# Patient Record
Sex: MALE | Race: WHITE | NOT HISPANIC OR LATINO | Employment: OTHER | URBAN - NONMETROPOLITAN AREA
[De-identification: names, ages, dates, MRNs, and addresses within clinical notes are randomized per-mention and may not be internally consistent; named-entity substitution may affect disease eponyms.]

---

## 2017-04-24 ENCOUNTER — HOSPITAL ENCOUNTER (EMERGENCY)
Facility: HOSPITAL | Age: 57
Discharge: ANOTHER HOSPITAL | End: 2017-04-24
Attending: EMERGENCY MEDICINE | Admitting: EMERGENCY MEDICINE

## 2017-04-24 ENCOUNTER — APPOINTMENT (OUTPATIENT)
Dept: CT IMAGING | Facility: HOSPITAL | Age: 57
End: 2017-04-24

## 2017-04-24 VITALS
BODY MASS INDEX: 32.95 KG/M2 | DIASTOLIC BLOOD PRESSURE: 91 MMHG | TEMPERATURE: 98.2 F | HEART RATE: 99 BPM | SYSTOLIC BLOOD PRESSURE: 129 MMHG | WEIGHT: 205 LBS | HEIGHT: 66 IN | RESPIRATION RATE: 16 BRPM | OXYGEN SATURATION: 98 %

## 2017-04-24 DIAGNOSIS — I63.9 CEREBROVASCULAR ACCIDENT (CVA), UNSPECIFIED MECHANISM (HCC): Primary | ICD-10-CM

## 2017-04-24 LAB
ALBUMIN SERPL-MCNC: 5.1 G/DL (ref 3.5–5)
ALBUMIN/GLOB SERPL: 1.4 G/DL (ref 1.5–2.5)
ALP SERPL-CCNC: 47 U/L (ref 40–129)
ALT SERPL W P-5'-P-CCNC: 26 U/L (ref 10–44)
ANION GAP SERPL CALCULATED.3IONS-SCNC: 6.9 MMOL/L (ref 3.6–11.2)
APTT PPP: 27.7 SECONDS (ref 24.4–31)
AST SERPL-CCNC: 23 U/L (ref 10–34)
BASOPHILS # BLD AUTO: 0.04 10*3/MM3 (ref 0–0.3)
BASOPHILS NFR BLD AUTO: 0.3 % (ref 0–2)
BILIRUB SERPL-MCNC: 0.6 MG/DL (ref 0.2–1.8)
BUN BLD-MCNC: 8 MG/DL (ref 7–21)
BUN/CREAT SERPL: 8.5 (ref 7–25)
CALCIUM SPEC-SCNC: 10.6 MG/DL (ref 7.7–10)
CHLORIDE SERPL-SCNC: 100 MMOL/L (ref 99–112)
CO2 SERPL-SCNC: 30.1 MMOL/L (ref 24.3–31.9)
CREAT BLD-MCNC: 0.94 MG/DL (ref 0.43–1.29)
D-LACTATE SERPL-SCNC: 1.6 MMOL/L (ref 0.5–2)
DEPRECATED RDW RBC AUTO: 45.5 FL (ref 37–54)
EOSINOPHIL # BLD AUTO: 0.03 10*3/MM3 (ref 0–0.7)
EOSINOPHIL NFR BLD AUTO: 0.2 % (ref 0–5)
ERYTHROCYTE [DISTWIDTH] IN BLOOD BY AUTOMATED COUNT: 14 % (ref 11.5–14.5)
FLUAV AG NPH QL: NEGATIVE
FLUBV AG NPH QL IA: NEGATIVE
GFR SERPL CREATININE-BSD FRML MDRD: 83 ML/MIN/1.73
GLOBULIN UR ELPH-MCNC: 3.6 GM/DL
GLUCOSE BLD-MCNC: 156 MG/DL (ref 70–110)
HCT VFR BLD AUTO: 51.2 % (ref 42–52)
HGB BLD-MCNC: 17.8 G/DL (ref 14–18)
HOLD SPECIMEN: NORMAL
IMM GRANULOCYTES # BLD: 0.03 10*3/MM3 (ref 0–0.03)
IMM GRANULOCYTES NFR BLD: 0.2 % (ref 0–0.5)
INR PPP: 1.03 (ref 0.8–1.1)
LYMPHOCYTES # BLD AUTO: 2.95 10*3/MM3 (ref 1–3)
LYMPHOCYTES NFR BLD AUTO: 20.8 % (ref 21–51)
MCH RBC QN AUTO: 30.9 PG (ref 27–33)
MCHC RBC AUTO-ENTMCNC: 34.8 G/DL (ref 33–37)
MCV RBC AUTO: 88.9 FL (ref 80–94)
MONOCYTES # BLD AUTO: 0.9 10*3/MM3 (ref 0.1–0.9)
MONOCYTES NFR BLD AUTO: 6.3 % (ref 0–10)
NEUTROPHILS # BLD AUTO: 10.23 10*3/MM3 (ref 1.4–6.5)
NEUTROPHILS NFR BLD AUTO: 72.2 % (ref 30–70)
OSMOLALITY SERPL CALC.SUM OF ELEC: 275.3 MOSM/KG (ref 273–305)
PLATELET # BLD AUTO: 294 10*3/MM3 (ref 130–400)
PMV BLD AUTO: 8.8 FL (ref 6–10)
POTASSIUM BLD-SCNC: 3.9 MMOL/L (ref 3.5–5.3)
PROT SERPL-MCNC: 8.7 G/DL (ref 6–8)
PROTHROMBIN TIME: 11.4 SECONDS (ref 9.8–11.9)
RBC # BLD AUTO: 5.76 10*6/MM3 (ref 4.7–6.1)
SODIUM BLD-SCNC: 137 MMOL/L (ref 135–153)
WBC NRBC COR # BLD: 14.18 10*3/MM3 (ref 4.5–12.5)
WHOLE BLOOD HOLD SPECIMEN: NORMAL
WHOLE BLOOD HOLD SPECIMEN: NORMAL

## 2017-04-24 PROCEDURE — 36415 COLL VENOUS BLD VENIPUNCTURE: CPT

## 2017-04-24 PROCEDURE — 83605 ASSAY OF LACTIC ACID: CPT | Performed by: EMERGENCY MEDICINE

## 2017-04-24 PROCEDURE — 80053 COMPREHEN METABOLIC PANEL: CPT | Performed by: EMERGENCY MEDICINE

## 2017-04-24 PROCEDURE — 70450 CT HEAD/BRAIN W/O DYE: CPT

## 2017-04-24 PROCEDURE — 93005 ELECTROCARDIOGRAM TRACING: CPT | Performed by: EMERGENCY MEDICINE

## 2017-04-24 PROCEDURE — 87040 BLOOD CULTURE FOR BACTERIA: CPT | Performed by: EMERGENCY MEDICINE

## 2017-04-24 PROCEDURE — 85025 COMPLETE CBC W/AUTO DIFF WBC: CPT | Performed by: EMERGENCY MEDICINE

## 2017-04-24 PROCEDURE — 70450 CT HEAD/BRAIN W/O DYE: CPT | Performed by: RADIOLOGY

## 2017-04-24 PROCEDURE — 93010 ELECTROCARDIOGRAM REPORT: CPT | Performed by: INTERNAL MEDICINE

## 2017-04-24 PROCEDURE — 85610 PROTHROMBIN TIME: CPT | Performed by: EMERGENCY MEDICINE

## 2017-04-24 PROCEDURE — 99284 EMERGENCY DEPT VISIT MOD MDM: CPT

## 2017-04-24 PROCEDURE — 87804 INFLUENZA ASSAY W/OPTIC: CPT | Performed by: EMERGENCY MEDICINE

## 2017-04-24 PROCEDURE — 85730 THROMBOPLASTIN TIME PARTIAL: CPT | Performed by: EMERGENCY MEDICINE

## 2017-04-24 RX ORDER — METOPROLOL TARTRATE 100 MG/1
100 TABLET ORAL 2 TIMES DAILY
COMMUNITY
End: 2017-05-04 | Stop reason: SDUPTHER

## 2017-04-24 RX ORDER — SODIUM CHLORIDE 0.9 % (FLUSH) 0.9 %
10 SYRINGE (ML) INJECTION AS NEEDED
Status: DISCONTINUED | OUTPATIENT
Start: 2017-04-24 | End: 2017-04-25 | Stop reason: HOSPADM

## 2017-04-25 ENCOUNTER — HOSPITAL ENCOUNTER (INPATIENT)
Facility: HOSPITAL | Age: 57
LOS: 3 days | Discharge: HOME | End: 2017-04-28
Attending: FAMILY MEDICINE | Admitting: FAMILY MEDICINE

## 2017-04-25 ENCOUNTER — APPOINTMENT (OUTPATIENT)
Dept: CARDIOLOGY | Facility: HOSPITAL | Age: 57
End: 2017-04-25

## 2017-04-25 ENCOUNTER — APPOINTMENT (OUTPATIENT)
Dept: MRI IMAGING | Facility: HOSPITAL | Age: 57
End: 2017-04-25

## 2017-04-25 ENCOUNTER — APPOINTMENT (OUTPATIENT)
Dept: CT IMAGING | Facility: HOSPITAL | Age: 57
End: 2017-04-25

## 2017-04-25 DIAGNOSIS — R47.01 COMBINED RECEPTIVE AND EXPRESSIVE APHASIA: Primary | ICD-10-CM

## 2017-04-25 DIAGNOSIS — Z74.09 IMPAIRED MOBILITY AND ADLS: ICD-10-CM

## 2017-04-25 DIAGNOSIS — Z74.09 IMPAIRED FUNCTIONAL MOBILITY, BALANCE, GAIT, AND ENDURANCE: ICD-10-CM

## 2017-04-25 DIAGNOSIS — I63.9 CEREBROVASCULAR ACCIDENT (CVA), UNSPECIFIED MECHANISM (HCC): ICD-10-CM

## 2017-04-25 DIAGNOSIS — Z78.9 IMPAIRED MOBILITY AND ADLS: ICD-10-CM

## 2017-04-25 PROBLEM — R73.09 ELEVATED GLUCOSE: Status: ACTIVE | Noted: 2017-04-25

## 2017-04-25 PROBLEM — I10 HYPERTENSION: Status: ACTIVE | Noted: 2017-04-25

## 2017-04-25 PROBLEM — Z72.0 TOBACCO ABUSE DISORDER: Status: ACTIVE | Noted: 2017-04-25

## 2017-04-25 LAB
ARTICHOKE IGE QN: 105 MG/DL (ref 0–130)
BH CV ECHO HCM PROVOKED PEAK GRADIENT: 29 MMHG
BH CV ECHO HCM RESTING PEAK GRADIENT: 29 MMHG
BH CV ECHO MEAS - AO ROOT AREA: 5.7 CM^2
BH CV ECHO MEAS - AO ROOT DIAM: 2.7 CM
BH CV ECHO MEAS - CONTRAST EF (2CH): 65 ML/M^2
BH CV ECHO MEAS - CONTRAST EF 4CH: 82.9 ML/M^2
BH CV ECHO MEAS - EDV(CUBED): 61.6 ML
BH CV ECHO MEAS - EDV(MOD-SP2): 20 ML
BH CV ECHO MEAS - EDV(MOD-SP4): 41 ML
BH CV ECHO MEAS - EDV(TEICH): 67.9 ML
BH CV ECHO MEAS - EF(CUBED): 74.3 %
BH CV ECHO MEAS - EF(MOD-SP2): 65 %
BH CV ECHO MEAS - EF(TEICH): 66.8 %
BH CV ECHO MEAS - ESV(CUBED): 15.8 ML
BH CV ECHO MEAS - ESV(MOD-SP2): 7 ML
BH CV ECHO MEAS - ESV(MOD-SP4): 7 ML
BH CV ECHO MEAS - ESV(TEICH): 22.5 ML
BH CV ECHO MEAS - FS: 36.5 %
BH CV ECHO MEAS - IVS/LVPW: 1.1
BH CV ECHO MEAS - IVSD: 1.1 CM
BH CV ECHO MEAS - LA/AO: 1.4
BH CV ECHO MEAS - LAT PEAK E' VEL: 8.2 CM/SEC
BH CV ECHO MEAS - LV MASS(C)D: 127.2 GRAMS
BH CV ECHO MEAS - LVIDD: 4 CM
BH CV ECHO MEAS - LVIDS: 2.5 CM
BH CV ECHO MEAS - LVLD AP2: 5.2 CM
BH CV ECHO MEAS - LVLD AP4: 6.8 CM
BH CV ECHO MEAS - LVLS AP2: 4.6 CM
BH CV ECHO MEAS - LVLS AP4: 4.4 CM
BH CV ECHO MEAS - LVPWD: 1.1 CM
BH CV ECHO MEAS - MED PEAK E' VEL: 6.1 CM/SEC
BH CV ECHO MEAS - MV A MAX VEL: 103 CM/SEC
BH CV ECHO MEAS - MV E MAX VEL: 66.1 CM/SEC
BH CV ECHO MEAS - MV E/A: 0.64
BH CV ECHO MEAS - PA ACC SLOPE: 574 CM/SEC^2
BH CV ECHO MEAS - PA ACC TIME: 0.15 SEC
BH CV ECHO MEAS - PA PR(ACCEL): 12.4 MMHG
BH CV ECHO MEAS - SV(CUBED): 45.8 ML
BH CV ECHO MEAS - SV(MOD-SP2): 13 ML
BH CV ECHO MEAS - SV(MOD-SP4): 34 ML
BH CV ECHO MEAS - SV(TEICH): 45.4 ML
BH CV ECHO MEAS - TAPSE (>1.6): 1.5 CM2
BH CV XLRA - RV BASE: 2.9 CM
BH CV XLRA - RV LENGTH: 5.2 CM
BH CV XLRA - RV MID: 2.5 CM
BH CV XLRA - TDI S': 33 CM/SEC
BH CV XLRA MEAS LEFT CCA RATIO VEL: 108 CM/SEC
BH CV XLRA MEAS LEFT DIST CCA EDV: 30.3 CM/SEC
BH CV XLRA MEAS LEFT DIST CCA PSV: 108 CM/SEC
BH CV XLRA MEAS LEFT DIST ICA EDV: 25.8 CM/SEC
BH CV XLRA MEAS LEFT DIST ICA PSV: 50.8 CM/SEC
BH CV XLRA MEAS LEFT ICA RATIO VEL: 289 CM/SEC
BH CV XLRA MEAS LEFT ICA/CCA RATIO: 2.7
BH CV XLRA MEAS LEFT MID ICA EDV: 42.7 CM/SEC
BH CV XLRA MEAS LEFT MID ICA PSV: 114 CM/SEC
BH CV XLRA MEAS LEFT PROX CCA EDV: 21 CM/SEC
BH CV XLRA MEAS LEFT PROX CCA PSV: 84.9 CM/SEC
BH CV XLRA MEAS LEFT PROX ECA PSV: 241 CM/SEC
BH CV XLRA MEAS LEFT PROX ICA EDV: 124 CM/SEC
BH CV XLRA MEAS LEFT PROX ICA PSV: 289 CM/SEC
BH CV XLRA MEAS LEFT PROX SCLA PSV: 132 CM/SEC
BH CV XLRA MEAS LEFT VERTEBRAL A PSV: 52.5 CM/SEC
BH CV XLRA MEAS RIGHT CCA RATIO VEL: 90.7 CM/SEC
BH CV XLRA MEAS RIGHT DIST CCA EDV: 30.8 CM/SEC
BH CV XLRA MEAS RIGHT DIST CCA PSV: 90.7 CM/SEC
BH CV XLRA MEAS RIGHT DIST ICA EDV: 47.4 CM/SEC
BH CV XLRA MEAS RIGHT DIST ICA PSV: 173 CM/SEC
BH CV XLRA MEAS RIGHT ICA RATIO VEL: 277 CM/SEC
BH CV XLRA MEAS RIGHT ICA/CCA RATIO: 3.1
BH CV XLRA MEAS RIGHT MID ICA EDV: 87.7 CM/SEC
BH CV XLRA MEAS RIGHT MID ICA PSV: 270 CM/SEC
BH CV XLRA MEAS RIGHT PROX CCA EDV: 27.9 CM/SEC
BH CV XLRA MEAS RIGHT PROX CCA PSV: 88.9 CM/SEC
BH CV XLRA MEAS RIGHT PROX ECA PSV: 274 CM/SEC
BH CV XLRA MEAS RIGHT PROX ICA EDV: 107 CM/SEC
BH CV XLRA MEAS RIGHT PROX ICA PSV: 277 CM/SEC
BH CV XLRA MEAS RIGHT PROX SCLA PSV: 129 CM/SEC
BH CV XLRA MEAS RIGHT VERTEBRAL A PSV: 39.6 CM/SEC
CHOLEST SERPL-MCNC: 192 MG/DL (ref 0–200)
CORTIS SERPL-MCNC: 23.4 MCG/DL
E/E' RATIO: 9.5
GLUCOSE BLDC GLUCOMTR-MCNC: 142 MG/DL (ref 70–130)
GLUCOSE BLDC GLUCOMTR-MCNC: 166 MG/DL (ref 70–130)
HBA1C MFR BLD: 7.7 % (ref 4.8–5.6)
HDLC SERPL-MCNC: 30 MG/DL (ref 40–60)
HOLD SPECIMEN: NORMAL
LEFT ATRIUM VOLUME INDEX: 9.5 ML/M2
LV EF 2D ECHO EST: 70 %
TRIGL SERPL-MCNC: 460 MG/DL (ref 0–150)

## 2017-04-25 PROCEDURE — 93010 ELECTROCARDIOGRAM REPORT: CPT | Performed by: INTERNAL MEDICINE

## 2017-04-25 PROCEDURE — 82962 GLUCOSE BLOOD TEST: CPT

## 2017-04-25 PROCEDURE — 0 IOPAMIDOL PER 1 ML: Performed by: INTERNAL MEDICINE

## 2017-04-25 PROCEDURE — 93005 ELECTROCARDIOGRAM TRACING: CPT | Performed by: FAMILY MEDICINE

## 2017-04-25 PROCEDURE — 83036 HEMOGLOBIN GLYCOSYLATED A1C: CPT | Performed by: NURSE PRACTITIONER

## 2017-04-25 PROCEDURE — 99255 IP/OBS CONSLTJ NEW/EST HI 80: CPT | Performed by: PSYCHIATRY & NEUROLOGY

## 2017-04-25 PROCEDURE — 80061 LIPID PANEL: CPT | Performed by: NURSE PRACTITIONER

## 2017-04-25 PROCEDURE — 70553 MRI BRAIN STEM W/O & W/DYE: CPT

## 2017-04-25 PROCEDURE — 93880 EXTRACRANIAL BILAT STUDY: CPT | Performed by: INTERNAL MEDICINE

## 2017-04-25 PROCEDURE — 92523 SPEECH SOUND LANG COMPREHEN: CPT

## 2017-04-25 PROCEDURE — 70498 CT ANGIOGRAPHY NECK: CPT

## 2017-04-25 PROCEDURE — 99223 1ST HOSP IP/OBS HIGH 75: CPT | Performed by: FAMILY MEDICINE

## 2017-04-25 PROCEDURE — 82533 TOTAL CORTISOL: CPT | Performed by: NURSE PRACTITIONER

## 2017-04-25 PROCEDURE — 93306 TTE W/DOPPLER COMPLETE: CPT

## 2017-04-25 PROCEDURE — 93880 EXTRACRANIAL BILAT STUDY: CPT

## 2017-04-25 PROCEDURE — 70496 CT ANGIOGRAPHY HEAD: CPT

## 2017-04-25 PROCEDURE — 99253 IP/OBS CNSLTJ NEW/EST LOW 45: CPT | Performed by: NEUROLOGICAL SURGERY

## 2017-04-25 PROCEDURE — A9577 INJ MULTIHANCE: HCPCS | Performed by: FAMILY MEDICINE

## 2017-04-25 PROCEDURE — 0 GADOBENATE DIMEGLUMINE 529 MG/ML SOLUTION: Performed by: FAMILY MEDICINE

## 2017-04-25 PROCEDURE — 97166 OT EVAL MOD COMPLEX 45 MIN: CPT

## 2017-04-25 PROCEDURE — 97162 PT EVAL MOD COMPLEX 30 MIN: CPT

## 2017-04-25 PROCEDURE — 93306 TTE W/DOPPLER COMPLETE: CPT | Performed by: INTERNAL MEDICINE

## 2017-04-25 RX ORDER — ATORVASTATIN CALCIUM 40 MG/1
80 TABLET, FILM COATED ORAL NIGHTLY
Status: DISCONTINUED | OUTPATIENT
Start: 2017-04-25 | End: 2017-04-28 | Stop reason: HOSPADM

## 2017-04-25 RX ORDER — BISACODYL 10 MG
10 SUPPOSITORY, RECTAL RECTAL DAILY PRN
Status: DISCONTINUED | OUTPATIENT
Start: 2017-04-25 | End: 2017-04-28 | Stop reason: HOSPADM

## 2017-04-25 RX ORDER — LANSOPRAZOLE 30 MG/1
30 CAPSULE, DELAYED RELEASE ORAL DAILY
Status: ON HOLD | COMMUNITY
End: 2017-04-28

## 2017-04-25 RX ORDER — ASPIRIN 325 MG
325 TABLET ORAL DAILY
Status: DISCONTINUED | OUTPATIENT
Start: 2017-04-26 | End: 2017-04-28 | Stop reason: HOSPADM

## 2017-04-25 RX ORDER — DOCUSATE SODIUM 100 MG/1
100 CAPSULE, LIQUID FILLED ORAL 2 TIMES DAILY
Status: DISCONTINUED | OUTPATIENT
Start: 2017-04-25 | End: 2017-04-28 | Stop reason: HOSPADM

## 2017-04-25 RX ORDER — ONDANSETRON 2 MG/ML
4 INJECTION INTRAMUSCULAR; INTRAVENOUS EVERY 6 HOURS PRN
Status: DISCONTINUED | OUTPATIENT
Start: 2017-04-25 | End: 2017-04-28 | Stop reason: HOSPADM

## 2017-04-25 RX ORDER — ASPIRIN 300 MG/1
300 SUPPOSITORY RECTAL DAILY
Status: DISCONTINUED | OUTPATIENT
Start: 2017-04-26 | End: 2017-04-28 | Stop reason: HOSPADM

## 2017-04-25 RX ORDER — ACETAMINOPHEN 325 MG/1
650 TABLET ORAL EVERY 4 HOURS PRN
Status: DISCONTINUED | OUTPATIENT
Start: 2017-04-25 | End: 2017-04-28 | Stop reason: HOSPADM

## 2017-04-25 RX ORDER — PANTOPRAZOLE SODIUM 40 MG/1
40 TABLET, DELAYED RELEASE ORAL
Status: DISCONTINUED | OUTPATIENT
Start: 2017-04-25 | End: 2017-04-28 | Stop reason: HOSPADM

## 2017-04-25 RX ORDER — SODIUM CHLORIDE 0.9 % (FLUSH) 0.9 %
1-10 SYRINGE (ML) INJECTION AS NEEDED
Status: DISCONTINUED | OUTPATIENT
Start: 2017-04-25 | End: 2017-04-28 | Stop reason: HOSPADM

## 2017-04-25 RX ORDER — ACETAMINOPHEN 650 MG/1
650 SUPPOSITORY RECTAL EVERY 4 HOURS PRN
Status: DISCONTINUED | OUTPATIENT
Start: 2017-04-25 | End: 2017-04-28 | Stop reason: HOSPADM

## 2017-04-25 RX ORDER — NICOTINE 21 MG/24HR
1 PATCH, TRANSDERMAL 24 HOURS TRANSDERMAL EVERY 24 HOURS
Status: DISCONTINUED | OUTPATIENT
Start: 2017-04-25 | End: 2017-04-28 | Stop reason: HOSPADM

## 2017-04-25 RX ADMIN — NICOTINE 1 PATCH: 21 PATCH, EXTENDED RELEASE TRANSDERMAL at 05:55

## 2017-04-25 RX ADMIN — GADOBENATE DIMEGLUMINE 19 ML: 529 INJECTION, SOLUTION INTRAVENOUS at 05:15

## 2017-04-25 RX ADMIN — DOCUSATE SODIUM 100 MG: 100 CAPSULE, LIQUID FILLED ORAL at 17:56

## 2017-04-25 RX ADMIN — PANTOPRAZOLE SODIUM 40 MG: 40 TABLET, DELAYED RELEASE ORAL at 05:55

## 2017-04-25 RX ADMIN — DOCUSATE SODIUM 100 MG: 100 CAPSULE, LIQUID FILLED ORAL at 09:27

## 2017-04-25 RX ADMIN — ATORVASTATIN CALCIUM 80 MG: 40 TABLET, FILM COATED ORAL at 20:12

## 2017-04-25 RX ADMIN — ACETAMINOPHEN 650 MG: 325 TABLET ORAL at 05:55

## 2017-04-25 RX ADMIN — IOPAMIDOL 75 ML: 755 INJECTION, SOLUTION INTRAVENOUS at 16:35

## 2017-04-25 NOTE — ED NOTES
Cary Clayton RN, [facility] called for report at this time. Full report given at this time, room number provided per Cary Clayton for patient.     Federica Mack RN  04/24/17 4811

## 2017-04-25 NOTE — ED NOTES
Called [facility] about ALS transport to [facility]. Dispatcher said they have 2 trucks out of town right now and will probably be a while before they can take it.     Cristina Carr  04/24/17 1237

## 2017-04-25 NOTE — ED NOTES
FAMILY STATES THAT PT JUST HAS NOT BEEN ACTING RIGHT FAMILY STATES THAT PT HAS BEEN VERY CONFUSED SINCE THEY WENT TO HIS HOUSE TODAY TO SEE WHY HE DIDN'T GO TO WORK OR WAS ANSWERING PHONE PT CANNOT VERBALIZE WHAT IS WRONG PT CANNOT TELL ME BIRTHDAY NAME ETC PT KNOWS BUT CANNOT VERBALIZE. LAST NIGHT WAS LAST KNOWN WELL FOR PT     Zena Nguyen RN  04/24/17 4415       Zena Nguyen RN  04/24/17 8561

## 2017-04-25 NOTE — ED NOTES
[facility] dispatch also said they have 2 trucks out of town and will be a few hours before they have a truck available.     Cristina Carr  04/24/17 9120

## 2017-04-25 NOTE — ED NOTES
Spoke with Oneida Vargas, patient's daughter at this time. Patient's daughter gives permission at this time for patient to be transported to [place] for further care.     Federica Mack RN  04/24/17 9749

## 2017-04-25 NOTE — ED NOTES
"Patient is resting on stretcher with patient's family at bedside x4. Patient remains aphasic at this time, patient is able to answer \"yes\" to questions, but unable to verbalize any other words. Patient's respirations are regular and unlabored at this time, NADN, will continue to monitor.     Federica Mack RN  04/24/17 9063    "

## 2017-04-25 NOTE — ED NOTES
"Patient is leaving ED with [facility] at this time. Report given to EMS, no further questions at this time. Patient remains aphasic, able to verbalize \"thank you\" when explained plan of care. IVs remain in place at discharge, respirations are regular and unlabored, DORENEN.     Federica Mack RN  04/24/17 8359    "

## 2017-04-25 NOTE — ED PROVIDER NOTES
Subjective   Patient is a 56 y.o. male presenting with altered mental status.   History provided by:  Patient  Altered Mental Status   Presenting symptoms: behavior changes, confusion, disorientation, lethargy and memory loss    Severity:  Moderate  Most recent episode:  Today  Episode history:  Single  Duration:  15 hours  Timing:  Constant  Progression:  Unchanged  Context: not head injury, taking medications as prescribed, not nursing home resident, not recent change in medication and not recent illness    Associated symptoms: weakness    Associated symptoms: no abdominal pain and no fever        Review of Systems   Constitutional: Negative for fever.   HENT: Negative.    Respiratory: Negative.    Cardiovascular: Negative.  Negative for chest pain.   Gastrointestinal: Negative.  Negative for abdominal pain.   Endocrine: Negative.    Genitourinary: Negative.  Negative for dysuria.   Skin: Negative.    Neurological: Positive for speech difficulty and weakness.   Psychiatric/Behavioral: Positive for confusion and memory loss.   All other systems reviewed and are negative.      Past Medical History:   Diagnosis Date   • Ear infection    • GERD (gastroesophageal reflux disease)     self diagnosed takes otc ppi   • Hypertension    • Hypertension        No Known Allergies    Past Surgical History:   Procedure Laterality Date   • HERNIA REPAIR     • HERNIA REPAIR         Family History   Problem Relation Age of Onset   • Diabetes Mother    • COPD Father        Social History     Social History   • Marital status:      Spouse name: N/A   • Number of children: N/A   • Years of education: N/A     Social History Main Topics   • Smoking status: Current Every Day Smoker     Packs/day: 1.00     Years: 40.00     Types: Cigarettes   • Smokeless tobacco: Never Used   • Alcohol use No   • Drug use: No   • Sexual activity: Defer     Other Topics Concern   • None     Social History Narrative           Objective   Physical  Exam   Constitutional: He is oriented to person, place, and time. He appears well-developed and well-nourished. No distress.   HENT:   Head: Normocephalic and atraumatic.   Right Ear: External ear normal.   Left Ear: External ear normal.   Nose: Nose normal.   Eyes: Conjunctivae and EOM are normal. Pupils are equal, round, and reactive to light.   Neck: Normal range of motion. Neck supple. No JVD present. No tracheal deviation present.   Cardiovascular: Normal rate, regular rhythm and normal heart sounds.    No murmur heard.  Pulmonary/Chest: Effort normal and breath sounds normal. No respiratory distress. He has no wheezes.   Abdominal: Soft. Bowel sounds are normal. There is no tenderness.   Musculoskeletal: Normal range of motion. He exhibits no edema or deformity.   Neurological: He is alert and oriented to person, place, and time. No cranial nerve deficit.   Weakness right    Skin: Skin is warm and dry. No rash noted. He is not diaphoretic. No erythema. No pallor.   Psychiatric: He has a normal mood and affect. His behavior is normal. Thought content normal.   Nursing note and vitals reviewed.      Procedures         ED Course  ED Course                  MDM    Final diagnoses:   Cerebrovascular accident (CVA), unspecified mechanism            Sid Rojo MD  04/28/17 3304

## 2017-04-26 LAB
GLUCOSE BLDC GLUCOMTR-MCNC: 147 MG/DL (ref 70–130)
GLUCOSE BLDC GLUCOMTR-MCNC: 148 MG/DL (ref 70–130)
GLUCOSE BLDC GLUCOMTR-MCNC: 150 MG/DL (ref 70–130)
GLUCOSE BLDC GLUCOMTR-MCNC: 155 MG/DL (ref 70–130)
GLUCOSE BLDC GLUCOMTR-MCNC: 156 MG/DL (ref 70–130)

## 2017-04-26 PROCEDURE — 99231 SBSQ HOSP IP/OBS SF/LOW 25: CPT | Performed by: PSYCHIATRY & NEUROLOGY

## 2017-04-26 PROCEDURE — 97110 THERAPEUTIC EXERCISES: CPT

## 2017-04-26 PROCEDURE — 82962 GLUCOSE BLOOD TEST: CPT

## 2017-04-26 PROCEDURE — G0108 DIAB MANAGE TRN  PER INDIV: HCPCS | Performed by: REGISTERED NURSE

## 2017-04-26 PROCEDURE — 99232 SBSQ HOSP IP/OBS MODERATE 35: CPT | Performed by: INTERNAL MEDICINE

## 2017-04-26 PROCEDURE — 99231 SBSQ HOSP IP/OBS SF/LOW 25: CPT | Performed by: NEUROLOGICAL SURGERY

## 2017-04-26 PROCEDURE — 63710000001 INSULIN LISPRO (HUMAN) PER 5 UNITS: Performed by: INTERNAL MEDICINE

## 2017-04-26 PROCEDURE — 97116 GAIT TRAINING THERAPY: CPT

## 2017-04-26 RX ORDER — CLOPIDOGREL BISULFATE 75 MG/1
75 TABLET ORAL DAILY
Status: DISCONTINUED | OUTPATIENT
Start: 2017-04-27 | End: 2017-04-28 | Stop reason: HOSPADM

## 2017-04-26 RX ORDER — CLOPIDOGREL BISULFATE 75 MG/1
300 TABLET ORAL ONCE
Status: COMPLETED | OUTPATIENT
Start: 2017-04-26 | End: 2017-04-26

## 2017-04-26 RX ORDER — NICOTINE POLACRILEX 4 MG
15 LOZENGE BUCCAL
Status: DISCONTINUED | OUTPATIENT
Start: 2017-04-26 | End: 2017-04-28 | Stop reason: HOSPADM

## 2017-04-26 RX ORDER — METOPROLOL TARTRATE 100 MG/1
100 TABLET ORAL 2 TIMES DAILY
Status: DISCONTINUED | OUTPATIENT
Start: 2017-04-26 | End: 2017-04-28 | Stop reason: HOSPADM

## 2017-04-26 RX ORDER — DEXTROSE MONOHYDRATE 25 G/50ML
25 INJECTION, SOLUTION INTRAVENOUS
Status: DISCONTINUED | OUTPATIENT
Start: 2017-04-26 | End: 2017-04-28 | Stop reason: HOSPADM

## 2017-04-26 RX ADMIN — INSULIN LISPRO 2 UNITS: 100 INJECTION, SOLUTION INTRAVENOUS; SUBCUTANEOUS at 20:56

## 2017-04-26 RX ADMIN — ASPIRIN 325 MG ORAL TABLET 325 MG: 325 PILL ORAL at 05:40

## 2017-04-26 RX ADMIN — INSULIN LISPRO 2 UNITS: 100 INJECTION, SOLUTION INTRAVENOUS; SUBCUTANEOUS at 12:49

## 2017-04-26 RX ADMIN — PANTOPRAZOLE SODIUM 40 MG: 40 TABLET, DELAYED RELEASE ORAL at 05:40

## 2017-04-26 RX ADMIN — DOCUSATE SODIUM 100 MG: 100 CAPSULE, LIQUID FILLED ORAL at 09:07

## 2017-04-26 RX ADMIN — DOCUSATE SODIUM 100 MG: 100 CAPSULE, LIQUID FILLED ORAL at 17:17

## 2017-04-26 RX ADMIN — CLOPIDOGREL BISULFATE 300 MG: 75 TABLET ORAL at 11:48

## 2017-04-26 RX ADMIN — NICOTINE 1 PATCH: 21 PATCH, EXTENDED RELEASE TRANSDERMAL at 05:41

## 2017-04-26 RX ADMIN — METOPROLOL TARTRATE 100 MG: 100 TABLET ORAL at 11:49

## 2017-04-26 RX ADMIN — METOPROLOL TARTRATE 100 MG: 100 TABLET ORAL at 17:17

## 2017-04-26 RX ADMIN — ATORVASTATIN CALCIUM 80 MG: 40 TABLET, FILM COATED ORAL at 20:55

## 2017-04-27 PROBLEM — E11.9 DIABETES MELLITUS TYPE 2 IN NONOBESE: Status: ACTIVE | Noted: 2017-04-25

## 2017-04-27 PROBLEM — E78.5 DYSLIPIDEMIA: Status: ACTIVE | Noted: 2017-04-27

## 2017-04-27 PROBLEM — I65.23 BILATERAL CAROTID ARTERY STENOSIS: Status: ACTIVE | Noted: 2017-04-27

## 2017-04-27 LAB
GLUCOSE BLDC GLUCOMTR-MCNC: 144 MG/DL (ref 70–130)
GLUCOSE BLDC GLUCOMTR-MCNC: 148 MG/DL (ref 70–130)
GLUCOSE BLDC GLUCOMTR-MCNC: 160 MG/DL (ref 70–130)
GLUCOSE BLDC GLUCOMTR-MCNC: 183 MG/DL (ref 70–130)

## 2017-04-27 PROCEDURE — 97530 THERAPEUTIC ACTIVITIES: CPT | Performed by: OCCUPATIONAL THERAPIST

## 2017-04-27 PROCEDURE — 97116 GAIT TRAINING THERAPY: CPT

## 2017-04-27 PROCEDURE — 82962 GLUCOSE BLOOD TEST: CPT

## 2017-04-27 PROCEDURE — 99232 SBSQ HOSP IP/OBS MODERATE 35: CPT | Performed by: FAMILY MEDICINE

## 2017-04-27 PROCEDURE — 99231 SBSQ HOSP IP/OBS SF/LOW 25: CPT | Performed by: PSYCHIATRY & NEUROLOGY

## 2017-04-27 PROCEDURE — 97110 THERAPEUTIC EXERCISES: CPT

## 2017-04-27 RX ORDER — MAGNESIUM CARB/ALUMINUM HYDROX 105-160MG
30 TABLET,CHEWABLE ORAL DAILY PRN
Status: DISCONTINUED | OUTPATIENT
Start: 2017-04-27 | End: 2017-04-28 | Stop reason: HOSPADM

## 2017-04-27 RX ADMIN — METOPROLOL TARTRATE 100 MG: 100 TABLET ORAL at 08:54

## 2017-04-27 RX ADMIN — INSULIN LISPRO 2 UNITS: 100 INJECTION, SOLUTION INTRAVENOUS; SUBCUTANEOUS at 12:19

## 2017-04-27 RX ADMIN — INSULIN LISPRO 2 UNITS: 100 INJECTION, SOLUTION INTRAVENOUS; SUBCUTANEOUS at 17:16

## 2017-04-27 RX ADMIN — DOCUSATE SODIUM 100 MG: 100 CAPSULE, LIQUID FILLED ORAL at 17:16

## 2017-04-27 RX ADMIN — CLOPIDOGREL BISULFATE 75 MG: 75 TABLET ORAL at 08:54

## 2017-04-27 RX ADMIN — ASPIRIN 325 MG ORAL TABLET 325 MG: 325 PILL ORAL at 08:54

## 2017-04-27 RX ADMIN — ATORVASTATIN CALCIUM 80 MG: 40 TABLET, FILM COATED ORAL at 21:29

## 2017-04-27 RX ADMIN — NICOTINE 1 PATCH: 21 PATCH, EXTENDED RELEASE TRANSDERMAL at 05:17

## 2017-04-27 RX ADMIN — METOPROLOL TARTRATE 100 MG: 100 TABLET ORAL at 17:16

## 2017-04-27 RX ADMIN — PANTOPRAZOLE SODIUM 40 MG: 40 TABLET, DELAYED RELEASE ORAL at 05:17

## 2017-04-27 RX ADMIN — METFORMIN HYDROCHLORIDE 500 MG: 500 TABLET, FILM COATED ORAL at 17:16

## 2017-04-27 RX ADMIN — DOCUSATE SODIUM 100 MG: 100 CAPSULE, LIQUID FILLED ORAL at 08:53

## 2017-04-28 VITALS
HEIGHT: 66 IN | DIASTOLIC BLOOD PRESSURE: 73 MMHG | SYSTOLIC BLOOD PRESSURE: 119 MMHG | BODY MASS INDEX: 32.62 KG/M2 | HEART RATE: 82 BPM | TEMPERATURE: 96.3 F | RESPIRATION RATE: 16 BRPM | WEIGHT: 203 LBS | OXYGEN SATURATION: 95 %

## 2017-04-28 LAB
GLUCOSE BLDC GLUCOMTR-MCNC: 134 MG/DL (ref 70–130)
GLUCOSE BLDC GLUCOMTR-MCNC: 146 MG/DL (ref 70–130)
TSH SERPL DL<=0.05 MIU/L-ACNC: 2.94 MIU/ML (ref 0.35–5.35)

## 2017-04-28 PROCEDURE — 82962 GLUCOSE BLOOD TEST: CPT

## 2017-04-28 PROCEDURE — 97116 GAIT TRAINING THERAPY: CPT

## 2017-04-28 PROCEDURE — 97110 THERAPEUTIC EXERCISES: CPT

## 2017-04-28 PROCEDURE — 84443 ASSAY THYROID STIM HORMONE: CPT | Performed by: NURSE PRACTITIONER

## 2017-04-28 RX ORDER — ATORVASTATIN CALCIUM 80 MG/1
80 TABLET, FILM COATED ORAL NIGHTLY
Qty: 30 TABLET | Refills: 0 | Status: SHIPPED | OUTPATIENT
Start: 2017-04-28 | End: 2017-05-04 | Stop reason: SDUPTHER

## 2017-04-28 RX ORDER — CLOPIDOGREL BISULFATE 75 MG/1
75 TABLET ORAL DAILY
Qty: 30 TABLET | Refills: 0 | Status: SHIPPED | OUTPATIENT
Start: 2017-04-28 | End: 2017-05-04 | Stop reason: SDUPTHER

## 2017-04-28 RX ORDER — LANSOPRAZOLE 30 MG/1
30 CAPSULE, DELAYED RELEASE ORAL DAILY
Qty: 30 CAPSULE | Refills: 0 | Status: SHIPPED | OUTPATIENT
Start: 2017-04-28 | End: 2017-05-04 | Stop reason: SDUPTHER

## 2017-04-28 RX ORDER — NICOTINE 21 MG/24HR
1 PATCH, TRANSDERMAL 24 HOURS TRANSDERMAL EVERY 24 HOURS
Qty: 30 PATCH | Refills: 0 | Status: SHIPPED | OUTPATIENT
Start: 2017-04-28 | End: 2017-05-16 | Stop reason: SINTOL

## 2017-04-28 RX ORDER — ASPIRIN 325 MG
325 TABLET ORAL DAILY
Qty: 30 TABLET | Refills: 0 | Status: SHIPPED | OUTPATIENT
Start: 2017-04-28 | End: 2017-05-04 | Stop reason: SDUPTHER

## 2017-04-28 RX ADMIN — METOPROLOL TARTRATE 100 MG: 100 TABLET ORAL at 09:31

## 2017-04-28 RX ADMIN — NICOTINE 1 PATCH: 21 PATCH, EXTENDED RELEASE TRANSDERMAL at 05:27

## 2017-04-28 RX ADMIN — CLOPIDOGREL BISULFATE 75 MG: 75 TABLET ORAL at 09:31

## 2017-04-28 RX ADMIN — PANTOPRAZOLE SODIUM 40 MG: 40 TABLET, DELAYED RELEASE ORAL at 05:26

## 2017-04-28 RX ADMIN — ASPIRIN 325 MG ORAL TABLET 325 MG: 325 PILL ORAL at 09:31

## 2017-04-28 RX ADMIN — METFORMIN HYDROCHLORIDE 500 MG: 500 TABLET, FILM COATED ORAL at 09:32

## 2017-04-29 LAB — BACTERIA SPEC AEROBE CULT: NORMAL

## 2017-05-01 ENCOUNTER — APPOINTMENT (OUTPATIENT)
Dept: OCCUPATIONAL THERAPY | Facility: HOSPITAL | Age: 57
End: 2017-05-01

## 2017-05-01 LAB
BACTERIA SPEC AEROBE CULT: ABNORMAL
GRAM STN SPEC: ABNORMAL

## 2017-05-02 ENCOUNTER — HOSPITAL ENCOUNTER (OUTPATIENT)
Dept: PHYSICAL THERAPY | Facility: HOSPITAL | Age: 57
Setting detail: THERAPIES SERIES
Discharge: HOME | End: 2017-05-02

## 2017-05-02 DIAGNOSIS — R26.89 BALANCE PROBLEM: ICD-10-CM

## 2017-05-02 DIAGNOSIS — R47.01 APHASIA: Primary | ICD-10-CM

## 2017-05-02 DIAGNOSIS — I63.9 CEREBROVASCULAR ACCIDENT (CVA), UNSPECIFIED MECHANISM (HCC): ICD-10-CM

## 2017-05-02 PROCEDURE — 97163 PT EVAL HIGH COMPLEX 45 MIN: CPT | Performed by: PHYSICAL THERAPIST

## 2017-05-03 ENCOUNTER — HOSPITAL ENCOUNTER (OUTPATIENT)
Dept: OCCUPATIONAL THERAPY | Facility: HOSPITAL | Age: 57
Setting detail: THERAPIES SERIES
Discharge: HOME | End: 2017-05-03

## 2017-05-03 DIAGNOSIS — G81.91 RIGHT HEMIPARESIS (HCC): Primary | ICD-10-CM

## 2017-05-03 PROCEDURE — 97167 OT EVAL HIGH COMPLEX 60 MIN: CPT | Performed by: OCCUPATIONAL THERAPIST

## 2017-05-04 ENCOUNTER — OFFICE VISIT (OUTPATIENT)
Dept: FAMILY MEDICINE CLINIC | Facility: CLINIC | Age: 57
End: 2017-05-04

## 2017-05-04 VITALS
WEIGHT: 195.2 LBS | OXYGEN SATURATION: 99 % | HEIGHT: 66 IN | SYSTOLIC BLOOD PRESSURE: 151 MMHG | TEMPERATURE: 97.3 F | DIASTOLIC BLOOD PRESSURE: 90 MMHG | BODY MASS INDEX: 31.37 KG/M2 | HEART RATE: 64 BPM

## 2017-05-04 DIAGNOSIS — R47.01 APHASIA: ICD-10-CM

## 2017-05-04 DIAGNOSIS — K21.9 GASTROESOPHAGEAL REFLUX DISEASE WITHOUT ESOPHAGITIS: ICD-10-CM

## 2017-05-04 DIAGNOSIS — E78.5 DYSLIPIDEMIA: ICD-10-CM

## 2017-05-04 DIAGNOSIS — E11.9 DIABETES MELLITUS TYPE 2 IN NONOBESE (HCC): ICD-10-CM

## 2017-05-04 DIAGNOSIS — I10 ESSENTIAL HYPERTENSION: ICD-10-CM

## 2017-05-04 DIAGNOSIS — Z72.0 TOBACCO ABUSE DISORDER: ICD-10-CM

## 2017-05-04 DIAGNOSIS — H61.22 IMPACTED CERUMEN OF LEFT EAR: ICD-10-CM

## 2017-05-04 DIAGNOSIS — I63.9 CEREBROVASCULAR ACCIDENT (CVA), UNSPECIFIED MECHANISM (HCC): Primary | ICD-10-CM

## 2017-05-04 LAB
ALBUMIN SERPL-MCNC: 4.7 G/DL (ref 3.5–5)
ALBUMIN/GLOB SERPL: 1.5 G/DL (ref 1.5–2.5)
ALP SERPL-CCNC: 59 U/L (ref 40–129)
ALT SERPL W P-5'-P-CCNC: 30 U/L (ref 10–44)
ANION GAP SERPL CALCULATED.3IONS-SCNC: 3.9 MMOL/L (ref 3.6–11.2)
AST SERPL-CCNC: 22 U/L (ref 10–34)
BASOPHILS # BLD AUTO: 0.05 10*3/MM3 (ref 0–0.3)
BASOPHILS NFR BLD AUTO: 0.5 % (ref 0–2)
BILIRUB SERPL-MCNC: 0.5 MG/DL (ref 0.2–1.8)
BUN BLD-MCNC: 10 MG/DL (ref 7–21)
BUN/CREAT SERPL: 12 (ref 7–25)
CALCIUM SPEC-SCNC: 10.2 MG/DL (ref 7.7–10)
CHLORIDE SERPL-SCNC: 103 MMOL/L (ref 99–112)
CO2 SERPL-SCNC: 32.1 MMOL/L (ref 24.3–31.9)
CREAT BLD-MCNC: 0.83 MG/DL (ref 0.43–1.29)
DEPRECATED RDW RBC AUTO: 43.4 FL (ref 37–54)
EOSINOPHIL # BLD AUTO: 0.11 10*3/MM3 (ref 0–0.7)
EOSINOPHIL NFR BLD AUTO: 1.1 % (ref 0–5)
ERYTHROCYTE [DISTWIDTH] IN BLOOD BY AUTOMATED COUNT: 13.5 % (ref 11.5–14.5)
GFR SERPL CREATININE-BSD FRML MDRD: 96 ML/MIN/1.73
GLOBULIN UR ELPH-MCNC: 3.1 GM/DL
GLUCOSE BLD-MCNC: 80 MG/DL (ref 70–110)
HCT VFR BLD AUTO: 44.8 % (ref 42–52)
HGB BLD-MCNC: 15.5 G/DL (ref 14–18)
IMM GRANULOCYTES # BLD: 0.02 10*3/MM3 (ref 0–0.03)
IMM GRANULOCYTES NFR BLD: 0.2 % (ref 0–0.5)
LYMPHOCYTES # BLD AUTO: 3.67 10*3/MM3 (ref 1–3)
LYMPHOCYTES NFR BLD AUTO: 36.8 % (ref 21–51)
MCH RBC QN AUTO: 31.1 PG (ref 27–33)
MCHC RBC AUTO-ENTMCNC: 34.6 G/DL (ref 33–37)
MCV RBC AUTO: 89.8 FL (ref 80–94)
MONOCYTES # BLD AUTO: 1.06 10*3/MM3 (ref 0.1–0.9)
MONOCYTES NFR BLD AUTO: 10.6 % (ref 0–10)
NEUTROPHILS # BLD AUTO: 5.06 10*3/MM3 (ref 1.4–6.5)
NEUTROPHILS NFR BLD AUTO: 50.8 % (ref 30–70)
OSMOLALITY SERPL CALC.SUM OF ELEC: 275.6 MOSM/KG (ref 273–305)
PLATELET # BLD AUTO: 354 10*3/MM3 (ref 130–400)
PMV BLD AUTO: 8.9 FL (ref 6–10)
POTASSIUM BLD-SCNC: 4.2 MMOL/L (ref 3.5–5.3)
PROT SERPL-MCNC: 7.8 G/DL (ref 6–8)
RBC # BLD AUTO: 4.99 10*6/MM3 (ref 4.7–6.1)
SODIUM BLD-SCNC: 139 MMOL/L (ref 135–153)
WBC NRBC COR # BLD: 9.97 10*3/MM3 (ref 4.5–12.5)

## 2017-05-04 PROCEDURE — 99214 OFFICE O/P EST MOD 30 MIN: CPT | Performed by: NURSE PRACTITIONER

## 2017-05-04 PROCEDURE — 80053 COMPREHEN METABOLIC PANEL: CPT | Performed by: NURSE PRACTITIONER

## 2017-05-04 PROCEDURE — 85025 COMPLETE CBC W/AUTO DIFF WBC: CPT | Performed by: NURSE PRACTITIONER

## 2017-05-04 RX ORDER — CALCIUM CITRATE/VITAMIN D3 200MG-6.25
TABLET ORAL
COMMUNITY
Start: 2017-05-01 | End: 2017-05-31

## 2017-05-04 RX ORDER — LOSARTAN POTASSIUM 25 MG/1
25 TABLET ORAL DAILY
Qty: 30 TABLET | Refills: 3 | Status: SHIPPED | OUTPATIENT
Start: 2017-05-04 | End: 2017-05-12 | Stop reason: SDUPTHER

## 2017-05-04 RX ORDER — METOPROLOL TARTRATE 100 MG/1
100 TABLET ORAL 2 TIMES DAILY
Qty: 60 TABLET | Refills: 3 | Status: SHIPPED | OUTPATIENT
Start: 2017-05-04 | End: 2017-08-02 | Stop reason: SDUPTHER

## 2017-05-04 RX ORDER — ASPIRIN 325 MG
325 TABLET ORAL DAILY
Qty: 30 TABLET | Refills: 3 | Status: SHIPPED | OUTPATIENT
Start: 2017-05-04 | End: 2017-09-25 | Stop reason: SDUPTHER

## 2017-05-04 RX ORDER — GLUCOSAM/CHON-MSM1/C/MANG/BOSW 500-416.6
TABLET ORAL
COMMUNITY
Start: 2017-05-01 | End: 2017-05-31

## 2017-05-04 RX ORDER — CLOPIDOGREL BISULFATE 75 MG/1
75 TABLET ORAL DAILY
Qty: 30 TABLET | Refills: 3 | Status: SHIPPED | OUTPATIENT
Start: 2017-05-04 | End: 2017-08-02 | Stop reason: SDUPTHER

## 2017-05-04 RX ORDER — LANSOPRAZOLE 30 MG/1
30 CAPSULE, DELAYED RELEASE ORAL DAILY
Qty: 30 CAPSULE | Refills: 3 | Status: SHIPPED | OUTPATIENT
Start: 2017-05-04 | End: 2017-05-31

## 2017-05-04 RX ORDER — ATORVASTATIN CALCIUM 80 MG/1
80 TABLET, FILM COATED ORAL NIGHTLY
Qty: 30 TABLET | Refills: 3 | Status: SHIPPED | OUTPATIENT
Start: 2017-05-04 | End: 2017-09-25 | Stop reason: SDUPTHER

## 2017-05-05 ENCOUNTER — APPOINTMENT (OUTPATIENT)
Dept: OCCUPATIONAL THERAPY | Facility: HOSPITAL | Age: 57
End: 2017-05-05

## 2017-05-05 ENCOUNTER — APPOINTMENT (OUTPATIENT)
Dept: PHYSICAL THERAPY | Facility: HOSPITAL | Age: 57
End: 2017-05-05

## 2017-05-08 ENCOUNTER — TELEPHONE (OUTPATIENT)
Dept: FAMILY MEDICINE CLINIC | Facility: CLINIC | Age: 57
End: 2017-05-08

## 2017-05-08 ENCOUNTER — HOSPITAL ENCOUNTER (OUTPATIENT)
Dept: OCCUPATIONAL THERAPY | Facility: HOSPITAL | Age: 57
Setting detail: THERAPIES SERIES
Discharge: HOME | End: 2017-05-08

## 2017-05-08 ENCOUNTER — HOSPITAL ENCOUNTER (OUTPATIENT)
Dept: SPEECH THERAPY | Facility: HOSPITAL | Age: 57
Setting detail: THERAPIES SERIES
Discharge: HOME | End: 2017-05-08

## 2017-05-08 ENCOUNTER — HOSPITAL ENCOUNTER (OUTPATIENT)
Dept: PHYSICAL THERAPY | Facility: HOSPITAL | Age: 57
Setting detail: THERAPIES SERIES
Discharge: HOME | End: 2017-05-08

## 2017-05-08 DIAGNOSIS — G81.91 RIGHT HEMIPARESIS (HCC): Primary | ICD-10-CM

## 2017-05-08 DIAGNOSIS — R47.01 APHASIA: Primary | ICD-10-CM

## 2017-05-08 DIAGNOSIS — R26.89 BALANCE PROBLEM: ICD-10-CM

## 2017-05-08 PROCEDURE — 92523 SPEECH SOUND LANG COMPREHEN: CPT

## 2017-05-08 PROCEDURE — 97112 NEUROMUSCULAR REEDUCATION: CPT

## 2017-05-08 PROCEDURE — 97110 THERAPEUTIC EXERCISES: CPT

## 2017-05-10 ENCOUNTER — APPOINTMENT (OUTPATIENT)
Dept: SPEECH THERAPY | Facility: HOSPITAL | Age: 57
End: 2017-05-10

## 2017-05-10 ENCOUNTER — TELEPHONE (OUTPATIENT)
Dept: FAMILY MEDICINE CLINIC | Facility: CLINIC | Age: 57
End: 2017-05-10

## 2017-05-10 DIAGNOSIS — I10 ESSENTIAL HYPERTENSION: ICD-10-CM

## 2017-05-12 RX ORDER — LOSARTAN POTASSIUM 25 MG/1
25 TABLET ORAL 2 TIMES DAILY
Qty: 30 TABLET | Refills: 3
Start: 2017-05-12 | End: 2017-05-16 | Stop reason: SDUPTHER

## 2017-05-15 ENCOUNTER — HOSPITAL ENCOUNTER (OUTPATIENT)
Dept: PHYSICAL THERAPY | Facility: HOSPITAL | Age: 57
Setting detail: THERAPIES SERIES
Discharge: HOME | End: 2017-05-15

## 2017-05-15 ENCOUNTER — HOSPITAL ENCOUNTER (OUTPATIENT)
Dept: OCCUPATIONAL THERAPY | Facility: HOSPITAL | Age: 57
Setting detail: THERAPIES SERIES
Discharge: HOME | End: 2017-05-15

## 2017-05-15 ENCOUNTER — HOSPITAL ENCOUNTER (OUTPATIENT)
Dept: SPEECH THERAPY | Facility: HOSPITAL | Age: 57
Setting detail: THERAPIES SERIES
Discharge: HOME | End: 2017-05-15

## 2017-05-15 DIAGNOSIS — R47.01 APHASIA: Primary | ICD-10-CM

## 2017-05-15 DIAGNOSIS — R26.89 BALANCE PROBLEM: ICD-10-CM

## 2017-05-15 DIAGNOSIS — I63.9 CEREBROVASCULAR ACCIDENT (CVA), UNSPECIFIED MECHANISM (HCC): Primary | ICD-10-CM

## 2017-05-15 PROCEDURE — 92507 TX SP LANG VOICE COMM INDIV: CPT

## 2017-05-16 ENCOUNTER — OFFICE VISIT (OUTPATIENT)
Dept: FAMILY MEDICINE CLINIC | Facility: CLINIC | Age: 57
End: 2017-05-16

## 2017-05-16 VITALS
SYSTOLIC BLOOD PRESSURE: 128 MMHG | HEIGHT: 66 IN | BODY MASS INDEX: 31.61 KG/M2 | OXYGEN SATURATION: 96 % | TEMPERATURE: 98.6 F | HEART RATE: 69 BPM | WEIGHT: 196.7 LBS | DIASTOLIC BLOOD PRESSURE: 78 MMHG

## 2017-05-16 DIAGNOSIS — I10 ESSENTIAL HYPERTENSION: ICD-10-CM

## 2017-05-16 PROCEDURE — 99213 OFFICE O/P EST LOW 20 MIN: CPT | Performed by: NURSE PRACTITIONER

## 2017-05-16 RX ORDER — CEPHALEXIN 500 MG/1
CAPSULE ORAL 2 TIMES DAILY
COMMUNITY
Start: 2017-05-10 | End: 2017-05-31

## 2017-05-16 RX ORDER — LOSARTAN POTASSIUM 25 MG/1
25 TABLET ORAL 2 TIMES DAILY
Qty: 30 TABLET | Refills: 5 | Status: SHIPPED | OUTPATIENT
Start: 2017-05-16 | End: 2017-06-23 | Stop reason: SDUPTHER

## 2017-05-18 ENCOUNTER — TELEPHONE (OUTPATIENT)
Dept: FAMILY MEDICINE CLINIC | Facility: CLINIC | Age: 57
End: 2017-05-18

## 2017-05-22 ENCOUNTER — HOSPITAL ENCOUNTER (OUTPATIENT)
Dept: SPEECH THERAPY | Facility: HOSPITAL | Age: 57
Setting detail: THERAPIES SERIES
Discharge: HOME | End: 2017-05-22

## 2017-05-22 ENCOUNTER — HOSPITAL ENCOUNTER (OUTPATIENT)
Dept: PHYSICAL THERAPY | Facility: HOSPITAL | Age: 57
Setting detail: THERAPIES SERIES
Discharge: HOME | End: 2017-05-22

## 2017-05-22 ENCOUNTER — TELEPHONE (OUTPATIENT)
Dept: FAMILY MEDICINE CLINIC | Facility: CLINIC | Age: 57
End: 2017-05-22

## 2017-05-22 DIAGNOSIS — R47.01 APHASIA: Primary | ICD-10-CM

## 2017-05-22 DIAGNOSIS — R26.89 BALANCE PROBLEM: ICD-10-CM

## 2017-05-22 DIAGNOSIS — I63.9 CEREBROVASCULAR ACCIDENT (CVA), UNSPECIFIED MECHANISM (HCC): ICD-10-CM

## 2017-05-22 PROCEDURE — 92507 TX SP LANG VOICE COMM INDIV: CPT

## 2017-05-25 ENCOUNTER — HOSPITAL ENCOUNTER (OUTPATIENT)
Dept: OCCUPATIONAL THERAPY | Facility: HOSPITAL | Age: 57
Setting detail: THERAPIES SERIES
Discharge: HOME | End: 2017-05-25

## 2017-05-25 ENCOUNTER — HOSPITAL ENCOUNTER (OUTPATIENT)
Dept: SPEECH THERAPY | Facility: HOSPITAL | Age: 57
Setting detail: THERAPIES SERIES
Discharge: HOME | End: 2017-05-25

## 2017-05-25 ENCOUNTER — HOSPITAL ENCOUNTER (OUTPATIENT)
Dept: PHYSICAL THERAPY | Facility: HOSPITAL | Age: 57
Setting detail: THERAPIES SERIES
Discharge: HOME | End: 2017-05-25

## 2017-05-25 DIAGNOSIS — G81.91 RIGHT HEMIPARESIS (HCC): Primary | ICD-10-CM

## 2017-05-25 DIAGNOSIS — R47.01 APHASIA: Primary | ICD-10-CM

## 2017-05-25 PROCEDURE — 97110 THERAPEUTIC EXERCISES: CPT | Performed by: OCCUPATIONAL THERAPIST

## 2017-05-25 PROCEDURE — 92507 TX SP LANG VOICE COMM INDIV: CPT

## 2017-05-31 ENCOUNTER — OFFICE VISIT (OUTPATIENT)
Dept: NEUROSURGERY | Facility: CLINIC | Age: 57
End: 2017-05-31

## 2017-05-31 VITALS
DIASTOLIC BLOOD PRESSURE: 80 MMHG | HEIGHT: 66 IN | SYSTOLIC BLOOD PRESSURE: 142 MMHG | TEMPERATURE: 98 F | WEIGHT: 197 LBS | BODY MASS INDEX: 31.66 KG/M2

## 2017-05-31 DIAGNOSIS — I65.23 BILATERAL CAROTID ARTERY STENOSIS: ICD-10-CM

## 2017-05-31 DIAGNOSIS — I63.9 CEREBROVASCULAR ACCIDENT (CVA), UNSPECIFIED MECHANISM (HCC): Primary | ICD-10-CM

## 2017-05-31 PROCEDURE — 99214 OFFICE O/P EST MOD 30 MIN: CPT | Performed by: NEUROLOGICAL SURGERY

## 2017-06-05 ENCOUNTER — APPOINTMENT (OUTPATIENT)
Dept: OCCUPATIONAL THERAPY | Facility: HOSPITAL | Age: 57
End: 2017-06-05

## 2017-06-05 ENCOUNTER — APPOINTMENT (OUTPATIENT)
Dept: SPEECH THERAPY | Facility: HOSPITAL | Age: 57
End: 2017-06-05

## 2017-06-06 DIAGNOSIS — I48.91 ATRIAL FIBRILLATION, UNSPECIFIED TYPE (HCC): Primary | ICD-10-CM

## 2017-06-12 ENCOUNTER — APPOINTMENT (OUTPATIENT)
Dept: SPEECH THERAPY | Facility: HOSPITAL | Age: 57
End: 2017-06-12

## 2017-06-12 ENCOUNTER — APPOINTMENT (OUTPATIENT)
Dept: PHYSICAL THERAPY | Facility: HOSPITAL | Age: 57
End: 2017-06-12

## 2017-06-22 ENCOUNTER — OFFICE VISIT (OUTPATIENT)
Dept: NEUROLOGY | Facility: CLINIC | Age: 57
End: 2017-06-22

## 2017-06-22 ENCOUNTER — OFFICE VISIT (OUTPATIENT)
Dept: CARDIOLOGY | Facility: HOSPITAL | Age: 57
End: 2017-06-22

## 2017-06-22 ENCOUNTER — PROCEDURE VISIT (OUTPATIENT)
Dept: CARDIOLOGY | Facility: HOSPITAL | Age: 57
End: 2017-06-22

## 2017-06-22 ENCOUNTER — APPOINTMENT (OUTPATIENT)
Dept: CARDIOLOGY | Facility: HOSPITAL | Age: 57
End: 2017-06-22

## 2017-06-22 VITALS
SYSTOLIC BLOOD PRESSURE: 161 MMHG | WEIGHT: 197 LBS | HEART RATE: 69 BPM | HEIGHT: 66 IN | OXYGEN SATURATION: 99 % | DIASTOLIC BLOOD PRESSURE: 93 MMHG | BODY MASS INDEX: 31.66 KG/M2 | RESPIRATION RATE: 16 BRPM | TEMPERATURE: 98 F

## 2017-06-22 VITALS
DIASTOLIC BLOOD PRESSURE: 100 MMHG | OXYGEN SATURATION: 99 % | HEIGHT: 66 IN | BODY MASS INDEX: 31.66 KG/M2 | WEIGHT: 197 LBS | HEART RATE: 64 BPM | SYSTOLIC BLOOD PRESSURE: 190 MMHG

## 2017-06-22 DIAGNOSIS — K12.2 ORAL ABSCESS: ICD-10-CM

## 2017-06-22 DIAGNOSIS — I63.9 CEREBROVASCULAR ACCIDENT (CVA), UNSPECIFIED MECHANISM (HCC): ICD-10-CM

## 2017-06-22 DIAGNOSIS — H53.451 ABNORMAL PERIPHERAL VISION OF RIGHT EYE: ICD-10-CM

## 2017-06-22 DIAGNOSIS — R00.2 PALPITATIONS: ICD-10-CM

## 2017-06-22 DIAGNOSIS — I63.9 CEREBROVASCULAR ACCIDENT (CVA), UNSPECIFIED MECHANISM (HCC): Primary | ICD-10-CM

## 2017-06-22 DIAGNOSIS — H91.93 HEARING LOSS, BILATERAL: ICD-10-CM

## 2017-06-22 DIAGNOSIS — I48.0 PAROXYSMAL ATRIAL FIBRILLATION (HCC): ICD-10-CM

## 2017-06-22 DIAGNOSIS — I10 ESSENTIAL HYPERTENSION: ICD-10-CM

## 2017-06-22 DIAGNOSIS — R06.83 SNORING: ICD-10-CM

## 2017-06-22 DIAGNOSIS — IMO0002: ICD-10-CM

## 2017-06-22 DIAGNOSIS — I10 ESSENTIAL HYPERTENSION: Primary | ICD-10-CM

## 2017-06-22 DIAGNOSIS — I65.23 BILATERAL CAROTID ARTERY STENOSIS: ICD-10-CM

## 2017-06-22 PROCEDURE — 99215 OFFICE O/P EST HI 40 MIN: CPT | Performed by: NURSE PRACTITIONER

## 2017-06-22 PROCEDURE — 93005 ELECTROCARDIOGRAM TRACING: CPT

## 2017-06-22 PROCEDURE — 99204 OFFICE O/P NEW MOD 45 MIN: CPT | Performed by: NURSE PRACTITIONER

## 2017-06-22 RX ORDER — LANSOPRAZOLE 15 MG/1
15 CAPSULE, DELAYED RELEASE ORAL DAILY
COMMUNITY
End: 2017-12-18 | Stop reason: SDUPTHER

## 2017-06-22 NOTE — PROGRESS NOTES
Subjective:     Patient ID: Vince Olivera is a 56 y.o. male.    History of Present Illness   This is a 56-year-old male who presents for [facility] follow-up from stroke.  He was admitted from 4/25/17 to 4/28/17.  He was diagnosed with a large left MCA stroke on the MRI of the brain with and without contrast also showing some chronic white matter changes and a very small intracranial hemorrhage in the right posterior lobe.  He was found on bilateral carotid ultrasound to have greater than 70% stenosis.  CTA of the brain was normal in CTA of the neck showed mild stenosis and he was evaluated by Dr. Bach but did not require intervention.  He is currently on aspirin 325 mg, Lipitor 80 mg and Plavix 75 mg daily for secondary stroke prevention.  Echocardiogram showed no PFO but it was recommended he have a loop recorder placed to rule out paroxysmal atrial fibrillation.  He is scheduled to see cardiology clinic today at 2 PM.  He is accompanied by his family during today's visit.  Late effects of stroke Included expressive and mild receptive aphasia, right eye peripheral vision disturbance and a mild right arm discoordination.  He also tells me that he is having severe tooth pain and mouth aches which were present before his stroke and he has several abscesses in his teeth and the dizziness has recommended he have a full oral extraction.  He and his family are wanting a referral to an oral surgeon for this to possibly be done.  He also has complaints of hearing loss and would like to be referred to ear nose and throat specialist.  He also has concerns of a right peripheral vision loss and would like to have his eyes evaluated as well.  He currently is not driving.  He does have hypertension and his blood pressure is significantly elevated today at 190/100.  He has taken all of his medications today.  He and his family have been checking his blood pressure twice a day and tell me every morning  that its significantly elevated like this.  He recently had his losartan added at 25 mg twice a day together with his metoprolol 100 mg twice a day.  His family tells me his blood pressure has still been poorly controlled.  He does have a lot of pain in his mouth so they felt it could be related to his mouth.  He did complete outpatient speech therapy and occupational therapy.  He did quit smoking on 5/29/17 as well.  He is also diabetic with most recent hemoglobin A1c 7.7%.  He is scheduled to follow-up with Dr. Bach in 2-3 months for repeat carotid ultrasound.  He will be seeing cardiology today and he is following closely with his primary care provider.    The following portions of the patient's history were reviewed and updated as appropriate: allergies, current medications, past family history, past medical history, past social history, past surgical history and problem list.    Review of Systems   Constitutional: Positive for fatigue. Negative for chills, fever and unexpected weight change.   HENT: Positive for ear pain. Negative for hearing loss, nosebleeds, rhinorrhea and sore throat.    Eyes: Positive for visual disturbance. Negative for photophobia, pain, discharge and itching.   Respiratory: Positive for cough. Negative for chest tightness, shortness of breath and wheezing.    Cardiovascular: Negative for chest pain, palpitations and leg swelling.   Gastrointestinal: Negative for abdominal pain, blood in stool, constipation, diarrhea, nausea and vomiting.   Endocrine:        FEELINGS OF WEAKNESS, MUSCLE WEAKNESS   Genitourinary: Negative for dysuria, frequency, hematuria and urgency.   Musculoskeletal: Negative for arthralgias, back pain, gait problem, joint swelling, myalgias, neck pain and neck stiffness.        JOINT STIFFNESS, LIMB PAIN   Skin: Negative for rash and wound.   Allergic/Immunologic: Negative for environmental allergies and food allergies.   Neurological: Negative for dizziness,  tremors, seizures, syncope, speech difficulty, weakness, light-headedness, numbness and headaches.        CONFUSION, DIFFICULTY WALKING   Hematological: Positive for adenopathy. Does not bruise/bleed easily.   Psychiatric/Behavioral: Positive for dysphoric mood and sleep disturbance. Negative for agitation, confusion, decreased concentration, hallucinations and suicidal ideas. The patient is not nervous/anxious.         CHANGE IN PERSONALITY, EMOTIONAL PROBLEMS        Objective:    Neurologic Exam     Mental Status   Oriented to person, place, and time.   Speech: (Mild expressive and receptive aphasia noted)  Level of consciousness: alert    Cranial Nerves     CN II   Visual acuity: decreased  Right visual field deficit: lower nasal quadrant(s)  Left visual field deficit: none     CN III, IV, VI   Pupils are equal, round, and reactive to light.  Extraocular motions are normal.     CN V   Facial sensation intact.     CN VII   Right facial weakness: central (mild)  Left facial weakness: none  Right taste: normal  Left taste: normal    CN VIII   Hearing: impaired    CN IX, X   CN IX normal.   CN X normal.     CN XI   CN XI normal.     CN XII   CN XII normal.     Motor Exam   Muscle bulk: normal  Overall muscle tone: normal    Strength   Strength 5/5 throughout.        Minimal right pronator drift     Sensory Exam   Light touch normal.   Vibration normal.   Proprioception normal.   Pinprick normal.     Gait, Coordination, and Reflexes     Gait  Gait: normal    Coordination   Romberg: negative  Finger to nose coordination: normal  Heel to shin coordination: normal  Tandem walking coordination: normal    Tremor   Resting tremor: absent  Intention tremor: absent  Action tremor: absent    Reflexes   Right brachioradialis: 2+  Left brachioradialis: 2+  Right biceps: 2+  Left biceps: 2+  Right triceps: 2+  Left triceps: 2+  Right patellar: 2+  Left patellar: 2+  Right achilles: 2+  Left achilles: 2+  Right : 2+  Left  : 2+      Physical Exam   Constitutional: He is oriented to person, place, and time. He appears well-developed and well-nourished.   HENT:   Mouth/Throat: Oral lesions present. Abnormal dentition. Dental abscesses present.   Eyes: EOM are normal. Pupils are equal, round, and reactive to light.   Fundoscopic exam:       The right eye shows red reflex.        The left eye shows red reflex.   Cardiovascular: Normal rate, regular rhythm, S1 normal, S2 normal and normal heart sounds.    Pulmonary/Chest: Effort normal and breath sounds normal.   Neurological: He is oriented to person, place, and time. He has normal strength. He has a normal Finger-Nose-Finger Test, a normal Heel to Shin Test, a normal Romberg Test and a normal Tandem Gait Test. Gait normal.   Reflex Scores:       Tricep reflexes are 2+ on the right side and 2+ on the left side.       Bicep reflexes are 2+ on the right side and 2+ on the left side.       Brachioradialis reflexes are 2+ on the right side and 2+ on the left side.       Patellar reflexes are 2+ on the right side and 2+ on the left side.       Achilles reflexes are 2+ on the right side and 2+ on the left side.  Psychiatric: His behavior is normal. Judgment and thought content normal. His mood appears anxious. Cognition and memory are normal.       Assessment/Plan:     Vince was seen today for stroke.    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  Recommended he follow-up with cardiology  as scheduled today and to discuss his hypertension with better management.  I recommended he take his second losartan when he gets to the car for a total of 50 mg this morning and to go to a local pharmacy her drug store and have this rechecked prior to his appointment with cardiology.  We will refer him to ophthalmology for vision exam and recommended he does not drive at this time.  Referral to ENT for hearing loss and to oral maxillofacial surgery for possible total dental extraction.  Of course he would have to be cleared to come off the Plavix unless the oral surgeon felt that they could do this safely while being on this medication.  He will follow-up in 3 months for reevaluation of symptoms.  I do expect that the receptive and expressive aphasia may slightly improved with time. Reviewed medications, potential side effects and signs and symptoms to report. Discussed risk versus benefits of treatment plan with patient and/or family-including medications, labs and radiology that may be ordered. Addressed questions and concerns during visit. Patient and/or family verbalized understanding and agree with plan. Discussed signs and symptoms of stroke and when to call 911. Instructed to follow a low fat diet including the Mediterranean diet. Instructed to take all medications daily as prescribed. Encouraged 30 minutes of exercise 3-4 times a week as tolerated. Stay well hydrated. Discussed potential side effects of new medications and signs and symptoms to report. If patient is currently using tobacco products, smoking cessation was encouraged. Reviewed stroke risk factors and stroke prevention plan. Patient and/or family verbalizes understanding and agrees with plan.     During this visit the following were done:  Labs Reviewed [x]    Labs Ordered []    Radiology Reports Reviewed [x]    Radiology Ordered []    PCP Records Reviewed []    Referring Provider Records Reviewed []    ER Records Reviewed [x]    Hospital  Records Reviewed [x]    History Obtained From Family []    Radiology Images Reviewed [x]    Other Reviewed [x]    Records Requested []      EMR Dragon/Transcription Disclaimer:  Much of this encounter note is an electronic transcription of spoken language to printed text. Electronic transcription of spoken language may permit erroneous words or phrases to be inadvertently transcribed. Although I have reviewed the note for such errors, some may still exist in this documentation.

## 2017-06-22 NOTE — PROGRESS NOTES
[facility]  [facility]      Encounter Date:06/22/2017     Vince Olivera  [address] [address]  [phone]    1960    [place]    Vince Olivera is a 56 y.o. male.      Subjective:     Chief Complaint:  Hypertension (s/p recent stroke.)       HPI     This is a 56-year-old male who presents for [facility] follow-up from stroke. He was admitted from 4/25/17 to 4/28/17. He was diagnosed with a large left MCA stroke on the MRI of the brain with and without contrast also showing some chronic white matter changes and a very small intracranial hemorrhage in the right posterior lobe. He was found on bilateral carotid ultrasound to have greater than 70% stenosis. CTA of the brain was normal in CTA of the neck showed mild stenosis and he was evaluated by Dr. Bach but did not require intervention. He is currently on aspirin 325 mg, Lipitor 80 mg and Plavix 75 mg daily for secondary stroke prevention. Echocardiogram showed no PFO. It was recommended during hospital visit to be evaluated for a loop recorder placed to rule out paroxysmal atrial fibrillation.     Pt denies CP, pressure, dizziness, syncope, edema.  C/o occasional palpitations over the years, short in duration, asymptomatic.  Reports hx of load snoring, even reported apneic period by family, daily time fatigue and can nap easily if sitting for period of time.  No hx of sleep study.       Late effects of stroke Included expressive and mild receptive aphasia, right eye peripheral vision disturbance and a mild right arm discoordination. He reports that he is having severe tooth pain and mouth aches which were present before his stroke and he has several abscesses in his teeth has been recommended he have a full oral extraction. He has been referred to opthalmology and oral surgeon by neurology.     He does have hypertension which has been elevated since d/c.  Duration of HTN  "unclear (\"I never went to the doctor unless sick\").  His Losartan increased today by Neurology to 50 mg am, 25 mg PM.  metoprolol 100 mg twice a day.  He did quit smoking on 5/29/17 as well. He is also diabetic with most recent hemoglobin A1c 7.7%.     He is scheduled to follow-up with Dr. Bach in 2-3 months for repeat carotid ultrasound.       Patient Active Problem List    Diagnosis   • GERD (gastroesophageal reflux disease) [K21.9]     Overview Note:     self diagnosed takes otc ppi     • Hyperlipidemia [E78.5]   • Combined receptive and expressive aphasia due to cerebrovascular accident [I63.9, R47.01]   • Abnormal peripheral vision of right eye [H53.451]   • Aphasia [R47.01]   • Gastroesophageal reflux disease without esophagitis [K21.9]   • Dyslipidemia [E78.5]   • Bilateral carotid artery stenosis [I65.23]   • Left MCA ischemic CVA (cerebral vascular accident) [I63.9]   • Tobacco abuse disorder [Z72.0]   • Diabetes mellitus type 2 in nonobese [E11.9]   • Hypertension [I10]         Past Surgical History:   Procedure Laterality Date   • HERNIA REPAIR     • HERNIA REPAIR         No Known Allergies      Current Outpatient Prescriptions:   •  aspirin 325 MG tablet, Take 1 tablet by mouth Daily., Disp: 30 tablet, Rfl: 3  •  atorvastatin (LIPITOR) 80 MG tablet, Take 1 tablet by mouth Every Night., Disp: 30 tablet, Rfl: 3  •  clopidogrel (PLAVIX) 75 MG tablet, Take 1 tablet by mouth Daily., Disp: 30 tablet, Rfl: 3  •  lansoprazole (PREVACID) 15 MG capsule, Take 15 mg by mouth Daily., Disp: , Rfl:   •  losartan (COZAAR) 25 MG tablet, 25 mg, 2 tabs am, 1 tab pm, Disp: 90 tablet, Rfl: 5  •  metFORMIN (GLUCOPHAGE) 500 MG tablet, Take 1 tablet by mouth 2 (Two) Times a Day With Meals., Disp: 60 tablet, Rfl: 3  •  metoprolol tartrate (LOPRESSOR) 100 MG tablet, Take 1 tablet by mouth 2 (Two) Times a Day., Disp: 60 tablet, Rfl: 3    The following portions of the patient's history were reviewed and updated as appropriate: " allergies, current medications, past family history, past medical history, past social history, past surgical history and problem list.    Review of Systems   Constitution: Positive for weakness and malaise/fatigue (all the time). Negative for chills, decreased appetite, diaphoresis, fever, night sweats, weight gain and weight loss.   HENT: Negative for congestion, headaches and nosebleeds.    Eyes: Negative for blurred vision, visual disturbance and visual halos.        Changes in vision   Cardiovascular: Negative for chest pain, dyspnea on exertion, irregular heartbeat, leg swelling, near-syncope, orthopnea, palpitations, paroxysmal nocturnal dyspnea and syncope.   Respiratory: Negative for cough, hemoptysis, shortness of breath, sleep disturbances due to breathing, snoring, sputum production and wheezing.    Endocrine: Negative for cold intolerance, heat intolerance, polydipsia, polyphagia and polyuria.   Hematologic/Lymphatic: Does not bruise/bleed easily.   Skin: Negative for dry skin, itching and rash.   Musculoskeletal: Positive for joint pain, muscle cramps, muscle weakness and neck pain (back of neck and shoulders). Negative for falls and myalgias.   Gastrointestinal: Positive for heartburn (controlled on prevacid). Negative for bloating, abdominal pain, constipation, diarrhea, dysphagia, melena, nausea and vomiting.   Genitourinary: Negative for dysuria, flank pain, hematuria and nocturia.   Neurological: Negative for difficulty with concentration, excessive daytime sleepiness, dizziness and loss of balance.   Psychiatric/Behavioral: Positive for altered mental status (confusion), depression and memory loss. The patient has insomnia and is nervous/anxious.    Allergic/Immunologic: Negative for environmental allergies.        Seasonal allergies       Objective:     Vitals:    06/22/17 1351 06/22/17 1352 06/22/17 1353   BP: 172/92 172/89 161/93   BP Location: Right arm Left arm Left arm   Patient Position:  "Sitting Sitting Standing   Pulse: 67  69   Resp: 16     Temp: 98 °F (36.7 °C)     TempSrc: Temporal Artery      SpO2: 99%     Weight: 197 lb (89.4 kg)     Height: 66\" (167.6 cm)           Physical Exam   Constitutional: He is oriented to person, place, and time. He appears well-developed and well-nourished. No distress.   HENT:   Head: Normocephalic and atraumatic.   Mouth/Throat: Oropharynx is clear and moist.   Eyes: Conjunctivae are normal. Pupils are equal, round, and reactive to light. No scleral icterus.   Neck: No hepatojugular reflux and no JVD present. Carotid bruit is not present. No tracheal deviation present. No thyromegaly present.   Cardiovascular: Normal rate, regular rhythm, normal heart sounds and intact distal pulses.  Exam reveals no friction rub.    No murmur heard.  Pulmonary/Chest: Effort normal and breath sounds normal.   Abdominal: Soft. Bowel sounds are normal. He exhibits no distension. There is no tenderness.   Musculoskeletal: He exhibits no edema.   Lymphadenopathy:     He has no cervical adenopathy.   Neurological: He is alert and oriented to person, place, and time.   Skin: Skin is warm, dry and intact. No rash noted. No cyanosis or erythema. No pallor.   Psychiatric: He has a normal mood and affect. His behavior is normal. Thought content normal.   Vitals reviewed.      Lab and Diagnostic Review:  05/04/17  Glucose 70 - 110 mg/dL 80   BUN 7 - 21 mg/dL 10   Creatinine 0.43 - 1.29 mg/dL 0.83   Sodium 135 - 153 mmol/L 139   Potassium 3.5 - 5.3 mmol/L 4.2   Chloride 99 - 112 mmol/L 103   CO2 24.3 - 31.9 mmol/L 32.1 (H)   Calcium 7.7 - 10.0 mg/dL 10.2 (H)   Total Protein 6.0 - 8.0 g/dL 7.8   Albumin 3.50 - 5.00 g/dL 4.70   ALT (SGPT) 10 - 44 U/L 30   AST (SGOT) 10 - 34 U/L 22     WBC 4.50 - 12.50 10*3/mm3 9.97   RBC 4.70 - 6.10 10*6/mm3 4.99   Hemoglobin 14.0 - 18.0 g/dL 15.5   Hematocrit 42.0 - 52.0 % 44.8   MCV 80.0 - 94.0 fL 89.8   MCH 27.0 - 33.0 pg 31.1   MCHC 33.0 - 37.0 g/dL 34.6 "   RDW 11.5 - 14.5 % 13.5   RDW-SD 37.0 - 54.0 fl 43.4   MPV 6.0 - 10.0 fL 8.9   Platelets 130 - 400 10*3/mm3 354     TSH 0.350 - 5.350 mIU/mL 2.936     04/25/17  Total Cholesterol 0 - 200 mg/dL 192   Triglycerides 0 - 150 mg/dL 460 (H)   HDL Cholesterol 40 - 60 mg/dL 30 (L)   LDL Cholesterol  0 - 130 mg/dL 105       Assessment and Plan:         1. Essential hypertension    - ECG 12 Lead; Normal sinus rhythm 60 bpm      - Ambulatory Referral to Sleep Lab  - Refill losartan (COZAAR) 25 MG tablet; 25 mg, 2 tabs am, 1 tab pm  Dispense: 90 tablet; Refill: 5  Continue metoprolol    Continue home BP and HR log.  F/u 2 weeks with PCP for BP check.    2. Bilateral carotid artery stenosis  Plavix, asa, statin    3. Cerebrovascular accident (CVA), unspecified mechanism      4. Palpitations    - Cardiac Event Monitor; 30 day, Dr. Ng to read  Pending event monitor result cardiology may consider loop recorder    5. Snoring    - Ambulatory Referral to Sleep Lab    Referral to cardiology    F/u H&V 8 weeks or sooner if needed.  *Please note that portions of this note were completed with a voice recognition program. Efforts were made to edit the dictations, but occasionally words are mistranscribed.

## 2017-06-23 RX ORDER — LOSARTAN POTASSIUM 25 MG/1
TABLET ORAL
Qty: 90 TABLET | Refills: 5 | Status: SHIPPED | OUTPATIENT
Start: 2017-06-23 | End: 2017-08-02 | Stop reason: SDUPTHER

## 2017-06-26 ENCOUNTER — TELEPHONE (OUTPATIENT)
Dept: FAMILY MEDICINE CLINIC | Facility: CLINIC | Age: 57
End: 2017-06-26

## 2017-06-26 NOTE — TELEPHONE ENCOUNTER
Medication been sent in by Cardiologist today with 5 refills. Wife been notified, she voiced understanding.

## 2017-06-26 NOTE — TELEPHONE ENCOUNTER
SAW CARDIOLOGIST IN [place] INCREASE HIS LOSARTAN.  CAN YOU SEND IN PREC FOR INCREASE. NOTE IN CHART.  HIS # [phone]

## 2017-07-03 ENCOUNTER — DOCUMENTATION (OUTPATIENT)
Dept: PHYSICAL THERAPY | Facility: HOSPITAL | Age: 57
End: 2017-07-03

## 2017-07-03 DIAGNOSIS — R47.01 APHASIA: Primary | ICD-10-CM

## 2017-07-03 DIAGNOSIS — R26.89 BALANCE PROBLEM: ICD-10-CM

## 2017-07-03 DIAGNOSIS — I63.9 CEREBROVASCULAR ACCIDENT (CVA), UNSPECIFIED MECHANISM (HCC): ICD-10-CM

## 2017-07-03 NOTE — THERAPY DISCHARGE NOTE
Outpatient Physical Therapy Discharge Summary         Patient Name: Vince Olivera  : 1960  MRN: [id number]    Today's Date: 7/3/2017    Visit Dx:    ICD-10-CM ICD-9-CM   1. Aphasia R47.01 784.3   2. Cerebrovascular accident (CVA), unspecified mechanism I63.9 434.91   3. Balance problem R26.89 781.99           OP PT Discharge Summary  Date of Discharge: 17  Reason for Discharge:  (Unknown)  Outcomes Achieved:  (Unable to assess due to failure to attend last scheduled treatment session.)  Discharge Destination: Unknown  Discharge Instructions: The patient was evaluated on 17 and attended four sessions following the evaluation. Two of the four sessions were held upon arrival due to elevated blood pressure. Due to not attending since 17, the patient is being discharged at this time.      Time Calculation:                    Ashley Claudene Dalton, PT  7/3/2017

## 2017-07-05 ENCOUNTER — DOCUMENTATION (OUTPATIENT)
Dept: OCCUPATIONAL THERAPY | Facility: HOSPITAL | Age: 57
End: 2017-07-05

## 2017-07-05 DIAGNOSIS — G81.91 RIGHT HEMIPARESIS (HCC): Primary | ICD-10-CM

## 2017-07-05 NOTE — THERAPY DISCHARGE NOTE
Outpatient Occupational Therapy Neuro Initial Evaluation/Discharge       Patient Name: Vince Olivera  : 1960  MRN: [id number]  Today's Date: 2017      Visit Date: 2017    Patient Active Problem List   Diagnosis   • Left MCA ischemic CVA (cerebral vascular accident)   • Tobacco abuse disorder   • Diabetes mellitus type 2 in nonobese   • Hypertension   • Dyslipidemia   • Bilateral carotid artery stenosis   • Aphasia   • Gastroesophageal reflux disease without esophagitis   • Combined receptive and expressive aphasia due to cerebrovascular accident   • Abnormal peripheral vision of right eye   • GERD (gastroesophageal reflux disease)   • Hyperlipidemia        Past Medical History:   Diagnosis Date   • Diabetes mellitus    • Ear infection    • GERD (gastroesophageal reflux disease)     self diagnosed takes otc ppi   • Hyperlipidemia    • Hypertension    • Hypertension    • Stroke         Past Surgical History:   Procedure Laterality Date   • HERNIA REPAIR     • HERNIA REPAIR           Visit Dx:    ICD-10-CM ICD-9-CM   1. Right hemiparesis G81.91 342.90                                                  Time Calculation:                     OP OT Discharge Summary  Date of Discharge: 17  Reason for Discharge: Unable to participate  Outcomes Achieved: Patient able to partially acheive established goals  Discharge Destination: Other (comment)  Discharge Instructions:   pt unable to attend therapy      Milagros Stein, OT  2017

## 2017-07-31 ENCOUNTER — OFFICE VISIT (OUTPATIENT)
Dept: CARDIOLOGY | Facility: CLINIC | Age: 57
End: 2017-07-31

## 2017-07-31 DIAGNOSIS — R00.2 PALPITATIONS: ICD-10-CM

## 2017-07-31 PROCEDURE — 93272 ECG/REVIEW INTERPRET ONLY: CPT | Performed by: INTERNAL MEDICINE

## 2017-08-02 ENCOUNTER — OFFICE VISIT (OUTPATIENT)
Dept: FAMILY MEDICINE CLINIC | Facility: CLINIC | Age: 57
End: 2017-08-02

## 2017-08-02 VITALS
DIASTOLIC BLOOD PRESSURE: 91 MMHG | SYSTOLIC BLOOD PRESSURE: 176 MMHG | BODY MASS INDEX: 31.19 KG/M2 | TEMPERATURE: 99.2 F | HEIGHT: 66 IN | HEART RATE: 68 BPM | WEIGHT: 194.1 LBS

## 2017-08-02 DIAGNOSIS — I10 ESSENTIAL HYPERTENSION: ICD-10-CM

## 2017-08-02 DIAGNOSIS — M79.671 BILATERAL FOOT PAIN: ICD-10-CM

## 2017-08-02 DIAGNOSIS — M79.672 BILATERAL FOOT PAIN: ICD-10-CM

## 2017-08-02 DIAGNOSIS — M67.432 GANGLION CYST OF WRIST, LEFT: ICD-10-CM

## 2017-08-02 DIAGNOSIS — E11.9 DIABETES MELLITUS TYPE 2 IN NONOBESE (HCC): ICD-10-CM

## 2017-08-02 DIAGNOSIS — I63.9 CEREBROVASCULAR ACCIDENT (CVA), UNSPECIFIED MECHANISM (HCC): Primary | ICD-10-CM

## 2017-08-02 DIAGNOSIS — Z11.59 NEED FOR HEPATITIS C SCREENING TEST: ICD-10-CM

## 2017-08-02 PROBLEM — R00.2 PALPITATIONS: Status: ACTIVE | Noted: 2017-08-02

## 2017-08-02 PROCEDURE — 99214 OFFICE O/P EST MOD 30 MIN: CPT | Performed by: NURSE PRACTITIONER

## 2017-08-02 RX ORDER — ONDANSETRON HYDROCHLORIDE 8 MG/1
8 TABLET, FILM COATED ORAL EVERY 8 HOURS PRN
Qty: 15 TABLET | Refills: 0 | Status: SHIPPED | OUTPATIENT
Start: 2017-08-02 | End: 2017-09-25

## 2017-08-02 RX ORDER — CLOPIDOGREL BISULFATE 75 MG/1
75 TABLET ORAL DAILY
Qty: 30 TABLET | Refills: 5 | Status: SHIPPED | OUTPATIENT
Start: 2017-08-02 | End: 2017-12-18 | Stop reason: SDUPTHER

## 2017-08-02 RX ORDER — LOSARTAN POTASSIUM 50 MG/1
50 TABLET ORAL 2 TIMES DAILY
Qty: 30 TABLET | Refills: 5 | Status: SHIPPED | OUTPATIENT
Start: 2017-08-02 | End: 2017-09-12 | Stop reason: SDUPTHER

## 2017-08-02 RX ORDER — METOPROLOL TARTRATE 100 MG/1
100 TABLET ORAL 2 TIMES DAILY
Qty: 60 TABLET | Refills: 5 | Status: SHIPPED | OUTPATIENT
Start: 2017-08-02 | End: 2017-11-10

## 2017-08-02 NOTE — PROGRESS NOTES
"Subjective   Vince Olivera is a 56 y.o. male.   Chief Complaint   Patient presents with   • Hypertension   • Med Refill     Plavix,Metformin,Metoprolol 30 days     History of Present Illness     Patient presents today for routine follow-up.  His blood pressure is elevated in the office.  He does report anxiety when coming into the office and states it is much higher here than it only as a home.  However, his blood pressure has been higher in the mornings.  Of the evenings, systolic readings are mostly in the 120s and 130s.  He is tolerating the medication well.  Continues to follow with neurology and cardiology.  He has appointments pending with ENT, ophthalmology, and the history.  Is planning to have all of his teeth extracted.  He is monitoring blood glucose at home, readings gradually improved.  Dietary compliance is variable.  He has become more active and is working more outside.  The expressive aphasia has markedly improved.  Today he has complaints of bilateral foot pain.  Describes this as a burning and tingling in both feet.  This is worse with walking and somewhat relieved with rest.  Is concerned about diabetic neuropathy.  Also has complaints of a nodule in the anterior portion of his left wrist.  Has noticed this in the past few months.  This does cause discomfort at times.  Has not had any trauma, redness, or swelling.  This is stable.  He also requests some type of medication for nausea.  Reports he has had some issues with drainage involving his teeth and this does cause nausea from time to time.        The following portions of the patient's history were reviewed and updated as appropriate: allergies, current medications, past family history, past medical history, past social history, past surgical history and problem list.  /91 (BP Location: Left arm, Patient Position: Sitting)  Pulse 68  Temp 99.2 °F (37.3 °C) (Oral)   Ht 66\" (167.6 cm)  Wt 194 lb 1.6 oz (88 kg)  BMI 31.33 " kg/m2  Review of Systems   Constitutional: Negative for chills and fever.   HENT: Negative for congestion, ear pain, rhinorrhea and sore throat.    Respiratory: Negative for cough, shortness of breath and wheezing.    Cardiovascular: Negative for chest pain, palpitations and leg swelling.   Gastrointestinal: Positive for nausea. Negative for abdominal pain, diarrhea and vomiting.   Genitourinary: Negative for dysuria and urgency.   Musculoskeletal: Negative for back pain and myalgias.        Foot pain   Skin: Negative for rash and wound.        cyst   Neurological: Negative for dizziness, light-headedness and headaches.   Psychiatric/Behavioral: Negative for sleep disturbance. The patient is not nervous/anxious.        Objective   Physical Exam   Constitutional: He is oriented to person, place, and time. He appears well-developed and well-nourished.   HENT:   Head: Normocephalic and atraumatic.   Eyes: Pupils are equal, round, and reactive to light.   Cardiovascular: Normal rate, regular rhythm and normal heart sounds.    Pedal pulses 2+, good hair growth, capillary refill < 3 seconds, warm   Pulmonary/Chest: Effort normal and breath sounds normal.   Abdominal: Soft. Bowel sounds are normal.   Musculoskeletal: Normal range of motion.   Ganglion cyst noted left wrist   Neurological: He is alert and oriented to person, place, and time.   Skin: Skin is warm and dry.   Psychiatric: He has a normal mood and affect. His behavior is normal.       Assessment/Plan   Vince was seen today for hypertension and med refill.    Diagnoses and all orders for this visit:    Cerebrovascular accident (CVA), unspecified mechanism  -     clopidogrel (PLAVIX) 75 MG tablet; Take 1 tablet by mouth Daily.  -     CBC & Differential; Future  -     Comprehensive Metabolic Panel; Future  -     Hemoglobin A1c; Future  -     Lipid Panel; Future  -     CK; Future  -     MicroAlbumin, Urine, Random; Future    Essential hypertension  -     losartan  (COZAAR) 50 MG tablet; Take 1 tablet by mouth 2 (Two) Times a Day.  -     metoprolol tartrate (LOPRESSOR) 100 MG tablet; Take 1 tablet by mouth 2 (Two) Times a Day.  -     CBC & Differential; Future  -     Comprehensive Metabolic Panel; Future  -     Hemoglobin A1c; Future  -     Lipid Panel; Future  -     CK; Future  -     MicroAlbumin, Urine, Random; Future    Diabetes mellitus type 2 in nonobese  -     metFORMIN (GLUCOPHAGE) 500 MG tablet; Take 1 tablet by mouth 2 (Two) Times a Day With Meals.  -     CBC & Differential; Future  -     Comprehensive Metabolic Panel; Future  -     Hemoglobin A1c; Future  -     Lipid Panel; Future  -     CK; Future  -     MicroAlbumin, Urine, Random; Future    Need for hepatitis C screening test  -     Hepatitis C Antibody; Future    Ganglion cyst of wrist, left    Bilateral foot pain    Other orders  -     ondansetron (ZOFRAN) 8 MG tablet; Take 1 tablet by mouth Every 8 (Eight) Hours As Needed for Nausea or Vomiting.      His blood pressure is not at goal.  I have increased the losartan to 50 mg twice a day.  They will continue to monitor blood pressure at home.  Parameters discussed.  Will let us know blood pressure does not improve.  For the bilateral foot pain, I have ordered a CK to be done with routine lab.  I do think this is likely a diabetic neuropathy.  However, I have asked him to discuss with cardiology as well due to the CAD.  I will discuss with Dr. Marvin for possible initiation of gabapentin.  The ganglion cyst, at this time he does not wish to pursue any type of intervention.  Will let us know if he wishes to do this in the future.  With the nausea, I have ordered Zofran to be used as needed.  Will help with facilitation of the oral surgeon referral.  I have ordered labs to be drawn fasting within the next week.  Continue follow-up with specialty as scheduled.  Follow-up here in 4 months and when needed.

## 2017-08-03 ENCOUNTER — LAB (OUTPATIENT)
Dept: FAMILY MEDICINE CLINIC | Facility: CLINIC | Age: 57
End: 2017-08-03

## 2017-08-03 DIAGNOSIS — Z11.59 NEED FOR HEPATITIS C SCREENING TEST: ICD-10-CM

## 2017-08-03 DIAGNOSIS — I10 ESSENTIAL HYPERTENSION: ICD-10-CM

## 2017-08-03 DIAGNOSIS — I63.9 CEREBROVASCULAR ACCIDENT (CVA), UNSPECIFIED MECHANISM (HCC): ICD-10-CM

## 2017-08-03 DIAGNOSIS — E11.9 DIABETES MELLITUS TYPE 2 IN NONOBESE (HCC): ICD-10-CM

## 2017-08-03 LAB
ALBUMIN SERPL-MCNC: 4.6 G/DL (ref 3.5–5)
ALBUMIN UR-MCNC: 4.6 MG/L
ALBUMIN/GLOB SERPL: 1.5 G/DL (ref 1.5–2.5)
ALP SERPL-CCNC: 67 U/L (ref 40–129)
ALT SERPL W P-5'-P-CCNC: 26 U/L (ref 10–44)
ANION GAP SERPL CALCULATED.3IONS-SCNC: 7.2 MMOL/L (ref 3.6–11.2)
AST SERPL-CCNC: 18 U/L (ref 10–34)
BASOPHILS # BLD AUTO: 0.04 10*3/MM3 (ref 0–0.3)
BASOPHILS NFR BLD AUTO: 0.5 % (ref 0–2)
BILIRUB SERPL-MCNC: 0.7 MG/DL (ref 0.2–1.8)
BUN BLD-MCNC: 9 MG/DL (ref 7–21)
BUN/CREAT SERPL: 10.5 (ref 7–25)
CALCIUM SPEC-SCNC: 10.3 MG/DL (ref 7.7–10)
CHLORIDE SERPL-SCNC: 105 MMOL/L (ref 99–112)
CHOLEST SERPL-MCNC: 98 MG/DL (ref 0–200)
CK SERPL-CCNC: 60 U/L (ref 24–204)
CO2 SERPL-SCNC: 30.8 MMOL/L (ref 24.3–31.9)
CREAT BLD-MCNC: 0.86 MG/DL (ref 0.43–1.29)
DEPRECATED RDW RBC AUTO: 44.6 FL (ref 37–54)
EOSINOPHIL # BLD AUTO: 0.13 10*3/MM3 (ref 0–0.7)
EOSINOPHIL NFR BLD AUTO: 1.5 % (ref 0–5)
ERYTHROCYTE [DISTWIDTH] IN BLOOD BY AUTOMATED COUNT: 13.8 % (ref 11.5–14.5)
GFR SERPL CREATININE-BSD FRML MDRD: 92 ML/MIN/1.73
GLOBULIN UR ELPH-MCNC: 3.1 GM/DL
GLUCOSE BLD-MCNC: 139 MG/DL (ref 70–110)
HBA1C MFR BLD: 6.1 % (ref 4.5–5.7)
HCT VFR BLD AUTO: 43.6 % (ref 42–52)
HCV AB SER DONR QL: NORMAL
HDLC SERPL-MCNC: 26 MG/DL (ref 60–100)
HGB BLD-MCNC: 14.8 G/DL (ref 14–18)
IMM GRANULOCYTES # BLD: 0.01 10*3/MM3 (ref 0–0.03)
IMM GRANULOCYTES NFR BLD: 0.1 % (ref 0–0.5)
LDLC SERPL CALC-MCNC: 47 MG/DL (ref 0–100)
LDLC/HDLC SERPL: 1.82 {RATIO}
LYMPHOCYTES # BLD AUTO: 3.22 10*3/MM3 (ref 1–3)
LYMPHOCYTES NFR BLD AUTO: 38.4 % (ref 21–51)
MCH RBC QN AUTO: 30.5 PG (ref 27–33)
MCHC RBC AUTO-ENTMCNC: 33.9 G/DL (ref 33–37)
MCV RBC AUTO: 89.9 FL (ref 80–94)
MONOCYTES # BLD AUTO: 0.76 10*3/MM3 (ref 0.1–0.9)
MONOCYTES NFR BLD AUTO: 9.1 % (ref 0–10)
NEUTROPHILS # BLD AUTO: 4.23 10*3/MM3 (ref 1.4–6.5)
NEUTROPHILS NFR BLD AUTO: 50.4 % (ref 30–70)
OSMOLALITY SERPL CALC.SUM OF ELEC: 285.9 MOSM/KG (ref 273–305)
PLATELET # BLD AUTO: 277 10*3/MM3 (ref 130–400)
PMV BLD AUTO: 9.2 FL (ref 6–10)
POTASSIUM BLD-SCNC: 4.2 MMOL/L (ref 3.5–5.3)
PROT SERPL-MCNC: 7.7 G/DL (ref 6–8)
RBC # BLD AUTO: 4.85 10*6/MM3 (ref 4.7–6.1)
SODIUM BLD-SCNC: 143 MMOL/L (ref 135–153)
TRIGL SERPL-MCNC: 124 MG/DL (ref 0–150)
VLDLC SERPL-MCNC: 24.8 MG/DL
WBC NRBC COR # BLD: 8.39 10*3/MM3 (ref 4.5–12.5)

## 2017-08-03 PROCEDURE — 83036 HEMOGLOBIN GLYCOSYLATED A1C: CPT | Performed by: NURSE PRACTITIONER

## 2017-08-03 PROCEDURE — 82043 UR ALBUMIN QUANTITATIVE: CPT | Performed by: NURSE PRACTITIONER

## 2017-08-03 PROCEDURE — 80053 COMPREHEN METABOLIC PANEL: CPT | Performed by: NURSE PRACTITIONER

## 2017-08-03 PROCEDURE — 36415 COLL VENOUS BLD VENIPUNCTURE: CPT | Performed by: FAMILY MEDICINE

## 2017-08-03 PROCEDURE — 80061 LIPID PANEL: CPT | Performed by: NURSE PRACTITIONER

## 2017-08-03 PROCEDURE — 85025 COMPLETE CBC W/AUTO DIFF WBC: CPT | Performed by: NURSE PRACTITIONER

## 2017-08-03 PROCEDURE — 86803 HEPATITIS C AB TEST: CPT | Performed by: NURSE PRACTITIONER

## 2017-08-03 PROCEDURE — 82550 ASSAY OF CK (CPK): CPT | Performed by: NURSE PRACTITIONER

## 2017-08-03 RX ORDER — GABAPENTIN 300 MG/1
300 CAPSULE ORAL
Qty: 30 CAPSULE | Refills: 1
Start: 2017-08-03 | End: 2017-09-25

## 2017-08-03 RX ORDER — AMOXICILLIN 875 MG/1
875 TABLET, COATED ORAL 2 TIMES DAILY
Qty: 20 TABLET | Refills: 0 | Status: SHIPPED | OUTPATIENT
Start: 2017-08-03 | End: 2017-08-13

## 2017-08-03 NOTE — TELEPHONE ENCOUNTER
Will let the patient know Dr. Marvin has written Gabapentin as noted for the bilateral foot paresthesias. He will  at the .

## 2017-09-11 DIAGNOSIS — I10 ESSENTIAL HYPERTENSION: ICD-10-CM

## 2017-09-11 NOTE — TELEPHONE ENCOUNTER
PREC ON LOSARTAN WAS INCREASE TO 2 A DAY . LAST PREC SENT IN WAS ONLY FOR 30 TABLETS , CAN YOU SEND NEW PREC TO [facility]

## 2017-09-12 RX ORDER — LOSARTAN POTASSIUM 50 MG/1
50 TABLET ORAL 2 TIMES DAILY
Qty: 60 TABLET | Refills: 5 | Status: SHIPPED | OUTPATIENT
Start: 2017-09-12 | End: 2017-09-25 | Stop reason: DRUGHIGH

## 2017-09-15 ENCOUNTER — HOSPITAL ENCOUNTER (OUTPATIENT)
Dept: CARDIOLOGY | Facility: HOSPITAL | Age: 57
Discharge: HOME | End: 2017-09-15
Attending: NEUROLOGICAL SURGERY | Admitting: NEUROLOGICAL SURGERY

## 2017-09-15 ENCOUNTER — OFFICE VISIT (OUTPATIENT)
Dept: NEUROSURGERY | Facility: CLINIC | Age: 57
End: 2017-09-15

## 2017-09-15 VITALS
WEIGHT: 190 LBS | SYSTOLIC BLOOD PRESSURE: 178 MMHG | BODY MASS INDEX: 30.53 KG/M2 | HEIGHT: 66 IN | TEMPERATURE: 98 F | DIASTOLIC BLOOD PRESSURE: 98 MMHG

## 2017-09-15 DIAGNOSIS — I63.412 CEREBROVASCULAR ACCIDENT (CVA) DUE TO EMBOLISM OF LEFT MIDDLE CEREBRAL ARTERY (HCC): ICD-10-CM

## 2017-09-15 DIAGNOSIS — I63.9 CEREBROVASCULAR ACCIDENT (CVA), UNSPECIFIED MECHANISM (HCC): ICD-10-CM

## 2017-09-15 DIAGNOSIS — I65.23 BILATERAL CAROTID ARTERY STENOSIS: ICD-10-CM

## 2017-09-15 DIAGNOSIS — I48.91 ATRIAL FIBRILLATION, UNSPECIFIED TYPE (HCC): ICD-10-CM

## 2017-09-15 DIAGNOSIS — I63.9 CEREBROVASCULAR ACCIDENT (CVA), UNSPECIFIED MECHANISM (HCC): Primary | ICD-10-CM

## 2017-09-15 PROBLEM — Z72.0 TOBACCO ABUSE DISORDER: Status: RESOLVED | Noted: 2017-04-25 | Resolved: 2017-09-15

## 2017-09-15 LAB
BH CV ECHO MEAS - BSA(HAYCOCK): 2.1 M^2
BH CV ECHO MEAS - BSA: 2 M^2
BH CV ECHO MEAS - BZI_BMI: 31.3 KILOGRAMS/M^2
BH CV ECHO MEAS - BZI_METRIC_HEIGHT: 167.6 CM
BH CV ECHO MEAS - BZI_METRIC_WEIGHT: 88 KG
BH CV XLRA MEAS LEFT CCA RATIO VEL: 66 CM/SEC
BH CV XLRA MEAS LEFT DIST CCA EDV: 22 CM/SEC
BH CV XLRA MEAS LEFT DIST CCA PSV: 66.6 CM/SEC
BH CV XLRA MEAS LEFT DIST ICA EDV: 22.4 CM/SEC
BH CV XLRA MEAS LEFT DIST ICA PSV: 50.7 CM/SEC
BH CV XLRA MEAS LEFT ICA RATIO VEL: 461 CM/SEC
BH CV XLRA MEAS LEFT ICA/CCA RATIO: 7
BH CV XLRA MEAS LEFT MID ICA EDV: 23.6 CM/SEC
BH CV XLRA MEAS LEFT MID ICA PSV: 95.9 CM/SEC
BH CV XLRA MEAS LEFT PROX CCA EDV: 22 CM/SEC
BH CV XLRA MEAS LEFT PROX CCA PSV: 84.9 CM/SEC
BH CV XLRA MEAS LEFT PROX ECA PSV: 256.8 CM/SEC
BH CV XLRA MEAS LEFT PROX ICA EDV: 164 CM/SEC
BH CV XLRA MEAS LEFT PROX ICA PSV: 461 CM/SEC
BH CV XLRA MEAS LEFT PROX SCLA PSV: 126.4 CM/SEC
BH CV XLRA MEAS LEFT VERTEBRAL A EDV: 14.1 CM/SEC
BH CV XLRA MEAS LEFT VERTEBRAL A PSV: 41.3 CM/SEC
BH CV XLRA MEAS RIGHT CCA RATIO VEL: 60.4 CM/SEC
BH CV XLRA MEAS RIGHT DIST CCA EDV: 20.6 CM/SEC
BH CV XLRA MEAS RIGHT DIST CCA PSV: 60.9 CM/SEC
BH CV XLRA MEAS RIGHT DIST ICA EDV: 21.4 CM/SEC
BH CV XLRA MEAS RIGHT DIST ICA PSV: 60.3 CM/SEC
BH CV XLRA MEAS RIGHT ICA RATIO VEL: 243 CM/SEC
BH CV XLRA MEAS RIGHT ICA/CCA RATIO: 4
BH CV XLRA MEAS RIGHT MID ICA EDV: 46.5 CM/SEC
BH CV XLRA MEAS RIGHT MID ICA PSV: 183.5 CM/SEC
BH CV XLRA MEAS RIGHT PROX CCA EDV: 18.2 CM/SEC
BH CV XLRA MEAS RIGHT PROX CCA PSV: 74.2 CM/SEC
BH CV XLRA MEAS RIGHT PROX ECA PSV: 211.2 CM/SEC
BH CV XLRA MEAS RIGHT PROX ICA EDV: 71.7 CM/SEC
BH CV XLRA MEAS RIGHT PROX ICA PSV: 243.9 CM/SEC
BH CV XLRA MEAS RIGHT PROX SCLA PSV: 151.3 CM/SEC
BH CV XLRA MEAS RIGHT VERTEBRAL A EDV: 6.3 CM/SEC
BH CV XLRA MEAS RIGHT VERTEBRAL A PSV: 32.7 CM/SEC
LEFT ARM BP: NORMAL MMHG
RIGHT ARM BP: NORMAL MMHG

## 2017-09-15 PROCEDURE — 99215 OFFICE O/P EST HI 40 MIN: CPT | Performed by: NEUROLOGICAL SURGERY

## 2017-09-15 PROCEDURE — 93880 EXTRACRANIAL BILAT STUDY: CPT

## 2017-09-15 PROCEDURE — 93880 EXTRACRANIAL BILAT STUDY: CPT | Performed by: INTERNAL MEDICINE

## 2017-09-15 NOTE — PROGRESS NOTES
Subjective     Chief Complaint: Left MCA stroke    Patient ID: Vince Olivera is a 56 y.o. male is here today for follow-up.    Stroke   This is a new problem. The current episode started more than 1 month ago. The problem occurs rarely. The problem has been rapidly improving. Associated symptoms include neck pain. Pertinent negatives include no abdominal pain, anorexia, arthralgias, change in bowel habit, chest pain, chills, congestion, coughing, diaphoresis, fatigue, fever, headaches, joint swelling, myalgias, nausea, numbness, rash, sore throat, swollen glands, urinary symptoms, vertigo, visual change, vomiting or weakness. Nothing aggravates the symptoms. He has tried nothing for the symptoms. The treatment provided significant relief.       This is a 56-year-old man who suffered a left MCA stroke about 5 months ago.  He has been managed with aspirin and Plavix.  He presents today for routine follow-up.  He has been taking his Lipitor, aspirin, and Plavix on a regular basis.  He reports no new problems with numbness, tingling, weakness or strokelike symptoms, or aphasia.  He reports that his expressive aphasia has gotten somewhat better.    He reports that he is no longer smoking.    The following portions of the patient's history were reviewed and updated as appropriate: allergies, current medications, past family history, past medical history, past social history, past surgical history and problem list.    Family history:   Family History   Problem Relation Age of Onset   • Diabetes Mother    • COPD Father        Social history:   Social History     Social History   • Marital status:      Spouse name: N/A   • Number of children: N/A   • Years of education: N/A     Occupational History   • Disabled      Social History Main Topics   • Smoking status: Former Smoker     Packs/day: 0.00     Years: 40.00     Types: Cigarettes     Quit date: 4/26/2017   • Smokeless tobacco: Never Used   • Alcohol use No   •  Drug use: No   • Sexual activity: Defer     Other Topics Concern   • Not on file     Social History Narrative    Patient consumes 0-1serving of caffeine daily.     Patient lives at home with wife.            Review of Systems   Constitutional: Positive for unexpected weight change. Negative for activity change, appetite change, chills, diaphoresis, fatigue and fever.   HENT: Positive for dental problem and hearing loss. Negative for congestion, drooling, ear discharge, ear pain, facial swelling, mouth sores, nosebleeds, postnasal drip, rhinorrhea, sinus pressure, sneezing, sore throat, tinnitus, trouble swallowing and voice change.    Eyes: Positive for visual disturbance. Negative for photophobia, pain, discharge, redness and itching.   Respiratory: Negative for apnea, cough, choking, chest tightness, shortness of breath, wheezing and stridor.    Cardiovascular: Negative for chest pain, palpitations and leg swelling.   Gastrointestinal: Negative for abdominal distention, abdominal pain, anal bleeding, anorexia, blood in stool, change in bowel habit, constipation, diarrhea, nausea, rectal pain and vomiting.   Endocrine: Negative for cold intolerance, heat intolerance, polydipsia, polyphagia and polyuria.   Genitourinary: Negative for decreased urine volume, difficulty urinating, dysuria, enuresis, flank pain, frequency, genital sores, hematuria and urgency.   Musculoskeletal: Positive for neck pain. Negative for arthralgias, back pain, gait problem, joint swelling, myalgias and neck stiffness.   Skin: Negative for color change, pallor, rash and wound.   Allergic/Immunologic: Negative for environmental allergies, food allergies and immunocompromised state.   Neurological: Negative for dizziness, vertigo, tremors, seizures, syncope, facial asymmetry, speech difficulty, weakness, light-headedness, numbness and headaches.        Mild expressive aphasia   Hematological: Negative for adenopathy. Does not bruise/bleed  "easily.   Psychiatric/Behavioral: Positive for confusion. Negative for agitation, behavioral problems, decreased concentration, dysphoric mood, hallucinations, self-injury, sleep disturbance and suicidal ideas. The patient is not nervous/anxious and is not hyperactive.    All other systems reviewed and are negative.      Objective   Blood pressure 178/98, temperature 98 °F (36.7 °C), height 66\" (167.6 cm), weight 190 lb (86.2 kg).  Body mass index is 30.67 kg/(m^2).    Physical Exam   Constitutional: He is oriented to person, place, and time. He appears well-developed and well-nourished.  Non-toxic appearance. No distress.   HENT:   Head: Normocephalic and atraumatic.   Mouth/Throat: Oropharynx is clear and moist.   Eyes: EOM are normal. Pupils are equal, round, and reactive to light. Right eye exhibits no discharge. Left eye exhibits no discharge.   Neck: Phonation normal. No JVD present. No tracheal deviation present. No thyromegaly present.   Cardiovascular: Normal rate and regular rhythm.    Pulmonary/Chest: Effort normal. No stridor. No respiratory distress. He has no wheezes.   Abdominal: Soft. Normal appearance. He exhibits no distension. There is no tenderness.   Musculoskeletal: He exhibits no edema.   Muscle Group     L          R  Deltoid                5          5  Bicep                  5          5  Tricep                 5          5                       5          5  Hand IO              5          5  Hip Flexor           5          5  Knee Extensor   5          5     ADF                    5          5  APF                    5          5      Neurological: He is alert and oriented to person, place, and time. He displays normal reflexes. No cranial nerve deficit or sensory deficit. He exhibits normal muscle tone. He displays a negative Romberg sign. Coordination and gait normal. GCS eye subscore is 4. GCS verbal subscore is 5. GCS motor subscore is 6.   Reflex Scores:       Tricep reflexes are " 2+ on the right side and 2+ on the left side.       Bicep reflexes are 2+ on the right side and 2+ on the left side.       Brachioradialis reflexes are 2+ on the right side and 2+ on the left side.       Patellar reflexes are 3+ on the right side and 3+ on the left side.       Achilles reflexes are 3+ on the right side and 3+ on the left side.  CN II-XII grossly intact.    Mild right pronator drift. FTN testing performed without dysmetria or bradykinesia.    Impaired naming and repetition.  Speech production is fluent.   Skin: Skin is warm and dry. He is not diaphoretic. No erythema.   Psychiatric: He has a normal mood and affect. His speech is normal and behavior is normal. Judgment and thought content normal.   Nursing note and vitals reviewed.        Assessment/Plan     Independent Review of Radiographic Studies:      Available for my review is a carotid duplex ultrasound that was performed this morning here at [facility].  Peak systolic velocities in the proximal portion of the left internal carotid artery are 461 cm/s inches increased in comparison to his carotid duplex from April.  Additionally, his ICA/CCA ratio is 7 which is also increased from his prior study.  Velocities in the right internal carotid artery are stable versus slightly decreased.    Medical Decision Making:      This is a 56-year-old man with high-grade, symptomatic stenosis of the left internal carotid artery.  I would like to see if he is a candidate for any of our stroke trials.  I will recheck out with our research coordinator today and see if we can get him enrolled in a carotid stent trial or crest 2.    I reviewed the signs and symptoms of stroke with the patient.  I directed him to call 911 if he thinks he is having a stroke.    Vince was seen today for cerebrovascular accident.    Diagnoses and all orders for this visit:    Cerebrovascular accident (CVA), unspecified mechanism  -     CT angiogram neck w wo contrast; Future    Atrial  fibrillation, unspecified type    Bilateral carotid artery stenosis    Cerebrovascular accident (CVA) due to embolism of left middle cerebral artery        Return in about 1 week (around 9/22/2017).           This document signed by HUGO Bach MD September 15, 2017 11:22 AM

## 2017-09-20 ENCOUNTER — HOSPITAL ENCOUNTER (OUTPATIENT)
Dept: CT IMAGING | Facility: HOSPITAL | Age: 57
Discharge: HOME | End: 2017-09-20
Attending: NEUROLOGICAL SURGERY | Admitting: NEUROLOGICAL SURGERY

## 2017-09-20 ENCOUNTER — OFFICE VISIT (OUTPATIENT)
Dept: NEUROSURGERY | Facility: CLINIC | Age: 57
End: 2017-09-20

## 2017-09-20 VITALS
HEIGHT: 66 IN | TEMPERATURE: 97.6 F | BODY MASS INDEX: 30.53 KG/M2 | SYSTOLIC BLOOD PRESSURE: 180 MMHG | DIASTOLIC BLOOD PRESSURE: 78 MMHG | WEIGHT: 190 LBS

## 2017-09-20 DIAGNOSIS — IMO0002: Primary | ICD-10-CM

## 2017-09-20 DIAGNOSIS — I63.9 CEREBROVASCULAR ACCIDENT (CVA), UNSPECIFIED MECHANISM (HCC): ICD-10-CM

## 2017-09-20 DIAGNOSIS — I10 ESSENTIAL HYPERTENSION: Primary | ICD-10-CM

## 2017-09-20 PROCEDURE — 82565 ASSAY OF CREATININE: CPT

## 2017-09-20 PROCEDURE — 0 IOPAMIDOL PER 1 ML: Performed by: NEUROLOGICAL SURGERY

## 2017-09-20 PROCEDURE — 99215 OFFICE O/P EST HI 40 MIN: CPT | Performed by: NEUROLOGICAL SURGERY

## 2017-09-20 PROCEDURE — 70498 CT ANGIOGRAPHY NECK: CPT

## 2017-09-20 RX ORDER — SODIUM CHLORIDE 0.9 % (FLUSH) 0.9 %
1-10 SYRINGE (ML) INJECTION AS NEEDED
Status: CANCELLED | OUTPATIENT
Start: 2017-09-20

## 2017-09-20 RX ADMIN — IOPAMIDOL 100 ML: 755 INJECTION, SOLUTION INTRAVENOUS at 10:45

## 2017-09-20 NOTE — H&P
Subjective     Chief Complaint: Stroke, left carotid stenosis    Patient ID: Vince Olivera is a 56 y.o. male is here today for follow-up.    Stroke   This is a new problem. The current episode started more than 1 month ago. The problem occurs rarely. The problem has been rapidly improving. Pertinent negatives include no abdominal pain, anorexia, arthralgias, change in bowel habit, chest pain, chills, congestion, coughing, diaphoresis, fatigue, fever, headaches, joint swelling, myalgias, nausea, neck pain, numbness, rash, sore throat, swollen glands, urinary symptoms, vertigo, visual change, vomiting or weakness. Nothing aggravates the symptoms. He has tried nothing for the symptoms. The treatment provided significant relief.       This is a 56-year-old man who suffered a stroke in April secondary to high-grade left internal carotid artery stenosis.  He has been maintained on aspirin and Plavix as an outpatient, and reports no new symptoms.  He presents today to discuss treatment options for his stenosis.    He does not have high risk features for a carotid endarterectomy.  Specifically, he does not have COPD, history of an MI, neck radiation, neck dissection, or high bifurcation of his carotid artery.    The following portions of the patient's history were reviewed and updated as appropriate: allergies, current medications, past family history, past medical history, past social history, past surgical history and problem list.    Family history:   Family History   Problem Relation Age of Onset   • Diabetes Mother    • COPD Father        Social history:   Social History     Social History   • Marital status:      Spouse name: N/A   • Number of children: N/A   • Years of education: N/A     Occupational History   • Disabled      Social History Main Topics   • Smoking status: Former Smoker     Packs/day: 0.00     Years: 40.00     Types: Cigarettes     Quit date: 4/26/2017   • Smokeless tobacco: Never Used   •  Alcohol use No   • Drug use: No   • Sexual activity: Defer     Other Topics Concern   • Not on file     Social History Narrative    Patient consumes 0-1serving of caffeine daily.     Patient lives at home with wife.            Review of Systems   Constitutional: Negative for activity change, appetite change, chills, diaphoresis, fatigue, fever and unexpected weight change.   HENT: Negative for congestion, dental problem, drooling, ear discharge, ear pain, facial swelling, hearing loss, mouth sores, nosebleeds, postnasal drip, rhinorrhea, sinus pressure, sneezing, sore throat, tinnitus, trouble swallowing and voice change.    Eyes: Negative for photophobia, pain, discharge, redness, itching and visual disturbance.   Respiratory: Negative for apnea, cough, choking, chest tightness, shortness of breath, wheezing and stridor.    Cardiovascular: Negative for chest pain, palpitations and leg swelling.   Gastrointestinal: Negative for abdominal distention, abdominal pain, anal bleeding, anorexia, blood in stool, change in bowel habit, constipation, diarrhea, nausea, rectal pain and vomiting.   Endocrine: Negative for cold intolerance, heat intolerance, polydipsia, polyphagia and polyuria.   Genitourinary: Negative for decreased urine volume, difficulty urinating, dysuria, enuresis, flank pain, frequency, genital sores, hematuria and urgency.   Musculoskeletal: Positive for neck stiffness. Negative for arthralgias, back pain, gait problem, joint swelling, myalgias and neck pain.   Skin: Negative for color change, pallor, rash and wound.   Allergic/Immunologic: Negative for environmental allergies, food allergies and immunocompromised state.   Neurological: Negative for dizziness, vertigo, tremors, seizures, syncope, facial asymmetry, speech difficulty, weakness, light-headedness, numbness and headaches.   Hematological: Negative for adenopathy. Does not bruise/bleed easily.   Psychiatric/Behavioral: Negative for agitation,  "behavioral problems, confusion, decreased concentration, dysphoric mood, hallucinations, self-injury, sleep disturbance and suicidal ideas. The patient is not nervous/anxious and is not hyperactive.    All other systems reviewed and are negative.      Objective   Blood pressure 180/78, temperature 97.6 °F (36.4 °C), height 66\" (167.6 cm), weight 190 lb (86.2 kg).  Body mass index is 30.67 kg/(m^2).    Physical Exam   Constitutional: He is oriented to person, place, and time. He appears well-developed and well-nourished.  Non-toxic appearance. No distress.   HENT:   Head: Normocephalic and atraumatic.   Mouth/Throat: Oropharynx is clear and moist.   Eyes: EOM are normal. Pupils are equal, round, and reactive to light. Right eye exhibits no discharge. Left eye exhibits no discharge.   Neck: Phonation normal. No JVD present. No tracheal deviation present. No thyromegaly present.   Cardiovascular: Normal rate and regular rhythm.    Pulmonary/Chest: Effort normal. No stridor. No respiratory distress. He has no wheezes.   Abdominal: Soft. Normal appearance. He exhibits no distension. There is no tenderness.   Musculoskeletal: He exhibits no edema.   Muscle Group     L          R  Deltoid                5          5  Bicep                  5          5  Tricep                 5          5                       5          5  Hand IO              5          5  Hip Flexor           5          5  Knee Extensor   5          5     ADF                    5          5  APF                    5          5      Neurological: He is alert and oriented to person, place, and time. He displays normal reflexes. No cranial nerve deficit or sensory deficit. He exhibits normal muscle tone. He displays a negative Romberg sign. Coordination and gait normal. GCS eye subscore is 4. GCS verbal subscore is 5. GCS motor subscore is 6.   Reflex Scores:       Tricep reflexes are 2+ on the right side and 2+ on the left side.       Bicep reflexes " are 2+ on the right side and 2+ on the left side.       Brachioradialis reflexes are 2+ on the right side and 2+ on the left side.       Patellar reflexes are 3+ on the right side and 3+ on the left side.       Achilles reflexes are 3+ on the right side and 3+ on the left side.  CN II-XII grossly intact.    Mild right pronator drift. FTN testing performed without dysmetria or bradykinesia.    Impaired naming and repetition.  Speech production is fluent.   Skin: Skin is warm and dry. He is not diaphoretic. No erythema.   Psychiatric: He has a normal mood and affect. His speech is normal and behavior is normal. Judgment and thought content normal.   Nursing note and vitals reviewed.        Assessment/Plan     Independent Review of Radiographic Studies:      Available for my review is a CT angiogram of the neck.  This discloses high-grade stenosis of the left internal carotid artery with extensive calcific disease circumferentially at the bifurcation.  The extent of the calcific disease does not extend cephalad to the level of the mandible or the C2 vertebral body.    Medical Decision Making:      This is a 56-year-old man with high-grade, symptomatic stenosis of his left internal carotid artery.  He suffered a stroke 5 months ago.  He has not had any new neurological symptoms and is being maintained on aspirin and Plavix.    Informed consent for a left sided carotid endarterectomy was obtained from the patient.  He acknowledges risk of stroke, death, pain, paralysis, coma, blindness, permanent neurologic disability, bleeding, infection, cranial nerve injury, recurrent laryngeal nerve injury, hoarseness, dysphagia, facial numbness, failure of benefit of the operation, or need for additional procedures.  All questions were answered.  No guarantees were given or implied.  The patient elects to proceed with surgery.    I will perform this procedure as the assistant to Dr. Armani Griffin.  We will schedule the patient on  an elective basis in the near future.  Did review the signs and symptoms of stroke with the patient, and I directed him to call 911 if he thinks he is having another stroke.    There are no diagnoses linked to this encounter.    No Follow-up on file.           This document signed by HUGO Bach MD September 20, 2017 2:49 PM

## 2017-09-20 NOTE — PROGRESS NOTES
Subjective     Chief Complaint: Stroke, left carotid stenosis    Patient ID: Vince Olivera is a 56 y.o. male is here today for follow-up.    Stroke   This is a new problem. The current episode started more than 1 month ago. The problem occurs rarely. The problem has been rapidly improving. Pertinent negatives include no abdominal pain, anorexia, arthralgias, change in bowel habit, chest pain, chills, congestion, coughing, diaphoresis, fatigue, fever, headaches, joint swelling, myalgias, nausea, neck pain, numbness, rash, sore throat, swollen glands, urinary symptoms, vertigo, visual change, vomiting or weakness. Nothing aggravates the symptoms. He has tried nothing for the symptoms. The treatment provided significant relief.       This is a 56-year-old man who suffered a stroke in April secondary to high-grade left internal carotid artery stenosis.  He has been maintained on aspirin and Plavix as an outpatient, and reports no new symptoms.  He presents today to discuss treatment options for his stenosis.    He does not have high risk features for a carotid endarterectomy.  Specifically, he does not have COPD, history of an MI, neck radiation, neck dissection, or high bifurcation of his carotid artery.    The following portions of the patient's history were reviewed and updated as appropriate: allergies, current medications, past family history, past medical history, past social history, past surgical history and problem list.    Family history:   Family History   Problem Relation Age of Onset   • Diabetes Mother    • COPD Father        Social history:   Social History     Social History   • Marital status:      Spouse name: N/A   • Number of children: N/A   • Years of education: N/A     Occupational History   • Disabled      Social History Main Topics   • Smoking status: Former Smoker     Packs/day: 0.00     Years: 40.00     Types: Cigarettes     Quit date: 4/26/2017   • Smokeless tobacco: Never Used   •  Alcohol use No   • Drug use: No   • Sexual activity: Defer     Other Topics Concern   • Not on file     Social History Narrative    Patient consumes 0-1serving of caffeine daily.     Patient lives at home with wife.            Review of Systems   Constitutional: Negative for activity change, appetite change, chills, diaphoresis, fatigue, fever and unexpected weight change.   HENT: Negative for congestion, dental problem, drooling, ear discharge, ear pain, facial swelling, hearing loss, mouth sores, nosebleeds, postnasal drip, rhinorrhea, sinus pressure, sneezing, sore throat, tinnitus, trouble swallowing and voice change.    Eyes: Negative for photophobia, pain, discharge, redness, itching and visual disturbance.   Respiratory: Negative for apnea, cough, choking, chest tightness, shortness of breath, wheezing and stridor.    Cardiovascular: Negative for chest pain, palpitations and leg swelling.   Gastrointestinal: Negative for abdominal distention, abdominal pain, anal bleeding, anorexia, blood in stool, change in bowel habit, constipation, diarrhea, nausea, rectal pain and vomiting.   Endocrine: Negative for cold intolerance, heat intolerance, polydipsia, polyphagia and polyuria.   Genitourinary: Negative for decreased urine volume, difficulty urinating, dysuria, enuresis, flank pain, frequency, genital sores, hematuria and urgency.   Musculoskeletal: Positive for neck stiffness. Negative for arthralgias, back pain, gait problem, joint swelling, myalgias and neck pain.   Skin: Negative for color change, pallor, rash and wound.   Allergic/Immunologic: Negative for environmental allergies, food allergies and immunocompromised state.   Neurological: Negative for dizziness, vertigo, tremors, seizures, syncope, facial asymmetry, speech difficulty, weakness, light-headedness, numbness and headaches.   Hematological: Negative for adenopathy. Does not bruise/bleed easily.   Psychiatric/Behavioral: Negative for agitation,  "behavioral problems, confusion, decreased concentration, dysphoric mood, hallucinations, self-injury, sleep disturbance and suicidal ideas. The patient is not nervous/anxious and is not hyperactive.    All other systems reviewed and are negative.      Objective   Blood pressure 180/78, temperature 97.6 °F (36.4 °C), height 66\" (167.6 cm), weight 190 lb (86.2 kg).  Body mass index is 30.67 kg/(m^2).    Physical Exam   Constitutional: He is oriented to person, place, and time. He appears well-developed and well-nourished.  Non-toxic appearance. No distress.   HENT:   Head: Normocephalic and atraumatic.   Mouth/Throat: Oropharynx is clear and moist.   Eyes: EOM are normal. Pupils are equal, round, and reactive to light. Right eye exhibits no discharge. Left eye exhibits no discharge.   Neck: Phonation normal. No JVD present. No tracheal deviation present. No thyromegaly present.   Cardiovascular: Normal rate and regular rhythm.    Pulmonary/Chest: Effort normal. No stridor. No respiratory distress. He has no wheezes.   Abdominal: Soft. Normal appearance. He exhibits no distension. There is no tenderness.   Musculoskeletal: He exhibits no edema.   Muscle Group     L          R  Deltoid                5          5  Bicep                  5          5  Tricep                 5          5                       5          5  Hand IO              5          5  Hip Flexor           5          5  Knee Extensor   5          5     ADF                    5          5  APF                    5          5      Neurological: He is alert and oriented to person, place, and time. He displays normal reflexes. No cranial nerve deficit or sensory deficit. He exhibits normal muscle tone. He displays a negative Romberg sign. Coordination and gait normal. GCS eye subscore is 4. GCS verbal subscore is 5. GCS motor subscore is 6.   Reflex Scores:       Tricep reflexes are 2+ on the right side and 2+ on the left side.       Bicep reflexes " are 2+ on the right side and 2+ on the left side.       Brachioradialis reflexes are 2+ on the right side and 2+ on the left side.       Patellar reflexes are 3+ on the right side and 3+ on the left side.       Achilles reflexes are 3+ on the right side and 3+ on the left side.  CN II-XII grossly intact.    Mild right pronator drift. FTN testing performed without dysmetria or bradykinesia.    Impaired naming and repetition.  Speech production is fluent.   Skin: Skin is warm and dry. He is not diaphoretic. No erythema.   Psychiatric: He has a normal mood and affect. His speech is normal and behavior is normal. Judgment and thought content normal.   Nursing note and vitals reviewed.        Assessment/Plan     Independent Review of Radiographic Studies:      Available for my review is a CT angiogram of the neck.  This discloses high-grade stenosis of the left internal carotid artery with extensive calcific disease circumferentially at the bifurcation.  The extent of the calcific disease does not extend cephalad to the level of the mandible or the C2 vertebral body.    Medical Decision Making:      This is a 56-year-old man with high-grade, symptomatic stenosis of his left internal carotid artery.  He suffered a stroke 5 months ago.  He has not had any new neurological symptoms and is being maintained on aspirin and Plavix.    Informed consent for a left sided carotid endarterectomy was obtained from the patient.  He acknowledges risk of stroke, death, pain, paralysis, coma, blindness, permanent neurologic disability, bleeding, infection, cranial nerve injury, recurrent laryngeal nerve injury, hoarseness, dysphagia, facial numbness, failure of benefit of the operation, or need for additional procedures.  All questions were answered.  No guarantees were given or implied.  The patient elects to proceed with surgery.    I will perform this procedure as the assistant to Dr. Armani Griffin.  We will schedule the patient on  an elective basis in the near future.  Did review the signs and symptoms of stroke with the patient, and I directed him to call 911 if he thinks he is having another stroke.    Vince was seen today for cerebrovascular accident.    Diagnoses and all orders for this visit:    Carotid artery stenosis with cerebral infarction over 8 weeks ago  -     Cardiopulmonary Stress Test (CPX); Future  -     Case Request; Standing  -     CBC & Differential; Future  -     Basic Metabolic Panel; Future  -     aPTT; Future  -     Protime-INR; Future  -     Urinalysis With Culture if Indicated; Future  -     Type and screen; Future  -     sodium chloride 0.9 % flush 1-10 mL; Infuse 1-10 mL into a venous catheter As Needed for Line Care.  -     ceFAZolin (ANCEF) 2 g in sodium chloride 0.9 % 100 mL IVPB; Infuse 2 g into a venous catheter 1 (One) Time.  -     Case Request    Other orders  -     Follow Anesthesia Guidelines / Standing Orders; Future  -     Obtain Informed Consent  -     Follow Anesthesia Guidelines / Standing Orders; Standing  -     Inpatient Admission; Standing  -     Insert Peripheral IV x2; Standing  -     Saline Lock & Maintain IV Access; Standing      No Follow-up on file.           This document signed by HUGO Bach MD September 20, 2017 2:52 PM

## 2017-09-21 ENCOUNTER — TELEPHONE (OUTPATIENT)
Dept: FAMILY MEDICINE CLINIC | Facility: CLINIC | Age: 57
End: 2017-09-21

## 2017-09-21 PROBLEM — IMO0002 CAROTID ARTERY STENOSIS WITH CEREBRAL INFARCTION OVER 8 WEEKS AGO: Status: ACTIVE | Noted: 2017-09-21

## 2017-09-21 LAB — CREAT BLDA-MCNC: 0.9 MG/DL (ref 0.6–1.3)

## 2017-09-21 RX ORDER — CEPHALEXIN 500 MG/1
500 CAPSULE ORAL 2 TIMES DAILY
Qty: 14 CAPSULE | Refills: 0 | Status: SHIPPED | OUTPATIENT
Start: 2017-09-21 | End: 2017-10-12

## 2017-09-21 NOTE — TELEPHONE ENCOUNTER
PATIENT SPOUSE CALLED AND STATES SHE HAS HAD PATIENT TO 2 DIFFERENT DENTIST WITH HIS TEETH. NEITHER ONE WILL DO ANYTHING DUE TO HIS UPCOMING SURGERY. SHE WANTS TO KNOW IF YOU CAN SEND SOMETHING IN FOR THE INFECTION?

## 2017-09-22 DIAGNOSIS — E78.5 DYSLIPIDEMIA: ICD-10-CM

## 2017-09-22 DIAGNOSIS — I63.9 CEREBROVASCULAR ACCIDENT (CVA), UNSPECIFIED MECHANISM (HCC): ICD-10-CM

## 2017-09-25 ENCOUNTER — APPOINTMENT (OUTPATIENT)
Dept: PREADMISSION TESTING | Facility: HOSPITAL | Age: 57
End: 2017-09-25

## 2017-09-25 ENCOUNTER — TELEPHONE (OUTPATIENT)
Dept: NEUROSURGERY | Facility: CLINIC | Age: 57
End: 2017-09-25

## 2017-09-25 ENCOUNTER — HOSPITAL ENCOUNTER (OUTPATIENT)
Dept: CARDIOLOGY | Facility: HOSPITAL | Age: 57
Discharge: HOME | End: 2017-09-25
Attending: NEUROLOGICAL SURGERY | Admitting: NEUROLOGICAL SURGERY

## 2017-09-25 VITALS — WEIGHT: 188.05 LBS | HEIGHT: 66 IN | BODY MASS INDEX: 30.22 KG/M2

## 2017-09-25 DIAGNOSIS — I10 ESSENTIAL HYPERTENSION: ICD-10-CM

## 2017-09-25 DIAGNOSIS — I65.29 STENOSIS OF CAROTID ARTERY, UNSPECIFIED LATERALITY: Primary | ICD-10-CM

## 2017-09-25 DIAGNOSIS — I63.412 CEREBROVASCULAR ACCIDENT (CVA) DUE TO EMBOLISM OF LEFT MIDDLE CEREBRAL ARTERY (HCC): ICD-10-CM

## 2017-09-25 DIAGNOSIS — Z01.89 LABORATORY TEST: Primary | ICD-10-CM

## 2017-09-25 DIAGNOSIS — IMO0002: ICD-10-CM

## 2017-09-25 DIAGNOSIS — E78.5 DYSLIPIDEMIA: ICD-10-CM

## 2017-09-25 DIAGNOSIS — I63.9 CEREBROVASCULAR ACCIDENT (CVA), UNSPECIFIED MECHANISM (HCC): ICD-10-CM

## 2017-09-25 LAB
ANION GAP SERPL CALCULATED.3IONS-SCNC: 8 MMOL/L (ref 3–11)
APTT PPP: 26.9 SECONDS (ref 24–31)
BASOPHILS # BLD AUTO: 0.04 10*3/MM3 (ref 0–0.2)
BASOPHILS NFR BLD AUTO: 0.4 % (ref 0–1)
BILIRUB UR QL STRIP: NEGATIVE
BUN BLD-MCNC: 9 MG/DL (ref 9–23)
BUN/CREAT SERPL: 11.3 (ref 7–25)
CALCIUM SPEC-SCNC: 10.1 MG/DL (ref 8.7–10.4)
CHLORIDE SERPL-SCNC: 103 MMOL/L (ref 99–109)
CLARITY UR: CLEAR
CO2 SERPL-SCNC: 32 MMOL/L (ref 20–31)
COLOR UR: YELLOW
CREAT BLD-MCNC: 0.8 MG/DL (ref 0.6–1.3)
DEPRECATED RDW RBC AUTO: 42.3 FL (ref 37–54)
EOSINOPHIL # BLD AUTO: 0.05 10*3/MM3 (ref 0–0.3)
EOSINOPHIL NFR BLD AUTO: 0.5 % (ref 0–3)
ERYTHROCYTE [DISTWIDTH] IN BLOOD BY AUTOMATED COUNT: 13.2 % (ref 11.3–14.5)
GFR SERPL CREATININE-BSD FRML MDRD: 100 ML/MIN/1.73
GLUCOSE BLD-MCNC: 134 MG/DL (ref 70–100)
GLUCOSE UR STRIP-MCNC: NEGATIVE MG/DL
HBA1C MFR BLD: 6.3 % (ref 4.8–5.6)
HCT VFR BLD AUTO: 42.3 % (ref 38.9–50.9)
HGB BLD-MCNC: 14.9 G/DL (ref 13.1–17.5)
HGB UR QL STRIP.AUTO: NEGATIVE
IMM GRANULOCYTES # BLD: 0.02 10*3/MM3 (ref 0–0.03)
IMM GRANULOCYTES NFR BLD: 0.2 % (ref 0–0.6)
INR PPP: 1.14
KETONES UR QL STRIP: NEGATIVE
LEUKOCYTE ESTERASE UR QL STRIP.AUTO: NEGATIVE
LYMPHOCYTES # BLD AUTO: 3.14 10*3/MM3 (ref 0.6–4.8)
LYMPHOCYTES NFR BLD AUTO: 32.4 % (ref 24–44)
MCH RBC QN AUTO: 30.7 PG (ref 27–31)
MCHC RBC AUTO-ENTMCNC: 35.2 G/DL (ref 32–36)
MCV RBC AUTO: 87.2 FL (ref 80–99)
MONOCYTES # BLD AUTO: 0.78 10*3/MM3 (ref 0–1)
MONOCYTES NFR BLD AUTO: 8 % (ref 0–12)
NEUTROPHILS # BLD AUTO: 5.66 10*3/MM3 (ref 1.5–8.3)
NEUTROPHILS NFR BLD AUTO: 58.5 % (ref 41–71)
NITRITE UR QL STRIP: NEGATIVE
PH UR STRIP.AUTO: 7 [PH] (ref 5–8)
PLATELET # BLD AUTO: 240 10*3/MM3 (ref 150–450)
PMV BLD AUTO: 8.4 FL (ref 6–12)
POTASSIUM BLD-SCNC: 4.5 MMOL/L (ref 3.5–5.5)
PROT UR QL STRIP: NEGATIVE
PROTHROMBIN TIME: 12.5 SECONDS (ref 9.6–11.5)
RBC # BLD AUTO: 4.85 10*6/MM3 (ref 4.2–5.76)
SODIUM BLD-SCNC: 143 MMOL/L (ref 132–146)
SP GR UR STRIP: <=1.005 (ref 1–1.03)
UROBILINOGEN UR QL STRIP: NORMAL
WBC NRBC COR # BLD: 9.69 10*3/MM3 (ref 3.5–10.8)

## 2017-09-25 PROCEDURE — 93017 CV STRESS TEST TRACING ONLY: CPT

## 2017-09-25 PROCEDURE — 83036 HEMOGLOBIN GLYCOSYLATED A1C: CPT | Performed by: ANESTHESIOLOGY

## 2017-09-25 PROCEDURE — 81003 URINALYSIS AUTO W/O SCOPE: CPT | Performed by: NEUROLOGICAL SURGERY

## 2017-09-25 PROCEDURE — 86900 BLOOD TYPING SEROLOGIC ABO: CPT

## 2017-09-25 PROCEDURE — 93018 CV STRESS TEST I&R ONLY: CPT | Performed by: INTERNAL MEDICINE

## 2017-09-25 PROCEDURE — 85730 THROMBOPLASTIN TIME PARTIAL: CPT | Performed by: NEUROLOGICAL SURGERY

## 2017-09-25 PROCEDURE — 80048 BASIC METABOLIC PNL TOTAL CA: CPT | Performed by: NEUROLOGICAL SURGERY

## 2017-09-25 PROCEDURE — 85610 PROTHROMBIN TIME: CPT | Performed by: NEUROLOGICAL SURGERY

## 2017-09-25 PROCEDURE — 36415 COLL VENOUS BLD VENIPUNCTURE: CPT

## 2017-09-25 PROCEDURE — 85025 COMPLETE CBC W/AUTO DIFF WBC: CPT | Performed by: NEUROLOGICAL SURGERY

## 2017-09-25 PROCEDURE — 86901 BLOOD TYPING SEROLOGIC RH(D): CPT

## 2017-09-25 RX ORDER — LOSARTAN POTASSIUM 100 MG/1
50 TABLET ORAL 2 TIMES DAILY
COMMUNITY
End: 2017-12-18 | Stop reason: SDUPTHER

## 2017-09-25 RX ORDER — ASPIRIN 325 MG
TABLET, DELAYED RELEASE (ENTERIC COATED) ORAL
Qty: 30 TABLET | Refills: 0 | OUTPATIENT
Start: 2017-09-25

## 2017-09-25 RX ORDER — ATORVASTATIN CALCIUM 80 MG/1
80 TABLET, FILM COATED ORAL NIGHTLY
Qty: 30 TABLET | Refills: 3 | Status: SHIPPED | OUTPATIENT
Start: 2017-09-25 | End: 2017-12-18 | Stop reason: SDUPTHER

## 2017-09-25 RX ORDER — ATORVASTATIN CALCIUM 80 MG/1
TABLET, FILM COATED ORAL
Qty: 30 TABLET | Refills: 0 | OUTPATIENT
Start: 2017-09-25

## 2017-09-25 RX ORDER — ASPIRIN 325 MG
325 TABLET ORAL DAILY
Qty: 30 TABLET | Refills: 3 | Status: SHIPPED | OUTPATIENT
Start: 2017-09-25 | End: 2018-01-22 | Stop reason: SDUPTHER

## 2017-09-25 NOTE — TELEPHONE ENCOUNTER
Provider:  Elias  Caller: Donna  Phone #:  [phone]  Surgery:  Carotid Endarterectomy  Surgery Date:  10/3/17(upcoming)  Last visit:   9/20/17  Next visit:     LEFTY:         Reason for call:         Pt fiance states that pt is unable to do cardiac stress test, states that she spoke with Karuna and a nuclear stress test was discussed, needs new order put in for this test. Not sure which one is needed

## 2017-09-25 NOTE — TELEPHONE ENCOUNTER
Pt requesting refills for    Asprin 325 mg  Last refill 05/4/17 #30 with 3 refills    Atorvastatin 80 mg  Last refill 05/04/17 #30 with 3 refills    Last appt 08/02/17  Next appt 12/06/17    [facility]

## 2017-09-26 LAB
ABO GROUP BLD: NORMAL
BH CV STRESS BP STAGE 1: NORMAL
BH CV STRESS DURATION MIN STAGE 1: 1
BH CV STRESS DURATION SEC STAGE 1: 7
BH CV STRESS GRADE STAGE 1: 10
BH CV STRESS HR STAGE 1: 156
BH CV STRESS METS STAGE 1: 4.1
BH CV STRESS O2 STAGE 1: 98
BH CV STRESS PROTOCOL 1: NORMAL
BH CV STRESS RECOVERY BP: NORMAL MMHG
BH CV STRESS RECOVERY HR: 104 BPM
BH CV STRESS RECOVERY O2: 98 %
BH CV STRESS SPEED STAGE 1: 1.7
BH CV STRESS STAGE 1: 1
MAXIMAL PREDICTED HEART RATE: 165 BPM
PERCENT MAX PREDICTED HR: 95.15 %
RH BLD: POSITIVE
STRESS BASELINE BP: NORMAL MMHG
STRESS BASELINE HR: 96 BPM
STRESS O2 SAT REST: 98 %
STRESS PERCENT HR: 112 %
STRESS POST ESTIMATED WORKLOAD: 4.1 METS
STRESS POST EXERCISE DUR MIN: 1 MIN
STRESS POST EXERCISE DUR SEC: 7 SEC
STRESS POST O2 SAT PEAK: 98 %
STRESS POST PEAK BP: NORMAL MMHG
STRESS POST PEAK HR: 157 BPM
STRESS TARGET HR: 140 BPM

## 2017-09-26 NOTE — TELEPHONE ENCOUNTER
Violetta, has this been scheduled yet. just checking on the status of this, patient has surgery on the 3rd. Thanks!

## 2017-09-29 ENCOUNTER — HOSPITAL ENCOUNTER (OUTPATIENT)
Dept: CARDIOLOGY | Facility: HOSPITAL | Age: 57
Discharge: HOME | End: 2017-09-29

## 2017-09-29 DIAGNOSIS — I65.29 STENOSIS OF CAROTID ARTERY, UNSPECIFIED LATERALITY: ICD-10-CM

## 2017-09-29 PROCEDURE — A9500 TC99M SESTAMIBI: HCPCS | Performed by: NEUROLOGICAL SURGERY

## 2017-09-29 PROCEDURE — 0 TECHNETIUM SESTAMIBI: Performed by: NEUROLOGICAL SURGERY

## 2017-09-29 PROCEDURE — 25010000002 REGADENOSON 0.4 MG/5ML SOLUTION: Performed by: NEUROLOGICAL SURGERY

## 2017-09-29 PROCEDURE — 78452 HT MUSCLE IMAGE SPECT MULT: CPT | Performed by: INTERNAL MEDICINE

## 2017-09-29 PROCEDURE — 78452 HT MUSCLE IMAGE SPECT MULT: CPT

## 2017-09-29 PROCEDURE — 93018 CV STRESS TEST I&R ONLY: CPT | Performed by: INTERNAL MEDICINE

## 2017-09-29 PROCEDURE — 93017 CV STRESS TEST TRACING ONLY: CPT

## 2017-09-29 RX ADMIN — REGADENOSON 0.4 MG: 0.08 INJECTION, SOLUTION INTRAVENOUS at 09:39

## 2017-09-29 RX ADMIN — TECHNETIUM TC-99M SESTAMIBI 1 DOSE: 1 INJECTION INTRAVENOUS at 09:35

## 2017-09-29 RX ADMIN — TECHNETIUM TC-99M SESTAMIBI 1 DOSE: 1 INJECTION INTRAVENOUS at 08:15

## 2017-10-02 ENCOUNTER — TELEPHONE (OUTPATIENT)
Dept: CARDIOLOGY | Facility: CLINIC | Age: 57
End: 2017-10-02

## 2017-10-02 LAB
BH CV STRESS BP STAGE 1: NORMAL
BH CV STRESS BP STAGE 2: NORMAL
BH CV STRESS BP STAGE 3: NORMAL
BH CV STRESS COMMENTS STAGE 1: NORMAL
BH CV STRESS COMMENTS STAGE 2: NORMAL
BH CV STRESS DOSE REGADENOSON STAGE 1: 0.4
BH CV STRESS DURATION MIN STAGE 1: 1
BH CV STRESS DURATION MIN STAGE 2: 3
BH CV STRESS DURATION MIN STAGE 3: 2
BH CV STRESS DURATION SEC STAGE 1: 0
BH CV STRESS DURATION SEC STAGE 2: 0
BH CV STRESS DURATION SEC STAGE 3: 0
BH CV STRESS HR STAGE 1: 64
BH CV STRESS HR STAGE 2: 73
BH CV STRESS HR STAGE 3: 68
BH CV STRESS PROTOCOL 1: NORMAL
BH CV STRESS RECOVERY BP: NORMAL MMHG
BH CV STRESS RECOVERY HR: 68 BPM
BH CV STRESS STAGE 1: 1
BH CV STRESS STAGE 2: 2
BH CV STRESS STAGE 3: 3
LV EF NUC BP: 52 %
MAXIMAL PREDICTED HEART RATE: 165 BPM
PERCENT MAX PREDICTED HR: 49.09 %
STRESS BASELINE BP: NORMAL MMHG
STRESS BASELINE HR: 58 BPM
STRESS PERCENT HR: 58 %
STRESS POST PEAK BP: NORMAL MMHG
STRESS POST PEAK HR: 81 BPM
STRESS TARGET HR: 140 BPM

## 2017-10-02 NOTE — TELEPHONE ENCOUNTER
Karuna call and stated that patient is having surgery tomorrow pending stress test from Friday. Informed her it has not been read but would speak to DR Alexandra and call her back.

## 2017-10-05 ENCOUNTER — PREP FOR SURGERY (OUTPATIENT)
Dept: OTHER | Facility: HOSPITAL | Age: 57
End: 2017-10-05

## 2017-10-05 DIAGNOSIS — I65.22 STENOSIS OF LEFT CAROTID ARTERY: Primary | ICD-10-CM

## 2017-10-05 RX ORDER — FAMOTIDINE 20 MG/1
20 TABLET, FILM COATED ORAL
Status: CANCELLED | OUTPATIENT
Start: 2017-10-05

## 2017-10-05 RX ORDER — CEFAZOLIN SODIUM 2 G/100ML
2 INJECTION, SOLUTION INTRAVENOUS ONCE
Status: CANCELLED | OUTPATIENT
Start: 2017-10-05 | End: 2017-10-05

## 2017-10-12 ENCOUNTER — APPOINTMENT (OUTPATIENT)
Dept: PREADMISSION TESTING | Facility: HOSPITAL | Age: 57
End: 2017-10-12

## 2017-10-12 VITALS — BODY MASS INDEX: 28.6 KG/M2 | HEIGHT: 68 IN | WEIGHT: 188.71 LBS

## 2017-10-12 DIAGNOSIS — I65.22 STENOSIS OF LEFT CAROTID ARTERY: ICD-10-CM

## 2017-10-12 LAB
ALBUMIN SERPL-MCNC: 4.5 G/DL (ref 3.2–4.8)
ALBUMIN/GLOB SERPL: 1.6 G/DL (ref 1.5–2.5)
ALP SERPL-CCNC: 84 U/L (ref 25–100)
ALT SERPL W P-5'-P-CCNC: 33 U/L (ref 7–40)
ANION GAP SERPL CALCULATED.3IONS-SCNC: 7 MMOL/L (ref 3–11)
AST SERPL-CCNC: 17 U/L (ref 0–33)
BILIRUB SERPL-MCNC: 0.7 MG/DL (ref 0.3–1.2)
BUN BLD-MCNC: 8 MG/DL (ref 9–23)
BUN/CREAT SERPL: 10 (ref 7–25)
CALCIUM SPEC-SCNC: 9.8 MG/DL (ref 8.7–10.4)
CHLORIDE SERPL-SCNC: 98 MMOL/L (ref 99–109)
CO2 SERPL-SCNC: 34 MMOL/L (ref 20–31)
CREAT BLD-MCNC: 0.8 MG/DL (ref 0.6–1.3)
DEPRECATED RDW RBC AUTO: 42.7 FL (ref 37–54)
ERYTHROCYTE [DISTWIDTH] IN BLOOD BY AUTOMATED COUNT: 13.3 % (ref 11.3–14.5)
GFR SERPL CREATININE-BSD FRML MDRD: 100 ML/MIN/1.73
GLOBULIN UR ELPH-MCNC: 2.8 GM/DL
GLUCOSE BLD-MCNC: 95 MG/DL (ref 70–100)
HBA1C MFR BLD: 6.2 % (ref 4.8–5.6)
HCT VFR BLD AUTO: 43 % (ref 38.9–50.9)
HGB BLD-MCNC: 14.7 G/DL (ref 13.1–17.5)
MCH RBC QN AUTO: 30.1 PG (ref 27–31)
MCHC RBC AUTO-ENTMCNC: 34.2 G/DL (ref 32–36)
MCV RBC AUTO: 88.1 FL (ref 80–99)
PLATELET # BLD AUTO: 261 10*3/MM3 (ref 150–450)
PMV BLD AUTO: 8.5 FL (ref 6–12)
POTASSIUM BLD-SCNC: 4.1 MMOL/L (ref 3.5–5.5)
PROT SERPL-MCNC: 7.3 G/DL (ref 5.7–8.2)
RBC # BLD AUTO: 4.88 10*6/MM3 (ref 4.2–5.76)
SODIUM BLD-SCNC: 139 MMOL/L (ref 132–146)
WBC NRBC COR # BLD: 9.98 10*3/MM3 (ref 3.5–10.8)

## 2017-10-12 PROCEDURE — 85027 COMPLETE CBC AUTOMATED: CPT | Performed by: PHYSICIAN ASSISTANT

## 2017-10-12 PROCEDURE — 83036 HEMOGLOBIN GLYCOSYLATED A1C: CPT | Performed by: ANESTHESIOLOGY

## 2017-10-12 PROCEDURE — 36415 COLL VENOUS BLD VENIPUNCTURE: CPT

## 2017-10-12 PROCEDURE — 80053 COMPREHEN METABOLIC PANEL: CPT | Performed by: PHYSICIAN ASSISTANT

## 2017-10-12 NOTE — PAT
Measured for TEDS/SCDS in PAT-     calf measurement   14.5        Length measurement  18.5      T&s too early for pat so please do in preop and blood permit.

## 2017-10-17 ENCOUNTER — HOSPITAL ENCOUNTER (INPATIENT)
Facility: HOSPITAL | Age: 57
LOS: 1 days | Discharge: HOME | End: 2017-10-18
Attending: NEUROLOGICAL SURGERY | Admitting: NEUROLOGICAL SURGERY

## 2017-10-17 ENCOUNTER — ANESTHESIA (OUTPATIENT)
Dept: PERIOP | Facility: HOSPITAL | Age: 57
End: 2017-10-17

## 2017-10-17 ENCOUNTER — ANESTHESIA EVENT (OUTPATIENT)
Dept: PERIOP | Facility: HOSPITAL | Age: 57
End: 2017-10-17

## 2017-10-17 ENCOUNTER — APPOINTMENT (OUTPATIENT)
Dept: NEUROLOGY | Facility: HOSPITAL | Age: 57
End: 2017-10-17
Attending: NEUROLOGICAL SURGERY

## 2017-10-17 DIAGNOSIS — IMO0002: ICD-10-CM

## 2017-10-17 PROBLEM — Z98.890 STATUS POST CAROTID ENDARTERECTOMY: Status: ACTIVE | Noted: 2017-10-17

## 2017-10-17 PROBLEM — I65.22 INTERNAL CAROTID ARTERY STENOSIS, LEFT: Status: ACTIVE | Noted: 2017-10-17

## 2017-10-17 LAB
ABO GROUP BLD: NORMAL
BLD GP AB SCN SERPL QL: NEGATIVE
GLUCOSE BLDC GLUCOMTR-MCNC: 116 MG/DL (ref 70–130)
GLUCOSE BLDC GLUCOMTR-MCNC: 150 MG/DL (ref 70–130)
GLUCOSE BLDC GLUCOMTR-MCNC: 151 MG/DL (ref 70–130)
GLUCOSE BLDC GLUCOMTR-MCNC: 195 MG/DL (ref 70–130)
GLUCOSE BLDC GLUCOMTR-MCNC: 198 MG/DL (ref 70–130)
POTASSIUM BLDA-SCNC: 3.65 MMOL/L (ref 3.5–5.3)
RH BLD: POSITIVE

## 2017-10-17 PROCEDURE — 25010000002 NEOSTIGMINE 10 MG/10ML SOLUTION: Performed by: NURSE ANESTHETIST, CERTIFIED REGISTERED

## 2017-10-17 PROCEDURE — 95955 EEG DURING SURGERY: CPT

## 2017-10-17 PROCEDURE — 25010000003 CEFAZOLIN IN DEXTROSE 2-4 GM/100ML-% SOLUTION: Performed by: NEUROLOGICAL SURGERY

## 2017-10-17 PROCEDURE — 25010000002 HYDROMORPHONE PER 4 MG: Performed by: NURSE ANESTHETIST, CERTIFIED REGISTERED

## 2017-10-17 PROCEDURE — 88304 TISSUE EXAM BY PATHOLOGIST: CPT | Performed by: NEUROLOGICAL SURGERY

## 2017-10-17 PROCEDURE — 35301 RECHANNELING OF ARTERY: CPT | Performed by: NEUROLOGICAL SURGERY

## 2017-10-17 PROCEDURE — 25010000002 PROPOFOL 10 MG/ML EMULSION: Performed by: NURSE ANESTHETIST, CERTIFIED REGISTERED

## 2017-10-17 PROCEDURE — 82962 GLUCOSE BLOOD TEST: CPT

## 2017-10-17 PROCEDURE — 25010000002 FENTANYL CITRATE (PF) 100 MCG/2ML SOLUTION: Performed by: NURSE ANESTHETIST, CERTIFIED REGISTERED

## 2017-10-17 PROCEDURE — 99233 SBSQ HOSP IP/OBS HIGH 50: CPT | Performed by: INTERNAL MEDICINE

## 2017-10-17 PROCEDURE — 86850 RBC ANTIBODY SCREEN: CPT | Performed by: NEUROLOGICAL SURGERY

## 2017-10-17 PROCEDURE — 84132 ASSAY OF SERUM POTASSIUM: CPT | Performed by: ANESTHESIOLOGY

## 2017-10-17 PROCEDURE — 88311 DECALCIFY TISSUE: CPT | Performed by: NEUROLOGICAL SURGERY

## 2017-10-17 PROCEDURE — 25010000002 HEPARIN (PORCINE) PER 1000 UNITS: Performed by: NEUROLOGICAL SURGERY

## 2017-10-17 PROCEDURE — 95816 EEG AWAKE AND DROWSY: CPT

## 2017-10-17 PROCEDURE — 86901 BLOOD TYPING SEROLOGIC RH(D): CPT | Performed by: NEUROLOGICAL SURGERY

## 2017-10-17 PROCEDURE — 25010000002 PHENYLEPHRINE PER 1 ML: Performed by: NURSE ANESTHETIST, CERTIFIED REGISTERED

## 2017-10-17 PROCEDURE — 25010000002 HEPARIN (PORCINE) PER 1000 UNITS: Performed by: NURSE ANESTHETIST, CERTIFIED REGISTERED

## 2017-10-17 PROCEDURE — 86900 BLOOD TYPING SEROLOGIC ABO: CPT | Performed by: NEUROLOGICAL SURGERY

## 2017-10-17 PROCEDURE — 25010000002 DEXAMETHASONE PER 1 MG: Performed by: PHYSICIAN ASSISTANT

## 2017-10-17 PROCEDURE — 03CL0ZZ EXTIRPATION OF MATTER FROM LEFT INTERNAL CAROTID ARTERY, OPEN APPROACH: ICD-10-PCS | Performed by: NEUROLOGICAL SURGERY

## 2017-10-17 PROCEDURE — 25010000002 ONDANSETRON PER 1 MG: Performed by: NURSE ANESTHETIST, CERTIFIED REGISTERED

## 2017-10-17 PROCEDURE — 63710000001 INSULIN LISPRO (HUMAN) PER 5 UNITS: Performed by: NURSE PRACTITIONER

## 2017-10-17 RX ORDER — FAMOTIDINE 10 MG/ML
20 INJECTION, SOLUTION INTRAVENOUS EVERY 12 HOURS SCHEDULED
Status: DISCONTINUED | OUTPATIENT
Start: 2017-10-17 | End: 2017-10-17

## 2017-10-17 RX ORDER — SODIUM CHLORIDE 0.9 % (FLUSH) 0.9 %
1-10 SYRINGE (ML) INJECTION AS NEEDED
Status: DISCONTINUED | OUTPATIENT
Start: 2017-10-17 | End: 2017-10-17

## 2017-10-17 RX ORDER — SODIUM CHLORIDE, SODIUM LACTATE, POTASSIUM CHLORIDE, CALCIUM CHLORIDE 600; 310; 30; 20 MG/100ML; MG/100ML; MG/100ML; MG/100ML
9 INJECTION, SOLUTION INTRAVENOUS CONTINUOUS
Status: DISCONTINUED | OUTPATIENT
Start: 2017-10-17 | End: 2017-10-18 | Stop reason: HOSPADM

## 2017-10-17 RX ORDER — ONDANSETRON 2 MG/ML
4 INJECTION INTRAMUSCULAR; INTRAVENOUS ONCE AS NEEDED
Status: DISCONTINUED | OUTPATIENT
Start: 2017-10-17 | End: 2017-10-17 | Stop reason: HOSPADM

## 2017-10-17 RX ORDER — LIDOCAINE HYDROCHLORIDE 10 MG/ML
0.5 INJECTION, SOLUTION EPIDURAL; INFILTRATION; INTRACAUDAL; PERINEURAL ONCE AS NEEDED
Status: COMPLETED | OUTPATIENT
Start: 2017-10-17 | End: 2017-10-17

## 2017-10-17 RX ORDER — CLOPIDOGREL BISULFATE 75 MG/1
75 TABLET ORAL DAILY
Status: DISCONTINUED | OUTPATIENT
Start: 2017-10-18 | End: 2017-10-18 | Stop reason: HOSPADM

## 2017-10-17 RX ORDER — PROMETHAZINE HYDROCHLORIDE 25 MG/1
25 TABLET ORAL ONCE AS NEEDED
Status: DISCONTINUED | OUTPATIENT
Start: 2017-10-17 | End: 2017-10-17 | Stop reason: HOSPADM

## 2017-10-17 RX ORDER — HYDROCODONE BITARTRATE AND ACETAMINOPHEN 5; 325 MG/1; MG/1
1 TABLET ORAL EVERY 4 HOURS PRN
Status: DISCONTINUED | OUTPATIENT
Start: 2017-10-17 | End: 2017-10-18 | Stop reason: HOSPADM

## 2017-10-17 RX ORDER — ROCURONIUM BROMIDE 10 MG/ML
INJECTION, SOLUTION INTRAVENOUS AS NEEDED
Status: DISCONTINUED | OUTPATIENT
Start: 2017-10-17 | End: 2017-10-17 | Stop reason: SURG

## 2017-10-17 RX ORDER — ONDANSETRON 4 MG/1
4 TABLET, FILM COATED ORAL EVERY 6 HOURS PRN
Status: DISCONTINUED | OUTPATIENT
Start: 2017-10-17 | End: 2017-10-18 | Stop reason: HOSPADM

## 2017-10-17 RX ORDER — METOPROLOL TARTRATE 100 MG/1
100 TABLET ORAL EVERY 12 HOURS SCHEDULED
Status: DISCONTINUED | OUTPATIENT
Start: 2017-10-17 | End: 2017-10-18 | Stop reason: HOSPADM

## 2017-10-17 RX ORDER — PROPOFOL 10 MG/ML
VIAL (ML) INTRAVENOUS AS NEEDED
Status: DISCONTINUED | OUTPATIENT
Start: 2017-10-17 | End: 2017-10-17 | Stop reason: SURG

## 2017-10-17 RX ORDER — FAMOTIDINE 20 MG/1
20 TABLET, FILM COATED ORAL
Status: COMPLETED | OUTPATIENT
Start: 2017-10-17 | End: 2017-10-17

## 2017-10-17 RX ORDER — ONDANSETRON 2 MG/ML
INJECTION INTRAMUSCULAR; INTRAVENOUS AS NEEDED
Status: DISCONTINUED | OUTPATIENT
Start: 2017-10-17 | End: 2017-10-17 | Stop reason: SURG

## 2017-10-17 RX ORDER — SENNA AND DOCUSATE SODIUM 50; 8.6 MG/1; MG/1
2 TABLET, FILM COATED ORAL 2 TIMES DAILY PRN
Status: DISCONTINUED | OUTPATIENT
Start: 2017-10-17 | End: 2017-10-18 | Stop reason: HOSPADM

## 2017-10-17 RX ORDER — CEFAZOLIN SODIUM 2 G/100ML
2 INJECTION, SOLUTION INTRAVENOUS ONCE
Status: COMPLETED | OUTPATIENT
Start: 2017-10-17 | End: 2017-10-17

## 2017-10-17 RX ORDER — FENTANYL CITRATE 50 UG/ML
50 INJECTION, SOLUTION INTRAMUSCULAR; INTRAVENOUS
Status: DISCONTINUED | OUTPATIENT
Start: 2017-10-17 | End: 2017-10-17 | Stop reason: HOSPADM

## 2017-10-17 RX ORDER — PROMETHAZINE HYDROCHLORIDE 25 MG/ML
6.25 INJECTION, SOLUTION INTRAMUSCULAR; INTRAVENOUS ONCE AS NEEDED
Status: DISCONTINUED | OUTPATIENT
Start: 2017-10-17 | End: 2017-10-17 | Stop reason: HOSPADM

## 2017-10-17 RX ORDER — FAMOTIDINE 10 MG/ML
20 INJECTION, SOLUTION INTRAVENOUS ONCE
Status: DISCONTINUED | OUTPATIENT
Start: 2017-10-17 | End: 2017-10-17

## 2017-10-17 RX ORDER — LIDOCAINE HYDROCHLORIDE 10 MG/ML
INJECTION, SOLUTION EPIDURAL; INFILTRATION; INTRACAUDAL; PERINEURAL AS NEEDED
Status: DISCONTINUED | OUTPATIENT
Start: 2017-10-17 | End: 2017-10-17 | Stop reason: SURG

## 2017-10-17 RX ORDER — FAMOTIDINE 20 MG/1
20 TABLET, FILM COATED ORAL ONCE
Status: DISCONTINUED | OUTPATIENT
Start: 2017-10-17 | End: 2017-10-17

## 2017-10-17 RX ORDER — PROMETHAZINE HYDROCHLORIDE 25 MG/1
25 SUPPOSITORY RECTAL ONCE AS NEEDED
Status: DISCONTINUED | OUTPATIENT
Start: 2017-10-17 | End: 2017-10-17 | Stop reason: HOSPADM

## 2017-10-17 RX ORDER — MAGNESIUM HYDROXIDE 1200 MG/15ML
LIQUID ORAL AS NEEDED
Status: DISCONTINUED | OUTPATIENT
Start: 2017-10-17 | End: 2017-10-17 | Stop reason: HOSPADM

## 2017-10-17 RX ORDER — ATORVASTATIN CALCIUM 40 MG/1
80 TABLET, FILM COATED ORAL NIGHTLY
Status: DISCONTINUED | OUTPATIENT
Start: 2017-10-17 | End: 2017-10-18 | Stop reason: HOSPADM

## 2017-10-17 RX ORDER — LOSARTAN POTASSIUM 50 MG/1
50 TABLET ORAL EVERY 12 HOURS SCHEDULED
Status: DISCONTINUED | OUTPATIENT
Start: 2017-10-17 | End: 2017-10-18 | Stop reason: HOSPADM

## 2017-10-17 RX ORDER — SODIUM CHLORIDE 0.9 % (FLUSH) 0.9 %
1-10 SYRINGE (ML) INJECTION AS NEEDED
Status: DISCONTINUED | OUTPATIENT
Start: 2017-10-17 | End: 2017-10-18 | Stop reason: HOSPADM

## 2017-10-17 RX ORDER — SODIUM CHLORIDE, SODIUM LACTATE, POTASSIUM CHLORIDE, CALCIUM CHLORIDE 600; 310; 30; 20 MG/100ML; MG/100ML; MG/100ML; MG/100ML
INJECTION, SOLUTION INTRAVENOUS CONTINUOUS PRN
Status: DISCONTINUED | OUTPATIENT
Start: 2017-10-17 | End: 2017-10-17 | Stop reason: SURG

## 2017-10-17 RX ORDER — NEOSTIGMINE METHYLSULFATE 1 MG/ML
INJECTION, SOLUTION INTRAVENOUS AS NEEDED
Status: DISCONTINUED | OUTPATIENT
Start: 2017-10-17 | End: 2017-10-17 | Stop reason: SURG

## 2017-10-17 RX ORDER — GABAPENTIN 300 MG/1
300 CAPSULE ORAL EVERY EVENING
COMMUNITY
End: 2017-11-10 | Stop reason: ALTCHOICE

## 2017-10-17 RX ORDER — ONDANSETRON 2 MG/ML
4 INJECTION INTRAMUSCULAR; INTRAVENOUS EVERY 6 HOURS PRN
Status: DISCONTINUED | OUTPATIENT
Start: 2017-10-17 | End: 2017-10-18 | Stop reason: HOSPADM

## 2017-10-17 RX ORDER — GLYCOPYRROLATE 0.2 MG/ML
INJECTION INTRAMUSCULAR; INTRAVENOUS AS NEEDED
Status: DISCONTINUED | OUTPATIENT
Start: 2017-10-17 | End: 2017-10-17 | Stop reason: SURG

## 2017-10-17 RX ORDER — FENTANYL CITRATE 50 UG/ML
INJECTION, SOLUTION INTRAMUSCULAR; INTRAVENOUS AS NEEDED
Status: DISCONTINUED | OUTPATIENT
Start: 2017-10-17 | End: 2017-10-17 | Stop reason: SURG

## 2017-10-17 RX ORDER — NICOTINE POLACRILEX 4 MG
15 LOZENGE BUCCAL
Status: DISCONTINUED | OUTPATIENT
Start: 2017-10-17 | End: 2017-10-18 | Stop reason: HOSPADM

## 2017-10-17 RX ORDER — FAMOTIDINE 20 MG/1
20 TABLET, FILM COATED ORAL EVERY 12 HOURS SCHEDULED
Status: DISCONTINUED | OUTPATIENT
Start: 2017-10-17 | End: 2017-10-18 | Stop reason: HOSPADM

## 2017-10-17 RX ORDER — HYDROMORPHONE HYDROCHLORIDE 1 MG/ML
0.5 INJECTION, SOLUTION INTRAMUSCULAR; INTRAVENOUS; SUBCUTANEOUS
Status: DISCONTINUED | OUTPATIENT
Start: 2017-10-17 | End: 2017-10-17 | Stop reason: HOSPADM

## 2017-10-17 RX ORDER — ASPIRIN 325 MG
325 TABLET ORAL DAILY
Status: DISCONTINUED | OUTPATIENT
Start: 2017-10-18 | End: 2017-10-18 | Stop reason: HOSPADM

## 2017-10-17 RX ORDER — CEFAZOLIN SODIUM 2 G/100ML
2 INJECTION, SOLUTION INTRAVENOUS EVERY 8 HOURS
Status: COMPLETED | OUTPATIENT
Start: 2017-10-17 | End: 2017-10-18

## 2017-10-17 RX ORDER — DEXTROSE MONOHYDRATE 25 G/50ML
25 INJECTION, SOLUTION INTRAVENOUS
Status: DISCONTINUED | OUTPATIENT
Start: 2017-10-17 | End: 2017-10-18 | Stop reason: HOSPADM

## 2017-10-17 RX ORDER — ATRACURIUM BESYLATE 10 MG/ML
INJECTION, SOLUTION INTRAVENOUS AS NEEDED
Status: DISCONTINUED | OUTPATIENT
Start: 2017-10-17 | End: 2017-10-17 | Stop reason: SURG

## 2017-10-17 RX ORDER — HEPARIN SODIUM 1000 [USP'U]/ML
INJECTION, SOLUTION INTRAVENOUS; SUBCUTANEOUS AS NEEDED
Status: DISCONTINUED | OUTPATIENT
Start: 2017-10-17 | End: 2017-10-17 | Stop reason: SURG

## 2017-10-17 RX ADMIN — ATRACURIUM BESYLATE 7.5 MG: 10 INJECTION, SOLUTION INTRAVENOUS at 09:37

## 2017-10-17 RX ADMIN — METOPROLOL TARTRATE 100 MG: 100 TABLET ORAL at 20:02

## 2017-10-17 RX ADMIN — SODIUM CHLORIDE, POTASSIUM CHLORIDE, SODIUM LACTATE AND CALCIUM CHLORIDE 9 ML/HR: 600; 310; 30; 20 INJECTION, SOLUTION INTRAVENOUS at 06:45

## 2017-10-17 RX ADMIN — HYDROCODONE BITARTRATE AND ACETAMINOPHEN 1 TABLET: 5; 325 TABLET ORAL at 12:37

## 2017-10-17 RX ADMIN — SODIUM CHLORIDE, POTASSIUM CHLORIDE, SODIUM LACTATE AND CALCIUM CHLORIDE: 600; 310; 30; 20 INJECTION, SOLUTION INTRAVENOUS at 06:47

## 2017-10-17 RX ADMIN — CEFAZOLIN SODIUM 2 G: 2 INJECTION, SOLUTION INTRAVENOUS at 16:25

## 2017-10-17 RX ADMIN — ONDANSETRON 4 MG: 2 INJECTION INTRAMUSCULAR; INTRAVENOUS at 10:45

## 2017-10-17 RX ADMIN — NEOSTIGMINE METHYLSULFATE 2.5 MG: 1 INJECTION, SOLUTION INTRAVENOUS at 10:45

## 2017-10-17 RX ADMIN — DEXAMETHASONE SODIUM PHOSPHATE 10 MG: 4 INJECTION, SOLUTION INTRAMUSCULAR; INTRAVENOUS at 07:56

## 2017-10-17 RX ADMIN — LOSARTAN POTASSIUM 50 MG: 50 TABLET ORAL at 20:01

## 2017-10-17 RX ADMIN — INSULIN LISPRO 2 UNITS: 100 INJECTION, SOLUTION INTRAVENOUS; SUBCUTANEOUS at 20:42

## 2017-10-17 RX ADMIN — CEFAZOLIN SODIUM 2 G: 2 INJECTION, SOLUTION INTRAVENOUS at 23:58

## 2017-10-17 RX ADMIN — FAMOTIDINE 20 MG: 20 TABLET, FILM COATED ORAL at 16:45

## 2017-10-17 RX ADMIN — GLYCOPYRROLATE 0.4 MG: 0.2 INJECTION, SOLUTION INTRAMUSCULAR; INTRAVENOUS at 10:45

## 2017-10-17 RX ADMIN — HEPARIN SODIUM 3500 UNITS: 1000 INJECTION, SOLUTION INTRAVENOUS; SUBCUTANEOUS at 08:50

## 2017-10-17 RX ADMIN — CEFAZOLIN SODIUM 2 G: 2 INJECTION, SOLUTION INTRAVENOUS at 07:48

## 2017-10-17 RX ADMIN — PROPOFOL 150 MG: 10 INJECTION, EMULSION INTRAVENOUS at 07:56

## 2017-10-17 RX ADMIN — Medication 5 MG: at 22:19

## 2017-10-17 RX ADMIN — ATRACURIUM BESYLATE 10 MG: 10 INJECTION, SOLUTION INTRAVENOUS at 08:40

## 2017-10-17 RX ADMIN — FENTANYL CITRATE 50 MCG: 50 INJECTION, SOLUTION INTRAMUSCULAR; INTRAVENOUS at 07:56

## 2017-10-17 RX ADMIN — INSULIN LISPRO 2 UNITS: 100 INJECTION, SOLUTION INTRAVENOUS; SUBCUTANEOUS at 16:46

## 2017-10-17 RX ADMIN — FAMOTIDINE 20 MG: 20 TABLET, FILM COATED ORAL at 06:45

## 2017-10-17 RX ADMIN — ROCURONIUM BROMIDE 50 MG: 10 INJECTION INTRAVENOUS at 07:56

## 2017-10-17 RX ADMIN — PHENYLEPHRINE HYDROCHLORIDE 1 MCG/KG/MIN: 10 INJECTION INTRAVENOUS at 08:18

## 2017-10-17 RX ADMIN — NICARDIPINE HYDROCHLORIDE 2 MG/HR: 25 INJECTION INTRAVENOUS at 10:00

## 2017-10-17 RX ADMIN — LIDOCAINE HYDROCHLORIDE 0.5 ML: 10 INJECTION, SOLUTION EPIDURAL; INFILTRATION; INTRACAUDAL; PERINEURAL at 06:45

## 2017-10-17 RX ADMIN — GLYCOPYRROLATE 0.2 MG: 0.2 INJECTION, SOLUTION INTRAMUSCULAR; INTRAVENOUS at 08:15

## 2017-10-17 RX ADMIN — HYDROMORPHONE HYDROCHLORIDE 0.5 MG: 1 INJECTION, SOLUTION INTRAMUSCULAR; INTRAVENOUS; SUBCUTANEOUS at 11:33

## 2017-10-17 RX ADMIN — LIDOCAINE HYDROCHLORIDE 50 MG: 10 INJECTION, SOLUTION EPIDURAL; INFILTRATION; INTRACAUDAL; PERINEURAL at 07:56

## 2017-10-17 RX ADMIN — EPHEDRINE SULFATE 5 MG: 50 INJECTION INTRAMUSCULAR; INTRAVENOUS; SUBCUTANEOUS at 10:25

## 2017-10-17 RX ADMIN — ATORVASTATIN CALCIUM 80 MG: 40 TABLET, FILM COATED ORAL at 20:01

## 2017-10-17 RX ADMIN — EPHEDRINE SULFATE 5 MG: 50 INJECTION INTRAMUSCULAR; INTRAVENOUS; SUBCUTANEOUS at 10:30

## 2017-10-17 NOTE — BRIEF OP NOTE
CAROTID ENDARTERECTOMY WITH EEG  Progress Note    Vince Olivera  10/17/2017    Pre-op Diagnosis:   Carotid artery stenosis with cerebral infarction over 8 weeks ago [I63.9]       Post-Op Diagnosis Codes:     * Carotid artery stenosis with cerebral infarction over 8 weeks ago [LON5201]    Procedure/CPT® Codes:  TX THROMBOENDARTECTMY NECK,NECK INCIS [64822]    Procedure(s):  CAROTID ENDARTERECTOMY LEFT    Surgeon(s):  MD Armani Prescott MD    Anesthesia: General    Staff:   Circulator: Vandana Cabral RN; Margarita Fox RN  Scrub Person: Marilynn Valenzuela  Nursing Assistant: Daniel Covarrubias  Assistant: Pearl Zhu PA-C  Orientee: Jaja Recio    Estimated Blood Loss: minimal    Urine Voided: * No values recorded between 10/17/2017  7:48 AM and 10/17/2017 10:36 AM *    Specimens:                  ID Type Source Tests Collected by Time Destination   A : left Tissue Carotid Plaque TISSUE EXAM Alexx Bach MD 10/17/2017 1003          Drains:   Drain/Device Site 10/17/17 1024 neck collapsible closed device (Active)           Findings: Heavily calcified plaque removed from theLeft carotid bifurcation and extending into the left internal carotid artery.  Primary closure of the arteriotomy was achieved without apparent complication    Complications: None      Alexx Bach MD     Date: 10/17/2017  Time: 10:36 AM

## 2017-10-17 NOTE — H&P
Pre-Op H&P  Vince Olivera  [id number]  1960    Date of Service: 10/17/2017    Chief complaint: Recent stroke    HPI:Patient is a 56 y.o.male presents with 6 month history of stroke which occurred in April. This resulted in some right sided weakness which resolved. He was found to have left carotid stenosis.  He was started on Plavix and ASA.  He denies any changes in symptoms or further episodes since last seen by the MD.      Review of Systems:  General ROS: negative for chills, fever or skin lesions;  No changes since last office visit  Cardiovascular ROS: no chest pain or dyspnea on exertion  Respiratory ROS: Admits to mild productive cough ,no  shortness of breath, or wheezing    Allergies: No Known Allergies    Home Meds:    Prescriptions Prior to Admission   Medication Sig Dispense Refill Last Dose   • aspirin 325 MG tablet Take 1 tablet by mouth Daily. 30 tablet 3 10/17/2017 at 0420   • atorvastatin (LIPITOR) 80 MG tablet Take 1 tablet by mouth Every Night. 30 tablet 3 10/17/2017 at 0420   • clopidogrel (PLAVIX) 75 MG tablet Take 1 tablet by mouth Daily. 30 tablet 5 10/17/2017 at 0420   • lansoprazole (PREVACID) 15 MG capsule Take 15 mg by mouth Daily.   10/17/2017 at 0420   • losartan (COZAAR) 100 MG tablet Take 50 mg by mouth 2 (Two) Times a Day.   10/17/2017 at 0420   • metFORMIN (GLUCOPHAGE) 500 MG tablet Take 1 tablet by mouth 2 (Two) Times a Day With Meals. 60 tablet 5 10/16/2017 at 2000   • metoprolol tartrate (LOPRESSOR) 100 MG tablet Take 1 tablet by mouth 2 (Two) Times a Day. 60 tablet 5 10/17/2017 at 0420       PMH:   Past Medical History:   Diagnosis Date   • Arthritis    • Diabetes mellitus     DIAGNOSED 4-2017 CHECKS FSBG 3X/WEEK    • Ear infection     about 2 weeks ago- cleared up - wax removed and cleaned out    • GERD (gastroesophageal reflux disease)     self diagnosed takes otc ppi   • Hyperlipidemia    • Hypertension    • Hypertension    • Shoulder pain     bilat    • Stroke  "04/24/2017    EXPRESSIVE APHASIA RESIDUAL    • Tooth loose     5 - all over- will get teeth done after this surgery    • Wears reading eyeglasses      PSH:    Past Surgical History:   Procedure Laterality Date   • ABDOMINAL HERNIA REPAIR  2010       Immunization History:  Influenza: no  Pneumococcal: no  Tetanus: yes    Social History:   Tobacco:   History   Smoking Status   • Former Smoker   • Packs/day: 0.05   • Years: 40.00   • Types: Cigarettes   • Quit date: 4/26/2017   Smokeless Tobacco   • Former User   • Types: Snuff     Comment: quit snuff when 18 or 19 years old - only did for baseball - 2-3 years       Alcohol:     History   Alcohol Use No       Vitals:           /95 (BP Location: Right arm, Patient Position: Lying)  Pulse 61  Temp 97.9 °F (36.6 °C) (Temporal Artery )   Resp 20  Ht 67.5\" (171.5 cm)  Wt 188 lb (85.3 kg)  SpO2 99%  BMI 29.01 kg/m2    Physical Exam:  General Appearance:    Alert, cooperative, no distress, appears stated age   Head:    Normocephalic, without obvious abnormality, atraumatic   Lungs:     Rales and wheezing  bilaterally, respirations unlabored    Heart:   Regular rate and rhythm, S1 and S2 normal, no murmur, rub    or gallop    Abdomen:    Soft, non-tender.  +bowel sounds   Breast Exam:    deferred   Genitalia:    deferred   Extremities:   Extremities normal, atraumatic, no cyanosis or edema   Skin:   Skin color, texture, turgor normal, no rashes or lesions   Neurologic:   Grossly intact   Results Review  I reviewed the patient's new clinical results.  CBC, Chem profile, INR on chart, EEG and EKG pending this AM.     Cancer Staging (if applicable)  Cancer Patient: __ yes _x_no __unknown; If yes, clinical stage T:__ N:__M:__, stage group or __N/A    Impression: Left carotid stenosis with recent stroke.    Plan:  For left carotid endarterectomy today.    KILEY Garcia   10/17/2017   7:06 AM     Patient with sx left ICA stenosis, plan left CEA  "

## 2017-10-17 NOTE — PROGRESS NOTES
Discharge Planning Assessment  [facility]     Patient Name: Vince Olivera  MRN: [id number]  Today's Date: 10/17/2017    Admit Date: 10/17/2017          Discharge Needs Assessment       10/17/17 1455    Living Environment    Lives With significant other   Lives In [place] with his girlfriend.    Living Arrangements house    Home Accessibility bed and bath on same level    Number of Stairs to Enter Home 2    Type of Financial/Environmental Concern none    Transportation Available car;family or friend will provide    Living Environment    Provides Primary Care For significant other    Quality Of Family Relationships supportive    Able to Return to Prior Living Arrangements yes    Discharge Needs Assessment    Concerns To Be Addressed basic needs concerns;no discharge needs identified    Readmission Within The Last 30 Days no previous admission in last 30 days    Outpatient/Agency/Support Group Needs --   He has never a used HH agency.    Equipment Currently Used at Home glucometer    Discharge Disposition home or self-care            Discharge Plan       10/17/17 1501    Case Management/Social Work Plan    Plan Home    Patient/Family In Agreement With Plan yes    Additional Comments Talked with Mr. Olivera at .  He lives c his S.O. in [place], [place]. in 1 level home.  He is independent c ADL's.  He does not use DME at home, except a glucometer.  His PCP is Dr. Emili Barber.  His goal is to return home c assist from his S.O.  CM will follow.          Discharge Placement     No information found                Demographic Summary       10/17/17 1451    Referral Information    Admission Type inpatient    Arrived From admitted as an inpatient    Referral Source admission list    Reason For Consult discharge planning    Contact Information    Permission Granted to Share Information With     Primary Care Physician Information    Name Dr. Emili Barber            Functional Status        10/17/17 1452    Functional Status Current    Ambulation 0-->independent    Transferring 0-->independent    Toileting 0-->independent    Bathing 0-->independent    Dressing 0-->independent    Eating 0-->independent    Communication 0-->understands/communicates without difficulty    Swallowing (if score 2 or more for any item, consult Rehab Services) 0-->swallows foods/liquids without difficulty    Functional Status Prior    Ambulation 0-->independent    Transferring 0-->independent    Toileting 0-->independent    Bathing 0-->independent    Dressing 0-->independent    Eating 0-->independent    Communication 0-->understands/communicates without difficulty    Swallowing 0-->swallows foods/liquids without difficulty    IADL    Medications independent    Meal Preparation independent    Housekeeping independent    Laundry independent    Shopping independent    Oral Care independent            Psychosocial     None            Abuse/Neglect     None            Legal     None            Substance Abuse     None            Patient Forms     None          Daniel Gold RN

## 2017-10-17 NOTE — OP NOTE
Preoperative diagnosis: Left internal carotid artery stenosis with infarction  Postoperative diagnosis: Same    Indication for procedure: This is a 56-year-old man with high-grade (greater than 70%), symptomatic stenosis of his left internal carotid artery.  He is a candidate for a carotid endarterectomy.  Informed consent for this procedure was obtained from the patient.  He acknowledges risk of stroke, death, pain, paralysis, coma, blindness, permanent neurologic disability, bleeding, infection, cranial nerve injury, hoarseness, dysphagia, tracheal/esophageal injury, failure of benefit of the operation, or need for additional procedures.  All questions were answered prior to beginning the procedure.  No guarantees were given or implied.  The patient elects to proceed with surgery.    Procedure in detail: The patient was identified in the preoperative holding area and brought to the operating suite where he underwent the uneventful induction of general endotracheal anesthesia.  Venodyne's and a Rucker catheter were placed by the nursing staff.  The patient's head was extended and rotated to the right exposing the left anterior neck.  The patient's neck was then prepped and draped in the usual sterile fashion.  A timeout was performed.  Intravenous antibiotics were administered.  An S-shaped incision over the medial border of the sternocleidomastoid was then made after anesthetizing the skin.  Hemostasis was achieved using bipolar and monopolar electrocautery.  The platysma was divided.  The avascular plane medial to the sternocleidomastoid was then followed down until the internal jugular vein was identified.  Dissection was then carried out medial to this until the common carotid artery was identified.  The common carotid artery, carotid bifurcation, proximal external carotid artery, and the internal carotid artery all the way up to the level of the hypoglossal nerve were then exposed circumferentially.  The  hypoglossal nerve and proximal ansa cervicalis were mobilized to minimize the risk of retraction injury.    3500 units of intravenous heparin was administered.    Self-retaining retractors were placed to expose the common carotid artery and its bifurcation.  A Lyndsay tourniquet was placed over the proximal common carotid artery.  The superior thyroid artery was secured using a silk tie and a single square knot and retracted medially.  A vascular loop was placed around the external carotid artery.  An aneurysm clip was applied to the distal internal carotid artery.  The common carotid artery was then ligated followed by the external carotid artery.  No changes in the baseline EEG were reported by the technologist.  Blood pressure was supported by the anesthesiologist to maintain a systolic blood pressure of 180 mmHg during crossclamping      An arteriotomy was then made starting in the common carotid artery just proximal to the bifurcation extending along the lateral aspect of the vessel wall up into the internal carotid artery.  There was a heavily calcified, ulcerated plaque which was encountered.  The Pott scissors were used to complete the arteriotomy.  The plaque was then meticulously worked free from the intima starting at the proximal aspect of the plaque in the common carotid artery.  The proximal aspect of the plaque was sharply cut.  The plaque was then elevated using blunt dissection from the back wall of the vessel.  The external carotid artery was backbled and I attempted to remove the plaque from the proximal aspect of the external carotid artery.  This was sharply truncated.  The external carotid artery was reclamped.  The plaque was removed and passed off the field.    The intima was carefully inspected and irrigated using heparinized saline.  Several small areas of intimal flaps were removed using vascular forceps.  The intima was tacked up at the proximal aspect of the internal carotid artery near  the junction between the plaque and the transition to normal intima using 7-0 Prolene sutures.    The arteriotomy was then closed with a 6-0 Prolene suture in a running fashion starting at the distal aspect of the opening and working proximally towards the common carotid artery area did before closing the suture line, the internal carotid artery was backbled to flush out the debris and a Campuzano from the vessel.  The internal carotid artery was reclamped, and the suture line was closed.  There was a small, focal area of arterial bleeding from the proximal aspect of the suture line and this was easily controlled using a single throw with a 6-0 Prolene suture.  There was a small area of oozing that was seen from the medial aspect of the internal carotid artery just distal to the bifurcation.  This was easily controlled using pressure and thrombin foam.    Gelfoam and Surgicel were laid over the suture line.  The wound was inspected for several minutes and hemostasis was excellent at this point.    A 7 flat LUIS drain was tunneled out through separate stab incision and left in the surgical bed.  The platysma and skin were then closed in layers.  Glue and a sterile dressing then applied.     I performed this procedure with the assistance of Armani Griffin, and Pearl Zhu is a physician assistant  Sponge, instrument, and needle counts were all correct at the conclusion of the case..

## 2017-10-17 NOTE — ANESTHESIA PROCEDURE NOTES
Airway  Urgency: elective    Airway not difficult    General Information and Staff    Patient location during procedure: OR    Indications and Patient Condition  Indications for airway management: airway protection    Preoxygenated: yes  MILS not maintained throughout  Mask difficulty assessment: 1 - vent by mask    Final Airway Details  Final airway type: endotracheal airway      Successful airway: ETT  Cuffed: yes   Successful intubation technique: direct laryngoscopy  Facilitating devices/methods: cricoid pressure  Endotracheal tube insertion site: oral  Blade: Koby  Blade size: #3  ETT size: 7.5 mm  Cormack-Lehane Classification: grade I - full view of glottis  Placement verified by: chest auscultation and capnometry   Measured from: lips  ETT to lips (cm): 22  Number of attempts at approach: 1    Additional Comments  Negative epigastric sounds, Breath sound equal bilaterally with symmetric chest rise and fall

## 2017-10-17 NOTE — PLAN OF CARE
Problem: Patient Care Overview (Adult)  Goal: Plan of Care Review  Outcome: Ongoing (interventions implemented as appropriate)    10/17/17 1402   Coping/Psychosocial Response Interventions   Plan Of Care Reviewed With patient;family;significant other   Patient Care Overview   Progress improving   Outcome Evaluation   Outcome Summary/Follow up Plan S/p CEA, site CDI wo drainage, LUIS drain intact with minimal serosanginous drainage. Minimal pain, tolerating PO and ambulation well         Problem: Perioperative Period (Adult)  Intervention: Prevent/Manage DVT/VTE Risk    10/17/17 1402   Support Surgical/Anesthesia Recovery   Venous Thromboembolism Prevent/Manage bilateral;sequential compression devices on;compression stockings on       Intervention: Monitor/Manage Postoperative Bleeding    10/17/17 1402   Safety Interventions   Bleeding Management dressing monitored           Problem: Carotid Endarterectomy (Adult)  Intervention: Maximize Oxygenation/Ventilation/Perfusion    10/17/17 1200   Activity   Activity Type activity adjusted per tolerance   Promote Aggressive Pulmonary Hygiene/Secretion Management   Cough And Deep Breathing done independently per patient   Positioning   Head Of Bed (HOB) Position HOB at 45 degrees       Intervention: Manage Post-operative Pain    10/17/17 1200   Manage Acute Burn Pain   Pain Management Interventions relaxation techniques promoted;pillow support       Intervention: Support/Optimize Psychosocial Response to Illness    10/17/17 1200 10/17/17 1402   Coping/Psychosocial Interventions   Environmental Support --  calm environment promoted   Coping Strategies   Family/Support System Care involvement promoted;presence promoted --    Supportive Measures active listening utilized;counseling provided;decision-making supported;goal setting facilitated;guided imagery facilitated;journaling promoted;positive reinforcement provided;problem solving facilitated --        Intervention: Promote  Hemodynamic Stability    10/17/17 1200 10/17/17 1402   Coping/Psychosocial Interventions   Environmental Support --  calm environment promoted   Safety Interventions   Medication Review/Management medications reviewed --    Safety Interventions   Bleeding Management dressing monitored --        Intervention: Protect/Optimize Cerebral Perfusion    10/17/17 1200 10/17/17 1402   Neurological Interventions   Cerebral Edema Prevention/Management --  blood pressure monitored   Positioning   Head Of Bed (HOB) Position HOB at 45 degrees --          Goal: Signs and Symptoms of Listed Potential Problems Will be Absent or Manageable (Carotid Endarterectomy)  Outcome: Ongoing (interventions implemented as appropriate)    10/17/17 1402   Carotid Endarterectomy   Problems Assessed (Carotid Endarterectomy) all   Problems Present (Carotid Endarterectomy) none

## 2017-10-17 NOTE — PROGRESS NOTES
Intensivist Note     10/17/2017  Hospital Day: 0      Mr. Vince Olivear, 56 y.o. male is followed for:  Principal Problem:    Status post left carotid endarterectomy  Active Problems:    Bilateral Carotid artery stenosis. Left greater than right     H/o Left MCA ischemic CVA 4/2017    Residual mild expressive Aphasia    Diabetes mellitus type 2 in nonobese    Hypertension    Dyslipidemia    GERD (gastroesophageal reflux disease)       SUBJECTIVE     Subjective    Vinec Olivera is a 56 y.o. male presenting to [facility] today for surgery. Patient Now  S/p left internal carotid endarterectomy per Dr. Bach and moved to the ICU for close observation. He had a left MCA ischemic CVA in 4/2017 and was found to have >70% bilateral carotid stenosis. The patient was not a candidate for TPA administration due to being outside the recommended timeline. During this previous hospitalization he was discharged on ASA, Plavix, and STATINs. Repeat carotid duplex revealed >70% left internal carotid stenosis and 50-69% right internal carotid stenosis. Today, he was transferred to 2B ICU from PACU for postop monitoring.    Currently the patient is very conversant and resting in the bed. He has no complaints of pain. Pertinent negatives include nausea, vomiting, hemoptysis, cough, headache, changes in vision, numbness, tingling, or speech/swallowing difficulty. The patient and family state that the patient does have baseline expressive aphasia from the previous CVA. Is confused on timeline of previous skin and thinks it just occurred this past weekend.    Past Medical History:   Diagnosis Date   • Arthritis    • Diabetes mellitus. Diagnosed 4/2017     • Ear infection    • GERD Self Diagnosed     • Hyperlipidemia    • Hypertension    • Shoulder pain     bilat    • Stroke 4/2017 04/24/2017    EXPRESSIVE APHASIA RESIDUAL    • Periodontal disease and loose teeth      5 - all over- will get teeth done after this surgery        Current  "Outpatient Prescriptions on File Prior to Encounter   Medication Sig Dispense Refill   • clopidogrel (PLAVIX) 75 MG tablet Take 1 tablet by mouth Daily. 30 tablet 5   • lansoprazole (PREVACID) 15 MG capsule Take 15 mg by mouth Daily.     • metFORMIN (GLUCOPHAGE) 500 MG tablet Take 1 tablet by mouth 2 (Two) Times a Day With Meals. 60 tablet 5   • metoprolol tartrate (LOPRESSOR) 100 MG tablet Take 1 tablet by mouth 2 (Two) Times a Day. 60 tablet 5     The patient's relevant past medical, surgical and social history were reviewed and updated in Epic as appropriate.    OBJECTIVE     BP 98/60  Pulse 67  Temp 98 °F (36.7 °C) (Oral)   Resp 20  Ht 67.5\" (171.5 cm)  Wt 188 lb (85.3 kg)  SpO2 98%  BMI 29.01 kg/m2     Flow (L/min): 2    Flowsheet Rows         First Filed Value    Admission Height  67.5\" (171.5 cm) Documented at 10/17/2017 0640    Admission Weight  188 lb (85.3 kg) Documented at 10/17/2017 0640        Intake & Output (last day)       10/16 0701 - 10/17 0700 10/17 0701 - 10/18 0700    P.O.  360    Total Intake(mL/kg)  360 (4.2)    Net   +360                Objective    Exam:  General Exam:  Middle-aged white male in NAD.  HEENT: Pupils equal and reactive. Nose and throat clear. Tongue midline.  No nystagmus. Face is symmetrical at rest and with movement.   Neck:                          Supple, no JVD, thyromegaly, or adenopathy. Left carotid incision well-approximated with clear mesh dressing. Clean and dry. Left neck LUIS drain noted with sanguinous drainage.   Lungs: Clear without wheezes, rales, rhonchi  Cardiovascular: Regular rate and rhythm without murmurs or gallops. Left radial arterial line in place.   Abdomen: Soft nontender without organomegaly or masses. Obese.    and rectal: Deferred.  Extremities: No cyanosis clubbing edema.  Neurologic:                 Symmetric strength. No focal deficits. Baseline expressive aphasia.     Chest X-Ray: None     INFUSIONS    lactated ringers 9 mL/hr Last " Rate: 9 mL/hr (10/17/17 0645)   niCARdipine 5-15 mg/hr          Results from last 7 days  Lab Units 10/12/17  1253   WBC 10*3/mm3 9.98   HEMOGLOBIN g/dL 14.7   HEMATOCRIT % 43.0   PLATELETS 10*3/mm3 261       Results from last 7 days  Lab Units 10/12/17  1253   SODIUM mmol/L 139   POTASSIUM mmol/L 4.1   CHLORIDE mmol/L 98*   CO2 mmol/L 34.0*   BUN mg/dL 8*   CREATININE mg/dL 0.80   GLUCOSE mg/dL 95   CALCIUM mg/dL 9.8           No results found for: SEDRATE  No results found for: BNP  Lab Results   Component Value Date    CKTOTAL 60 08/03/2017     Lab Results   Component Value Date    TSH 2.936 04/28/2017     Lab Results   Component Value Date    LACTATE 1.6 04/24/2017     Lab Results   Component Value Date    CORTISOL 23.40 04/25/2017       I reviewed the patient's results, images and medication.    Assessment/Plan   ASSESSMENT      Principal Problem:    Status post left carotid endarterectomy  Active Problems:    Bilateral Carotid artery stenosis. Left greater than right     H/o Left MCA ischemic CVA 4/2017    Residual mild expressive Aphasia    Diabetes mellitus type 2 in nonobese    Hypertension    Dyslipidemia    GERD (gastroesophageal reflux disease)      DISCUSSION: Looks great postop. Seems confused regarding timing of his previous CVA, but is alert and cooperative without any focal findings.    PLAN     1. Monitor in ICU  2. Neuro checks per protocol  3. SSI coverage  4. Cardene to keep SBP<140    5. PUD/DVT prophylaxis     Plan of care and goals reviewed with mulitdisciplinary team at daily rounds.    I discussed the patient's findings and my recommendations with patient, family and nursing staff    Time spent Critical care 25 min (It does not include procedure time).    Jack Menon MD  Intensive Care Medicine  10/17/17 3:08 PM

## 2017-10-17 NOTE — ANESTHESIA PREPROCEDURE EVALUATION
Anesthesia Evaluation     Patient summary reviewed and Nursing notes reviewed          Airway   Mallampati: II  TM distance: >3 FB  Neck ROM: full  no difficulty expected  Dental - normal exam   (+) poor dentition    Comment: Multiple loose teeth    Pulmonary - normal exam   (+) a smoker (quit 6 months ago) Former,   Cardiovascular - normal exam    (+) hypertension, PVD, hyperlipidemia    ROS comment: Negative stress test 9/17    Normal echo 4/17    Neuro/Psych  (+) CVA (expressive aphasia),    GI/Hepatic/Renal/Endo    (+)  GERD, diabetes mellitus,     Musculoskeletal     Abdominal  - normal exam    Bowel sounds: normal.   Substance History - negative use     OB/GYN negative ob/gyn ROS         Other   (+) arthritis                                     Anesthesia Plan    ASA 3     general   (A line)  intravenous induction   Anesthetic plan and risks discussed with patient.    Plan discussed with CRNA.

## 2017-10-17 NOTE — ANESTHESIA POSTPROCEDURE EVALUATION
Patient: Vince Olivera    Procedure Summary     Date Anesthesia Start Anesthesia Stop Room / Location    10/17/17 0748   [facility] OR [facility] / [facility]       Procedure Diagnosis Surgeon Provider    CAROTID ENDARTERECTOMY LEFT (Left Neck) Carotid artery stenosis with cerebral infarction over 8 weeks ago  (Carotid artery stenosis with cerebral infarction over 8 weeks ago [I63.9]) MD Markel Prescott MD          Anesthesia Type: general  Last vitals  BP   173/95 (10/17/17 0640)   Temp   97.9 °F (36.6 °C) (10/17/17 0640)   Pulse   61 (10/17/17 0640)   Resp   20 (10/17/17 0640)     SpO2   99 % (10/17/17 0640)     Post Anesthesia Care and Evaluation    Patient location during evaluation: PACU  Patient participation: complete - patient participated  Level of consciousness: awake and alert  Pain score: 0  Pain management: adequate  Airway patency: patent  Anesthetic complications: No anesthetic complications  PONV Status: none  Cardiovascular status: hemodynamically stable and acceptable  Respiratory status: nonlabored ventilation, acceptable and nasal cannula  Hydration status: acceptable

## 2017-10-18 VITALS
SYSTOLIC BLOOD PRESSURE: 124 MMHG | TEMPERATURE: 97.9 F | BODY MASS INDEX: 28.49 KG/M2 | HEIGHT: 68 IN | HEART RATE: 64 BPM | WEIGHT: 188 LBS | DIASTOLIC BLOOD PRESSURE: 69 MMHG | RESPIRATION RATE: 20 BRPM | OXYGEN SATURATION: 93 %

## 2017-10-18 LAB
ANION GAP SERPL CALCULATED.3IONS-SCNC: 9 MMOL/L (ref 3–11)
BASOPHILS # BLD AUTO: 0.01 10*3/MM3 (ref 0–0.2)
BASOPHILS NFR BLD AUTO: 0.1 % (ref 0–1)
BUN BLD-MCNC: 13 MG/DL (ref 9–23)
BUN/CREAT SERPL: 16.3 (ref 7–25)
CALCIUM SPEC-SCNC: 8.6 MG/DL (ref 8.7–10.4)
CHLORIDE SERPL-SCNC: 100 MMOL/L (ref 99–109)
CO2 SERPL-SCNC: 26 MMOL/L (ref 20–31)
CREAT BLD-MCNC: 0.8 MG/DL (ref 0.6–1.3)
CYTO UR: NORMAL
DEPRECATED RDW RBC AUTO: 42.5 FL (ref 37–54)
EOSINOPHIL # BLD AUTO: 0 10*3/MM3 (ref 0–0.3)
EOSINOPHIL NFR BLD AUTO: 0 % (ref 0–3)
ERYTHROCYTE [DISTWIDTH] IN BLOOD BY AUTOMATED COUNT: 13.2 % (ref 11.3–14.5)
GFR SERPL CREATININE-BSD FRML MDRD: 100 ML/MIN/1.73
GLUCOSE BLD-MCNC: 178 MG/DL (ref 70–100)
GLUCOSE BLDC GLUCOMTR-MCNC: 190 MG/DL (ref 70–130)
HCT VFR BLD AUTO: 38.4 % (ref 38.9–50.9)
HGB BLD-MCNC: 13.1 G/DL (ref 13.1–17.5)
IMM GRANULOCYTES # BLD: 0.05 10*3/MM3 (ref 0–0.03)
IMM GRANULOCYTES NFR BLD: 0.3 % (ref 0–0.6)
LAB AP CASE REPORT: NORMAL
LAB AP CLINICAL INFORMATION: NORMAL
LYMPHOCYTES # BLD AUTO: 2.6 10*3/MM3 (ref 0.6–4.8)
LYMPHOCYTES NFR BLD AUTO: 18.2 % (ref 24–44)
Lab: NORMAL
MCH RBC QN AUTO: 29.8 PG (ref 27–31)
MCHC RBC AUTO-ENTMCNC: 34.1 G/DL (ref 32–36)
MCV RBC AUTO: 87.5 FL (ref 80–99)
MONOCYTES # BLD AUTO: 1.18 10*3/MM3 (ref 0–1)
MONOCYTES NFR BLD AUTO: 8.2 % (ref 0–12)
NEUTROPHILS # BLD AUTO: 10.47 10*3/MM3 (ref 1.5–8.3)
NEUTROPHILS NFR BLD AUTO: 73.2 % (ref 41–71)
PATH REPORT.FINAL DX SPEC: NORMAL
PATH REPORT.GROSS SPEC: NORMAL
PLATELET # BLD AUTO: 251 10*3/MM3 (ref 150–450)
PMV BLD AUTO: 8.6 FL (ref 6–12)
POTASSIUM BLD-SCNC: 3.6 MMOL/L (ref 3.5–5.5)
RBC # BLD AUTO: 4.39 10*6/MM3 (ref 4.2–5.76)
SODIUM BLD-SCNC: 135 MMOL/L (ref 132–146)
WBC NRBC COR # BLD: 14.31 10*3/MM3 (ref 3.5–10.8)

## 2017-10-18 PROCEDURE — 85025 COMPLETE CBC W/AUTO DIFF WBC: CPT | Performed by: NEUROLOGICAL SURGERY

## 2017-10-18 PROCEDURE — 82962 GLUCOSE BLOOD TEST: CPT

## 2017-10-18 PROCEDURE — 80048 BASIC METABOLIC PNL TOTAL CA: CPT | Performed by: NEUROLOGICAL SURGERY

## 2017-10-18 RX ADMIN — CLOPIDOGREL BISULFATE 75 MG: 75 TABLET ORAL at 08:04

## 2017-10-18 RX ADMIN — METOPROLOL TARTRATE 100 MG: 100 TABLET ORAL at 08:04

## 2017-10-18 RX ADMIN — ASPIRIN 325 MG ORAL TABLET 325 MG: 325 PILL ORAL at 08:04

## 2017-10-18 RX ADMIN — LOSARTAN POTASSIUM 50 MG: 50 TABLET ORAL at 08:04

## 2017-10-18 RX ADMIN — INSULIN LISPRO 2 UNITS: 100 INJECTION, SOLUTION INTRAVENOUS; SUBCUTANEOUS at 08:04

## 2017-10-18 RX ADMIN — FAMOTIDINE 20 MG: 20 TABLET, FILM COATED ORAL at 08:04

## 2017-10-18 NOTE — PLAN OF CARE
Problem: Patient Care Overview (Adult)  Goal: Plan of Care Review  Outcome: Ongoing (interventions implemented as appropriate)    10/18/17 0116   Coping/Psychosocial Response Interventions   Plan Of Care Reviewed With patient   Patient Care Overview   Progress improving         Problem: Perioperative Period (Adult)  Goal: Signs and Symptoms of Listed Potential Problems Will be Absent or Manageable (Perioperative Period)  Outcome: Ongoing (interventions implemented as appropriate)    10/18/17 0116   Perioperative Period   Problems Assessed (Perioperative Period) all   Problems Present (Perioperative Period) none         Problem: Carotid Endarterectomy (Adult)  Goal: Signs and Symptoms of Listed Potential Problems Will be Absent or Manageable (Carotid Endarterectomy)  Outcome: Ongoing (interventions implemented as appropriate)    10/18/17 0116   Carotid Endarterectomy   Problems Assessed (Carotid Endarterectomy) all   Problems Present (Carotid Endarterectomy) none         Problem: Skin Integrity Impairment, Risk/Actual (Adult)  Goal: Identify Related Risk Factors and Signs and Symptoms  Outcome: Ongoing (interventions implemented as appropriate)    10/18/17 0116   Skin Integrity Impairment, Risk/Actual   Skin Integrity Impairment, Risk/Actual: Related Risk Factors surgery/procedure       Goal: Skin Integrity/Wound Healing  Outcome: Ongoing (interventions implemented as appropriate)    10/18/17 0116   Skin Integrity Impairment, Risk/Actual (Adult)   Skin Integrity/Wound Healing making progress toward outcome

## 2017-10-18 NOTE — PROGRESS NOTES
Continued Stay Note  [facility]     Patient Name: Vince Olivera  MRN: [id number]  Today's Date: 10/18/2017    Admit Date: 10/17/2017          Discharge Plan       10/18/17 1014    Case Management/Social Work Plan    Additional Comments Discharging to home acc. by S.O.  No d/c needs noted.               Discharge Codes     None        Expected Discharge Date and Time     Expected Discharge Date Expected Discharge Time    Oct 18, 2017             Daniel Gold RN

## 2017-10-18 NOTE — DISCHARGE SUMMARY
Date of admission: 10/17/17  Date of discharge 10/18/17    Reason for hospitalization: Admitted following an elective left sided carotid endarterectomy    Hospital course: The patient underwent an uncomplicated left sided carotid endarterectomy on the day of admission.  He was admitted and observed in the intensive care unit overnight.  On the day of discharge, he was tolerating a regular diet, his pain was controlled with oral medications, he was ambulating, and is at his neurological baseline.    Discharge activities: As tolerated  Discharge diet: Regular  Discharge medications: Resume all outpatient medications.  He should continue to take aspirin and Plavix on a daily basis  Follow-up: Dr. Bach physician assistant clinic in 2 weeks.  He will need a carotid duplex in 1 month.

## 2017-10-19 ENCOUNTER — OFFICE VISIT (OUTPATIENT)
Dept: FAMILY MEDICINE CLINIC | Facility: CLINIC | Age: 57
End: 2017-10-19

## 2017-10-19 VITALS
BODY MASS INDEX: 29.01 KG/M2 | DIASTOLIC BLOOD PRESSURE: 87 MMHG | TEMPERATURE: 99.4 F | RESPIRATION RATE: 20 BRPM | WEIGHT: 191.4 LBS | SYSTOLIC BLOOD PRESSURE: 161 MMHG | HEART RATE: 77 BPM | OXYGEN SATURATION: 96 % | HEIGHT: 68 IN

## 2017-10-19 DIAGNOSIS — Z98.890 STATUS POST CAROTID ENDARTERECTOMY: Primary | ICD-10-CM

## 2017-10-19 DIAGNOSIS — J20.9 ACUTE BRONCHITIS, UNSPECIFIED ORGANISM: ICD-10-CM

## 2017-10-19 PROCEDURE — 99213 OFFICE O/P EST LOW 20 MIN: CPT | Performed by: NURSE PRACTITIONER

## 2017-10-19 RX ORDER — DOXYCYCLINE 100 MG/1
100 CAPSULE ORAL 2 TIMES DAILY
Qty: 20 CAPSULE | Refills: 0 | Status: SHIPPED | OUTPATIENT
Start: 2017-10-19 | End: 2017-10-29

## 2017-10-20 ENCOUNTER — EPISODE CHANGES (OUTPATIENT)
Dept: CASE MANAGEMENT | Facility: OTHER | Age: 57
End: 2017-10-20

## 2017-10-23 ENCOUNTER — TELEPHONE (OUTPATIENT)
Dept: FAMILY MEDICINE CLINIC | Facility: CLINIC | Age: 57
End: 2017-10-23

## 2017-10-23 NOTE — TELEPHONE ENCOUNTER
SHANEKA WAS TAKEN TO HOSPITAL AND THEN SENT OUT TO [facility]  TOLD HIM HE HAD SEIZURE. GAVE HIM A PREC FOR LEVETIRACETAM    500 MG 1 TAB TWICE DAILY. ONLY HAS ENOUGH  FOR A FEW DAYS.. CAN YOU SEND IN A PREC FOR HIM TO [facility].  KAREEN   [phone]    DAPHNE WORKING ON GETTING RECORDS FROM HOSPITAL.

## 2017-10-26 RX ORDER — LEVETIRACETAM 500 MG/1
500 TABLET ORAL 2 TIMES DAILY
Qty: 60 TABLET | Refills: 0 | Status: SHIPPED | OUTPATIENT
Start: 2017-10-26 | End: 2017-11-27 | Stop reason: SDUPTHER

## 2017-10-30 ENCOUNTER — OFFICE VISIT (OUTPATIENT)
Dept: NEUROSURGERY | Facility: CLINIC | Age: 57
End: 2017-10-30

## 2017-10-30 VITALS
WEIGHT: 183 LBS | TEMPERATURE: 97.1 F | HEIGHT: 68 IN | BODY MASS INDEX: 27.74 KG/M2 | DIASTOLIC BLOOD PRESSURE: 80 MMHG | SYSTOLIC BLOOD PRESSURE: 132 MMHG

## 2017-10-30 DIAGNOSIS — I65.29 STENOSIS OF CAROTID ARTERY, UNSPECIFIED LATERALITY: Primary | ICD-10-CM

## 2017-10-30 PROCEDURE — 99024 POSTOP FOLLOW-UP VISIT: CPT | Performed by: NEUROLOGICAL SURGERY

## 2017-10-30 NOTE — PROGRESS NOTES
Subjective     Chief Complaint: Status post carotid endarterectomy    Patient ID: Vince Olivera is a 56 y.o. male is here today for follow-up.    Stroke   This is a new problem. The current episode started more than 1 month ago. The problem occurs rarely. The problem has been rapidly improving. Associated symptoms include congestion. Pertinent negatives include no abdominal pain, anorexia, arthralgias, change in bowel habit, chest pain, chills, coughing, diaphoresis, fatigue, fever, headaches, joint swelling, myalgias, nausea, neck pain, numbness, rash, sore throat, swollen glands, urinary symptoms, vertigo, visual change, vomiting or weakness. Nothing aggravates the symptoms. He has tried nothing for the symptoms. The treatment provided significant relief.       This is a 56-year-old man who underwent an uncomplicated left sided carotid endarterectomy about 2 weeks ago.  After being discharged home on postoperative day 1, he suffered a seizure and was transferred to the [facility].  He was extensively worked up there, and ultimately diagnosed with an unrelated seizure.  He did not have any radiographic or clinical evidence of new strokes.  His surgically repaired artery was widely patent according to the reports that I received from the hospital.    The following portions of the patient's history were reviewed and updated as appropriate: allergies, current medications, past family history, past medical history, past social history, past surgical history and problem list.    Family history:   Family History   Problem Relation Age of Onset   • Diabetes Mother    • COPD Father        Social history:   Social History     Social History   • Marital status:      Spouse name: N/A   • Number of children: N/A   • Years of education: N/A     Occupational History   • Disabled      Social History Main Topics   • Smoking status: Former Smoker     Packs/day: 0.05     Years: 40.00      Types: Cigarettes     Quit date: 4/26/2017   • Smokeless tobacco: Former User     Types: Snuff      Comment: quit snuff when 18 or 19 years old - only did for baseball - 2-3 years    • Alcohol use No   • Drug use: No   • Sexual activity: Yes     Partners: Female      Comment: SPOUSE     Other Topics Concern   • Not on file     Social History Narrative    Patient consumes 0-1serving of caffeine daily.     Patient lives at home with wife.            Review of Systems   Constitutional: Positive for activity change. Negative for appetite change, chills, diaphoresis, fatigue, fever and unexpected weight change.   HENT: Positive for congestion, dental problem and ear pain. Negative for drooling, ear discharge, facial swelling, hearing loss, mouth sores, nosebleeds, postnasal drip, rhinorrhea, sinus pressure, sneezing, sore throat, tinnitus, trouble swallowing and voice change.    Eyes: Negative for photophobia, pain, discharge, redness, itching and visual disturbance.   Respiratory: Negative for apnea, cough, choking, chest tightness, shortness of breath, wheezing and stridor.    Cardiovascular: Negative for chest pain, palpitations and leg swelling.   Gastrointestinal: Negative for abdominal distention, abdominal pain, anal bleeding, anorexia, blood in stool, change in bowel habit, constipation, diarrhea, nausea, rectal pain and vomiting.   Endocrine: Negative for cold intolerance, heat intolerance, polydipsia, polyphagia and polyuria.   Genitourinary: Negative for decreased urine volume, difficulty urinating, dysuria, enuresis, flank pain, frequency, genital sores, hematuria and urgency.   Musculoskeletal: Negative for arthralgias, back pain, gait problem, joint swelling, myalgias, neck pain and neck stiffness.   Skin: Negative for color change, pallor, rash and wound.   Allergic/Immunologic: Negative for environmental allergies, food allergies and immunocompromised state.   Neurological: Positive for seizures and  "speech difficulty. Negative for dizziness, vertigo, tremors, syncope, facial asymmetry, weakness, light-headedness, numbness and headaches.   Hematological: Negative for adenopathy. Does not bruise/bleed easily.   Psychiatric/Behavioral: Positive for confusion, decreased concentration and dysphoric mood. Negative for agitation, behavioral problems, hallucinations, self-injury, sleep disturbance and suicidal ideas. The patient is nervous/anxious and is hyperactive.        Objective   Blood pressure 132/80, temperature 97.1 °F (36.2 °C), height 67.5\" (171.5 cm), weight 183 lb (83 kg).  Body mass index is 28.24 kg/(m^2).    Physical Exam   Constitutional: He is oriented to person, place, and time. He appears well-developed and well-nourished.  Non-toxic appearance. No distress.   HENT:   Head: Normocephalic and atraumatic.   Mouth/Throat: Oropharynx is clear and moist.   Eyes: EOM are normal. Pupils are equal, round, and reactive to light. Right eye exhibits no discharge. Left eye exhibits no discharge.   Neck: Phonation normal. No JVD present. No tracheal deviation present. No thyromegaly present.       Cardiovascular: Normal rate and regular rhythm.    Pulmonary/Chest: Effort normal. No stridor. No respiratory distress. He has no wheezes.   Abdominal: Soft. Normal appearance. He exhibits no distension. There is no tenderness.   Musculoskeletal: He exhibits no edema.   Muscle Group     L          R  Deltoid                5          5  Bicep                  5          5  Tricep                 5          5                       5          5  Hand IO              5          5  Hip Flexor           5          5  Knee Extensor   5          5     ADF                    5          5  APF                    5          5      Neurological: He is alert and oriented to person, place, and time. He displays normal reflexes. No cranial nerve deficit or sensory deficit. He exhibits normal muscle tone. He displays a negative " Romberg sign. Coordination and gait normal. GCS eye subscore is 4. GCS verbal subscore is 5. GCS motor subscore is 6.   Reflex Scores:       Tricep reflexes are 2+ on the right side and 2+ on the left side.       Bicep reflexes are 2+ on the right side and 2+ on the left side.       Brachioradialis reflexes are 2+ on the right side and 2+ on the left side.       Patellar reflexes are 3+ on the right side and 3+ on the left side.       Achilles reflexes are 3+ on the right side and 3+ on the left side.  CN II-XII grossly intact.    Mild right pronator drift. FTN testing performed without dysmetria or bradykinesia.    Impaired naming and repetition.  Speech production is fluent.   Skin: Skin is warm and dry. He is not diaphoretic. No erythema.   Psychiatric: He has a normal mood and affect. His speech is normal and behavior is normal. Judgment and thought content normal.   Nursing note and vitals reviewed.        Assessment/Plan     Independent Review of Radiographic Studies:      No new imaging    Medical Decision Making:      This is a 56-year-old man who is 2 weeks status post an uncomplicated left sided carotid endarterectomy.  He suffered a seizure several days after his surgery.  He was admitted to [facility] [place].  A MRI of the brain was done which did not demonstrate any new strokes.  The presumptive etiology for his seizure was hemodynamic.    He cannot drive for 90 days after his event.    I would like to follow up with him in 3 months with a repeat carotid duplex ultrasound.  I reviewed the signs and symptoms of stroke with him, and I directed him to contact my office with any new symptoms.  He should remain on aspirin and Plavix for the time being.  After his ultrasound, we can talk about of early discontinuing some of his antiplatelet agents for some dental work that he needs done at that point.    Vince was seen today for post-op.    Diagnoses and all orders for this  visit:    Stenosis of carotid artery, unspecified laterality  -     Duplex Carotid Ultrasound CAR; Future      Return in about 3 months (around 1/30/2018).           This document signed by HUGO Bach MD October 30, 2017 11:57 AM

## 2017-11-10 ENCOUNTER — OFFICE VISIT (OUTPATIENT)
Dept: FAMILY MEDICINE CLINIC | Facility: CLINIC | Age: 57
End: 2017-11-10

## 2017-11-10 VITALS
SYSTOLIC BLOOD PRESSURE: 175 MMHG | HEART RATE: 74 BPM | HEIGHT: 68 IN | DIASTOLIC BLOOD PRESSURE: 90 MMHG | TEMPERATURE: 99 F | BODY MASS INDEX: 28.58 KG/M2 | WEIGHT: 188.6 LBS

## 2017-11-10 DIAGNOSIS — R42 DIZZINESS AND GIDDINESS: Primary | ICD-10-CM

## 2017-11-10 DIAGNOSIS — Z98.890 STATUS POST CAROTID ENDARTERECTOMY: ICD-10-CM

## 2017-11-10 DIAGNOSIS — I10 ESSENTIAL HYPERTENSION: ICD-10-CM

## 2017-11-10 LAB
ANION GAP SERPL CALCULATED.3IONS-SCNC: 7.3 MMOL/L (ref 3.6–11.2)
BASOPHILS # BLD AUTO: 0.04 10*3/MM3 (ref 0–0.3)
BASOPHILS NFR BLD AUTO: 0.4 % (ref 0–2)
BUN BLD-MCNC: 8 MG/DL (ref 7–21)
BUN/CREAT SERPL: 9.4 (ref 7–25)
CALCIUM SPEC-SCNC: 9.8 MG/DL (ref 7.7–10)
CHLORIDE SERPL-SCNC: 101 MMOL/L (ref 99–112)
CO2 SERPL-SCNC: 31.7 MMOL/L (ref 24.3–31.9)
CREAT BLD-MCNC: 0.85 MG/DL (ref 0.43–1.29)
DEPRECATED RDW RBC AUTO: 41.5 FL (ref 37–54)
EOSINOPHIL # BLD AUTO: 0.05 10*3/MM3 (ref 0–0.7)
EOSINOPHIL NFR BLD AUTO: 0.5 % (ref 0–5)
ERYTHROCYTE [DISTWIDTH] IN BLOOD BY AUTOMATED COUNT: 13.2 % (ref 11.5–14.5)
GFR SERPL CREATININE-BSD FRML MDRD: 93 ML/MIN/1.73
GLUCOSE BLD-MCNC: 172 MG/DL (ref 70–110)
HCT VFR BLD AUTO: 42.6 % (ref 42–52)
HGB BLD-MCNC: 14.6 G/DL (ref 14–18)
IMM GRANULOCYTES # BLD: 0.02 10*3/MM3 (ref 0–0.03)
IMM GRANULOCYTES NFR BLD: 0.2 % (ref 0–0.5)
LYMPHOCYTES # BLD AUTO: 2.76 10*3/MM3 (ref 1–3)
LYMPHOCYTES NFR BLD AUTO: 28.8 % (ref 21–51)
MCH RBC QN AUTO: 30 PG (ref 27–33)
MCHC RBC AUTO-ENTMCNC: 34.3 G/DL (ref 33–37)
MCV RBC AUTO: 87.5 FL (ref 80–94)
MONOCYTES # BLD AUTO: 0.83 10*3/MM3 (ref 0.1–0.9)
MONOCYTES NFR BLD AUTO: 8.7 % (ref 0–10)
NEUTROPHILS # BLD AUTO: 5.88 10*3/MM3 (ref 1.4–6.5)
NEUTROPHILS NFR BLD AUTO: 61.4 % (ref 30–70)
OSMOLALITY SERPL CALC.SUM OF ELEC: 281.8 MOSM/KG (ref 273–305)
PLATELET # BLD AUTO: 344 10*3/MM3 (ref 130–400)
PMV BLD AUTO: 9 FL (ref 6–10)
POTASSIUM BLD-SCNC: 4 MMOL/L (ref 3.5–5.3)
RBC # BLD AUTO: 4.87 10*6/MM3 (ref 4.7–6.1)
SODIUM BLD-SCNC: 140 MMOL/L (ref 135–153)
WBC NRBC COR # BLD: 9.58 10*3/MM3 (ref 4.5–12.5)

## 2017-11-10 PROCEDURE — 99214 OFFICE O/P EST MOD 30 MIN: CPT | Performed by: NURSE PRACTITIONER

## 2017-11-10 PROCEDURE — 85025 COMPLETE CBC W/AUTO DIFF WBC: CPT | Performed by: NURSE PRACTITIONER

## 2017-11-10 PROCEDURE — 80048 BASIC METABOLIC PNL TOTAL CA: CPT | Performed by: NURSE PRACTITIONER

## 2017-11-10 RX ORDER — METOPROLOL SUCCINATE 200 MG/1
200 TABLET, EXTENDED RELEASE ORAL DAILY
Qty: 30 TABLET | Refills: 5 | Status: SHIPPED | OUTPATIENT
Start: 2017-11-10 | End: 2017-12-18 | Stop reason: SDUPTHER

## 2017-11-10 NOTE — PROGRESS NOTES
"Subjective   Vince Olivera is a 57 y.o. male.   Chief Complaint   Patient presents with   • Earache   • Dizziness     History of Present Illness     The patient presents today with his girlfriend, Donna present. Has c/o dizziness. This has been present for the past few weeks. He had a left carotid Endarterectomy on 10/17/17.  His girlfriend reports on 10/20/17 he was observed at home having a seizure.  He was flown to [facility].  He was placed on Keppra.  Since that time, has seen the neurologist.  Donna states Dr. Bach plans to keep him on the Keppra for the next few months. Hopefully will be able to discontinue in the next few months if he does not have any more seizure activity. Donna also has BP readings from home. His BP has been fluctuating. He does have mostly high readings in the morning which normalize after taking the Losartan a few hours later. Also has higher BP when coming in to the office. States she has talked to his other providers and is wanting to try an extended release formulation of his BP medications. The patient states the dizziness occurs when he changes positions quickly. He describes this as a loss of equilibrium. He has a history of cerumen impaction, wonders if that is impacting his symptoms. Also has chronic ear pain, but he relates to dental infection. Is hoping he will be cleared for tooth extraction by his surgeon in the near future.     The following portions of the patient's history were reviewed and updated as appropriate: allergies, current medications, past family history, past medical history, past social history, past surgical history and problem list.  /90 (BP Location: Left arm, Patient Position: Sitting, Cuff Size: Adult)  Pulse 74  Temp 99 °F (37.2 °C) (Oral)   Ht 67.5\" (171.5 cm)  Wt 188 lb 9.6 oz (85.5 kg)  BMI 29.1 kg/m2  Review of Systems   Constitutional: Positive for fatigue. Negative for chills and fever.   HENT: Positive for ear pain. " Negative for congestion, rhinorrhea and sore throat.    Respiratory: Negative for cough, shortness of breath and wheezing.    Cardiovascular: Negative for chest pain, palpitations and leg swelling.   Gastrointestinal: Negative for abdominal pain, diarrhea, nausea and vomiting.   Genitourinary: Negative for dysuria.   Musculoskeletal: Negative for back pain and myalgias.   Skin: Negative for rash and wound.   Neurological: Positive for dizziness. Negative for light-headedness and headaches.   Psychiatric/Behavioral: Negative for sleep disturbance. The patient is not nervous/anxious.        Objective   Physical Exam   Constitutional: He is oriented to person, place, and time. He appears well-developed and well-nourished.   HENT:   Head: Normocephalic and atraumatic.   Right Ear: External ear normal.   Left Ear: External ear normal.   Eyes: EOM are normal. Pupils are equal, round, and reactive to light.   Cardiovascular: Normal rate, regular rhythm and normal heart sounds.    Pulmonary/Chest: Effort normal and breath sounds normal.   Abdominal: Soft. Bowel sounds are normal.   Musculoskeletal: Normal range of motion.   Neurological: He is alert and oriented to person, place, and time. No cranial nerve deficit.   Skin: Skin is warm and dry.   Psychiatric: He has a normal mood and affect. His behavior is normal.       Assessment/Plan   Vince was seen today for earache and dizziness.    Diagnoses and all orders for this visit:    Dizziness and giddiness  -     Basic Metabolic Panel  -     CBC & Differential    Essential hypertension  -     metoprolol succinate XL (TOPROL XL) 200 MG 24 hr tablet; Take 1 tablet by mouth Daily.  -     Basic Metabolic Panel  -     CBC & Differential    Status post left carotid endarterectomy  -     Basic Metabolic Panel  -     CBC & Differential      I suspect the dizziness is related to the BP fluctuations. This could also be a side effect of the Keppra. His ears are clean today, no signs of  infection. I have ordered a BMP, CBC. I have also discontinued the lopressor BID dosing and started Toprol XL daily dosing as noted. Continue to monitor BP closely at home. Concerning signs and symptoms that would prompt urgent evaluation discussed. Patient voiced understanding.  Will follow up in 4 weeks as scheduled or earlier if needed.

## 2017-11-16 ENCOUNTER — OFFICE VISIT (OUTPATIENT)
Dept: FAMILY MEDICINE CLINIC | Facility: CLINIC | Age: 57
End: 2017-11-16

## 2017-11-16 VITALS
DIASTOLIC BLOOD PRESSURE: 97 MMHG | WEIGHT: 189 LBS | BODY MASS INDEX: 28.64 KG/M2 | SYSTOLIC BLOOD PRESSURE: 193 MMHG | HEART RATE: 75 BPM | TEMPERATURE: 96.9 F | HEIGHT: 68 IN

## 2017-11-16 DIAGNOSIS — I10 HYPERTENSION, UNSPECIFIED TYPE: Primary | ICD-10-CM

## 2017-11-16 PROCEDURE — 99213 OFFICE O/P EST LOW 20 MIN: CPT | Performed by: FAMILY MEDICINE

## 2017-11-16 RX ORDER — AMLODIPINE BESYLATE 5 MG/1
5 TABLET ORAL DAILY
Qty: 30 TABLET | Refills: 6 | Status: SHIPPED | OUTPATIENT
Start: 2017-11-16 | End: 2017-12-18 | Stop reason: SINTOL

## 2017-11-17 NOTE — PROGRESS NOTES
"Subjective   Vince Olivera is a 57 y.o. male.     History of Present Illness blood pressure fluctuating.  Mild uneasiness but no headache.  No visual changes.  No CP SOB palpitations GI  changes.  Compliant with medications.    The following portions of the patient's history were reviewed and updated as appropriate: allergies, past family history, past medical history, past social history, past surgical history and problem list.    Review of Systems see history of present illness    Objective   Physical Exam   Constitutional: He is oriented to person, place, and time. He appears well-developed and well-nourished.   HENT:   Head: Normocephalic.   Mouth/Throat: Oropharynx is clear and moist.   Eyes: Conjunctivae and EOM are normal. Pupils are equal, round, and reactive to light.   Neck: Normal range of motion. Neck supple. No thyromegaly present.   Cardiovascular: Normal rate, regular rhythm, normal heart sounds and intact distal pulses.    Pulmonary/Chest: Effort normal and breath sounds normal.   Musculoskeletal: He exhibits no edema.   Neurological: He is alert and oriented to person, place, and time.   Psychiatric: He has a normal mood and affect.   Vitals reviewed.    BP (!) 193/97 (BP Location: Left arm, Patient Position: Sitting)  Pulse 75  Temp 96.9 °F (36.1 °C) (Oral)   Ht 67.5\" (171.5 cm)  Wt 189 lb (85.7 kg)  BMI 29.16 kg/m2  Assessment/Plan   Vince was seen today for hypertension.    Diagnoses and all orders for this visit:    Hypertension, unspecified type  -     amLODIPine (NORVASC) 5 MG tablet; Take 1 tablet by mouth Daily.      Blood pressure noted.  Not controlled.  Home blood pressure readings noted.  Today will add amlodipine to current regimen.  Stay safely active maintain heart healthy diet.  Continue to monitor blood pressure.  If things do not improve with appropriate dosing will likely refer to nephrology.  Keep follow-up as scheduled and of course as needed.         "

## 2017-11-18 ENCOUNTER — APPOINTMENT (OUTPATIENT)
Dept: CT IMAGING | Facility: HOSPITAL | Age: 57
End: 2017-11-18

## 2017-11-18 ENCOUNTER — HOSPITAL ENCOUNTER (EMERGENCY)
Facility: HOSPITAL | Age: 57
Discharge: HOME | End: 2017-11-18
Attending: EMERGENCY MEDICINE | Admitting: EMERGENCY MEDICINE

## 2017-11-18 VITALS
DIASTOLIC BLOOD PRESSURE: 74 MMHG | RESPIRATION RATE: 16 BRPM | SYSTOLIC BLOOD PRESSURE: 108 MMHG | TEMPERATURE: 98.1 F | OXYGEN SATURATION: 98 % | HEART RATE: 92 BPM | BODY MASS INDEX: 30.53 KG/M2 | WEIGHT: 190 LBS | HEIGHT: 66 IN

## 2017-11-18 DIAGNOSIS — I10 ESSENTIAL HYPERTENSION: Primary | ICD-10-CM

## 2017-11-18 DIAGNOSIS — R56.9 SEIZURE (HCC): ICD-10-CM

## 2017-11-18 LAB
ALBUMIN SERPL-MCNC: 4.3 G/DL (ref 3.5–5)
ALBUMIN/GLOB SERPL: 1.4 G/DL (ref 1.5–2.5)
ALP SERPL-CCNC: 64 U/L (ref 40–129)
ALT SERPL W P-5'-P-CCNC: 30 U/L (ref 10–44)
ANION GAP SERPL CALCULATED.3IONS-SCNC: 3.4 MMOL/L (ref 3.6–11.2)
APTT PPP: 28.4 SECONDS (ref 23.8–36.1)
AST SERPL-CCNC: 35 U/L (ref 10–34)
BASOPHILS # BLD AUTO: 0.03 10*3/MM3 (ref 0–0.3)
BASOPHILS NFR BLD AUTO: 0.3 % (ref 0–2)
BILIRUB SERPL-MCNC: 0.5 MG/DL (ref 0.2–1.8)
BILIRUB UR QL STRIP: NEGATIVE
BUN BLD-MCNC: 6 MG/DL (ref 7–21)
BUN/CREAT SERPL: 7.6 (ref 7–25)
CALCIUM SPEC-SCNC: 9.6 MG/DL (ref 7.7–10)
CHLORIDE SERPL-SCNC: 105 MMOL/L (ref 99–112)
CLARITY UR: CLEAR
CO2 SERPL-SCNC: 27.6 MMOL/L (ref 24.3–31.9)
COLOR UR: YELLOW
CREAT BLD-MCNC: 0.79 MG/DL (ref 0.43–1.29)
DEPRECATED RDW RBC AUTO: 41 FL (ref 37–54)
EOSINOPHIL # BLD AUTO: 0.04 10*3/MM3 (ref 0–0.7)
EOSINOPHIL NFR BLD AUTO: 0.4 % (ref 0–5)
ERYTHROCYTE [DISTWIDTH] IN BLOOD BY AUTOMATED COUNT: 13.2 % (ref 11.5–14.5)
GFR SERPL CREATININE-BSD FRML MDRD: 101 ML/MIN/1.73
GLOBULIN UR ELPH-MCNC: 3 GM/DL
GLUCOSE BLD-MCNC: 140 MG/DL (ref 70–110)
GLUCOSE UR STRIP-MCNC: ABNORMAL MG/DL
HCT VFR BLD AUTO: 40.8 % (ref 42–52)
HGB BLD-MCNC: 14.4 G/DL (ref 14–18)
HGB UR QL STRIP.AUTO: NEGATIVE
IMM GRANULOCYTES # BLD: 0.01 10*3/MM3 (ref 0–0.03)
IMM GRANULOCYTES NFR BLD: 0.1 % (ref 0–0.5)
INR PPP: 1.08 (ref 0.9–1.1)
KETONES UR QL STRIP: NEGATIVE
LEUKOCYTE ESTERASE UR QL STRIP.AUTO: NEGATIVE
LYMPHOCYTES # BLD AUTO: 2.53 10*3/MM3 (ref 1–3)
LYMPHOCYTES NFR BLD AUTO: 25 % (ref 21–51)
MCH RBC QN AUTO: 30.3 PG (ref 27–33)
MCHC RBC AUTO-ENTMCNC: 35.3 G/DL (ref 33–37)
MCV RBC AUTO: 85.7 FL (ref 80–94)
MONOCYTES # BLD AUTO: 0.65 10*3/MM3 (ref 0.1–0.9)
MONOCYTES NFR BLD AUTO: 6.4 % (ref 0–10)
NEUTROPHILS # BLD AUTO: 6.84 10*3/MM3 (ref 1.4–6.5)
NEUTROPHILS NFR BLD AUTO: 67.8 % (ref 30–70)
NITRITE UR QL STRIP: NEGATIVE
OSMOLALITY SERPL CALC.SUM OF ELEC: 271.9 MOSM/KG (ref 273–305)
PH UR STRIP.AUTO: 8 [PH] (ref 5–8)
PLATELET # BLD AUTO: 239 10*3/MM3 (ref 130–400)
PMV BLD AUTO: 8.5 FL (ref 6–10)
POTASSIUM BLD-SCNC: 4.2 MMOL/L (ref 3.5–5.3)
PROT SERPL-MCNC: 7.3 G/DL (ref 6–8)
PROT UR QL STRIP: NEGATIVE
PROTHROMBIN TIME: 14.1 SECONDS (ref 11–15.4)
RBC # BLD AUTO: 4.76 10*6/MM3 (ref 4.7–6.1)
SODIUM BLD-SCNC: 136 MMOL/L (ref 135–153)
SP GR UR STRIP: 1.01 (ref 1–1.03)
UROBILINOGEN UR QL STRIP: ABNORMAL
WBC NRBC COR # BLD: 10.1 10*3/MM3 (ref 4.5–12.5)

## 2017-11-18 PROCEDURE — 99285 EMERGENCY DEPT VISIT HI MDM: CPT

## 2017-11-18 PROCEDURE — 85025 COMPLETE CBC W/AUTO DIFF WBC: CPT | Performed by: EMERGENCY MEDICINE

## 2017-11-18 PROCEDURE — 80177 DRUG SCRN QUAN LEVETIRACETAM: CPT | Performed by: EMERGENCY MEDICINE

## 2017-11-18 PROCEDURE — 85610 PROTHROMBIN TIME: CPT | Performed by: EMERGENCY MEDICINE

## 2017-11-18 PROCEDURE — 81003 URINALYSIS AUTO W/O SCOPE: CPT | Performed by: EMERGENCY MEDICINE

## 2017-11-18 PROCEDURE — 93005 ELECTROCARDIOGRAM TRACING: CPT | Performed by: EMERGENCY MEDICINE

## 2017-11-18 PROCEDURE — 85730 THROMBOPLASTIN TIME PARTIAL: CPT | Performed by: EMERGENCY MEDICINE

## 2017-11-18 PROCEDURE — 70450 CT HEAD/BRAIN W/O DYE: CPT | Performed by: RADIOLOGY

## 2017-11-18 PROCEDURE — 96374 THER/PROPH/DIAG INJ IV PUSH: CPT

## 2017-11-18 PROCEDURE — 70450 CT HEAD/BRAIN W/O DYE: CPT

## 2017-11-18 PROCEDURE — 80053 COMPREHEN METABOLIC PANEL: CPT | Performed by: EMERGENCY MEDICINE

## 2017-11-18 PROCEDURE — 96361 HYDRATE IV INFUSION ADD-ON: CPT

## 2017-11-18 PROCEDURE — 25010000002 LORAZEPAM PER 2 MG: Performed by: EMERGENCY MEDICINE

## 2017-11-18 RX ORDER — LORAZEPAM 2 MG/ML
2 INJECTION INTRAMUSCULAR ONCE
Status: DISCONTINUED | OUTPATIENT
Start: 2017-11-18 | End: 2017-11-18

## 2017-11-18 RX ORDER — LORAZEPAM 2 MG/ML
1 INJECTION INTRAMUSCULAR ONCE
Status: COMPLETED | OUTPATIENT
Start: 2017-11-18 | End: 2017-11-18

## 2017-11-18 RX ORDER — SODIUM CHLORIDE 0.9 % (FLUSH) 0.9 %
10 SYRINGE (ML) INJECTION AS NEEDED
Status: DISCONTINUED | OUTPATIENT
Start: 2017-11-18 | End: 2017-11-18 | Stop reason: HOSPADM

## 2017-11-18 RX ADMIN — LORAZEPAM 1 MG: 2 INJECTION INTRAMUSCULAR; INTRAVENOUS at 15:26

## 2017-11-18 RX ADMIN — SODIUM CHLORIDE 1000 ML: 9 INJECTION, SOLUTION INTRAVENOUS at 15:51

## 2017-11-18 NOTE — ED PROVIDER NOTES
Subjective   Patient is a 57 y.o. male presenting with weakness.   History provided by:  Spouse  Weakness - Generalized   Severity:  Mild  Onset quality:  Gradual  Progression:  Resolved  Chronicity:  Recurrent  Context: stress    Relieved by:  Nothing  Worsened by:  Nothing  Ineffective treatments:  None tried  Associated symptoms: dizziness    Associated symptoms: no abdominal pain, no chest pain, no dysuria and no fever        Review of Systems   Constitutional: Negative.  Negative for fever.   HENT: Negative.    Respiratory: Negative.    Cardiovascular: Negative.  Negative for chest pain.   Gastrointestinal: Negative.  Negative for abdominal pain.   Endocrine: Negative.    Genitourinary: Negative.  Negative for dysuria.   Skin: Negative.    Neurological: Positive for dizziness.   Psychiatric/Behavioral: Negative.    All other systems reviewed and are negative.      Past Medical History:   Diagnosis Date   • Arthritis    • Carotid artery disorder    • Diabetes mellitus     DIAGNOSED 4-2017 CHECKS FSBG 3X/WEEK    • Ear infection     about 2 weeks ago- cleared up - wax removed and cleaned out    • GERD (gastroesophageal reflux disease)     self diagnosed takes otc ppi   • Hyperlipidemia    • Hypertension    • Hypertension    • Seizure    • Shoulder pain     bilat    • Stroke 04/24/2017    EXPRESSIVE APHASIA RESIDUAL    • Tooth loose     5 - all over- will get teeth done after this surgery    • Wears reading eyeglasses        No Known Allergies    Past Surgical History:   Procedure Laterality Date   • ABDOMINAL HERNIA REPAIR  2010   • CAROTID ENDARTERECTOMY Left 10/17/2017    Procedure: CAROTID ENDARTERECTOMY LEFT;  Surgeon: Alexx Bach MD;  [facility];  Service:    • CAROTID ENDARTERECTOMY Left 10/17/2017       Family History   Problem Relation Age of Onset   • Diabetes Mother    • COPD Father        Social History     Social History   • Marital status:      Spouse name: N/A   • Number  of children: N/A   • Years of education: N/A     Occupational History   • Disabled      Social History Main Topics   • Smoking status: Former Smoker     Packs/day: 0.05     Years: 40.00     Types: Cigarettes     Quit date: 4/26/2017   • Smokeless tobacco: Former User     Types: Snuff      Comment: quit snuff when 18 or 19 years old - only did for baseball - 2-3 years    • Alcohol use No   • Drug use: No   • Sexual activity: Yes     Partners: Female      Comment: SPOUSE     Other Topics Concern   • None     Social History Narrative    Patient consumes 0-1serving of caffeine daily.     Patient lives at home with wife.                Objective   Physical Exam   Constitutional: He is oriented to person, place, and time. He appears well-developed and well-nourished. No distress.   HENT:   Head: Normocephalic and atraumatic.   Right Ear: External ear normal.   Left Ear: External ear normal.   Nose: Nose normal.   Eyes: Conjunctivae and EOM are normal. Pupils are equal, round, and reactive to light.   Neck: Normal range of motion. Neck supple. No JVD present. No tracheal deviation present.   Cardiovascular: Normal rate, regular rhythm and normal heart sounds.    No murmur heard.  Pulmonary/Chest: Effort normal and breath sounds normal. No respiratory distress. He has no wheezes.   Abdominal: Soft. Bowel sounds are normal. There is no tenderness.   Musculoskeletal: Normal range of motion. He exhibits no edema or deformity.   Neurological: He is alert and oriented to person, place, and time. No cranial nerve deficit.   No acute neuro deficits noted in the exam   Skin: Skin is warm and dry. No rash noted. He is not diaphoretic. No erythema. No pallor.   Psychiatric: He has a normal mood and affect. His behavior is normal. Thought content normal.   Nursing note and vitals reviewed.      Procedures         ED Course  ED Course   Comment By Time   CT head negative Sid Rojo MD 11/18 1510   Dr. Daniel Bach consulted  at 15:28, advises to double Keppra and have patient call office Monday AM Sid Rojo MD 11/18 1536                  [facility]    Final diagnoses:   Essential hypertension   Seizure            Sid Rojo MD  11/18/17 1610       Sid Rojo MD  12/11/17 0216

## 2017-11-18 NOTE — ED NOTES
Pt sleeping. Seizure precautions maintained. o2 and suction at bedside, siderails up x2 and padded.     Kayla Dejesus RN  11/18/17 1310

## 2017-11-18 NOTE — ED NOTES
"Wife at bedside, staated\"he had a stroke 4/24/2017. His ability to answer questions is abstract since the stroke.     Edi Dumas RN  11/18/17 [id number]    "

## 2017-11-18 NOTE — ED NOTES
Pt's girlfriend came to the nurses desk and ask for help upon arriving in the room the pt was actively seizing this was at 1517 the seizure lasted until 1519, the pt is now postictal, Dr. Rojo was in the room as well as myself, Ativan was ordered, was not available in the accudose, pharmacy was called by another RN, as another RN was trying to pull Valium in which we did not have in the accudose either, the pharmacist advised Sonia SINGH that they had to have an order in the system before they could bring the Ativan over, the order was placed, after they had already been informed verbally since the pt was actively seizing at 1522, the medication arrived at 1526 by pharmacy tech 9 minutes after the sz started and 7 minutes after the seizure stopped.  Meanwhile the pt was immediately placed on O2 at the bedside and suction was in place if needed.     Gregoria Ngo, SAMANTHA  11/18/17 [id number]

## 2017-11-18 NOTE — ED NOTES
Pt resting on stretcher, pt skin pwd, no respiratory distress, pt alert and confused. No seizure activity noted at this time.     Kayla Dejesus RN  11/18/17 [id number]

## 2017-11-22 LAB — LEVETIRACETAM SERPL-MCNC: 11 UG/ML (ref 10–40)

## 2017-11-27 ENCOUNTER — TELEPHONE (OUTPATIENT)
Dept: FAMILY MEDICINE CLINIC | Facility: CLINIC | Age: 57
End: 2017-11-27

## 2017-11-27 RX ORDER — LEVETIRACETAM 500 MG/1
1000 TABLET ORAL 2 TIMES DAILY
Qty: 120 TABLET | Refills: 1 | Status: SHIPPED | OUTPATIENT
Start: 2017-11-27 | End: 2017-12-18 | Stop reason: SDUPTHER

## 2017-11-27 NOTE — TELEPHONE ENCOUNTER
KAREEN CALLED AND STATED SHE HAD TO CALL 911 ON Saturday DUE TO PATIENT HAVING ANOTHER SEIZURE. SHE SAID THE ER DOCTORS CALLED HIS NEUROLOGIST AND HAS INCREASED HIS KEPPRA TO 2 TABS IN THE AM AND PM. SHE NEED A NEW PRESCRIPTION FOR THIS INCREASE. SEND TO [facility].

## 2017-12-18 ENCOUNTER — OFFICE VISIT (OUTPATIENT)
Dept: FAMILY MEDICINE CLINIC | Facility: CLINIC | Age: 57
End: 2017-12-18

## 2017-12-18 VITALS
HEIGHT: 66 IN | HEART RATE: 77 BPM | TEMPERATURE: 97.7 F | WEIGHT: 192.4 LBS | SYSTOLIC BLOOD PRESSURE: 192 MMHG | BODY MASS INDEX: 30.92 KG/M2 | DIASTOLIC BLOOD PRESSURE: 93 MMHG

## 2017-12-18 DIAGNOSIS — E11.9 DIABETES MELLITUS TYPE 2 IN NONOBESE (HCC): ICD-10-CM

## 2017-12-18 DIAGNOSIS — K02.9 DENTAL CARIES: ICD-10-CM

## 2017-12-18 DIAGNOSIS — I63.9 CEREBROVASCULAR ACCIDENT (CVA), UNSPECIFIED MECHANISM (HCC): ICD-10-CM

## 2017-12-18 DIAGNOSIS — G40.909 SEIZURE DISORDER (HCC): ICD-10-CM

## 2017-12-18 DIAGNOSIS — I10 ESSENTIAL HYPERTENSION: Primary | ICD-10-CM

## 2017-12-18 DIAGNOSIS — E78.5 DYSLIPIDEMIA: ICD-10-CM

## 2017-12-18 DIAGNOSIS — K21.9 GASTROESOPHAGEAL REFLUX DISEASE, ESOPHAGITIS PRESENCE NOT SPECIFIED: ICD-10-CM

## 2017-12-18 PROCEDURE — 99214 OFFICE O/P EST MOD 30 MIN: CPT | Performed by: NURSE PRACTITIONER

## 2017-12-18 RX ORDER — CLOPIDOGREL BISULFATE 75 MG/1
75 TABLET ORAL DAILY
Qty: 30 TABLET | Refills: 5 | Status: SHIPPED | OUTPATIENT
Start: 2017-12-18 | End: 2018-09-25 | Stop reason: SDUPTHER

## 2017-12-18 RX ORDER — LANSOPRAZOLE 15 MG/1
15 CAPSULE, DELAYED RELEASE ORAL DAILY
Qty: 30 CAPSULE | Refills: 5 | Status: SHIPPED | OUTPATIENT
Start: 2017-12-18 | End: 2019-10-16 | Stop reason: HOSPADM

## 2017-12-18 RX ORDER — HYDROCHLOROTHIAZIDE 25 MG/1
25 TABLET ORAL DAILY
Qty: 30 TABLET | Refills: 5 | Status: SHIPPED | OUTPATIENT
Start: 2017-12-18 | End: 2018-06-26 | Stop reason: SDUPTHER

## 2017-12-18 RX ORDER — LEVETIRACETAM 500 MG/1
1000 TABLET ORAL 2 TIMES DAILY
Qty: 120 TABLET | Refills: 1 | Status: SHIPPED | OUTPATIENT
Start: 2017-12-18 | End: 2018-04-02 | Stop reason: SDUPTHER

## 2017-12-18 RX ORDER — ATORVASTATIN CALCIUM 80 MG/1
80 TABLET, FILM COATED ORAL NIGHTLY
Qty: 30 TABLET | Refills: 5 | Status: SHIPPED | OUTPATIENT
Start: 2017-12-18 | End: 2018-07-24 | Stop reason: SDUPTHER

## 2017-12-18 RX ORDER — LOSARTAN POTASSIUM 100 MG/1
50 TABLET ORAL 2 TIMES DAILY
Qty: 60 TABLET | Refills: 5 | Status: SHIPPED | OUTPATIENT
Start: 2017-12-18 | End: 2018-12-20 | Stop reason: SDUPTHER

## 2017-12-18 RX ORDER — METOPROLOL SUCCINATE 200 MG/1
200 TABLET, EXTENDED RELEASE ORAL DAILY
Qty: 30 TABLET | Refills: 5 | Status: SHIPPED | OUTPATIENT
Start: 2017-12-18 | End: 2018-10-23 | Stop reason: SDUPTHER

## 2017-12-18 NOTE — PROGRESS NOTES
"Subjective   Vince Olivera is a 57 y.o. male.   Chief Complaint   Patient presents with   • Follow-up     History of Present Illness     The patient presents today for routine follow-up.  His blood pressure is elevated in the office today.  Home readings are consistent.  Reports the blood pressure elevation only occurs in the morning time.  He had been taking the amlodipine 5 mg daily.  However, on 11/19/17 had another seizure and went to the ER.  Was reportedly advised by his neurosurgeon to discontinue the amlodipine.  Since that time, blood pressures have remained high.  He has doubled the Keppra dosing as recommended by the neurosurgeon.  Also reports he has been referred to a neurologist locally and has an appointment there next month.  In addition, has multiple dental caries and poor dentition.  States he has been advised by dentist to have extraction of teeth.  He has requested referral to an oral surgeon at .  His last hemoglobin A1c was 6.2%.  Reports blood glucose at home has been excellent.      The following portions of the patient's history were reviewed and updated as appropriate: allergies, current medications, past family history, past medical history, past social history, past surgical history and problem list.  BP (!) 192/93 (BP Location: Right arm, Patient Position: Sitting)  Pulse 77  Temp 97.7 °F (36.5 °C) (Oral)   Ht 167.6 cm (66\")  Wt 87.3 kg (192 lb 6.4 oz)  BMI 31.05 kg/m2  Review of Systems   Constitutional: Negative for chills and fever.   HENT: Positive for dental problem. Negative for congestion, ear pain, rhinorrhea and sore throat.    Respiratory: Negative for cough, shortness of breath and wheezing.    Cardiovascular: Negative for chest pain, palpitations and leg swelling.   Gastrointestinal: Negative for abdominal pain, diarrhea, nausea and vomiting.   Genitourinary: Negative for dysuria.   Musculoskeletal: Negative for back pain and myalgias.   Skin: Negative for rash and " wound.   Neurological: Positive for seizures. Negative for dizziness, light-headedness and headaches.   Psychiatric/Behavioral: Negative for sleep disturbance. The patient is not nervous/anxious.        Objective   Physical Exam   Constitutional: He is oriented to person, place, and time. He appears well-developed and well-nourished.   HENT:   Head: Normocephalic and atraumatic.   Mouth/Throat: Oropharynx is clear and moist.   Poor dentition   Cardiovascular: Normal rate, regular rhythm and normal heart sounds.    Pulmonary/Chest: Effort normal and breath sounds normal.   Abdominal: Soft. Bowel sounds are normal.   Musculoskeletal: Normal range of motion.   Neurological: He is alert and oriented to person, place, and time.   Mild expressive aphasia noted   Skin: Skin is warm and dry.   Psychiatric: He has a normal mood and affect. His behavior is normal.       Assessment/Plan   Vince was seen today for follow-up.    Diagnoses and all orders for this visit:    Essential hypertension  -     losartan (COZAAR) 100 MG tablet; Take 0.5 tablets by mouth 2 (Two) Times a Day.  -     metoprolol succinate XL (TOPROL XL) 200 MG 24 hr tablet; Take 1 tablet by mouth Daily.  -     hydrochlorothiazide (HYDRODIURIL) 25 MG tablet; Take 1 tablet by mouth Daily.    Dyslipidemia  -     atorvastatin (LIPITOR) 80 MG tablet; Take 1 tablet by mouth Every Night.    Cerebrovascular accident (CVA), unspecified mechanism  -     clopidogrel (PLAVIX) 75 MG tablet; Take 1 tablet by mouth Daily.    Diabetes mellitus type 2 in nonobese  -     metFORMIN (GLUCOPHAGE) 500 MG tablet; Take 1 tablet by mouth 2 (Two) Times a Day With Meals.    Gastroesophageal reflux disease, esophagitis presence not specified  -     lansoprazole (PREVACID) 15 MG capsule; Take 1 capsule by mouth Daily.    Seizure disorder  -     levETIRAcetam (KEPPRA) 500 MG tablet; Take 2 tablets by mouth 2 (Two) Times a Day.    Dental caries  -     Ambulatory Referral to Oral  Maxillofacial Surgery      I have renewed his medications. His blood pressure is not at goal. I have added HCTZ 25 mg daily. He will take in the morning. His girlfriend will carefully monitor BP at home and call with readings within the next 2 weeks. He will continue with neurology consultation as scheduled. I have placed a referral for evaluation by oral surgeon. Follow up here in 3 months and PRN.

## 2017-12-21 LAB — TOAL ENROLLMENT DAYS: 30

## 2018-01-03 ENCOUNTER — OFFICE VISIT (OUTPATIENT)
Dept: FAMILY MEDICINE CLINIC | Facility: CLINIC | Age: 58
End: 2018-01-03

## 2018-01-03 VITALS
OXYGEN SATURATION: 95 % | WEIGHT: 186.9 LBS | BODY MASS INDEX: 30.04 KG/M2 | DIASTOLIC BLOOD PRESSURE: 69 MMHG | TEMPERATURE: 101.9 F | HEIGHT: 66 IN | SYSTOLIC BLOOD PRESSURE: 120 MMHG | HEART RATE: 107 BPM

## 2018-01-03 DIAGNOSIS — R50.9 FEVER, UNSPECIFIED FEVER CAUSE: ICD-10-CM

## 2018-01-03 DIAGNOSIS — J10.1 INFLUENZA A: Primary | ICD-10-CM

## 2018-01-03 LAB
EXPIRATION DATE: NORMAL
FLUAV AG NPH QL: POSITIVE
FLUBV AG NPH QL: NEGATIVE
INTERNAL CONTROL: NORMAL
Lab: NORMAL

## 2018-01-03 PROCEDURE — 99213 OFFICE O/P EST LOW 20 MIN: CPT | Performed by: NURSE PRACTITIONER

## 2018-01-03 PROCEDURE — 87804 INFLUENZA ASSAY W/OPTIC: CPT | Performed by: NURSE PRACTITIONER

## 2018-01-03 RX ORDER — OSELTAMIVIR PHOSPHATE 75 MG/1
75 CAPSULE ORAL 2 TIMES DAILY
Qty: 10 CAPSULE | Refills: 0 | Status: SHIPPED | OUTPATIENT
Start: 2018-01-03 | End: 2018-03-19

## 2018-01-03 NOTE — PROGRESS NOTES
"Esther Olivera is a 57 y.o. male.   Chief Complaint   Patient presents with   • Generalized Body Aches   • Fever     Fever    This is a new problem. The current episode started yesterday. The problem occurs constantly. The problem has been unchanged. His temperature was unmeasured prior to arrival. Pertinent negatives include no abdominal pain, chest pain, congestion, coughing, diarrhea, ear pain, headaches, nausea, rash, sore throat, urinary pain, vomiting or wheezing. He has tried nothing for the symptoms. The treatment provided no relief.   Risk factors: no sick contacts         The following portions of the patient's history were reviewed and updated as appropriate: allergies, current medications, past family history, past medical history, past social history, past surgical history and problem list.  /69 (BP Location: Left arm, Patient Position: Sitting, Cuff Size: Adult)  Pulse 107  Temp (!) 101.9 °F (38.8 °C) (Oral)   Ht 167.6 cm (65.98\")  Wt 84.8 kg (186 lb 14.4 oz)  SpO2 95%  BMI 30.18 kg/m2  Review of Systems   Constitutional: Positive for chills and fever. Negative for fatigue.   HENT: Positive for rhinorrhea, sinus pain and sinus pressure. Negative for congestion, ear pain and sore throat.    Respiratory: Negative for cough, shortness of breath and wheezing.    Cardiovascular: Negative for chest pain, palpitations and leg swelling.   Gastrointestinal: Negative for abdominal pain, diarrhea, nausea and vomiting.   Genitourinary: Negative for dysuria.   Musculoskeletal: Positive for myalgias. Negative for back pain.   Skin: Negative for rash and wound.   Neurological: Negative for dizziness, light-headedness and headaches.   Psychiatric/Behavioral: Negative for sleep disturbance. The patient is not nervous/anxious.        Objective   Physical Exam   Constitutional: He is oriented to person, place, and time. He appears well-developed and well-nourished.   HENT:   Head: Normocephalic " and atraumatic.   Right Ear: External ear normal.   Left Ear: External ear normal.   Mouth/Throat: Oropharynx is clear and moist.   Cardiovascular: Regular rhythm and normal heart sounds.    tachycardic   Pulmonary/Chest: Effort normal and breath sounds normal.   Abdominal: Soft. Bowel sounds are normal.   Musculoskeletal:   Gait and station normal   Neurological: He is alert and oriented to person, place, and time.   Skin: Skin is warm and dry.   Psychiatric: He has a normal mood and affect. His behavior is normal.       Assessment/Plan   Vince was seen today for generalized body aches and fever.    Diagnoses and all orders for this visit:    Influenza A  -     oseltamivir (TAMIFLU) 75 MG capsule; Take 1 capsule by mouth 2 (Two) Times a Day.    Fever, unspecified fever cause  -     POCT Influenza A/B      Influenza A positive  Influenza B negative    We have discussed SE of Tamiflu. I have sent in a prescription. Stay hydrated. Emphasized fever control. Supportive measures encouraged.  Follow up in 2-3 days if no improvement or if symptoms worsen.

## 2018-01-05 ENCOUNTER — TELEPHONE (OUTPATIENT)
Dept: FAMILY MEDICINE CLINIC | Facility: CLINIC | Age: 58
End: 2018-01-05

## 2018-01-05 RX ORDER — AMOXICILLIN 875 MG/1
875 TABLET, COATED ORAL EVERY 12 HOURS SCHEDULED
Qty: 20 TABLET | Refills: 0 | Status: SHIPPED | OUTPATIENT
Start: 2018-01-05 | End: 2018-01-15

## 2018-01-05 NOTE — TELEPHONE ENCOUNTER
KAREEN CALLED STATES PATIENTS FEVER HAS BROKE, HE IS HAVING SOME EAR PAIN AND WANTS TO KNOW IF SOMETHING COULD BE CALLED TO PHARMACY. PATIENT USES [facility].

## 2018-01-19 DIAGNOSIS — I63.9 CEREBROVASCULAR ACCIDENT (CVA), UNSPECIFIED MECHANISM (HCC): ICD-10-CM

## 2018-01-19 RX ORDER — ASPIRIN 325 MG
TABLET, DELAYED RELEASE (ENTERIC COATED) ORAL
Qty: 30 TABLET | Refills: 0 | OUTPATIENT
Start: 2018-01-19

## 2018-01-22 DIAGNOSIS — I63.9 CEREBROVASCULAR ACCIDENT (CVA), UNSPECIFIED MECHANISM (HCC): ICD-10-CM

## 2018-01-22 RX ORDER — ASPIRIN 325 MG
325 TABLET ORAL DAILY
Qty: 30 TABLET | Refills: 3 | Status: SHIPPED | OUTPATIENT
Start: 2018-01-22 | End: 2018-02-16 | Stop reason: SDUPTHER

## 2018-02-02 ENCOUNTER — APPOINTMENT (OUTPATIENT)
Dept: CARDIOLOGY | Facility: HOSPITAL | Age: 58
End: 2018-02-02
Attending: NEUROLOGICAL SURGERY

## 2018-02-16 ENCOUNTER — HOSPITAL ENCOUNTER (OUTPATIENT)
Dept: CARDIOLOGY | Facility: HOSPITAL | Age: 58
Discharge: HOME | End: 2018-02-16
Attending: NEUROLOGICAL SURGERY | Admitting: NEUROLOGICAL SURGERY

## 2018-02-16 ENCOUNTER — OFFICE VISIT (OUTPATIENT)
Dept: NEUROSURGERY | Facility: CLINIC | Age: 58
End: 2018-02-16

## 2018-02-16 VITALS
TEMPERATURE: 97 F | BODY MASS INDEX: 30.53 KG/M2 | WEIGHT: 190 LBS | SYSTOLIC BLOOD PRESSURE: 126 MMHG | HEIGHT: 66 IN | DIASTOLIC BLOOD PRESSURE: 80 MMHG

## 2018-02-16 DIAGNOSIS — I65.29 STENOSIS OF CAROTID ARTERY, UNSPECIFIED LATERALITY: ICD-10-CM

## 2018-02-16 DIAGNOSIS — I65.23 BILATERAL CAROTID ARTERY STENOSIS: Primary | ICD-10-CM

## 2018-02-16 LAB
BH CV XLRA MEAS LEFT DIST CCA EDV: 30 CM/SEC
BH CV XLRA MEAS LEFT DIST CCA PSV: 129 CM/SEC
BH CV XLRA MEAS LEFT DIST ICA EDV: 40 CM/SEC
BH CV XLRA MEAS LEFT DIST ICA PSV: 102 CM/SEC
BH CV XLRA MEAS LEFT ICA/CCA RATIO: 0.6
BH CV XLRA MEAS LEFT MID CCA EDV: 26 CM/SEC
BH CV XLRA MEAS LEFT MID CCA PSV: 95 CM/SEC
BH CV XLRA MEAS LEFT MID ICA EDV: 27 CM/SEC
BH CV XLRA MEAS LEFT MID ICA PSV: 80 CM/SEC
BH CV XLRA MEAS LEFT PROX CCA EDV: 25 CM/SEC
BH CV XLRA MEAS LEFT PROX CCA PSV: 105 CM/SEC
BH CV XLRA MEAS LEFT PROX ECA EDV: 20 CM/SEC
BH CV XLRA MEAS LEFT PROX ECA PSV: 128 CM/SEC
BH CV XLRA MEAS LEFT PROX ICA EDV: 29 CM/SEC
BH CV XLRA MEAS LEFT PROX ICA PSV: 79 CM/SEC
BH CV XLRA MEAS LEFT PROX SCLA EDV: 0.7 CM/SEC
BH CV XLRA MEAS LEFT PROX SCLA PSV: 133 CM/SEC
BH CV XLRA MEAS LEFT VERTEBRAL A EDV: 11 CM/SEC
BH CV XLRA MEAS LEFT VERTEBRAL A PSV: 32 CM/SEC
BH CV XLRA MEAS RIGHT DIST CCA EDV: 25 CM/SEC
BH CV XLRA MEAS RIGHT DIST CCA PSV: 92 CM/SEC
BH CV XLRA MEAS RIGHT DIST ICA EDV: 21 CM/SEC
BH CV XLRA MEAS RIGHT DIST ICA PSV: 55 CM/SEC
BH CV XLRA MEAS RIGHT ICA/CCA RATIO: 2
BH CV XLRA MEAS RIGHT MID CCA EDV: 24 CM/SEC
BH CV XLRA MEAS RIGHT MID CCA PSV: 80 CM/SEC
BH CV XLRA MEAS RIGHT MID ICA EDV: 29 CM/SEC
BH CV XLRA MEAS RIGHT MID ICA PSV: 154 CM/SEC
BH CV XLRA MEAS RIGHT PROX CCA EDV: 25 CM/SEC
BH CV XLRA MEAS RIGHT PROX CCA PSV: 89 CM/SEC
BH CV XLRA MEAS RIGHT PROX ECA EDV: 35 CM/SEC
BH CV XLRA MEAS RIGHT PROX ECA PSV: 244 CM/SEC
BH CV XLRA MEAS RIGHT PROX ICA EDV: 57 CM/SEC
BH CV XLRA MEAS RIGHT PROX ICA PSV: 190 CM/SEC
BH CV XLRA MEAS RIGHT PROX SCLA EDV: 0.7 CM/SEC
BH CV XLRA MEAS RIGHT PROX SCLA PSV: 106 CM/SEC
BH CV XLRA MEAS RIGHT VERTEBRAL A EDV: 7 CM/SEC
BH CV XLRA MEAS RIGHT VERTEBRAL A PSV: 41 CM/SEC
LEFT ARM BP: NORMAL MMHG
RIGHT ARM BP: NORMAL MMHG

## 2018-02-16 PROCEDURE — 93880 EXTRACRANIAL BILAT STUDY: CPT | Performed by: INTERNAL MEDICINE

## 2018-02-16 PROCEDURE — 93880 EXTRACRANIAL BILAT STUDY: CPT

## 2018-02-16 PROCEDURE — 99213 OFFICE O/P EST LOW 20 MIN: CPT | Performed by: NEUROLOGICAL SURGERY

## 2018-02-16 RX ORDER — ASPIRIN 325 MG
TABLET, DELAYED RELEASE (ENTERIC COATED) ORAL
COMMUNITY
Start: 2018-01-22 | End: 2018-05-22 | Stop reason: SDUPTHER

## 2018-02-16 NOTE — PROGRESS NOTES
Subjective     Chief Complaint: Stroke, status post left sided carotid endarterectomy    Patient ID: Vince Olivera is a 57 y.o. male is here today for follow-up.    Stroke   This is a new problem. The current episode started more than 1 month ago. The problem occurs rarely. The problem has been rapidly improving. Pertinent negatives include no abdominal pain, anorexia, arthralgias, change in bowel habit, chest pain, chills, congestion, coughing, diaphoresis, fatigue, fever, headaches, joint swelling, myalgias, nausea, neck pain, numbness, rash, sore throat, swollen glands, urinary symptoms, vertigo, visual change, vomiting or weakness. Nothing aggravates the symptoms. He has tried nothing for the symptoms. The treatment provided significant relief.       This is a 57-year-old man who is about 6 months out following an uncomplicated left sided carotid endarterectomy.  Postoperatively, he experienced some seizure activity.  He was started on Keppra, and reports no seizure activity since October 2017.    He denies any numbness, tingling, weakness, or strokelike symptoms.    He presents today for routine follow-up, and to discuss the results of a carotid duplex ultrasound which was performed.    The following portions of the patient's history were reviewed and updated as appropriate: allergies, current medications, past family history, past medical history, past social history, past surgical history and problem list.    Family history:   Family History   Problem Relation Age of Onset   • Diabetes Mother    • COPD Father        Social history:   Social History     Social History   • Marital status:      Spouse name: N/A   • Number of children: N/A   • Years of education: N/A     Occupational History   • Disabled      Social History Main Topics   • Smoking status: Former Smoker     Packs/day: 0.05     Years: 40.00     Types: Cigarettes     Quit date: 4/26/2017   • Smokeless tobacco: Former User     Types: Snuff       Comment: quit snuff when 18 or 19 years old - only did for baseball - 2-3 years    • Alcohol use No   • Drug use: No   • Sexual activity: Yes     Partners: Female      Comment: SPOUSE     Other Topics Concern   • Not on file     Social History Narrative    Patient consumes 0-1serving of caffeine daily.     Patient lives at home with wife.            Review of Systems   Constitutional: Negative for activity change, appetite change, chills, diaphoresis, fatigue, fever and unexpected weight change.   HENT: Positive for dental problem and sinus pressure. Negative for congestion, drooling, ear discharge, ear pain, facial swelling, hearing loss, mouth sores, nosebleeds, postnasal drip, rhinorrhea, sneezing, sore throat, tinnitus, trouble swallowing and voice change.    Eyes: Negative for photophobia, pain, discharge, redness, itching and visual disturbance.   Respiratory: Negative for apnea, cough, choking, chest tightness, shortness of breath, wheezing and stridor.    Cardiovascular: Negative for chest pain, palpitations and leg swelling.   Gastrointestinal: Negative for abdominal distention, abdominal pain, anal bleeding, anorexia, blood in stool, change in bowel habit, constipation, diarrhea, nausea, rectal pain and vomiting.   Endocrine: Negative for cold intolerance, heat intolerance, polydipsia, polyphagia and polyuria.   Genitourinary: Negative for decreased urine volume, difficulty urinating, dysuria, enuresis, flank pain, frequency, genital sores, hematuria and urgency.   Musculoskeletal: Negative for arthralgias, back pain, gait problem, joint swelling, myalgias, neck pain and neck stiffness.   Skin: Negative for color change, pallor, rash and wound.   Allergic/Immunologic: Negative for environmental allergies, food allergies and immunocompromised state.   Neurological: Negative for dizziness, vertigo, tremors, seizures, syncope, facial asymmetry, speech difficulty, weakness, light-headedness, numbness and  "headaches.   Hematological: Negative for adenopathy. Does not bruise/bleed easily.   Psychiatric/Behavioral: Negative for agitation, behavioral problems, confusion, decreased concentration, dysphoric mood, hallucinations, self-injury, sleep disturbance and suicidal ideas. The patient is not nervous/anxious and is not hyperactive.    All other systems reviewed and are negative.      Objective   Blood pressure 126/80, temperature 97 °F (36.1 °C), height 167.6 cm (65.98\"), weight 86.2 kg (190 lb).  Body mass index is 30.68 kg/(m^2).    Physical Exam   Constitutional: He is oriented to person, place, and time. He appears well-developed and well-nourished.  Non-toxic appearance. No distress.   HENT:   Head: Normocephalic and atraumatic.   Mouth/Throat: Oropharynx is clear and moist.   Eyes: EOM are normal. Pupils are equal, round, and reactive to light. Right eye exhibits no discharge. Left eye exhibits no discharge.   Neck: Phonation normal. No JVD present. No tracheal deviation present. No thyromegaly present.       Cardiovascular: Normal rate and regular rhythm.    Pulmonary/Chest: Effort normal. No stridor. No respiratory distress. He has no wheezes.   Abdominal: Soft. Normal appearance. He exhibits no distension. There is no tenderness.   Musculoskeletal: He exhibits no edema.   Muscle Group     L          R  Deltoid                5          5  Bicep                  5          5  Tricep                 5          5                       5          5  Hand IO              5          5  Hip Flexor           5          5  Knee Extensor   5          5     ADF                    5          5  APF                    5          5      Neurological: He is alert and oriented to person, place, and time. He displays normal reflexes. No cranial nerve deficit or sensory deficit. He exhibits normal muscle tone. He displays a negative Romberg sign. Coordination and gait normal. GCS eye subscore is 4. GCS verbal subscore is " 5. GCS motor subscore is 6.   Reflex Scores:       Tricep reflexes are 2+ on the right side and 2+ on the left side.       Bicep reflexes are 2+ on the right side and 2+ on the left side.       Brachioradialis reflexes are 2+ on the right side and 2+ on the left side.       Patellar reflexes are 3+ on the right side and 3+ on the left side.       Achilles reflexes are 3+ on the right side and 3+ on the left side.  CN II-XII grossly intact.    No pronator drift. FTN testing performed without dysmetria or bradykinesia.    Impaired naming and repetition.  Speech production is fluent.   Skin: Skin is warm and dry. He is not diaphoretic. No erythema.   Psychiatric: He has a normal mood and affect. His speech is normal and behavior is normal. Judgment and thought content normal.   Nursing note and vitals reviewed.        Assessment/Plan     Independent Review of Radiographic Studies:      Available for my review is a carotid duplex ultrasound which was performed today, to 1618.  His previous carotid duplex which was performed in May 2017 showed peak systolic velocities on the right side of 243 cm/s.  This is improved on today's study showing be systolic velocities of 190 cm/s.  Peak systolic velocities in the left side are 102 cm/s, which are substantially improved compared to his preoperative duplex    Medical Decision Making:      This is a 57-year-old man who is about 6 months out following an uncomplicated left sided carotid endarterectomy.  He did have some seizure activity postoperatively, but he has not reported any seizure-like activity since October 2017.  It is been more than 90 days since his last seizure, so he appears to be well-controlled on his current dose of Keppra.  From my standpoint, it is safe for him to resume driving.    He has some dental work that needs to be performed.  It would be safe for him to stop his aspirin and Plavix, however I would be more comfortable if the procedure could be performed  with him on aspirin alone.  In any case, if he has to, off of dual antiplatelet therapy I would like for it to be for as short amount of time as possible.    Vince was seen today for carotid artery disease.    Diagnoses and all orders for this visit:    Bilateral carotid artery stenosis        Return in about 1 year (around 2/16/2019).           This document signed by HUGO Bach MD February 16, 2018 1:39 PM

## 2018-02-21 ENCOUNTER — TELEPHONE (OUTPATIENT)
Dept: FAMILY MEDICINE CLINIC | Facility: CLINIC | Age: 58
End: 2018-02-21

## 2018-02-21 NOTE — TELEPHONE ENCOUNTER
THEY HAVE NOT THEY GO BACK ON 3/13/18 TO FIND OUT WHAT THEY ARE GOING TO DO. THEY DID NOT GIVE HIM ANYTHING TAKING TYLENOL AND ALEVE OTC.

## 2018-02-21 NOTE — TELEPHONE ENCOUNTER
PATIENT WENT TO  DENTAL CLINIC ON 1/30/18 AND THEY DID A SCREENING. THEY SCHEDULED HIM TO COME BACK TO HAVE ALL HIS TEETH REMOVED. PATIENT IS HAVING A LOT OF PAIN, KEEPING HIM UP AT NIGHT. EATING IS PAINFUL EVEN SOUP. PATIENT WOULD LIKE TO KNOW IF AN ANTIBIOTIC OR SOMETHING CAN BE SENT TO [facility]. OR WILL PATIENT NEED TO BE SEEN?

## 2018-02-22 RX ORDER — PENICILLIN V POTASSIUM 500 MG/1
500 TABLET ORAL 2 TIMES DAILY
Qty: 20 TABLET | Refills: 0 | Status: SHIPPED | OUTPATIENT
Start: 2018-02-22 | End: 2018-03-19

## 2018-03-19 ENCOUNTER — OFFICE VISIT (OUTPATIENT)
Dept: FAMILY MEDICINE CLINIC | Facility: CLINIC | Age: 58
End: 2018-03-19

## 2018-03-19 VITALS
TEMPERATURE: 97.3 F | HEIGHT: 66 IN | HEART RATE: 86 BPM | BODY MASS INDEX: 31.19 KG/M2 | OXYGEN SATURATION: 98 % | DIASTOLIC BLOOD PRESSURE: 80 MMHG | SYSTOLIC BLOOD PRESSURE: 128 MMHG | WEIGHT: 194.1 LBS

## 2018-03-19 DIAGNOSIS — E11.9 DIABETES MELLITUS TYPE 2 IN NONOBESE (HCC): ICD-10-CM

## 2018-03-19 DIAGNOSIS — M54.2 CHRONIC NECK PAIN: ICD-10-CM

## 2018-03-19 DIAGNOSIS — K21.9 GASTROESOPHAGEAL REFLUX DISEASE WITHOUT ESOPHAGITIS: ICD-10-CM

## 2018-03-19 DIAGNOSIS — G89.29 CHRONIC NECK PAIN: ICD-10-CM

## 2018-03-19 DIAGNOSIS — E78.49 OTHER HYPERLIPIDEMIA: ICD-10-CM

## 2018-03-19 DIAGNOSIS — I10 HYPERTENSION, UNSPECIFIED TYPE: Primary | ICD-10-CM

## 2018-03-19 DIAGNOSIS — I73.9 INTERMITTENT CLAUDICATION (HCC): ICD-10-CM

## 2018-03-19 PROCEDURE — 99214 OFFICE O/P EST MOD 30 MIN: CPT | Performed by: NURSE PRACTITIONER

## 2018-03-20 PROBLEM — G40.909 SEIZURE DISORDER (HCC): Status: ACTIVE | Noted: 2018-03-20

## 2018-03-20 NOTE — PROGRESS NOTES
"Subjective   Vince Olivera is a 57 y.o. male.   Chief Complaint   Patient presents with   • Essential hypertension     History of Present Illness     The patient presents today for routine follow-up.  He has a history of CVA and has been following with the neurosurgeon. He has had seizures in the past and is now on Keppra. He reports his neurologist plans to keep him on this medication for at least one year. He has a history of hypertension, his blood pressure is well controlled.  Has a history of diabetes.  He has not been monitoring glucose regularly at home.  His last hemoglobin A1c was at goal.  He has poor dentition and has been following with the [facility] dental clinic.  Plans to have teeth extracted but this will be at least 3 months in the future.  Recently, has had some headaches and dizziness.  Has also had worsening neck pain.  However, he does have a history of chronic neck pain.  Has had multiple injuries over the years mostly acquired on the job.  He denies paresthesias and weakness in the arms.  His also been having bilateral lower extremity pain.  He denies swelling or erythema.  The pain is worse when walking and alleviated when resting.  This is variable.    The following portions of the patient's history were reviewed and updated as appropriate: allergies, current medications, past family history, past medical history, past social history, past surgical history and problem list.  /80 (BP Location: Left arm, Patient Position: Sitting, Cuff Size: Adult)   Pulse 86   Temp 97.3 °F (36.3 °C) (Oral)   Ht 167.6 cm (65.98\")   Wt 88 kg (194 lb 1.6 oz)   SpO2 98%   BMI 31.34 kg/m²   Review of Systems   Constitutional: Negative for chills and fever.   HENT: Positive for dental problem. Negative for congestion, ear pain, rhinorrhea and sore throat.    Respiratory: Negative for cough, shortness of breath and wheezing.    Cardiovascular: Negative for chest pain, palpitations and leg swelling. "   Gastrointestinal: Negative for abdominal pain, diarrhea, nausea and vomiting.   Genitourinary: Negative for dysuria.   Musculoskeletal: Positive for back pain and neck pain. Negative for myalgias.   Skin: Negative for rash and wound.   Neurological: Positive for dizziness. Negative for light-headedness and headaches.   Psychiatric/Behavioral: Negative for sleep disturbance. The patient is not nervous/anxious.        Objective   Physical Exam   Constitutional: He is oriented to person, place, and time. He appears well-developed and well-nourished.   HENT:   Head: Normocephalic and atraumatic.   Cardiovascular: Normal rate, regular rhythm and normal heart sounds.    Pedal pulses 1 + bilaterally   Pulmonary/Chest: Effort normal and breath sounds normal.   Abdominal: Soft. Bowel sounds are normal.   Musculoskeletal:   No palpable cervical spine tenderness. Full ROM cervical spine. Pain with movement.   Neurological: He is alert and oriented to person, place, and time.   Skin: Skin is warm and dry.   Psychiatric: He has a normal mood and affect. His behavior is normal.       Assessment/Plan   Vince was seen today for essential hypertension.    Diagnoses and all orders for this visit:    Hypertension, unspecified type  -     CBC & Differential; Future  -     Comprehensive Metabolic Panel; Future  -     Hemoglobin A1c; Future  -     Lipid Panel; Future  -     TSH; Future    Other hyperlipidemia  -     CBC & Differential; Future  -     Comprehensive Metabolic Panel; Future  -     Hemoglobin A1c; Future  -     Lipid Panel; Future  -     TSH; Future    Chronic neck pain  -     XR Spine Cervical 2 or 3 View; Future    Diabetes mellitus type 2 in nonobese  -     CBC & Differential; Future  -     Comprehensive Metabolic Panel; Future  -     Hemoglobin A1c; Future  -     Lipid Panel; Future  -     TSH; Future    Gastroesophageal reflux disease without esophagitis  -     CBC & Differential; Future  -     Comprehensive Metabolic  Panel; Future  -     Hemoglobin A1c; Future  -     Lipid Panel; Future  -     TSH; Future    Intermittent claudication  -     US Arterial Doppler Lower Extremity Bilateral; Future        I have made no medication changes today.  I will order fasting labs to be done the next week.  Monitor BP at home. Monitor glucose at home. For the chronic worsening neck pain, I have ordered an x-ray of the cervical spine.  For the intermittent claudication, I have ordered an arterial ultrasound of the lower extremities.  He will continue follow-up with neurologist and dental clinic.  Follow-up here in 4 months and as needed.

## 2018-03-31 DIAGNOSIS — G40.909 SEIZURE DISORDER (HCC): ICD-10-CM

## 2018-04-02 DIAGNOSIS — G40.909 SEIZURE DISORDER (HCC): ICD-10-CM

## 2018-04-02 RX ORDER — LEVETIRACETAM 500 MG/1
TABLET ORAL
Qty: 120 TABLET | Refills: 0 | OUTPATIENT
Start: 2018-04-02

## 2018-04-02 RX ORDER — LEVETIRACETAM 500 MG/1
1000 TABLET ORAL EVERY 12 HOURS SCHEDULED
Qty: 120 TABLET | Refills: 1 | Status: SHIPPED | OUTPATIENT
Start: 2018-04-02 | End: 2018-06-01 | Stop reason: SDUPTHER

## 2018-04-05 ENCOUNTER — HOSPITAL ENCOUNTER (OUTPATIENT)
Dept: CARDIOLOGY | Facility: HOSPITAL | Age: 58
Discharge: HOME | End: 2018-04-05
Admitting: NURSE PRACTITIONER

## 2018-04-05 DIAGNOSIS — I73.9 INTERMITTENT CLAUDICATION (HCC): ICD-10-CM

## 2018-04-05 PROCEDURE — 93925 LOWER EXTREMITY STUDY: CPT | Performed by: RADIOLOGY

## 2018-04-05 PROCEDURE — 93923 UPR/LXTR ART STDY 3+ LVLS: CPT

## 2018-04-06 DIAGNOSIS — I73.9 PAD (PERIPHERAL ARTERY DISEASE) (HCC): ICD-10-CM

## 2018-04-06 DIAGNOSIS — I73.9 INTERMITTENT CLAUDICATION (HCC): Primary | ICD-10-CM

## 2018-04-06 NOTE — PROGRESS NOTES
Please let him know there are several areas in his leg where there is decreased blood flow. I would like him to see a vascular surgeon

## 2018-04-14 ENCOUNTER — HOSPITAL ENCOUNTER (EMERGENCY)
Facility: HOSPITAL | Age: 58
Discharge: ANOTHER HOSPITAL | End: 2018-04-14
Attending: EMERGENCY MEDICINE | Admitting: EMERGENCY MEDICINE

## 2018-04-14 ENCOUNTER — APPOINTMENT (OUTPATIENT)
Dept: GENERAL RADIOLOGY | Facility: HOSPITAL | Age: 58
End: 2018-04-14

## 2018-04-14 ENCOUNTER — APPOINTMENT (OUTPATIENT)
Dept: CT IMAGING | Facility: HOSPITAL | Age: 58
End: 2018-04-14

## 2018-04-14 ENCOUNTER — APPOINTMENT (OUTPATIENT)
Dept: MRI IMAGING | Facility: HOSPITAL | Age: 58
End: 2018-04-14

## 2018-04-14 ENCOUNTER — HOSPITAL ENCOUNTER (INPATIENT)
Facility: HOSPITAL | Age: 58
LOS: 2 days | Discharge: HOME | End: 2018-04-16
Attending: FAMILY MEDICINE | Admitting: FAMILY MEDICINE

## 2018-04-14 VITALS
HEART RATE: 83 BPM | HEIGHT: 68 IN | DIASTOLIC BLOOD PRESSURE: 90 MMHG | RESPIRATION RATE: 18 BRPM | OXYGEN SATURATION: 98 % | SYSTOLIC BLOOD PRESSURE: 165 MMHG | BODY MASS INDEX: 29.7 KG/M2 | WEIGHT: 196 LBS | TEMPERATURE: 98.6 F

## 2018-04-14 DIAGNOSIS — Z74.09 IMPAIRED FUNCTIONAL MOBILITY, BALANCE, GAIT, AND ENDURANCE: Primary | ICD-10-CM

## 2018-04-14 DIAGNOSIS — I67.9 CEREBRAL VASCULAR DISEASE: ICD-10-CM

## 2018-04-14 DIAGNOSIS — Z74.09 IMPAIRED MOBILITY AND ADLS: ICD-10-CM

## 2018-04-14 DIAGNOSIS — Z78.9 IMPAIRED MOBILITY AND ADLS: ICD-10-CM

## 2018-04-14 DIAGNOSIS — Z86.73 HISTORY OF CVA (CEREBROVASCULAR ACCIDENT): ICD-10-CM

## 2018-04-14 DIAGNOSIS — R29.90 STROKE-LIKE SYMPTOMS: Primary | ICD-10-CM

## 2018-04-14 PROBLEM — R53.1 ACUTE RIGHT-SIDED WEAKNESS: Status: ACTIVE | Noted: 2018-04-14

## 2018-04-14 PROBLEM — R47.01 APHASIA: Status: ACTIVE | Noted: 2018-04-14

## 2018-04-14 PROBLEM — R56.9 PARTIAL SEIZURE: Status: ACTIVE | Noted: 2018-03-20

## 2018-04-14 LAB
ALBUMIN SERPL-MCNC: 4.2 G/DL (ref 3.5–5)
ALBUMIN/GLOB SERPL: 1.4 G/DL (ref 1.5–2.5)
ALP SERPL-CCNC: 64 U/L (ref 40–129)
ALT SERPL W P-5'-P-CCNC: 34 U/L (ref 10–44)
ANION GAP SERPL CALCULATED.3IONS-SCNC: 15 MMOL/L (ref 3–11)
ANION GAP SERPL CALCULATED.3IONS-SCNC: 6.7 MMOL/L (ref 3.6–11.2)
APTT PPP: 29.6 SECONDS (ref 23.8–36.1)
AST SERPL-CCNC: 19 U/L (ref 10–34)
BASOPHILS # BLD AUTO: 0.03 10*3/MM3 (ref 0–0.3)
BASOPHILS NFR BLD AUTO: 0.4 % (ref 0–2)
BILIRUB SERPL-MCNC: 0.5 MG/DL (ref 0.2–1.8)
BUN BLD-MCNC: 11 MG/DL (ref 9–23)
BUN BLD-MCNC: 12 MG/DL (ref 7–21)
BUN/CREAT SERPL: 11 (ref 7–25)
BUN/CREAT SERPL: 12.9 (ref 7–25)
CALCIUM SPEC-SCNC: 10 MG/DL (ref 8.7–10.4)
CALCIUM SPEC-SCNC: 9.9 MG/DL (ref 7.7–10)
CHLORIDE SERPL-SCNC: 102 MMOL/L (ref 99–112)
CHLORIDE SERPL-SCNC: 97 MMOL/L (ref 99–109)
CO2 SERPL-SCNC: 26 MMOL/L (ref 20–31)
CO2 SERPL-SCNC: 28.3 MMOL/L (ref 24.3–31.9)
CREAT BLD-MCNC: 0.93 MG/DL (ref 0.43–1.29)
CREAT BLD-MCNC: 1 MG/DL (ref 0.6–1.3)
DEPRECATED RDW RBC AUTO: 42.1 FL (ref 37–54)
EOSINOPHIL # BLD AUTO: 0.06 10*3/MM3 (ref 0–0.7)
EOSINOPHIL NFR BLD AUTO: 0.7 % (ref 0–5)
ERYTHROCYTE [DISTWIDTH] IN BLOOD BY AUTOMATED COUNT: 13.3 % (ref 11.5–14.5)
GFR SERPL CREATININE-BSD FRML MDRD: 77 ML/MIN/1.73
GFR SERPL CREATININE-BSD FRML MDRD: 84 ML/MIN/1.73
GLOBULIN UR ELPH-MCNC: 3 GM/DL
GLUCOSE BLD-MCNC: 164 MG/DL (ref 70–100)
GLUCOSE BLD-MCNC: 222 MG/DL (ref 70–110)
HCT VFR BLD AUTO: 41 % (ref 42–52)
HGB BLD-MCNC: 14.6 G/DL (ref 14–18)
IMM GRANULOCYTES # BLD: 0.01 10*3/MM3 (ref 0–0.03)
IMM GRANULOCYTES NFR BLD: 0.1 % (ref 0–0.5)
INR PPP: 1.04 (ref 0.9–1.1)
LYMPHOCYTES # BLD AUTO: 2.84 10*3/MM3 (ref 1–3)
LYMPHOCYTES NFR BLD AUTO: 34 % (ref 21–51)
MCH RBC QN AUTO: 30.9 PG (ref 27–33)
MCHC RBC AUTO-ENTMCNC: 35.6 G/DL (ref 33–37)
MCV RBC AUTO: 86.7 FL (ref 80–94)
MONOCYTES # BLD AUTO: 0.67 10*3/MM3 (ref 0.1–0.9)
MONOCYTES NFR BLD AUTO: 8 % (ref 0–10)
NEUTROPHILS # BLD AUTO: 4.74 10*3/MM3 (ref 1.4–6.5)
NEUTROPHILS NFR BLD AUTO: 56.8 % (ref 30–70)
OSMOLALITY SERPL CALC.SUM OF ELEC: 280.4 MOSM/KG (ref 273–305)
PLATELET # BLD AUTO: 243 10*3/MM3 (ref 130–400)
PMV BLD AUTO: 8.8 FL (ref 6–10)
POTASSIUM BLD-SCNC: 3.3 MMOL/L (ref 3.5–5.3)
POTASSIUM BLD-SCNC: 3.7 MMOL/L (ref 3.5–5.5)
PROT SERPL-MCNC: 7.2 G/DL (ref 6–8)
PROTHROMBIN TIME: 13.8 SECONDS (ref 11–15.4)
RBC # BLD AUTO: 4.73 10*6/MM3 (ref 4.7–6.1)
SODIUM BLD-SCNC: 137 MMOL/L (ref 135–153)
SODIUM BLD-SCNC: 138 MMOL/L (ref 132–146)
WBC NRBC COR # BLD: 8.35 10*3/MM3 (ref 4.5–12.5)

## 2018-04-14 PROCEDURE — 70450 CT HEAD/BRAIN W/O DYE: CPT | Performed by: RADIOLOGY

## 2018-04-14 PROCEDURE — 85730 THROMBOPLASTIN TIME PARTIAL: CPT | Performed by: PHYSICIAN ASSISTANT

## 2018-04-14 PROCEDURE — 71045 X-RAY EXAM CHEST 1 VIEW: CPT

## 2018-04-14 PROCEDURE — 93005 ELECTROCARDIOGRAM TRACING: CPT | Performed by: FAMILY MEDICINE

## 2018-04-14 PROCEDURE — 63710000001 INSULIN LISPRO (HUMAN) PER 5 UNITS: Performed by: FAMILY MEDICINE

## 2018-04-14 PROCEDURE — 25010000002 FOSPHENYTOIN 500 MG PE/10ML SOLUTION 10 ML VIAL: Performed by: PSYCHIATRY & NEUROLOGY

## 2018-04-14 PROCEDURE — 93010 ELECTROCARDIOGRAM REPORT: CPT | Performed by: INTERNAL MEDICINE

## 2018-04-14 PROCEDURE — 25010000002 LORAZEPAM PER 2 MG: Performed by: PSYCHIATRY & NEUROLOGY

## 2018-04-14 PROCEDURE — 99223 1ST HOSP IP/OBS HIGH 75: CPT | Performed by: FAMILY MEDICINE

## 2018-04-14 PROCEDURE — 25010000003 LEVETIRACETAM IN NACL 0.54% 1500 MG/100ML SOLUTION: Performed by: PSYCHIATRY & NEUROLOGY

## 2018-04-14 PROCEDURE — 85610 PROTHROMBIN TIME: CPT | Performed by: PHYSICIAN ASSISTANT

## 2018-04-14 PROCEDURE — 80053 COMPREHEN METABOLIC PANEL: CPT | Performed by: PHYSICIAN ASSISTANT

## 2018-04-14 PROCEDURE — 99284 EMERGENCY DEPT VISIT MOD MDM: CPT

## 2018-04-14 PROCEDURE — 70450 CT HEAD/BRAIN W/O DYE: CPT

## 2018-04-14 PROCEDURE — 85025 COMPLETE CBC W/AUTO DIFF WBC: CPT | Performed by: PHYSICIAN ASSISTANT

## 2018-04-14 PROCEDURE — 70551 MRI BRAIN STEM W/O DYE: CPT

## 2018-04-14 RX ORDER — FOSPHENYTOIN SODIUM 50 MG/ML
150 INJECTION, SOLUTION INTRAMUSCULAR; INTRAVENOUS EVERY 8 HOURS
Status: DISCONTINUED | OUTPATIENT
Start: 2018-04-15 | End: 2018-04-14 | Stop reason: SDUPTHER

## 2018-04-14 RX ORDER — SODIUM CHLORIDE 0.9 % (FLUSH) 0.9 %
1-10 SYRINGE (ML) INJECTION AS NEEDED
Status: DISCONTINUED | OUTPATIENT
Start: 2018-04-14 | End: 2018-04-16 | Stop reason: HOSPADM

## 2018-04-14 RX ORDER — LEVETIRACETAM 10 MG/ML
1000 INJECTION INTRAVASCULAR EVERY 12 HOURS SCHEDULED
Status: DISCONTINUED | OUTPATIENT
Start: 2018-04-15 | End: 2018-04-15

## 2018-04-14 RX ORDER — CLOPIDOGREL BISULFATE 75 MG/1
75 TABLET ORAL DAILY
Status: DISCONTINUED | OUTPATIENT
Start: 2018-04-14 | End: 2018-04-16 | Stop reason: HOSPADM

## 2018-04-14 RX ORDER — LORAZEPAM 2 MG/ML
2 INJECTION INTRAMUSCULAR ONCE
Status: COMPLETED | OUTPATIENT
Start: 2018-04-14 | End: 2018-04-14

## 2018-04-14 RX ORDER — SODIUM CHLORIDE 0.9 % (FLUSH) 0.9 %
10 SYRINGE (ML) INJECTION AS NEEDED
Status: DISCONTINUED | OUTPATIENT
Start: 2018-04-14 | End: 2018-04-14 | Stop reason: HOSPADM

## 2018-04-14 RX ORDER — PROMETHAZINE HYDROCHLORIDE 25 MG/ML
12.5 INJECTION, SOLUTION INTRAMUSCULAR; INTRAVENOUS EVERY 6 HOURS PRN
Status: DISCONTINUED | OUTPATIENT
Start: 2018-04-14 | End: 2018-04-16 | Stop reason: HOSPADM

## 2018-04-14 RX ORDER — METOPROLOL SUCCINATE 100 MG/1
200 TABLET, EXTENDED RELEASE ORAL DAILY
Status: DISCONTINUED | OUTPATIENT
Start: 2018-04-14 | End: 2018-04-16 | Stop reason: HOSPADM

## 2018-04-14 RX ORDER — LORAZEPAM 2 MG/ML
1 INJECTION INTRAMUSCULAR EVERY 6 HOURS PRN
Status: DISCONTINUED | OUTPATIENT
Start: 2018-04-14 | End: 2018-04-16 | Stop reason: HOSPADM

## 2018-04-14 RX ORDER — ATORVASTATIN CALCIUM 40 MG/1
80 TABLET, FILM COATED ORAL NIGHTLY
Status: DISCONTINUED | OUTPATIENT
Start: 2018-04-14 | End: 2018-04-16 | Stop reason: HOSPADM

## 2018-04-14 RX ORDER — NICOTINE POLACRILEX 4 MG
15 LOZENGE BUCCAL
Status: DISCONTINUED | OUTPATIENT
Start: 2018-04-14 | End: 2018-04-16 | Stop reason: HOSPADM

## 2018-04-14 RX ORDER — DEXTROSE MONOHYDRATE 25 G/50ML
25 INJECTION, SOLUTION INTRAVENOUS
Status: DISCONTINUED | OUTPATIENT
Start: 2018-04-14 | End: 2018-04-16 | Stop reason: HOSPADM

## 2018-04-14 RX ORDER — PROMETHAZINE HYDROCHLORIDE 12.5 MG/1
12.5 SUPPOSITORY RECTAL EVERY 6 HOURS PRN
Status: DISCONTINUED | OUTPATIENT
Start: 2018-04-14 | End: 2018-04-16 | Stop reason: HOSPADM

## 2018-04-14 RX ORDER — BISACODYL 5 MG/1
5 TABLET, DELAYED RELEASE ORAL DAILY PRN
Status: DISCONTINUED | OUTPATIENT
Start: 2018-04-14 | End: 2018-04-16 | Stop reason: HOSPADM

## 2018-04-14 RX ORDER — BISACODYL 10 MG
10 SUPPOSITORY, RECTAL RECTAL DAILY PRN
Status: DISCONTINUED | OUTPATIENT
Start: 2018-04-14 | End: 2018-04-16 | Stop reason: HOSPADM

## 2018-04-14 RX ORDER — PANTOPRAZOLE SODIUM 40 MG/1
40 TABLET, DELAYED RELEASE ORAL EVERY MORNING
Status: DISCONTINUED | OUTPATIENT
Start: 2018-04-15 | End: 2018-04-16 | Stop reason: HOSPADM

## 2018-04-14 RX ORDER — LOSARTAN POTASSIUM 50 MG/1
50 TABLET ORAL
Status: DISCONTINUED | OUTPATIENT
Start: 2018-04-14 | End: 2018-04-16 | Stop reason: HOSPADM

## 2018-04-14 RX ORDER — ACETAMINOPHEN 325 MG/1
650 TABLET ORAL EVERY 6 HOURS PRN
Status: DISCONTINUED | OUTPATIENT
Start: 2018-04-14 | End: 2018-04-16 | Stop reason: HOSPADM

## 2018-04-14 RX ORDER — PROMETHAZINE HYDROCHLORIDE 12.5 MG/1
12.5 TABLET ORAL EVERY 6 HOURS PRN
Status: DISCONTINUED | OUTPATIENT
Start: 2018-04-14 | End: 2018-04-16 | Stop reason: HOSPADM

## 2018-04-14 RX ORDER — HYDROCHLOROTHIAZIDE 25 MG/1
25 TABLET ORAL DAILY
Status: DISCONTINUED | OUTPATIENT
Start: 2018-04-14 | End: 2018-04-16 | Stop reason: HOSPADM

## 2018-04-14 RX ORDER — LEVETIRACETAM 15 MG/ML
1500 INJECTION INTRAVASCULAR ONCE
Status: COMPLETED | OUTPATIENT
Start: 2018-04-14 | End: 2018-04-14

## 2018-04-14 RX ORDER — LABETALOL HYDROCHLORIDE 5 MG/ML
20 INJECTION, SOLUTION INTRAVENOUS EVERY 6 HOURS PRN
Status: DISCONTINUED | OUTPATIENT
Start: 2018-04-14 | End: 2018-04-16 | Stop reason: HOSPADM

## 2018-04-14 RX ORDER — ASPIRIN 325 MG
325 TABLET, DELAYED RELEASE (ENTERIC COATED) ORAL DAILY
Status: DISCONTINUED | OUTPATIENT
Start: 2018-04-15 | End: 2018-04-16 | Stop reason: HOSPADM

## 2018-04-14 RX ADMIN — LORAZEPAM 2 MG: 2 INJECTION INTRAMUSCULAR; INTRAVENOUS at 19:41

## 2018-04-14 RX ADMIN — LEVETIRACETAM 1500 MG: 15 INJECTION INTRAVENOUS at 19:39

## 2018-04-14 RX ADMIN — SODIUM CHLORIDE 1333 MG PE: 9 INJECTION, SOLUTION INTRAVENOUS at 21:58

## 2018-04-14 RX ADMIN — LABETALOL HYDROCHLORIDE 20 MG: 5 INJECTION INTRAVENOUS at 19:31

## 2018-04-14 NOTE — ED PROVIDER NOTES
"Subjective   Pt was at home with girlfriend working outside, came in around 1pm to eat lunch and around 1:30, girlfriend states patient began complaining of right side \"numbness\" that is different from his baseline, and his head was burning, felt like he was about to have a seizure. Gilfriend also states he has been more confused than his baseline. Pt with PMH of Left MCA ischemic stroke in 4/2017 that left him with deficits and has difficulty with naming place, colors, and year. He is followed by neurology at [facility]. Developed seizures in 9/2017 and started on keppra, last seizure activity was 11/2017 and keppra level was increased to 2 pills BID and has had no complications thus far. Pt has no other complaints at this time.         History provided by:  Patient and friend   used: No    Stroke   Presenting symptoms: confusion and weakness    Presenting symptoms: no headaches    Onset quality:  Sudden  Last known well:  1:30pm  Duration:  2 hours  Timing:  Constant  Progression:  Unchanged  Similar to previous episodes: yes    Associated symptoms: no chest pain, no dizziness, no facial pain, no fever, no bladder incontinence, no nausea, no seizures and no vomiting        Review of Systems   Constitutional: Negative for activity change and fever.   HENT: Negative for congestion, rhinorrhea and sore throat.    Eyes: Negative for pain.   Respiratory: Negative for cough, shortness of breath and wheezing.    Cardiovascular: Negative for chest pain.   Gastrointestinal: Negative for abdominal distention, abdominal pain, anal bleeding, blood in stool, diarrhea, nausea and vomiting.   Genitourinary: Negative for bladder incontinence, difficulty urinating and dysuria.   Musculoskeletal: Negative for arthralgias and myalgias.   Skin: Negative for rash and wound.   Neurological: Positive for speech difficulty and weakness. Negative for dizziness, seizures and headaches.   Psychiatric/Behavioral: Positive for " confusion. Negative for agitation.   All other systems reviewed and are negative.      Past Medical History:   Diagnosis Date   • Arthritis    • Carotid artery disorder    • Diabetes mellitus     DIAGNOSED 4-2017 CHECKS FSBG 3X/WEEK    • Ear infection     about 2 weeks ago- cleared up - wax removed and cleaned out    • GERD (gastroesophageal reflux disease)     self diagnosed takes otc ppi   • Hyperlipidemia    • Hypertension    • Hypertension    • Seizure    • Shoulder pain     bilat    • Stroke 04/24/2017    EXPRESSIVE APHASIA RESIDUAL    • Tooth loose     5 - all over- will get teeth done after this surgery    • Wears reading eyeglasses        No Known Allergies    Past Surgical History:   Procedure Laterality Date   • ABDOMINAL HERNIA REPAIR  2010   • CAROTID ENDARTERECTOMY Left 10/17/2017    Procedure: CAROTID ENDARTERECTOMY LEFT;  Surgeon: Alexx Bach MD;  Location: [facility];  Service:    • CAROTID ENDARTERECTOMY Left 10/17/2017       Family History   Problem Relation Age of Onset   • Diabetes Mother    • COPD Father        Social History     Social History   • Marital status:      Occupational History   • Disabled      Social History Main Topics   • Smoking status: Former Smoker     Packs/day: 0.05     Years: 40.00     Types: Cigarettes     Quit date: 4/26/2017   • Smokeless tobacco: Former User     Types: Snuff      Comment: quit snuff when 18 or 19 years old - only did for baseball - 2-3 years    • Alcohol use No   • Drug use: No   • Sexual activity: Yes     Partners: Female      Comment: SPOUSE     Other Topics Concern   • Not on file     Social History Narrative    Patient consumes 0-1serving of caffeine daily.     Patient lives at home with wife.                Objective   Physical Exam   Constitutional: He is oriented to person, place, and time. He appears well-developed and well-nourished.   HENT:   Head: Normocephalic and atraumatic.   Eyes: EOM are normal. Pupils are equal,  round, and reactive to light.   Neck: Normal range of motion. Neck supple.   Cardiovascular: Normal rate, regular rhythm and normal heart sounds.    Pulmonary/Chest: Effort normal and breath sounds normal.   Abdominal: Soft. Bowel sounds are normal.   Musculoskeletal: Normal range of motion.   Neurological: He is alert and oriented to person, place, and time. GCS eye subscore is 4. GCS verbal subscore is 4. GCS motor subscore is 6.   Skin: Skin is warm and dry.   Psychiatric: He has a normal mood and affect. His behavior is normal. Judgment and thought content normal.   Nursing note and vitals reviewed.      Procedures         ED Course Mary ESPINOZA had patient loaded into the ambulance and were about to pull out from facility, one of the crew came back in and informed me that patient's condition had seemed to worsen since loading in to the ambulance. I went over and discussed with my attending, Dr. Vaughan, patient's condition in detail, who advised that no further intervention at this time, and proceed with transportation to [facility] at this time.   ED Course   Comment By Time   Dr. Vaughan evaluated patient also, stroke scale 7. Calling [facility] stroke team.  KILEY Vasquez 04/14 5979   Spoke with Dr. Bazan, advises no tpa at this time. Will accept in transfer.  KILEY Vasquez 04/14 2112   No air transport is available at this time due to weather, Mary ESPINOZA is in route at this time.  KILEY Vasquez 04/14 1609                  [facility]  Number of Diagnoses or Management Options     Amount and/or Complexity of Data Reviewed  Clinical lab tests: ordered and reviewed  Tests in the radiology section of CPT®: ordered and reviewed  Tests in the medicine section of CPT®: reviewed and ordered  Independent visualization of images, tracings, or specimens: yes    Patient Progress  Patient progress: stable      Final diagnoses:   Stroke-like symptoms   Cerebral vascular disease   History of CVA (cerebrovascular accident)             KILEY Vasquez  04/15/18 0120

## 2018-04-14 NOTE — ED NOTES
"Patients girlfriend states \" he is normally confused, he has been this way ever since his last stroke\". Provider aware     Nila Ardon RN  04/14/18 0533    "

## 2018-04-14 NOTE — ED NOTES
Patients family reports patient had his plavix and ASA this morning.      Nila Ardon RN  04/14/18 9199

## 2018-04-14 NOTE — ED NOTES
Patient to be transferred to [facility].      Nila Ardon RN  04/14/18 6637

## 2018-04-14 NOTE — ED NOTES
[facility] declined transport of patient due to weather conditions.      Chris Aguilar  04/14/18 3539

## 2018-04-14 NOTE — ED NOTES
Contacted [facility], patient information given along with consult for Primary Provider.      Chris Aguilar  04/14/18 1436

## 2018-04-14 NOTE — H&P
[facility] Medicine Services  HISTORY AND PHYSICAL    Patient Name: Vince Olivera  : 1960  MRN: [id number]  Primary Care Physician: KINGSTON Rowley    Subjective   Subjective     Chief Complaint:  Right sided weakness    HPI:  Vince Olivera is a 57 y.o. male who came in transfer from [facility] for acute onset of right sided weakness at about 1:30 PM.  History is taken from the chart due to patient's confusion and aphasia.  He told his girlfriend he felt weird and like he was about to have a seizure as well as right sided numbness.  He has a history of left MCA stroke 2017 with some residual right HP and aphasia at baseline and left CEA 10/2017.  His CT head was reported to be negative per the outside hospital, however official transcript is not available.  He does have a history of seizures beginning 2017 and has been on keppra without seizure since October.    EMS reports that while the patient was in the transport vehicle, he had sudden right arm flaciddity followed by tonic clonic movements of the right arm and leg.  He received ativan and the movements seemed to calm down.  He did not have LOC.    Presently the patient is having right arm and leg movements again and is awake but unable to contribute anything meaningful to the history.    Review of Systems   Unable to obtain    Otherwise 10-system ROS reviewed and is negative except as mentioned in the HPI.    Personal History     Past Medical History:   Diagnosis Date   • Arthritis    • Carotid artery disorder    • Diabetes mellitus     DIAGNOSED  CHECKS FSBG 3X/WEEK    • Ear infection     about 2 weeks ago- cleared up - wax removed and cleaned out    • GERD (gastroesophageal reflux disease)     self diagnosed takes otc ppi   • Hyperlipidemia    • Hypertension    • Hypertension    • Seizure    • Shoulder pain     bilat    • Stroke 2017    EXPRESSIVE APHASIA RESIDUAL    • Tooth loose     5 - all  over- will get teeth done after this surgery    • Wears reading eyeglasses        Past Surgical History:   Procedure Laterality Date   • ABDOMINAL HERNIA REPAIR  2010   • CAROTID ENDARTERECTOMY Left 10/17/2017    Procedure: CAROTID ENDARTERECTOMY LEFT;  Surgeon: Alexx Bach MD;  Location: [facility];  Service:    • CAROTID ENDARTERECTOMY Left 10/17/2017       Family History: family history includes COPD in his father; Diabetes in his mother.     Social History:  reports that he quit smoking about a year ago. His smoking use included Cigarettes. He has a 2.00 pack-year smoking history. He has quit using smokeless tobacco. His smokeless tobacco use included Snuff. He reports that he does not drink alcohol or use drugs.  Social History     Social History Narrative    Patient consumes 0-1serving of caffeine daily.     Patient lives at home with wife.            Medications:  Prescriptions Prior to Admission   Medication Sig Dispense Refill Last Dose   • aspirin  MG tablet    Taking   • atorvastatin (LIPITOR) 80 MG tablet Take 1 tablet by mouth Every Night. 30 tablet 5 Taking   • clopidogrel (PLAVIX) 75 MG tablet Take 1 tablet by mouth Daily. 30 tablet 5 Taking   • hydrochlorothiazide (HYDRODIURIL) 25 MG tablet Take 1 tablet by mouth Daily. 30 tablet 5 Taking   • lansoprazole (PREVACID) 15 MG capsule Take 1 capsule by mouth Daily. 30 capsule 5 Taking   • levETIRAcetam (KEPPRA) 500 MG tablet Take 2 tablets by mouth Every 12 (Twelve) Hours. 120 tablet 1    • losartan (COZAAR) 100 MG tablet Take 0.5 tablets by mouth 2 (Two) Times a Day. 60 tablet 5 Taking   • metFORMIN (GLUCOPHAGE) 500 MG tablet Take 1 tablet by mouth 2 (Two) Times a Day With Meals. 60 tablet 5 Taking   • metoprolol succinate XL (TOPROL XL) 200 MG 24 hr tablet Take 1 tablet by mouth Daily. 30 tablet 5 Taking       No Known Allergies    Objective   Objective     Vital Signs:   Temp:  [98.5 °F (36.9 °C)-98.6 °F (37 °C)] 98.6 °F (37  °C)  Heart Rate:  [] 108  Resp:  [14-18] 14  BP: (144-210)/() 210/117   Total (NIH Stroke Scale): 17    Physical Exam   Constitutional: No acute distress, awake, alert, flushed  Eyes: PERRLA, sclerae anicteric, no conjunctival injection  HENT: NCAT, mucous membranes moist  Neck: Supple, no thyromegaly, no lymphadenopathy, trachea midline  Respiratory: Clear to auscultation bilaterally, nonlabored respirations   Cardiovascular: RRR, palpable pedal pulses bilaterally  Gastrointestinal: Positive bowel sounds, soft, nontender, nondistended  Musculoskeletal: No bilateral ankle edema, no clubbing or cyanosis to extremities  Psychiatric: Appropriate affect, cooperative  Neurologic: Oriented x 1, lucas name birthdate, year, current president, location.  Right arm and leg with rhythmic jerking.  Unable to really cooperate very well with exam.    Skin: No rashes      Results Reviewed:  I have personally reviewed current lab, radiology, and data and agree.      Results from last 7 days  Lab Units 04/14/18  1513   WBC 10*3/mm3 8.35   HEMOGLOBIN g/dL 14.6   HEMATOCRIT % 41.0*   PLATELETS 10*3/mm3 243   INR  1.04       Results from last 7 days  Lab Units 04/14/18  1513   SODIUM mmol/L 137   POTASSIUM mmol/L 3.3*   CHLORIDE mmol/L 102   CO2 mmol/L 28.3   BUN mg/dL 12   CREATININE mg/dL 0.93   GLUCOSE mg/dL 222*   CALCIUM mg/dL 9.9   ALT (SGPT) U/L 34   AST (SGOT) U/L 19     Estimated Creatinine Clearance: 96.1 mL/min (by C-G formula based on SCr of 0.93 mg/dL).  Brief Urine Lab Results  (Last result in the past 365 days)      Color   Clarity   Blood   Leuk Est   Nitrite   Protein   CREAT   Urine HCG        11/18/17 1539 Yellow Clear Negative Negative Negative Negative             No results found for: BNP  No results found for: PHART  Imaging Results (last 24 hours)     ** No results found for the last 24 hours. **        Results for orders placed during the hospital encounter of 04/25/17   Transthoracic Echo Complete  With Bubble Study    Narrative · Left ventricular function is normal. Estimated EF = 70%.  · Hyperdynamic right ventricular systolic function noted.  · Left ventricular diastolic dysfunction (grade I) consistent with   impaired relaxation.  · There is no evidence of pericardial effusion.  · No evidence of pulmonary hypertension is present.  · The aortic valve exhibits sclerosis.  · Saline test results are negative.          Assessment/Plan   Assessment / Plan     Hospital Problem List     H/o Left MCA ischemic CVA 4/2017    Diabetes mellitus type 2 in nonobese    Hypertension    Dyslipidemia    GERD (gastroesophageal reflux disease)    Overview Signed 6/23/2017 10:26 AM by KINGSTON Ha     self diagnosed takes otc ppi         Hyperlipidemia    Status post left carotid endarterectomy    Partial seizure    Acute right-sided weakness    Aphasia            Assessment & Plan:  Mr. Olivera is a 57 year old gentleman with history of left MCA CVA in 4/2017, left CEA in 10/17, DM, HTN, HLD who comes in transfer from [facility] with history of acute right sided deficit starting at about 1:30 PM.  In transit, he developed right arm flaccidity and right sided t/c movements (arm and leg).    Neuro consultation, Dr. Bazan  --He has changed Keppra to IV and has added dilantin  --EEG  --Stat MRI brain  --ECHO  --Carotids (last done in February)  --Continue plavix and ASA    Check A1C, lipids  BP currently >200/>100, will add labetalol for SBP greater than 180  Hold metformin, SSI and accuchecks      DVT prophylaxis: Mechanical    CODE STATUS:  Full Code    INPATIENT status due to the need for care which can only be reasonably provided in an hospital setting such as aggressive/expedited ancillary services and consultation services, the necessity for IV medications, close physician monitoring.  In such, I feel patient’s risk for adverse outcomes and need for care warrant INPATIENT evaluation and predict the patient’s  care encounter to likely last beyond 2 midnights.    Electronically signed by Katlyn Hill MD, 04/14/18, 7:29 PM.

## 2018-04-14 NOTE — NURSING NOTE
ACC REVIEW REPORT: [facility]        PATIENT NAME: Vince Olivera    PATIENT ID: [id number]    BED: S371    BED TYPE: Tele    BED GIVEN TO: Nila    TIME BED GIVEN: 1540    YOB: 1960    AGE: 56 yo    GENDER: Male    PREVIOUS ADMIT TO [facility]:     PREVIOUS ADMISSION DATE:     PATIENT CLASS:     TODAY'S DATE: 4/14/2018    TRANSFER DATE: 4/14/2018    ETA:     TRANSFERRING FACILITY: [facility]    TRANSFERRING FACILITY PHONE # : [phone]    TRANSFERRING MD: Josiah CAO    DATE/TIME REQUEST RECEIVED: 4/14/2018 at 1534    [facility] RN: Annie Bedoya RN     REPORT FROM: Nila Ardon RN    TIME REPORT TAKEN: 1552    DIAGNOSIS: Cerebral Vascular Disease    REASON FOR TRANSFER TO [facility]: Higher Level of Care    TRANSPORTATION: Ambulance    CLINICAL REASON FOR TRANSFER TO [facility]: Per the girlfriend, the patient was sitting on the porch eating a bowl of cereal when he broke out into a sweat and complained of his head burning.  Symptoms began around 1145 this moring. The EMS was called and he was brought to the local ED .  CT scan of  head was negative for acute CVA. [facility].      CLINICAL INFORMATION    HEIGHT:     WEIGHT:     ALLERGIES: NKA    LI:     INFECTIOUS DISEASE: None    ISOLATION:     1ST VITAL SIGNS:   TIME:   TEMP:   PULSE:   B/P:   RESP:     LAST VITAL SIGNS:  TIME:   TEMP: 98.5  PULSE: 80  B/P: 168/100  RESP: 18    LAB INFORMATION: Glucose-222, K+-3.3    CULTURE INFORMATION:     MEDS/IV FLUIDS: See MAR sent with patient. Girlfriend reports the patient took his Asa, Keppra,  and Plavix at home. Has a #20 LAC-SL      CARDIAC SYSTEM:    CHEST PAIN:     RATE:     SCALE:     RHYTHM: SR    Is patient taking or has patient been given any drugs that could increase bleeding? Yes  (Plavix, Brilinta, Effient, Eliquis, Xarelto, Warfarin, Integrilin, Angiomax)    DRUG: Plavix      DOSE/FREQUENCY: 75 mg daily    CARDIAC ENZYMES:    DATE:   TIME:   CK:   CKMB:   JL:   TROP:      DATE:   TIME:   CK:   CKMB:   JL:   TROP:       HEART CATH:     HEART CATH DATE:     HEART CATH RESULTS:     LAD:   RCA:   CX:   LMAIN:   EF:     SWAN:     SITE:   SIZE:    DATE INSERTED:     ARTLINE:     SITE:   SIZE:   DATE INSERTED:     SHEATH:    SITE:   SIZE:   DATE INSERTED:         VASOSEAL:    SITE:   DATE INSERTED:     EXTERNAL PACEMAKER:     RATE:   EXT PACER ON:     MODE:    DATE INSERTED:   OUTPUT SETTING:   SENSITIVITY SETTING:   SENSITIVITY TYPE:     IABP:    SITE:   AUG PRESSURE:   DATE INSERTED:     CARDIAC NOTES:       RESPIRATORY SYSTEM:    LUNG SOUNDS:    CLEAR: Yes  CRACKLES:   WHEEZES:   RHONCHI:   DIMINISHED:     ABG DATE:         ABG TIME:     ABG RESULTS:    PH:   PO2:   PCO2:   HCO3:   O2 SAT:       ETT:     ETT SIZE:     ORAL:     NASAL:     SECURED AT MEASUREMENT (CM):     ON VENTILATOR:    TV:   FI02:   RATE:   PEEP:     OXYGEN: RA    O2 SAT: 99%    ADMINISTRATION ROUTE:     IMAGING FINDINGS:     PNEUMO LOCATION:     PNEUMO SIZE:     PNEUMO CHEST TUBE SEAL TYPE:     RADIOLOGY RESULTS:     RESPIRATORY STATUS:       CNS/MUSCULOSKELETAL    ALERT AND ORIENTED:    PERSON: Yes  PLACE: No  TIME: No    INJURY:  WHERE:     EMILY COMA SCALE: 15    E:   M:   V:     STROKE SCALE: NIH-7  LOC-1  Commands-1  Rt motor-arm-1  Rt motor leg-1  Limb Ataxia-1  Sensory-1  Best Language-1    SIZE OF HEMORRHAGE:     SYMPTOMS: (CHOOSE APPROPRIATE)    ASPHASIA:   ATAXIA:   DYSARTHRIA:   DYSPHASIA:   HEADACHE:   PARALYSIS:   SEIZURE:   SYNCOPE:   VERTIGO:   VISION CHANGE:        EXTREMITY WEAKNESS:    LEFT ARM:   RIGHT ARM: Yes  LEFT LEG:   RIGHT LEG: Yes    CAT SCAN RESULTS: CT of head showed old CVA    MRI RESULTS:    CNS/MUSCULOSKELETAL NOTES: Patient had a CVA in 2017 which left him with some confusion and right sided weakness. According to the girlfriend it is baseline for the patient not to know time or place. He follows commands. Has no dysarthria. He is ambulatory.       GI//GY      ABDOMINAL PAIN:      VOMITING:     DIARRHEA:     NAUSEA:     BOWEL SOUNDS:     OCCULT STOOL:     VAGINAL BLEEDING:     TESTICULAR PAIN:     HEMATURIA:     NG TUBE:    SIZE:   DATE INSERTED:       ULTRASOUND:     ULTRASOUND RESULTS:       ACUTE ABDOMEN:     ACUTE ABDOMEN RESULTS:       CT SCAN:     CT SCAN RESULTS:       GI//GY NOTES:     PAST MEDICAL HISTORY: CVA, Dm, Arthritis, HLD, HTN,  Hernia repair    OTHER SYMPTOM NOTES:     ADDITIONAL NOTES:           Annie Bedoya RN  4/14/2018  4:08 PM

## 2018-04-14 NOTE — CONSULTS
NEUROLOGY CONSULTATION    Vince Olivera    Consulting Physician: Dr. Hill    Chief Complaint/Reason for Consultation: seizures, speech difficulties    HPI:   Subjective     Vince Olivera is a 57 y.o. M who presented to an OSH today with onset of worsening speech and right sided weakness.  He has a history of a left MCA ischemic stroke in April 2017.  He has residual combined aphasia with some right hemiparesis at baseline.  However, today he had an acute worsening of these deficits.  He underwent CT head which did not show acute abnormalities.  Upon transfer to [facility] for further evaluation and treatment, he has developed rhythmic jerking of the right upper and lower extremity and worsening of his aphasia.  He was given one dose of IV Ativan by EMS.  He has had seizures after the stroke last year and takes Keppra at home.       Patient Active Problem List    Diagnosis   • Seizure disorder [G40.909]   • Internal carotid artery stenosis, left [I65.22]   • Status post left carotid endarterectomy [Z98.890]   • Bilateral Carotid artery stenosis. Left greater than right  [[id number]]     Overview Note:     Added automatically from request for surgery [id number]     • Palpitations [R00.2]     Overview Note:     · Event monitor 6/22/17-7/21/17.  No rhythm strips for analysis except for baseline rhythm of sinus rhythm 63 bpm. (pt only wore monitor for 1 day)     • GERD (gastroesophageal reflux disease) [K21.9]     Overview Note:     self diagnosed takes otc ppi     • Hyperlipidemia [E78.5]   • Combined receptive and expressive aphasia due to cerebrovascular accident [I63.9, R47.01]   • Abnormal peripheral vision of right eye [H53.451]   • Residual mild expressive Aphasia [R47.01]   • Gastroesophageal reflux disease without esophagitis [K21.9]   • Dyslipidemia [E78.5]   • Bilateral carotid artery stenosis [I65.23]   • H/o Left MCA ischemic CVA 4/2017 [I63.9]   • Diabetes mellitus type 2 in nonobese [E11.9]    • Hypertension [I10]     Past Surgical History:   Procedure Laterality Date   • ABDOMINAL HERNIA REPAIR  2010   • CAROTID ENDARTERECTOMY Left 10/17/2017    Procedure: CAROTID ENDARTERECTOMY LEFT;  Surgeon: Alexx Bach MD;  Location: [facility];  Service:    • CAROTID ENDARTERECTOMY Left 10/17/2017     Family History   Problem Relation Age of Onset   • Diabetes Mother    • COPD Father      Social History     Social History   • Marital status:      Occupational History   • Disabled      Social History Main Topics   • Smoking status: Former Smoker     Packs/day: 0.05     Years: 40.00     Types: Cigarettes     Quit date: 4/26/2017   • Smokeless tobacco: Former User     Types: Snuff      Comment: quit snuff when 18 or 19 years old - only did for baseball - 2-3 years    • Alcohol use No   • Drug use: No   • Sexual activity: Yes     Partners: Female      Comment: SPOUSE     Other Topics Concern   • Not on file     Social History Narrative    Patient consumes 0-1serving of caffeine daily.     Patient lives at home with wife.             Review of Systems  Review of Systems unable to obtain secondary to patient's aphasia    Objective     Vital signs in last 24 hours:  Temp:  [98.5 °F (36.9 °C)-98.6 °F (37 °C)] 98.6 °F (37 °C)  Heart Rate:  [] 108  Resp:  [14-18] 14  BP: (144-210)/() 210/117    General: NAD  HEENT: NCAT  Neuro: awake, alert. Mixed aphasia with receptive and expressive difficulties. PERRL. Moves left upper and lower extremity purposefully.  Rhythmic jerking of RUE and RLE with increased tone. Right hemineglect.        Current Facility-Administered Medications:   •  acetaminophen (TYLENOL) tablet 650 mg, 650 mg, Oral, Q6H PRN, Katlyn Hill MD  •  [START ON 4/15/2018] aspirin EC tablet 325 mg, 325 mg, Oral, Daily, Katlyn Hill MD  •  atorvastatin (LIPITOR) tablet 80 mg, 80 mg, Oral, Nightly, Katlyn Hill MD  •  bisacodyl (DULCOLAX) EC tablet 5 mg, 5 mg, Oral, Daily  PRN, Katlyn Hill MD  •  bisacodyl (DULCOLAX) suppository 10 mg, 10 mg, Rectal, Daily PRN, Katlyn Hill MD  •  clopidogrel (PLAVIX) tablet 75 mg, 75 mg, Oral, Daily, Katlyn Hill MD  •  dextrose (D50W) solution 25 g, 25 g, Intravenous, Q15 Min PRN, Katlyn Hill MD  •  dextrose (GLUTOSE) oral gel 15 g, 15 g, Oral, Q15 Min PRN, Katlyn Hill MD  •  fosphenytoin (Cerebyx) 1,333 mg PE in sodium chloride 0.9 % 100 mL IVPB, 1,333 mg PE, Intravenous, Once, Pedro Bazan MD  •  [START ON 4/15/2018] fosphenytoin (Cerebyx) 150 mg PE in sodium chloride 0.9 % 100 mL IVPB, 150 mg PE, Intravenous, Q8H, Katlyn Hill MD  •  glucagon (human recombinant) (GLUCAGEN DIAGNOSTIC) injection 1 mg, 1 mg, Subcutaneous, PRN, Katlyn Hill MD  •  hydrochlorothiazide (HYDRODIURIL) tablet 25 mg, 25 mg, Oral, Daily, Katlyn Hill MD  •  insulin lispro (humaLOG) injection 0-7 Units, 0-7 Units, Subcutaneous, 4x Daily With Meals & Nightly, Katlyn Hill MD  •  labetalol (NORMODYNE,TRANDATE) injection 20 mg, 20 mg, Intravenous, Q6H PRN, Katlyn Hill MD  •  levETIRAcetam in NaCl 0.54% (KEPPRA) IVPB 1,500 mg, 1,500 mg, Intravenous, Once, Pedro Bazan MD  •  [START ON 4/15/2018] levETIRAcetam in NaCl 0.75% (KEPPRA) IVPB 1,000 mg, 1,000 mg, Intravenous, Q12H, Pedro Bazan MD  •  losartan (COZAAR) tablet 50 mg, 50 mg, Oral, Q24H, Katlyn Hill MD  •  metoprolol succinate XL (TOPROL-XL) 24 hr tablet 200 mg, 200 mg, Oral, Daily, Katlyn Hill MD  •  [START ON 4/15/2018] pantoprazole (PROTONIX) EC tablet 40 mg, 40 mg, Oral, QAM, Katlyn Hill MD  •  promethazine (PHENERGAN) tablet 12.5 mg, 12.5 mg, Oral, Q6H PRN **OR** promethazine (PHENERGAN) injection 12.5 mg, 12.5 mg, Intramuscular, Q6H PRN **OR** promethazine (PHENERGAN) injection 12.5 mg, 12.5 mg, Intravenous, Q6H PRN **OR** promethazine (PHENERGAN) suppository 12.5 mg, 12.5 mg, Rectal, Q6H PRN, Katlyn Hill MD  •  sodium  chloride 0.9 % flush 1-10 mL, 1-10 mL, Intravenous, PRN, Katlyn Hill MD  •  sodium chloride 0.9 % flush 1-10 mL, 1-10 mL, Intravenous, PRN, Katlyn Hill MD    No Known Allergies      Imaging/Results:   CT Head at OSH: No acute abnormalities per report.    Lab Results   Component Value Date    GLUCOSE 222 (H) 04/14/2018    BUN 12 04/14/2018    CREATININE 0.93 04/14/2018    EGFRIFNONA 84 04/14/2018    BCR 12.9 04/14/2018    K 3.3 (L) 04/14/2018    CO2 28.3 04/14/2018    CALCIUM 9.9 04/14/2018    ALBUMIN 4.20 04/14/2018    LABIL2 1.4 (L) 04/14/2018    AST 19 04/14/2018    ALT 34 04/14/2018       Lab Results   Component Value Date    WBC 8.35 04/14/2018    HGB 14.6 04/14/2018    HCT 41.0 (L) 04/14/2018    MCV 86.7 04/14/2018     04/14/2018         Assessment/Plan      1. Epilepsia Partialis Continua  2. History of Left MCA stroke  3. Aphasia      -Fosphenytoin load and maintenance dose  -Keppra load and maintenance dose  -MRI brain STAT  -EEG  -phenytoin level in AM  -carotid u/s, Echo  -plavix, atorvastatin        Pedro Bazan MD

## 2018-04-14 NOTE — ED NOTES
[facility] contacted for transport of patient to [facility] for ischemic stroke, declined due to inclement weather.      Chris Aguilar  04/14/18 [id number]

## 2018-04-14 NOTE — ED NOTES
[facility] at bedside, bedside report given to transport crew      Nila Ardon RN  04/14/18 [id number]

## 2018-04-15 ENCOUNTER — APPOINTMENT (OUTPATIENT)
Dept: CARDIOLOGY | Facility: HOSPITAL | Age: 58
End: 2018-04-15
Attending: FAMILY MEDICINE

## 2018-04-15 LAB
ANION GAP SERPL CALCULATED.3IONS-SCNC: 10 MMOL/L (ref 3–11)
ARTICHOKE IGE QN: 66 MG/DL (ref 0–130)
BUN BLD-MCNC: 10 MG/DL (ref 9–23)
BUN/CREAT SERPL: 11.1 (ref 7–25)
CALCIUM SPEC-SCNC: 9.4 MG/DL (ref 8.7–10.4)
CHLORIDE SERPL-SCNC: 98 MMOL/L (ref 99–109)
CHOLEST SERPL-MCNC: 108 MG/DL (ref 0–200)
CO2 SERPL-SCNC: 31 MMOL/L (ref 20–31)
CREAT BLD-MCNC: 0.9 MG/DL (ref 0.6–1.3)
DEPRECATED RDW RBC AUTO: 44 FL (ref 37–54)
ERYTHROCYTE [DISTWIDTH] IN BLOOD BY AUTOMATED COUNT: 13.6 % (ref 11.3–14.5)
GFR SERPL CREATININE-BSD FRML MDRD: 87 ML/MIN/1.73
GLUCOSE BLD-MCNC: 144 MG/DL (ref 70–100)
GLUCOSE BLDC GLUCOMTR-MCNC: 133 MG/DL (ref 70–130)
GLUCOSE BLDC GLUCOMTR-MCNC: 140 MG/DL (ref 70–130)
GLUCOSE BLDC GLUCOMTR-MCNC: 149 MG/DL (ref 70–130)
GLUCOSE BLDC GLUCOMTR-MCNC: 172 MG/DL (ref 70–130)
HBA1C MFR BLD: 7.7 % (ref 4.8–5.6)
HCT VFR BLD AUTO: 39.3 % (ref 38.9–50.9)
HDLC SERPL-MCNC: 29 MG/DL (ref 40–60)
HGB BLD-MCNC: 13.6 G/DL (ref 13.1–17.5)
MCH RBC QN AUTO: 30.7 PG (ref 27–31)
MCHC RBC AUTO-ENTMCNC: 34.6 G/DL (ref 32–36)
MCV RBC AUTO: 88.7 FL (ref 80–99)
PHENYTOIN SERPL-MCNC: 14 MCG/ML (ref 10–20)
PLATELET # BLD AUTO: 244 10*3/MM3 (ref 150–450)
PMV BLD AUTO: 8.6 FL (ref 6–12)
POTASSIUM BLD-SCNC: 3.1 MMOL/L (ref 3.5–5.5)
RBC # BLD AUTO: 4.43 10*6/MM3 (ref 4.2–5.76)
SODIUM BLD-SCNC: 139 MMOL/L (ref 132–146)
TRIGL SERPL-MCNC: 112 MG/DL (ref 0–150)
WBC NRBC COR # BLD: 9.95 10*3/MM3 (ref 3.5–10.8)

## 2018-04-15 PROCEDURE — 93880 EXTRACRANIAL BILAT STUDY: CPT | Performed by: INTERNAL MEDICINE

## 2018-04-15 PROCEDURE — 80185 ASSAY OF PHENYTOIN TOTAL: CPT | Performed by: PSYCHIATRY & NEUROLOGY

## 2018-04-15 PROCEDURE — 93306 TTE W/DOPPLER COMPLETE: CPT

## 2018-04-15 PROCEDURE — 97161 PT EVAL LOW COMPLEX 20 MIN: CPT

## 2018-04-15 PROCEDURE — 92610 EVALUATE SWALLOWING FUNCTION: CPT

## 2018-04-15 PROCEDURE — 93880 EXTRACRANIAL BILAT STUDY: CPT

## 2018-04-15 PROCEDURE — 80048 BASIC METABOLIC PNL TOTAL CA: CPT | Performed by: FAMILY MEDICINE

## 2018-04-15 PROCEDURE — 93306 TTE W/DOPPLER COMPLETE: CPT | Performed by: INTERNAL MEDICINE

## 2018-04-15 PROCEDURE — 97165 OT EVAL LOW COMPLEX 30 MIN: CPT

## 2018-04-15 PROCEDURE — 80061 LIPID PANEL: CPT | Performed by: FAMILY MEDICINE

## 2018-04-15 PROCEDURE — 83036 HEMOGLOBIN GLYCOSYLATED A1C: CPT | Performed by: FAMILY MEDICINE

## 2018-04-15 PROCEDURE — 85027 COMPLETE CBC AUTOMATED: CPT | Performed by: FAMILY MEDICINE

## 2018-04-15 PROCEDURE — 99233 SBSQ HOSP IP/OBS HIGH 50: CPT | Performed by: INTERNAL MEDICINE

## 2018-04-15 PROCEDURE — 25010000003 LEVETIRACETAM IN NACL 0.75% 1000 MG/100ML SOLUTION: Performed by: PSYCHIATRY & NEUROLOGY

## 2018-04-15 PROCEDURE — 82962 GLUCOSE BLOOD TEST: CPT

## 2018-04-15 PROCEDURE — 25010000002 FOSPHENYTOIN 100 MG PE/2ML SOLUTION 2 ML VIAL: Performed by: FAMILY MEDICINE

## 2018-04-15 RX ORDER — PHENYTOIN SODIUM 100 MG/1
200 CAPSULE, EXTENDED RELEASE ORAL EVERY 12 HOURS SCHEDULED
Status: DISCONTINUED | OUTPATIENT
Start: 2018-04-15 | End: 2018-04-16 | Stop reason: HOSPADM

## 2018-04-15 RX ORDER — LEVETIRACETAM 750 MG/1
1500 TABLET ORAL 2 TIMES DAILY
Status: DISCONTINUED | OUTPATIENT
Start: 2018-04-15 | End: 2018-04-16 | Stop reason: HOSPADM

## 2018-04-15 RX ADMIN — LOSARTAN POTASSIUM 50 MG: 50 TABLET ORAL at 09:36

## 2018-04-15 RX ADMIN — HYDROCHLOROTHIAZIDE 25 MG: 25 TABLET ORAL at 09:36

## 2018-04-15 RX ADMIN — PANTOPRAZOLE SODIUM 40 MG: 40 TABLET, DELAYED RELEASE ORAL at 09:36

## 2018-04-15 RX ADMIN — CLOPIDOGREL BISULFATE 75 MG: 75 TABLET ORAL at 09:36

## 2018-04-15 RX ADMIN — LEVETIRACETAM 1000 MG: 10 INJECTION INTRAVENOUS at 09:36

## 2018-04-15 RX ADMIN — ASPIRIN 325 MG: 325 TABLET, DELAYED RELEASE ORAL at 09:36

## 2018-04-15 RX ADMIN — ATORVASTATIN CALCIUM 80 MG: 40 TABLET, FILM COATED ORAL at 21:37

## 2018-04-15 RX ADMIN — SODIUM CHLORIDE 150 MG PE: 9 INJECTION, SOLUTION INTRAVENOUS at 07:00

## 2018-04-15 RX ADMIN — PHENYTOIN SODIUM 200 MG: 100 CAPSULE, EXTENDED RELEASE ORAL at 17:42

## 2018-04-15 RX ADMIN — LEVETIRACETAM 1500 MG: 750 TABLET, FILM COATED ORAL at 21:37

## 2018-04-15 RX ADMIN — METOPROLOL SUCCINATE 200 MG: 100 TABLET, EXTENDED RELEASE ORAL at 09:36

## 2018-04-15 NOTE — THERAPY EVALUATION
Acute Care - Occupational Therapy Initial Evaluation   [place]     Patient Name: Vince Olivera  : 1960  MRN: [id number]  Today's Date: 4/15/2018  Onset of Illness/Injury or Date of Surgery: 18  Date of Referral to OT: 18  Referring Physician: MD Sergio    Admit Date: 2018       ICD-10-CM ICD-9-CM   1. Impaired functional mobility, balance, gait, and endurance Z74.09 V49.89   2. Impaired mobility and ADLs Z74.09 799.89     Patient Active Problem List   Diagnosis   • H/o Left MCA ischemic CVA 2017   • Diabetes mellitus type 2 in nonobese   • Hypertension   • Dyslipidemia   • Bilateral carotid artery stenosis   • Residual mild expressive Aphasia   • Gastroesophageal reflux disease without esophagitis   • Combined receptive and expressive aphasia due to cerebrovascular accident   • Abnormal peripheral vision of right eye   • GERD (gastroesophageal reflux disease)   • Hyperlipidemia   • Palpitations   • Bilateral Carotid artery stenosis. Left greater than right    • Internal carotid artery stenosis, left   • Status post left carotid endarterectomy   • Partial seizure   • Acute right-sided weakness   • Aphasia     Past Medical History:   Diagnosis Date   • Arthritis    • Carotid artery disorder    • Diabetes mellitus     DIAGNOSED  CHECKS FSBG 3X/WEEK    • Ear infection     about 2 weeks ago- cleared up - wax removed and cleaned out    • GERD (gastroesophageal reflux disease)     self diagnosed takes otc ppi   • Hyperlipidemia    • Hypertension    • Hypertension    • Seizure    • Shoulder pain     bilat    • Stroke 2017    EXPRESSIVE APHASIA RESIDUAL    • Tooth loose     5 - all over- will get teeth done after this surgery    • Wears reading eyeglasses      Past Surgical History:   Procedure Laterality Date   • ABDOMINAL HERNIA REPAIR     • CAROTID ENDARTERECTOMY Left 10/17/2017    Procedure: CAROTID ENDARTERECTOMY LEFT;  Surgeon: Alexx Bach MD;  Location:   [facility];  Service:    • CAROTID ENDARTERECTOMY Left 10/17/2017          OT ASSESSMENT FLOWSHEET (last 72 hours)      Occupational Therapy Evaluation     Row Name 04/15/18 6793                   OT Evaluation Time/Intention    Subjective Information complains of;pain  -KF        Document Type evaluation  -KF        Mode of Treatment occupational therapy  -KF        Patient Effort excellent  -KF           General Information    Patient Profile Reviewed? yes  -KF        Onset of Illness/Injury or Date of Surgery 04/14/18  -KF        Referring Physician MD Sergio  -KF        Patient Observations alert;cooperative;agree to therapy  -KF        Patient/Family Observations girlfriend present and family  -KF        General Observations of Patient supine, seizure precautions  -KF        Prior Level of Function independent:;gait;all household mobility;transfer;ADL's  -KF        Equipment Currently Used at Home cane, straight  -KF        Pertinent History of Current Functional Problem Pt hx L MCA stroke w/ aphasia and R hemiparesis; c/o burning in R side worsening R sided weakness and speech. Pt felt like seizure was coming on, no TPA, CT neg, MRI neg.   -KF        Existing Precautions/Restrictions seizures;fall  -KF        Limitations/Impairments visual  -KF        Risks Reviewed patient:;family:;spouse/S.O.:;LOB;nausea/vomiting;dizziness;increased discomfort;lines disloged  -KF        Benefits Reviewed patient:;family:;spouse/S.O.:;improve function;increase independence;increase strength;increase balance;increase knowledge  -KF        Barriers to Rehab previous functional deficit  -KF           Relationship/Environment    Primary Source of Support/Comfort significant other  -KF        Lives With significant other  -KF           Resource/Environmental Concerns    Current Living Arrangements home/apartment/condo  -KF           Cognitive Assessment/Interventions    Additional Documentation Cognitive Assessment/Intervention (Group)   -KF           Cognitive Assessment/Intervention- PT/OT    Affect/Mental Status (Cognitive) confused  -KF        Orientation Status (Cognition) oriented to;person;time;verbal cues/prompts needed for orientation;place  -KF        Follows Commands (Cognition) follows one step commands;75-90% accuracy  -KF        Cognitive Function (Cognitive) safety deficit  -KF        Safety Deficit (Cognitive) moderate deficit;impulsivity;awareness of need for assistance;insight into deficits/self awareness;safety precautions awareness;ability to follow commands  -KF        Cognitive Interventions (Cognitive) process/task specific training;occupation/activity based interventions;drill and practice training  -KF        Personal Safety Interventions fall prevention program maintained;muscle strengthening facilitated;gait belt;nonskid shoes/slippers when out of bed  -KF           Safety Issues, Functional Mobility    Safety Issues Affecting Function (Mobility) awareness of need for assistance;impulsivity;insight into deficits/self awareness;positioning of assistive device;judgment;safety precaution awareness;sequencing abilities  -KF        Impairments Affecting Function (Mobility) balance;cognition;visual/perceptual  -KF        Comment, Safety Issues/Impairments (Mobility) R visual field deficit  -KF           Bed Mobility Assessment/Treatment    Bed Mobility Assessment/Treatment rolling left;scooting/bridging;supine-sit;sit-supine  -KF        Rolling Left [place] (Bed Mobility) independent  -KF        Supine-Sit [place] (Bed Mobility) independent  -KF        Sit-Supine [place] (Bed Mobility) independent  -KF        Bed Mobility, Safety Issues cognitive deficits limit understanding  -KF           Functional Mobility    Functional Mobility- Ind. Level contact guard assist;verbal cues required  -KF        Functional Mobility- Device rolling walker  -KF        Functional Mobility-Distance (Feet) 8  -KF        Functional  Mobility- Safety Issues balance decreased during turns;weight-shifting ability decreased;step length decreased;sequencing ability decreased  -KF        Functional Mobility- Comment impulsive, safety awareness deficits  -KF           Transfer Assessment/Treatment    Transfer Assessment/Treatment sit-stand transfer;stand-sit transfer  -KF        Sit-Stand [place] (Transfers) stand by assist;verbal cues  -KF        Stand-Sit [place] (Transfers) verbal cues;supervision  -KF           Sit-Stand Transfer    Assistive Device (Sit-Stand Transfers) walker, front-wheeled  -KF           Stand-Sit Transfer    Assistive Device (Stand-Sit Transfers) walker, front-wheeled  -KF           ADL Assessment/Intervention    BADL Assessment/Intervention lower body dressing;toileting  -KF           Lower Body Dressing Assessment/Training    Lower Body Dressing [place] Level don;doff;socks;supervision  -KF        Lower Body Dressing Position edge of bed sitting  -KF        Comment (Lower Body Dressing) demo no LOB dynamic sitting balance  -KF           Toileting Assessment/Training    [place] Level (Toileting) toileting skills;minimum assist (75% patient effort)  -KF        Assistive Devices (Toileting) urinal  -KF        Toileting Position supine  -KF           BADL Safety/Performance    Impairments, BADL Safety/Performance balance;cognition;motor planning;coordination  -KF        Cognitive Impairments, BADL Safety/Performance impulsivity;awareness, need for assistance;insight into deficits/self awareness;safety precaution awareness  -KF        Skilled BADL Treatment/Intervention adaptive equipment training;BADL process/adaptation training;cognitive/safety deficit modifications;sensory/visual deficit compensatory training  -KF        Progress in BADL Status cueing required;level of supervision required  -KF           General ROM    GENERAL ROM COMMENTS BUE WFL  -KF           MMT (Manual Muscle Testing)    Additional  Documentation General Assessment (Manual Muscle Testing) (Group)  -KF           General Assessment (Manual Muscle Testing)    General Manual Muscle Testing (MMT) Assessment no strength deficits identified  -KF           Lower Extremity (Manual Muscle Testing)    Comment, MMT: Lower Extremity BUE 5/5  -KF           Motor Assessment/Interventions    Additional Documentation Balance (Group);Balance Interventions (Group);Gross Motor Coordination (Group)  -KF           Gross Motor Coordination    Gross Motor Impairments coordination;motor control;sensation;postural control  -KF        Gross Motor Skill, Impairments Detail deficits in motor planning for coordination  -KF        Gross Motor Coordination Interventions backward walking  -KF           Balance    Balance static sitting balance;static standing balance;dynamic sitting balance  -KF           Static Sitting Balance    Level of [place] (Unsupported Sitting, Static Balance) independent  -KF        Sitting Position (Unsupported Sitting, Static Balance) sitting on edge of bed  -KF        Level of [place] (Supported Sitting, Static Balance) independent  -KF        Sitting Position (Supported Sitting, Static Balance) sitting on edge of bed  -KF           Dynamic Sitting Balance    Level of [place], Reaches Outside Midline (Sitting, Dynamic Balance) independent  -KF        Sitting Position, Reaches Outside Midline (Sitting, Dynamic Balance) sitting on edge of bed  -KF        Level of [place], Resists Mild Perturbations (Sitting, Dynamic Balance) independent  -KF        Sitting Position, Resists Mild Perturbations (Sitting, Dynamic Balance) sitting on edge of bed  -KF           Static Standing Balance    Level of [place] (Supported Standing, Static Balance) contact guard assist  -KF        Assistive Device Utilized (Supported Standing, Static Balance) rolling walker  -KF           Dynamic Standing Balance    Level of [place], Reaches Outside  Midline (Standing, Dynamic Balance) minimal assist, 75% patient effort  -KF        Comment, Reaches Outside Midline (Standing, Dynamic Balance) requires RW for safety in standing and funct mob  -KF           Balance Interventions    Identified Impairments Contributing to Balance Disturbance (Balance) strength decreased;postural control impaired;sensation decreased;motor control impaired  -KF           Sensory Assessment/Intervention    Sensory General Assessment light touch sensation deficits identified  -KF           Light Touch Sensation Assessment    Right Upper Extremity: Light Touch Sensation Assessment mild impairment, 75% or more correct responses  -KF           Vision Assessment/Intervention    Visual Impairment/Limitations visual/perceptual impairments present   blind spot on R visual field  -KF           Positioning and Restraints    Pre-Treatment Position in bed  -KF        Post Treatment Position bed  -KF        In Bed notified nsg;supine;fowlers;side rails up x3;call light within reach;encouraged to call for assist;exit alarm on;with family/caregiver;legs elevated   seizure precautions  -KF           Pain Assessment    Additional Documentation Pain Scale: Numbers Pre/Post-Treatment (Group)  -KF           Pain Scale: Numbers Pre/Post-Treatment    Pain Scale: Numbers, Pretreatment 0/10 - no pain  -KF        Pain Scale: Numbers, Post-Treatment 0/10 - no pain  -KF        Pain Intervention(s) Repositioned;Ambulation/increased activity  -KF           Plan of Care Review    Plan of Care Reviewed With patient  -KF           Clinical Impression (OT)    Date of Referral to OT 04/14/18  -KF        OT Diagnosis ADL decline  -KF        Criteria for Skilled Therapeutic Interventions Met (OT Eval) yes;treatment indicated  -KF        Rehab Potential (OT Eval) fair, will monitor progress closely  -KF        Therapy Frequency (OT Eval) daily  -KF        Care Plan Review (OT) risks/benefits reviewed;current/potential  barriers reviewed;patient/other agree to care plan  -KF        Care Plan Review, Other Participant (OT Eval) significant other  -KF        Anticipated Discharge Disposition (OT) home with home health  -KF           Vital Signs    Pre Systolic BP Rehab --   RN cleared; vitals stable   -KF        O2 Delivery Pre Treatment room air  -KF        O2 Delivery Intra Treatment room air  -KF        Pre Patient Position Supine  -KF        Intra Patient Position Standing  -KF        Post Patient Position Supine  -KF           Planned OT Interventions    Planned Therapy Interventions (OT Eval) adaptive equipment training;BADL retraining;cognitive/visual perception retraining;functional balance retraining;occupation/activity based interventions;transfer/mobility retraining;patient/caregiver education/training;neuromuscular control/coordination retraining  -KF           OT Goals    Transfer Goal Selection (OT) transfer, OT goal 1  -KF        Dressing Goal Selection (OT) dressing, OT goal 1  -KF        Toileting Goal Selection (OT) toileting, OT goal 1  -KF           Transfer Goal 1 (OT)    Activity/Assistive Device (Transfer Goal 1, OT) bed-to-chair/chair-to-bed  -KF        [place] Level/Cues Needed (Transfer Goal 1, OT) standby assist  -KF        Time Frame (Transfer Goal 1, OT) long term goal (LTG);1 week  -KF        Progress/Outcome (Transfer Goal 1, OT) goal ongoing  -KF           Dressing Goal 1 (OT)    Activity/Assistive Device (Dressing Goal 1, OT) lower body dressing  -KF        [place]/Cues Needed (Dressing Goal 1, OT) standby assist  -KF        Time Frame (Dressing Goal 1, OT) long term goal (LTG);1 week  -KF        Progress/Outcome (Dressing Goal 1, OT) goal ongoing  -KF           Toileting Goal 1 (OT)    Activity/Device (Toileting Goal 1, OT) adjust/manage clothing;commode;perform perineal hygiene  -KF        [place] Level/Cues Needed (Toileting Goal 1, OT) supervision required  -KF        Time Frame  (Toileting Goal 1, OT) long term goal (LTG);1 week  -KF        Progress/Outcome (Toileting Goal 1, OT) goal ongoing  -KF           Patient Education Goal (OT)    Activity (Patient Education Goal, OT) Pt demonstrates safe transfer w/ RW with min VC's 50% of the time  -KF        [place]/Cues/Accuracy (Memory Goal 2, OT) demonstrates adequately  -KF        Time Frame (Patient Education Goal, OT) long term goal (LTG);1 week  -KF        Progress/Outcome (Patient Education Goal, OT) goal ongoing  -KF           Living Environment    Home Accessibility stairs within home  -KF          User Key  (r) = Recorded By, (t) = Taken By, (c) = Cosigned By    Initials Name Effective Dates    LES Adams, OT 04/03/18 -            Occupational Therapy Education     Title: PT OT SLP Therapies (Active)     Topic: Occupational Therapy (Active)     Point: ADL training (Active)     Description: Instruct learner(s) on proper safety adaptation and remediation techniques during self care or transfers.   Instruct in proper use of assistive devices.   Learning Progress Summary     Learner Status Readiness Method Response Comment Documented by    Patient Active Acceptance E NR Pt education provided on safe sequencing for functional transfers with FWW, body mechanics for transfers and funct mobility, and purpose/role of OT services with ADL retraining.  04/15/18 1349    Significant Other Active Acceptance E NR Pt education provided on safe sequencing for functional transfers with FWW, body mechanics for transfers and funct mobility, and purpose/role of OT services with ADL retraining.  04/15/18 1342          Point: Home exercise program (Active)     Description: Instruct learner(s) on appropriate technique for monitoring, assisting and/or progressing therapeutic exercises/activities.   Learning Progress Summary     Learner Status Readiness Method Response Comment Documented by    Patient Active Acceptance E NR Pt education  provided on safe sequencing for functional transfers with FWW, body mechanics for transfers and funct mobility, and purpose/role of OT services with ADL retraining.  04/15/18 1349    Significant Other Active Acceptance E NR Pt education provided on safe sequencing for functional transfers with FWW, body mechanics for transfers and funct mobility, and purpose/role of OT services with ADL retraining.  04/15/18 1349          Point: Precautions (Active)     Description: Instruct learner(s) on prescribed precautions during self-care and functional transfers.   Learning Progress Summary     Learner Status Readiness Method Response Comment Documented by    Patient Active Acceptance E NR Pt education provided on safe sequencing for functional transfers with FWW, body mechanics for transfers and funct mobility, and purpose/role of OT services with ADL retraining.  04/15/18 1349    Significant Other Active Acceptance E NR Pt education provided on safe sequencing for functional transfers with FWW, body mechanics for transfers and funct mobility, and purpose/role of OT services with ADL retraining.  04/15/18 1349          Point: Body mechanics (Active)     Description: Instruct learner(s) on proper positioning and spine alignment during self-care, functional mobility activities and/or exercises.   Learning Progress Summary     Learner Status Readiness Method Response Comment Documented by    Patient Active Acceptance E NR Pt education provided on safe sequencing for functional transfers with FWW, body mechanics for transfers and funct mobility, and purpose/role of OT services with ADL retraining.  04/15/18 1349    Significant Other Active Acceptance E NR Pt education provided on safe sequencing for functional transfers with FWW, body mechanics for transfers and funct mobility, and purpose/role of OT services with ADL retraining.  04/15/18 1341                      User Key     Initials Effective Dates Name Provider  Type Discipline    KF 04/03/18 -  Radha Adams, OT Occupational Therapist OT                  OT Recommendation and Plan  Outcome Summary/Treatment Plan (OT)  Anticipated Discharge Disposition (OT): home with home health  Planned Therapy Interventions (OT Eval): adaptive equipment training, BADL retraining, cognitive/visual perception retraining, functional balance retraining, occupation/activity based interventions, transfer/mobility retraining, patient/caregiver education/training, neuromuscular control/coordination retraining  Therapy Frequency (OT Eval): daily  Plan of Care Review  Plan of Care Reviewed With: patient  Plan of Care Reviewed With: patient  Outcome Summary: OT eval completed with chart review, pt demo no BUE strength deficits, but deficits in safety awareness with functional mobility and functional transfers as well as deficits in functional balance for ADL completion. Recom IPOT d/c home w/ HH          Outcome Measures     Row Name 04/15/18 1303 04/15/18 0930          How much help from another person do you currently need...    Turning from your back to your side while in flat bed without using bedrails?  -- 4  -SC     Moving from lying on back to sitting on the side of a flat bed without bedrails?  -- 4  -SC     Moving to and from a bed to a chair (including a wheelchair)?  -- 3  -SC     Standing up from a chair using your arms (e.g., wheelchair, bedside chair)?  -- 3  -SC     Climbing 3-5 steps with a railing?  -- 3  -SC     To walk in hospital room?  -- 3  -SC     AM-PAC 6 Clicks Score  -- 20  -SC        How much help from another is currently needed...    Putting on and taking off regular lower body clothing? 3  -KF  --     Bathing (including washing, rinsing, and drying) 3  -KF  --     Toileting (which includes using toilet bed pan or urinal) 3  -KF  --     Putting on and taking off regular upper body clothing 4  -KF  --     Taking care of personal grooming (such as brushing teeth) 3   -KF  --     Eating meals 4  -KF  --     Score 20  -KF  --        Modified [place] Scale    Modified [place] Scale 4 - Moderately severe disability.  Unable to walk without assistance, and unable to attend to own bodily needs without assistance.  -KF  --        Functional Assessment    Outcome Measure Options AM-PAC 6 Clicks Daily Activity (OT);Modified [place]  -KF AM-PAC 6 Clicks Basic Mobility (PT)  -SC       User Key  (r) = Recorded By, (t) = Taken By, (c) = Cosigned By    Initials Name Provider Type    SC Abi Silva, PT Physical Therapist    LES Adams, OT Occupational Therapist          Time Calculation:   OT Start Time: 1303    Therapy Charges for Today     Code Description Service Date Service Provider Modifiers Qty    [id number]  OT EVAL LOW COMPLEXITY 4 4/15/2018 Radha Adams OT GO 1               Radha Adams OT  4/15/2018

## 2018-04-15 NOTE — PLAN OF CARE
Problem: Patient Care Overview  Goal: Plan of Care Review  Outcome: Ongoing (interventions implemented as appropriate)   04/15/18 1022   Coping/Psychosocial   Plan of Care Reviewed With patient;significant other   Plan of Care Review   Progress (initial eval)   SLP evaluation completed. Will continue to address with a speech/language evaluation. Please see note for further details and recommendations.

## 2018-04-15 NOTE — PROGRESS NOTES
[facility] Medicine Services  PROGRESS NOTE    Patient Name: Vince Olivera  : 1960  MRN: [id number]    Date of Admission: 2018  Length of Stay: 1  Primary Care Physician: KINGSTON Rowley    Subjective   Subjective     CC: right sided weakness    HPI:No acute events overnight,patient states that he is feeling fine, symptoms are resolving.    Review of Systems  Gen- No fevers, chills  CV- No chest pain, palpitations  Resp- No cough, dyspnea  GI- No N/V/D, abd pain  Otherwise ROS is negative except as mentioned in the HPI.    Objective   Objective     Vital Signs:   Temp:  [98.1 °F (36.7 °C)-99.5 °F (37.5 °C)] 98.1 °F (36.7 °C)  Heart Rate:  [] 82  Resp:  [14-18] 16  BP: (131-210)/() 159/91  Total (NIH Stroke Scale): 16     Physical Exam:  Constitutional: No acute distress, awake, alert, comfortable  HENT: NCAT, mucous membranes moist  Respiratory: Clear to auscultation bilaterally, respiratory effort normal   Cardiovascular: RRR, no murmurs, rubs, or gallops, palpable pedal pulses bilaterally  Gastrointestinal: Positive bowel sounds, soft, nontender, nondistended  Musculoskeletal: No bilateral ankle edema  Psychiatric: Appropriate affect, cooperative  Neurologic: Oriented x 3, strength symmetric in all extremities, Cranial Nerves grossly intact to confrontation, speech clear  Skin: No rashes    Results Reviewed:  I have personally reviewed current lab, radiology, and data and agree.      Results from last 7 days  Lab Units 04/15/18  0508 18  1513   WBC 10*3/mm3 9.95 8.35   HEMOGLOBIN g/dL 13.6 14.6   HEMATOCRIT % 39.3 41.0*   PLATELETS 10*3/mm3 244 243   INR   --  1.04       Results from last 7 days  Lab Units 04/15/18  0508 18  1935 18  1513   SODIUM mmol/L 139 138 137   POTASSIUM mmol/L 3.1* 3.7 3.3*   CHLORIDE mmol/L 98* 97* 102   CO2 mmol/L 31.0 26.0 28.3   BUN mg/dL 10 11 12   CREATININE mg/dL 0.90 1.00 0.93   GLUCOSE mg/dL 144* 164*  222*   CALCIUM mg/dL 9.4 10.0 9.9   ALT (SGPT) U/L  --   --  34   AST (SGOT) U/L  --   --  19     Estimated Creatinine Clearance: 99.3 mL/min (by C-G formula based on SCr of 0.9 mg/dL).  No results found for: BNP  No results found for: PHART    Microbiology Results Abnormal     None          Imaging Results (last 24 hours)     Procedure Component Value Units Date/Time    MRI Brain Without Contrast [[id number]] Collected:  04/14/18 1856     Updated:  04/14/18 2150    Narrative:       EXAM:    MR Head Without Intravenous Contrast    EXAM DATE/TIME:    4/14/2018 6:56 PM    CLINICAL HISTORY:    57 years old, male; Signs and symptoms; Altered mental status/memory loss and   numbness / parasthesia and speech disturbance and weakness, extremity; Right;   Confusion or disorientation; Slurred speech; Patient HX: Right sided weakness   and numbness, confusion, speech difficulty, HX seizures-possible seizure   activity **motion noted-best possible images at this time**; Additional info:   Seizures new or progressive    TECHNIQUE:    Magnetic resonance images of the head/brain without intravenous contrast in   multiple planes.    COMPARISON:    MRI BRAIN W WO CONTRAST 2017-04-25 04:20    FINDINGS:    No abnormal restriction of diffusion to indicate acute CVA.      Prior posterior division left MCA infarct has evolved to encephalomalacia   with compensatory dilatation of the left lateral ventricle trigone and temporal   horn, with cortical thinning and white matter changes      Midline structures and cerebellar tonsillar position appear normal.     Ventricles, cisterns and sulci are symmetric and normal for age.      No intracranial mass, midline shift or abnormal extra-axial fluid.      No acute intracranial hemorrhage.       Mild pattern of increased T2 and flair signal in supratentorial white matter.     Optic chiasm and pituitary infundibulum appear normal.       Normal vascular flow voids in major intracranial arteries and  dural venous   sinuses.       Paranasal sinuses are clear.     Right mastoids are opacified.     Optic globes and orbits are unremarkable.        Impression:         No acute intracranial abnormality.      Remote posterior division left MCA infarct with encephalomalacia      Left mastoid opacification, new since the prior MRI one year ago, without   middle ear involvement and unclear acute significance    THIS DOCUMENT HAS BEEN ELECTRONICALLY SIGNED BY ERIN GUILLEN MD    XR Chest 1 View [[id number]] Collected:  04/14/18 1929     Updated:  04/14/18 2101    Narrative:       EXAM:    XR Chest, 1 View    EXAM DATE/TIME:    4/14/2018 7:29 PM    CLINICAL HISTORY:    57 years old, male; Signs and symptoms; Shortness of breath; Additional info:   Stroke    TECHNIQUE:    Frontal view of the chest.    COMPARISON:    No relevant prior studies available.    FINDINGS:    Degree of lung inflation is normal.      Cardiac silhouette appears normal.      No adenopathy or hilar mass.      No evidence of pulmonary edema.      No focal consolidation or parenchymal lung mass.      No pleural effusion or pneumothorax.    Bony structures and extrathoracic soft tissues are unremarkable.       Impression:         No acute or focal cardiopulmonary process.       THIS DOCUMENT HAS BEEN ELECTRONICALLY SIGNED BY ERIN GUILLEN MD        Results for orders placed during the hospital encounter of 04/25/17   Transthoracic Echo Complete With Bubble Study    Narrative · Left ventricular function is normal. Estimated EF = 70%.  · Hyperdynamic right ventricular systolic function noted.  · Left ventricular diastolic dysfunction (grade I) consistent with   impaired relaxation.  · There is no evidence of pericardial effusion.  · No evidence of pulmonary hypertension is present.  · The aortic valve exhibits sclerosis.  · Saline test results are negative.          I have reviewed the medications.    Assessment/Plan   Assessment / Plan     Hospital  Problem List     H/o Left MCA ischemic CVA 4/2017    Diabetes mellitus type 2 in nonobese    Hypertension    Dyslipidemia    GERD (gastroesophageal reflux disease)    Overview Signed 6/23/2017 10:26 AM by KINGSTON Ha     self diagnosed takes otc ppi         Hyperlipidemia    Status post left carotid endarterectomy    Partial seizure    Acute right-sided weakness    Aphasia        Brief Hospital Course to date:  Vince Olivera is a 57 y.o. male with a hx of left MCA CVA with residual right hemiparesis and aphasia, s/p left CEA, type 2 diabetes, HLD, patient presented with right sided weakness, starring and tonic clonic movements of right arm and leg.    Assessment & Plan:  - Suspected seizure, neurology following, s/p fosphenytoin and Keppra load, now on maintainance for both  - Hx of left MCA CVA with residual right hemiparesis and aphasia, with suspected new cva however, repeat MRI shows no acute infarcts, continue asa, statin, plavix  - Hypertensive urgency, /117 with above symptoms, continue aggressive BP control  - decently controlled T2DM A1C 7.7 continue ssi  - Resume home rx for HTN  - PT/OT  - repeat labs in AM    DVT Prophylaxis: mechanical    CODE STATUS: Full Code    Disposition: anticipate d/c in AM    Electronically signed by Kya Min MD, 04/15/18, 1:31 PM.

## 2018-04-15 NOTE — THERAPY EVALUATION
Acute Care - Speech Language Pathology   Swallow Initial Evaluation  [place]     Patient Name: Vince Olivera  : 1960  MRN: [id number]  Today's Date: 4/15/2018               Admit Date: 2018    Visit Dx:   No diagnosis found.  Patient Active Problem List   Diagnosis   • H/o Left MCA ischemic CVA 2017   • Diabetes mellitus type 2 in nonobese   • Hypertension   • Dyslipidemia   • Bilateral carotid artery stenosis   • Residual mild expressive Aphasia   • Gastroesophageal reflux disease without esophagitis   • Combined receptive and expressive aphasia due to cerebrovascular accident   • Abnormal peripheral vision of right eye   • GERD (gastroesophageal reflux disease)   • Hyperlipidemia   • Palpitations   • Bilateral Carotid artery stenosis. Left greater than right    • Internal carotid artery stenosis, left   • Status post left carotid endarterectomy   • Partial seizure   • Acute right-sided weakness   • Aphasia     Past Medical History:   Diagnosis Date   • Arthritis    • Carotid artery disorder    • Diabetes mellitus     DIAGNOSED  CHECKS FSBG 3X/WEEK    • Ear infection     about 2 weeks ago- cleared up - wax removed and cleaned out    • GERD (gastroesophageal reflux disease)     self diagnosed takes otc ppi   • Hyperlipidemia    • Hypertension    • Hypertension    • Seizure    • Shoulder pain     bilat    • Stroke 2017    EXPRESSIVE APHASIA RESIDUAL    • Tooth loose     5 - all over- will get teeth done after this surgery    • Wears reading eyeglasses      Past Surgical History:   Procedure Laterality Date   • ABDOMINAL HERNIA REPAIR     • CAROTID ENDARTERECTOMY Left 10/17/2017    Procedure: CAROTID ENDARTERECTOMY LEFT;  Surgeon: Alexx Bach MD;  Location: [facility];  Service:    • CAROTID ENDARTERECTOMY Left 10/17/2017          SWALLOW EVALUATION (last 72 hours)      SLP Adult Swallow Evaluation     Row Name 04/15/18 0815                   Rehab Evaluation     Document Type evaluation  -LR        Subjective Information no complaints  -LR        Patient Observations alert;cooperative   pt has aphasia present  -LR           General Information    Patient Profile Reviewed yes  -LR        Pertinent History Of Current Problem Pt has h/o L CVA in 2017 with mild aphasia as residual. Per pt's girlfriend, pt's speech is not back to baseline, but has improved since initial admission.   -LR        Current Method of Nutrition NPO  -LR        Precautions/Limitations, Vision WFL;for purposes of eval  -LR        Precautions/Limitations, Hearing WFL;for purposes of eval  -LR        Prior Level of Function-Communication expressive language impairment   pt's girlfriend reported difficulty w/ names, places, colors  -LR        Prior Level of Function-Swallowing no diet consistency restrictions;safe, efficient swallowing in all situations  -LR        Plans/Goals Discussed with patient;spouse/S.O.  -LR        Barriers to Rehab none identified  -LR        Patient's Goals for Discharge patient did not state  -LR        Family Goals for Discharge family did not state  -LR           Pain Assessment    Additional Documentation --  -LR           Oral Motor and Function    Dentition Assessment natural, present and adequate;missing teeth  -LR        Secretion Management WNL/WFL  -LR        Mucosal Quality moist, healthy  -LR        Volitional Swallow WFL  -LR        Volitional Cough WFL  -LR           Oral Musculature and Cranial Nerve Assessment    Oral Motor General Assessment --   oral apraxia  -LR        Oral Motor, Comment Pt had difficulty following some oral motor commands due to oral apraxia. Pt did not appear to have oral motor weakness or reduced mvmt.   -LR           General Eating/Swallowing Observations    Respiratory Support Currently in Use room air  -LR           Clinical Swallow Eval    Oral Prep Phase WFL  -LR        Oral Transit WFL  -LR        Oral Residue WFL  -LR         Pharyngeal Phase no overt signs/symptoms of pharyngeal impairment  -LR        Esophageal Phase unremarkable  -LR        Clinical Swallow Evaluation Summary Pt presents alert, and with mild to mod aphasia at this time. Pt had difficulty following oral motor commands due to oral apraxia. No oral dysphagia present. No overt s/s of aspiration or pharyngeal dysphagia with any consistency. Recommend to initiate a regular diet with thin liquids.   -LR           Clinical Impression    SLP Swallowing Diagnosis functional oral phase;functional pharyngeal phase  -LR        Criteria for Skilled Therapeutic Interventions Met no problems identified which require skilled intervention   will f/u with speech/language eval for aphasia  -LR           Recommendations    SLP Diet Recommendation regular textures;thin liquids  -LR        Recommended Precautions and Strategies upright posture during/after eating;small bites of food and sips of liquid  -LR        SLP Rec. for Method of Medication Administration meds whole;with thin liquids  -LR          User Key  (r) = Recorded By, (t) = Taken By, (c) = Cosigned By    Initials Name Effective Dates    LR Cece Garcia, MS CCC-SLP 06/22/15 -         EDUCATION  The patient has been educated in the following areas:   Dysphagia (Swallowing Impairment).    SLP Recommendation and Plan  SLP Swallowing Diagnosis: functional oral phase, functional pharyngeal phase  SLP Diet Recommendation: regular textures, thin liquids  Recommended Precautions and Strategies: upright posture during/after eating, small bites of food and sips of liquid           Criteria for Skilled Therapeutic Interventions Met: no problems identified which require skilled intervention (will f/u with speech/language eval for aphasia)                   Plan of Care Reviewed With: patient, significant other  Plan of Care Review  Plan of Care Reviewed With: patient, significant other  Progress:  (initial eval)             Time  Calculation:         Time Calculation- SLP     Row Name 04/15/18 1024             Time Calculation- SLP    SLP Start Time 0815  -LR      SLP Received On 04/15/18  -LR        User Key  (r) = Recorded By, (t) = Taken By, (c) = Cosigned By    Initials Name Provider Type    LR Cece Garcia MS CCC-SLP Speech and Language Pathologist          Therapy Charges for Today     Code Description Service Date Service Provider Modifiers Qty    [id number] HC ST EVAL ORAL PHARYNG SWALLOW 4 4/15/2018 Cece Garcia MS CCC-SLP GN 1               Cece Garcia MS CCC-SLP  4/15/2018

## 2018-04-15 NOTE — THERAPY EVALUATION
Acute Care - Physical Therapy Initial Evaluation  [facility]     Patient Name: Vince Olivera  : 1960  MRN: [id number]  Today's Date: 4/15/2018   Onset of Illness/Injury or Date of Surgery: 18  Date of Referral to PT: 04/15/18  Referring Physician: Dr Hill      Admit Date: 2018    Visit Dx:     ICD-10-CM ICD-9-CM   1. Impaired functional mobility, balance, gait, and endurance Z74.09 V49.89     Patient Active Problem List   Diagnosis   • H/o Left MCA ischemic CVA 2017   • Diabetes mellitus type 2 in nonobese   • Hypertension   • Dyslipidemia   • Bilateral carotid artery stenosis   • Residual mild expressive Aphasia   • Gastroesophageal reflux disease without esophagitis   • Combined receptive and expressive aphasia due to cerebrovascular accident   • Abnormal peripheral vision of right eye   • GERD (gastroesophageal reflux disease)   • Hyperlipidemia   • Palpitations   • Bilateral Carotid artery stenosis. Left greater than right    • Internal carotid artery stenosis, left   • Status post left carotid endarterectomy   • Partial seizure   • Acute right-sided weakness   • Aphasia     Past Medical History:   Diagnosis Date   • Arthritis    • Carotid artery disorder    • Diabetes mellitus     DIAGNOSED  CHECKS FSBG 3X/WEEK    • Ear infection     about 2 weeks ago- cleared up - wax removed and cleaned out    • GERD (gastroesophageal reflux disease)     self diagnosed takes otc ppi   • Hyperlipidemia    • Hypertension    • Hypertension    • Seizure    • Shoulder pain     bilat    • Stroke 2017    EXPRESSIVE APHASIA RESIDUAL    • Tooth loose     5 - all over- will get teeth done after this surgery    • Wears reading eyeglasses      Past Surgical History:   Procedure Laterality Date   • ABDOMINAL HERNIA REPAIR     • CAROTID ENDARTERECTOMY Left 10/17/2017    Procedure: CAROTID ENDARTERECTOMY LEFT;  Surgeon: Alexx Bach MD;  Location: [facility];  Service:    • CAROTID  ENDARTERECTOMY Left 10/17/2017        PT ASSESSMENT (last 12 hours)      Physical Therapy Evaluation     Row Name 04/15/18 0930          PT Evaluation Time/Intention    Subjective Information no complaints  -SC     Document Type evaluation  -SC     Mode of Treatment physical therapy  -SC     Patient Effort good  -SC     Row Name 04/15/18 0930          General Information    Patient Profile Reviewed? yes  -SC     Onset of Illness/Injury or Date of Surgery 04/14/18  -SC     Referring Physician Dr Hill  -SC     Patient Observations alert;cooperative;agree to therapy  -SC     Patient/Family Observations girlfriend   -SC     General Observations of Patient in bed  -SC     Prior Level of Function independent:;all household mobility;gait   occationally uses a cane  -SC     Equipment Currently Used at Home cane, straight  -SC     Pertinent History of Current Functional Problem Admitted wtih c/o numbness R UE. Has hx of old L mca. No to ER for testing. Ct negative but being treated for seizures  -SC     Existing Precautions/Restrictions fall  -SC     Limitations/Impairments visual  -SC     Risks Reviewed patient and family:;increased discomfort;change in vital signs  -SC     Benefits Reviewed patient:;improve function;increase independence  -SC     Barriers to Rehab none identified  -SC     Row Name 04/15/18 0930          Relationship/Environment    Primary Source of Support/Comfort significant other  -SC     Lives With significant other  -SC     Row Name 04/15/18 0930          Resource/Environmental Concerns    Current Living Arrangements home/apartment/condo  -SC     Row Name 04/15/18 0930          Cognitive Assessment/Intervention- PT/OT    Orientation Status (Cognition) oriented to;person;situation;verbal cues/prompts needed for orientation;place;time  -SC     Follows Commands (Cognition) follows one step commands;75-90% accuracy  -SC     Safety Deficit (Cognitive) moderate deficit;impulsivity  -SC     Row Name 04/15/18  0930          Safety Issues, Functional Mobility    Safety Issues Affecting Function (Mobility) judgment  -SC     Impairments Affecting Function (Mobility) balance;visual/perceptual  -SC     Row Name 04/15/18 0930          Bed Mobility Assessment/Treatment    Bed Mobility Assessment/Treatment supine-sit;sit-supine  -SC     Supine-Sit [place] (Bed Mobility) independent  -SC     Sit-Supine [place] (Bed Mobility) independent  -SC     Row Name 04/15/18 0930          Transfer Assessment/Treatment    Transfer Assessment/Treatment sit-stand transfer;stand-sit transfer  -SC     Sit-Stand [place] (Transfers) supervision  -SC     Stand-Sit [place] (Transfers) supervision  -SC     Row Name 04/15/18 0930          Gait/Stairs Assessment/Training    Gait/Stairs Assessment/Training gait/ambulation independence  -SC     [place] Level (Gait) contact guard  -SC     Assistive Device (Gait) walker, front-wheeled  -SC     Distance in Feet (Gait) 350  -SC     Deviations/Abnormal Patterns (Gait) other (see comments)   sways to right and difficulty negotiating objects on right  -SC     Right Sided Gait Deviations heel strike decreased  -SC     Comment (Gait/Stairs) Walked  in hallway with cues to stay centered. Unable to control walk 100% with some hitting of objects in halway  -SC     Row Name 04/15/18 0930          General ROM    GENERAL ROM COMMENTS wnl  -SC     Row Name 04/15/18 0930          General Assessment (Manual Muscle Testing)    General Manual Muscle Testing (MMT) Assessment lower extremity strength deficits identified  -[facility] Name 04/15/18 0930          Lower Extremity (Manual Muscle Testing)    Comment, MMT: Lower Extremity R: tib ant -3/5, ehl1/5, hip flex3/5 , quads 4/5  -SC     Row Name 04/15/18 0930          Motor Assessment/Intervention    Additional Documentation Balance (Group);Balance Interventions (Group);Muscle Tone Assessment (Row);Gross Motor Coordination (Group)  -SC     Row Name  04/15/18 0930          Gross Motor Coordination    Gross Motor Impairments motor control;balance   no clonus noted, no increased mus tone, no tremor  -SC     Row Name 04/15/18 0930          Balance    Balance dynamic standing balance  -SC     Row Name 04/15/18 0930          Dynamic Standing Balance    Level of Hinds, Reaches Outside Midline (Standing, Dynamic Balance) minimal assist, 75% patient effort  -SC     Comment, Reaches Outside Midline (Standing, Dynamic Balance) required walker  -SC     Row Name 04/15/18 0930          Sensory Assessment/Intervention    Sensory General Assessment light touch sensation deficits identified  -SC     Row Name 04/15/18 0930          Vision Assessment/Intervention    Visual Impairment/Limitations --   R side with some disfunction  -SC     Row Name 04/15/18 0930          Light Touch Sensation Assessment    Right Upper Extremity: Light Touch Sensation Assessment moderate impairment, 50 to 74% correct responses  -SC     Row Name 04/15/18 0930          Pain Assessment    Additional Documentation Pain Scale: Numbers Pre/Post-Treatment (Group)  -SC     Row Name 04/15/18 0930          Pain Scale: Numbers Pre/Post-Treatment    Pain Scale: Numbers, Pretreatment 0/10 - no pain  -SC     Pain Scale: Numbers, Post-Treatment 0/10 - no pain  -SC     Row Name 04/15/18 0930          Coping    Observed Emotional State calm  -SC     Verbalized Emotional State acceptance  -SC     Row Name 04/15/18 0930          Plan of Care Review    Plan of Care Reviewed With patient;significant other  -SC     Row Name 04/15/18 0930          Physical Therapy Clinical Impression    Date of Referral to PT 04/15/18  -SC     PT Diagnosis (PT Clinical Impression) r side weakness  -SC     Criteria for Skilled Interventions Met (PT Clinical Impression) yes;treatment indicated  -SC     Pathology/Pathophysiology Noted (Describe Specifically for Each System) musculoskeletal;neuromuscular   vision  -SC     Impairments  Found (describe specific impairments) gait, locomotion, and balance;motor function;muscle performance;sensory Integrity;cranial and peripheral nerve integrity  -SC     Rehab Potential (PT Clinical Summary) good, to achieve stated therapy goals  -SC     Care Plan Review (PT) risks/benefits reviewed;care plan/treatment goals reviewed  -SC     Care Plan Review, Other Participant (PT Clinical Impression) significant other  -SC     Row Name 04/15/18 0930          Vital Signs    Post Systolic BP Rehab 171  -SC     Post Treatment Diastolic   -SC     Row Name 04/15/18 0930          Physical Therapy Goals    Gait Training Goal Selection (PT) gait training, PT goal 1  -SC     Stairs Goal Selection (PT) stairs, PT goal 1  -SC     Additional Documentation Stairs Goal Selection (PT) (Row)  -SC     Row Name 04/15/18 0930          Gait Training Goal 1 (PT)    Activity/Assistive Device (Gait Training Goal 1, PT) gait (walking locomotion);cane, straight  -SC     [place] Level (Gait Training Goal 1, PT) supervision required  -SC     Distance (Gait Goal 1, PT) 350  -SC     Time Frame (Gait Training Goal 1, PT) long term goal (LTG);10 days  -SC     Progress/Outcome (Gait Training Goal 1, PT) goal ongoing  -SC     Row Name 04/15/18 0930          Stairs Goal 1 (PT)    Activity/Assistive Device (Stairs Goal 1, PT) ascending stairs;descending stairs  -SC     [place] Level/Cues Needed (Stairs Goal 1, PT) supervision required  -SC     Number of Stairs (Stairs Goal 1, PT) 10  -SC     Time Frame (Stairs Goal 1, PT) long term goal (LTG);10 days  -SC     Progress/Outcome (Stairs Goal 1, PT) goal ongoing  -SC     Row Name 04/15/18 0930          Positioning and Restraints    Pre-Treatment Position in bed  -SC     Post Treatment Position bed  -SC     In Bed notified nsg;supine;encouraged to call for assist;patient within staff view  -SC     Row Name 04/15/18 0930          Living Environment    Home Accessibility stairs within home   -SC       User Key  (r) = Recorded By, (t) = Taken By, (c) = Cosigned By    Initials Name Provider Type    SC Abi Silva, PT Physical Therapist          Physical Therapy Education     Title: PT OT SLP Therapies (Done)     Topic: Physical Therapy (Done)     Point: Mobility training (Done)    Learning Progress Summary     Learner Status Readiness Method Response Comment Documented by    Patient Done Tonny AN,NR reviewed safety [facility] mobility SC 04/15/18 1205          Point: Home exercise program (Done)    Learning Progress Summary     Learner Status Readiness Method Response Comment Documented by    Patient Done Tonny AN,NR reviewed safety [facility] mobility SC 04/15/18 1205          Point: Body mechanics (Done)    Learning Progress Summary     Learner Status Readiness Method Response Comment Documented by    Patient Done Tonny AN,NR reviewed safety [facility] mobility SC 04/15/18 1205          Point: Precautions (Done)    Learning Progress Summary     Learner Status Readiness Method Response Comment Documented by    Patient Done Tonny AN,NR reviewed safety [facility] mobility SC 04/15/18 1205                      User Key     Initials Effective Dates Name Provider Type Discipline    SC 06/19/15 -  Abi Silva, PT Physical Therapist PT                PT Recommendation and Plan  Anticipated Discharge Disposition (PT): home or self care  Planned Therapy Interventions (PT Eval): bed mobility training, gait training, home exercise program, patient/family education, stair training, strengthening, transfer training  Therapy Frequency (PT Clinical Impression): daily  Outcome Summary/Treatment Plan (PT)  Anticipated Discharge Disposition (PT): home or self care  Plan of Care Reviewed With: patient  Progress: improving  Outcome Summary: Austin is slightly below his baseline function. Requires rolling walker for extra stability today.  Walked in hallway 350 feet wtih wome difficulty negotiating objects           Outcome Measures      Row Name 04/15/18 0930             How much help from another person do you currently need...    Turning from your back to your side while in flat bed without using bedrails? 4  -SC      Moving from lying on back to sitting on the side of a flat bed without bedrails? 4  -SC      Moving to and from a bed to a chair (including a wheelchair)? 3  -SC      Standing up from a chair using your arms (e.g., wheelchair, bedside chair)? 3  -SC      Climbing 3-5 steps with a railing? 3  -SC      To walk in hospital room? 3  -SC      AM-PAC 6 Clicks Score 20  -SC         Functional Assessment    Outcome Measure Options AM-PAC 6 Clicks Basic Mobility (PT)  -SC        User Key  (r) = Recorded By, (t) = Taken By, (c) = Cosigned By    Initials Name Provider Type    SC Abi Silva, PT Physical Therapist           Time Calculation:         PT Charges     Row Name 04/15/18 1209             Time Calculation    Start Time 0930  -SC      PT Received On 04/15/18  -SC      PT Goal Re-Cert Due Date 04/25/18  -SC        User Key  (r) = Recorded By, (t) = Taken By, (c) = Cosigned By    Initials Name Provider Type    SC Abi Silva, PT Physical Therapist          Therapy Charges for Today     Code Description Service Date Service Provider Modifiers Qty    07866964239 HC PT EVAL LOW COMPLEXITY 4 4/15/2018 Abi Silva, PT GP 1          PT G-Codes  Outcome Measure Options: AM-PAC 6 Clicks Basic Mobility (PT)      Abi Silva PT  4/15/2018

## 2018-04-15 NOTE — PLAN OF CARE
Problem: Patient Care Overview  Goal: Plan of Care Review  Outcome: Ongoing (interventions implemented as appropriate)   04/15/18 1205   Coping/Psychosocial   Plan of Care Reviewed With patient   Plan of Care Review   Progress improving   OTHER   Outcome Summary Patient is slightly below his baseline function. Requires rolling walker for extra stability today. Walked in hallway 350 feet with some difficulty negotiating objects .

## 2018-04-15 NOTE — PLAN OF CARE
Problem: Patient Care Overview  Goal: Plan of Care Review  Outcome: Ongoing (interventions implemented as appropriate)   04/15/18 1350   Coping/Psychosocial   Plan of Care Reviewed With patient   Plan of Care Review   Progress improving   OTHER   Outcome Summary OT eval completed with chart review, pt demo no BUE strength deficits, but deficits in safety awareness with functional mobility and functional transfers as well as deficits in functional balance for ADL completion. Recom IPOT d/c home w/ HH

## 2018-04-15 NOTE — PROGRESS NOTES
"Neurology Progress Note      Patient: Vince Olivera        CC: seizures  Subjective: patient much better today       The relevant past medical, social, surgical, and family history and ROS were reviewed.     Vital Signs   Vitals:    04/14/18 1945 04/15/18 0008 04/15/18 0418 04/15/18 0737   BP: 155/90 131/72 155/95 159/91   BP Location: Right arm Right arm Right arm Right arm   Patient Position: Lying Lying Sitting Lying   Pulse: 102 86 88 82   Resp: 14 14 16 16   Temp:  99.5 °F (37.5 °C) 99.3 °F (37.4 °C) 98.1 °F (36.7 °C)   TempSrc:  Oral Oral Tympanic   SpO2: 95% 96% 99% 95%   Weight: 88.9 kg (196 lb)      Height: 172.7 cm (67.99\")        Temp:  [98.1 °F (36.7 °C)-99.5 °F (37.5 °C)] 98.1 °F (36.7 °C)  Physical Exam:   General: NAD   Neuro: awake, alert and oriented x 3. Follows commands. Mild-moderate aphasia of mixed variety with some receptive difficulties, inability to repeat, and some expressive difficulties including annomia and paraphasic errors. PERRL, EOMI, RHH. Face symm. HERMOSILLO no drift. Increased tone on R      Current Facility-Administered Medications:   •  acetaminophen (TYLENOL) tablet 650 mg, 650 mg, Oral, Q6H PRN, Katlyn Hill MD  •  aspirin EC tablet 325 mg, 325 mg, Oral, Daily, Katlyn Hill MD, 325 mg at 04/15/18 0936  •  atorvastatin (LIPITOR) tablet 80 mg, 80 mg, Oral, Nightly, Katlyn Hill MD  •  bisacodyl (DULCOLAX) EC tablet 5 mg, 5 mg, Oral, Daily PRN, Katlyn Hill MD  •  bisacodyl (DULCOLAX) suppository 10 mg, 10 mg, Rectal, Daily PRN, Katlyn Hill MD  •  clopidogrel (PLAVIX) tablet 75 mg, 75 mg, Oral, Daily, Katlyn Hill MD, 75 mg at 04/15/18 0936  •  dextrose (D50W) solution 25 g, 25 g, Intravenous, Q15 Min PRN, Katyln Hill MD  •  dextrose (GLUTOSE) oral gel 15 g, 15 g, Oral, Q15 Min PRN, Katlyn Hill MD  •  fosphenytoin (Cerebyx) 150 mg PE in sodium chloride 0.9 % 100 mL IVPB, 150 mg PE, Intravenous, Q8H, Katlyn Hill MD, 150 mg PE at 04/15/18 " 0700  •  glucagon (human recombinant) (GLUCAGEN DIAGNOSTIC) injection 1 mg, 1 mg, Subcutaneous, PRN, Katlyn Hill MD  •  hydrochlorothiazide (HYDRODIURIL) tablet 25 mg, 25 mg, Oral, Daily, Katlyn Hill MD, 25 mg at 04/15/18 0936  •  insulin lispro (humaLOG) injection 0-7 Units, 0-7 Units, Subcutaneous, 4x Daily With Meals & Nightly, Katlyn Hill MD  •  labetalol (NORMODYNE,TRANDATE) injection 20 mg, 20 mg, Intravenous, Q6H PRN, Katlyn Hill MD, 20 mg at 04/14/18 1931  •  levETIRAcetam in NaCl 0.75% (KEPPRA) IVPB 1,000 mg, 1,000 mg, Intravenous, Q12H, Pedro Bazan MD, 1,000 mg at 04/15/18 0936  •  LORazepam (ATIVAN) injection 1 mg, 1 mg, Intravenous, Q6H PRN, Pedro Bazan MD  •  losartan (COZAAR) tablet 50 mg, 50 mg, Oral, Q24H, Katlyn Hill MD, 50 mg at 04/15/18 0936  •  metoprolol succinate XL (TOPROL-XL) 24 hr tablet 200 mg, 200 mg, Oral, Daily, Katlyn Hill MD, 200 mg at 04/15/18 0936  •  pantoprazole (PROTONIX) EC tablet 40 mg, 40 mg, Oral, QAM, Katlyn Hill MD, 40 mg at 04/15/18 0936  •  pneumococcal polysaccharide 23-valent (PNEUMOVAX-23) vaccine 0.5 mL, 0.5 mL, Intramuscular, During Hospitalization, Katlyn Hill MD  •  promethazine (PHENERGAN) tablet 12.5 mg, 12.5 mg, Oral, Q6H PRN **OR** promethazine (PHENERGAN) injection 12.5 mg, 12.5 mg, Intramuscular, Q6H PRN **OR** promethazine (PHENERGAN) injection 12.5 mg, 12.5 mg, Intravenous, Q6H PRN **OR** promethazine (PHENERGAN) suppository 12.5 mg, 12.5 mg, Rectal, Q6H PRN, Katlyn Hill MD  •  sodium chloride 0.9 % flush 1-10 mL, 1-10 mL, Intravenous, PRN, Katlyn Hill MD  •  sodium chloride 0.9 % flush 1-10 mL, 1-10 mL, Intravenous, PRN, Katlyn Hill MD    No Known Allergies      Results Review:    Results from last 7 days  Lab Units 04/15/18  0508   WBC 10*3/mm3 9.95   HEMOGLOBIN g/dL 13.6   HEMATOCRIT % 39.3   PLATELETS 10*3/mm3 244       Results from last 7 days  Lab Units 04/15/18  0508  04/14/18  1935 04/14/18  1513   SODIUM mmol/L 139 138 137   POTASSIUM mmol/L 3.1* 3.7 3.3*   CHLORIDE mmol/L 98* 97* 102   CO2 mmol/L 31.0 26.0 28.3   BUN mg/dL 10 11 12   CREATININE mg/dL 0.90 1.00 0.93   CALCIUM mg/dL 9.4 10.0 9.9   BILIRUBIN mg/dL  --   --  0.5   ALK PHOS U/L  --   --  64   ALT (SGPT) U/L  --   --  34   AST (SGOT) U/L  --   --  19   GLUCOSE mg/dL 144* 164* 222*     Results for orders placed or performed during the hospital encounter of 04/14/18   MRI Brain Without Contrast    Narrative    EXAM:    MR Head Without Intravenous Contrast    EXAM DATE/TIME:    4/14/2018 6:56 PM    CLINICAL HISTORY:    57 years old, male; Signs and symptoms; Altered mental status/memory loss and   numbness / parasthesia and speech disturbance and weakness, extremity; Right;   Confusion or disorientation; Slurred speech; Patient HX: Right sided weakness   and numbness, confusion, speech difficulty, HX seizures-possible seizure   activity **motion noted-best possible images at this time**; Additional info:   Seizures new or progressive    TECHNIQUE:    Magnetic resonance images of the head/brain without intravenous contrast in   multiple planes.    COMPARISON:    MRI BRAIN W WO CONTRAST 2017-04-25 04:20    FINDINGS:    No abnormal restriction of diffusion to indicate acute CVA.      Prior posterior division left MCA infarct has evolved to encephalomalacia   with compensatory dilatation of the left lateral ventricle trigone and temporal   horn, with cortical thinning and white matter changes      Midline structures and cerebellar tonsillar position appear normal.     Ventricles, cisterns and sulci are symmetric and normal for age.      No intracranial mass, midline shift or abnormal extra-axial fluid.      No acute intracranial hemorrhage.       Mild pattern of increased T2 and flair signal in supratentorial white matter.     Optic chiasm and pituitary infundibulum appear normal.       Normal vascular flow voids in major  intracranial arteries and dural venous   sinuses.       Paranasal sinuses are clear.     Right mastoids are opacified.     Optic globes and orbits are unremarkable.        Impression      No acute intracranial abnormality.      Remote posterior division left MCA infarct with encephalomalacia      Left mastoid opacification, new since the prior MRI one year ago, without   middle ear involvement and unclear acute significance    THIS DOCUMENT HAS BEEN ELECTRONICALLY SIGNED BY ERIN GUILLEN MD   Results for orders placed or performed during the hospital encounter of 10/17/17   EEG    Narrative    Reason for referral:    56 y.o.male undergoing Baseline EEG prior to intraoperative monitoring    Technical Summary:     A 19 channel digital EEG was performed using the international 10-20   placement system, including eye leads and EKG leads.    Findings:    The awake tracing demonstrates low amplitude 14-18 Hz beta activity seen   symmetrically over both hemispheres, with abundant EMG artifact and eye   blink artifact seen over the anterior head leads.  Sleep is not seen.  No   focal slowing or epileptiform activity is seen.      Impression    Normal EEG in the awake state    This study is adequate for intraoperative monitoring     EEG Monitoring Nonintracranial Surgery    Narrative    Reason for referral:    56 y.o.male undergoing EEG monitoring during left carotid endarterectomy    Technical Summary:     A 19 channel digital EEG was performed using the international 10-20   placement system, including eye leads and EKG leads.    Findings:    The awake tracing is as described in the baseline report.  Following   sedation, there is generalized slowing of the background.  Eventually a   pattern of diffuse low amplitude 4-7 Hz intermixed delta and theta   activity seen symmetrically over both hemispheres.  Occasional and brief   periods of relative voltage attenuation are seen usually lasting less than   1 second.  The  left carotid artery is clamped at 9:13 AM and the clamp is   removed at 10:04 AM.  During the time the clamp is in place and following   clamp removal no changes in the background are seen.      Impression    Uneventful EEG monitoring during left carotid endarterectomy     Results for orders placed or performed during the hospital encounter of 04/25/17   MRI Brain With & Without Contrast    Addendum: 4/25/2017    IMPRESSION:       Large ACUTE ISCHEMIC INFARCT in the LEFT MCA distribution concordant with the   recent CT findings.      Associated intracranial hemorrhage in the infarcted brain consistent WITH   HEMORRHAGIC TRANSFORMATION.         THIS REPORT CONTAINS FINDINGS THAT MAY BE CRITICAL TO PATIENT CARE. The   findings were verbally communicated via telephone conference with RAMU Aguilar  at 6:22 AM EDT on 4/25/2017. The findings were acknowledged and   understood.    THIS DOCUMENT HAS BEEN ELECTRONICALLY SIGNED BY NICHOLAS MURPHY MD      Narrative    EXAM:    MR Head Without and With Intravenous Contrast    CLINICAL HISTORY:    56 years old, male; Signs and symptoms; Other: AMS, confusion; Patient HX:   Cyberknife protocol for new finding left temporal/parietal mass 0.94 creat 83   gfr 19 ml multihance administered    TECHNIQUE:    Magnetic resonance images of the head/brain without and with intravenous   contrast in multiple planes.    CONTRAST:    19 mL of multihance administered intravenously.    COMPARISON:    CT HEAD WO CONTRAST 4/24/2017 9:21:14 PM    FINDINGS:    Large area of restricted diffusion in the LEFT MCA distribution involving the   LEFT basal ganglia, temporofrontal and parietal lobes measuring approximately   12.2 cm AP and 4.1 cm transverse with surrounding edema and mass effect   attenuating the sulci with a few areas of susceptibility artifact predominantly   in the posterior RIGHT lobe consistent with hemorrhage.       A few scattered focal areas of abnormal T2 prolongation within  the   periventricular and subcortical white matter of the supratentorial brain   without any other region of restricted diffusion.      Midline brain structures including the corpus callosum, pituitary gland,   pineal region, and craniovertebral junction are unremarkable. Ventricles and   sulci are mildly dilated without evidence of hydrocephalus. Intracranial   vessels demonstrate a normal flow-void.       Impression      Large ACUTE ISCHEMIC INFARCT in the LEFT MCA distribution concordant with the   recent CT findings.      Associated intracranial hemorrhage in the infarcted brain consistent WITH   HEMORRHAGIC TRANSFORMATION.         THIS DOCUMENT HAS BEEN ELECTRONICALLY SIGNED BY NICHOLAS MURPHY MD   Results for orders placed or performed during the hospital encounter of 04/24/17   CT Head Without Contrast    Narrative    CT HEAD WITHOUT CONTRAST-     CLINICAL INDICATION: Altered mental status.          COMPARISON: None available.      TECHNIQUE: Axial images of the brain were obtained with out intravenous  contrast.  Reformatted images were created in the sagittal and coronal  planes.     DOSE: 964.28 mGy.cm     Radiation dose reduction techniques were utilized per ALARA protocol.  Automated exposure control was initiated through either or CareDose or  DoseRight software packages by  protocol.        FINDINGS:   Abnormal low attenuation in the left temporal and posterior parietal  region. This may represent acute ischemic change, but I cannot exclude  underlying mass. A contrasted study is suggested.  The ventricles, cisterns, and sulci are unremarkable. There is no  hydrocephalus.        I do not see epidural or subdural hematoma.       The bone window setting images show no destructive calvarial lesion or  acute calvarial fracture.   The posterior fossa is unremarkable.             Impression    1.  Abnormal low attenuation in the left temporal lobe and posterior  parietal region on the left. This is  a somewhat poor distribution for  focal vascular territory. Differential includes acute ischemic event  versus edema from an underlying mass. Follow-up contrasted study is  suggested.  2.  No evidence of hemorrhage.     This report was finalized on 4/25/2017 7:44 AM by Dr. Josiah Kaplan MD.      PHT: 14.0    Assessment/Plan:            1. Symptomatic Localization Related Epilepsy  2. History of Left MCA stroke  3. Aphasia        -Phenytoin  mg BID  -Keppra 1500 mg BID  -ASA, Atorvastatin  -follow up in Neurology clinic as outpatient post hospitalization        Pedro Baazn MD  04/15/18  11:27 AM

## 2018-04-16 VITALS
HEIGHT: 68 IN | TEMPERATURE: 97.9 F | RESPIRATION RATE: 18 BRPM | HEART RATE: 86 BPM | DIASTOLIC BLOOD PRESSURE: 96 MMHG | SYSTOLIC BLOOD PRESSURE: 158 MMHG | BODY MASS INDEX: 29.7 KG/M2 | WEIGHT: 196 LBS | OXYGEN SATURATION: 97 %

## 2018-04-16 PROBLEM — R56.9 PARTIAL SEIZURE: Status: RESOLVED | Noted: 2018-03-20 | Resolved: 2018-04-16

## 2018-04-16 PROBLEM — R53.1 ACUTE RIGHT-SIDED WEAKNESS: Status: RESOLVED | Noted: 2018-04-14 | Resolved: 2018-04-16

## 2018-04-16 PROBLEM — R47.01 APHASIA: Status: RESOLVED | Noted: 2018-04-14 | Resolved: 2018-04-16

## 2018-04-16 LAB
ANION GAP SERPL CALCULATED.3IONS-SCNC: 8 MMOL/L (ref 3–11)
BH CV ECHO MEAS - AO ROOT AREA (BSA CORRECTED): 1.4
BH CV ECHO MEAS - AO ROOT AREA: 6.3 CM^2
BH CV ECHO MEAS - AO ROOT DIAM: 2.8 CM
BH CV ECHO MEAS - ASC AORTA: 2.4 CM
BH CV ECHO MEAS - BSA(HAYCOCK): 2.1 M^2
BH CV ECHO MEAS - BSA(HAYCOCK): 2.1 M^2
BH CV ECHO MEAS - BSA: 2 M^2
BH CV ECHO MEAS - BSA: 2 M^2
BH CV ECHO MEAS - BZI_BMI: 29.8 KILOGRAMS/M^2
BH CV ECHO MEAS - BZI_BMI: 29.8 KILOGRAMS/M^2
BH CV ECHO MEAS - BZI_METRIC_HEIGHT: 172.7 CM
BH CV ECHO MEAS - BZI_METRIC_HEIGHT: 172.7 CM
BH CV ECHO MEAS - BZI_METRIC_WEIGHT: 88.9 KG
BH CV ECHO MEAS - BZI_METRIC_WEIGHT: 88.9 KG
BH CV ECHO MEAS - CONTRAST EF (2CH): 63.4 ML/M^2
BH CV ECHO MEAS - CONTRAST EF 4CH: 64.2 ML/M^2
BH CV ECHO MEAS - EDV(CUBED): 105.6 ML
BH CV ECHO MEAS - EDV(MOD-SP2): 41 ML
BH CV ECHO MEAS - EDV(MOD-SP4): 81 ML
BH CV ECHO MEAS - EDV(TEICH): 103.7 ML
BH CV ECHO MEAS - EF(CUBED): 71.7 %
BH CV ECHO MEAS - EF(MOD-SP2): 63.4 %
BH CV ECHO MEAS - EF(MOD-SP4): 64.2 %
BH CV ECHO MEAS - EF(TEICH): 63.3 %
BH CV ECHO MEAS - ESV(CUBED): 29.9 ML
BH CV ECHO MEAS - ESV(MOD-SP2): 15 ML
BH CV ECHO MEAS - ESV(MOD-SP4): 29 ML
BH CV ECHO MEAS - ESV(TEICH): 38 ML
BH CV ECHO MEAS - FS: 34.3 %
BH CV ECHO MEAS - IVS/LVPW: 0.99
BH CV ECHO MEAS - IVSD: 1.1 CM
BH CV ECHO MEAS - LAT PEAK E' VEL: 11.1 CM/SEC
BH CV ECHO MEAS - LV DIASTOLIC VOL/BSA (35-75): 40 ML/M^2
BH CV ECHO MEAS - LV MASS(C)D: 185.9 GRAMS
BH CV ECHO MEAS - LV MASS(C)DI: 91.7 GRAMS/M^2
BH CV ECHO MEAS - LV SYSTOLIC VOL/BSA (12-30): 14.3 ML/M^2
BH CV ECHO MEAS - LVIDD: 4.7 CM
BH CV ECHO MEAS - LVIDS: 3.1 CM
BH CV ECHO MEAS - LVLD AP2: 6.1 CM
BH CV ECHO MEAS - LVLD AP4: 7.5 CM
BH CV ECHO MEAS - LVLS AP2: 4.9 CM
BH CV ECHO MEAS - LVLS AP4: 5.4 CM
BH CV ECHO MEAS - LVOT AREA (M): 2.5 CM^2
BH CV ECHO MEAS - LVOT AREA: 2.5 CM^2
BH CV ECHO MEAS - LVOT DIAM: 1.8 CM
BH CV ECHO MEAS - LVPWD: 1.1 CM
BH CV ECHO MEAS - MED PEAK E' VEL: 6.36 CM/SEC
BH CV ECHO MEAS - MV A MAX VEL: 101.2 CM/SEC
BH CV ECHO MEAS - MV DEC TIME: 0.18 SEC
BH CV ECHO MEAS - MV E MAX VEL: 85.9 CM/SEC
BH CV ECHO MEAS - MV E/A: 0.85
BH CV ECHO MEAS - PA ACC SLOPE: 330.6 CM/SEC^2
BH CV ECHO MEAS - PA ACC TIME: 0.19 SEC
BH CV ECHO MEAS - PA MAX PG: 3.2 MMHG
BH CV ECHO MEAS - PA PR(ACCEL): -5 MMHG
BH CV ECHO MEAS - PA V2 MAX: 89.6 CM/SEC
BH CV ECHO MEAS - SI(CUBED): 37.3 ML/M^2
BH CV ECHO MEAS - SI(MOD-SP2): 12.8 ML/M^2
BH CV ECHO MEAS - SI(MOD-SP4): 25.7 ML/M^2
BH CV ECHO MEAS - SI(TEICH): 32.4 ML/M^2
BH CV ECHO MEAS - SV(CUBED): 75.6 ML
BH CV ECHO MEAS - SV(MOD-SP2): 26 ML
BH CV ECHO MEAS - SV(MOD-SP4): 52 ML
BH CV ECHO MEAS - SV(TEICH): 65.7 ML
BH CV ECHO MEAS - TAPSE (>1.6): 2.67 CM2
BH CV XLRA - RV BASE: 2.2 CM
BH CV XLRA - RV LENGTH: 6.2 CM
BH CV XLRA - RV MID: 1.6 CM
BH CV XLRA - TDI S': 18.9 CM/SEC
BH CV XLRA MEAS LEFT CCA RATIO VEL: 94.3 CM/SEC
BH CV XLRA MEAS LEFT DIST CCA EDV: 30.3 CM/SEC
BH CV XLRA MEAS LEFT DIST CCA PSV: 116.7 CM/SEC
BH CV XLRA MEAS LEFT DIST ICA EDV: 23.2 CM/SEC
BH CV XLRA MEAS LEFT DIST ICA PSV: 75.5 CM/SEC
BH CV XLRA MEAS LEFT ICA RATIO VEL: 75 CM/SEC
BH CV XLRA MEAS LEFT ICA/CCA RATIO: 0.8
BH CV XLRA MEAS LEFT MID CCA EDV: 28.1 CM/SEC
BH CV XLRA MEAS LEFT MID CCA PSV: 80.8 CM/SEC
BH CV XLRA MEAS LEFT MID ICA EDV: 29.2 CM/SEC
BH CV XLRA MEAS LEFT MID ICA PSV: 77.2 CM/SEC
BH CV XLRA MEAS LEFT PROX CCA EDV: 22.4 CM/SEC
BH CV XLRA MEAS LEFT PROX CCA PSV: 95.4 CM/SEC
BH CV XLRA MEAS LEFT PROX ECA EDV: 19.3 CM/SEC
BH CV XLRA MEAS LEFT PROX ECA PSV: 114.7 CM/SEC
BH CV XLRA MEAS LEFT PROX ICA EDV: 24.8 CM/SEC
BH CV XLRA MEAS LEFT PROX ICA PSV: 67.8 CM/SEC
BH CV XLRA MEAS LEFT PROX SCLA PSV: 145.9 CM/SEC
BH CV XLRA MEAS LEFT VERTEBRAL A EDV: 11 CM/SEC
BH CV XLRA MEAS LEFT VERTEBRAL A PSV: 28.3 CM/SEC
BH CV XLRA MEAS RIGHT CCA RATIO VEL: 66.8 CM/SEC
BH CV XLRA MEAS RIGHT DIST CCA EDV: 18.3 CM/SEC
BH CV XLRA MEAS RIGHT DIST CCA PSV: 67.2 CM/SEC
BH CV XLRA MEAS RIGHT DIST ICA EDV: 18.2 CM/SEC
BH CV XLRA MEAS RIGHT DIST ICA PSV: 82.7 CM/SEC
BH CV XLRA MEAS RIGHT ICA RATIO VEL: 139 CM/SEC
BH CV XLRA MEAS RIGHT ICA/CCA RATIO: 2.1
BH CV XLRA MEAS RIGHT MID CCA EDV: 13.1 CM/SEC
BH CV XLRA MEAS RIGHT MID CCA PSV: 59.4 CM/SEC
BH CV XLRA MEAS RIGHT MID ICA EDV: 27.9 CM/SEC
BH CV XLRA MEAS RIGHT MID ICA PSV: 139.7 CM/SEC
BH CV XLRA MEAS RIGHT PROX CCA EDV: 15.3 CM/SEC
BH CV XLRA MEAS RIGHT PROX CCA PSV: 55.9 CM/SEC
BH CV XLRA MEAS RIGHT PROX ECA EDV: 40.2 CM/SEC
BH CV XLRA MEAS RIGHT PROX ECA PSV: 270.3 CM/SEC
BH CV XLRA MEAS RIGHT PROX ICA EDV: 41 CM/SEC
BH CV XLRA MEAS RIGHT PROX ICA PSV: 110 CM/SEC
BH CV XLRA MEAS RIGHT PROX SCLA EDV: 9.4 CM/SEC
BH CV XLRA MEAS RIGHT PROX SCLA PSV: 102 CM/SEC
BH CV XLRA MEAS RIGHT VERTEBRAL A EDV: 9.9 CM/SEC
BH CV XLRA MEAS RIGHT VERTEBRAL A PSV: 46.3 CM/SEC
BUN BLD-MCNC: 10 MG/DL (ref 9–23)
BUN/CREAT SERPL: 10 (ref 7–25)
CALCIUM SPEC-SCNC: 9.4 MG/DL (ref 8.7–10.4)
CHLORIDE SERPL-SCNC: 94 MMOL/L (ref 99–109)
CO2 SERPL-SCNC: 32 MMOL/L (ref 20–31)
CREAT BLD-MCNC: 1 MG/DL (ref 0.6–1.3)
E/E' RATIO: 11.1
GFR SERPL CREATININE-BSD FRML MDRD: 77 ML/MIN/1.73
GLUCOSE BLD-MCNC: 164 MG/DL (ref 70–100)
GLUCOSE BLDC GLUCOMTR-MCNC: 167 MG/DL (ref 70–130)
GLUCOSE BLDC GLUCOMTR-MCNC: 179 MG/DL (ref 70–130)
LEFT ATRIUM VOLUME INDEX: 17.2 ML/M2
LV EF 2D ECHO EST: 65 %
MAXIMAL PREDICTED HEART RATE: 164 BPM
POTASSIUM BLD-SCNC: 3.2 MMOL/L (ref 3.5–5.5)
SODIUM BLD-SCNC: 134 MMOL/L (ref 132–146)
STRESS TARGET HR: 139 BPM

## 2018-04-16 PROCEDURE — 82962 GLUCOSE BLOOD TEST: CPT

## 2018-04-16 PROCEDURE — 99239 HOSP IP/OBS DSCHRG MGMT >30: CPT | Performed by: INTERNAL MEDICINE

## 2018-04-16 PROCEDURE — 80048 BASIC METABOLIC PNL TOTAL CA: CPT | Performed by: INTERNAL MEDICINE

## 2018-04-16 PROCEDURE — 97110 THERAPEUTIC EXERCISES: CPT

## 2018-04-16 PROCEDURE — 97116 GAIT TRAINING THERAPY: CPT

## 2018-04-16 RX ORDER — POTASSIUM CHLORIDE 7.45 MG/ML
10 INJECTION INTRAVENOUS
Status: DISCONTINUED | OUTPATIENT
Start: 2018-04-16 | End: 2018-04-16 | Stop reason: HOSPADM

## 2018-04-16 RX ORDER — POTASSIUM CHLORIDE 1.5 G/1.77G
40 POWDER, FOR SOLUTION ORAL AS NEEDED
Status: DISCONTINUED | OUTPATIENT
Start: 2018-04-16 | End: 2018-04-16 | Stop reason: HOSPADM

## 2018-04-16 RX ORDER — PHENYTOIN SODIUM 200 MG/1
200 CAPSULE, EXTENDED RELEASE ORAL EVERY 12 HOURS SCHEDULED
Qty: 60 CAPSULE | Refills: 1 | Status: SHIPPED | OUTPATIENT
Start: 2018-04-16 | End: 2018-05-17

## 2018-04-16 RX ORDER — POTASSIUM CHLORIDE 750 MG/1
40 CAPSULE, EXTENDED RELEASE ORAL AS NEEDED
Status: DISCONTINUED | OUTPATIENT
Start: 2018-04-16 | End: 2018-04-16 | Stop reason: HOSPADM

## 2018-04-16 RX ADMIN — PHENYTOIN SODIUM 200 MG: 100 CAPSULE, EXTENDED RELEASE ORAL at 09:47

## 2018-04-16 RX ADMIN — HYDROCHLOROTHIAZIDE 25 MG: 25 TABLET ORAL at 09:46

## 2018-04-16 RX ADMIN — INSULIN LISPRO 2 UNITS: 100 INJECTION, SOLUTION INTRAVENOUS; SUBCUTANEOUS at 08:28

## 2018-04-16 RX ADMIN — POTASSIUM CHLORIDE 40 MEQ: 750 CAPSULE, EXTENDED RELEASE ORAL at 09:46

## 2018-04-16 RX ADMIN — CLOPIDOGREL BISULFATE 75 MG: 75 TABLET ORAL at 09:46

## 2018-04-16 RX ADMIN — ASPIRIN 325 MG: 325 TABLET, DELAYED RELEASE ORAL at 09:46

## 2018-04-16 RX ADMIN — PANTOPRAZOLE SODIUM 40 MG: 40 TABLET, DELAYED RELEASE ORAL at 07:33

## 2018-04-16 RX ADMIN — LEVETIRACETAM 1500 MG: 750 TABLET, FILM COATED ORAL at 09:46

## 2018-04-16 RX ADMIN — LOSARTAN POTASSIUM 50 MG: 50 TABLET ORAL at 09:46

## 2018-04-16 RX ADMIN — METOPROLOL SUCCINATE 200 MG: 100 TABLET, EXTENDED RELEASE ORAL at 09:46

## 2018-04-16 NOTE — PROGRESS NOTES
Discharge Planning Assessment  [facility]     Patient Name: Vince Olivera  MRN: [id number]  Today's Date: 4/16/2018    Admit Date: 4/14/2018          Discharge Needs Assessment     Row Name 04/16/18 0945       Living Environment    Lives With significant other    Name(s) of Who Lives With Patient Donna Davis    Current Living Arrangements home/apartment/condo    Primary Care Provided by self    Provides Primary Care For no one    Family Caregiver if Needed significant other    Family Caregiver Names Donna Davis    Quality of Family Relationships helpful;involved    Able to Return to Prior Arrangements yes       Resource/Environmental Concerns    Resource/Environmental Concerns none       Transition Planning    Patient/Family Anticipates Transition to home with family    Patient/Family Anticipated Services at Transition none       Discharge Needs Assessment    Readmission Within the Last 30 Days no previous admission in last 30 days    Concerns to be Addressed no discharge needs identified    Equipment Currently Used at Home cane, quad    Anticipated Changes Related to Illness none    Equipment Needed After Discharge none            Discharge Plan     Row Name 04/16/18 0945       Plan    Plan home    Patient/Family in Agreement with Plan yes    Plan Comments Pt lives in [facility] with his significant other. He reports he uses a quad cane as needed for mobility and is independent with ADLs. He is followed by his PCP and his medications are covered by insurance. His goal for discharge is to return home. No discharge needs at this time.    Final Discharge Disposition Code 01 - home or self-care    Final Note home        Destination     No service coordination in this encounter.      Durable Medical Equipment     No service coordination in this encounter.      Dialysis/Infusion     No service coordination in this encounter.      Home Medical Care     No service coordination in this encounter.      Social Care      No service coordination in this encounter.        Expected Discharge Date and Time     Expected Discharge Date Expected Discharge Time    Apr 16, 2018               Demographic Summary     Row Name 04/16/18 0944       General Information    Admission Type inpatient    Referral Source physician    Reason for Consult discharge planning    Preferred Language English     Used During This Interaction no    General Information Comments Emili Barber is the PCP       Contact Information    Permission Granted to Share Info With     Contact Information Comments Donna Davis [phone]            Functional Status     Row Name 04/16/18 0945       Functional Status, IADL    Medications independent    Meal Preparation independent    Housekeeping independent    Laundry independent    Shopping independent       Mental Status    General Appearance WDL WDL       Mental Status Summary    Recent Changes in Mental Status/Cognitive Functioning no changes            Psychosocial    No documentation.           Abuse/Neglect    No documentation.           Legal    No documentation.           Substance Abuse    No documentation.           Patient Forms    No documentation.         Sangeeta Jacques RN

## 2018-04-16 NOTE — NURSING NOTE
Reviewed discharge paperwork w/ pt and family.  Instructed to  medication at [facility] pharmacy on the way home.  PIV removed and tele box 2390 cleaned and returned to [facility].  Follow up appointments reviewed as well.  Family taking pt home via private vehicle, awaiting hospital transport to take pt out to car.

## 2018-04-16 NOTE — PLAN OF CARE
Problem: Patient Care Overview  Goal: Plan of Care Review  Outcome: Ongoing (interventions implemented as appropriate)   04/16/18 1050   Coping/Psychosocial   Plan of Care Reviewed With patient   Plan of Care Review   Progress improving   OTHER   Outcome Summary Pt ambulated 330 feet with RW and CGA, limited by fatigue. Pt with decreased R sided spatial awareness and ability to control R LE during gait training. Stair training completed to include 3 steps x2 reps with B handrails and CGA. Continual VC required for sequencing and safety. Pt educated on importance of slowed movements for safe mobility once returned home.

## 2018-04-16 NOTE — PLAN OF CARE
Problem: Patient Care Overview  Goal: Plan of Care Review  Outcome: Ongoing (interventions implemented as appropriate)   04/16/18 8230   Coping/Psychosocial   Plan of Care Reviewed With patient;family   Plan of Care Review   Progress improving   OTHER   Outcome Summary patient appears to continue having difficulty processing understanding of all information. family member articulates understanding.

## 2018-04-16 NOTE — THERAPY DISCHARGE NOTE
Orders received per SLP CVA pathway. Pt d/c'd facility before S/L evaluation could be completed. Per MD note, no acute CVA.

## 2018-04-16 NOTE — DISCHARGE SUMMARY
[facility] Medicine Services  DISCHARGE SUMMARY    Patient Name: Vince Olivera  : 1960  MRN: [id number]    Date of Admission: 2018  Date of Discharge:  2018  Primary Care Physician: KINGSTON Rowley    Consults     Date and Time Order Name Status Description    2018 1926 Inpatient Neurology Consult          Hospital Course     Presenting Problem:   CVA (cerebral vascular accident) [I63.9]    Active Hospital Problems (** Indicates Principal Problem)    Diagnosis Date Noted   • Status post left carotid endarterectomy [Z98.890] 10/17/2017   • Hyperlipidemia [E78.5]    • GERD (gastroesophageal reflux disease) [K21.9]    • Dyslipidemia [E78.5] 2017   • Hypertension [I10] 2017   • H/o Left MCA ischemic CVA 2017 [I63.9] 2017   • Diabetes mellitus type 2 in nonobese [E11.9] 2017      Resolved Hospital Problems    Diagnosis Date Noted Date Resolved   • Acute right-sided weakness [M62.89] 2018   • Aphasia [R47.01] 2018   • Partial seizure [R56.9] 2018        Hospital Course:  Vince Olivera is a 57 y.o. 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 He was admitted for suspected CVA vs seizure.  MRI showed no acute infarct. Neurology was consulted,their impression was this was likely symptomatic localization related to epilepsy. He was loaded on fosphenytoin and Keppra load and started on maintainance for which he will continue at d/c. He as been asked to f/u with neurology clinic in 2 weeks. Due to his prior CVA  We continued his home asa, statin, plavix. His BP was elevated on presentation /117, this was aggressively controlled, he will resume his home rx at d/c.       Day of Discharge     HPI: No  acute events overnight, patient states that he had a good night, feels like he is back to baseline.    Review of Systems  Gen- No fevers, chills  CV- No chest pain, palpitations  Resp- No cough, dyspnea  GI- No N/V/D, abd pain    Otherwise ROS is negative except as mentioned in the HPI.    Vital Signs:   Temp:  [97.9 °F (36.6 °C)-98.9 °F (37.2 °C)] 97.9 °F (36.6 °C)  Heart Rate:  [66-86] 86  Resp:  [16-18] 18  BP: (133-158)/(85-99) 158/96     Physical Exam:  Constitutional: No acute distress, awake, alert  HENT: NCAT, mucous membranes moist  Respiratory: Clear to auscultation bilaterally, respiratory effort normal   Cardiovascular: RRR, no murmurs, rubs, or gallops, palpable pedal pulses bilaterally  Gastrointestinal: Positive bowel sounds, soft, nontender, nondistended  Musculoskeletal: No bilateral ankle edema  Psychiatric: Appropriate affect, cooperative  Neurologic: Oriented x 3, strength symmetric in all extremities, Cranial Nerves grossly intact to confrontation, speech clear  Skin: No rashes    Pertinent  and/or Most Recent Results       Results from last 7 days  Lab Units 04/16/18  0714 04/15/18  0508 04/14/18  1935 04/14/18  1513   WBC 10*3/mm3  --  9.95  --  8.35   HEMOGLOBIN g/dL  --  13.6  --  14.6   HEMATOCRIT %  --  39.3  --  41.0*   PLATELETS 10*3/mm3  --  244  --  243   SODIUM mmol/L 134 139 138 137   POTASSIUM mmol/L 3.2* 3.1* 3.7 3.3*   CHLORIDE mmol/L 94* 98* 97* 102   CO2 mmol/L 32.0* 31.0 26.0 28.3   BUN mg/dL 10 10 11 12   CREATININE mg/dL 1.00 0.90 1.00 0.93   GLUCOSE mg/dL 164* 144* 164* 222*   CALCIUM mg/dL 9.4 9.4 10.0 9.9       Results from last 7 days  Lab Units 04/14/18  1513   BILIRUBIN mg/dL 0.5   ALK PHOS U/L 64   ALT (SGPT) U/L 34   AST (SGOT) U/L 19   PROTIME Seconds 13.8   INR  1.04   APTT seconds 29.6       Results from last 7 days  Lab Units 04/15/18  0508   CHOLESTEROL mg/dL 108   TRIGLYCERIDES mg/dL 112   HDL CHOL mg/dL 29*       Results from last 7 days  Lab Units  04/15/18  0508   HEMOGLOBIN A1C % 7.70*     Brief Urine Lab Results  (Last result in the past 365 days)      Color   Clarity   Blood   Leuk Est   Nitrite   Protein   CREAT   Urine HCG        11/18/17 1539 Yellow Clear Negative Negative Negative Negative               Microbiology Results Abnormal     None          Imaging Results (all)     Procedure Component Value Units Date/Time    MRI Brain Without Contrast [[id number]] Collected:  04/14/18 1856     Updated:  04/14/18 2150    Narrative:       EXAM:    MR Head Without Intravenous Contrast    EXAM DATE/TIME:    4/14/2018 6:56 PM    CLINICAL HISTORY:    57 years old, male; Signs and symptoms; Altered mental status/memory loss and   numbness / parasthesia and speech disturbance and weakness, extremity; Right;   Confusion or disorientation; Slurred speech; Patient HX: Right sided weakness   and numbness, confusion, speech difficulty, HX seizures-possible seizure   activity **motion noted-best possible images at this time**; Additional info:   Seizures new or progressive    TECHNIQUE:    Magnetic resonance images of the head/brain without intravenous contrast in   multiple planes.    COMPARISON:    MRI BRAIN W WO CONTRAST 2017-04-25 04:20    FINDINGS:    No abnormal restriction of diffusion to indicate acute CVA.      Prior posterior division left MCA infarct has evolved to encephalomalacia   with compensatory dilatation of the left lateral ventricle trigone and temporal   horn, with cortical thinning and white matter changes      Midline structures and cerebellar tonsillar position appear normal.     Ventricles, cisterns and sulci are symmetric and normal for age.      No intracranial mass, midline shift or abnormal extra-axial fluid.      No acute intracranial hemorrhage.       Mild pattern of increased T2 and flair signal in supratentorial white matter.     Optic chiasm and pituitary infundibulum appear normal.       Normal vascular flow voids in major intracranial  arteries and dural venous   sinuses.       Paranasal sinuses are clear.     Right mastoids are opacified.     Optic globes and orbits are unremarkable.        Impression:         No acute intracranial abnormality.      Remote posterior division left MCA infarct with encephalomalacia      Left mastoid opacification, new since the prior MRI one year ago, without   middle ear involvement and unclear acute significance    THIS DOCUMENT HAS BEEN ELECTRONICALLY SIGNED BY ERIN GUILLEN MD    XR Chest 1 View [[id number]] Collected:  04/14/18 1929     Updated:  04/14/18 2101    Narrative:       EXAM:    XR Chest, 1 View    EXAM DATE/TIME:    4/14/2018 7:29 PM    CLINICAL HISTORY:    57 years old, male; Signs and symptoms; Shortness of breath; Additional info:   Stroke    TECHNIQUE:    Frontal view of the chest.    COMPARISON:    No relevant prior studies available.    FINDINGS:    Degree of lung inflation is normal.      Cardiac silhouette appears normal.      No adenopathy or hilar mass.      No evidence of pulmonary edema.      No focal consolidation or parenchymal lung mass.      No pleural effusion or pneumothorax.    Bony structures and extrathoracic soft tissues are unremarkable.       Impression:         No acute or focal cardiopulmonary process.       THIS DOCUMENT HAS BEEN ELECTRONICALLY SIGNED BY ERIN GUILLEN MD          Results for orders placed during the hospital encounter of 02/16/18   Duplex Carotid Ultrasound CAR    Narrative · Right internal carotid artery stenosis of 50-69%.  · Left internal carotid artery stenosis of 0-49%.  · Proximal left internal carotid artery plaque without significant   stenosis.          Results for orders placed during the hospital encounter of 02/16/18   Duplex Carotid Ultrasound CAR    Narrative · Right internal carotid artery stenosis of 50-69%.  · Left internal carotid artery stenosis of 0-49%.  · Proximal left internal carotid artery plaque without significant    stenosis.          Results for orders placed during the hospital encounter of 04/14/18   Adult Transthoracic Echo Complete W/ Cont if Necessary Per Protocol (With Agitated Saline)    Narrative · Left ventricular systolic function is normal. Estimated EF = 65%.  · Left ventricular diastolic dysfunction (grade I a) consistent with   impaired relaxation.  · Saline test results are negative for evidence of a PFO.            Discharge Details      Vince Olivera   Home Medication Instructions YANETH:[id number]    Printed on:04/16/18 0439   Medication Information                      aspirin  MG tablet               atorvastatin (LIPITOR) 80 MG tablet  Take 1 tablet by mouth Every Night.             clopidogrel (PLAVIX) 75 MG tablet  Take 1 tablet by mouth Daily.             hydrochlorothiazide (HYDRODIURIL) 25 MG tablet  Take 1 tablet by mouth Daily.             lansoprazole (PREVACID) 15 MG capsule  Take 1 capsule by mouth Daily.             levETIRAcetam (KEPPRA) 500 MG tablet  Take 2 tablets by mouth Every 12 (Twelve) Hours.             losartan (COZAAR) 100 MG tablet  Take 0.5 tablets by mouth 2 (Two) Times a Day.             metFORMIN (GLUCOPHAGE) 500 MG tablet  Take 1 tablet by mouth 2 (Two) Times a Day With Meals.             metoprolol succinate XL (TOPROL XL) 200 MG 24 hr tablet  Take 1 tablet by mouth Daily.             phenytoin extended (DILANTIN) 200 MG ER capsule  Take 1 capsule by mouth Every 12 (Twelve) Hours for 30 days.             pneumococcal polysaccharide 23-valent (PNEUMOVAX-23) 25 MCG/0.5ML vaccine  Inject 0.5 mL into the shoulder, thigh, or buttocks During Hospitalization (before d/c) for up to 1 dose.                   Discharge Disposition:  Home or Self Care    Discharge Diet:  Diet Instructions     Advance Diet As Tolerated             Discharge Activity:   Activity Instructions     Activity as Tolerated             Special Instructions:None    Future Appointments  Date Time  Provider Department [place]   7/16/2018 2:30 PM KINGSTON Rowley [facility] None       Additional Instructions for the Follow-ups that You Need to Schedule     Discharge Follow-up with PCP    As directed      Follow Up Details:  post-hospitalization f/u         Discharge Follow-up with Specialty: Neurology; 2 Weeks    As directed      Specialty:  Neurology    Follow Up:  2 Weeks               Time Spent on Discharge:  35 minutes    Electronically signed by Kya Min MD, 04/16/18, 9:42 AM..

## 2018-04-16 NOTE — THERAPY DISCHARGE NOTE
Acute Care - Physical Therapy Treatment Note/Discharge  [facility]     Patient Name: Vince Olivera  : 1960  MRN: [id number]  Today's Date: 2018  Onset of Illness/Injury or Date of Surgery: 18  Date of Referral to PT: 04/15/18  Referring Physician: MD Sergio    Admit Date: 2018    Visit Dx:    ICD-10-CM ICD-9-CM   1. Impaired functional mobility, balance, gait, and endurance Z74.09 V49.89   2. Impaired mobility and ADLs Z74.09 799.89     Patient Active Problem List   Diagnosis   • H/o Left MCA ischemic CVA 2017   • Diabetes mellitus type 2 in nonobese   • Hypertension   • Dyslipidemia   • Bilateral carotid artery stenosis   • Residual mild expressive Aphasia   • Gastroesophageal reflux disease without esophagitis   • Combined receptive and expressive aphasia due to cerebrovascular accident   • Abnormal peripheral vision of right eye   • GERD (gastroesophageal reflux disease)   • Hyperlipidemia   • Palpitations   • Bilateral Carotid artery stenosis. Left greater than right    • Internal carotid artery stenosis, left   • Status post left carotid endarterectomy       Physical Therapy Education     Title: PT OT SLP Therapies (Active)     Topic: Physical Therapy (Done)     Point: Mobility training (Done)    Learning Progress Summary     Learner Status Readiness Method Response Comment Documented by    Patient Done Acceptance E VU Educated pt on proper body mechanics and sequencing with bed mobility, transfers, stair training, and gait training. Educated pt and significant other on importance of slowed mobility for safety. AD 18 1054     Done [place] AN,NR reviewed safety [facility] mobility SC 04/15/18 1205          Point: Home exercise program (Done)    Learning Progress Summary     Learner Status Readiness Method Response Comment Documented by    Patient Done Acceptance E VU Educated pt on proper body mechanics and sequencing with bed mobility, transfers, stair training, and gait training.  Educated pt and significant other on importance of slowed mobility for safety. AD 04/16/18 1054     Done Tonny AN,NR reviewed safety wtih mobility SC 04/15/18 1205          Point: Body mechanics (Done)    Learning Progress Summary     Learner Status Readiness Method Response Comment Documented by    Patient Done Acceptance E VU Educated pt on proper body mechanics and sequencing with bed mobility, transfers, stair training, and gait training. Educated pt and significant other on importance of slowed mobility for safety. AD 04/16/18 1054     Done Tonny AN,NR reviewed safety wtih mobility SC 04/15/18 1205          Point: Precautions (Done)    Learning Progress Summary     Learner Status Readiness Method Response Comment Documented by    Patient Done Acceptance E VU Educated pt on proper body mechanics and sequencing with bed mobility, transfers, stair training, and gait training. Educated pt and significant other on importance of slowed mobility for safety. AD 04/16/18 1054     Done Tonny AN,NR reviewed safety wtih mobility SC 04/15/18 1205                      User Key     Initials Effective Dates Name Provider Type Discipline    SC 06/19/15 -  Abi Silva, PT Physical Therapist PT     03/07/18 -  Misti Huerta, PT Student PT Student PT                    PT Rehab Goals     Row Name 04/16/18 1020             Gait Training Goal 1 (PT)    Activity/Assistive Device (Gait Training Goal 1, PT) (P)  gait (walking locomotion);cane, straight  -AD      [place] Level (Gait Training Goal 1, PT) (P)  supervision required  -AD      Distance (Gait Goal 1, PT) (P)  350  -AD      Time Frame (Gait Training Goal 1, PT) (P)  long term goal (LTG);10 days  -AD      Progress/Outcome (Gait Training Goal 1, PT) (P)  goal not met  -AD         Stairs Goal 1 (PT)    Activity/Assistive Device (Stairs Goal 1, PT) (P)  ascending stairs;descending stairs  -AD      [place] Level/Cues Needed (Stairs Goal 1, PT) (P)   supervision required  -AD      Number of Stairs (Stairs Goal 1, PT) (P)  10  -AD      Time Frame (Stairs Goal 1, PT) (P)  long term goal (LTG);10 days  -AD      Progress/Outcome (Stairs Goal 1, PT) (P)  goal not met  -AD        User Key  (r) = Recorded By, (t) = Taken By, (c) = Cosigned By    Initials Name Provider Type Discipline    AD Misti Huerta, PT Student PT Student PT        Therapy Treatment        Rehabilitation Treatment Summary     Row Name 04/16/18 1020             Treatment Time/Intention    Discipline (P)  physical therapist  -AD      Document Type (P)  therapy note (daily note);discharge evaluation/summary  -AD      Subjective Information (P)  no complaints  -AD      Mode of Treatment (P)  individual therapy  -AD      Patient/Family Observations (P)  Pt awake and supine in bed with girlfriend present.   -AD      Care Plan Review (P)  patient/other agree to care plan  -AD      Therapy Frequency (PT Clinical Impression) (P)  daily  -AD      Patient Effort (P)  good  -AD      Existing Precautions/Restrictions (P)  fall  -AD      Recorded by [AD] Misti Huerta, PT Student 04/16/18 1051 04/16/18 1051      Row Name 04/16/18 1020             Cognitive Assessment/Intervention- PT/OT    Affect/Mental Status (Cognitive) (P)  WNL  -AD      Orientation Status (Cognition) (P)  oriented x 4  -AD      Follows Commands (Cognition) (P)  WNL;follows one step commands;75-90% accuracy  -AD      Cognitive Function (Cognitive) (P)  WNL  -AD      Safety Deficit (Cognitive) (P)  moderate deficit;impulsivity  -AD      Recorded by [AD] Misti Huerta, PT Student 04/16/18 1051 04/16/18 1051      Row Name 04/16/18 1020             Safety Issues, Functional Mobility    Safety Issues Affecting Function (Mobility) (P)  impulsivity;judgment  -AD      Impairments Affecting Function (Mobility) (P)  balance;visual/perceptual  -AD      Recorded by [AD] Misti Huerta, PT Student 04/16/18 1051 04/16/18 1051       Row Name 04/16/18 1020             Bed Mobility Assessment/Treatment    Supine-Sit [place] (Bed Mobility) (P)  independent  -AD      Assistive Device (Bed Mobility) (P)  bed rails  -AD      Comment (Bed Mobility) (P)  Pt able to complete bed mobility independent of VC and manual assist.   -AD      Recorded by [AD] Misti Huerta, PT Student 04/16/18 1051 04/16/18 1051      Row Name 04/16/18 1020             Transfer Assessment/Treatment    Transfer Assessment/Treatment (P)  sit-stand transfer;stand-sit transfer  -AD      Comment (Transfers) (P)  VC for hand placement and slowed pace for safety   -AD      Sit-Stand [place] (Transfers) (P)  verbal cues;contact guard  -AD      Stand-Sit [place] (Transfers) (P)  verbal cues;contact guard  -AD      Recorded by [AD] Misti Huerta, PT Student 04/16/18 1051 04/16/18 1051      Row Name 04/16/18 1020             Sit-Stand Transfer    Assistive Device (Sit-Stand Transfers) (P)  walker, front-wheeled  -AD      Recorded by [AD] Misti Huerta, PT Student 04/16/18 1051 04/16/18 1051      Row Name 04/16/18 1020             Stand-Sit Transfer    Assistive Device (Stand-Sit Transfers) (P)  walker, front-wheeled  -AD      Recorded by [AD] Misti Huerta, PT Student 04/16/18 1051 04/16/18 1051      Row Name 04/16/18 1020             Gait/Stairs Assessment/Training    [place] Level (Gait) (P)  verbal cues;contact guard  -AD      Assistive Device (Gait) (P)  walker, front-wheeled  -AD      Distance in Feet (Gait) (P)  330  -AD      Pattern (Gait) (P)  step-through  -AD      Deviations/Abnormal Patterns (Gait) (P)  bilateral deviations;stride length decreased   Mild difficulty with R LE control and placement. Sways R.   -AD      [place] Level (Stairs) (P)  verbal cues;contact guard  -AD      Handrail Location (Stairs) (P)  both sides  -AD      Number of Steps (Stairs) (P)  6  -AD      Ascending Technique (Stairs) (P)  step-to-step  -AD       Descending Technique (Stairs) (P)  step-to-step  -AD      Stairs, Safety Issues (P)  sequencing ability decreased   Impulsivity. Decreased ability to follow directions   -AD      Stairs, Impairments (P)  impaired balance  -AD      Comment (Gait/Stairs) (P)  Pt ambulated with RW and CGA at quick pace limited by fatigue. VC for slowed pace, right-sided spatial awareness, and equal step lengths. Pt required cuing to initiate and complete R turns. Stair training completed to include 3 steps x2 reps with B hand rails and CGA. Pt impulsive and requiring continual VC for sequencing and slowed pace for safety.     -AD      Recorded by [AD] Misti Huerta, PT Student 04/16/18 1051 04/16/18 1051      Row Name 04/16/18 1020             Motor Skills Assessment/Interventions    Additional Documentation (P)  Therapeutic Exercise (Group);Therapeutic Exercise Interventions (Group)  -AD      Recorded by [AD] Misti Huerta, PT Student 04/16/18 1051 04/16/18 1051      Row Name 04/16/18 1020             Therapeutic Exercise    Lower Extremity (Therapeutic Exercise) (P)  gluteal sets;heel slides, bilateral;LAQ (long arc quad), bilateral;marching while seated;quad sets, bilateral;SAQ (short arc quad), bilateral;SLR (straight leg raise), bilateral   B ankle pumps   -AD      Position (Therapeutic Exercise) (P)  supine  -AD      Sets/Reps (Therapeutic Exercise) (P)  x15  -AD      Comment (Therapeutic Exercise) (P)  No manual assist required. VC and TC for instructions  -AD      Recorded by [AD] Misti Huerta, PT Student 04/16/18 1053 04/16/18 1053      Row Name 04/16/18 1020             Positioning and Restraints    Pre-Treatment Position (P)  in bed  -AD      Post Treatment Position (P)  bed  -AD      In Bed (P)  notified nsg;call light within reach;encouraged to call for assist;with family/caregiver;side rails up x3  -AD      Recorded by [AD] Misti Huerta, PT Student 04/16/18 1053 04/16/18 1053      Row Name  04/16/18 1020             Pain Scale: Numbers Pre/Post-Treatment    Pain Scale: Numbers, Pretreatment (P)  0/10 - no pain  -AD      Pain Scale: Numbers, Post-Treatment (P)  0/10 - no pain  -AD      Recorded by [AD] Misti Huerta, PT Student 04/16/18 1053 04/16/18 1053      Row Name 04/16/18 1020             Plan of Care Review    Plan of Care Reviewed With (P)  patient  -AD      Recorded by [AD] Misti Huerta, PT Student 04/16/18 1053 04/16/18 1053      Row Name 04/16/18 1020             Outcome Summary/Treatment Plan (PT)    Daily Summary of Progress (PT) (P)  progress toward functional goals is good  -AD      Recorded by [AD] Misti Huerta, PT Student 04/16/18 1053 04/16/18 1053        User Key  (r) = Recorded By, (t) = Taken By, (c) = Cosigned By    Initials Name Effective Dates Discipline    AD Misti Huerta, PT Student 03/07/18 -  PT             PT Recommendation and Plan  Anticipated Discharge Disposition (PT): (P) home or self care  Therapy Frequency (PT Clinical Impression): (P) daily  Outcome Summary/Treatment Plan (PT)  Daily Summary of Progress (PT): (P) progress toward functional goals is good  Anticipated Discharge Disposition (PT): (P) home or self care  Plan of Care Reviewed With: (P) patient  Progress: (P) improving  Outcome Summary: (P) Pt ambulated 330 feet with RW and CGA, limited by fatigue. Pt with decreased R sided spatial awareness and ability to control R LE during gait training. Stair training completed to include 3 steps x2 reps with B handrails and CGA. Continual VC required for sequencing and safety. Pt educated on importance of slowed movements for safe mobility once returned home.           Outcome Measures     Row Name 04/16/18 1020 04/15/18 1303 04/15/18 0930       How much help from another person do you currently need...    Turning from your back to your side while in flat bed without using bedrails? (P)  4  -AD  -- 4  -SC    Moving from lying on back to  sitting on the side of a flat bed without bedrails? (P)  4  -AD  -- 4  -SC    Moving to and from a bed to a chair (including a wheelchair)? (P)  4  -AD  -- 3  -SC    Standing up from a chair using your arms (e.g., wheelchair, bedside chair)? (P)  4  -AD  -- 3  -SC    Climbing 3-5 steps with a railing? (P)  3  -AD  -- 3  -SC    To walk in hospital room? (P)  3  -AD  -- 3  -SC    AM-PAC 6 Clicks Score (P)  22  -MJ (r) AD (t)  -- 20  -SC       How much help from another is currently needed...    Putting on and taking off regular lower body clothing?  -- 3  -KF  --    Bathing (including washing, rinsing, and drying)  -- 3  -KF  --    Toileting (which includes using toilet bed pan or urinal)  -- 3  -KF  --    Putting on and taking off regular upper body clothing  -- 4  -KF  --    Taking care of personal grooming (such as brushing teeth)  -- 3  -KF  --    Eating meals  -- 4  -KF  --    Score  -- 20  -KF  --       Modified [place] Scale    Modified [place] Scale (P)  2 - Slight disability.  Unable to carry out all previous activities but able to look after own affairs without assistance.  -AD 4 - Moderately severe disability.  Unable to walk without assistance, and unable to attend to own bodily needs without assistance.  -KF  --       Functional Assessment    Outcome Measure Options (P)  AM-PAC 6 Clicks Basic Mobility (PT);Modified [place]  -AD AM-PAC 6 Clicks Daily Activity (OT);Modified [place]  -KF AM-PAC 6 Clicks Basic Mobility (PT)  -SC      User Key  (r) = Recorded By, (t) = Taken By, (c) = Cosigned By    Initials Name Provider Type    SC Abi Silva, PT Physical Therapist    JOSE Rich, PT Physical Therapist    RIAN Huerta, PT Student PT Student    LES Adams, OT Occupational Therapist           Time Calculation:         PT Charges     Row Name 04/16/18 1101             Time Calculation    Start Time (P)  1020  -AD      PT Received On (P)  04/16/18  -AD      PT Goal Re-Cert Due Date (P)   04/25/18  -AD         Time Calculation- PT    Total Timed Code Minutes- PT (P)  23 minute(s)  -AD        User Key  (r) = Recorded By, (t) = Taken By, (c) = Cosigned By    Initials Name Provider Type    AD Misti Huerta, PT Student PT Student          Therapy Charges for Today     Code Description Service Date Service Provider Modifiers Qty    [id number] HC PT THER PROC EA 15 MIN 4/16/2018 Misti Huerta, PT Student GP 1    [id number] HC GAIT TRAINING EA 15 MIN 4/16/2018 Misti Huerta, PT Student GP 1          PT G-Codes  Outcome Measure Options: (P) AM-PAC 6 Clicks Basic Mobility (PT), Modified [place]    PT Discharge Summary  Anticipated Discharge Disposition (PT): (P) home or self care  Reason for Discharge: (P) Discharge from facility  Outcomes Achieved: (P) Refer to plan of care for updates on goals achieved  Discharge Destination: (P) Home with assist    Misti Huerta, PT Student  4/16/2018

## 2018-04-16 NOTE — PAYOR COMM NOTE
"Shaneka Tinoco (57 y.o. Male)     Day 2 clinicals per your request      Kenisha Gilmore RN  [id number]  [id number]        Date of Birth Social Security Number Address Home Phone MRN    1960  [address] [place] [id number] [phone] [id number]    Sikh Marital Status          Nondenominational        Admission Date Admission Type Admitting Provider Attending Provider Department, Room/Bed    4/14/18 Elective Kya Min MD Opii, [place], MD [address], S371/1    Discharge Date Discharge Disposition Discharge Destination                       Attending Provider:  Kya Min MD    Allergies:  No Known Allergies    Isolation:  None   Infection:  None   Code Status:  FULL    Ht:  172.7 cm (67.99\")   Wt:  88.9 kg (196 lb)    Admission Cmt:  None   Principal Problem:  None                Active Insurance as of 4/14/2018     Primary Coverage     Payor Plan Insurance Group Employer/Plan Group    ANTH MEDICAID [facility] MEDICAID [id number]     Payor Plan Address Payor Plan Phone Number Effective From Effective To    [address] [address] [phone] 2/1/2017     [place] [address]       Subscriber Name Subscriber Birth Date Member ID       SHANEKA TINOCO 1960 [id number]                 Emergency Contacts      (Rel.) Home Phone Work Phone Mobile Phone    Donna Davis (Significant Other) [phone] -- [phone]    Sussy Retana (Relative) [phone] -- [phone]                  ICU Vital Signs     Row Name 04/16/18 0725 04/16/18 0400 04/16/18 0306 04/16/18 0200 04/16/18 0053       Vitals    Temp 97.9 °F (36.6 °C)  -- 97.9 °F (36.6 °C)  -- 98.5 °F (36.9 °C)    Temp src Temporal Artery   -- Oral  -- Oral    Pulse 86  -- 71  -- 71    Heart Rate Source Monitor  -- Monitor  -- Monitor    Resp 18  -- 18  -- 18    Resp Rate Source Visual  -- Visual  -- Visual    /96  -- 152/99  -- 146/91    BP Location Right arm  -- Right arm  -- Right arm " "   BP Method Automatic  -- Automatic  -- Automatic    Patient Position Sitting  -- Lying  -- Lying       Oxygen Therapy    SpO2  --  -- 97 %  -- 96 %    Device (Oxygen Therapy)  -- room air room air room air room air    Row Name 04/16/18 0001 04/15/18 2113 04/15/18 2000 04/15/18 1911 04/15/18 1647       Height and Weight    Height  -- 172.7 cm (67.99\")  --  --  --    Weight  -- 88.9 kg (196 lb)  --  --  --    Ideal Body Weight (IBW) (kg)  -- 70.87  --  --  --    BSA (Calculated - sq m)  -- 2.03 sq meters  --  --  --    BMI (Calculated)  -- 29.8  --  --  --    Weight in (lb) to have BMI = 25  -- 164  --  --  --       Vitals    Temp  --  --  -- 98.9 °F (37.2 °C)  --    Temp src  --  --  -- Oral  --    Pulse  --  --  -- 66 66    Heart Rate Source  --  --  -- Monitor Monitor    Resp  --  --  -- 16 16    Resp Rate Source  --  --  -- Visual Visual    BP  -- 133/87  -- 142/96 155/85    BP Location  --  --  -- Right arm Right arm    BP Method  --  --  -- Automatic Automatic    Patient Position  --  --  -- Lying Lying       Oxygen Therapy    SpO2  --  --  -- 96 % 96 %    Device (Oxygen Therapy) room air  -- room air room air  --    Row Name 04/15/18 1600 04/15/18 1400 04/15/18 1200 04/15/18 1000 04/15/18 0800       Vitals    Temp  --  -- 98.7 °F (37.1 °C)  --  --    Temp src  --  -- Oral  --  --    Pulse  --  -- 67  --  --    Heart Rate Source  --  -- Monitor  --  --    Resp  --  -- 16  --  --    Resp Rate Source  --  -- Visual  --  --    BP  --  -- 137/87  --  --    BP Location  --  -- Right arm  --  --    BP Method  --  -- Automatic  --  --    Patient Position  --  -- Lying  --  --       Oxygen Therapy    SpO2  --  -- 97 %  --  --    Device (Oxygen Therapy) room air room air room air room air room air    Row Name 04/15/18 0737 04/15/18 0419 04/15/18 0418 04/15/18 0040 04/15/18 0008       Vitals    Temp 98.1 °F (36.7 °C)  -- 99.3 °F (37.4 °C)  -- 99.5 °F (37.5 °C)    Temp src Tympanic  -- Oral  -- Oral    Pulse 82  -- 88  " "-- 86    Heart Rate Source Monitor  -- Monitor  -- Monitor    Resp 16  -- 16  -- 14    Resp Rate Source Visual  -- Visual  -- Visual    /91  -- 155/95  -- 131/72    BP Location Right arm  -- Right arm  -- Right arm    BP Method Automatic  -- Automatic  -- Automatic    Patient Position Lying  -- Sitting  -- Lying       Oxygen Therapy    SpO2 95 %  -- 99 %  -- 96 %    Device (Oxygen Therapy)  -- room air  -- room air  --    Row Name 04/14/18 1957 04/14/18 1945 04/14/18 1931 04/14/18 1903 04/14/18 1826       Height and Weight    Height  -- 172.7 cm (67.99\")  --  --  --    Weight  -- 88.9 kg (196 lb)  --  --  --    Ideal Body Weight (IBW) (kg)  -- 70.87  --  --  --    BSA (Calculated - sq m)  -- 2.03 sq meters  --  --  --    BMI (Calculated)  -- 29.8  --  --  --    Weight in (lb) to have BMI = 25  -- 164  --  --  --       Vitals    Temp  --  --  --  -- 98.6 °F (37 °C)    Temp src  --  --  --  -- Axillary    Pulse  -- 102 114 108 91    Heart Rate Source  -- Monitor  -- Monitor Monitor    Resp  -- 14  -- 14 16    Resp Rate Source  -- Visual  -- Visual Visual    BP  -- 155/90  -- (!)  210/117 (!)  198/96    BP Location  -- Right arm  -- Left arm Right arm    BP Method  -- Automatic  -- Automatic Automatic    Patient Position  -- Lying  -- Lying Lying       Oxygen Therapy    SpO2  -- 95 %  --  -- 98 %    Pulse Oximetry Type  -- Continuous  --  --  --    Device (Oxygen Therapy) room air room air  --  -- room air    Row Name 04/14/18 1631 04/14/18 15:50:07 04/14/18 1445             Height and Weight    Height  --  -- 172.7 cm (68\")      Height Method  --  -- Stated      Weight  --  -- 88.9 kg (196 lb)      Weight Method  --  -- Stated      Ideal Body Weight (IBW) (kg)  --  -- 70.89      BSA (Calculated - sq m)  --  -- 2.03 sq meters      BMI (Calculated)  --  -- 29.8      Weight in (lb) to have BMI = 25  --  -- 164.1         Vitals    Temp 98.6 °F (37 °C)  -- 98.5 °F (36.9 °C)      Temp src Oral  -- Oral      Pulse 83 " 80 84      Heart Rate Source Monitor Monitor Monitor      Resp 18 18 18      Resp Rate Source Visual Visual Visual      /90 168/100 144/86      BP Location Right arm  -- Right arm      BP Method Automatic  -- Automatic      Patient Position Lying  -- Lying         Oxygen Therapy    SpO2 98 % 99 % 99 %      Pulse Oximetry Type Intermittent Intermittent Intermittent      Device (Oxygen Therapy) room air  -- room air          Hospital Medications (active)       Dose Frequency Start End    acetaminophen (TYLENOL) tablet 650 mg 650 mg Every 6 Hours PRN 4/14/2018     Sig - Route: Take 2 tablets by mouth Every 6 (Six) Hours As Needed for Mild Pain . - Oral    aspirin EC tablet 325 mg 325 mg Daily 4/15/2018     Sig - Route: Take 1 tablet by mouth Daily. - Oral    atorvastatin (LIPITOR) tablet 80 mg 80 mg Nightly 4/14/2018     Sig - Route: Take 2 tablets by mouth Every Night. - Oral    bisacodyl (DULCOLAX) EC tablet 5 mg 5 mg Daily PRN 4/14/2018     Sig - Route: Take 1 tablet by mouth Daily As Needed for Constipation. - Oral    bisacodyl (DULCOLAX) suppository 10 mg 10 mg Daily PRN 4/14/2018     Sig - Route: Insert 1 suppository into the rectum Daily As Needed for Constipation. - Rectal    clopidogrel (PLAVIX) tablet 75 mg 75 mg Daily 4/14/2018     Sig - Route: Take 1 tablet by mouth Daily. - Oral    dextrose (D50W) solution 25 g 25 g Every 15 Minutes PRN 4/14/2018     Sig - Route: Infuse 50 mL into a venous catheter Every 15 (Fifteen) Minutes As Needed for Low Blood Sugar (Blood Sugar Less Than 70). - Intravenous    dextrose (GLUTOSE) oral gel 15 g 15 g Every 15 Minutes PRN 4/14/2018     Sig - Route: Take 15 g by mouth Every 15 (Fifteen) Minutes As Needed for Low Blood Sugar (Blood sugar less than 70). - Oral    glucagon (human recombinant) (GLUCAGEN DIAGNOSTIC) injection 1 mg 1 mg As Needed 4/14/2018     Sig - Route: Inject 1 mg under the skin As Needed (Blood Glucose Less Than 70). - Subcutaneous     hydrochlorothiazide (HYDRODIURIL) tablet 25 mg 25 mg Daily 4/14/2018     Sig - Route: Take 1 tablet by mouth Daily. - Oral    insulin lispro (humaLOG) injection 0-7 Units 0-7 Units 4 Times Daily With Meals & Nightly 4/14/2018     Sig - Route: Inject 0-7 Units under the skin 4 (Four) Times a Day With Meals & at Bedtime. - Subcutaneous    labetalol (NORMODYNE,TRANDATE) injection 20 mg 20 mg Every 6 Hours PRN 4/14/2018     Sig - Route: Infuse 4 mL into a venous catheter Every 6 (Six) Hours As Needed for High Blood Pressure. - Intravenous    levETIRAcetam (KEPPRA) tablet 1,500 mg 1,500 mg 2 Times Daily 4/15/2018     Sig - Route: Take 2 tablets by mouth 2 (Two) Times a Day. - Oral    LORazepam (ATIVAN) injection 1 mg 1 mg Every 6 Hours PRN 4/14/2018 4/24/2018    Sig - Route: Infuse 0.5 mL into a venous catheter Every 6 (Six) Hours As Needed for Seizures. - Intravenous    losartan (COZAAR) tablet 50 mg 50 mg Every 24 Hours Scheduled 4/14/2018     Sig - Route: Take 1 tablet by mouth Daily. - Oral    metoprolol succinate XL (TOPROL-XL) 24 hr tablet 200 mg 200 mg Daily 4/14/2018     Sig - Route: Take 2 tablets by mouth Daily. - Oral    pantoprazole (PROTONIX) EC tablet 40 mg 40 mg Every Morning 4/15/2018     Sig - Route: Take 1 tablet by mouth Every Morning. - Oral    phenytoin (DILANTIN) ER capsule 200 mg 200 mg Every 12 Hours Scheduled 4/15/2018     Sig - Route: Take 2 capsules by mouth Every 12 (Twelve) Hours. - Oral    pneumococcal polysaccharide 23-valent (PNEUMOVAX-23) vaccine 0.5 mL 0.5 mL During Hospitalization 4/14/2018     Sig - Route: Inject 0.5 mL into the shoulder, thigh, or buttocks During Hospitalization for Immunization. - Intramuscular    Cosign for Ordering: Accepted by Katlyn Hill MD on 4/14/2018  9:55 PM    potassium chloride (KLOR-CON) packet 40 mEq 40 mEq As Needed 4/16/2018     Sig - Route: Take 40 mEq by mouth As Needed (potassium replacement, see admin instructions). - Oral    Linked Group 1:   "\"Or\" Linked Group Details        potassium chloride (MICRO-K) CR capsule 40 mEq 40 mEq As Needed 4/16/2018     Sig - Route: Take 4 capsules by mouth As Needed (potassium replacement.  see admin instructions). - Oral    Linked Group 1:  \"Or\" Linked Group Details        potassium chloride 10 mEq in 100 mL IVPB 10 mEq Every 1 Hour PRN 4/16/2018     Sig - Route: Infuse 100 mL into a venous catheter Every 1 (One) Hour As Needed (potassium protocol PERIPHERAL - see admin instructions). - Intravenous    Linked Group 1:  \"Or\" Linked Group Details        promethazine (PHENERGAN) injection 12.5 mg 12.5 mg Every 6 Hours PRN 4/14/2018     Sig - Route: Inject 0.5 mL into the shoulder, thigh, or buttocks Every 6 (Six) Hours As Needed for Nausea or Vomiting. - Intramuscular    Linked Group 2:  \"Or\" Linked Group Details        promethazine (PHENERGAN) injection 12.5 mg 12.5 mg Every 6 Hours PRN 4/14/2018     Sig - Route: Infuse 0.5 mL into a venous catheter Every 6 (Six) Hours As Needed for Nausea or Vomiting. - Intravenous    Linked Group 2:  \"Or\" Linked Group Details        promethazine (PHENERGAN) suppository 12.5 mg 12.5 mg Every 6 Hours PRN 4/14/2018     Sig - Route: Insert 1 suppository into the rectum Every 6 (Six) Hours As Needed for Nausea or Vomiting. - Rectal    Linked Group 2:  \"Or\" Linked Group Details        promethazine (PHENERGAN) tablet 12.5 mg 12.5 mg Every 6 Hours PRN 4/14/2018     Sig - Route: Take 1 tablet by mouth Every 6 (Six) Hours As Needed for Nausea or Vomiting. - Oral    Linked Group 2:  \"Or\" Linked Group Details        sodium chloride 0.9 % flush 1-10 mL 1-10 mL As Needed 4/14/2018     Sig - Route: Infuse 1-10 mL into a venous catheter As Needed for Line Care. - Intravenous    sodium chloride 0.9 % flush 1-10 mL 1-10 mL As Needed 4/14/2018     Sig - Route: Infuse 1-10 mL into a venous catheter As Needed for Line Care. - Intravenous    fosphenytoin (Cerebyx) 150 mg PE in sodium chloride 0.9 % 100 mL IVPB " (Discontinued) 150 mg PE Every 8 Hours 4/15/2018 4/15/2018    Sig - Route: Infuse 150 mg PE into a venous catheter Every 8 (Eight) Hours. - Intravenous    levETIRAcetam in NaCl 0.75% (KEPPRA) IVPB 1,000 mg (Discontinued) 1,000 mg Every 12 Hours Scheduled 4/15/2018 4/15/2018    Sig - Route: Infuse 100 mL into a venous catheter Every 12 (Twelve) Hours. - Intravenous          Lab Results (last 72 hours)     Procedure Component Value Units Date/Time    Basic Metabolic Panel [[id number]]  (Abnormal) Collected:  04/16/18 0714    Specimen:  Blood Updated:  04/16/18 0906     Glucose 164 (H) mg/dL      BUN 10 mg/dL      Creatinine 1.00 mg/dL      Sodium 134 mmol/L      Potassium 3.2 (L) mmol/L      Chloride 94 (L) mmol/L      CO2 32.0 (H) mmol/L      Calcium 9.4 mg/dL      eGFR Non African Amer 77 mL/min/1.73      BUN/Creatinine Ratio 10.0     Anion Gap 8.0 mmol/L     Narrative:       National Kidney Foundation Guidelines    Stage     Description        GFR  1         Normal or High     90+  2         Mild decrease      60-89  3         Moderate decrease  30-59  4         Severe decrease    15-29  5         Kidney failure     <15    POC Glucose Once [[id number]]  (Abnormal) Collected:  04/16/18 0731    Specimen:  Blood Updated:  04/16/18 0813     Glucose 179 (H) mg/dL     Narrative:       Meter: [id number] : [id number] Kika Orta    POC Glucose Once [[id number]]  (Abnormal) Collected:  04/15/18 2015    Specimen:  Blood Updated:  04/15/18 2016     Glucose 149 (H) mg/dL     Narrative:       Verify with Lab Meter: [id number] : [id number] Tania Rizo    POC Glucose Once [[id number]]  (Abnormal) Collected:  04/15/18 1646    Specimen:  Blood Updated:  04/15/18 1648     Glucose 140 (H) mg/dL     Narrative:       Meter: [id number] : [id number] Hubert Garcia    POC Glucose Once [[id number]]  (Abnormal) Collected:  04/15/18 1202    Specimen:  Blood Updated:  04/15/18 1204     Glucose 172 (H) mg/dL     Narrative:        Meter: [id number] : [id number] Hubert Deangelohoma    POC Glucose Once [[id number]]  (Abnormal) Collected:  04/15/18 0739    Specimen:  Blood Updated:  04/15/18 0740     Glucose 133 (H) mg/dL     Narrative:       Meter: [id number] : [id number] Hubert Brighthoma    Hemoglobin A1c [[id number]]  (Abnormal) Collected:  04/15/18 0508    Specimen:  Blood Updated:  04/15/18 0705     Hemoglobin A1C 7.70 (H) %     Narrative:       The American Diabetes Association recommends maintenance of Hemoglobin A1C at 7.0% or lower. Goals for Hemoglobin A1C reduction may need to be modified if hypoglycemia is a problem.    Phenytoin Level, Total [[id number]]  (Normal) Collected:  04/15/18 0508    Specimen:  Blood Updated:  04/15/18 0653     Phenytoin Level 14.0 mcg/mL     Lipid Panel [[id number]]  (Abnormal) Collected:  04/15/18 0508    Specimen:  Blood Updated:  04/15/18 0646     Total Cholesterol 108 mg/dL      Triglycerides 112 mg/dL      HDL Cholesterol 29 (L) mg/dL      LDL Cholesterol  66 mg/dL     Narrative:       Cholesterol Reference Ranges:   Desirable       < 200 mg/dL   Borderline    200-239 mg/dL   High Risk       > 239 mg/dL    Triglyceride Reference Ranges:   Normal          < 150 mg/dL   Borderline    150-199 mg/dL   High          200-499 mg/dL   Very High       > 499 mg/dL    HDL Reference Ranges:   Low              < 40 mg/dL   High             > 59 mg/dL    LDL Reference Ranges:   Optimal         < 100 mg/dL   Near Optimal  100-129 mg/dL   Borderline    130-159 mg/dL   High          160-189 mg/dL   Very High       > 189 mg/dL    Basic Metabolic Panel [[id number]]  (Abnormal) Collected:  04/15/18 0508    Specimen:  Blood Updated:  04/15/18 0646     Glucose 144 (H) mg/dL      BUN 10 mg/dL      Creatinine 0.90 mg/dL      Sodium 139 mmol/L      Potassium 3.1 (L) mmol/L      Chloride 98 (L) mmol/L      CO2 31.0 mmol/L      Calcium 9.4 mg/dL      eGFR Non African Amer 87 mL/min/1.73      BUN/Creatinine Ratio 11.1     Anion Gap 10.0  mmol/L     Narrative:       National Kidney Foundation Guidelines    Stage     Description        GFR  1         Normal or High     90+  2         Mild decrease      60-89  3         Moderate decrease  30-59  4         Severe decrease    15-29  5         Kidney failure     <15    CBC (No Diff) [[id number]]  (Normal) Collected:  04/15/18 0508    Specimen:  Blood Updated:  04/15/18 0631     WBC 9.95 10*3/mm3      RBC 4.43 10*6/mm3      Hemoglobin 13.6 g/dL      Hematocrit 39.3 %      MCV 88.7 fL      MCH 30.7 pg      MCHC 34.6 g/dL      RDW 13.6 %      RDW-SD 44.0 fl      MPV 8.6 fL      Platelets 244 10*3/mm3     Basic Metabolic Panel [[id number]]  (Abnormal) Collected:  04/14/18 1935    Specimen:  Blood Updated:  04/14/18 2023     Glucose 164 (H) mg/dL      BUN 11 mg/dL      Creatinine 1.00 mg/dL      Sodium 138 mmol/L      Potassium 3.7 mmol/L      Chloride 97 (L) mmol/L      CO2 26.0 mmol/L      Calcium 10.0 mg/dL      eGFR Non African Amer 77 mL/min/1.73      BUN/Creatinine Ratio 11.0     Anion Gap 15.0 (H) mmol/L     Narrative:       National Kidney Foundation Guidelines    Stage     Description        GFR  1         Normal or High     90+  2         Mild decrease      60-89  3         Moderate decrease  30-59  4         Severe decrease    15-29  5         Kidney failure     <15          Imaging Results (last 72 hours)     Procedure Component Value Units Date/Time    MRI Brain Without Contrast [[id number]] Collected:  04/14/18 1856     Updated:  04/14/18 2150    Narrative:       EXAM:    MR Head Without Intravenous Contrast    EXAM DATE/TIME:    4/14/2018 6:56 PM    CLINICAL HISTORY:    57 years old, male; Signs and symptoms; Altered mental status/memory loss and   numbness / parasthesia and speech disturbance and weakness, extremity; Right;   Confusion or disorientation; Slurred speech; Patient HX: Right sided weakness   and numbness, confusion, speech difficulty, HX seizures-possible seizure   activity **motion  noted-best possible images at this time**; Additional info:   Seizures new or progressive    TECHNIQUE:    Magnetic resonance images of the head/brain without intravenous contrast in   multiple planes.    COMPARISON:    MRI BRAIN W WO CONTRAST 2017-04-25 04:20    FINDINGS:    No abnormal restriction of diffusion to indicate acute CVA.      Prior posterior division left MCA infarct has evolved to encephalomalacia   with compensatory dilatation of the left lateral ventricle trigone and temporal   horn, with cortical thinning and white matter changes      Midline structures and cerebellar tonsillar position appear normal.     Ventricles, cisterns and sulci are symmetric and normal for age.      No intracranial mass, midline shift or abnormal extra-axial fluid.      No acute intracranial hemorrhage.       Mild pattern of increased T2 and flair signal in supratentorial white matter.     Optic chiasm and pituitary infundibulum appear normal.       Normal vascular flow voids in major intracranial arteries and dural venous   sinuses.       Paranasal sinuses are clear.     Right mastoids are opacified.     Optic globes and orbits are unremarkable.        Impression:         No acute intracranial abnormality.      Remote posterior division left MCA infarct with encephalomalacia      Left mastoid opacification, new since the prior MRI one year ago, without   middle ear involvement and unclear acute significance    THIS DOCUMENT HAS BEEN ELECTRONICALLY SIGNED BY ERIN GUILLEN MD    XR Chest 1 View [[id number]] Collected:  04/14/18 1929     Updated:  04/14/18 2101    Narrative:       EXAM:    XR Chest, 1 View    EXAM DATE/TIME:    4/14/2018 7:29 PM    CLINICAL HISTORY:    57 years old, male; Signs and symptoms; Shortness of breath; Additional info:   Stroke    TECHNIQUE:    Frontal view of the chest.    COMPARISON:    No relevant prior studies available.    FINDINGS:    Degree of lung inflation is normal.      Cardiac  silhouette appears normal.      No adenopathy or hilar mass.      No evidence of pulmonary edema.      No focal consolidation or parenchymal lung mass.      No pleural effusion or pneumothorax.    Bony structures and extrathoracic soft tissues are unremarkable.       Impression:         No acute or focal cardiopulmonary process.       THIS DOCUMENT HAS BEEN ELECTRONICALLY SIGNED BY ERIN GUILLEN MD          ECG/EMG Results (last 72 hours)     Procedure Component Value Units Date/Time    ECG 12 Lead [[id number]] Collected:  04/14/18 2153     Updated:  04/14/18 2153    Narrative:       Test Reason : stroke  Blood Pressure : **/** mmHG  Vent. Rate : 094 BPM     Atrial Rate : 094 BPM     P-R Int : 150 ms          QRS Dur : 082 ms      QT Int : 346 ms       P-R-T Axes : 037 034 046 degrees     QTc Int : 432 ms    Normal sinus rhythm  Normal ECG  When compared with ECG of 22-JUN-2017 12:59,  Vent. rate has increased BY  34 BPM  Nonspecific T wave abnormality, improved in Inferior leads  Nonspecific T wave abnormality no longer evident in Lateral leads    Referred By: BECKY MORRISON           Confirmed By:     Adult Transthoracic Echo Complete W/ Cont if Necessary Per Protocol (With Agitated Saline) [[id number]] Collected:  04/15/18 1553     Updated:  04/16/18 0851     BSA 2.0 m^2      IVSd 1.1 cm      LVIDd 4.7 cm      LVIDs 3.1 cm      LVPWd 1.1 cm      IVS/LVPW 0.99     FS 34.3 %      EDV(Teich) 103.7 ml      ESV(Teich) 38.0 ml      EF(Teich) 63.3 %      EDV(cubed) 105.6 ml      ESV(cubed) 29.9 ml      EF(cubed) 71.7 %      LV mass(C)d 185.9 grams      LV mass(C)dI 91.7 grams/m^2      SV(Teich) 65.7 ml      SI(Teich) 32.4 ml/m^2      SV(cubed) 75.6 ml      SI(cubed) 37.3 ml/m^2      Ao root diam 2.8 cm      Ao root area 6.3 cm^2      asc Aorta Diam 2.4 cm      LVOT diam 1.8 cm      LVOT area 2.5 cm^2      LVOT area(traced) 2.5 cm^2      LVLd ap4 7.5 cm      EDV(MOD-sp4) 81.0 ml      LVLs ap4 5.4 cm      ESV(MOD-sp4) 29.0 ml       EF(MOD-sp4) 64.2 %      LVLd ap2 6.1 cm      EDV(MOD-sp2) 41.0 ml      LVLs ap2 4.9 cm      ESV(MOD-sp2) 15.0 ml      EF(MOD-sp2) 63.4 %      SV(MOD-sp4) 52.0 ml      SI(MOD-sp4) 25.7 ml/m^2      SV(MOD-sp2) 26.0 ml      SI(MOD-sp2) 12.8 ml/m^2      Ao root area (BSA corrected) 1.4     Ao root area (BSA corrected) 63.4 ml/m^2      CONTRAST EF 4CH 64.2 ml/m^2      LV Diastolic corrected for BSA 40.0 ml/m^2      LV Systolic corrected for BSA 14.3 ml/m^2      MV E max jesse 85.9 cm/sec      MV A max jesse 101.2 cm/sec      MV E/A 0.85     MV dec time 0.18 sec      PA V2 max 89.6 cm/sec      PA max PG 3.2 mmHg      PA acc slope 330.6 cm/sec^2      PA acc time 0.19 sec      PA pr(Accel) -5.0 mmHg       CV ECHO PENNY - BZI_BMI 29.8 kilograms/m^2       CV ECHO PENNY - BSA(HAYCOCK) 2.1 m^2       CV ECHO PENNY - BZI_METRIC_WEIGHT 88.9 kg       CV ECHO PENNY - BZI_METRIC_HEIGHT 172.7 cm      TDI S' 18.90 cm/sec      RV Base 2.20 cm      RV Length 6.20 cm      RV Mid 1.60 cm      E/E' ratio 11.1     LA Volume Index 17.2 mL/m2      Lat Peak E' Jesse 11.1 cm/sec      Med Peak E' Jesse 6.36 cm/sec      TAPSE (>1.6) 2.67 cm2      Target HR (85%) 139 bpm      Max. Pred. HR (100%) 164 bpm      Echo EF Estimated 65 %     Narrative:       · Left ventricular systolic function is normal. Estimated EF = 65%.  · Left ventricular diastolic dysfunction (grade I a) consistent with   impaired relaxation.  · Saline test results are negative for evidence of a PFO.             Operative/Procedure Notes (all)     No notes of this type exist for this encounter.           Physician Progress Notes (last 72 hours) (Notes from 2018  9:34 AM through 2018  9:34 AM)      Kya Min MD at 4/15/2018  1:12 PM              [facility] Medicine Services  PROGRESS NOTE    Patient Name: Vince Olivera  : 1960  MRN: [id number]    Date of Admission: 2018  Length of Stay: 1  Primary Care Physician: Emili  KINGSTON Barber    Subjective   Subjective     CC: right sided weakness    HPI:No acute events overnight,patient states that he is feeling fine, symptoms are resolving.    Review of Systems  Gen- No fevers, chills  CV- No chest pain, palpitations  Resp- No cough, dyspnea  GI- No N/V/D, abd pain  Otherwise ROS is negative except as mentioned in the HPI.    Objective   Objective     Vital Signs:   Temp:  [98.1 °F (36.7 °C)-99.5 °F (37.5 °C)] 98.1 °F (36.7 °C)  Heart Rate:  [] 82  Resp:  [14-18] 16  BP: (131-210)/() 159/91  Total (NIH Stroke Scale): 16     Physical Exam:  Constitutional: No acute distress, awake, alert, comfortable  HENT: NCAT, mucous membranes moist  Respiratory: Clear to auscultation bilaterally, respiratory effort normal   Cardiovascular: RRR, no murmurs, rubs, or gallops, palpable pedal pulses bilaterally  Gastrointestinal: Positive bowel sounds, soft, nontender, nondistended  Musculoskeletal: No bilateral ankle edema  Psychiatric: Appropriate affect, cooperative  Neurologic: Oriented x 3, strength symmetric in all extremities, Cranial Nerves grossly intact to confrontation, speech clear  Skin: No rashes    Results Reviewed:  I have personally reviewed current lab, radiology, and data and agree.      Results from last 7 days  Lab Units 04/15/18  0508 04/14/18  1513   WBC 10*3/mm3 9.95 8.35   HEMOGLOBIN g/dL 13.6 14.6   HEMATOCRIT % 39.3 41.0*   PLATELETS 10*3/mm3 244 243   INR   --  1.04       Results from last 7 days  Lab Units 04/15/18  0508 04/14/18  1935 04/14/18  1513   SODIUM mmol/L 139 138 137   POTASSIUM mmol/L 3.1* 3.7 3.3*   CHLORIDE mmol/L 98* 97* 102   CO2 mmol/L 31.0 26.0 28.3   BUN mg/dL 10 11 12   CREATININE mg/dL 0.90 1.00 0.93   GLUCOSE mg/dL 144* 164* 222*   CALCIUM mg/dL 9.4 10.0 9.9   ALT (SGPT) U/L  --   --  34   AST (SGOT) U/L  --   --  19     Estimated Creatinine Clearance: 99.3 mL/min (by C-G formula based on SCr of 0.9 mg/dL).  No results found for: BNP  No  results found for: PHART    Microbiology Results Abnormal     None          Imaging Results (last 24 hours)     Procedure Component Value Units Date/Time    MRI Brain Without Contrast [[id number]] Collected:  04/14/18 1856     Updated:  04/14/18 2150    Narrative:       EXAM:    MR Head Without Intravenous Contrast    EXAM DATE/TIME:    4/14/2018 6:56 PM    CLINICAL HISTORY:    57 years old, male; Signs and symptoms; Altered mental status/memory loss and   numbness / parasthesia and speech disturbance and weakness, extremity; Right;   Confusion or disorientation; Slurred speech; Patient HX: Right sided weakness   and numbness, confusion, speech difficulty, HX seizures-possible seizure   activity **motion noted-best possible images at this time**; Additional info:   Seizures new or progressive    TECHNIQUE:    Magnetic resonance images of the head/brain without intravenous contrast in   multiple planes.    COMPARISON:    MRI BRAIN W WO CONTRAST 2017-04-25 04:20    FINDINGS:    No abnormal restriction of diffusion to indicate acute CVA.      Prior posterior division left MCA infarct has evolved to encephalomalacia   with compensatory dilatation of the left lateral ventricle trigone and temporal   horn, with cortical thinning and white matter changes      Midline structures and cerebellar tonsillar position appear normal.     Ventricles, cisterns and sulci are symmetric and normal for age.      No intracranial mass, midline shift or abnormal extra-axial fluid.      No acute intracranial hemorrhage.       Mild pattern of increased T2 and flair signal in supratentorial white matter.     Optic chiasm and pituitary infundibulum appear normal.       Normal vascular flow voids in major intracranial arteries and dural venous   sinuses.       Paranasal sinuses are clear.     Right mastoids are opacified.     Optic globes and orbits are unremarkable.        Impression:         No acute intracranial abnormality.      Remote  posterior division left MCA infarct with encephalomalacia      Left mastoid opacification, new since the prior MRI one year ago, without   middle ear involvement and unclear acute significance    THIS DOCUMENT HAS BEEN ELECTRONICALLY SIGNED BY ERIN GUILLEN MD    XR Chest 1 View [[id number]] Collected:  04/14/18 1929     Updated:  04/14/18 2101    Narrative:       EXAM:    XR Chest, 1 View    EXAM DATE/TIME:    4/14/2018 7:29 PM    CLINICAL HISTORY:    57 years old, male; Signs and symptoms; Shortness of breath; Additional info:   Stroke    TECHNIQUE:    Frontal view of the chest.    COMPARISON:    No relevant prior studies available.    FINDINGS:    Degree of lung inflation is normal.      Cardiac silhouette appears normal.      No adenopathy or hilar mass.      No evidence of pulmonary edema.      No focal consolidation or parenchymal lung mass.      No pleural effusion or pneumothorax.    Bony structures and extrathoracic soft tissues are unremarkable.       Impression:         No acute or focal cardiopulmonary process.       THIS DOCUMENT HAS BEEN ELECTRONICALLY SIGNED BY ERIN GUILLEN MD        Results for orders placed during the hospital encounter of 04/25/17   Transthoracic Echo Complete With Bubble Study    Narrative · Left ventricular function is normal. Estimated EF = 70%.  · Hyperdynamic right ventricular systolic function noted.  · Left ventricular diastolic dysfunction (grade I) consistent with   impaired relaxation.  · There is no evidence of pericardial effusion.  · No evidence of pulmonary hypertension is present.  · The aortic valve exhibits sclerosis.  · Saline test results are negative.          I have reviewed the medications.    Assessment/Plan   Assessment / Plan     Hospital Problem List     H/o Left MCA ischemic CVA 4/2017    Diabetes mellitus type 2 in nonobese    Hypertension    Dyslipidemia    GERD (gastroesophageal reflux disease)    Overview Signed 6/23/2017 10:26 AM by He  "KINGSTON Gray     self diagnosed takes otc ppi         Hyperlipidemia    Status post left carotid endarterectomy    Partial seizure    Acute right-sided weakness    Aphasia        Brief Hospital Course to date:  Vince Olivera is a 57 y.o. male with a hx of left MCA CVA with residual right hemiparesis and aphasia, s/p left CEA, type 2 diabetes, HLD, patient presented with right sided weakness, starring and tonic clonic movements of right arm and leg.    Assessment & Plan:  - Suspected seizure, neurology following, s/p fosphenytoin and Keppra load, now on maintainance for both  - Hx of left MCA CVA with residual right hemiparesis and aphasia, with suspected new cva however, repeat MRI shows no acute infarcts, continue asa, statin, plavix  - Hypertensive urgency, /117 with above symptoms, continue aggressive BP control  - decently controlled T2DM A1C 7.7 continue ssi  - Resume home rx for HTN  - PT/OT  - repeat labs in AM    DVT Prophylaxis: mechanical    CODE STATUS: Full Code    Disposition: anticipate d/c in AM    Electronically signed by Kya Min MD, 04/15/18, 1:31 PM.      Electronically signed by Kya Min MD at 4/15/2018  1:31 PM     Pedro Bazan MD at 4/15/2018 11:27 AM          Neurology Progress Note      Patient: Vince Olivera        CC: seizures  Subjective: patient much better today       The relevant past medical, social, surgical, and family history and ROS were reviewed.     Vital Signs   Vitals:    04/14/18 1945 04/15/18 0008 04/15/18 0418 04/15/18 0737   BP: 155/90 131/72 155/95 159/91   BP Location: Right arm Right arm Right arm Right arm   Patient Position: Lying Lying Sitting Lying   Pulse: 102 86 88 82   Resp: 14 14 16 16   Temp:  99.5 °F (37.5 °C) 99.3 °F (37.4 °C) 98.1 °F (36.7 °C)   TempSrc:  Oral Oral Tympanic   SpO2: 95% 96% 99% 95%   Weight: 88.9 kg (196 lb)      Height: 172.7 cm (67.99\")        Temp:  [98.1 °F (36.7 °C)-99.5 °F (37.5 °C)] 98.1 °F (36.7 " °C)  Physical Exam:   General: NAD   Neuro: awake, alert and oriented x 3. Follows commands. Mild-moderate aphasia of mixed variety with some receptive difficulties, inability to repeat, and some expressive difficulties including annomia and paraphasic errors. PERRL, EOMI, RHH. Face symm. HERMOSILLO no drift. Increased tone on R      Current Facility-Administered Medications:   •  acetaminophen (TYLENOL) tablet 650 mg, 650 mg, Oral, Q6H PRN, Ktalyn Hill MD  •  aspirin EC tablet 325 mg, 325 mg, Oral, Daily, Katlyn Hill MD, 325 mg at 04/15/18 0936  •  atorvastatin (LIPITOR) tablet 80 mg, 80 mg, Oral, Nightly, Katlyn Hill MD  •  bisacodyl (DULCOLAX) EC tablet 5 mg, 5 mg, Oral, Daily PRN, Katlyn Hill MD  •  bisacodyl (DULCOLAX) suppository 10 mg, 10 mg, Rectal, Daily PRN, Katlyn Hill MD  •  clopidogrel (PLAVIX) tablet 75 mg, 75 mg, Oral, Daily, Katlyn Hill MD, 75 mg at 04/15/18 0936  •  dextrose (D50W) solution 25 g, 25 g, Intravenous, Q15 Min PRN, Katlyn Hill MD  •  dextrose (GLUTOSE) oral gel 15 g, 15 g, Oral, Q15 Min PRN, Katlyn Hill MD  •  fosphenytoin (Cerebyx) 150 mg PE in sodium chloride 0.9 % 100 mL IVPB, 150 mg PE, Intravenous, Q8H, Katlyn Hill MD, 150 mg PE at 04/15/18 0700  •  glucagon (human recombinant) (GLUCAGEN DIAGNOSTIC) injection 1 mg, 1 mg, Subcutaneous, PRN, Katlyn Hill MD  •  hydrochlorothiazide (HYDRODIURIL) tablet 25 mg, 25 mg, Oral, Daily, Katlyn Hill MD, 25 mg at 04/15/18 0936  •  insulin lispro (humaLOG) injection 0-7 Units, 0-7 Units, Subcutaneous, 4x Daily With Meals & Nightly, Katlyn Hill MD  •  labetalol (NORMODYNE,TRANDATE) injection 20 mg, 20 mg, Intravenous, Q6H PRN, Katlyn Hill MD, 20 mg at 04/14/18 1931  •  levETIRAcetam in NaCl 0.75% (KEPPRA) IVPB 1,000 mg, 1,000 mg, Intravenous, Q12H, Pedro Bazan MD, 1,000 mg at 04/15/18 0936  •  LORazepam (ATIVAN) injection 1 mg, 1 mg, Intravenous, Q6H PRN, Pedro Bazan,  MD  •  losartan (COZAAR) tablet 50 mg, 50 mg, Oral, Q24H, Katlyn Hill MD, 50 mg at 04/15/18 0936  •  metoprolol succinate XL (TOPROL-XL) 24 hr tablet 200 mg, 200 mg, Oral, Daily, Katlyn Hill MD, 200 mg at 04/15/18 0936  •  pantoprazole (PROTONIX) EC tablet 40 mg, 40 mg, Oral, QAM, Katlyn Hill MD, 40 mg at 04/15/18 0936  •  pneumococcal polysaccharide 23-valent (PNEUMOVAX-23) vaccine 0.5 mL, 0.5 mL, Intramuscular, During Hospitalization, Katlyn Hill MD  •  promethazine (PHENERGAN) tablet 12.5 mg, 12.5 mg, Oral, Q6H PRN **OR** promethazine (PHENERGAN) injection 12.5 mg, 12.5 mg, Intramuscular, Q6H PRN **OR** promethazine (PHENERGAN) injection 12.5 mg, 12.5 mg, Intravenous, Q6H PRN **OR** promethazine (PHENERGAN) suppository 12.5 mg, 12.5 mg, Rectal, Q6H PRN, Katlyn Hill MD  •  sodium chloride 0.9 % flush 1-10 mL, 1-10 mL, Intravenous, PRN, Katlyn Hill MD  •  sodium chloride 0.9 % flush 1-10 mL, 1-10 mL, Intravenous, PRN, Katlyn Hill MD    No Known Allergies      Results Review:    Results from last 7 days  Lab Units 04/15/18  0508   WBC 10*3/mm3 9.95   HEMOGLOBIN g/dL 13.6   HEMATOCRIT % 39.3   PLATELETS 10*3/mm3 244       Results from last 7 days  Lab Units 04/15/18  0508 04/14/18  1935 04/14/18  1513   SODIUM mmol/L 139 138 137   POTASSIUM mmol/L 3.1* 3.7 3.3*   CHLORIDE mmol/L 98* 97* 102   CO2 mmol/L 31.0 26.0 28.3   BUN mg/dL 10 11 12   CREATININE mg/dL 0.90 1.00 0.93   CALCIUM mg/dL 9.4 10.0 9.9   BILIRUBIN mg/dL  --   --  0.5   ALK PHOS U/L  --   --  64   ALT (SGPT) U/L  --   --  34   AST (SGOT) U/L  --   --  19   GLUCOSE mg/dL 144* 164* 222*     Results for orders placed or performed during the hospital encounter of 04/14/18   MRI Brain Without Contrast    Narrative    EXAM:    MR Head Without Intravenous Contrast    EXAM DATE/TIME:    4/14/2018 6:56 PM    CLINICAL HISTORY:    57 years old, male; Signs and symptoms; Altered mental status/memory loss and   numbness /  parasthesia and speech disturbance and weakness, extremity; Right;   Confusion or disorientation; Slurred speech; Patient HX: Right sided weakness   and numbness, confusion, speech difficulty, HX seizures-possible seizure   activity **motion noted-best possible images at this time**; Additional info:   Seizures new or progressive    TECHNIQUE:    Magnetic resonance images of the head/brain without intravenous contrast in   multiple planes.    COMPARISON:    MRI BRAIN W WO CONTRAST 2017-04-25 04:20    FINDINGS:    No abnormal restriction of diffusion to indicate acute CVA.      Prior posterior division left MCA infarct has evolved to encephalomalacia   with compensatory dilatation of the left lateral ventricle trigone and temporal   horn, with cortical thinning and white matter changes      Midline structures and cerebellar tonsillar position appear normal.     Ventricles, cisterns and sulci are symmetric and normal for age.      No intracranial mass, midline shift or abnormal extra-axial fluid.      No acute intracranial hemorrhage.       Mild pattern of increased T2 and flair signal in supratentorial white matter.     Optic chiasm and pituitary infundibulum appear normal.       Normal vascular flow voids in major intracranial arteries and dural venous   sinuses.       Paranasal sinuses are clear.     Right mastoids are opacified.     Optic globes and orbits are unremarkable.        Impression      No acute intracranial abnormality.      Remote posterior division left MCA infarct with encephalomalacia      Left mastoid opacification, new since the prior MRI one year ago, without   middle ear involvement and unclear acute significance    THIS DOCUMENT HAS BEEN ELECTRONICALLY SIGNED BY ERIN GUILLEN MD   Results for orders placed or performed during the hospital encounter of 10/17/17   EEG    Narrative    Reason for referral:    56 y.o.male undergoing Baseline EEG prior to intraoperative monitoring    Technical  Summary:     A 19 channel digital EEG was performed using the international 10-20   placement system, including eye leads and EKG leads.    Findings:    The awake tracing demonstrates low amplitude 14-18 Hz beta activity seen   symmetrically over both hemispheres, with abundant EMG artifact and eye   blink artifact seen over the anterior head leads.  Sleep is not seen.  No   focal slowing or epileptiform activity is seen.      Impression    Normal EEG in the awake state    This study is adequate for intraoperative monitoring     EEG Monitoring Nonintracranial Surgery    Narrative    Reason for referral:    56 y.o.male undergoing EEG monitoring during left carotid endarterectomy    Technical Summary:     A 19 channel digital EEG was performed using the international 10-20   placement system, including eye leads and EKG leads.    Findings:    The awake tracing is as described in the baseline report.  Following   sedation, there is generalized slowing of the background.  Eventually a   pattern of diffuse low amplitude 4-7 Hz intermixed delta and theta   activity seen symmetrically over both hemispheres.  Occasional and brief   periods of relative voltage attenuation are seen usually lasting less than   1 second.  The left carotid artery is clamped at 9:13 AM and the clamp is   removed at 10:04 AM.  During the time the clamp is in place and following   clamp removal no changes in the background are seen.      Impression    Uneventful EEG monitoring during left carotid endarterectomy     Results for orders placed or performed during the hospital encounter of 04/25/17   MRI Brain With & Without Contrast    Addendum: 4/25/2017    IMPRESSION:       Large ACUTE ISCHEMIC INFARCT in the LEFT MCA distribution concordant with the   recent CT findings.      Associated intracranial hemorrhage in the infarcted brain consistent WITH   HEMORRHAGIC TRANSFORMATION.         THIS REPORT CONTAINS FINDINGS THAT MAY BE CRITICAL TO PATIENT  CARE. The   findings were verbally communicated via telephone conference with RAMU Aguilar  at 6:22 AM EDT on 4/25/2017. The findings were acknowledged and   understood.    THIS DOCUMENT HAS BEEN ELECTRONICALLY SIGNED BY NICHOLAS MURPHY MD      Narrative    EXAM:    MR Head Without and With Intravenous Contrast    CLINICAL HISTORY:    56 years old, male; Signs and symptoms; Other: AMS, confusion; Patient HX:   Cyberknife protocol for new finding left temporal/parietal mass 0.94 creat 83   gfr 19 ml multihance administered    TECHNIQUE:    Magnetic resonance images of the head/brain without and with intravenous   contrast in multiple planes.    CONTRAST:    19 mL of multihance administered intravenously.    COMPARISON:    CT HEAD WO CONTRAST 4/24/2017 9:21:14 PM    FINDINGS:    Large area of restricted diffusion in the LEFT MCA distribution involving the   LEFT basal ganglia, temporofrontal and parietal lobes measuring approximately   12.2 cm AP and 4.1 cm transverse with surrounding edema and mass effect   attenuating the sulci with a few areas of susceptibility artifact predominantly   in the posterior RIGHT lobe consistent with hemorrhage.       A few scattered focal areas of abnormal T2 prolongation within the   periventricular and subcortical white matter of the supratentorial brain   without any other region of restricted diffusion.      Midline brain structures including the corpus callosum, pituitary gland,   pineal region, and craniovertebral junction are unremarkable. Ventricles and   sulci are mildly dilated without evidence of hydrocephalus. Intracranial   vessels demonstrate a normal flow-void.       Impression      Large ACUTE ISCHEMIC INFARCT in the LEFT MCA distribution concordant with the   recent CT findings.      Associated intracranial hemorrhage in the infarcted brain consistent WITH   HEMORRHAGIC TRANSFORMATION.         THIS DOCUMENT HAS BEEN ELECTRONICALLY SIGNED BY NICHOLAS MURPHY MD    Results for orders placed or performed during the hospital encounter of 04/24/17   CT Head Without Contrast    Narrative    CT HEAD WITHOUT CONTRAST-     CLINICAL INDICATION: Altered mental status.          COMPARISON: None available.      TECHNIQUE: Axial images of the brain were obtained with out intravenous  contrast.  Reformatted images were created in the sagittal and coronal  planes.     DOSE: 964.28 mGy.cm     Radiation dose reduction techniques were utilized per ALARA protocol.  Automated exposure control was initiated through either or [employer] or  Culture Machine software packages by  protocol.        FINDINGS:   Abnormal low attenuation in the left temporal and posterior parietal  region. This may represent acute ischemic change, but I cannot exclude  underlying mass. A contrasted study is suggested.  The ventricles, cisterns, and sulci are unremarkable. There is no  hydrocephalus.        I do not see epidural or subdural hematoma.       The bone window setting images show no destructive calvarial lesion or  acute calvarial fracture.   The posterior fossa is unremarkable.             Impression    1.  Abnormal low attenuation in the left temporal lobe and posterior  parietal region on the left. This is a somewhat poor distribution for  focal vascular territory. Differential includes acute ischemic event  versus edema from an underlying mass. Follow-up contrasted study is  suggested.  2.  No evidence of hemorrhage.     This report was finalized on 4/25/2017 7:44 AM by Dr. Josiah Kaplan MD.      PHT: 14.0    Assessment/Plan:            1. Symptomatic Localization Related Epilepsy  2. History of Left MCA stroke  3. Aphasia        -Phenytoin  mg BID  -Keppra 1500 mg BID  -ASA, Atorvastatin  -follow up in Neurology clinic as outpatient post hospitalization        Pedro Bazan MD  04/15/18  11:27 AM          Electronically signed by Pedro Bazan MD at 4/15/2018 11:32 AM           Consult Notes (last 72 hours) (Notes from 4/13/2018  9:34 AM through 4/16/2018  9:34 AM)      Pedro Bazan MD at 4/14/2018  7:06 PM          NEUROLOGY CONSULTATION    Vince Olivera    Consulting Physician: Dr. Hill    Chief Complaint/Reason for Consultation: seizures, speech difficulties    HPI:   Subjective     Vince Olivera is a 57 y.o. M who presented to an OSH today with onset of worsening speech and right sided weakness.  He has a history of a left MCA ischemic stroke in April 2017.  He has residual combined aphasia with some right hemiparesis at baseline.  However, today he had an acute worsening of these deficits.  He underwent CT head which did not show acute abnormalities.  Upon transfer to [facility] for further evaluation and treatment, he has developed rhythmic jerking of the right upper and lower extremity and worsening of his aphasia.  He was given one dose of IV Ativan by EMS.  He has had seizures after the stroke last year and takes Keppra at home.       Patient Active Problem List    Diagnosis   • Seizure disorder [G40.909]   • Internal carotid artery stenosis, left [I65.22]   • Status post left carotid endarterectomy [Z98.890]   • Bilateral Carotid artery stenosis. Left greater than right  [[id number]]     Overview Note:     Added automatically from request for surgery [id number]     • Palpitations [R00.2]     Overview Note:     · Event monitor 6/22/17-7/21/17.  No rhythm strips for analysis except for baseline rhythm of sinus rhythm 63 bpm. (pt only wore monitor for 1 day)     • GERD (gastroesophageal reflux disease) [K21.9]     Overview Note:     self diagnosed takes otc ppi     • Hyperlipidemia [E78.5]   • Combined receptive and expressive aphasia due to cerebrovascular accident [I63.9, R47.01]   • Abnormal peripheral vision of right eye [H53.451]   • Residual mild expressive Aphasia [R47.01]   • Gastroesophageal reflux disease without esophagitis [K21.9]   •  Dyslipidemia [E78.5]   • Bilateral carotid artery stenosis [I65.23]   • H/o Left MCA ischemic CVA 4/2017 [I63.9]   • Diabetes mellitus type 2 in nonobese [E11.9]   • Hypertension [I10]     Past Surgical History:   Procedure Laterality Date   • ABDOMINAL HERNIA REPAIR  2010   • CAROTID ENDARTERECTOMY Left 10/17/2017    Procedure: CAROTID ENDARTERECTOMY LEFT;  Surgeon: Alexx Bach MD;  Location: [facility];  Service:    • CAROTID ENDARTERECTOMY Left 10/17/2017     Family History   Problem Relation Age of Onset   • Diabetes Mother    • COPD Father      Social History     Social History   • Marital status:      Occupational History   • Disabled      Social History Main Topics   • Smoking status: Former Smoker     Packs/day: 0.05     Years: 40.00     Types: Cigarettes     Quit date: 4/26/2017   • Smokeless tobacco: Former User     Types: Snuff      Comment: quit snuff when 18 or 19 years old - only did for baseball - 2-3 years    • Alcohol use No   • Drug use: No   • Sexual activity: Yes     Partners: Female      Comment: SPOUSE     Other Topics Concern   • Not on file     Social History Narrative    Patient consumes 0-1serving of caffeine daily.     Patient lives at home with wife.             Review of Systems  Review of Systems unable to obtain secondary to patient's aphasia    Objective     Vital signs in last 24 hours:  Temp:  [98.5 °F (36.9 °C)-98.6 °F (37 °C)] 98.6 °F (37 °C)  Heart Rate:  [] 108  Resp:  [14-18] 14  BP: (144-210)/() 210/117    General: NAD  HEENT: NCAT  Neuro: awake, alert. Mixed aphasia with receptive and expressive difficulties. PERRL. Moves left upper and lower extremity purposefully.  Rhythmic jerking of RUE and RLE with increased tone. Right hemineglect.        Current Facility-Administered Medications:   •  acetaminophen (TYLENOL) tablet 650 mg, 650 mg, Oral, Q6H PRN, Katlyn Hill MD  •  [START ON 4/15/2018] aspirin EC tablet 325 mg, 325 mg, Oral, Daily,  Katlyn Hill MD  •  atorvastatin (LIPITOR) tablet 80 mg, 80 mg, Oral, Nightly, Katlyn Hill MD  •  bisacodyl (DULCOLAX) EC tablet 5 mg, 5 mg, Oral, Daily PRN, Katlyn Hill MD  •  bisacodyl (DULCOLAX) suppository 10 mg, 10 mg, Rectal, Daily PRN, Katlyn Hill MD  •  clopidogrel (PLAVIX) tablet 75 mg, 75 mg, Oral, Daily, Katlyn Hill MD  •  dextrose (D50W) solution 25 g, 25 g, Intravenous, Q15 Min PRN, Katlyn Hill MD  •  dextrose (GLUTOSE) oral gel 15 g, 15 g, Oral, Q15 Min PRN, Katlyn Hill MD  •  fosphenytoin (Cerebyx) 1,333 mg PE in sodium chloride 0.9 % 100 mL IVPB, 1,333 mg PE, Intravenous, Once, Pedro Bazan MD  •  [START ON 4/15/2018] fosphenytoin (Cerebyx) 150 mg PE in sodium chloride 0.9 % 100 mL IVPB, 150 mg PE, Intravenous, Q8H, Katlyn Hill MD  •  glucagon (human recombinant) (GLUCAGEN DIAGNOSTIC) injection 1 mg, 1 mg, Subcutaneous, PRN, Katlyn Hill MD  •  hydrochlorothiazide (HYDRODIURIL) tablet 25 mg, 25 mg, Oral, Daily, Katlyn Hill MD  •  insulin lispro (humaLOG) injection 0-7 Units, 0-7 Units, Subcutaneous, 4x Daily With Meals & Nightly, Katlyn Hill MD  •  labetalol (NORMODYNE,TRANDATE) injection 20 mg, 20 mg, Intravenous, Q6H PRN, Katlyn Hill MD  •  levETIRAcetam in NaCl 0.54% (KEPPRA) IVPB 1,500 mg, 1,500 mg, Intravenous, Once, Pedro Bazan MD  •  [START ON 4/15/2018] levETIRAcetam in NaCl 0.75% (KEPPRA) IVPB 1,000 mg, 1,000 mg, Intravenous, Q12H, Pedro Bazan MD  •  losartan (COZAAR) tablet 50 mg, 50 mg, Oral, Q24H, Katlyn Hill MD  •  metoprolol succinate XL (TOPROL-XL) 24 hr tablet 200 mg, 200 mg, Oral, Daily, Katlyn Hill MD  •  [START ON 4/15/2018] pantoprazole (PROTONIX) EC tablet 40 mg, 40 mg, Oral, QAM, Katlyn Hill MD  •  promethazine (PHENERGAN) tablet 12.5 mg, 12.5 mg, Oral, Q6H PRN **OR** promethazine (PHENERGAN) injection 12.5 mg, 12.5 mg, Intramuscular, Q6H PRN **OR** promethazine (PHENERGAN)  injection 12.5 mg, 12.5 mg, Intravenous, Q6H PRN **OR** promethazine (PHENERGAN) suppository 12.5 mg, 12.5 mg, Rectal, Q6H PRN, Katlyn Hill MD  •  sodium chloride 0.9 % flush 1-10 mL, 1-10 mL, Intravenous, PRN, Katlyn Hill MD  •  sodium chloride 0.9 % flush 1-10 mL, 1-10 mL, Intravenous, PRN, Katlyn Hill MD    No Known Allergies      Imaging/Results:   CT Head at OSH: No acute abnormalities per report.    Lab Results   Component Value Date    GLUCOSE 222 (H) 04/14/2018    BUN 12 04/14/2018    CREATININE 0.93 04/14/2018    EGFRIFNONA 84 04/14/2018    BCR 12.9 04/14/2018    K 3.3 (L) 04/14/2018    CO2 28.3 04/14/2018    CALCIUM 9.9 04/14/2018    ALBUMIN 4.20 04/14/2018    LABIL2 1.4 (L) 04/14/2018    AST 19 04/14/2018    ALT 34 04/14/2018       Lab Results   Component Value Date    WBC 8.35 04/14/2018    HGB 14.6 04/14/2018    HCT 41.0 (L) 04/14/2018    MCV 86.7 04/14/2018     04/14/2018         Assessment/Plan      1. Epilepsia Partialis Continua  2. History of Left MCA stroke  3. Aphasia      -Fosphenytoin load and maintenance dose  -Keppra load and maintenance dose  -MRI brain STAT  -EEG  -phenytoin level in AM  -carotid u/s, Echo  -plavix, atorvastatin        Pedro Bazan MD      Electronically signed by Pedro Bazan MD at 4/14/2018  7:31 PM

## 2018-04-18 ENCOUNTER — TELEPHONE (OUTPATIENT)
Dept: FAMILY MEDICINE CLINIC | Facility: CLINIC | Age: 58
End: 2018-04-18

## 2018-04-18 RX ORDER — MECLIZINE HYDROCHLORIDE 25 MG/1
25 TABLET ORAL 3 TIMES DAILY PRN
Qty: 21 TABLET | Refills: 0 | Status: SHIPPED | OUTPATIENT
Start: 2018-04-18 | End: 2019-02-19

## 2018-04-18 NOTE — TELEPHONE ENCOUNTER
I have sent in meclizine. He can take up to three times a day as needed. This will likely cause drowsiness so use with caution. I would like him to follow up soon for evaluation

## 2018-04-18 NOTE — TELEPHONE ENCOUNTER
Got out of [facility] Monday, still having problems with dizzy spells.  Do you need to see or can you call him something.

## 2018-04-23 ENCOUNTER — OFFICE VISIT (OUTPATIENT)
Dept: FAMILY MEDICINE CLINIC | Facility: CLINIC | Age: 58
End: 2018-04-23

## 2018-04-23 VITALS
DIASTOLIC BLOOD PRESSURE: 90 MMHG | WEIGHT: 191.2 LBS | SYSTOLIC BLOOD PRESSURE: 151 MMHG | TEMPERATURE: 97.2 F | OXYGEN SATURATION: 99 % | HEIGHT: 68 IN | BODY MASS INDEX: 28.98 KG/M2 | HEART RATE: 74 BPM

## 2018-04-23 DIAGNOSIS — K02.9 DENTAL CARIES: ICD-10-CM

## 2018-04-23 DIAGNOSIS — G40.909 SEIZURE DISORDER AS SEQUELA OF CEREBROVASCULAR ACCIDENT (HCC): ICD-10-CM

## 2018-04-23 DIAGNOSIS — I69.398 SEIZURE DISORDER AS SEQUELA OF CEREBROVASCULAR ACCIDENT (HCC): ICD-10-CM

## 2018-04-23 DIAGNOSIS — I63.412 CEREBROVASCULAR ACCIDENT (CVA) DUE TO EMBOLISM OF LEFT MIDDLE CEREBRAL ARTERY (HCC): ICD-10-CM

## 2018-04-23 DIAGNOSIS — E11.9 DIABETES MELLITUS TYPE 2 IN NONOBESE (HCC): Primary | ICD-10-CM

## 2018-04-23 DIAGNOSIS — I73.9 PAD (PERIPHERAL ARTERY DISEASE) (HCC): ICD-10-CM

## 2018-04-23 LAB
ANION GAP SERPL CALCULATED.3IONS-SCNC: 7.6 MMOL/L (ref 3.6–11.2)
BUN BLD-MCNC: 10 MG/DL (ref 7–21)
BUN/CREAT SERPL: 12 (ref 7–25)
CALCIUM SPEC-SCNC: 10 MG/DL (ref 7.7–10)
CHLORIDE SERPL-SCNC: 98 MMOL/L (ref 99–112)
CO2 SERPL-SCNC: 33.4 MMOL/L (ref 24.3–31.9)
CREAT BLD-MCNC: 0.83 MG/DL (ref 0.43–1.29)
GFR SERPL CREATININE-BSD FRML MDRD: 96 ML/MIN/1.73
GLUCOSE BLD-MCNC: 131 MG/DL (ref 70–110)
OSMOLALITY SERPL CALC.SUM OF ELEC: 278.4 MOSM/KG (ref 273–305)
POTASSIUM BLD-SCNC: 3.9 MMOL/L (ref 3.5–5.3)
SODIUM BLD-SCNC: 139 MMOL/L (ref 135–153)

## 2018-04-23 PROCEDURE — 80048 BASIC METABOLIC PNL TOTAL CA: CPT | Performed by: NURSE PRACTITIONER

## 2018-04-23 PROCEDURE — 99214 OFFICE O/P EST MOD 30 MIN: CPT | Performed by: NURSE PRACTITIONER

## 2018-04-23 NOTE — PROGRESS NOTES
"Subjective   Vince Olivera is a 57 y.o. male.   Chief Complaint   Patient presents with   • Hypertension     History of Present Illness     Patient presents today for follow-up regarding recent hospitalization.  He was hospitalized on 4/14/18 due to a seizure.  Imaging of the brain was stable.  He was ultimately discharged home with follow-up arranged with neurology.  He was maintained on Keppra and Dilantin was also prescribed.  Since the event, he has had intermittent dizziness.  This is mostly positional.  Has had no more seizure activity, headache, or visual change.  He does have a follow-up with neurology next week.  During his hospitalization he was noted to be hypokalemic. He is not taking any potassium replacement currently. He does have an appointment with a vascular surgeon scheduled due to PAD and abnormal arterial doppler of the lower extremities. In addition, he has persistent dental caries. He is needing a total extraction of his teeth. Reports he has been on a wait list at  for several months. He is requesting referral to a local oral surgeon.    The following portions of the patient's history were reviewed and updated as appropriate: allergies, current medications, past family history, past medical history, past social history, past surgical history and problem list.  /90 (BP Location: Left arm, Patient Position: Sitting, Cuff Size: Adult)   Pulse 74   Temp 97.2 °F (36.2 °C) (Oral)   Ht 172.7 cm (67.99\")   Wt 86.7 kg (191 lb 3.2 oz)   SpO2 99%   BMI 29.08 kg/m²   Review of Systems   Constitutional: Negative for chills and fever.   HENT: Negative for congestion, ear pain, rhinorrhea and sore throat.    Respiratory: Negative for cough, shortness of breath and wheezing.    Cardiovascular: Negative for chest pain, palpitations and leg swelling.   Gastrointestinal: Negative for abdominal pain, diarrhea, nausea and vomiting.   Genitourinary: Negative for dysuria.   Musculoskeletal: " Negative for back pain and myalgias.   Skin: Negative for rash and wound.   Neurological: Positive for dizziness. Negative for light-headedness and headaches.   Psychiatric/Behavioral: Negative for sleep disturbance. The patient is not nervous/anxious.        Objective   Physical Exam   Constitutional: He is oriented to person, place, and time. He appears well-developed and well-nourished.   HENT:   Head: Normocephalic and atraumatic.   Cardiovascular: Normal rate, regular rhythm and normal heart sounds.    Pulmonary/Chest: Effort normal and breath sounds normal.   Abdominal: Soft. Bowel sounds are normal.   Musculoskeletal:   Gait and station normal   Neurological: He is alert and oriented to person, place, and time.   Skin: Skin is warm and dry.   Psychiatric: He has a normal mood and affect. His behavior is normal.       Assessment/Plan   Vince was seen today for hypertension.    Diagnoses and all orders for this visit:    Diabetes mellitus type 2 in nonobese  -     metFORMIN (GLUCOPHAGE) 500 MG tablet; Take one tablet in the morning and two tablets in the evening.    Cerebrovascular accident (CVA) due to embolism of left middle cerebral artery    Seizure disorder as sequela of cerebrovascular accident  -     Basic Metabolic Panel    Dental caries  -     Ambulatory Referral to Oral Maxillofacial Surgery    PAD (peripheral artery disease)        We have reviewed recent lab from hospitalization. I have ordered a BMP today. I have increased his Metformin as noted. Continue to work on diabetic diet. He will follow up with neurology next week as scheduled. He may use Meclizine for the dizziness. However, it is possible this is a side effect of his medication. For the dental caries, I have placed a referral to a local oral surgeon for further evaluation. Follow up with vascular surgeon as scheduled. Follow up here as scheduled and needed.

## 2018-04-30 ENCOUNTER — OFFICE VISIT (OUTPATIENT)
Dept: NEUROLOGY | Facility: CLINIC | Age: 58
End: 2018-04-30

## 2018-04-30 VITALS
OXYGEN SATURATION: 99 % | SYSTOLIC BLOOD PRESSURE: 132 MMHG | DIASTOLIC BLOOD PRESSURE: 88 MMHG | WEIGHT: 192 LBS | HEART RATE: 76 BPM | BODY MASS INDEX: 29.2 KG/M2

## 2018-04-30 DIAGNOSIS — I63.412 CEREBROVASCULAR ACCIDENT (CVA) DUE TO EMBOLISM OF LEFT MIDDLE CEREBRAL ARTERY (HCC): ICD-10-CM

## 2018-04-30 DIAGNOSIS — I69.398 SEIZURE DISORDER AS SEQUELA OF CEREBROVASCULAR ACCIDENT (HCC): Primary | ICD-10-CM

## 2018-04-30 DIAGNOSIS — G40.909 SEIZURE DISORDER AS SEQUELA OF CEREBROVASCULAR ACCIDENT (HCC): Primary | ICD-10-CM

## 2018-04-30 PROCEDURE — 99213 OFFICE O/P EST LOW 20 MIN: CPT | Performed by: NURSE PRACTITIONER

## 2018-04-30 NOTE — PROGRESS NOTES
Subjective:     Patient ID: Vince Olivera is a 57 y.o. male.    CC:   Chief Complaint   Patient presents with   • Seizures       HPI:   History of Present Illness     Mr. Olivera is a very pleasant 57-year-old male here for hospital follow-up.  He first came in to neurology care in April 2017 after having a fairly significant left MCA CVA.  He is status post left CEA by Dr. Bach in April 2017.  After stroke he continued to have mild residual right arm weakness with occasional dysarthria.  He is accompanied by his significant other today who tells me that in October 2017 he had his first seizure, at that time he was started on Keppra 500 twice a day.  On November 17 he had his second seizure and Keppra at that time was increased to 1000 mg twice a day.  He had been seizure-free until April 14, 2018 when he had sudden onset of worsening right-sided weakness and dysarthria, EMS was called by family and he was taken to [facility] in [place] was thought to have extended his stroke.  MRI was essentially unremarkable for new CVA and he was witnessed to have tonic-clonic jerking of the right extremities.  At that time he was given IV loading dose of Dilantin and started on oral Dilantin at 200 mg extended release twice a day.  He was discharged home with the addition of Dilantin and has felt back to baseline.  He tells me that he has had continued dizziness since CVA in April 2017, this is at his baseline now.  Further workup done with a recent hospitalization included carotid duplex studies showing 50-69% blockage of the right carotid artery which was stable from previous ultrasound in February 2018 at his last visit with Dr. Bach.  Echocardiogram with negative bubble study was also performed.  He feels well today with no other complaints.  His PCP has recently increased his metformin from 500 twice a day to 500 in the morning with 1000 mg at night due to elevated hemoglobin A1c of 7.7%.  He does continue  on Plavix, aspirin 325 and 80 mg of atorvastatin.  The following portions of the patient's history were reviewed and updated as appropriate: allergies, current medications, past family history, past medical history, past social history, past surgical history and problem list.    Past Medical History:   Diagnosis Date   • Arthritis    • Carotid artery disorder    • Diabetes mellitus     DIAGNOSED 4-2017 CHECKS FSBG 3X/WEEK    • Ear infection     about 2 weeks ago- cleared up - wax removed and cleaned out    • GERD (gastroesophageal reflux disease)     self diagnosed takes otc ppi   • Hyperlipidemia    • Hypertension    • Hypertension    • Seizure    • Shoulder pain     bilat    • Stroke 04/24/2017    EXPRESSIVE APHASIA RESIDUAL    • Tooth loose     5 - all over- will get teeth done after this surgery    • Wears reading eyeglasses        Past Surgical History:   Procedure Laterality Date   • ABDOMINAL HERNIA REPAIR  2010   • CAROTID ENDARTERECTOMY Left 10/17/2017    Procedure: CAROTID ENDARTERECTOMY LEFT;  Surgeon: Alexx Bach MD;  [facility]  Service:    • CAROTID ENDARTERECTOMY Left 10/17/2017       Social History     Social History   • Marital status:      Spouse name: N/A   • Number of children: N/A   • Years of education: N/A     Occupational History   • Disabled      Social History Main Topics   • Smoking status: Former Smoker     Packs/day: 0.05     Years: 40.00     Types: Cigarettes     Quit date: 4/26/2017   • Smokeless tobacco: Former User     Types: Snuff      Comment: quit snuff when 18 or 19 years old - only did for baseball - 2-3 years    • Alcohol use No   • Drug use: No   • Sexual activity: Yes     Partners: Female      Comment: SPOUSE     Other Topics Concern   • Not on file     Social History Narrative    Patient consumes 0-1serving of caffeine daily.     Patient lives at home with wife.            Family History   Problem Relation Age of Onset   • Diabetes Mother    •  COPD Father         Review of Systems   Constitutional: Negative for chills, fatigue, fever and unexpected weight change.   HENT: Negative for ear pain, hearing loss, tinnitus and trouble swallowing.    Eyes: Negative for photophobia and visual disturbance.   Respiratory: Negative for cough, chest tightness, shortness of breath and wheezing.    Cardiovascular: Negative for chest pain, palpitations and leg swelling.   Gastrointestinal: Negative.    Endocrine: Negative.    Genitourinary: Negative for difficulty urinating, frequency and urgency.   Musculoskeletal: Positive for neck pain. Negative for arthralgias, back pain, gait problem, joint swelling, myalgias and neck stiffness.   Skin: Negative.  Negative for rash and wound.   Allergic/Immunologic: Negative for environmental allergies and food allergies.   Neurological: Positive for dizziness (at baseline since CVA), seizures and light-headedness. Negative for tremors, syncope, facial asymmetry, speech difficulty, weakness, numbness and headaches.   Hematological: Negative.    Psychiatric/Behavioral: Positive for confusion (only during seizure activity, none since hospitalization in April 14) and decreased concentration. Negative for agitation, dysphoric mood, hallucinations, sleep disturbance and suicidal ideas. The patient is not nervous/anxious.         Objective:    Neurologic Exam     Mental Status   Oriented to person, place, and time.   Follows 3 step commands.   Attention: normal. Concentration: normal.   Speech: speech is normal   Level of consciousness: alert  Knowledge: consistent with education.   Normal comprehension.     Cranial Nerves   Cranial nerves II through XII intact.     CN III, IV, VI   Pupils are equal, round, and reactive to light.  Extraocular motions are normal.     Motor Exam   Muscle bulk: normal  Overall muscle tone: normal  Right arm tone: normal  Left arm tone: normal  Right arm pronator drift: absent  Left arm pronator drift:  absent  Right leg tone: normal  Left leg tone: normal    Strength   Strength 5/5 throughout.     Sensory Exam   Light touch normal.   Vibration normal.     Gait, Coordination, and Reflexes     Gait  Gait: normal    Coordination   Romberg: negative  Finger to nose coordination: normal  Heel to shin coordination: normal  Tandem walking coordination: normal    Tremor   Resting tremor: absent  Intention tremor: absent  Action tremor: absent    Reflexes   Reflexes 2+ except as noted.   Right Rick: absent  Left Rick: absent      Physical Exam   Constitutional: He is oriented to person, place, and time. He appears well-developed and well-nourished.   HENT:   Head: Normocephalic and atraumatic.   Eyes: EOM are normal. Pupils are equal, round, and reactive to light.   Neck: Normal range of motion. Neck supple.   Cardiovascular: Normal rate, regular rhythm, normal heart sounds and intact distal pulses.    Pulmonary/Chest: Effort normal and breath sounds normal. No respiratory distress.   Musculoskeletal: Normal range of motion.   Neurological: He is alert and oriented to person, place, and time. He has normal strength and normal reflexes. He displays normal reflexes. No cranial nerve deficit. He exhibits normal muscle tone. He has a normal Finger-Nose-Finger Test, a normal Heel to Shin Test, a normal Romberg Test and a normal Tandem Gait Test. Gait normal. Coordination normal.   Skin: Skin is warm. Capillary refill takes less than 2 seconds.   Psychiatric: He has a normal mood and affect. His speech is normal and behavior is normal. Judgment and thought content normal.   Vitals reviewed.      Assessment/Plan:       Vince was seen today for seizures.    Diagnoses and all orders for this visit:    Seizure disorder as sequela of cerebrovascular accident  Comments:  Continue current Keppra and Dilantin doses.  Order placed for Dilantin level, CBC and CMP to be drawn in approximately 3 weeks  Orders:  -     Phenytoin Level,  Total & Free; Future  -     CBC & Differential; Future  -     Comprehensive Metabolic Panel; Future    Cerebrovascular accident (CVA) due to embolism of left middle cerebral artery  Comments:  Continue aspirin and statin and Plavix, continue follow-up with Dr. Bach as scheduled    Mr. Olivera is doing well today after having his third seizure since CVA in April 2017.  He is currently on Dilantin 200 mg extended release twice a day along with Keppra 1000 mg twice a day.  We will get Dilantin level with CBC and CMP in approximately 3-4 weeks, he will report any seizure activity or problems with the medication in the meantime.  I will see him back in the clinic here in 3 months or sooner if needed.  He will continue secondary stroke prevention with aspirin and statin and Plavix.  Carotid ultrasound done recently was stable from February.  He will continue follow-up with Dr. Bach as scheduled.  Patient instructions include: No driving or operating heavy machinery for 3 months from onset of most recent seizure. Minimize stress as much as possible. Recommended 7-8 hours of sleep each night. Abstain from alcohol intake. Educated on Antiepileptic medications with possible side effects and signs and symptoms to report if prescribed during visit. Instructed to take seizure medication daily if prescribed. Reviewed potential seizure risk factors. Instructed to call 911 or our office if another seizure does occur.  Reviewed medications, potential side effects and signs and symptoms to report. Discussed risk versus benefits of treatment plan with patient and/or family-including medications, labs and radiology that may be ordered. Addressed questions and concerns during visit. Patient and/or family verbalized understanding and agree with plan.  During this visit the following were done:  Labs Reviewed [x]    Labs Ordered [x]    Radiology Reports Reviewed [x]    Radiology Ordered []    PCP Records Reviewed []    Referring  Provider Records Reviewed []    ER Records Reviewed [x]    Hospital Records Reviewed [x]    History Obtained From Family []    Radiology Images Reviewed []    Other Reviewed []    Records Requested []      EMR Dragon/Transcription Disclaimer:  Much of this encounter note is an electronic transcription of spoken language to printed text. Electronic transcription of spoken language may permit erroneous words or phrases to be inadvertently transcribed. Although I have reviewed the note for such errors, some may still exist in this documentation.           Ani Haas, APRN  4/30/2018

## 2018-05-14 ENCOUNTER — APPOINTMENT (OUTPATIENT)
Dept: LAB | Facility: HOSPITAL | Age: 58
End: 2018-05-14

## 2018-05-14 ENCOUNTER — LAB (OUTPATIENT)
Dept: LAB | Facility: HOSPITAL | Age: 58
End: 2018-05-14

## 2018-05-14 DIAGNOSIS — G40.909 SEIZURE DISORDER AS SEQUELA OF CEREBROVASCULAR ACCIDENT (HCC): ICD-10-CM

## 2018-05-14 DIAGNOSIS — I69.398 SEIZURE DISORDER AS SEQUELA OF CEREBROVASCULAR ACCIDENT (HCC): ICD-10-CM

## 2018-05-14 LAB
ALBUMIN SERPL-MCNC: 4.2 G/DL (ref 3.5–5)
ALBUMIN/GLOB SERPL: 1.6 G/DL (ref 1.5–2.5)
ALP SERPL-CCNC: 78 U/L (ref 40–129)
ALT SERPL W P-5'-P-CCNC: 29 U/L (ref 10–44)
ANION GAP SERPL CALCULATED.3IONS-SCNC: 7.3 MMOL/L (ref 3.6–11.2)
AST SERPL-CCNC: 17 U/L (ref 10–34)
BASOPHILS # BLD AUTO: 0.03 10*3/MM3 (ref 0–0.3)
BASOPHILS NFR BLD AUTO: 0.3 % (ref 0–2)
BILIRUB SERPL-MCNC: 0.3 MG/DL (ref 0.2–1.8)
BUN BLD-MCNC: 10 MG/DL (ref 7–21)
BUN/CREAT SERPL: 10.8 (ref 7–25)
CALCIUM SPEC-SCNC: 9.4 MG/DL (ref 7.7–10)
CHLORIDE SERPL-SCNC: 99 MMOL/L (ref 99–112)
CO2 SERPL-SCNC: 31.7 MMOL/L (ref 24.3–31.9)
CREAT BLD-MCNC: 0.93 MG/DL (ref 0.43–1.29)
DEPRECATED RDW RBC AUTO: 42.8 FL (ref 37–54)
EOSINOPHIL # BLD AUTO: 0.11 10*3/MM3 (ref 0–0.7)
EOSINOPHIL NFR BLD AUTO: 1.2 % (ref 0–5)
ERYTHROCYTE [DISTWIDTH] IN BLOOD BY AUTOMATED COUNT: 13.6 % (ref 11.5–14.5)
GFR SERPL CREATININE-BSD FRML MDRD: 84 ML/MIN/1.73
GLOBULIN UR ELPH-MCNC: 2.7 GM/DL
GLUCOSE BLD-MCNC: 106 MG/DL (ref 70–110)
HCT VFR BLD AUTO: 39.6 % (ref 42–52)
HGB BLD-MCNC: 13.9 G/DL (ref 14–18)
IMM GRANULOCYTES # BLD: 0.03 10*3/MM3 (ref 0–0.03)
IMM GRANULOCYTES NFR BLD: 0.3 % (ref 0–0.5)
LYMPHOCYTES # BLD AUTO: 3.9 10*3/MM3 (ref 1–3)
LYMPHOCYTES NFR BLD AUTO: 43 % (ref 21–51)
MCH RBC QN AUTO: 30.6 PG (ref 27–33)
MCHC RBC AUTO-ENTMCNC: 35.1 G/DL (ref 33–37)
MCV RBC AUTO: 87.2 FL (ref 80–94)
MONOCYTES # BLD AUTO: 0.78 10*3/MM3 (ref 0.1–0.9)
MONOCYTES NFR BLD AUTO: 8.6 % (ref 0–10)
NEUTROPHILS # BLD AUTO: 4.23 10*3/MM3 (ref 1.4–6.5)
NEUTROPHILS NFR BLD AUTO: 46.6 % (ref 30–70)
OSMOLALITY SERPL CALC.SUM OF ELEC: 275.1 MOSM/KG (ref 273–305)
PLATELET # BLD AUTO: 223 10*3/MM3 (ref 130–400)
PMV BLD AUTO: 8.8 FL (ref 6–10)
POTASSIUM BLD-SCNC: 3.2 MMOL/L (ref 3.5–5.3)
PROT SERPL-MCNC: 6.9 G/DL (ref 6–8)
RBC # BLD AUTO: 4.54 10*6/MM3 (ref 4.7–6.1)
SODIUM BLD-SCNC: 138 MMOL/L (ref 135–153)
WBC NRBC COR # BLD: 9.08 10*3/MM3 (ref 4.5–12.5)

## 2018-05-14 PROCEDURE — 36415 COLL VENOUS BLD VENIPUNCTURE: CPT

## 2018-05-14 PROCEDURE — 85025 COMPLETE CBC W/AUTO DIFF WBC: CPT

## 2018-05-14 PROCEDURE — 80053 COMPREHEN METABOLIC PANEL: CPT

## 2018-05-14 PROCEDURE — 80186 ASSAY OF PHENYTOIN FREE: CPT

## 2018-05-14 PROCEDURE — 80185 ASSAY OF PHENYTOIN TOTAL: CPT

## 2018-05-16 LAB
PHENYTOIN FREE SERPL-MCNC: ABNORMAL UG/ML (ref 1–2)
PHENYTOIN SERPL-MCNC: 3.6 UG/ML (ref 10–20)

## 2018-05-16 NOTE — PROGRESS NOTES
Let him know his potassium is a bit low, does he have potassium supplement at home that he has taken in the past? Dilantin level is pending.  VERY mild anemia, fax copy of labs to PCP

## 2018-05-17 ENCOUNTER — TELEPHONE (OUTPATIENT)
Dept: FAMILY MEDICINE CLINIC | Facility: CLINIC | Age: 58
End: 2018-05-17

## 2018-05-17 ENCOUNTER — TELEPHONE (OUTPATIENT)
Dept: NEUROLOGY | Facility: CLINIC | Age: 58
End: 2018-05-17

## 2018-05-17 ENCOUNTER — HOSPITAL ENCOUNTER (EMERGENCY)
Facility: HOSPITAL | Age: 58
Discharge: HOME | End: 2018-05-17
Attending: EMERGENCY MEDICINE | Admitting: EMERGENCY MEDICINE

## 2018-05-17 ENCOUNTER — APPOINTMENT (OUTPATIENT)
Dept: CT IMAGING | Facility: HOSPITAL | Age: 58
End: 2018-05-17

## 2018-05-17 VITALS
SYSTOLIC BLOOD PRESSURE: 151 MMHG | DIASTOLIC BLOOD PRESSURE: 98 MMHG | RESPIRATION RATE: 16 BRPM | BODY MASS INDEX: 30.92 KG/M2 | HEIGHT: 67 IN | HEART RATE: 94 BPM | OXYGEN SATURATION: 98 % | WEIGHT: 197 LBS | TEMPERATURE: 97.5 F

## 2018-05-17 DIAGNOSIS — R56.9 SEIZURE (HCC): Primary | ICD-10-CM

## 2018-05-17 LAB
ALBUMIN SERPL-MCNC: 4.5 G/DL (ref 3.5–5)
ALBUMIN/GLOB SERPL: 1.6 G/DL (ref 1.5–2.5)
ALP SERPL-CCNC: 71 U/L (ref 40–129)
ALT SERPL W P-5'-P-CCNC: 34 U/L (ref 10–44)
ANION GAP SERPL CALCULATED.3IONS-SCNC: 11.3 MMOL/L (ref 3.6–11.2)
AST SERPL-CCNC: 19 U/L (ref 10–34)
BASOPHILS # BLD AUTO: 0.02 10*3/MM3 (ref 0–0.3)
BASOPHILS NFR BLD AUTO: 0.2 % (ref 0–2)
BILIRUB SERPL-MCNC: 0.3 MG/DL (ref 0.2–1.8)
BUN BLD-MCNC: 10 MG/DL (ref 7–21)
BUN/CREAT SERPL: 11.4 (ref 7–25)
CALCIUM SPEC-SCNC: 9.3 MG/DL (ref 7.7–10)
CHLORIDE SERPL-SCNC: 97 MMOL/L (ref 99–112)
CO2 SERPL-SCNC: 26.7 MMOL/L (ref 24.3–31.9)
CREAT BLD-MCNC: 0.88 MG/DL (ref 0.43–1.29)
DEPRECATED RDW RBC AUTO: 41.7 FL (ref 37–54)
EOSINOPHIL # BLD AUTO: 0.02 10*3/MM3 (ref 0–0.7)
EOSINOPHIL NFR BLD AUTO: 0.2 % (ref 0–5)
ERYTHROCYTE [DISTWIDTH] IN BLOOD BY AUTOMATED COUNT: 13.5 % (ref 11.5–14.5)
GFR SERPL CREATININE-BSD FRML MDRD: 90 ML/MIN/1.73
GLOBULIN UR ELPH-MCNC: 2.9 GM/DL
GLUCOSE BLD-MCNC: 182 MG/DL (ref 70–110)
HCT VFR BLD AUTO: 39.7 % (ref 42–52)
HGB BLD-MCNC: 14.1 G/DL (ref 14–18)
IMM GRANULOCYTES # BLD: 0.02 10*3/MM3 (ref 0–0.03)
IMM GRANULOCYTES NFR BLD: 0.2 % (ref 0–0.5)
LYMPHOCYTES # BLD AUTO: 1.88 10*3/MM3 (ref 1–3)
LYMPHOCYTES NFR BLD AUTO: 17.8 % (ref 21–51)
MCH RBC QN AUTO: 31.1 PG (ref 27–33)
MCHC RBC AUTO-ENTMCNC: 35.5 G/DL (ref 33–37)
MCV RBC AUTO: 87.4 FL (ref 80–94)
MONOCYTES # BLD AUTO: 0.63 10*3/MM3 (ref 0.1–0.9)
MONOCYTES NFR BLD AUTO: 6 % (ref 0–10)
NEUTROPHILS # BLD AUTO: 7.99 10*3/MM3 (ref 1.4–6.5)
NEUTROPHILS NFR BLD AUTO: 75.6 % (ref 30–70)
OSMOLALITY SERPL CALC.SUM OF ELEC: 273.8 MOSM/KG (ref 273–305)
PHENYTOIN SERPL-MCNC: 3.4 MCG/ML (ref 10–20)
PLATELET # BLD AUTO: 243 10*3/MM3 (ref 130–400)
PMV BLD AUTO: 8.3 FL (ref 6–10)
POTASSIUM BLD-SCNC: 2.9 MMOL/L (ref 3.5–5.3)
PROT SERPL-MCNC: 7.4 G/DL (ref 6–8)
RBC # BLD AUTO: 4.54 10*6/MM3 (ref 4.7–6.1)
SODIUM BLD-SCNC: 135 MMOL/L (ref 135–153)
WBC NRBC COR # BLD: 10.56 10*3/MM3 (ref 4.5–12.5)

## 2018-05-17 PROCEDURE — 70450 CT HEAD/BRAIN W/O DYE: CPT

## 2018-05-17 PROCEDURE — 99285 EMERGENCY DEPT VISIT HI MDM: CPT

## 2018-05-17 PROCEDURE — 93005 ELECTROCARDIOGRAM TRACING: CPT | Performed by: EMERGENCY MEDICINE

## 2018-05-17 PROCEDURE — 85025 COMPLETE CBC W/AUTO DIFF WBC: CPT | Performed by: EMERGENCY MEDICINE

## 2018-05-17 PROCEDURE — 96360 HYDRATION IV INFUSION INIT: CPT

## 2018-05-17 PROCEDURE — 70450 CT HEAD/BRAIN W/O DYE: CPT | Performed by: RADIOLOGY

## 2018-05-17 PROCEDURE — 80185 ASSAY OF PHENYTOIN TOTAL: CPT | Performed by: EMERGENCY MEDICINE

## 2018-05-17 PROCEDURE — 80177 DRUG SCRN QUAN LEVETIRACETAM: CPT | Performed by: EMERGENCY MEDICINE

## 2018-05-17 PROCEDURE — 80053 COMPREHEN METABOLIC PANEL: CPT | Performed by: EMERGENCY MEDICINE

## 2018-05-17 RX ORDER — HYDROCHLOROTHIAZIDE 12.5 MG/1
12.5 TABLET ORAL DAILY
Qty: 30 TABLET | Refills: 0 | Status: SHIPPED | OUTPATIENT
Start: 2018-05-17 | End: 2018-06-26 | Stop reason: SDUPTHER

## 2018-05-17 RX ORDER — POTASSIUM CHLORIDE 750 MG/1
10 TABLET, FILM COATED, EXTENDED RELEASE ORAL DAILY
Qty: 15 TABLET | Refills: 0 | Status: SHIPPED | OUTPATIENT
Start: 2018-05-17 | End: 2018-07-10

## 2018-05-17 RX ORDER — LEVETIRACETAM 250 MG/1
500 TABLET ORAL ONCE
Status: COMPLETED | OUTPATIENT
Start: 2018-05-17 | End: 2018-05-17

## 2018-05-17 RX ORDER — POTASSIUM CHLORIDE 20 MEQ/1
40 TABLET, EXTENDED RELEASE ORAL ONCE
Status: COMPLETED | OUTPATIENT
Start: 2018-05-17 | End: 2018-05-17

## 2018-05-17 RX ORDER — PHENYTOIN SODIUM 100 MG/1
300 CAPSULE, EXTENDED RELEASE ORAL ONCE
Status: COMPLETED | OUTPATIENT
Start: 2018-05-17 | End: 2018-05-17

## 2018-05-17 RX ADMIN — SODIUM CHLORIDE 1000 ML: 9 INJECTION, SOLUTION INTRAVENOUS at 18:07

## 2018-05-17 RX ADMIN — POTASSIUM CHLORIDE 40 MEQ: 1500 TABLET, EXTENDED RELEASE ORAL at 19:17

## 2018-05-17 RX ADMIN — LEVETIRACETAM 500 MG: 250 TABLET, FILM COATED ORAL at 17:44

## 2018-05-17 RX ADMIN — PHENYTOIN SODIUM 300 MG: 100 CAPSULE, EXTENDED RELEASE ORAL at 19:17

## 2018-05-17 NOTE — TELEPHONE ENCOUNTER
----- Message from KINGSTON Rawls sent at 5/16/2018  3:32 PM EDT -----  Let him know his potassium is a bit low, does he have potassium supplement at home that he has taken in the past? Dilantin level is pending.  VERY mild anemia, fax copy of labs to PCP

## 2018-05-17 NOTE — TELEPHONE ENCOUNTER
KAREEN MUNROE HAD BEEN DIZZY FOR LAST SEVERAL DAYS. BLOOD PRESSURE UP THIS MORNING /97. WAS JUST NOT FEELING RIGHT. SPOKE WITH MCKENNA .  WITH PATIENT HISTORY REFERRED TO ER.

## 2018-05-18 ENCOUNTER — TELEPHONE (OUTPATIENT)
Dept: NEUROLOGY | Facility: CLINIC | Age: 58
End: 2018-05-18

## 2018-05-18 NOTE — TELEPHONE ENCOUNTER
I talked to Donna to let her know.  She states that she ended up taking him to the ER yesterday because he had a seizure. The ER prescribed him Potassium BID for 15 days, but didn't adjust his Dilantin dose.

## 2018-05-18 NOTE — PROGRESS NOTES
Please let him know his dilantin level is low, is he taking his dilantin every day along with his Keppra? Has he missed doses?

## 2018-05-18 NOTE — TELEPHONE ENCOUNTER
----- Message from KINGSTON Rawls sent at 5/18/2018 12:32 AM EDT -----  Please let him know his dilantin level is low, is he taking his dilantin every day along with his Keppra? Has he missed doses?

## 2018-05-20 LAB — LEVETIRACETAM SERPL-MCNC: 13.8 UG/ML (ref 10–40)

## 2018-05-22 ENCOUNTER — LAB (OUTPATIENT)
Dept: FAMILY MEDICINE CLINIC | Facility: CLINIC | Age: 58
End: 2018-05-22

## 2018-05-22 DIAGNOSIS — E87.6 HYPOKALEMIA: Primary | ICD-10-CM

## 2018-05-22 DIAGNOSIS — E87.6 HYPOKALEMIA: ICD-10-CM

## 2018-05-22 RX ORDER — ASPIRIN 325 MG
325 TABLET, DELAYED RELEASE (ENTERIC COATED) ORAL DAILY
Qty: 30 TABLET | Refills: 5 | Status: SHIPPED | OUTPATIENT
Start: 2018-05-22 | End: 2018-11-19 | Stop reason: SDUPTHER

## 2018-05-23 ENCOUNTER — APPOINTMENT (OUTPATIENT)
Dept: LAB | Facility: HOSPITAL | Age: 58
End: 2018-05-23

## 2018-05-23 LAB
ANION GAP SERPL CALCULATED.3IONS-SCNC: 5.3 MMOL/L (ref 3.6–11.2)
BUN BLD-MCNC: 10 MG/DL (ref 7–21)
BUN/CREAT SERPL: 10 (ref 7–25)
CALCIUM SPEC-SCNC: 8.1 MG/DL (ref 7.7–10)
CHLORIDE SERPL-SCNC: 103 MMOL/L (ref 99–112)
CO2 SERPL-SCNC: 30.7 MMOL/L (ref 24.3–31.9)
CREAT BLD-MCNC: 1 MG/DL (ref 0.43–1.29)
GFR SERPL CREATININE-BSD FRML MDRD: 77 ML/MIN/1.73
GLUCOSE BLD-MCNC: 201 MG/DL (ref 70–110)
OSMOLALITY SERPL CALC.SUM OF ELEC: 282.3 MOSM/KG (ref 273–305)
POTASSIUM BLD-SCNC: 3.5 MMOL/L (ref 3.5–5.3)
SODIUM BLD-SCNC: 139 MMOL/L (ref 135–153)

## 2018-05-23 PROCEDURE — 80048 BASIC METABOLIC PNL TOTAL CA: CPT | Performed by: NURSE PRACTITIONER

## 2018-05-23 NOTE — PROGRESS NOTES
Please let him know his potassium is normal. Make sure to keep follow up with neurology regarding the seizures

## 2018-05-23 NOTE — PROGRESS NOTES
I spoke to Mr. Olivera's significant other over the weekend, his call and tell her I spoke with Dr. Marley.  We will continue the Dilantin 200 mg in the morning with 300 mg at night, repeat Dilantin level in 2 weeks.  I would like to see him in follow-up in about 3-4 weeks on a Tuesday that Dr. Marley is here please

## 2018-05-24 ENCOUNTER — TELEPHONE (OUTPATIENT)
Dept: NEUROLOGY | Facility: CLINIC | Age: 58
End: 2018-05-24

## 2018-05-24 NOTE — TELEPHONE ENCOUNTER
----- Message from KINGSTON Rawls sent at 5/23/2018  9:54 AM EDT -----  I spoke to Mr. Olivera's significant other over the weekend, his call and tell her I spoke with Dr. Marley.  We will continue the Dilantin 200 mg in the morning with 300 mg at night, repeat Dilantin level in 2 weeks.  I would like to see him in follow-up in about 3-4 weeks on a Tuesday that Dr. Marley is here please

## 2018-05-25 NOTE — ED PROVIDER NOTES
Subjective   Patient had seizure 30 minutes prior to arrival        Seizures   Seizure activity on arrival: no    Seizure type:  Grand mal  Preceding symptoms: dizziness    Episode characteristics: eye deviation and stiffening    Postictal symptoms: somnolence    Return to baseline: no    Timing:  Intermittent      Review of Systems   Constitutional: Positive for activity change.   HENT: Negative.    Eyes: Negative.    Respiratory: Negative.    Cardiovascular: Negative.    Gastrointestinal: Negative.    Endocrine: Negative.    Genitourinary: Negative.    Musculoskeletal: Negative.    Skin: Negative.    Allergic/Immunologic: Negative.    Neurological: Positive for seizures and weakness.   Hematological: Negative.    Psychiatric/Behavioral: Negative.    All other systems reviewed and are negative.      Past Medical History:   Diagnosis Date   • Arthritis    • Carotid artery disorder    • Diabetes mellitus     DIAGNOSED 4-2017 CHECKS FSBG 3X/WEEK    • Ear infection     about 2 weeks ago- cleared up - wax removed and cleaned out    • GERD (gastroesophageal reflux disease)     self diagnosed takes otc ppi   • Hyperlipidemia    • Hypertension    • Hypertension    • Seizure    • Shoulder pain     bilat    • Stroke 04/24/2017    EXPRESSIVE APHASIA RESIDUAL    • Tooth loose     5 - all over- will get teeth done after this surgery    • Wears reading eyeglasses        No Known Allergies    Past Surgical History:   Procedure Laterality Date   • ABDOMINAL HERNIA REPAIR  2010   • CAROTID ENDARTERECTOMY Left 10/17/2017    Procedure: CAROTID ENDARTERECTOMY LEFT;  Surgeon: Alexx Bach MD;  Location: [facility];  Service:    • CAROTID ENDARTERECTOMY Left 10/17/2017       Family History   Problem Relation Age of Onset   • Diabetes Mother    • COPD Father        Social History     Social History   • Marital status:      Occupational History   • Disabled      Social History Main Topics   • Smoking status: Former  Smoker     Packs/day: 0.05     Years: 40.00     Types: Cigarettes     Quit date: 4/26/2017   • Smokeless tobacco: Former User     Types: Snuff      Comment: quit snuff when 18 or 19 years old - only did for baseball - 2-3 years    • Alcohol use No   • Drug use: No   • Sexual activity: Yes     Partners: Female      Comment: SPOUSE     Other Topics Concern   • Not on file     Social History Narrative    Patient consumes 0-1serving of caffeine daily.     Patient lives at home with wife.                Objective   Physical Exam   Constitutional: He appears well-developed and well-nourished.   HENT:   Head: Normocephalic and atraumatic.   Eyes: EOM are normal. Pupils are equal, round, and reactive to light.   Neck: Normal range of motion.   Cardiovascular: Normal rate.    Pulmonary/Chest: Effort normal.   Abdominal: Soft.   Musculoskeletal: Normal range of motion.   Neurological: He is alert.   Skin: Skin is warm.   Psychiatric: He has a normal mood and affect.   Nursing note and vitals reviewed.      Procedures           ED Course  ED Course as of May 25 1352   Thu May 17, 2018   1817 CT head old scarring no new injury noted per Dr Martinez  [JANIE]      ED Course User Index  [JANIE] Sid Rojo MD                  [facility]      Final diagnoses:   Seizure            Sid Rojo MD  05/25/18 [id number]

## 2018-06-01 DIAGNOSIS — G40.909 SEIZURE DISORDER (HCC): ICD-10-CM

## 2018-06-01 RX ORDER — LEVETIRACETAM 500 MG/1
1000 TABLET ORAL EVERY 12 HOURS SCHEDULED
Qty: 120 TABLET | Refills: 1 | Status: SHIPPED | OUTPATIENT
Start: 2018-06-01 | End: 2018-07-24 | Stop reason: SDUPTHER

## 2018-06-01 NOTE — TELEPHONE ENCOUNTER
PATIENT REQUESTING REFILL ON KEPPRA. HE WILL BE OUT OF MEDICATION THIS EVENING. SEND TO [place]

## 2018-06-04 ENCOUNTER — TELEPHONE (OUTPATIENT)
Dept: NEUROLOGY | Facility: CLINIC | Age: 58
End: 2018-06-04

## 2018-06-04 DIAGNOSIS — G40.909 SEIZURE DISORDER (HCC): Primary | ICD-10-CM

## 2018-06-04 NOTE — TELEPHONE ENCOUNTER
Donna was reminded that it was time for Vince's Dilantin level.  She said if we put an order in [facility].

## 2018-06-07 ENCOUNTER — LAB (OUTPATIENT)
Dept: LAB | Facility: HOSPITAL | Age: 58
End: 2018-06-07

## 2018-06-07 DIAGNOSIS — G40.909 SEIZURE DISORDER (HCC): ICD-10-CM

## 2018-06-07 LAB — PHENYTOIN SERPL-MCNC: 3.6 MCG/ML (ref 10–20)

## 2018-06-07 PROCEDURE — 80185 ASSAY OF PHENYTOIN TOTAL: CPT

## 2018-06-07 PROCEDURE — 36415 COLL VENOUS BLD VENIPUNCTURE: CPT

## 2018-06-08 ENCOUNTER — TELEPHONE (OUTPATIENT)
Dept: NEUROLOGY | Facility: CLINIC | Age: 58
End: 2018-06-08

## 2018-06-08 NOTE — TELEPHONE ENCOUNTER
Call in potassium 10 meq 1 po daily for 2 days, then follow up with PCP for repeat potassium check next week   Patient called back  Informed of +chlamydia  Patient was treated in the ER  Instructed patient to have partner get tested and treated  Patient agreeable

## 2018-06-08 NOTE — PROGRESS NOTES
Please let Mr. Olivera know his Dilantin level was again low, is he taking this medication as prescribed which is 200 mg in the morning and 300 mg at night?  Has she been missing doses?  Also verify his dose of Keppra, I am going to increase the Keppra to more therapeutic dose.

## 2018-06-08 NOTE — TELEPHONE ENCOUNTER
Donna is informed.  She said he is taking Dilantin 200mg bid but can't tolerate the extra 300mg due to dizziness.   Pt is taking Keppra 500mg q 12 hours.

## 2018-06-08 NOTE — TELEPHONE ENCOUNTER
----- Message from KINGSTON Rawls sent at 6/8/2018  9:24 AM EDT -----  Please let Mr. Olivera know his Dilantin level was again low, is he taking this medication as prescribed which is 200 mg in the morning and 300 mg at night?  Has she been missing doses?  Also verify his dose of Keppra, I am going to increase the Keppra to more therapeutic dose.

## 2018-06-23 DIAGNOSIS — I10 ESSENTIAL HYPERTENSION: ICD-10-CM

## 2018-06-25 RX ORDER — HYDROCHLOROTHIAZIDE 25 MG/1
TABLET ORAL
Qty: 30 TABLET | Refills: 0 | OUTPATIENT
Start: 2018-06-25

## 2018-06-26 DIAGNOSIS — I10 ESSENTIAL HYPERTENSION: ICD-10-CM

## 2018-06-26 RX ORDER — HYDROCHLOROTHIAZIDE 25 MG/1
25 TABLET ORAL DAILY
Qty: 30 TABLET | Refills: 5 | Status: SHIPPED | OUTPATIENT
Start: 2018-06-26 | End: 2018-07-24 | Stop reason: SDUPTHER

## 2018-07-10 ENCOUNTER — APPOINTMENT (OUTPATIENT)
Dept: LAB | Facility: HOSPITAL | Age: 58
End: 2018-07-10

## 2018-07-10 ENCOUNTER — OFFICE VISIT (OUTPATIENT)
Dept: NEUROLOGY | Facility: CLINIC | Age: 58
End: 2018-07-10

## 2018-07-10 VITALS
HEIGHT: 66 IN | OXYGEN SATURATION: 99 % | DIASTOLIC BLOOD PRESSURE: 92 MMHG | BODY MASS INDEX: 31.66 KG/M2 | HEART RATE: 62 BPM | SYSTOLIC BLOOD PRESSURE: 148 MMHG | WEIGHT: 197 LBS

## 2018-07-10 DIAGNOSIS — I65.23 BILATERAL CAROTID ARTERY STENOSIS: ICD-10-CM

## 2018-07-10 DIAGNOSIS — I69.398 SEIZURE DISORDER AS SEQUELA OF CEREBROVASCULAR ACCIDENT (HCC): Primary | ICD-10-CM

## 2018-07-10 DIAGNOSIS — G40.909 SEIZURE DISORDER AS SEQUELA OF CEREBROVASCULAR ACCIDENT (HCC): Primary | ICD-10-CM

## 2018-07-10 LAB
ANION GAP SERPL CALCULATED.3IONS-SCNC: 5 MMOL/L (ref 3–11)
BUN BLD-MCNC: 8 MG/DL (ref 9–23)
BUN/CREAT SERPL: 9.6 (ref 7–25)
CALCIUM SPEC-SCNC: 9.9 MG/DL (ref 8.7–10.4)
CHLORIDE SERPL-SCNC: 101 MMOL/L (ref 99–109)
CO2 SERPL-SCNC: 34 MMOL/L (ref 20–31)
CREAT BLD-MCNC: 0.83 MG/DL (ref 0.6–1.3)
GFR SERPL CREATININE-BSD FRML MDRD: 96 ML/MIN/1.73
GLUCOSE BLD-MCNC: 128 MG/DL (ref 70–100)
PHENYTOIN SERPL-MCNC: 5 MCG/ML (ref 10–20)
POTASSIUM BLD-SCNC: 3.8 MMOL/L (ref 3.5–5.5)
SODIUM BLD-SCNC: 140 MMOL/L (ref 132–146)

## 2018-07-10 PROCEDURE — 80048 BASIC METABOLIC PNL TOTAL CA: CPT | Performed by: NURSE PRACTITIONER

## 2018-07-10 PROCEDURE — 80177 DRUG SCRN QUAN LEVETIRACETAM: CPT | Performed by: NURSE PRACTITIONER

## 2018-07-10 PROCEDURE — 99214 OFFICE O/P EST MOD 30 MIN: CPT | Performed by: NURSE PRACTITIONER

## 2018-07-10 PROCEDURE — 36415 COLL VENOUS BLD VENIPUNCTURE: CPT | Performed by: NURSE PRACTITIONER

## 2018-07-10 PROCEDURE — 80185 ASSAY OF PHENYTOIN TOTAL: CPT | Performed by: NURSE PRACTITIONER

## 2018-07-10 NOTE — PROGRESS NOTES
Subjective:     Patient ID: Vince Olivera is a 57 y.o. male.    CC:   Chief Complaint   Patient presents with   • Seizures       HPI:   History of Present Illness     Mr Olivera is here today for follow up on seizure disorder.  He is doing well since last visit, no seizure activity since last ER visit when dilantin level was subtherapeutic. He was unable to tolerate increased dilantin dosage due to dizziness, he is currently taking dilantin 200 mg ER BID along with keppra 1000 mg am and 1500 mg evening; keppra was increased when he cut back on his dilantin dose.  He is feeling very well overall, he is active and working with no stroke s/s.  He does c/o persistent episodic dizziness that he reports only started after initiation of dilantin during last hospitalization.  He does have documented carotid artery disease with 50-69% stenosis KANNAN and <50% stenosis LICA on last duplex 4/2018, this was stable from previous and is followed by Dr. Bach.  He has follow up with NS in January.  Dizziness was present when last carotid US was performed.      The following portions of the patient's history were reviewed and updated as appropriate: allergies, current medications, past family history, past medical history, past social history, past surgical history and problem list.    Past Medical History:   Diagnosis Date   • Arthritis    • Carotid artery disorder (CMS/HCC)    • Diabetes mellitus (CMS/HCC)     DIAGNOSED 4-2017 CHECKS FSBG 3X/WEEK    • Ear infection     about 2 weeks ago- cleared up - wax removed and cleaned out    • GERD (gastroesophageal reflux disease)     self diagnosed takes otc ppi   • Hyperlipidemia    • Hypertension    • Hypertension    • Seizure (CMS/HCC)    • Shoulder pain     bilat    • Stroke (CMS/HCC) 04/24/2017    EXPRESSIVE APHASIA RESIDUAL    • Tooth loose     5 - all over- will get teeth done after this surgery    • Wears reading eyeglasses        Past Surgical History:   Procedure Laterality  Date   • ABDOMINAL HERNIA REPAIR  2010   • CAROTID ENDARTERECTOMY Left 10/17/2017    Procedure: CAROTID ENDARTERECTOMY LEFT;  Surgeon: Alexx Bach MD;  Location: [facility];  Service:    • CAROTID ENDARTERECTOMY Left 10/17/2017       Social History     Social History   • Marital status:      Spouse name: N/A   • Number of children: N/A   • Years of education: N/A     Occupational History   • Disabled      Social History Main Topics   • Smoking status: Former Smoker     Packs/day: 0.05     Years: 40.00     Types: Cigarettes     Quit date: 4/26/2017   • Smokeless tobacco: Former User     Types: Snuff      Comment: quit snuff when 18 or 19 years old - only did for baseball - 2-3 years    • Alcohol use No   • Drug use: No   • Sexual activity: Yes     Partners: Female      Comment: SPOUSE     Other Topics Concern   • Not on file     Social History Narrative    Patient consumes 0-1serving of caffeine daily.     Patient lives at home with wife.            Family History   Problem Relation Age of Onset   • Diabetes Mother    • COPD Father         Review of Systems   Constitutional: Negative.    HENT: Negative.    Eyes: Negative.    Respiratory: Negative.    Cardiovascular: Negative.    Gastrointestinal: Negative.    Endocrine: Negative.    Genitourinary: Negative.    Musculoskeletal: Positive for neck pain and neck stiffness.   Allergic/Immunologic: Negative.    Neurological: Positive for dizziness and seizures (none since last ER visit and increase in Keppra). Negative for tremors, syncope, facial asymmetry, speech difficulty, weakness, light-headedness, numbness and headaches.   Hematological: Negative.    Psychiatric/Behavioral: Negative.         Objective:    Neurologic Exam     Mental Status   Oriented to person, place, and time.   Attention: normal. Concentration: normal.   Speech: speech is normal   Level of consciousness: alert  Knowledge: consistent with education.   Able to read. Able to write.  Normal comprehension.     Cranial Nerves   Cranial nerves II through XII intact.     CN III, IV, VI   Pupils are equal, round, and reactive to light.  Extraocular motions are normal.     Motor Exam   Muscle bulk: normal  Overall muscle tone: normal  Right arm tone: normal  Left arm tone: normal  Right arm pronator drift: absent  Left arm pronator drift: absent  Right leg tone: normal  Left leg tone: normal    Strength   Strength 5/5 throughout.     Gait, Coordination, and Reflexes     Gait  Gait: normal    Coordination   Romberg: negative  Finger to nose coordination: normal    Tremor   Resting tremor: absent  Intention tremor: absent  Action tremor: absent    Reflexes   Right brachioradialis: 1+  Left brachioradialis: 1+  Right biceps: 1+  Left biceps: 1+  Right triceps: 1+  Left triceps: 1+  Right patellar: 1+  Left patellar: 1+  Right achilles: 1+  Left achilles: 1+  Right : 1+  Left : 1+  Right Rick: absent  Left Rick: absent      Physical Exam   Constitutional: He is oriented to person, place, and time. He appears well-developed and well-nourished. No distress.   HENT:   Head: Normocephalic and atraumatic.   Eyes: EOM are normal. Pupils are equal, round, and reactive to light.   Neck: Normal range of motion. Neck supple.   Cardiovascular: Normal rate.    Pulmonary/Chest: Effort normal. No respiratory distress.   Neurological: He is alert and oriented to person, place, and time. He has normal strength. He displays normal reflexes. No cranial nerve deficit. He exhibits normal muscle tone. He has a normal Finger-Nose-Finger Test and a normal Romberg Test. Gait normal.   Reflex Scores:       Tricep reflexes are 1+ on the right side and 1+ on the left side.       Bicep reflexes are 1+ on the right side and 1+ on the left side.       Brachioradialis reflexes are 1+ on the right side and 1+ on the left side.       Patellar reflexes are 1+ on the right side and 1+ on the left side.       Achilles reflexes  are 1+ on the right side and 1+ on the left side.  Skin: Skin is warm.   Psychiatric: He has a normal mood and affect. His speech is normal and behavior is normal. Judgment and thought content normal.   Vitals reviewed.      Assessment/Plan:       Vince was seen today for seizures.    Diagnoses and all orders for this visit:    Seizure disorder as sequela of cerebrovascular accident (CMS/[facility])  -     Basic Metabolic Panel  -     Levetiracetam Level (Keppra)  -     Phenytoin Level, Total    Bilateral carotid artery stenosis  Comments:  last carotid US 4/2018 unchanged with Left internal carotid stenosis 50-69%    Mr Olivera reports he is doing well with exception of intermittent dizziness which he contributes to initiation of dilantin, dizziness was worse with higher dose of dilantin.  When he was unable to tolerate increased dilantin dose keppra was increased; he would like to try to get off the dilantin.  We will check levels today and likely cut back on dilantin and increase keppra to 1500 mg BID and eventually taper off dilantin.  If dizziness persists after medication change I suggest he follow up with NS as he does have documented carotid stenosis up to 69% (unchanged on last carotid US)  He and significant other verbalize understanding.    Reviewed medications, potential side effects and signs and symptoms to report. Discussed risk versus benefits of treatment plan with patient and/or family-including medications, labs and radiology that may be ordered. Addressed questions and concerns during visit. Patient and/or family verbalized understanding and agree with plan.  Patient instructions include: No driving or operating heavy machinery for 3 months from onset of most recent seizure. Minimize stress as much as possible. Recommended 7-8 hours of sleep each night. Abstain from alcohol intake. Educated on Antiepileptic medications with possible side effects and signs and symptoms to report if prescribed during  visit. Instructed to take seizure medication daily if prescribed. Reviewed potential seizure risk factors. Instructed to call 911 or our office if another seizure does occur.  EMR Dragon/Transcription Disclaimer:  Much of this encounter note is an electronic transcription of spoken language to printed text. Electronic transcription of spoken language may permit erroneous words or phrases to be inadvertently transcribed. Although I have reviewed the note for such errors, some may still exist in this documentation.           Ani Haas, APRN  7/10/2018

## 2018-07-13 LAB — LEVETIRACETAM SERPL-MCNC: 28.3 UG/ML (ref 10–40)

## 2018-07-24 ENCOUNTER — OFFICE VISIT (OUTPATIENT)
Dept: FAMILY MEDICINE CLINIC | Facility: CLINIC | Age: 58
End: 2018-07-24

## 2018-07-24 VITALS
BODY MASS INDEX: 31.58 KG/M2 | TEMPERATURE: 97.3 F | WEIGHT: 196.5 LBS | SYSTOLIC BLOOD PRESSURE: 139 MMHG | DIASTOLIC BLOOD PRESSURE: 87 MMHG | OXYGEN SATURATION: 98 % | HEIGHT: 66 IN | HEART RATE: 71 BPM

## 2018-07-24 DIAGNOSIS — G40.909 SEIZURE DISORDER (HCC): ICD-10-CM

## 2018-07-24 DIAGNOSIS — K02.9 DENTAL CARIES: ICD-10-CM

## 2018-07-24 DIAGNOSIS — E78.5 DYSLIPIDEMIA: ICD-10-CM

## 2018-07-24 DIAGNOSIS — I10 ESSENTIAL HYPERTENSION: Primary | ICD-10-CM

## 2018-07-24 PROCEDURE — 99214 OFFICE O/P EST MOD 30 MIN: CPT | Performed by: FAMILY MEDICINE

## 2018-07-24 RX ORDER — PHENYTOIN SODIUM 100 MG/1
100 CAPSULE, EXTENDED RELEASE ORAL 2 TIMES DAILY
COMMUNITY
End: 2018-08-28

## 2018-07-24 RX ORDER — ATORVASTATIN CALCIUM 80 MG/1
80 TABLET, FILM COATED ORAL NIGHTLY
Qty: 30 TABLET | Refills: 5 | Status: SHIPPED | OUTPATIENT
Start: 2018-07-24 | End: 2019-01-30 | Stop reason: SDUPTHER

## 2018-07-24 RX ORDER — LEVETIRACETAM 500 MG/1
TABLET ORAL
Qty: 150 TABLET | Refills: 1 | Status: SHIPPED | OUTPATIENT
Start: 2018-07-24 | End: 2018-09-18 | Stop reason: SDUPTHER

## 2018-07-24 RX ORDER — LEVETIRACETAM 500 MG/1
1000 TABLET ORAL EVERY 12 HOURS SCHEDULED
Qty: 120 TABLET | Refills: 1 | Status: CANCELLED | OUTPATIENT
Start: 2018-07-24

## 2018-07-24 RX ORDER — HYDROCHLOROTHIAZIDE 12.5 MG/1
12.5 TABLET ORAL DAILY
COMMUNITY
Start: 2018-07-24 | End: 2018-08-20 | Stop reason: SDUPTHER

## 2018-07-24 RX ORDER — HYDROCHLOROTHIAZIDE 25 MG/1
25 TABLET ORAL DAILY
Qty: 30 TABLET | Refills: 5 | Status: CANCELLED | OUTPATIENT
Start: 2018-07-24

## 2018-07-24 NOTE — PROGRESS NOTES
Subjective     Chief Complaint   Patient presents with   • Hypertension   • Dizziness       Vince Olivera is a 57 y.o. male.     History of Present Illness  follow-up regarding hypertension.  Has had some challenges regarding seizure medications.  No recent seizure activity.  Has had what is felt to be intolerance to the Dilantin.  Medications are as reconciled.  Has been visiting with neurology clinic in [place].  Some medication adjustments and recent levels obtained.  Has also visited with neurosurgeon locally who anticipates near total dental extractions.  Desires medical clearance for that procedure to be done under local anesthesia.  States blood sugars are doing reasonably well.  Again has had no seizure activity.  Denies headaches visual changes.  Occasional dizziness that is felt to be possibly related to the Dilantin.  Denies upper or lower GI changes.  Denies urinary concerns.  Stays very active.  No orthopedic concerns at this time.  There was no residua from the CVA essentially at this time besides the concern about seizures it seems.    The following portions of the patient's history were reviewed and updated as appropriate: allergies, current medications, past medical history, past social history, past surgical history and problem list.    Review of Systems see the HPI    Objective   Physical Exam   Constitutional: He is oriented to person, place, and time. He appears well-developed and well-nourished.   HENT:   Head: Normocephalic.   Right Ear: External ear normal.   Left Ear: External ear normal.   Mouth/Throat: Oropharynx is clear and moist.   Very poor dentition   Eyes: Pupils are equal, round, and reactive to light. Conjunctivae and EOM are normal.   Neck: Normal range of motion. Neck supple. No thyromegaly present.   Cardiovascular: Normal rate, regular rhythm, normal heart sounds and intact distal pulses.    No murmur heard.  Pulmonary/Chest: Effort normal and breath sounds normal.  "  Abdominal: Soft. There is no tenderness.   Musculoskeletal: He exhibits no edema.   Lymphadenopathy:     He has no cervical adenopathy.   Neurological: He is alert and oriented to person, place, and time.   Skin: Skin is warm and dry.   Psychiatric: He has a normal mood and affect.   Vitals reviewed.    /87 (BP Location: Right arm, Patient Position: Sitting, Cuff Size: Adult)   Pulse 71   Temp 97.3 °F (36.3 °C) (Oral)   Ht 167.6 cm (65.98\")   Wt 89.1 kg (196 lb 8 oz)   SpO2 98%   BMI 31.73 kg/m²   Assessment/Plan   Vince was seen today for hypertension and dizziness.    Diagnoses and all orders for this visit:    Essential hypertension    Seizure disorder (CMS/HCC)  -     levETIRAcetam (KEPPRA) 500 MG tablet; Take 2 am  And 3 pm    Dyslipidemia  -     atorvastatin (LIPITOR) 80 MG tablet; Take 1 tablet by mouth Every Night.    Dental caries    Other orders  -     Cancel: hydrochlorothiazide (HYDRODIURIL) 25 MG tablet; Take 1 tablet by mouth Daily.     At this time will continue medications as reconciled ordered.  I encourage you to be in contact with neurology clinic.  It appears that recent labs were unremarkable.  You report they are attempting to decrease the Dilantin.  I believe you are in except a candidate to have the proposed procedure under local anesthetic.  Follow-up.  Just a few months.         "

## 2018-07-24 NOTE — PROGRESS NOTES
I spoke with Ms Olivera, pt wishes to get off dilantin.  Will increase keppra to 1500 bid and cut back dilantin to 100 mg daily from 200 mg daily.  She will call to check in with office in 2 weeks or sooner if needed.  No driving, report any seizures

## 2018-07-25 NOTE — TELEPHONE ENCOUNTER
----- Message from KINGSTON Rawls sent at 7/24/2018  6:58 PM EDT -----  I spoke with Ms Olivera, pt wishes to get off dilantin.  Will increase keppra to 1500 bid and cut back dilantin to 100 mg daily from 200 mg daily.  She will call to check in with office in 2 weeks or sooner if needed.  No driving, report any seizures

## 2018-08-20 ENCOUNTER — OFFICE VISIT (OUTPATIENT)
Dept: NEUROLOGY | Facility: CLINIC | Age: 58
End: 2018-08-20

## 2018-08-20 VITALS
HEART RATE: 61 BPM | SYSTOLIC BLOOD PRESSURE: 130 MMHG | BODY MASS INDEX: 31.33 KG/M2 | WEIGHT: 194 LBS | DIASTOLIC BLOOD PRESSURE: 80 MMHG | OXYGEN SATURATION: 99 %

## 2018-08-20 DIAGNOSIS — I65.23 BILATERAL CAROTID ARTERY STENOSIS: Primary | ICD-10-CM

## 2018-08-20 DIAGNOSIS — I69.398 SEIZURE DISORDER AS SEQUELA OF CEREBROVASCULAR ACCIDENT (HCC): ICD-10-CM

## 2018-08-20 DIAGNOSIS — I10 ESSENTIAL HYPERTENSION: ICD-10-CM

## 2018-08-20 DIAGNOSIS — G40.909 SEIZURE DISORDER AS SEQUELA OF CEREBROVASCULAR ACCIDENT (HCC): ICD-10-CM

## 2018-08-20 PROCEDURE — 99213 OFFICE O/P EST LOW 20 MIN: CPT | Performed by: NURSE PRACTITIONER

## 2018-08-20 RX ORDER — HYDROCHLOROTHIAZIDE 12.5 MG/1
12.5 TABLET ORAL DAILY
Qty: 30 TABLET | Refills: 5 | Status: SHIPPED | OUTPATIENT
Start: 2018-08-20 | End: 2018-10-23 | Stop reason: SDUPTHER

## 2018-08-20 NOTE — PROGRESS NOTES
Subjective:     Patient ID: Vince Olivera is a 57 y.o. male.    CC:   Chief Complaint   Patient presents with   • Seizures       HPI:   History of Present Illness     Mr. Olivera is doing well.  He's here for follow-up on seizures after CVA last years.  No seizures since I saw him last, he is now on Keppra 1500 mg twice a day with only 100 mg of Dilantin once a day, he would like to stop the Dilantin as dizziness started with the initiation of this medication.  He is working around the house with no problems.  No new stroke symptoms  He is under care of NS for carotid stenosis s/p intervention      The following portions of the patient's history were reviewed and updated as appropriate: allergies, current medications, past family history, past medical history, past social history, past surgical history and problem list.    Past Medical History:   Diagnosis Date   • Arthritis    • Carotid artery disorder (CMS/HCC)    • Diabetes mellitus (CMS/HCC)     DIAGNOSED 4-2017 CHECKS FSBG 3X/WEEK    • Ear infection     about 2 weeks ago- cleared up - wax removed and cleaned out    • GERD (gastroesophageal reflux disease)     self diagnosed takes otc ppi   • Hyperlipidemia    • Hypertension    • Hypertension    • Seizure (CMS/HCC)    • Shoulder pain     bilat    • Stroke (CMS/HCC) 04/24/2017    EXPRESSIVE APHASIA RESIDUAL    • Tooth loose     5 - all over- will get teeth done after this surgery    • Wears reading eyeglasses        Past Surgical History:   Procedure Laterality Date   • ABDOMINAL HERNIA REPAIR  2010   • CAROTID ENDARTERECTOMY Left 10/17/2017    Procedure: CAROTID ENDARTERECTOMY LEFT;  Surgeon: Alexx Bach MD;  [facility]  Service:    • CAROTID ENDARTERECTOMY Left 10/17/2017       Social History     Social History   • Marital status:      Spouse name: N/A   • Number of children: N/A   • Years of education: N/A     Occupational History   • Disabled      Social History Main Topics    • Smoking status: Former Smoker     Packs/day: 0.05     Years: 40.00     Types: Cigarettes     Quit date: 4/26/2017   • Smokeless tobacco: Former User     Types: Snuff      Comment: quit snuff when 18 or 19 years old - only did for baseball - 2-3 years    • Alcohol use No   • Drug use: No   • Sexual activity: Defer      Comment: SPOUSE     Other Topics Concern   • Not on file     Social History Narrative    Patient consumes 0-1serving of caffeine daily.     Patient lives at home with wife.            Family History   Problem Relation Age of Onset   • Diabetes Mother    • COPD Father         Review of Systems   Constitutional: Negative for chills, fatigue, fever and unexpected weight change.   HENT: Negative for ear pain, hearing loss, nosebleeds, rhinorrhea and sore throat.    Eyes: Negative for photophobia, pain, discharge, itching and visual disturbance.   Respiratory: Negative for cough, chest tightness, shortness of breath and wheezing.    Cardiovascular: Negative for chest pain, palpitations and leg swelling.   Gastrointestinal: Negative for abdominal pain, blood in stool, constipation, diarrhea, nausea and vomiting.   Genitourinary: Negative for dysuria, frequency, hematuria and urgency.   Musculoskeletal: Negative for arthralgias, back pain, gait problem, joint swelling, myalgias, neck pain and neck stiffness.   Skin: Negative for rash and wound.   Allergic/Immunologic: Negative for environmental allergies and food allergies.   Neurological: Positive for dizziness. Negative for tremors, seizures, syncope, speech difficulty, weakness, light-headedness, numbness and headaches.   Hematological: Negative for adenopathy. Does not bruise/bleed easily.   Psychiatric/Behavioral: Positive for dysphoric mood. Negative for agitation, confusion, decreased concentration, hallucinations, sleep disturbance and suicidal ideas. The patient is nervous/anxious.    All other systems reviewed and are negative.        Objective:    Neurologic Exam     Mental Status   Oriented to person, place, and time.   Attention: normal. Concentration: normal.   Speech: speech is normal   Level of consciousness: alert  Knowledge: consistent with education.   Able to read. Able to write. Normal comprehension.     Cranial Nerves   Cranial nerves II through XII intact.     CN III, IV, VI   Pupils are equal, round, and reactive to light.    Motor Exam   Muscle bulk: normal    Gait, Coordination, and Reflexes     Gait  Gait: normal    Coordination   Romberg: negative  Finger to nose coordination: normal  Heel to shin coordination: normal    Tremor   Resting tremor: absent  Intention tremor: absent  Action tremor: absent    Reflexes   Right brachioradialis: 1+  Left brachioradialis: 1+  Right biceps: 1+  Left biceps: 1+  Right triceps: 1+  Left triceps: 1+  Right patellar: 1+  Left patellar: 1+  Right achilles: 1+  Left achilles: 1+  Right : 1+  Left : 1+  Right Rick: absent  Left Rick: absent      Physical Exam   Constitutional: He is oriented to person, place, and time. He appears well-developed and well-nourished. No distress.   HENT:   Head: Normocephalic and atraumatic.   Eyes: Pupils are equal, round, and reactive to light.   Neck: Normal range of motion. Neck supple.   Cardiovascular: Normal rate.    Pulmonary/Chest: Effort normal.   Neurological: He is alert and oriented to person, place, and time. He has a normal Finger-Nose-Finger Test, a normal Heel to Gunn Test and a normal Romberg Test. Gait normal.   Reflex Scores:       Tricep reflexes are 1+ on the right side and 1+ on the left side.       Bicep reflexes are 1+ on the right side and 1+ on the left side.       Brachioradialis reflexes are 1+ on the right side and 1+ on the left side.       Patellar reflexes are 1+ on the right side and 1+ on the left side.       Achilles reflexes are 1+ on the right side and 1+ on the left side.  Psychiatric: He has a normal mood and  affect. His speech is normal and behavior is normal. Judgment and thought content normal.   Vitals reviewed.      Assessment/Plan:       Vince was seen today for seizures.    Diagnoses and all orders for this visit:    Bilateral carotid artery stenosis  -     hydrochlorothiazide (HYDRODIURIL) 12.5 MG tablet; Take 1 tablet by mouth Daily.  -     Duplex Carotid Ultrasound CAR    Essential hypertension  -     hydrochlorothiazide (HYDRODIURIL) 12.5 MG tablet; Take 1 tablet by mouth Daily.    Seizure disorder as sequela of cerebrovascular accident (CMS/HCC)  -     lacosamide (VIMPAT) 50 MG tablet tablet; Take 1 tablet by mouth Every 12 (Twelve) Hours.    We will stop Dilantin and start Vimpat with the Keppra.  He will let me know ASAP if he has any seizures.  Pt and wife verbalize understanding.    This will touch base with me in about 3-4 weeks otherwise and RTC 3 mo or sooner PRN  Reviewed medications, potential side effects and signs and symptoms to report. Discussed risk versus benefits of treatment plan with patient and/or family-including medications, labs and radiology that may be ordered. Addressed questions and concerns during visit. Patient and/or family verbalized understanding and agree with plan.  Patient instructions include: No driving or operating heavy machinery for 3 months from onset of most recent seizure. Minimize stress as much as possible. Recommended 7-8 hours of sleep each night. Abstain from alcohol intake. Educated on Antiepileptic medications with possible side effects and signs and symptoms to report if prescribed during visit. Instructed to take seizure medication daily if prescribed. Reviewed potential seizure risk factors. Instructed to call 911 or our office if another seizure does occur.  EMR Dragon/Transcription Disclaimer:  Much of this encounter note is an electronic transcription of spoken language to printed text. Electronic transcription of spoken language may permit erroneous  words or phrases to be inadvertently transcribed. Although I have reviewed the note for such errors, some may still exist in this documentation.             Ani Haas, APRN  8/28/2018

## 2018-08-28 ENCOUNTER — TELEPHONE (OUTPATIENT)
Dept: NEUROLOGY | Facility: CLINIC | Age: 58
End: 2018-08-28

## 2018-08-28 RX ORDER — LACOSAMIDE 50 MG/1
50 TABLET ORAL EVERY 12 HOURS SCHEDULED
Qty: 60 TABLET | Refills: 2 | Status: SHIPPED | OUTPATIENT
Start: 2018-08-28 | End: 2019-03-18 | Stop reason: SDUPTHER

## 2018-08-28 NOTE — TELEPHONE ENCOUNTER
Pt is informed to stop Dilantin and start Vimpat.  He will led us know if he feels bad and give us a report in 3-4 weeks.

## 2018-08-31 RX ORDER — PHENYTOIN SODIUM 100 MG/1
100 CAPSULE, EXTENDED RELEASE ORAL DAILY
Qty: 15 CAPSULE | Refills: 0 | Status: SHIPPED | OUTPATIENT
Start: 2018-08-31 | End: 2018-10-23

## 2018-09-14 ENCOUNTER — TELEPHONE (OUTPATIENT)
Dept: NEUROLOGY | Facility: CLINIC | Age: 58
End: 2018-09-14

## 2018-09-14 DIAGNOSIS — G40.909 SEIZURE DISORDER (HCC): ICD-10-CM

## 2018-09-17 ENCOUNTER — TELEPHONE (OUTPATIENT)
Dept: NEUROLOGY | Facility: CLINIC | Age: 58
End: 2018-09-17

## 2018-09-17 ENCOUNTER — DOCUMENTATION (OUTPATIENT)
Dept: NEUROLOGY | Facility: CLINIC | Age: 58
End: 2018-09-17

## 2018-09-17 DIAGNOSIS — R73.9 HYPERGLYCEMIA: Primary | ICD-10-CM

## 2018-09-17 DIAGNOSIS — G40.909 SEIZURE DISORDER (HCC): ICD-10-CM

## 2018-09-17 RX ORDER — LEVETIRACETAM 500 MG/1
TABLET ORAL
Qty: 150 TABLET | Refills: 1 | OUTPATIENT
Start: 2018-09-17

## 2018-09-17 RX ORDER — LEVETIRACETAM 500 MG/1
TABLET ORAL
Qty: 150 TABLET | Refills: 1 | Status: CANCELLED | OUTPATIENT
Start: 2018-09-17

## 2018-09-17 NOTE — TELEPHONE ENCOUNTER
Donna called to let us know that the pcp wants us to put the order in for the A1C..   Donna also said that you wanted to per Vince on a medication that was expensive.  I will call pharmacy to figure out what we need to do.

## 2018-09-17 NOTE — TELEPHONE ENCOUNTER
They picked up  The vimpat but stated it was expensive and not covered, we need to work on PA for this, on keppra, failed dilantin  A1c order put in

## 2018-09-17 NOTE — TELEPHONE ENCOUNTER
Have patient contact neurology.  There only needs to be one provider prescribing seizure medications.  They are managing the seizure disorder.

## 2018-09-18 DIAGNOSIS — G40.909 SEIZURE DISORDER (HCC): ICD-10-CM

## 2018-09-18 RX ORDER — LEVETIRACETAM 500 MG/1
TABLET ORAL
Qty: 180 TABLET | Refills: 5 | Status: SHIPPED | OUTPATIENT
Start: 2018-09-18 | End: 2019-03-18 | Stop reason: SDUPTHER

## 2018-09-25 DIAGNOSIS — I63.9 CEREBROVASCULAR ACCIDENT (CVA), UNSPECIFIED MECHANISM (HCC): ICD-10-CM

## 2018-09-25 RX ORDER — CLOPIDOGREL BISULFATE 75 MG/1
75 TABLET ORAL DAILY
Qty: 30 TABLET | Refills: 5 | Status: SHIPPED | OUTPATIENT
Start: 2018-09-25 | End: 2019-02-19 | Stop reason: SDUPTHER

## 2018-09-26 ENCOUNTER — HOSPITAL ENCOUNTER (OUTPATIENT)
Dept: CARDIOLOGY | Facility: HOSPITAL | Age: 58
Discharge: HOME | End: 2018-09-26
Admitting: NURSE PRACTITIONER

## 2018-09-26 PROCEDURE — 93880 EXTRACRANIAL BILAT STUDY: CPT

## 2018-09-26 PROCEDURE — 93880 EXTRACRANIAL BILAT STUDY: CPT | Performed by: RADIOLOGY

## 2018-10-05 ENCOUNTER — TELEPHONE (OUTPATIENT)
Dept: NEUROLOGY | Facility: CLINIC | Age: 58
End: 2018-10-05

## 2018-10-10 ENCOUNTER — APPOINTMENT (OUTPATIENT)
Dept: LAB | Facility: HOSPITAL | Age: 58
End: 2018-10-10

## 2018-10-10 LAB — HBA1C MFR BLD: 6.2 % (ref 4.5–5.7)

## 2018-10-10 PROCEDURE — 83036 HEMOGLOBIN GLYCOSYLATED A1C: CPT | Performed by: NURSE PRACTITIONER

## 2018-10-10 PROCEDURE — 36415 COLL VENOUS BLD VENIPUNCTURE: CPT | Performed by: NURSE PRACTITIONER

## 2018-10-23 ENCOUNTER — OFFICE VISIT (OUTPATIENT)
Dept: FAMILY MEDICINE CLINIC | Facility: CLINIC | Age: 58
End: 2018-10-23

## 2018-10-23 VITALS
DIASTOLIC BLOOD PRESSURE: 90 MMHG | HEART RATE: 65 BPM | WEIGHT: 197.2 LBS | BODY MASS INDEX: 31.69 KG/M2 | HEIGHT: 66 IN | SYSTOLIC BLOOD PRESSURE: 153 MMHG | TEMPERATURE: 97.2 F

## 2018-10-23 DIAGNOSIS — I65.23 BILATERAL CAROTID ARTERY STENOSIS: ICD-10-CM

## 2018-10-23 DIAGNOSIS — I10 ESSENTIAL HYPERTENSION: Primary | ICD-10-CM

## 2018-10-23 DIAGNOSIS — Z23 FLU VACCINE NEED: ICD-10-CM

## 2018-10-23 PROCEDURE — 99213 OFFICE O/P EST LOW 20 MIN: CPT | Performed by: FAMILY MEDICINE

## 2018-10-23 PROCEDURE — 90674 CCIIV4 VAC NO PRSV 0.5 ML IM: CPT | Performed by: FAMILY MEDICINE

## 2018-10-23 PROCEDURE — 90471 IMMUNIZATION ADMIN: CPT | Performed by: FAMILY MEDICINE

## 2018-10-23 RX ORDER — HYDROCHLOROTHIAZIDE 25 MG/1
25 TABLET ORAL DAILY
Qty: 30 TABLET | Refills: 6 | Status: SHIPPED | OUTPATIENT
Start: 2018-10-23 | End: 2019-02-19 | Stop reason: SDUPTHER

## 2018-10-23 RX ORDER — METOPROLOL SUCCINATE 200 MG/1
200 TABLET, EXTENDED RELEASE ORAL DAILY
Qty: 30 TABLET | Refills: 6 | Status: SHIPPED | OUTPATIENT
Start: 2018-10-23 | End: 2019-05-20 | Stop reason: SDUPTHER

## 2018-10-23 NOTE — PROGRESS NOTES
Subjective   Vince Olivera is a 57 y.o. male.     History of Present Illness follow-up regarding hypertension.  Has visited with neurology.  Medications have been adjusted.  Currently using Keppra 1200 a.m. and 1500 mg p.m.  All other medications are as reconciled.  Had recent carotid ultrasound.  Will be following with vascular surgeon in near future.  Denies seizure activity.  Denies CP SOB palpitations GI  changes.  Did have all teeth extracted.  Still has persistent dizziness.    The following portions of the patient's history were reviewed and updated as appropriate: current medications, past family history, past medical history, past social history, past surgical history and problem list.    Review of Systems see the history of present illness    Objective   Physical Exam   Constitutional: He is oriented to person, place, and time. He appears well-developed and well-nourished.   HENT:   Head: Normocephalic.   Mouth/Throat: Oropharynx is clear and moist.   Edentulous   Eyes: Pupils are equal, round, and reactive to light. Conjunctivae and EOM are normal.   Neck: Normal range of motion. Neck supple. No thyromegaly present.   Cardiovascular: Normal rate, regular rhythm and normal heart sounds.    No murmur heard.  Pulmonary/Chest: Effort normal and breath sounds normal.   Musculoskeletal: He exhibits no edema.   Neurological: He is alert and oriented to person, place, and time.   Skin: Skin is warm and dry.   Psychiatric: He has a normal mood and affect.   Vitals reviewed.      Assessment/Plan   Vince was seen today for essential hypertension.    Diagnoses and all orders for this visit:    Essential hypertension  -     metoprolol succinate XL (TOPROL XL) 200 MG 24 hr tablet; Take 1 tablet by mouth Daily.  -     hydrochlorothiazide (HYDRODIURIL) 25 MG tablet; Take 1 tablet by mouth Daily.    Flu vaccine need  -     Flucelvax Quad=>4Years (PFS)    Bilateral carotid artery stenosis  -     hydrochlorothiazide  (HYDRODIURIL) 25 MG tablet; Take 1 tablet by mouth Daily.     flu vaccine given.  Will continue all medications as reconciled ordered.  Stay safely active.  Maintain heart healthy diet.  Keep follow-ups as scheduled.  Recheck here in about 6 months or as needed.  Most recent A1c was very acceptable.    Patient's Body mass index is 31.84 kg/m². BMI is above normal parameters. Recommendations include: exercise counseling and nutrition counseling.

## 2018-10-29 ENCOUNTER — OFFICE VISIT (OUTPATIENT)
Dept: NEUROLOGY | Facility: CLINIC | Age: 58
End: 2018-10-29

## 2018-10-29 VITALS
OXYGEN SATURATION: 98 % | HEART RATE: 84 BPM | WEIGHT: 194 LBS | DIASTOLIC BLOOD PRESSURE: 80 MMHG | SYSTOLIC BLOOD PRESSURE: 134 MMHG | BODY MASS INDEX: 31.33 KG/M2

## 2018-10-29 DIAGNOSIS — R20.2 NUMBNESS AND TINGLING: ICD-10-CM

## 2018-10-29 DIAGNOSIS — I69.398 SEIZURE DISORDER AS SEQUELA OF CEREBROVASCULAR ACCIDENT (HCC): Primary | ICD-10-CM

## 2018-10-29 DIAGNOSIS — G40.909 SEIZURE DISORDER AS SEQUELA OF CEREBROVASCULAR ACCIDENT (HCC): Primary | ICD-10-CM

## 2018-10-29 DIAGNOSIS — R42 DIZZINESS: ICD-10-CM

## 2018-10-29 DIAGNOSIS — R20.0 NUMBNESS AND TINGLING: ICD-10-CM

## 2018-10-29 PROCEDURE — 99213 OFFICE O/P EST LOW 20 MIN: CPT | Performed by: NURSE PRACTITIONER

## 2018-11-07 ENCOUNTER — TELEPHONE (OUTPATIENT)
Dept: NEUROLOGY | Facility: CLINIC | Age: 58
End: 2018-11-07

## 2018-11-07 NOTE — TELEPHONE ENCOUNTER
Per Mayra pt is informed to take his Vimpat 100mg 1 po bid starting now, and take Keppra 750mg q a.m. And 1000mg in evening for one week and then Keppra 750mg 1 po bid.  Pt is instructed to then call in three weeks with an update and sooner if needed.  Verbalizes understanding.

## 2018-11-19 RX ORDER — ASPIRIN 325 MG
325 TABLET, DELAYED RELEASE (ENTERIC COATED) ORAL DAILY
Qty: 30 TABLET | Refills: 5 | Status: SHIPPED | OUTPATIENT
Start: 2018-11-19 | End: 2019-05-15 | Stop reason: SDUPTHER

## 2018-12-13 ENCOUNTER — APPOINTMENT (OUTPATIENT)
Dept: NEUROLOGY | Facility: HOSPITAL | Age: 58
End: 2018-12-13
Attending: NURSE PRACTITIONER

## 2018-12-19 DIAGNOSIS — I10 ESSENTIAL HYPERTENSION: ICD-10-CM

## 2018-12-20 DIAGNOSIS — I10 ESSENTIAL HYPERTENSION: ICD-10-CM

## 2018-12-20 RX ORDER — LOSARTAN POTASSIUM 100 MG/1
50 TABLET ORAL 2 TIMES DAILY
Qty: 60 TABLET | Refills: 5 | Status: SHIPPED | OUTPATIENT
Start: 2018-12-20 | End: 2019-10-25 | Stop reason: SDUPTHER

## 2018-12-20 RX ORDER — LOSARTAN POTASSIUM 100 MG/1
TABLET ORAL
Qty: 60 TABLET | Refills: 0 | OUTPATIENT
Start: 2018-12-20

## 2019-01-16 ENCOUNTER — APPOINTMENT (OUTPATIENT)
Dept: NEUROLOGY | Facility: HOSPITAL | Age: 59
End: 2019-01-16
Attending: NURSE PRACTITIONER

## 2019-01-18 DIAGNOSIS — E11.9 DIABETES MELLITUS TYPE 2 IN NONOBESE (HCC): ICD-10-CM

## 2019-01-30 DIAGNOSIS — E78.5 DYSLIPIDEMIA: ICD-10-CM

## 2019-01-30 RX ORDER — ATORVASTATIN CALCIUM 80 MG/1
80 TABLET, FILM COATED ORAL NIGHTLY
Qty: 30 TABLET | Refills: 5 | Status: SHIPPED | OUTPATIENT
Start: 2019-01-30 | End: 2019-05-20 | Stop reason: SDUPTHER

## 2019-02-05 ENCOUNTER — TELEPHONE (OUTPATIENT)
Dept: NEUROLOGY | Facility: CLINIC | Age: 59
End: 2019-02-05

## 2019-02-12 ENCOUNTER — APPOINTMENT (OUTPATIENT)
Dept: NEUROLOGY | Facility: HOSPITAL | Age: 59
End: 2019-02-12
Attending: NURSE PRACTITIONER

## 2019-02-19 ENCOUNTER — OFFICE VISIT (OUTPATIENT)
Dept: FAMILY MEDICINE CLINIC | Facility: CLINIC | Age: 59
End: 2019-02-19

## 2019-02-19 VITALS
HEIGHT: 66 IN | WEIGHT: 200.6 LBS | TEMPERATURE: 97.8 F | SYSTOLIC BLOOD PRESSURE: 129 MMHG | DIASTOLIC BLOOD PRESSURE: 82 MMHG | HEART RATE: 66 BPM | BODY MASS INDEX: 32.24 KG/M2

## 2019-02-19 DIAGNOSIS — Z23 NEED FOR STREPTOCOCCUS PNEUMONIAE VACCINATION: ICD-10-CM

## 2019-02-19 DIAGNOSIS — I65.23 BILATERAL CAROTID ARTERY STENOSIS: ICD-10-CM

## 2019-02-19 DIAGNOSIS — E11.9 ENCOUNTER FOR DIABETIC FOOT EXAM (HCC): ICD-10-CM

## 2019-02-19 DIAGNOSIS — Z01.00 DIABETIC EYE EXAM (HCC): ICD-10-CM

## 2019-02-19 DIAGNOSIS — I63.9 CEREBROVASCULAR ACCIDENT (CVA), UNSPECIFIED MECHANISM (HCC): ICD-10-CM

## 2019-02-19 DIAGNOSIS — E11.9 TYPE 2 DIABETES MELLITUS WITHOUT COMPLICATION, WITHOUT LONG-TERM CURRENT USE OF INSULIN (HCC): Primary | ICD-10-CM

## 2019-02-19 DIAGNOSIS — I10 ESSENTIAL HYPERTENSION: ICD-10-CM

## 2019-02-19 DIAGNOSIS — E11.9 DIABETIC EYE EXAM (HCC): ICD-10-CM

## 2019-02-19 LAB
ALBUMIN SERPL-MCNC: 4.6 G/DL (ref 3.5–5)
ALBUMIN/GLOB SERPL: 1.5 G/DL (ref 1.5–2.5)
ALP SERPL-CCNC: 70 U/L (ref 40–129)
ALT SERPL W P-5'-P-CCNC: 32 U/L (ref 10–44)
ANION GAP SERPL CALCULATED.3IONS-SCNC: 7 MMOL/L (ref 3.6–11.2)
AST SERPL-CCNC: 16 U/L (ref 10–34)
BASOPHILS # BLD AUTO: 0.04 10*3/MM3 (ref 0–0.3)
BASOPHILS NFR BLD AUTO: 0.5 % (ref 0–2)
BILIRUB SERPL-MCNC: 0.5 MG/DL (ref 0.2–1.8)
BUN BLD-MCNC: 12 MG/DL (ref 7–21)
BUN/CREAT SERPL: 13.5 (ref 7–25)
CALCIUM SPEC-SCNC: 10 MG/DL (ref 7.7–10)
CHLORIDE SERPL-SCNC: 99 MMOL/L (ref 99–112)
CHOLEST SERPL-MCNC: 94 MG/DL (ref 0–200)
CO2 SERPL-SCNC: 33 MMOL/L (ref 24.3–31.9)
CREAT BLD-MCNC: 0.89 MG/DL (ref 0.43–1.29)
DEPRECATED RDW RBC AUTO: 43.6 FL (ref 37–54)
EOSINOPHIL # BLD AUTO: 0.14 10*3/MM3 (ref 0–0.7)
EOSINOPHIL NFR BLD AUTO: 1.7 % (ref 0–5)
ERYTHROCYTE [DISTWIDTH] IN BLOOD BY AUTOMATED COUNT: 13.5 % (ref 11.5–14.5)
GFR SERPL CREATININE-BSD FRML MDRD: 88 ML/MIN/1.73
GLOBULIN UR ELPH-MCNC: 3.1 GM/DL
GLUCOSE BLD-MCNC: 113 MG/DL (ref 70–110)
HBA1C MFR BLD: 7.4 % (ref 4.5–5.7)
HCT VFR BLD AUTO: 41.6 % (ref 42–52)
HDLC SERPL-MCNC: 27 MG/DL (ref 60–100)
HGB BLD-MCNC: 14.7 G/DL (ref 14–18)
IMM GRANULOCYTES # BLD AUTO: 0.01 10*3/MM3 (ref 0–0.03)
IMM GRANULOCYTES NFR BLD AUTO: 0.1 % (ref 0–0.5)
LDLC SERPL CALC-MCNC: 39 MG/DL (ref 0–100)
LDLC/HDLC SERPL: 1.46 {RATIO}
LYMPHOCYTES # BLD AUTO: 4.08 10*3/MM3 (ref 1–3)
LYMPHOCYTES NFR BLD AUTO: 50.7 % (ref 21–51)
MCH RBC QN AUTO: 31.4 PG (ref 27–33)
MCHC RBC AUTO-ENTMCNC: 35.3 G/DL (ref 33–37)
MCV RBC AUTO: 88.9 FL (ref 80–94)
MONOCYTES # BLD AUTO: 0.82 10*3/MM3 (ref 0.1–0.9)
MONOCYTES NFR BLD AUTO: 10.2 % (ref 0–10)
NEUTROPHILS # BLD AUTO: 2.95 10*3/MM3 (ref 1.4–6.5)
NEUTROPHILS NFR BLD AUTO: 36.8 % (ref 30–70)
OSMOLALITY SERPL CALC.SUM OF ELEC: 278.1 MOSM/KG (ref 273–305)
PLATELET # BLD AUTO: 244 10*3/MM3 (ref 130–400)
PMV BLD AUTO: 8.8 FL (ref 6–10)
POTASSIUM BLD-SCNC: 4 MMOL/L (ref 3.5–5.3)
PROT SERPL-MCNC: 7.7 G/DL (ref 6–8)
RBC # BLD AUTO: 4.68 10*6/MM3 (ref 4.7–6.1)
SODIUM BLD-SCNC: 139 MMOL/L (ref 135–153)
TRIGL SERPL-MCNC: 138 MG/DL (ref 0–150)
TSH SERPL DL<=0.05 MIU/L-ACNC: 1.95 MIU/ML (ref 0.55–4.78)
VLDLC SERPL-MCNC: 27.6 MG/DL
WBC NRBC COR # BLD: 8.04 10*3/MM3 (ref 4.5–12.5)

## 2019-02-19 PROCEDURE — 83036 HEMOGLOBIN GLYCOSYLATED A1C: CPT | Performed by: FAMILY MEDICINE

## 2019-02-19 PROCEDURE — 90471 IMMUNIZATION ADMIN: CPT | Performed by: FAMILY MEDICINE

## 2019-02-19 PROCEDURE — 80061 LIPID PANEL: CPT | Performed by: FAMILY MEDICINE

## 2019-02-19 PROCEDURE — 80053 COMPREHEN METABOLIC PANEL: CPT | Performed by: FAMILY MEDICINE

## 2019-02-19 PROCEDURE — 84443 ASSAY THYROID STIM HORMONE: CPT | Performed by: FAMILY MEDICINE

## 2019-02-19 PROCEDURE — 99214 OFFICE O/P EST MOD 30 MIN: CPT | Performed by: FAMILY MEDICINE

## 2019-02-19 PROCEDURE — 85025 COMPLETE CBC W/AUTO DIFF WBC: CPT | Performed by: FAMILY MEDICINE

## 2019-02-19 PROCEDURE — 90732 PPSV23 VACC 2 YRS+ SUBQ/IM: CPT | Performed by: FAMILY MEDICINE

## 2019-02-19 RX ORDER — HYDROCHLOROTHIAZIDE 12.5 MG/1
12.5 TABLET ORAL DAILY
Qty: 30 TABLET | Refills: 6 | Status: SHIPPED | OUTPATIENT
Start: 2019-02-19 | End: 2019-09-11 | Stop reason: SDUPTHER

## 2019-02-19 RX ORDER — CLOPIDOGREL BISULFATE 75 MG/1
75 TABLET ORAL DAILY
Qty: 30 TABLET | Refills: 5 | Status: SHIPPED | OUTPATIENT
Start: 2019-02-19 | End: 2019-09-11 | Stop reason: SDUPTHER

## 2019-02-19 NOTE — PROGRESS NOTES
Subjective   Vince Olivera is a 58 y.o. male.     History of Present Illness follow-up regarding diabetes dyslipidemia hypertension CVA.  Has had no recent seizure activity.  Has maintained visits with neurology.  Has upcoming EMG/NCV of upper and lower extremities pending.  Has difficulties with some muscle spasms.  Has been active as comfortably possible.  Trying to maintain reasonable diet.  Utilizing medications as reconciled.  Denies recent illness.  Denies CP SOB palpitations  GI changes.    The following portions of the patient's history were reviewed and updated as appropriate: allergies, current medications, past medical history, past social history, past surgical history and problem list.    Review of Systems see the history of present illness    Objective   Physical Exam   Constitutional: He is oriented to person, place, and time. He appears well-developed and well-nourished.   HENT:   Head: Normocephalic.   Right Ear: External ear normal.   Left Ear: External ear normal.   Mouth/Throat: Oropharynx is clear and moist.   Edentulous   Eyes: Conjunctivae and EOM are normal. Pupils are equal, round, and reactive to light.   Neck: Normal range of motion. Neck supple. No thyromegaly present.   Cardiovascular: Normal rate, regular rhythm and normal heart sounds.   No murmur heard.  Pulmonary/Chest: Effort normal and breath sounds normal.   Musculoskeletal: He exhibits no edema.   Neurological: He is alert and oriented to person, place, and time. He displays normal reflexes.   Skin: Skin is warm and dry.   Psychiatric: He has a normal mood and affect.   Vitals reviewed.      Assessment/Plan   Vince was seen today for essential hypertension, leg spasms and hip pain.    Diagnoses and all orders for this visit:    Encounter for diabetic foot exam (CMS/[facility])  -     Ambulatory Referral to Podiatry    Essential hypertension  -     hydrochlorothiazide (HYDRODIURIL) 12.5 MG tablet; Take 1 tablet by mouth Daily.  -      CBC & Differential  -     TSH  -     CBC Auto Differential    Bilateral carotid artery stenosis  -     hydrochlorothiazide (HYDRODIURIL) 12.5 MG tablet; Take 1 tablet by mouth Daily.    Cerebrovascular accident (CVA), unspecified mechanism (CMS/[facility])  -     clopidogrel (PLAVIX) 75 MG tablet; Take 1 tablet by mouth Daily.  -     CBC & Differential  -     Lipid Panel  -     CBC Auto Differential    Diabetic eye exam (CMS/[facility])  -     Ambulatory Referral for Diabetic Eye Exam-Ophthalmology    Need for Streptococcus pneumoniae vaccination  -     Pneumococcal Polysaccharide Vaccine 23-Valent Greater Than or Equal To 3yo Subcutaneous / IM    Type 2 diabetes mellitus without complication, without long-term current use of insulin (CMS/[facility])  -     CBC & Differential  -     Comprehensive Metabolic Panel  -     Hemoglobin A1c  -     Lipid Panel  -     TSH  -     CBC Auto Differential  -     Osmolality, Calculated; Future  -     Osmolality, Calculated     overall things appear to be stable.  Neurological follow-up evaluation is ongoing.  I believe the studies planned can enlighten us regarding possible peripheral or central cause for some symptoms.  Continue medications as reconciled ordered.  Multiple lab check as well as referrals for PME accomplish.  Vaccine administered.  Recheck here in a few months routinely.  Stay safely active.  Maintain heart healthy diabetic diet.    Patient's Body mass index is 32.39 kg/m². BMI is above normal parameters. Recommendations include: exercise counseling and nutrition counseling.

## 2019-02-21 PROBLEM — E11.9 TYPE 2 DIABETES MELLITUS WITHOUT COMPLICATION, WITHOUT LONG-TERM CURRENT USE OF INSULIN: Status: ACTIVE | Noted: 2019-02-21

## 2019-02-21 PROBLEM — E11.9 DIABETES MELLITUS TYPE 2 IN NONOBESE (HCC): Status: RESOLVED | Noted: 2017-04-25 | Resolved: 2019-02-21

## 2019-02-22 DIAGNOSIS — E11.9 DIABETES MELLITUS TYPE 2 IN NONOBESE (HCC): ICD-10-CM

## 2019-02-22 NOTE — PROGRESS NOTES
Average sugar test not as good.  Thyroid good.  Cholesterol profile okay.  Other chemistries okay.  Start taking the metformin 2 pills twice daily.  Try to get weight down.  Stay well-hydrated.

## 2019-03-12 ENCOUNTER — TELEPHONE (OUTPATIENT)
Dept: NEUROLOGY | Facility: CLINIC | Age: 59
End: 2019-03-12

## 2019-03-18 ENCOUNTER — HOSPITAL ENCOUNTER (OUTPATIENT)
Dept: NEUROLOGY | Facility: HOSPITAL | Age: 59
Discharge: HOME | End: 2019-03-18
Attending: NURSE PRACTITIONER | Admitting: NURSE PRACTITIONER

## 2019-03-18 ENCOUNTER — OFFICE VISIT (OUTPATIENT)
Dept: NEUROLOGY | Facility: CLINIC | Age: 59
End: 2019-03-18

## 2019-03-18 ENCOUNTER — LAB (OUTPATIENT)
Dept: LAB | Facility: HOSPITAL | Age: 59
End: 2019-03-18

## 2019-03-18 VITALS
BODY MASS INDEX: 33.32 KG/M2 | HEIGHT: 65 IN | WEIGHT: 200 LBS | SYSTOLIC BLOOD PRESSURE: 140 MMHG | OXYGEN SATURATION: 98 % | DIASTOLIC BLOOD PRESSURE: 90 MMHG | HEART RATE: 73 BPM

## 2019-03-18 DIAGNOSIS — G40.909 SEIZURE DISORDER (HCC): Primary | ICD-10-CM

## 2019-03-18 DIAGNOSIS — I63.412 CEREBROVASCULAR ACCIDENT (CVA) DUE TO EMBOLISM OF LEFT MIDDLE CEREBRAL ARTERY (HCC): ICD-10-CM

## 2019-03-18 DIAGNOSIS — I69.398 SEIZURE DISORDER AS SEQUELA OF CEREBROVASCULAR ACCIDENT (HCC): ICD-10-CM

## 2019-03-18 DIAGNOSIS — G40.909 SEIZURE DISORDER (HCC): ICD-10-CM

## 2019-03-18 DIAGNOSIS — G40.909 SEIZURE DISORDER AS SEQUELA OF CEREBROVASCULAR ACCIDENT (HCC): ICD-10-CM

## 2019-03-18 PROCEDURE — 80177 DRUG SCRN QUAN LEVETIRACETAM: CPT

## 2019-03-18 PROCEDURE — 99213 OFFICE O/P EST LOW 20 MIN: CPT | Performed by: NURSE PRACTITIONER

## 2019-03-18 PROCEDURE — 36415 COLL VENOUS BLD VENIPUNCTURE: CPT

## 2019-03-18 PROCEDURE — 95913 NRV CNDJ TEST 13/> STUDIES: CPT

## 2019-03-18 PROCEDURE — 95886 MUSC TEST DONE W/N TEST COMP: CPT

## 2019-03-18 RX ORDER — LEVETIRACETAM 500 MG/1
TABLET ORAL
Qty: 180 TABLET | Refills: 5 | Status: ON HOLD | OUTPATIENT
Start: 2019-03-18 | End: 2019-10-16 | Stop reason: SDUPTHER

## 2019-03-18 RX ORDER — LACOSAMIDE 50 MG/1
50 TABLET ORAL EVERY 12 HOURS SCHEDULED
Qty: 60 TABLET | Refills: 5 | Status: SHIPPED | OUTPATIENT
Start: 2019-03-18 | End: 2019-09-20 | Stop reason: SDUPTHER

## 2019-03-18 NOTE — PROGRESS NOTES
Subjective:     Patient ID: Vince Olivera is a 58 y.o. male.    CC:   Chief Complaint   Patient presents with   • Seizures       HPI:   History of Present Illness     Mr Olivera is here today for follow-up.  He is status post ischemic left MCA CVA 2017 with subsequent seizure disorder.  He was referred to me after having 2 seizures post CVA.  He has a history of carotid stenosis status post intervention.  Throughout the past year he has struggled with dizziness post CVA, dizziness did intensify after Dilantin was started after hospitalization in April 2018.  Over the course of several months Dilantin was tapered off word, Keppra was increased and he was started on low-dose Vimpat.  He reports that his dizziness is greatly improved, he has occasional episodes of dizziness however nothing like when he was taking Dilantin.  He is currently taking Keppra 1500 mg twice daily with Vimpat 50 mg twice daily.  He has had no seizures in nearly 1 year.    Last carotid ultrasound performed in August 2018 showing no significant stenosis compared to  prior ultrasound.  He continues on Lipitor 80 mg, Plavix and aspirin for secondary stroke prevention.  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        The following portions of the patient's history were reviewed and updated as appropriate: allergies, current medications, past family history, past medical history, past social history, past surgical history and problem list.    Past Medical History:   Diagnosis Date   • Arthritis    • Carotid artery disorder (CMS/HCC)    • Diabetes mellitus (CMS/HCC)     DIAGNOSED 4-2017 CHECKS FSBG 3X/WEEK    • Ear infection     about 2 weeks ago- cleared up - wax removed and cleaned out    • GERD (gastroesophageal reflux disease)     self diagnosed takes otc ppi   • Hyperlipidemia    • Hypertension    • Hypertension    • Seizure (CMS/HCC)    • Shoulder pain     bilat    • Stroke  (CMS/[facility]) 2017    EXPRESSIVE APHASIA RESIDUAL    • Tooth loose     5 - all over- will get teeth done after this surgery    • Wears reading eyeglasses        Past Surgical History:   Procedure Laterality Date   • ABDOMINAL HERNIA REPAIR     • CAROTID ENDARTERECTOMY Left 10/17/2017    Procedure: CAROTID ENDARTERECTOMY LEFT;  Surgeon: Alexx Bach MD;  Location: [facility];  Service:    • CAROTID ENDARTERECTOMY Left 10/17/2017       Social History     Socioeconomic History   • Marital status:      Spouse name: Not on file   • Number of children: Not on file   • Years of education: Not on file   • Highest education level: Not on file   Social Needs   • Financial resource strain: Not on file   • Food insecurity - worry: Not on file   • Food insecurity - inability: Not on file   • Transportation needs - medical: Not on file   • Transportation needs - non-medical: Not on file   Occupational History   • Occupation: Disabled   Tobacco Use   • Smoking status: Former Smoker     Packs/day: 0.05     Years: 40.00     Pack years: 2.00     Types: Cigarettes     Last attempt to quit: 2017     Years since quittin.8   • Smokeless tobacco: Former User     Types: Snuff   • Tobacco comment: quit snuff when 18 or 19 years old - only did for baseball - 2-3 years    Substance and Sexual Activity   • Alcohol use: No   • Drug use: No   • Sexual activity: Defer     Partners: Female     Comment: SPOUSE   Other Topics Concern   • Not on file   Social History Narrative    Patient consumes 0-1serving of caffeine daily.     Patient lives at home with wife.        Family History   Problem Relation Age of Onset   • Diabetes Mother    • COPD Father         Review of Systems   Constitutional: Negative.    HENT: Negative for hearing loss, tinnitus, trouble swallowing and voice change.    Eyes: Negative.    Respiratory: Negative.    Cardiovascular: Negative.    Gastrointestinal: Negative.    Endocrine: Negative.     Genitourinary: Negative.    Musculoskeletal: Positive for back pain (chronic and stable per report).   Skin: Negative.    Neurological: Positive for dizziness (much improved) and numbness (in feet, having nerve conduction today). Negative for tremors, seizures, syncope, facial asymmetry, speech difficulty, weakness and headaches.   Hematological: Negative.    Psychiatric/Behavioral: Negative.         Objective:    Neurologic Exam     Mental Status   Oriented to person, place, and time.   Attention: normal. Concentration: normal.   Speech: speech is normal   Level of consciousness: alert  Knowledge: consistent with education.   Able to read. Able to write. Normal comprehension.     Cranial Nerves   Cranial nerves II through XII intact.     CN II   Visual fields full to confrontation.   Right visual field deficit: none  Left visual field deficit: none     CN III, IV, VI   Pupils are equal, round, and reactive to light.  Extraocular motions are normal.   Right pupil: Shape: regular. Reactivity: brisk. Consensual response: intact. Accommodation: intact.   Left pupil: Shape: regular. Reactivity: brisk. Consensual response: intact. Accommodation: intact.   CN III: no CN III palsy  CN VI: no CN VI palsy  Nystagmus: none   Upgaze: normal  Downgaze: normal    CN V   Facial sensation intact.     CN VII   Facial expression full, symmetric.     CN VIII   CN VIII normal.     CN IX, X   CN IX normal.   CN X normal.     CN XI   CN XI normal.     CN XII   CN XII normal.     Motor Exam   Muscle bulk: normal  Overall muscle tone: normal  Right arm tone: normal  Left arm tone: normal  Right arm pronator drift: absent  Left arm pronator drift: absent  Right leg tone: normal  Left leg tone: normal    Strength   Strength 5/5 throughout.     Sensory Exam   Light touch normal.     Gait, Coordination, and Reflexes     Gait  Gait: normal    Coordination   Romberg: negative  Finger to nose coordination: normal  Heel to shin coordination:  normal  Tandem walking coordination: normal    Tremor   Resting tremor: absent  Intention tremor: absent  Action tremor: absent    Reflexes   Reflexes 2+ except as noted.   Right plantar: normal  Left plantar: normal  Right Rick: absent  Left Rick: absent      Physical Exam   Constitutional: He is oriented to person, place, and time. He appears well-developed and well-nourished. No distress.   HENT:   Head: Normocephalic and atraumatic.   Eyes: Conjunctivae and EOM are normal. Pupils are equal, round, and reactive to light. No scleral icterus.   Neck: Normal range of motion. Neck supple.   Cardiovascular: Normal rate.   Pulmonary/Chest: Effort normal. No respiratory distress.   Neurological: He is alert and oriented to person, place, and time. He has normal strength. He has a normal Finger-Nose-Finger Test, a normal Heel to Shin Test, a normal Romberg Test and a normal Tandem Gait Test. Gait normal.   Skin: Skin is warm.   Psychiatric: He has a normal mood and affect. His speech is normal and behavior is normal. Judgment and thought content normal.   Vitals reviewed.      Assessment/Plan:       Vince was seen today for seizures.    Diagnoses and all orders for this visit:    Seizure disorder (CMS/HCC)  -     Levetiracetam Level (Keppra); Future  -     levETIRAcetam (KEPPRA) 500 MG tablet; Take 1500 mg BID    Cerebrovascular accident (CVA) due to embolism of left middle cerebral artery (CMS/HCC)  Comments:  repeat carotid US 8/2019  continue asa, statin, plavix    Seizure disorder as sequela of cerebrovascular accident (CMS/HCC)  Comments:  no seizures in nearly one year, cont current medications  Orders:  -     lacosamide (VIMPAT) 50 MG tablet tablet; Take 1 tablet by mouth Every 12 (Twelve) Hours.    Seizure disorder as sequela of cerebrovascular accident (CMS/HCC)  -     lacosamide (VIMPAT) 50 MG tablet tablet; Take 1 tablet by mouth Every 12 (Twelve) Hours.    Mr Olivera is doing very well, much improved  in regards to dizziness since tapering off Dilantin.  Levels pending as noted above  Recent labs with PCP 2/2019, TC 94, LDL 39, platelets in normal range, electrolytes in normal range. A1c 7.4%.  Recommend Mr. Olivera continue secondary stroke prevention methods aspirin, statin and Plavix.  He has had no seizures in nearly 1 year, continue current medications.  Labs pending as noted above.  Carotid ultrasound stable August 2018, will repeat August 2019.  He is reported no new stroke symptoms, he is doing very well overall.  To the ER with any stroke symptoms, no seizures, notify office with seizure activity.  Return to the office 6 months or sooner if needed  Reviewed medications, potential side effects and signs and symptoms to report. Discussed risk versus benefits of treatment plan with patient and/or family-including medications, labs and radiology that may be ordered. Addressed questions and concerns during visit. Patient and/or family verbalized understanding and agree with plan.  Discussed signs and symptoms of stroke and when to call 911. Instructed to follow a low fat diet including the Mediterranean diet. Instructed to take all medications daily as prescribed. Encouraged 30 minutes of exercise 3-4 times a week as tolerated. Stay well hydrated. Discussed potential side effects of new medications and signs and symptoms to report. If patient is currently using tobacco products, smoking cessation was encouraged. Reviewed stroke risk factors and stroke prevention plan. Patient and/or family verbalizes understanding and agrees with plan.   Patient instructions include: No driving or operating heavy machinery for 3 months from onset of most recent seizure. Minimize stress as much as possible. Recommended 7-8 hours of sleep each night. Abstain from alcohol intake. Educated on Antiepileptic medications with possible side effects and signs and symptoms to report if prescribed during visit. Instructed to take  seizure medication daily if prescribed. Reviewed potential seizure risk factors. Instructed to call 911 or our office if another seizure does occur.  As part of this patient's treatment plan I am prescribing controlled substances. The patient has been made aware of appropriate use of such medications, including potential risk of somnolence, limited ability to drive and/or work safely, and potential for dependence or overdose. It has also been made clear that these medications are for use by the patient only, without concomitant use of alcohol or other substances unless prescribed. Keep out of reach of children.  Jose C report has been reviewed. If this is going to be prescribed long term, [facility] Controlled Substance Agreement Contract has also been read and signed by patient and myself.  EMR Dragon/Transcription Disclaimer:  Much of this encounter note is an electronic transcription of spoken language to printed text. Electronic transcription of spoken language may permit erroneous words or phrases to be inadvertently transcribed. Although I have reviewed the note for such errors, some may still exist in this documentation.           Ani Haas, APRN  3/18/2019

## 2019-03-20 LAB — LEVETIRACETAM SERPL-MCNC: 40.2 UG/ML (ref 10–40)

## 2019-03-25 ENCOUNTER — TELEPHONE (OUTPATIENT)
Dept: NEUROLOGY | Facility: CLINIC | Age: 59
End: 2019-03-25

## 2019-03-26 NOTE — TELEPHONE ENCOUNTER
Pt is informed and verbalizes understanding.  He also wants to check on results of emg that he just had.

## 2019-04-09 DIAGNOSIS — G56.00 CARPAL TUNNEL SYNDROME, UNSPECIFIED LATERALITY: Primary | ICD-10-CM

## 2019-04-18 ENCOUNTER — OFFICE VISIT (OUTPATIENT)
Dept: ORTHOPEDIC SURGERY | Facility: CLINIC | Age: 59
End: 2019-04-18

## 2019-04-18 VITALS
HEIGHT: 66 IN | DIASTOLIC BLOOD PRESSURE: 89 MMHG | HEART RATE: 63 BPM | BODY MASS INDEX: 32.14 KG/M2 | SYSTOLIC BLOOD PRESSURE: 145 MMHG | WEIGHT: 200 LBS

## 2019-04-18 DIAGNOSIS — M79.672 BILATERAL LEG AND FOOT PAIN: Primary | ICD-10-CM

## 2019-04-18 DIAGNOSIS — M79.604 BILATERAL LEG AND FOOT PAIN: Primary | ICD-10-CM

## 2019-04-18 DIAGNOSIS — M79.671 BILATERAL LEG AND FOOT PAIN: Primary | ICD-10-CM

## 2019-04-18 DIAGNOSIS — M79.605 BILATERAL LEG AND FOOT PAIN: Primary | ICD-10-CM

## 2019-04-18 DIAGNOSIS — G56.03 BILATERAL CARPAL TUNNEL SYNDROME: ICD-10-CM

## 2019-04-18 PROCEDURE — 99203 OFFICE O/P NEW LOW 30 MIN: CPT | Performed by: ORTHOPAEDIC SURGERY

## 2019-04-18 NOTE — PROGRESS NOTES
Patient: Vince Olivera    YOB: 1960    Chief Complaint   Patient presents with   • Right Knee - Pain   • Left Knee - Pain         History of Present Illness: 58-year-old white male who was sent here for carpal tunnel syndrome but his main complaint is bilateral lower extremity pain and spasm.  States is a history of a stroke and seizure disorder subsequently.  States that over the past 6 months or so he is developed increasing problems with his legs he has a burning sensation in his legs and he can only walk short period of time before he feels like a spasm or lock up on him may be a little bit of swelling at times is not a major complaint.  He points up and down his calves is where most of the discomfort is not into any particular joints.  States his hands get numb at times especially his right hand is on second third finger but that seems to have improved since his last seizure.    Past Medical History:   Diagnosis Date   • Arthritis    • Carotid artery disorder (CMS/[facility])    • Diabetes mellitus (CMS/[facility])     DIAGNOSED 4-2017 CHECKS FSBG 3X/WEEK    • Ear infection     about 2 weeks ago- cleared up - wax removed and cleaned out    • GERD (gastroesophageal reflux disease)     self diagnosed takes otc ppi   • Hyperlipidemia    • Hypertension    • Hypertension    • Seizure (CMS/[facility])    • Shoulder pain     bilat    • Stroke (CMS/[facility]) 04/24/2017    EXPRESSIVE APHASIA RESIDUAL    • Tooth loose     5 - all over- will get teeth done after this surgery    • Wears reading eyeglasses         Social History     Socioeconomic History   • Marital status:      Spouse name: Not on file   • Number of children: Not on file   • Years of education: Not on file   • Highest education level: Not on file   Occupational History   • Occupation: Disabled   Tobacco Use   • Smoking status: Former Smoker     Packs/day: 0.05     Years: 40.00     Pack years: 2.00     Types: Cigarettes     Last attempt to quit: 4/26/2017  "    Years since quittin.9   • Smokeless tobacco: Former User     Types: Snuff   • Tobacco comment: quit snuff when 18 or 19 years old - only did for baseball - 2-3 years    Substance and Sexual Activity   • Alcohol use: No   • Drug use: No   • Sexual activity: Defer     Partners: Female     Comment: SPOUSE   Social History Narrative    Patient consumes 0-1serving of caffeine daily.     Patient lives at home with wife.            Physical Exam: 58 y.o. male  General Appearance:    Alert and oriented x 3, cooperative, in no acute distress                   Vitals:    19 1338   BP: 145/89   BP Location: Left arm   Patient Position: Sitting   Cuff Size: Adult   Pulse: 63   Weight: 90.7 kg (200 lb)   Height: 167.6 cm (66\")          Lower extremities showed any bilaterally and excellent range of motion hips are no obvious discomfort.  His knee shows no obvious warmth swelling or effusion bilaterally with full range of motion.  No focal motor deficit noted.  He has good range of motion of his ankles and swelling or point tenderness noted.  He has good dorsiflexion plantarflexion of his feet.  His toes show capillary refill.  I cannot really find a good dorsalis or posterior tibial pulse in either leg.  Hand show excellent range of motion no swelling good  strength.  Mildly decreased sensation is noted in the median nerve distribution on the right.    Patient presented with a EMG nerve conductions are consistent with bilateral carpal tunnel syndrome mild to moderate on the right mild on the left.  Also noted peripheral neuropathy chiefly sensory.  Please see official report  Radiology:             Assessment/Plan: Patient does appear to have some signs and symptoms of carpal tunnel syndrome which are verified with the EMG nerve conduction test.  This however is not his main complaint.  He is more bothered by his legs burning pain he has a decrease in walking ability.  I discussed with him his wife this could " be secondary to peripheral neuropathy and might want you to talk with the neurologist or family physician about treatment of this.  The other possibilities he could have peripheral vascular disease in his legs also need to be getting ischemic claudication.  I am going to set him up for a ankle-brachial index as a screening test to see if this shows us any evidence of arterial pathology.  If this will be positive you need further work-up with vascular surgery.  We will inform him once we get the results of that test.  As far as his carpal tunnel there is no emergency.  If he has worsening symptoms and signs he can use at all reevaluate him for that.              Patient's Body mass index is 32.28 kg/m². BMI is above normal parameters. Recommendations include: referral to primary care.      Discussion/Summary:                This chart was completed utilizing the dragon speech recognition software.  Grammatical errors, random word insertions, pronoun errors, and incomplete sentences or occasional consequences of the system due to software limitations, ambient noise, and hardware issues.  Any questions or concerns about the content, text, or information contained within the body of this dictation should be directly addressed to the physician for clarification        This document was signed by Keanu Sheth M.D. April 18, 2019 2:22 PM

## 2019-04-24 ENCOUNTER — TELEPHONE (OUTPATIENT)
Dept: NEUROLOGY | Facility: CLINIC | Age: 59
End: 2019-04-24

## 2019-04-24 NOTE — TELEPHONE ENCOUNTER
Pt was seen with ortho.  They didn't feel it was severe enough to do anything about.  He suggested following up with us or pcp.

## 2019-04-25 NOTE — TELEPHONE ENCOUNTER
Donna said that they called today to inform them that he needs to be at the [facility] for an ultrasound on Tuesday.  They will call after that to decide if he wants to do PT.

## 2019-04-30 ENCOUNTER — HOSPITAL ENCOUNTER (OUTPATIENT)
Dept: ULTRASOUND IMAGING | Facility: HOSPITAL | Age: 59
Discharge: HOME | End: 2019-04-30
Admitting: ORTHOPAEDIC SURGERY

## 2019-04-30 PROCEDURE — 93923 UPR/LXTR ART STDY 3+ LVLS: CPT | Performed by: RADIOLOGY

## 2019-04-30 PROCEDURE — 93923 UPR/LXTR ART STDY 3+ LVLS: CPT

## 2019-05-02 DIAGNOSIS — M79.605 BILATERAL LEG AND FOOT PAIN: Primary | ICD-10-CM

## 2019-05-02 DIAGNOSIS — M79.671 BILATERAL LEG AND FOOT PAIN: Primary | ICD-10-CM

## 2019-05-02 DIAGNOSIS — R68.89 ABNORMAL ANKLE BRACHIAL INDEX (ABI): ICD-10-CM

## 2019-05-02 DIAGNOSIS — M79.604 BILATERAL LEG AND FOOT PAIN: Primary | ICD-10-CM

## 2019-05-02 DIAGNOSIS — M79.672 BILATERAL LEG AND FOOT PAIN: Primary | ICD-10-CM

## 2019-05-10 ENCOUNTER — OFFICE VISIT (OUTPATIENT)
Dept: SURGERY | Facility: CLINIC | Age: 59
End: 2019-05-10

## 2019-05-10 VITALS
RESPIRATION RATE: 17 BRPM | WEIGHT: 200 LBS | SYSTOLIC BLOOD PRESSURE: 145 MMHG | BODY MASS INDEX: 32.14 KG/M2 | HEART RATE: 70 BPM | TEMPERATURE: 97.9 F | HEIGHT: 66 IN | DIASTOLIC BLOOD PRESSURE: 89 MMHG | OXYGEN SATURATION: 99 %

## 2019-05-10 DIAGNOSIS — Z98.890 STATUS POST CAROTID ENDARTERECTOMY: ICD-10-CM

## 2019-05-10 DIAGNOSIS — I65.22 INTERNAL CAROTID ARTERY STENOSIS, LEFT: Primary | ICD-10-CM

## 2019-05-10 DIAGNOSIS — E78.5 DYSLIPIDEMIA: ICD-10-CM

## 2019-05-10 DIAGNOSIS — H53.451 ABNORMAL PERIPHERAL VISION OF RIGHT EYE: ICD-10-CM

## 2019-05-10 DIAGNOSIS — E11.9 TYPE 2 DIABETES MELLITUS WITHOUT COMPLICATION, WITHOUT LONG-TERM CURRENT USE OF INSULIN (HCC): ICD-10-CM

## 2019-05-10 DIAGNOSIS — I10 ESSENTIAL HYPERTENSION: ICD-10-CM

## 2019-05-10 PROCEDURE — 99204 OFFICE O/P NEW MOD 45 MIN: CPT | Performed by: SURGERY

## 2019-05-10 NOTE — PROGRESS NOTES
5/10/2019    Patient Information  Vince Olivera  [address] [address]  1960  [phone] (home)     Chief Complaint   Patient presents with   • Leg Pain     Referred for Leg Pain and Abnormal ABIs       HPI  Patient is a 58-year-old white male referred by Dr. Yesenia Walter, orthopedic surgeon.  Patient reports he can only walk very short distances before he starts having spasm in his legs locking up.  He has had a previous left carotid endarterectomy.  Chart indicates he has some stenosis of his right carotid as well.  He has diabetes, hypertension and history of a stroke.  He is a former long-term smoker, however he quit 2 years ago.    His wife reports that he really started having problems with his legs 5 years ago.    Right SAMINA is 0.4, left SAMINA is 0.5  Review of Systems    The Past medical history, family history, social history, medication list, allergies, and Review of Systems has been reviewed and confirmed.      General: negative  Integumentary: negative  Eyes: negative  ENT: negative  Respiratory: negative  Gastrointestinal: negative  Cardiovascular: negative  Neurological: confusion and dizziness  Psychiatric: negative  Hematologic/Lymphatic: negative  Genitourinary: negative  Musculoskeletal: painful joints, sore muscles and joint stiffness  Endocrine: negative  Breasts: negative      Patient Active Problem List   Diagnosis   • H/o Left MCA ischemic CVA 4/2017   • Essential hypertension   • Dyslipidemia   • Bilateral carotid artery stenosis   • Residual mild expressive Aphasia   • Gastroesophageal reflux disease without esophagitis   • Combined receptive and expressive aphasia due to cerebrovascular accident   • Abnormal peripheral vision of right eye   • GERD (gastroesophageal reflux disease)   • Hyperlipidemia   • Palpitations   • Bilateral Carotid artery stenosis. Left greater than right    • Internal carotid artery stenosis, left   • Status post left carotid endarterectomy    • Seizure disorder as sequela of cerebrovascular accident (CMS/HCC)   • Type 2 diabetes mellitus without complication, without long-term current use of insulin (CMS/HCC)   • Bilateral leg and foot pain   • Bilateral carpal tunnel syndrome         Past Medical History:   Diagnosis Date   • Arthritis    • Carotid artery disorder (CMS/HCC)    • Diabetes mellitus (CMS/HCC)     DIAGNOSED  CHECKS FSBG 3X/WEEK    • Ear infection     about 2 weeks ago- cleared up - wax removed and cleaned out    • GERD (gastroesophageal reflux disease)     self diagnosed takes otc ppi   • Hyperlipidemia    • Hypertension    • Hypertension    • Seizure (CMS/HCC)    • Shoulder pain     bilat    • Stroke (CMS/HCC) 2017    EXPRESSIVE APHASIA RESIDUAL    • Tooth loose     5 - all over- will get teeth done after this surgery    • Wears reading eyeglasses          Past Surgical History:   Procedure Laterality Date   • ABDOMINAL HERNIA REPAIR     • CAROTID ENDARTERECTOMY Left 10/17/2017    Procedure: CAROTID ENDARTERECTOMY LEFT;  Surgeon: Alexx Bach MD;  Location: [facility];  Service:    • CAROTID ENDARTERECTOMY Left 10/17/2017         Family History   Adopted: Yes   Problem Relation Age of Onset   • Diabetes Mother    • COPD Father          Social History     Tobacco Use   • Smoking status: Former Smoker     Packs/day: 0.05     Years: 40.00     Pack years: 2.00     Types: Cigarettes     Last attempt to quit: 2017     Years since quittin.0   • Smokeless tobacco: Former User     Types: Snuff   • Tobacco comment: quit snuff when 18 or 19 years old - only did for baseball - 2-3 years    Substance Use Topics   • Alcohol use: No   • Drug use: No       Current Outpatient Medications   Medication Sig Dispense Refill   • aspirin  MG tablet Take 1 tablet by mouth Daily. 30 tablet 5   • atorvastatin (LIPITOR) 80 MG tablet Take 1 tablet by mouth Every Night. 30 tablet 5   • clopidogrel (PLAVIX) 75 MG tablet Take  "1 tablet by mouth Daily. 30 tablet 5   • hydrochlorothiazide (HYDRODIURIL) 12.5 MG tablet Take 1 tablet by mouth Daily. 30 tablet 6   • lacosamide (VIMPAT) 50 MG tablet tablet Take 1 tablet by mouth Every 12 (Twelve) Hours. 60 tablet 5   • lansoprazole (PREVACID) 15 MG capsule Take 1 capsule by mouth Daily. 30 capsule 5   • levETIRAcetam (KEPPRA) 500 MG tablet Take 1500 mg  tablet 5   • losartan (COZAAR) 100 MG tablet Take 0.5 tablets by mouth 2 (Two) Times a Day. 60 tablet 5   • metFORMIN (GLUCOPHAGE) 500 MG tablet Take 2 tablets by mouth 2 (Two) Times a Day With Meals. 120 tablet 5   • metoprolol succinate XL (TOPROL XL) 200 MG 24 hr tablet Take 1 tablet by mouth Daily. 30 tablet 6     No current facility-administered medications for this visit.          Allergies  Patient has no known allergies.    /89   Pulse 70   Temp 97.9 °F (36.6 °C) (Oral)   Resp 17   Ht 167.6 cm (65.98\")   Wt 90.7 kg (200 lb)   SpO2 99%   BMI 32.30 kg/m²      Physical Exam    General :  Patient is a 58 y.o. male in no acute distress.    HEENT:  Normocephalic, pupils equally round and reactive to light, extraocular motions intact.  Scar left neck consistent with a left carotid endarterectomy chest:  Clear bilaterally. Equal breath sounds. No rales or rhonchi.  Heart:   Regular rate and rhythm.  Abdomen:  Soft, nontender. No distention.  :  Normal external genitalia.  Rectal:  Not performed.  Extremities:  No clubbing cyanosis or edema. .  Neurologic:  Patient oriented ×3. Cranial nerves grossly intact. No sensory,cellebellar, or motor dysfunction.      Both extremities warm.  Both with decreased capillary refill.  I am not able to palpate a femoral, popliteal, dorsalis pedis, or posterior tibial pulse.          ASSESSMENT  Peripheral vascular disease  Hypertension  Diabetes  History of stroke  Claudication  Carotid artery stenosis  History of carotid endarterectomy        PLAN    CTA.  His symptoms are bad enough and " his ABIs are bad enough that he needs an intervention, either stent placement or bypass surgery.    Patient's Body mass index is 32.3 kg/m². BMI is above normal parameters. Recommendations include: educational material.           This document signed by Zeus Lawrence MD May 10, 2019 1:57 PM

## 2019-05-15 RX ORDER — ASPIRIN 325 MG
325 TABLET, DELAYED RELEASE (ENTERIC COATED) ORAL DAILY
Qty: 30 TABLET | Refills: 5 | Status: SHIPPED | OUTPATIENT
Start: 2019-05-15 | End: 2019-10-25 | Stop reason: SDUPTHER

## 2019-05-17 ENCOUNTER — TELEPHONE (OUTPATIENT)
Dept: SURGERY | Facility: CLINIC | Age: 59
End: 2019-05-17

## 2019-05-17 NOTE — TELEPHONE ENCOUNTER
I spoke to Vince's significant other in regards to his CTA. I let her know it is scheduled 5/29 @ 10:15 @ the [facility]. NPO 6 hrs

## 2019-05-20 ENCOUNTER — OFFICE VISIT (OUTPATIENT)
Dept: FAMILY MEDICINE CLINIC | Facility: CLINIC | Age: 59
End: 2019-05-20

## 2019-05-20 VITALS
DIASTOLIC BLOOD PRESSURE: 82 MMHG | WEIGHT: 200 LBS | TEMPERATURE: 98.1 F | OXYGEN SATURATION: 98 % | HEIGHT: 66 IN | SYSTOLIC BLOOD PRESSURE: 138 MMHG | HEART RATE: 68 BPM | BODY MASS INDEX: 32.14 KG/M2

## 2019-05-20 DIAGNOSIS — E78.5 DYSLIPIDEMIA: ICD-10-CM

## 2019-05-20 DIAGNOSIS — I10 ESSENTIAL HYPERTENSION: ICD-10-CM

## 2019-05-20 DIAGNOSIS — E11.9 TYPE 2 DIABETES MELLITUS WITHOUT COMPLICATION, WITHOUT LONG-TERM CURRENT USE OF INSULIN (HCC): Primary | ICD-10-CM

## 2019-05-20 LAB
ANION GAP SERPL CALCULATED.3IONS-SCNC: 12.8 MMOL/L
BUN BLD-MCNC: 9 MG/DL (ref 6–20)
BUN/CREAT SERPL: 10.1 (ref 7–25)
CALCIUM SPEC-SCNC: 10.1 MG/DL (ref 8.6–10.5)
CHLORIDE SERPL-SCNC: 96 MMOL/L (ref 98–107)
CO2 SERPL-SCNC: 29.2 MMOL/L (ref 22–29)
CREAT BLD-MCNC: 0.89 MG/DL (ref 0.76–1.27)
GFR SERPL CREATININE-BSD FRML MDRD: 88 ML/MIN/1.73
GLUCOSE BLD-MCNC: 107 MG/DL (ref 65–99)
HBA1C MFR BLD: 6.6 % (ref 4.8–5.6)
POTASSIUM BLD-SCNC: 4.3 MMOL/L (ref 3.5–5.2)
SODIUM BLD-SCNC: 138 MMOL/L (ref 136–145)

## 2019-05-20 PROCEDURE — 83036 HEMOGLOBIN GLYCOSYLATED A1C: CPT | Performed by: FAMILY MEDICINE

## 2019-05-20 PROCEDURE — 80048 BASIC METABOLIC PNL TOTAL CA: CPT | Performed by: FAMILY MEDICINE

## 2019-05-20 PROCEDURE — 99213 OFFICE O/P EST LOW 20 MIN: CPT | Performed by: FAMILY MEDICINE

## 2019-05-20 RX ORDER — ATORVASTATIN CALCIUM 80 MG/1
80 TABLET, FILM COATED ORAL NIGHTLY
Qty: 30 TABLET | Refills: 5 | Status: SHIPPED | OUTPATIENT
Start: 2019-05-20 | End: 2019-12-30

## 2019-05-20 RX ORDER — METOPROLOL SUCCINATE 200 MG/1
200 TABLET, EXTENDED RELEASE ORAL DAILY
Qty: 30 TABLET | Refills: 6 | Status: SHIPPED | OUTPATIENT
Start: 2019-05-20 | End: 2020-01-08 | Stop reason: SDUPTHER

## 2019-05-20 NOTE — PROGRESS NOTES
Subjective   Vince Olivera is a 58 y.o. male.     History of Present Illness follow-up regarding diabetes hypertension PAD.  Has visited with orthopedist then general surgeon.  Diagnosis of carpal tunnel syndrome.  Has pending CT angiogram lower extremities.  I believe answer will be obtained with that study.  Utilizing medications as reconciled.  Has seen neurology clinic also.  No recent acute illness.  Denies CP S OB palpitations.  Tolerating the higher dose of metformin since last visit here.  Lower extremity claudication impedes physical activity.    The following portions of the patient's history were reviewed and updated as appropriate: allergies, past family history, past medical history, past social history, past surgical history and problem list.    Review of Systems  See history of Present Illness     Objective     Physical Exam   Constitutional: He is oriented to person, place, and time. He appears well-developed and well-nourished.   HENT:   Head: Normocephalic.   Mouth/Throat: Oropharynx is clear and moist.   Eyes: Conjunctivae and EOM are normal. Pupils are equal, round, and reactive to light.   Neck: Normal range of motion. Neck supple. No thyromegaly present.   Cardiovascular: Normal rate, regular rhythm and normal heart sounds.   No murmur heard.  Pulmonary/Chest: Effort normal and breath sounds normal.   Musculoskeletal: He exhibits no edema.   Neurological: He is alert and oriented to person, place, and time.   Skin: Skin is warm and dry.   Psychiatric: He has a normal mood and affect.   Vitals reviewed.      PHQ-9 Total Score:      Patient's Body mass index is 32.3 kg/m². BMI is above normal parameters. Recommendations include: exercise counseling and nutrition counseling.   (Normal BMI:  18.5-24.9, OW 25-29.9, Obesity 30 or greater)      Assessment/Plan     Vince was seen today for type 2 diabetes mellitus without complication without long t and essential hypertension.    Diagnoses and all  orders for this visit:    Type 2 diabetes mellitus without complication, without long-term current use of insulin (CMS/[facility])  -     Hemoglobin A1c  -     Basic Metabolic Panel    Dyslipidemia  -     atorvastatin (LIPITOR) 80 MG tablet; Take 1 tablet by mouth Every Night.    Essential hypertension  -     metoprolol succinate XL (TOPROL XL) 200 MG 24 hr tablet; Take 1 tablet by mouth Daily.  -     Basic Metabolic Panel    Some renewals authorized.  Will check labs.  Renal function has been very good to this date.  I recommend holding metformin for at least 2 days prior and 2 days after the contrast CT.  That would be most current conservative approach.  Recheck here in a few months.  Anticipate more information and further intervention likely surgical.                     This document has been electronically signed by Fortunato Marvin MD   May 20, 2019 3:12 PM

## 2019-05-29 ENCOUNTER — HOSPITAL ENCOUNTER (OUTPATIENT)
Dept: CT IMAGING | Facility: HOSPITAL | Age: 59
Discharge: HOME | End: 2019-05-29
Admitting: SURGERY

## 2019-05-29 DIAGNOSIS — E78.5 DYSLIPIDEMIA: ICD-10-CM

## 2019-05-29 DIAGNOSIS — Z98.890 STATUS POST CAROTID ENDARTERECTOMY: ICD-10-CM

## 2019-05-29 DIAGNOSIS — I65.22 INTERNAL CAROTID ARTERY STENOSIS, LEFT: ICD-10-CM

## 2019-05-29 DIAGNOSIS — E11.9 TYPE 2 DIABETES MELLITUS WITHOUT COMPLICATION, WITHOUT LONG-TERM CURRENT USE OF INSULIN (HCC): ICD-10-CM

## 2019-05-29 DIAGNOSIS — H53.451 ABNORMAL PERIPHERAL VISION OF RIGHT EYE: ICD-10-CM

## 2019-05-29 DIAGNOSIS — I10 ESSENTIAL HYPERTENSION: ICD-10-CM

## 2019-05-29 PROCEDURE — 75635 CT ANGIO ABDOMINAL ARTERIES: CPT

## 2019-05-29 PROCEDURE — 75635 CT ANGIO ABDOMINAL ARTERIES: CPT | Performed by: RADIOLOGY

## 2019-05-29 PROCEDURE — 0 IOVERSOL 68 % SOLUTION: Performed by: SURGERY

## 2019-05-29 RX ADMIN — IOVERSOL 100 ML: 678 INJECTION INTRA-ARTERIAL; INTRAVENOUS at 10:56

## 2019-06-11 ENCOUNTER — OFFICE VISIT (OUTPATIENT)
Dept: SURGERY | Facility: CLINIC | Age: 59
End: 2019-06-11

## 2019-06-11 VITALS
BODY MASS INDEX: 32.14 KG/M2 | SYSTOLIC BLOOD PRESSURE: 143 MMHG | TEMPERATURE: 97.9 F | WEIGHT: 200 LBS | DIASTOLIC BLOOD PRESSURE: 88 MMHG | HEART RATE: 70 BPM | HEIGHT: 66 IN | OXYGEN SATURATION: 99 % | RESPIRATION RATE: 16 BRPM

## 2019-06-11 DIAGNOSIS — M79.672 BILATERAL LEG AND FOOT PAIN: Primary | ICD-10-CM

## 2019-06-11 DIAGNOSIS — I10 ESSENTIAL HYPERTENSION: ICD-10-CM

## 2019-06-11 DIAGNOSIS — M79.605 BILATERAL LEG AND FOOT PAIN: Primary | ICD-10-CM

## 2019-06-11 DIAGNOSIS — I65.23 BILATERAL CAROTID ARTERY STENOSIS: ICD-10-CM

## 2019-06-11 DIAGNOSIS — E11.9 TYPE 2 DIABETES MELLITUS WITHOUT COMPLICATION, WITHOUT LONG-TERM CURRENT USE OF INSULIN (HCC): ICD-10-CM

## 2019-06-11 DIAGNOSIS — E78.49 OTHER HYPERLIPIDEMIA: ICD-10-CM

## 2019-06-11 DIAGNOSIS — M79.604 BILATERAL LEG AND FOOT PAIN: Primary | ICD-10-CM

## 2019-06-11 DIAGNOSIS — M79.671 BILATERAL LEG AND FOOT PAIN: Primary | ICD-10-CM

## 2019-06-11 DIAGNOSIS — I73.9 PERIPHERAL VASCULAR DISEASE OF EXTREMITY (HCC): ICD-10-CM

## 2019-06-11 PROCEDURE — 99214 OFFICE O/P EST MOD 30 MIN: CPT | Performed by: SURGERY

## 2019-06-11 NOTE — PROGRESS NOTES
6/11/2019    Patient Information  Vince Olivera  [address] [address]  1960  [phone] (home)     Chief Complaint   Patient presents with   • Follow-up     FU CTA       HPI  Patient is a 58-year-old white male here for follow-up on CTA.  He has right SAMINA is 0.4 and his left SAMINA is 0.5.  He has very short distance claudication.  He says he may be able to walk 100 to 50 feet only.  He has no nonhealing wounds.  He has no rest pain.  He is status post left carotid endarterectomy after a stroke.  CTA shows occlusion of his aorta with reconstitution at the femoral arteries.  He also has bilateral distal superficial femoral occlusions.  He does not have any chest pain.  He is quit smoking years ago.  He does have diabetes and hypertension.    Review of Systems    The Past medical history, family history, social history, medication list, allergies, and Review of Systems has been reviewed and confirmed.      General: negative  Integumentary: negative  Eyes: negative  ENT: negative  Respiratory: negative  Gastrointestinal: negative  Cardiovascular: negative  Neurological: confusion and dizziness  Psychiatric: negative  Hematologic/Lymphatic: negative  Genitourinary: negative  Musculoskeletal: painful joints, sore muscles and joint stiffness  Endocrine: negative  Breasts: negative      Patient Active Problem List   Diagnosis   • H/o Left MCA ischemic CVA 4/2017   • Essential hypertension   • Dyslipidemia   • Bilateral carotid artery stenosis   • Residual mild expressive Aphasia   • Gastroesophageal reflux disease without esophagitis   • Combined receptive and expressive aphasia due to cerebrovascular accident   • Abnormal peripheral vision of right eye   • GERD (gastroesophageal reflux disease)   • Hyperlipidemia   • Palpitations   • Bilateral Carotid artery stenosis. Left greater than right    • Internal carotid artery stenosis, left   • Status post left carotid endarterectomy   • Seizure  disorder as sequela of cerebrovascular accident (CMS/HCC)   • Type 2 diabetes mellitus without complication, without long-term current use of insulin (CMS/HCC)   • Bilateral leg and foot pain   • Bilateral carpal tunnel syndrome         Past Medical History:   Diagnosis Date   • Arthritis    • Carotid artery disorder (CMS/HCC)    • Diabetes mellitus (CMS/HCC)     DIAGNOSED - CHECKS FSBG 3X/WEEK    • Ear infection     about 2 weeks ago- cleared up - wax removed and cleaned out    • GERD (gastroesophageal reflux disease)     self diagnosed takes otc ppi   • Hyperlipidemia    • Hypertension    • Hypertension    • Seizure (CMS/HCC)    • Shoulder pain     bilat    • Stroke (CMS/[facility]) 2017    EXPRESSIVE APHASIA RESIDUAL    • Tooth loose     5 - all over- will get teeth done after this surgery    • Wears reading eyeglasses          Past Surgical History:   Procedure Laterality Date   • ABDOMINAL HERNIA REPAIR     • CAROTID ENDARTERECTOMY Left 10/17/2017    Procedure: CAROTID ENDARTERECTOMY LEFT;  Surgeon: Alexx Bach MD;  Location: [facility];  Service:    • CAROTID ENDARTERECTOMY Left 10/17/2017         Family History   Adopted: Yes   Problem Relation Age of Onset   • Diabetes Mother    • COPD Father          Social History     Tobacco Use   • Smoking status: Former Smoker     Packs/day: 0.05     Years: 40.00     Pack years: 2.00     Types: Cigarettes     Last attempt to quit: 2017     Years since quittin.1   • Smokeless tobacco: Former User     Types: Snuff   • Tobacco comment: quit snuff when 18 or 19 years old - only did for baseball - 2-3 years    Substance Use Topics   • Alcohol use: No   • Drug use: No       Current Outpatient Medications   Medication Sig Dispense Refill   • aspirin  MG tablet Take 1 tablet by mouth Daily. 30 tablet 5   • atorvastatin (LIPITOR) 80 MG tablet Take 1 tablet by mouth Every Night. 30 tablet 5   • clopidogrel (PLAVIX) 75 MG tablet Take 1 tablet by  "mouth Daily. 30 tablet 5   • hydrochlorothiazide (HYDRODIURIL) 12.5 MG tablet Take 1 tablet by mouth Daily. 30 tablet 6   • lacosamide (VIMPAT) 50 MG tablet tablet Take 1 tablet by mouth Every 12 (Twelve) Hours. 60 tablet 5   • lansoprazole (PREVACID) 15 MG capsule Take 1 capsule by mouth Daily. 30 capsule 5   • levETIRAcetam (KEPPRA) 500 MG tablet Take 1500 mg  tablet 5   • losartan (COZAAR) 100 MG tablet Take 0.5 tablets by mouth 2 (Two) Times a Day. 60 tablet 5   • metFORMIN (GLUCOPHAGE) 500 MG tablet Take 2 tablets by mouth 2 (Two) Times a Day With Meals. 120 tablet 5   • metoprolol succinate XL (TOPROL XL) 200 MG 24 hr tablet Take 1 tablet by mouth Daily. 30 tablet 6     No current facility-administered medications for this visit.          Allergies  Patient has no known allergies.    /88   Pulse 70   Temp 97.9 °F (36.6 °C)   Resp 16   Ht 167.6 cm (65.98\")   Wt 90.7 kg (200 lb)   SpO2 99%   BMI 32.30 kg/m²      Physical Exam    General :  Patient is a 58 y.o. male in no acute distress.    HEENT:  Normocephalic, pupils equally round and reactive to light, extraocular motions intact.  Chest:  Clear bilaterally. Equal breath sounds. No rales or rhonchi.  Heart:   Regular rate and rhythm.  Abdomen:  Soft, nontender. No distention.  :  Normal external genitalia.  Rectal:  Not performed.  Extremities:  No femoral, popliteal, or pedal pulses bilaterally..  Neurologic:  Patient oriented ×3. Cranial nerves grossly intact. No sensory,cellebellar, or motor dysfunction.                ASSESSMENT  Severe life limiting claudication with occlusion of the aorta and bilateral iliacs.        PLAN    Patient needs an aortobifemoral bypass.  His disease is not amenable to stenting.  Is asked me to do the procedure here as it would be much less trouble to his family and himself.  I have explained to him that I have done many of these procedures in the past but not at this hospital.    I went over his CTA in " detail with both him and his wife showing them the actual films from where his occlusions are.    Patient's Body mass index is 32.3 kg/m². BMI is above normal parameters. Recommendations include: educational material.           This document signed by Zeus Lawrence MD June 11, 2019 4:36 PM

## 2019-06-25 ENCOUNTER — OFFICE VISIT (OUTPATIENT)
Dept: SURGERY | Facility: CLINIC | Age: 59
End: 2019-06-25

## 2019-06-25 VITALS
HEART RATE: 70 BPM | OXYGEN SATURATION: 98 % | BODY MASS INDEX: 32.14 KG/M2 | HEIGHT: 66 IN | TEMPERATURE: 98 F | DIASTOLIC BLOOD PRESSURE: 87 MMHG | RESPIRATION RATE: 17 BRPM | WEIGHT: 200 LBS | SYSTOLIC BLOOD PRESSURE: 140 MMHG

## 2019-06-25 DIAGNOSIS — M79.671 BILATERAL LEG AND FOOT PAIN: Primary | ICD-10-CM

## 2019-06-25 DIAGNOSIS — M79.604 BILATERAL LEG AND FOOT PAIN: Primary | ICD-10-CM

## 2019-06-25 DIAGNOSIS — M79.672 BILATERAL LEG AND FOOT PAIN: Primary | ICD-10-CM

## 2019-06-25 DIAGNOSIS — I74.09 AORTOILIAC OCCLUSIVE DISEASE (HCC): ICD-10-CM

## 2019-06-25 DIAGNOSIS — M79.605 BILATERAL LEG AND FOOT PAIN: Primary | ICD-10-CM

## 2019-06-25 PROCEDURE — 99212 OFFICE O/P EST SF 10 MIN: CPT | Performed by: SURGERY

## 2019-06-25 NOTE — PROGRESS NOTES
6/25/2019    Patient Information  Vince Olivera  [address] [address]  1960  [phone] (home)     Chief Complaint   Patient presents with   • Follow-up     FU Occlusion of Aorta       HPI  Patient is a 58-year-old white male here for follow-up of severe peripheral vascular disease with occlusion of his aorta with reconstitution of his femorals bilaterally, with bilateral superficial femoral occlusions.  Patient has very short distance claudication.    Review of Systems    The Past medical history, family history, social history, medication list, allergies, and Review of Systems has been reviewed and confirmed.      General: negative  Integumentary: negative  Eyes: negative  ENT: negative  Respiratory: negative  Gastrointestinal: negative  Cardiovascular: negative  Neurological: confusion and dizziness  Psychiatric: negative  Hematologic/Lymphatic: negative  Genitourinary: negative  Musculoskeletal: painful joints, sore muscles and joint stiffness  Endocrine: negative  Breasts: negative      Patient Active Problem List   Diagnosis   • H/o Left MCA ischemic CVA 4/2017   • Essential hypertension   • Dyslipidemia   • Bilateral carotid artery stenosis   • Residual mild expressive Aphasia   • Gastroesophageal reflux disease without esophagitis   • Combined receptive and expressive aphasia due to cerebrovascular accident   • Abnormal peripheral vision of right eye   • GERD (gastroesophageal reflux disease)   • Hyperlipidemia   • Palpitations   • Bilateral Carotid artery stenosis. Left greater than right    • Internal carotid artery stenosis, left   • Status post left carotid endarterectomy   • Seizure disorder as sequela of cerebrovascular accident (CMS/HCC)   • Type 2 diabetes mellitus without complication, without long-term current use of insulin (CMS/HCC)   • Bilateral leg and foot pain   • Bilateral carpal tunnel syndrome         Past Medical History:   Diagnosis Date   • Arthritis    •  Carotid artery disorder (CMS/HCC)    • Diabetes mellitus (CMS/HCC)     DIAGNOSED - CHECKS FSBG 3X/WEEK    • Ear infection     about 2 weeks ago- cleared up - wax removed and cleaned out    • GERD (gastroesophageal reflux disease)     self diagnosed takes otc ppi   • Hyperlipidemia    • Hypertension    • Hypertension    • Seizure (CMS/HCC)    • Shoulder pain     bilat    • Stroke (CMS/HCC) 2017    EXPRESSIVE APHASIA RESIDUAL    • Tooth loose     5 - all over- will get teeth done after this surgery    • Wears reading eyeglasses          Past Surgical History:   Procedure Laterality Date   • ABDOMINAL HERNIA REPAIR     • CAROTID ENDARTERECTOMY Left 10/17/2017    Procedure: CAROTID ENDARTERECTOMY LEFT;  Surgeon: Alexx Bach MD;  Location: [facility];  Service:    • CAROTID ENDARTERECTOMY Left 10/17/2017         Family History   Adopted: Yes   Problem Relation Age of Onset   • Diabetes Mother    • COPD Father          Social History     Tobacco Use   • Smoking status: Former Smoker     Packs/day: 0.05     Years: 40.00     Pack years: 2.00     Types: Cigarettes     Last attempt to quit: 2017     Years since quittin.1   • Smokeless tobacco: Former User     Types: Snuff   • Tobacco comment: quit snuff when 18 or 19 years old - only did for baseball - 2-3 years    Substance Use Topics   • Alcohol use: No   • Drug use: No       Current Outpatient Medications   Medication Sig Dispense Refill   • aspirin  MG tablet Take 1 tablet by mouth Daily. 30 tablet 5   • atorvastatin (LIPITOR) 80 MG tablet Take 1 tablet by mouth Every Night. 30 tablet 5   • clopidogrel (PLAVIX) 75 MG tablet Take 1 tablet by mouth Daily. 30 tablet 5   • hydrochlorothiazide (HYDRODIURIL) 12.5 MG tablet Take 1 tablet by mouth Daily. 30 tablet 6   • lacosamide (VIMPAT) 50 MG tablet tablet Take 1 tablet by mouth Every 12 (Twelve) Hours. 60 tablet 5   • lansoprazole (PREVACID) 15 MG capsule Take 1 capsule by mouth  "Daily. 30 capsule 5   • levETIRAcetam (KEPPRA) 500 MG tablet Take 1500 mg  tablet 5   • losartan (COZAAR) 100 MG tablet Take 0.5 tablets by mouth 2 (Two) Times a Day. 60 tablet 5   • metFORMIN (GLUCOPHAGE) 500 MG tablet Take 2 tablets by mouth 2 (Two) Times a Day With Meals. 120 tablet 5   • metoprolol succinate XL (TOPROL XL) 200 MG 24 hr tablet Take 1 tablet by mouth Daily. 30 tablet 6     No current facility-administered medications for this visit.          Allergies  Patient has no known allergies.    /87   Pulse 70   Temp 98 °F (36.7 °C)   Resp 17   Ht 167.6 cm (65.98\")   Wt 90.7 kg (200 lb)   SpO2 98%   BMI 32.30 kg/m²      Physical Exam    General :  Patient is a 58 y.o. male in no acute distress.    HEENT:  Normocephalic, pupils equally round and reactive to light, extraocular motions intact.  Chest:  Clear bilaterally. Equal breath sounds. No rales or rhonchi.  Heart:   Regular rate and rhythm.  Abdomen:  Soft, nontender. No distention.  :  Normal external genitalia.  Rectal:  Not performed.  Extremities:  No clubbing cyanosis or edema. Good femoral, popliteal, dorsalis pedis and posterior tibial pulse.  Neurologic:  Patient oriented ×3. Cranial nerves grossly intact. No sensory,cellebellar, or motor dysfunction.                ASSESSMENT  Severe aorto iliac occlusive disease        PLAN    Not able to do procedure at our hospital.  I will refer to Dr. Gerald Weaver, cardiovascular surgeon at [facility].    Patient's Body mass index is 32.3 kg/m². BMI is above normal parameters. Recommendations include: educational material.           This document signed by Zeus Lawrence MD June 25, 2019 4:05 PM   "

## 2019-07-18 ENCOUNTER — OFFICE VISIT (OUTPATIENT)
Dept: CARDIAC SURGERY | Facility: CLINIC | Age: 59
End: 2019-07-18

## 2019-07-18 VITALS
WEIGHT: 199 LBS | DIASTOLIC BLOOD PRESSURE: 89 MMHG | BODY MASS INDEX: 31.98 KG/M2 | OXYGEN SATURATION: 98 % | TEMPERATURE: 98.5 F | HEIGHT: 66 IN | HEART RATE: 59 BPM | SYSTOLIC BLOOD PRESSURE: 135 MMHG

## 2019-07-18 DIAGNOSIS — I63.412 CEREBROVASCULAR ACCIDENT (CVA) DUE TO EMBOLISM OF LEFT MIDDLE CEREBRAL ARTERY (HCC): ICD-10-CM

## 2019-07-18 DIAGNOSIS — E11.9 TYPE 2 DIABETES MELLITUS WITHOUT COMPLICATION, WITHOUT LONG-TERM CURRENT USE OF INSULIN (HCC): ICD-10-CM

## 2019-07-18 DIAGNOSIS — I10 ESSENTIAL HYPERTENSION: ICD-10-CM

## 2019-07-18 DIAGNOSIS — I74.09 CHRONIC DISTAL AORTIC OCCLUSION (HCC): ICD-10-CM

## 2019-07-18 DIAGNOSIS — I65.23 BILATERAL CAROTID ARTERY STENOSIS: Primary | ICD-10-CM

## 2019-07-18 PROCEDURE — 99204 OFFICE O/P NEW MOD 45 MIN: CPT | Performed by: THORACIC SURGERY (CARDIOTHORACIC VASCULAR SURGERY)

## 2019-07-18 NOTE — PROGRESS NOTES
07/18/2019  Patient Information  Vince Olivera                                                                                          [address] [facility] [address]   1960  'PCP/Referring Physician'  Fortunato Marvin MD  [phone]  Zeus Lawrence MD  [phone]  Chief Complaint   Patient presents with   • Consult     Np referred for PAD,complains of leg pain and tightness when he walks.   • Peripheral Vascular Disease   • Claudication     CC: My legs hurt every time I walk and I have to stop after a few feet    History of Present Illness: 58-year-old  male being seen at the request of Dr. Lawrence for evaluation of claudication and aortic occlusion.  This gentleman has a significant past history of vascular disease.  He had a cerebrovascular accident in April 2017 and subsequent to that has had a left carotid endarterectomy.  Residual mild expressive aphasia but minimal motor function problems following his stroke.  He has hypertension, diabetes, hyperlipidemia, a past history of cigarette smoking, and a positive family history of diabetes.  Has not had anginal quality chest pain or significant shortness of breath.  Since his carotid endarterectomy he has not had TIAs.  His major problem at this point which extends back at least 6 months is claudication.  If he walks more than about 100 feet he develops discomfort in both calves necessitating stopping and resting.  If he tries to push on the discomfort extends up into his buttocks.  If he waits approximately 5 to 10 minutes he can then walk another short distance before he again has to stop secondary to discomfort in his calves.  He has not had rest pain.  He does not awaken in the middle the night secondary to foot pain.  He has not had sensory or motor loss of either lower extremity.    Patient Active Problem List   Diagnosis   • H/o Left MCA ischemic CVA 4/2017   • Essential hypertension   • Dyslipidemia   • Bilateral  carotid artery stenosis   • Residual mild expressive Aphasia   • Gastroesophageal reflux disease without esophagitis   • Combined receptive and expressive aphasia due to cerebrovascular accident   • Abnormal peripheral vision of right eye   • GERD (gastroesophageal reflux disease)   • Hyperlipidemia   • Palpitations   • Bilateral Carotid artery stenosis. Left greater than right    • Internal carotid artery stenosis, left   • Status post left carotid endarterectomy   • Seizure disorder as sequela of cerebrovascular accident (CMS/[facility])   • Type 2 diabetes mellitus without complication, without long-term current use of insulin (CMS/[facility])   • Bilateral leg and foot pain   • Bilateral carpal tunnel syndrome     Past Medical History:   Diagnosis Date   • Arthritis    • Carotid artery disease (CMS/HCC)    • Carotid artery disorder (CMS/[facility])    • Depression    • Diabetes mellitus (CMS/[facility])     DIAGNOSED 4-2017 CHECKS FSBG 3X/WEEK    • Ear infection     about 2 weeks ago- cleared up - wax removed and cleaned out    • GERD (gastroesophageal reflux disease)     self diagnosed takes otc ppi   • Hyperlipidemia    • Hypertension    • Hypertension    • Inguinal hernia    • Seizure (CMS/[facility])    • Shoulder pain     bilat    • Stroke (CMS/[facility]) 04/24/2017    EXPRESSIVE APHASIA RESIDUAL    • Tooth loose     5 - all over- will get teeth done after this surgery    • Wears reading eyeglasses      Past Surgical History:   Procedure Laterality Date   • ABDOMINAL HERNIA REPAIR  2010   • CAROTID ENDARTERECTOMY Left 10/17/2017    Procedure: CAROTID ENDARTERECTOMY LEFT;  Surgeon: Alexx Bach MD;  Location: [facility];  Service:    • CAROTID ENDARTERECTOMY Left 10/17/2017   • INGUINAL HERNIA REPAIR Left        Current Outpatient Medications:   •  aspirin  MG tablet, Take 1 tablet by mouth Daily., Disp: 30 tablet, Rfl: 5  •  atorvastatin (LIPITOR) 80 MG tablet, Take 1 tablet by mouth Every Night., Disp: 30 tablet, Rfl: 5  •   clopidogrel (PLAVIX) 75 MG tablet, Take 1 tablet by mouth Daily., Disp: 30 tablet, Rfl: 5  •  hydrochlorothiazide (HYDRODIURIL) 12.5 MG tablet, Take 1 tablet by mouth Daily., Disp: 30 tablet, Rfl: 6  •  lacosamide (VIMPAT) 50 MG tablet tablet, Take 1 tablet by mouth Every 12 (Twelve) Hours., Disp: 60 tablet, Rfl: 5  •  lansoprazole (PREVACID) 15 MG capsule, Take 1 capsule by mouth Daily., Disp: 30 capsule, Rfl: 5  •  levETIRAcetam (KEPPRA) 500 MG tablet, Take 1500 mg BID, Disp: 180 tablet, Rfl: 5  •  losartan (COZAAR) 100 MG tablet, Take 0.5 tablets by mouth 2 (Two) Times a Day., Disp: 60 tablet, Rfl: 5  •  metFORMIN (GLUCOPHAGE) 500 MG tablet, Take 2 tablets by mouth 2 (Two) Times a Day With Meals., Disp: 120 tablet, Rfl: 5  •  metoprolol succinate XL (TOPROL XL) 200 MG 24 hr tablet, Take 1 tablet by mouth Daily., Disp: 30 tablet, Rfl: 6  No Known Allergies  Social History     Socioeconomic History   • Marital status:      Spouse name: Not on file   • Number of children: 2   • Years of education: Not on file   • Highest education level: Not on file   Occupational History   • Occupation: Disabled/allan     Comment: disabled from stroke   Tobacco Use   • Smoking status: Former Smoker     Packs/day: 0.05     Years: 40.00     Pack years: 2.00     Types: Cigarettes     Last attempt to quit: 2017     Years since quittin.2   • Smokeless tobacco: Former User     Types: Snuff   • Tobacco comment: quit snuff when 18 or 19 years old - only did for baseball - 2-3 years    Substance and Sexual Activity   • Alcohol use: No   • Drug use: No   • Sexual activity: Defer     Partners: Female     Comment: SPOUSE   Social History Narrative    Patient consumes 0-1serving of caffeine daily.     Patient lives at home with wife in [facility]     Family History   Adopted: Yes   Problem Relation Age of Onset   • Diabetes Mother    • COPD Father      Review of Systems   Constitution: Negative. Negative for chills,  "fever, malaise/fatigue, night sweats and weight loss.   HENT: Negative.  Negative for hearing loss, odynophagia and sore throat.    Eyes: Negative.    Cardiovascular: Positive for claudication. Negative for chest pain, dyspnea on exertion, leg swelling, orthopnea and palpitations.   Respiratory: Negative.  Negative for cough and hemoptysis.    Endocrine: Negative.  Negative for cold intolerance, heat intolerance, polydipsia, polyphagia and polyuria.   Hematologic/Lymphatic: Bruises/bleeds easily.   Skin: Negative.  Negative for itching and rash.   Musculoskeletal: Positive for muscle cramps. Negative for joint pain, joint swelling and myalgias.   Gastrointestinal: Negative.  Negative for abdominal pain, constipation, diarrhea, hematemesis, hematochezia, melena, nausea and vomiting.   Genitourinary: Negative.  Negative for dysuria, frequency and hematuria.   Neurological: Positive for seizures. Negative for focal weakness, headaches and numbness.   Psychiatric/Behavioral: Positive for depression. Negative for suicidal ideas.   Allergic/Immunologic: Positive for environmental allergies.   All other systems reviewed and are negative.    Vitals:    07/18/19 1012   BP: 135/89   BP Location: Right arm   Patient Position: Sitting   Pulse: 59   Temp: 98.5 °F (36.9 °C)   SpO2: 98%   Weight: 90.3 kg (199 lb)   Height: 167.6 cm (66\")      Physical Exam   Constitutional: He is oriented to person, place, and time. He appears well-developed and well-nourished. No distress.   HENT:   Head: Normocephalic.   Right Ear: External ear normal.   Left Ear: External ear normal.   Nose: Nose normal.   Eyes: Pupils are equal, round, and reactive to light.   Neck: Normal range of motion. Neck supple. No tracheal deviation present. No thyromegaly present.   Bilateral carotid bruits are present left greater than right.  There is a healed left carotid endarterectomy incision.   Cardiovascular: Normal rate, regular rhythm and normal heart " sounds.   No murmur heard.  Pulmonary/Chest: Effort normal and breath sounds normal. No respiratory distress. He has no wheezes. He has no rales. He exhibits no tenderness.   Abdominal: Soft. Bowel sounds are normal. He exhibits no distension and no mass. There is no tenderness.   Musculoskeletal: Normal range of motion. He exhibits no edema.   Femoral pulses are absent bilaterally but are faintly present with the Doppler.  Popliteal pulses are absent.  Dorsalis pedis and posterior tibial pulses are obtainable only with the Doppler and are not palpable.  Hair and skin and nails of both feet are normal.  Capillary refill is slightly greater than 2 seconds.  The feet are warm and pink.  There is no sensory loss and no motor loss in either foot.   Lymphadenopathy:     He has no cervical adenopathy.   Neurological: He is alert and oriented to person, place, and time. He has normal reflexes. No cranial nerve deficit.   Skin: Skin is warm and dry. No rash noted. No erythema.   Psychiatric: He has a normal mood and affect.       Labs/Imaging: CT angiogram of the abdominal aorta with runoff reviewed.  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  External iliac artery reconstitution is present bilaterally from collateral flow.  Both common femoral arteries are patent but have significant vascular disease.  The profunda is patent bilaterally.  Both superficial femoral arteries have extensive atherosclerotic disease and are occluded in their distal portions proximal to reconstitution below Silverio's canal.  There is  single-vessel runoff  to below the ankles bilaterally.    Assessment: Extensive peripheral vascular disease as noted above.  Modification is likely secondary to aortic occlusion    Plan: This patient will need clearance cardiology and will need bilateral carotid ultrasound.  As we have cardiology clearance patient can be scheduled for aortobifemoral bypass.   I have discussed the procedure in detail with the patient including risks of the procedure such as stroke, heart attack, death, bleeding, renal insufficiency, respiratory insufficiency etc. the patient understands the procedure he understands risks and benefits and is ready to proceed.  If he continues to have claudication following aortobifemoral bypass femoral-popliteal bypass on one or both sides may be necessary.  The patient is aware of this and understands all of proceeding with aortobifemoral bypass first.    Patient Active Problem List   Diagnosis   • H/o Left MCA ischemic CVA 4/2017   • Essential hypertension   • Dyslipidemia   • Bilateral carotid artery stenosis   • Residual mild expressive Aphasia   • Gastroesophageal reflux disease without esophagitis   • Combined receptive and expressive aphasia due to cerebrovascular accident   • Abnormal peripheral vision of right eye   • GERD (gastroesophageal reflux disease)   • Hyperlipidemia   • Palpitations   • Bilateral Carotid artery stenosis. Left greater than right    • Internal carotid artery stenosis, left   • Status post left carotid endarterectomy   • Seizure disorder as sequela of cerebrovascular accident (CMS/HCC)   • Type 2 diabetes mellitus without complication, without long-term current use of insulin (CMS/HCC)   • Bilateral leg and foot pain   • Bilateral carpal tunnel syndrome         Dylan Weaver MD  [facility]gery  07/23/19   10:58 AM

## 2019-07-22 ENCOUNTER — HOSPITAL ENCOUNTER (OUTPATIENT)
Dept: CARDIOLOGY | Facility: HOSPITAL | Age: 59
Discharge: HOME | End: 2019-07-22
Admitting: PHYSICIAN ASSISTANT

## 2019-07-22 DIAGNOSIS — I65.23 BILATERAL CAROTID ARTERY STENOSIS: ICD-10-CM

## 2019-07-22 PROCEDURE — 93880 EXTRACRANIAL BILAT STUDY: CPT

## 2019-07-22 PROCEDURE — 93880 EXTRACRANIAL BILAT STUDY: CPT | Performed by: RADIOLOGY

## 2019-07-31 ENCOUNTER — OFFICE VISIT (OUTPATIENT)
Dept: CARDIOLOGY | Facility: CLINIC | Age: 59
End: 2019-07-31

## 2019-07-31 VITALS
DIASTOLIC BLOOD PRESSURE: 85 MMHG | HEART RATE: 62 BPM | HEIGHT: 66 IN | SYSTOLIC BLOOD PRESSURE: 140 MMHG | OXYGEN SATURATION: 99 % | BODY MASS INDEX: 31.82 KG/M2 | WEIGHT: 198 LBS

## 2019-07-31 DIAGNOSIS — I73.9 PVD (PERIPHERAL VASCULAR DISEASE) WITH CLAUDICATION (HCC): ICD-10-CM

## 2019-07-31 DIAGNOSIS — Z91.89 HIGH RISK OF CARDIAC EVENT: Primary | ICD-10-CM

## 2019-07-31 DIAGNOSIS — Z01.810 PREOP CARDIOVASCULAR EXAM: ICD-10-CM

## 2019-07-31 PROCEDURE — 93000 ELECTROCARDIOGRAM COMPLETE: CPT | Performed by: INTERNAL MEDICINE

## 2019-07-31 PROCEDURE — 99204 OFFICE O/P NEW MOD 45 MIN: CPT | Performed by: INTERNAL MEDICINE

## 2019-07-31 NOTE — PROGRESS NOTES
Patient Identification:  Name: Vince Olivera  Age: 58 y.o.  Sex: male  :  1960  MRN: [id number]             Chief Complaint: Preop clearance, peripheral vascular surgery    History of Present Illness:    58-year-old white male with a history of peripheral vascular disease involving carotid arteries with previous stroke, left carotid endarterectomy.  Recently has been worked up for somewhat chronic claudication, moderately intense worse with physical exertion alleviated with rest.  No associated chest pain.  Mild associated shortness of breath.  This is been going on for a year or more.  His CTA imaging shows total occlusion of the distal abdominal aorta involving the iliac vessels with markedly abnormal SAMINA.  He has been evaluated by Dr. Weaver and will be undergoing aortic bifemoral bypass surgery.  Cardiac clearance has been requested.  The patient's functional capacity is somewhat limited by claudication.  He is compliant with his medications.  He quit smoking in 2017.    Past Medical History:  Past Medical History:   Diagnosis Date   • Arthritis    • Carotid artery disease (CMS/HCC)    • Carotid artery disorder (CMS/HCC)    • Depression    • Diabetes mellitus (CMS/HCC)     DIAGNOSED  CHECKS FSBG 3X/WEEK    • Ear infection     about 2 weeks ago- cleared up - wax removed and cleaned out    • GERD (gastroesophageal reflux disease)     self diagnosed takes otc ppi   • Hyperlipidemia    • Hypertension    • Hypertension    • Inguinal hernia    • Seizure (CMS/HCC)    • Shoulder pain     bilat    • Stroke (CMS/HCC) 2017    EXPRESSIVE APHASIA RESIDUAL    • Tooth loose     5 - all over- will get teeth done after this surgery    • Wears reading eyeglasses        Past Surgical History:  Past Surgical History:   Procedure Laterality Date   • ABDOMINAL HERNIA REPAIR     • CAROTID ENDARTERECTOMY Left 10/17/2017    Procedure: CAROTID ENDARTERECTOMY LEFT;  Surgeon: Alexx Bach MD;   Location: [facility] OR;  Service:    • CAROTID ENDARTERECTOMY Left 10/17/2017   • INGUINAL HERNIA REPAIR Left         Home Meds:  Current Outpatient Medications   Medication Sig Dispense Refill   • aspirin  MG tablet Take 1 tablet by mouth Daily. 30 tablet 5   • atorvastatin (LIPITOR) 80 MG tablet Take 1 tablet by mouth Every Night. 30 tablet 5   • clopidogrel (PLAVIX) 75 MG tablet Take 1 tablet by mouth Daily. 30 tablet 5   • hydrochlorothiazide (HYDRODIURIL) 12.5 MG tablet Take 1 tablet by mouth Daily. 30 tablet 6   • lacosamide (VIMPAT) 50 MG tablet tablet Take 1 tablet by mouth Every 12 (Twelve) Hours. 60 tablet 5   • lansoprazole (PREVACID) 15 MG capsule Take 1 capsule by mouth Daily. 30 capsule 5   • levETIRAcetam (KEPPRA) 500 MG tablet Take 1500 mg  tablet 5   • losartan (COZAAR) 100 MG tablet Take 0.5 tablets by mouth 2 (Two) Times a Day. 60 tablet 5   • metFORMIN (GLUCOPHAGE) 500 MG tablet Take 2 tablets by mouth 2 (Two) Times a Day With Meals. 120 tablet 5   • metoprolol succinate XL (TOPROL XL) 200 MG 24 hr tablet Take 1 tablet by mouth Daily. 30 tablet 6     No current facility-administered medications for this visit.          Allergies:  No Known Allergies    Social History:   Social History     Tobacco Use   • Smoking status: Former Smoker     Packs/day: 0.05     Years: 40.00     Pack years: 2.00     Types: Cigarettes     Last attempt to quit: 2017     Years since quittin.2   • Smokeless tobacco: Former User     Types: Snuff   • Tobacco comment: quit snuff when 18 or 19 years old - only did for baseball - 2-3 years    Substance Use Topics   • Alcohol use: No        Family History:  Family History   Adopted: Yes   Problem Relation Age of Onset   • Diabetes Mother    • COPD Father         Review of Systems  Review of Systems   Constitution: Negative for chills, fever and malaise/fatigue.   HENT: Negative for congestion and sore throat.    Cardiovascular: Negative for chest pain,  "near-syncope, palpitations and syncope.   Respiratory: Negative for shortness of breath.    Endocrine: Negative.    Hematologic/Lymphatic: Negative.    Skin: Negative.    Musculoskeletal: Positive for arthritis.   Gastrointestinal: Negative.    Genitourinary: Negative.    Neurological: Negative.    Psychiatric/Behavioral: Negative.    Allergic/Immunologic: Negative.    All other systems reviewed and are negative.        Physical Exam:  Ht 167.6 cm (65.98\")   BMI 32.14 kg/m²          ECG 12 Lead  Date/Time: 7/31/2019 8:35 AM  Performed by: MAGO Phillips MD  Authorized by: MAGO Phillips MD   Previous ECG: no previous ECG available  Rhythm: sinus bradycardia  Conduction: conduction normal  ST Segments: ST segments normal  T Waves: T waves normal    Clinical impression: normal ECG             General Appearance:   · well developed  · well nourished  HENT:   · oropharynx moist  · lips not cyanotic  Neck:  · thyroid not enlarged  · supple  Respiratory:  · no respiratory distress  · normal breath sounds  · no rales  Cardiovascular:  · no jugular venous distention  · regular rhythm  · apical impulse normal  · S1 normal, S2 normal  · no S3, no S4   · no murmur  · no rub, no thrill  · carotid pulses normal; no bruit  · pedal pulses diminished  · lower extremity edema: none    Gastrointestinal:   · bowel sounds normal  · non-tender  · no hepatomegaly, no splenomegaly  Musculoskeletal:  · no clubbing of fingers.   · normocephalic, head atraumatic  Skin:   · warm, dry  Psychiatric:  · judgement and insight appropriate  · normal mood and affect  ·   Data Review:    Reviewed primary care records, imaging and laboratory studies    Previous echo in 2017 showed no significant structural abnormalities    Patient's Body mass index is 32.14 kg/m². BMI is above normal parameters. Recommendations include: nutrition counseling.           DIAGNOSES:     Diagnosis Plan   1. High risk of cardiac event  Stress Test With Myocardial " Perfusion   2. Preop cardiovascular exam  Stress Test With Myocardial Perfusion   3. PVD (peripheral vascular disease) with claudication (CMS/HCC)  Stress Test With Myocardial Perfusion         MANAGEMENT PLAN:    Mr. Olivera is not complaining of cardiac symptoms, however, his functional capacity is limited by claudication with severe peripheral vascular disease.  He is going to be undergoing aortic bifemoral bypass surgery.  Further cardiac evaluation is recommended to risk stratify.  Stress imaging will be scheduled.  Agree with his current medical therapy.  He has already previously quit smoking.  If stress imaging does not show any high risk findings then the patient is cleared for vascular surgery from a cardiac standpoint.  The patient is agreeable with this plan.          MAGO Phillips MD  7/31/2019    8:31 AM

## 2019-08-16 ENCOUNTER — HOSPITAL ENCOUNTER (OUTPATIENT)
Dept: NUCLEAR MEDICINE | Facility: HOSPITAL | Age: 59
Discharge: HOME | End: 2019-08-16

## 2019-08-16 ENCOUNTER — HOSPITAL ENCOUNTER (OUTPATIENT)
Dept: CARDIOLOGY | Facility: HOSPITAL | Age: 59
Discharge: HOME | End: 2019-08-16

## 2019-08-16 DIAGNOSIS — I73.9 PVD (PERIPHERAL VASCULAR DISEASE) WITH CLAUDICATION (HCC): ICD-10-CM

## 2019-08-16 DIAGNOSIS — Z91.89 HIGH RISK OF CARDIAC EVENT: ICD-10-CM

## 2019-08-16 DIAGNOSIS — Z01.810 PREOP CARDIOVASCULAR EXAM: ICD-10-CM

## 2019-08-16 LAB
BH CV NUCLEAR PRIOR STUDY: 3
BH CV STRESS BP STAGE 1: NORMAL
BH CV STRESS BP STAGE 2: NORMAL
BH CV STRESS COMMENTS STAGE 1: NORMAL
BH CV STRESS COMMENTS STAGE 2: NORMAL
BH CV STRESS DOSE REGADENOSON STAGE 1: 0.4
BH CV STRESS DURATION MIN STAGE 1: 0
BH CV STRESS DURATION MIN STAGE 2: 4
BH CV STRESS DURATION SEC STAGE 1: 10
BH CV STRESS DURATION SEC STAGE 2: 0
BH CV STRESS HR STAGE 1: 78
BH CV STRESS HR STAGE 2: 74
BH CV STRESS PROTOCOL 1: NORMAL
BH CV STRESS RECOVERY BP: NORMAL MMHG
BH CV STRESS RECOVERY HR: 67 BPM
BH CV STRESS STAGE 1: 1
BH CV STRESS STAGE 2: 2
LV EF NUC BP: 60 %
MAXIMAL PREDICTED HEART RATE: 163 BPM
PERCENT MAX PREDICTED HR: 50.31 %
STRESS BASELINE BP: NORMAL MMHG
STRESS BASELINE HR: 59 BPM
STRESS PERCENT HR: 59 %
STRESS POST PEAK BP: NORMAL MMHG
STRESS POST PEAK HR: 82 BPM
STRESS TARGET HR: 139 BPM

## 2019-08-16 PROCEDURE — 93018 CV STRESS TEST I&R ONLY: CPT | Performed by: INTERNAL MEDICINE

## 2019-08-16 PROCEDURE — 0 TECHNETIUM SESTAMIBI: Performed by: INTERNAL MEDICINE

## 2019-08-16 PROCEDURE — 78452 HT MUSCLE IMAGE SPECT MULT: CPT | Performed by: INTERNAL MEDICINE

## 2019-08-16 PROCEDURE — A9500 TC99M SESTAMIBI: HCPCS | Performed by: INTERNAL MEDICINE

## 2019-08-16 PROCEDURE — 93017 CV STRESS TEST TRACING ONLY: CPT

## 2019-08-16 PROCEDURE — 78452 HT MUSCLE IMAGE SPECT MULT: CPT

## 2019-08-16 PROCEDURE — 25010000002 REGADENOSON 0.4 MG/5ML SOLUTION: Performed by: INTERNAL MEDICINE

## 2019-08-16 RX ADMIN — REGADENOSON 0.4 MG: 0.08 INJECTION, SOLUTION INTRAVENOUS at 09:48

## 2019-08-16 RX ADMIN — TECHNETIUM TC 99M SESTAMIBI 1 DOSE: 1 INJECTION INTRAVENOUS at 08:10

## 2019-08-16 RX ADMIN — TECHNETIUM TC 99M SESTAMIBI 1 DOSE: 1 INJECTION INTRAVENOUS at 09:48

## 2019-08-21 ENCOUNTER — TELEPHONE (OUTPATIENT)
Dept: CARDIOLOGY | Facility: CLINIC | Age: 59
End: 2019-08-21

## 2019-08-21 NOTE — TELEPHONE ENCOUNTER
Patient is needing to be scheduled for surgery for aortabifemoral bypass. He was seen by Dr. Phillips and had a stress. They wanted to know if patient is cleared for surgery.     Thanks!

## 2019-08-22 NOTE — TELEPHONE ENCOUNTER
Stress test result note:    MAGO Phillips MD  P [facility]             Nuclear stress test was mildly abnormal abnormal, showing a relatively small area of abnormal blood flow in the inferior wall/apex     However, his overall heart function calculated normal     This is not a high risk finding, based on his peripheral vascular disease and need for peripheral vascular surgery, I have cleared him to undergo peripheral vascular bypass.  Based on the stress test results he is at increased but not high risk for cardiac events from general anesthesia.  More invasive cardiac testing is not indicated at this time.  However, if the patient develops chest pain or worsening cardiac symptoms, I would recommend additional work-up in that case.     Otherwise he can be cleared for vascular surgery.  He should continue taking his Toprol every day including the day of surgery, continue taking his atorvastatin every day, and continue taking his losartan every day including the day of surgery.  Whether he needs to come off of aspirin or Plavix for surgery is up to his surgeon, but after surgery, long-term these should be used for vascular prevention     Alexx Phillips MD

## 2019-08-28 ENCOUNTER — HOSPITAL ENCOUNTER (OUTPATIENT)
Facility: HOSPITAL | Age: 59
Setting detail: SURGERY ADMIT
End: 2019-08-28
Attending: THORACIC SURGERY (CARDIOTHORACIC VASCULAR SURGERY) | Admitting: THORACIC SURGERY (CARDIOTHORACIC VASCULAR SURGERY)

## 2019-08-28 DIAGNOSIS — M79.605 BILATERAL LEG AND FOOT PAIN: Primary | ICD-10-CM

## 2019-08-28 DIAGNOSIS — M79.671 BILATERAL LEG AND FOOT PAIN: Primary | ICD-10-CM

## 2019-08-28 DIAGNOSIS — M79.604 BILATERAL LEG AND FOOT PAIN: Primary | ICD-10-CM

## 2019-08-28 DIAGNOSIS — M79.672 BILATERAL LEG AND FOOT PAIN: Primary | ICD-10-CM

## 2019-08-29 ENCOUNTER — OFFICE VISIT (OUTPATIENT)
Dept: CARDIOLOGY | Facility: CLINIC | Age: 59
End: 2019-08-29

## 2019-08-29 VITALS
HEART RATE: 61 BPM | SYSTOLIC BLOOD PRESSURE: 174 MMHG | HEIGHT: 66 IN | WEIGHT: 198 LBS | BODY MASS INDEX: 31.82 KG/M2 | DIASTOLIC BLOOD PRESSURE: 99 MMHG | RESPIRATION RATE: 16 BRPM

## 2019-08-29 DIAGNOSIS — I63.412 CEREBROVASCULAR ACCIDENT (CVA) DUE TO EMBOLISM OF LEFT MIDDLE CEREBRAL ARTERY (HCC): Primary | ICD-10-CM

## 2019-08-29 DIAGNOSIS — E78.5 DYSLIPIDEMIA: ICD-10-CM

## 2019-08-29 DIAGNOSIS — I73.9 PAD (PERIPHERAL ARTERY DISEASE) (HCC): ICD-10-CM

## 2019-08-29 DIAGNOSIS — E11.9 TYPE 2 DIABETES MELLITUS WITHOUT COMPLICATION, WITHOUT LONG-TERM CURRENT USE OF INSULIN (HCC): ICD-10-CM

## 2019-08-29 DIAGNOSIS — I65.23 BILATERAL CAROTID ARTERY STENOSIS: ICD-10-CM

## 2019-08-29 DIAGNOSIS — I10 ESSENTIAL HYPERTENSION: ICD-10-CM

## 2019-08-29 PROCEDURE — 99213 OFFICE O/P EST LOW 20 MIN: CPT | Performed by: PHYSICIAN ASSISTANT

## 2019-08-29 RX ORDER — OMEPRAZOLE 20 MG/1
20 CAPSULE, DELAYED RELEASE ORAL DAILY
COMMUNITY
End: 2020-01-23

## 2019-08-30 ENCOUNTER — OFFICE VISIT (OUTPATIENT)
Dept: FAMILY MEDICINE CLINIC | Facility: CLINIC | Age: 59
End: 2019-08-30

## 2019-08-30 VITALS
TEMPERATURE: 97.2 F | DIASTOLIC BLOOD PRESSURE: 86 MMHG | SYSTOLIC BLOOD PRESSURE: 148 MMHG | HEART RATE: 61 BPM | OXYGEN SATURATION: 98 % | WEIGHT: 198.6 LBS | BODY MASS INDEX: 31.92 KG/M2 | HEIGHT: 66 IN

## 2019-08-30 DIAGNOSIS — I10 ESSENTIAL HYPERTENSION: ICD-10-CM

## 2019-08-30 DIAGNOSIS — J01.11 ACUTE RECURRENT FRONTAL SINUSITIS: ICD-10-CM

## 2019-08-30 DIAGNOSIS — H61.23 BILATERAL IMPACTED CERUMEN: Primary | ICD-10-CM

## 2019-08-30 PROCEDURE — 99214 OFFICE O/P EST MOD 30 MIN: CPT | Performed by: NURSE PRACTITIONER

## 2019-08-30 RX ORDER — AMOXICILLIN AND CLAVULANATE POTASSIUM 875; 125 MG/1; MG/1
1 TABLET, FILM COATED ORAL 2 TIMES DAILY
Qty: 20 TABLET | Refills: 0 | Status: SHIPPED | OUTPATIENT
Start: 2019-08-30 | End: 2019-09-09

## 2019-08-30 RX ORDER — AMLODIPINE BESYLATE 5 MG/1
5 TABLET ORAL DAILY
Qty: 30 TABLET | Refills: 2 | Status: SHIPPED | OUTPATIENT
Start: 2019-08-30 | End: 2019-09-23

## 2019-08-30 NOTE — PROGRESS NOTES
Subjective   Vince Olivera is a 58 y.o. male.     Chief Complaint   Patient presents with   • Dizziness   • Ear Problem   • blood pressure problem       He presents with c/o high blood pressure. His wife states it was 174/99 yesterday and today it is 148/86. He c/o feeling dizzy. He denies shortness of breath and chest pain. His wife states he was scheduled to have surgery on his legs but he saw the cardiologist for clearance. She states he is scheduled for a heart cath in the near future. He c/o feeling dizzy when he stands up or when he turns his head. He states he doesn't feel like the room is spinning or like he is going to pass out. He just feels funny walking. He states he got new glasses that make him dizzy. He c/o headache in the back of his head every now and then. His wife states that is new. His wife states he takes metoprolol and losartan every day. She states he takes hctz as well.          The following portions of the patient's history were reviewed and updated as appropriate: allergies, current medications, past family history, past medical history, past social history, past surgical history and problem list.    Review of Systems   Constitutional: Negative for fever and unexpected weight change.   HENT: Positive for ear pain. Negative for rhinorrhea, sore throat and trouble swallowing.    Eyes: Negative for discharge, itching and visual disturbance.   Respiratory: Negative for cough, shortness of breath and wheezing.    Cardiovascular: Negative for chest pain and palpitations.   Gastrointestinal: Negative for abdominal pain, blood in stool, constipation, diarrhea, nausea and vomiting.   Endocrine: Negative for polydipsia, polyphagia and polyuria.   Genitourinary: Negative for dysuria and hematuria.   Musculoskeletal: Negative for arthralgias and myalgias.   Skin: Negative for color change.   Allergic/Immunologic: Positive for environmental allergies.   Neurological: Positive for dizziness,  "light-headedness and headaches. Negative for seizures and syncope.   Hematological: Negative for adenopathy.   Psychiatric/Behavioral: Negative for sleep disturbance and suicidal ideas. The patient is not nervous/anxious.        Objective     /86 (BP Location: Left arm, Patient Position: Sitting, Cuff Size: Large Adult)   Pulse 61   Temp 97.2 °F (36.2 °C) (Oral)   Ht 167.6 cm (65.98\")   Wt 90.1 kg (198 lb 9.6 oz)   SpO2 98%   BMI 32.07 kg/m²     Physical Exam   Constitutional: He is oriented to person, place, and time. He appears well-developed and well-nourished. No distress.   HENT:   Head: Normocephalic and atraumatic.   Right Ear: Hearing and external ear normal. A foreign body (cerumen impaction) is present.   Left Ear: Hearing and external ear normal. A foreign body (cerumen impaction) is present.   Nose: Right sinus exhibits frontal sinus tenderness. Right sinus exhibits no maxillary sinus tenderness. Left sinus exhibits frontal sinus tenderness. Left sinus exhibits no maxillary sinus tenderness.   Mouth/Throat: Uvula is midline, oropharynx is clear and moist and mucous membranes are normal. Abnormal dentition (edentulous).   Eyes: Conjunctivae and lids are normal. Pupils are equal, round, and reactive to light.   Neck: Trachea normal. No tracheal tenderness present. No tracheal deviation present.   Cardiovascular: Normal rate, regular rhythm, S1 normal, S2 normal, normal heart sounds and intact distal pulses. Exam reveals no gallop and no friction rub.   No murmur heard.  Pulmonary/Chest: Effort normal and breath sounds normal. No respiratory distress.   Abdominal: Soft. Bowel sounds are normal. He exhibits no distension. There is no tenderness.   Neurological: He is alert and oriented to person, place, and time. He has normal strength. No cranial nerve deficit or sensory deficit.   Reflex Scores:       Tricep reflexes are 2+ on the right side and 2+ on the left side.       Bicep reflexes are 2+ " on the right side and 2+ on the left side.       Brachioradialis reflexes are 2+ on the right side and 2+ on the left side.       Patellar reflexes are 2+ on the right side and 2+ on the left side.  Skin: Skin is warm and dry. He is not diaphoretic.   Psychiatric: He has a normal mood and affect. His speech is normal and behavior is normal. Judgment and thought content normal.   Vitals reviewed.      Assessment/Plan     Problem List Items Addressed This Visit        Cardiovascular and Mediastinum    Essential hypertension    Relevant Medications    amLODIPine (NORVASC) 5 MG tablet      Other Visit Diagnoses     Bilateral impacted cerumen    -  Primary    Relevant Medications    carbamide peroxide (DEBROX) 6.5 % otic solution    Acute recurrent frontal sinusitis        Relevant Medications    amoxicillin-clavulanate (AUGMENTIN) 875-125 MG per tablet        Plan: Start amlodipine for better blood pressure control. Debrox ordered for bilateral cerumen impactions. Augmentin ordered for sinusitis. Your dizziness could be caused by your sinusitis or your elevated blood pressure. If you develop severe headache, difficulty speaking or any other symptom of stroke, go straight to the emergency room. His wife states he has been craving cigarettes. I have explained that nicotine in cigarettes causes vasoconstriction, so if he has blockages in his legs and heart, it will only make them worse. So he should not use a patch or any form of nicotine if possible. Follow up as needed.     Patient's Body mass index is 32.07 kg/m². BMI is above normal parameters. Recommendations include: educational material.   (Normal BMI:  18.5-24.9, OW 25-29.9, Obesity 30 or greater)          Understands disease processes and need for medications.  Understands reasons for urgent and emergent care.  Patient (& family) verbalized agreement for treatment plan.   Emotional support and active listening provided.  Patient provided time to verbalize  feelings.               This document has been electronically signed by KINGSTON Gallardo   August 30, 2019 4:39 PM

## 2019-08-30 NOTE — PATIENT INSTRUCTIONS

## 2019-09-09 ENCOUNTER — APPOINTMENT (OUTPATIENT)
Dept: PREADMISSION TESTING | Facility: HOSPITAL | Age: 59
End: 2019-09-09

## 2019-09-11 ENCOUNTER — TELEPHONE (OUTPATIENT)
Dept: FAMILY MEDICINE CLINIC | Facility: CLINIC | Age: 59
End: 2019-09-11

## 2019-09-11 DIAGNOSIS — I10 ESSENTIAL HYPERTENSION: ICD-10-CM

## 2019-09-11 DIAGNOSIS — I63.9 CEREBROVASCULAR ACCIDENT (CVA), UNSPECIFIED MECHANISM (HCC): ICD-10-CM

## 2019-09-11 DIAGNOSIS — I65.23 BILATERAL CAROTID ARTERY STENOSIS: ICD-10-CM

## 2019-09-11 RX ORDER — HYDROCHLOROTHIAZIDE 12.5 MG/1
12.5 TABLET ORAL DAILY
Qty: 30 TABLET | Refills: 6 | Status: SHIPPED | OUTPATIENT
Start: 2019-09-11 | End: 2020-04-24 | Stop reason: SDUPTHER

## 2019-09-11 RX ORDER — CLOPIDOGREL BISULFATE 75 MG/1
75 TABLET ORAL DAILY
Qty: 30 TABLET | Refills: 5 | Status: SHIPPED | OUTPATIENT
Start: 2019-09-11 | End: 2020-03-23 | Stop reason: SDUPTHER

## 2019-09-12 ENCOUNTER — TELEPHONE (OUTPATIENT)
Dept: NEUROLOGY | Facility: CLINIC | Age: 59
End: 2019-09-12

## 2019-09-19 ENCOUNTER — TELEPHONE (OUTPATIENT)
Dept: CARDIOLOGY | Facility: CLINIC | Age: 59
End: 2019-09-19

## 2019-09-19 NOTE — TELEPHONE ENCOUNTER
CT surgery called requesting clarification for pt's vascular surgery clearance.     Dr. Phillips and Edwin are both out of office this week, will return Monday 9/23/19.     Please refer to Dr. Phillips stress test result note 8/16/19, telephone note 8/21/19, and office visit note 8/21/19 from Edwin Wall PA-C.

## 2019-09-20 DIAGNOSIS — G40.909 SEIZURE DISORDER AS SEQUELA OF CEREBROVASCULAR ACCIDENT (HCC): ICD-10-CM

## 2019-09-20 DIAGNOSIS — I69.398 SEIZURE DISORDER AS SEQUELA OF CEREBROVASCULAR ACCIDENT (HCC): ICD-10-CM

## 2019-09-20 RX ORDER — LACOSAMIDE 50 MG
TABLET ORAL
Qty: 60 TABLET | Refills: 0 | Status: ON HOLD | OUTPATIENT
Start: 2019-09-20 | End: 2019-10-16 | Stop reason: SDUPTHER

## 2019-09-23 ENCOUNTER — OFFICE VISIT (OUTPATIENT)
Dept: FAMILY MEDICINE CLINIC | Facility: CLINIC | Age: 59
End: 2019-09-23

## 2019-09-23 VITALS
WEIGHT: 196.3 LBS | OXYGEN SATURATION: 98 % | HEART RATE: 62 BPM | TEMPERATURE: 97 F | DIASTOLIC BLOOD PRESSURE: 88 MMHG | SYSTOLIC BLOOD PRESSURE: 150 MMHG | HEIGHT: 66 IN | BODY MASS INDEX: 31.55 KG/M2

## 2019-09-23 DIAGNOSIS — E11.9 TYPE 2 DIABETES MELLITUS WITHOUT COMPLICATION, WITHOUT LONG-TERM CURRENT USE OF INSULIN (HCC): ICD-10-CM

## 2019-09-23 DIAGNOSIS — I10 ESSENTIAL HYPERTENSION: Primary | ICD-10-CM

## 2019-09-23 DIAGNOSIS — Z12.5 SCREENING PSA (PROSTATE SPECIFIC ANTIGEN): ICD-10-CM

## 2019-09-23 PROCEDURE — 83036 HEMOGLOBIN GLYCOSYLATED A1C: CPT | Performed by: NURSE PRACTITIONER

## 2019-09-23 PROCEDURE — G0103 PSA SCREENING: HCPCS | Performed by: NURSE PRACTITIONER

## 2019-09-23 PROCEDURE — 99214 OFFICE O/P EST MOD 30 MIN: CPT | Performed by: NURSE PRACTITIONER

## 2019-09-23 PROCEDURE — 82043 UR ALBUMIN QUANTITATIVE: CPT | Performed by: NURSE PRACTITIONER

## 2019-09-23 PROCEDURE — 85025 COMPLETE CBC W/AUTO DIFF WBC: CPT | Performed by: NURSE PRACTITIONER

## 2019-09-23 PROCEDURE — 80061 LIPID PANEL: CPT | Performed by: NURSE PRACTITIONER

## 2019-09-23 PROCEDURE — 80053 COMPREHEN METABOLIC PANEL: CPT | Performed by: NURSE PRACTITIONER

## 2019-09-23 RX ORDER — AMLODIPINE BESYLATE 10 MG/1
10 TABLET ORAL DAILY
Qty: 30 TABLET | Refills: 5 | Status: SHIPPED | OUTPATIENT
Start: 2019-09-23 | End: 2020-04-04 | Stop reason: SDUPTHER

## 2019-09-23 NOTE — PROGRESS NOTES
Subjective   Vince Olivera is a 58 y.o. male.     Chief Complaint   Patient presents with   • Diabetes   • Hypertension       He presents for follow up of high blood pressure. He states he has not been checking his blood pressure like he should. He states he has been taking the amlodipine and tolerating it well. He states he still feels dizzy. He states he thinks his seizure medicine is making him dizzy. He states his blood pressure may have been high because he has been working out in the heat building a house. He states the cardiologists got together and decided he didn't need a heart cath before they plan to do his leg surgery for blood flow in his legs. He states he has an appointment with neurology in October. He states the dizziness has been since right after his stroke around 2 years ago.     He also presents for follow up of diabetes. He states his blood sugar is around 120 when he checks it at home. His wife states he takes metformin 500mg twice a day. She states he is not a big sweet eater.          The following portions of the patient's history were reviewed and updated as appropriate: allergies, current medications, past family history, past medical history, past social history, past surgical history and problem list.    Review of Systems   Constitutional: Negative for fever and unexpected weight change.   HENT: Negative for ear pain, rhinorrhea, sore throat and trouble swallowing.    Eyes: Negative for visual disturbance.   Respiratory: Negative for cough, shortness of breath and wheezing.    Cardiovascular: Negative for chest pain and palpitations.   Gastrointestinal: Negative for abdominal pain, blood in stool, constipation, diarrhea, nausea and vomiting.   Genitourinary: Negative for dysuria.   Musculoskeletal: Negative for arthralgias and myalgias.   Skin: Negative for color change.   Allergic/Immunologic: Positive for environmental allergies.   Neurological: Positive for dizziness and  "light-headedness.   Hematological: Negative for adenopathy.   Psychiatric/Behavioral: Negative for sleep disturbance and suicidal ideas. The patient is not nervous/anxious.        Objective     /88 (BP Location: Right arm, Patient Position: Sitting, Cuff Size: Adult)   Pulse 62   Temp 97 °F (36.1 °C)   Ht 167.6 cm (65.98\")   Wt 89 kg (196 lb 4.8 oz)   SpO2 98%   BMI 31.70 kg/m²     Physical Exam   Constitutional: He is oriented to person, place, and time. He appears well-developed and well-nourished. No distress.   HENT:   Head: Normocephalic and atraumatic.   Cardiovascular: Normal rate, regular rhythm, normal heart sounds and intact distal pulses. Exam reveals no gallop and no friction rub.   No murmur heard.  Pulmonary/Chest: Effort normal and breath sounds normal. No respiratory distress.   Abdominal: Soft. Bowel sounds are normal. He exhibits no distension. There is no tenderness.   Neurological: He is alert and oriented to person, place, and time.   Skin: Skin is warm and dry. He is not diaphoretic.   Psychiatric: He has a normal mood and affect. His behavior is normal. Judgment and thought content normal.   Vitals reviewed.      Assessment/Plan     Problem List Items Addressed This Visit        Cardiovascular and Mediastinum    Essential hypertension - Primary    Relevant Medications    amLODIPine (NORVASC) 10 MG tablet    Other Relevant Orders    CBC & Differential    Comprehensive Metabolic Panel    Lipid Panel    CBC Auto Differential       Endocrine    Type 2 diabetes mellitus without complication, without long-term current use of insulin (CMS/[facility])    Relevant Orders    Hemoglobin A1c    MicroAlbumin, Urine, Random - Urine, Clean Catch    Lipid Panel      Other Visit Diagnoses     Screening PSA (prostate specific antigen)        Relevant Orders    PSA Screen          Plan: Get A1C today. Continue metformin. Increase amlodipine for better blood pressure control. Follow up in 6 months and as " needed.     Patient's Body mass index is 31.7 kg/m². BMI is above normal parameters. Recommendations include: educational material.   (Normal BMI:  18.5-24.9, OW 25-29.9, Obesity 30 or greater)        Understands disease processes and need for medications.  Understands reasons for urgent and emergent care.  Patient (& family) verbalized agreement for treatment plan.   Emotional support and active listening provided.  Patient provided time to verbalize feelings.               This document has been electronically signed by KINGSTON Gallardo   September 23, 2019 3:35 PM

## 2019-09-24 LAB
ALBUMIN SERPL-MCNC: 4.9 G/DL (ref 3.5–5.2)
ALBUMIN UR-MCNC: <1.2 MG/DL
ALBUMIN/GLOB SERPL: 2 G/DL
ALP SERPL-CCNC: 75 U/L (ref 39–117)
ALT SERPL W P-5'-P-CCNC: 15 U/L (ref 1–41)
ANION GAP SERPL CALCULATED.3IONS-SCNC: 13.8 MMOL/L (ref 5–15)
AST SERPL-CCNC: 15 U/L (ref 1–40)
BASOPHILS # BLD AUTO: 0.06 10*3/MM3 (ref 0–0.2)
BASOPHILS NFR BLD AUTO: 0.6 % (ref 0–1.5)
BILIRUB SERPL-MCNC: 0.3 MG/DL (ref 0.2–1.2)
BUN BLD-MCNC: 7 MG/DL (ref 6–20)
BUN/CREAT SERPL: 8.9 (ref 7–25)
CALCIUM SPEC-SCNC: 9.3 MG/DL (ref 8.6–10.5)
CHLORIDE SERPL-SCNC: 97 MMOL/L (ref 98–107)
CHOLEST SERPL-MCNC: 85 MG/DL (ref 0–200)
CO2 SERPL-SCNC: 26.2 MMOL/L (ref 22–29)
CREAT BLD-MCNC: 0.79 MG/DL (ref 0.76–1.27)
DEPRECATED RDW RBC AUTO: 42.4 FL (ref 37–54)
EOSINOPHIL # BLD AUTO: 0.11 10*3/MM3 (ref 0–0.4)
EOSINOPHIL NFR BLD AUTO: 1.2 % (ref 0.3–6.2)
ERYTHROCYTE [DISTWIDTH] IN BLOOD BY AUTOMATED COUNT: 12.9 % (ref 12.3–15.4)
GFR SERPL CREATININE-BSD FRML MDRD: 101 ML/MIN/1.73
GLOBULIN UR ELPH-MCNC: 2.5 GM/DL
GLUCOSE BLD-MCNC: 99 MG/DL (ref 65–99)
HBA1C MFR BLD: 6.6 % (ref 4.8–5.6)
HCT VFR BLD AUTO: 46.8 % (ref 37.5–51)
HDLC SERPL-MCNC: 24 MG/DL (ref 40–60)
HGB BLD-MCNC: 15.9 G/DL (ref 13–17.7)
IMM GRANULOCYTES # BLD AUTO: 0.04 10*3/MM3 (ref 0–0.05)
IMM GRANULOCYTES NFR BLD AUTO: 0.4 % (ref 0–0.5)
LDLC SERPL CALC-MCNC: 32 MG/DL (ref 0–100)
LDLC/HDLC SERPL: 1.33 {RATIO}
LYMPHOCYTES # BLD AUTO: 4.13 10*3/MM3 (ref 0.7–3.1)
LYMPHOCYTES NFR BLD AUTO: 44.5 % (ref 19.6–45.3)
MCH RBC QN AUTO: 30.9 PG (ref 26.6–33)
MCHC RBC AUTO-ENTMCNC: 34 G/DL (ref 31.5–35.7)
MCV RBC AUTO: 91.1 FL (ref 79–97)
MONOCYTES # BLD AUTO: 0.75 10*3/MM3 (ref 0.1–0.9)
MONOCYTES NFR BLD AUTO: 8.1 % (ref 5–12)
NEUTROPHILS # BLD AUTO: 4.2 10*3/MM3 (ref 1.7–7)
NEUTROPHILS NFR BLD AUTO: 45.2 % (ref 42.7–76)
NRBC BLD AUTO-RTO: 0 /100 WBC (ref 0–0.2)
PLATELET # BLD AUTO: 278 10*3/MM3 (ref 140–450)
PMV BLD AUTO: 9 FL (ref 6–12)
POTASSIUM BLD-SCNC: 5 MMOL/L (ref 3.5–5.2)
PROT SERPL-MCNC: 7.4 G/DL (ref 6–8.5)
PSA SERPL-MCNC: 0.93 NG/ML (ref 0–4)
RBC # BLD AUTO: 5.14 10*6/MM3 (ref 4.14–5.8)
SODIUM BLD-SCNC: 137 MMOL/L (ref 136–145)
TRIGL SERPL-MCNC: 146 MG/DL (ref 0–150)
VLDLC SERPL-MCNC: 29.2 MG/DL (ref 5–40)
WBC NRBC COR # BLD: 9.29 10*3/MM3 (ref 3.4–10.8)

## 2019-09-24 NOTE — TELEPHONE ENCOUNTER
Renard's office note is very clear, the patient has been cleared for surgery, he discussed the case with me and documented that.    What additional questions are there?    Alexx Phillips MD

## 2019-09-24 NOTE — TELEPHONE ENCOUNTER
Left message for Nila @ CT surgery to please refer to this phone encounter and to please call with any further questions.

## 2019-09-30 ENCOUNTER — PREP FOR SURGERY (OUTPATIENT)
Dept: OTHER | Facility: HOSPITAL | Age: 59
End: 2019-09-30

## 2019-09-30 ENCOUNTER — TELEPHONE (OUTPATIENT)
Dept: CARDIAC SURGERY | Facility: CLINIC | Age: 59
End: 2019-09-30

## 2019-09-30 DIAGNOSIS — I70.0 AORTIC OCCLUSION (HCC): Primary | ICD-10-CM

## 2019-09-30 RX ORDER — CHLORHEXIDINE GLUCONATE 500 MG/1
1 CLOTH TOPICAL EVERY 12 HOURS PRN
Status: CANCELLED | OUTPATIENT
Start: 2019-10-08

## 2019-09-30 NOTE — TELEPHONE ENCOUNTER
I have called the bowel prep/antibiotics into the [facility] pharmacy and I spoke with Mr. Olivera's wife regarding instructions.

## 2019-10-08 ENCOUNTER — APPOINTMENT (OUTPATIENT)
Dept: CT IMAGING | Facility: HOSPITAL | Age: 59
End: 2019-10-08

## 2019-10-08 ENCOUNTER — APPOINTMENT (OUTPATIENT)
Dept: PREADMISSION TESTING | Facility: HOSPITAL | Age: 59
End: 2019-10-08

## 2019-10-08 ENCOUNTER — HOSPITAL ENCOUNTER (EMERGENCY)
Facility: HOSPITAL | Age: 59
Discharge: ANOTHER HOSPITAL | End: 2019-10-08
Attending: EMERGENCY MEDICINE | Admitting: EMERGENCY MEDICINE

## 2019-10-08 ENCOUNTER — HOSPITAL ENCOUNTER (INPATIENT)
Facility: HOSPITAL | Age: 59
LOS: 8 days | Discharge: HOME | End: 2019-10-16
Attending: INTERNAL MEDICINE | Admitting: THORACIC SURGERY (CARDIOTHORACIC VASCULAR SURGERY)

## 2019-10-08 ENCOUNTER — APPOINTMENT (OUTPATIENT)
Dept: GENERAL RADIOLOGY | Facility: HOSPITAL | Age: 59
End: 2019-10-08

## 2019-10-08 ENCOUNTER — APPOINTMENT (OUTPATIENT)
Dept: MRI IMAGING | Facility: HOSPITAL | Age: 59
End: 2019-10-08

## 2019-10-08 VITALS
BODY MASS INDEX: 31.34 KG/M2 | SYSTOLIC BLOOD PRESSURE: 117 MMHG | OXYGEN SATURATION: 99 % | RESPIRATION RATE: 18 BRPM | HEART RATE: 88 BPM | DIASTOLIC BLOOD PRESSURE: 84 MMHG | HEIGHT: 66 IN | WEIGHT: 195 LBS | TEMPERATURE: 98.5 F

## 2019-10-08 DIAGNOSIS — G40.909 SEIZURE DISORDER AS SEQUELA OF CEREBROVASCULAR ACCIDENT (HCC): ICD-10-CM

## 2019-10-08 DIAGNOSIS — G40.909 SEIZURE DISORDER (HCC): ICD-10-CM

## 2019-10-08 DIAGNOSIS — Z74.09 IMPAIRED MOBILITY AND ADLS: Primary | ICD-10-CM

## 2019-10-08 DIAGNOSIS — I70.0 AORTIC OCCLUSION (HCC): ICD-10-CM

## 2019-10-08 DIAGNOSIS — R56.9 SEIZURE (HCC): ICD-10-CM

## 2019-10-08 DIAGNOSIS — Z78.9 IMPAIRED MOBILITY AND ADLS: Primary | ICD-10-CM

## 2019-10-08 DIAGNOSIS — I69.398 SEIZURE DISORDER AS SEQUELA OF CEREBROVASCULAR ACCIDENT (HCC): ICD-10-CM

## 2019-10-08 DIAGNOSIS — G40.909 NONINTRACTABLE EPILEPSY WITHOUT STATUS EPILEPTICUS, UNSPECIFIED EPILEPSY TYPE (HCC): Primary | ICD-10-CM

## 2019-10-08 PROBLEM — G40.919 BREAKTHROUGH SEIZURE (HCC): Status: ACTIVE | Noted: 2019-10-08

## 2019-10-08 PROBLEM — I63.9 CVA (CEREBRAL VASCULAR ACCIDENT) (HCC): Status: ACTIVE | Noted: 2019-10-08

## 2019-10-08 LAB
ALBUMIN SERPL-MCNC: 4.74 G/DL (ref 3.5–5.2)
ALBUMIN/GLOB SERPL: 1.5 G/DL
ALP SERPL-CCNC: 82 U/L (ref 39–117)
ALT SERPL W P-5'-P-CCNC: 26 U/L (ref 1–41)
ANION GAP SERPL CALCULATED.3IONS-SCNC: 18.1 MMOL/L (ref 5–15)
APTT PPP: 28.8 SECONDS (ref 23.8–36.1)
AST SERPL-CCNC: 16 U/L (ref 1–40)
BASOPHILS # BLD AUTO: 0.02 10*3/MM3 (ref 0–0.2)
BASOPHILS NFR BLD AUTO: 0.1 % (ref 0–1.5)
BILIRUB SERPL-MCNC: 0.3 MG/DL (ref 0.2–1.2)
BUN BLD-MCNC: 8 MG/DL (ref 6–20)
BUN/CREAT SERPL: 9.6 (ref 7–25)
CALCIUM SPEC-SCNC: 9.7 MG/DL (ref 8.6–10.5)
CHLORIDE SERPL-SCNC: 92 MMOL/L (ref 98–107)
CO2 SERPL-SCNC: 23.9 MMOL/L (ref 22–29)
CREAT BLD-MCNC: 0.83 MG/DL (ref 0.76–1.27)
DEPRECATED RDW RBC AUTO: 44.2 FL (ref 37–54)
EOSINOPHIL # BLD AUTO: 0.04 10*3/MM3 (ref 0–0.4)
EOSINOPHIL NFR BLD AUTO: 0.3 % (ref 0.3–6.2)
ERYTHROCYTE [DISTWIDTH] IN BLOOD BY AUTOMATED COUNT: 13.4 % (ref 12.3–15.4)
GFR SERPL CREATININE-BSD FRML MDRD: 95 ML/MIN/1.73
GLOBULIN UR ELPH-MCNC: 3.3 GM/DL
GLUCOSE BLD-MCNC: 232 MG/DL (ref 65–99)
GLUCOSE BLDC GLUCOMTR-MCNC: 135 MG/DL (ref 70–130)
HCT VFR BLD AUTO: 46.4 % (ref 37.5–51)
HGB BLD-MCNC: 16.3 G/DL (ref 13–17.7)
HOLD SPECIMEN: NORMAL
HOLD SPECIMEN: NORMAL
IMM GRANULOCYTES # BLD AUTO: 0.04 10*3/MM3 (ref 0–0.05)
IMM GRANULOCYTES NFR BLD AUTO: 0.3 % (ref 0–0.5)
INR PPP: 0.97 (ref 0.9–1.1)
LYMPHOCYTES # BLD AUTO: 2.55 10*3/MM3 (ref 0.7–3.1)
LYMPHOCYTES NFR BLD AUTO: 18.2 % (ref 19.6–45.3)
MCH RBC QN AUTO: 32 PG (ref 26.6–33)
MCHC RBC AUTO-ENTMCNC: 35.1 G/DL (ref 31.5–35.7)
MCV RBC AUTO: 91.2 FL (ref 79–97)
MONOCYTES # BLD AUTO: 1.06 10*3/MM3 (ref 0.1–0.9)
MONOCYTES NFR BLD AUTO: 7.6 % (ref 5–12)
NEUTROPHILS # BLD AUTO: 10.27 10*3/MM3 (ref 1.7–7)
NEUTROPHILS NFR BLD AUTO: 73.5 % (ref 42.7–76)
PLATELET # BLD AUTO: 274 10*3/MM3 (ref 140–450)
PMV BLD AUTO: 9 FL (ref 6–12)
POTASSIUM BLD-SCNC: 3.7 MMOL/L (ref 3.5–5.2)
PROT SERPL-MCNC: 8 G/DL (ref 6–8.5)
PROTHROMBIN TIME: 13.4 SECONDS (ref 11–15.4)
RBC # BLD AUTO: 5.09 10*6/MM3 (ref 4.14–5.8)
SODIUM BLD-SCNC: 134 MMOL/L (ref 136–145)
TROPONIN T SERPL-MCNC: <0.01 NG/ML (ref 0–0.03)
WBC NRBC COR # BLD: 13.98 10*3/MM3 (ref 3.4–10.8)
WHOLE BLOOD HOLD SPECIMEN: NORMAL
WHOLE BLOOD HOLD SPECIMEN: NORMAL

## 2019-10-08 PROCEDURE — 70450 CT HEAD/BRAIN W/O DYE: CPT | Performed by: RADIOLOGY

## 2019-10-08 PROCEDURE — 70450 CT HEAD/BRAIN W/O DYE: CPT

## 2019-10-08 PROCEDURE — 80053 COMPREHEN METABOLIC PANEL: CPT | Performed by: EMERGENCY MEDICINE

## 2019-10-08 PROCEDURE — 85610 PROTHROMBIN TIME: CPT | Performed by: EMERGENCY MEDICINE

## 2019-10-08 PROCEDURE — 93010 ELECTROCARDIOGRAM REPORT: CPT | Performed by: INTERNAL MEDICINE

## 2019-10-08 PROCEDURE — 63710000001 INSULIN LISPRO (HUMAN) PER 5 UNITS: Performed by: NURSE PRACTITIONER

## 2019-10-08 PROCEDURE — 70551 MRI BRAIN STEM W/O DYE: CPT

## 2019-10-08 PROCEDURE — 25010000003 LEVETIRACETAM IN NACL 0.75% 1000 MG/100ML SOLUTION: Performed by: NURSE PRACTITIONER

## 2019-10-08 PROCEDURE — 25010000002 LORAZEPAM PER 2 MG: Performed by: EMERGENCY MEDICINE

## 2019-10-08 PROCEDURE — 93005 ELECTROCARDIOGRAM TRACING: CPT | Performed by: EMERGENCY MEDICINE

## 2019-10-08 PROCEDURE — 99223 1ST HOSP IP/OBS HIGH 75: CPT | Performed by: INTERNAL MEDICINE

## 2019-10-08 PROCEDURE — 85730 THROMBOPLASTIN TIME PARTIAL: CPT | Performed by: EMERGENCY MEDICINE

## 2019-10-08 PROCEDURE — 99285 EMERGENCY DEPT VISIT HI MDM: CPT

## 2019-10-08 PROCEDURE — 25010000002 LORAZEPAM PER 2 MG

## 2019-10-08 PROCEDURE — 93005 ELECTROCARDIOGRAM TRACING: CPT | Performed by: NURSE PRACTITIONER

## 2019-10-08 PROCEDURE — 82962 GLUCOSE BLOOD TEST: CPT

## 2019-10-08 PROCEDURE — 85025 COMPLETE CBC W/AUTO DIFF WBC: CPT | Performed by: EMERGENCY MEDICINE

## 2019-10-08 PROCEDURE — 96374 THER/PROPH/DIAG INJ IV PUSH: CPT

## 2019-10-08 PROCEDURE — 96375 TX/PRO/DX INJ NEW DRUG ADDON: CPT

## 2019-10-08 PROCEDURE — 71045 X-RAY EXAM CHEST 1 VIEW: CPT | Performed by: RADIOLOGY

## 2019-10-08 PROCEDURE — 84484 ASSAY OF TROPONIN QUANT: CPT | Performed by: EMERGENCY MEDICINE

## 2019-10-08 PROCEDURE — 71045 X-RAY EXAM CHEST 1 VIEW: CPT

## 2019-10-08 PROCEDURE — 25010000002 LEVETRIRACETAM PER 10 MG: Performed by: EMERGENCY MEDICINE

## 2019-10-08 RX ORDER — ATORVASTATIN CALCIUM 40 MG/1
80 TABLET, FILM COATED ORAL NIGHTLY
Status: DISCONTINUED | OUTPATIENT
Start: 2019-10-08 | End: 2019-10-16 | Stop reason: HOSPADM

## 2019-10-08 RX ORDER — ACETAMINOPHEN 325 MG/1
650 TABLET ORAL EVERY 4 HOURS PRN
Status: DISCONTINUED | OUTPATIENT
Start: 2019-10-08 | End: 2019-10-16 | Stop reason: HOSPADM

## 2019-10-08 RX ORDER — LEVETIRACETAM 750 MG/1
1500 TABLET ORAL EVERY 12 HOURS SCHEDULED
Status: DISCONTINUED | OUTPATIENT
Start: 2019-10-09 | End: 2019-10-16 | Stop reason: HOSPADM

## 2019-10-08 RX ORDER — METOPROLOL SUCCINATE 100 MG/1
200 TABLET, EXTENDED RELEASE ORAL DAILY
Status: DISCONTINUED | OUTPATIENT
Start: 2019-10-09 | End: 2019-10-16 | Stop reason: HOSPADM

## 2019-10-08 RX ORDER — HYDROCHLOROTHIAZIDE 25 MG/1
12.5 TABLET ORAL DAILY
Status: DISCONTINUED | OUTPATIENT
Start: 2019-10-09 | End: 2019-10-11

## 2019-10-08 RX ORDER — LORAZEPAM 2 MG/ML
1 INJECTION INTRAMUSCULAR ONCE
Status: DISCONTINUED | OUTPATIENT
Start: 2019-10-08 | End: 2019-10-08

## 2019-10-08 RX ORDER — AMLODIPINE BESYLATE 10 MG/1
10 TABLET ORAL DAILY
Status: DISCONTINUED | OUTPATIENT
Start: 2019-10-09 | End: 2019-10-16 | Stop reason: HOSPADM

## 2019-10-08 RX ORDER — DEXTROSE MONOHYDRATE 25 G/50ML
25 INJECTION, SOLUTION INTRAVENOUS
Status: DISCONTINUED | OUTPATIENT
Start: 2019-10-08 | End: 2019-10-16 | Stop reason: HOSPADM

## 2019-10-08 RX ORDER — CHOLECALCIFEROL (VITAMIN D3) 125 MCG
5 CAPSULE ORAL NIGHTLY PRN
Status: DISCONTINUED | OUTPATIENT
Start: 2019-10-08 | End: 2019-10-16 | Stop reason: HOSPADM

## 2019-10-08 RX ORDER — SODIUM CHLORIDE 0.9 % (FLUSH) 0.9 %
10 SYRINGE (ML) INJECTION AS NEEDED
Status: DISCONTINUED | OUTPATIENT
Start: 2019-10-08 | End: 2019-10-08 | Stop reason: HOSPADM

## 2019-10-08 RX ORDER — ACETAMINOPHEN 650 MG/1
650 SUPPOSITORY RECTAL EVERY 4 HOURS PRN
Status: DISCONTINUED | OUTPATIENT
Start: 2019-10-08 | End: 2019-10-16 | Stop reason: HOSPADM

## 2019-10-08 RX ORDER — SODIUM CHLORIDE 0.9 % (FLUSH) 0.9 %
10 SYRINGE (ML) INJECTION EVERY 12 HOURS SCHEDULED
Status: DISCONTINUED | OUTPATIENT
Start: 2019-10-08 | End: 2019-10-16 | Stop reason: HOSPADM

## 2019-10-08 RX ORDER — LEVETIRACETAM 10 MG/ML
1000 INJECTION INTRAVASCULAR ONCE
Status: COMPLETED | OUTPATIENT
Start: 2019-10-08 | End: 2019-10-08

## 2019-10-08 RX ORDER — LACOSAMIDE 50 MG/1
50 TABLET ORAL EVERY 12 HOURS SCHEDULED
Status: DISCONTINUED | OUTPATIENT
Start: 2019-10-08 | End: 2019-10-09

## 2019-10-08 RX ORDER — LORAZEPAM 2 MG/ML
1 INJECTION INTRAMUSCULAR ONCE
Status: COMPLETED | OUTPATIENT
Start: 2019-10-08 | End: 2019-10-08

## 2019-10-08 RX ORDER — NICOTINE POLACRILEX 4 MG
15 LOZENGE BUCCAL
Status: DISCONTINUED | OUTPATIENT
Start: 2019-10-08 | End: 2019-10-16 | Stop reason: HOSPADM

## 2019-10-08 RX ORDER — LOSARTAN POTASSIUM 50 MG/1
50 TABLET ORAL 2 TIMES DAILY
Status: DISCONTINUED | OUTPATIENT
Start: 2019-10-09 | End: 2019-10-16 | Stop reason: HOSPADM

## 2019-10-08 RX ORDER — ASPIRIN 325 MG
325 TABLET, DELAYED RELEASE (ENTERIC COATED) ORAL DAILY
Status: DISCONTINUED | OUTPATIENT
Start: 2019-10-09 | End: 2019-10-16 | Stop reason: HOSPADM

## 2019-10-08 RX ORDER — PANTOPRAZOLE SODIUM 40 MG/1
40 TABLET, DELAYED RELEASE ORAL EVERY MORNING
Status: DISCONTINUED | OUTPATIENT
Start: 2019-10-09 | End: 2019-10-16 | Stop reason: HOSPADM

## 2019-10-08 RX ORDER — LORAZEPAM 2 MG/ML
INJECTION INTRAMUSCULAR
Status: COMPLETED
Start: 2019-10-08 | End: 2019-10-08

## 2019-10-08 RX ORDER — LORAZEPAM 2 MG/ML
1 INJECTION INTRAMUSCULAR ONCE AS NEEDED
Status: DISCONTINUED | OUTPATIENT
Start: 2019-10-08 | End: 2019-10-16 | Stop reason: HOSPADM

## 2019-10-08 RX ORDER — SODIUM CHLORIDE 0.9 % (FLUSH) 0.9 %
10 SYRINGE (ML) INJECTION AS NEEDED
Status: DISCONTINUED | OUTPATIENT
Start: 2019-10-08 | End: 2019-10-16 | Stop reason: HOSPADM

## 2019-10-08 RX ADMIN — ATORVASTATIN CALCIUM 80 MG: 40 TABLET, FILM COATED ORAL at 22:46

## 2019-10-08 RX ADMIN — LACOSAMIDE 50 MG: 50 TABLET, FILM COATED ORAL at 22:46

## 2019-10-08 RX ADMIN — LEVETIRACETAM INJECTION 1000 MG: 10 INJECTION INTRAVENOUS at 22:46

## 2019-10-08 RX ADMIN — LORAZEPAM 1 MG: 2 INJECTION INTRAMUSCULAR; INTRAVENOUS at 09:32

## 2019-10-08 RX ADMIN — SODIUM CHLORIDE 1000 MG: 9 INJECTION, SOLUTION INTRAVENOUS at 10:19

## 2019-10-08 RX ADMIN — LORAZEPAM 1 MG: 2 INJECTION INTRAMUSCULAR; INTRAVENOUS at 09:29

## 2019-10-08 RX ADMIN — SODIUM CHLORIDE, PRESERVATIVE FREE 10 ML: 5 INJECTION INTRAVENOUS at 22:47

## 2019-10-08 RX ADMIN — LORAZEPAM 1 MG: 2 INJECTION INTRAMUSCULAR at 09:29

## 2019-10-08 NOTE — ED NOTES
Patient resting on stretcher at this time with family at bedside. Patient and family updated on wait times and plan of care. No needs or concerns voiced at this time. Patient and family updated on wait times for transport to [facility]. NADA. VSS. Will continue to monitor and follow plan of care. Call light within reach and side rails up x2.        Heron Godfrey RN  10/08/19 [id number]

## 2019-10-08 NOTE — ED NOTES
Patient resting on stretcher at this time with family at bedside. Patient and family updated on wait times and plan of care. Patient refused helicopter at this time, waiting for ambulance. No needs or concerns voiced at this time. NADA. VSS. Will continue to monitor and follow plan of care. Call light within reach and side rails up x2.        Heron Godfrey, SAMANTHA  10/08/19 1988

## 2019-10-08 NOTE — ED NOTES
Patient resting on stretcher at this time with family at bedside. Patient and family updated on wait times and plan of care. No needs or concerns voiced at this time. Patient continues to wait for transport to [facility]. NADA. VSS. Will continue to monitor and follow plan of care. Call light within reach and side rails up x2.        Heron Godfrey, SAMANTHA  10/08/19 [id number]

## 2019-10-08 NOTE — ED NOTES
Alteplase (tPA) Inclusion / Exclusion Criteria    Time: 9:22 AM  Person Administering Scale: Zena Lovell RN    Inclusion Criteria  Defer to MD Diagnosis of acute ischemic stroke.   Yes Onset of symptoms < 4.5 hours before beginning treatment (stroke onset = time patient was last seen well or without symptoms).   Yes 18 years of age or greater.   Exclusion criteria (Contraindications) for onset < 3 hrs.   No Serious head trauma < 3 months.   No Symptoms suggestive of subarachnoid hemorrhage.   No Arterial stick at non-compressible site in previous 14 days.   No Current intracranial hemorrhage.   No Some intracranial neoplasm, AV malformation, or aneurysm.   No Recent intracranial or intraspinal surgery < 3 months.   No Current Uncontrolled Hypertension.   No CT demonstrates multi-lobar infarction (hypodensity > 1/3 cerebral hemisphere).   No Active internal bleeding.   No Active bleeding diathesis.   Relative exclusion for all patients.  No Pregnancy.   No Major surgery or previous trauma within previous 14 days.   No Recent GI or urinary tract hemorrhage (within previous 21 days).   No Recent acute MI (within previous 3 months).   No Subacute/Acute Bacterial Endocarditis / Acute Pericarditis   Additional exclusions for patients with symptoms onset between 3 and 4.5 hours.  No Age > 80.   No Severe stroke (NIHSS > 25).   No History of BOTH diabetes and prior ischemic stroke.   Yes On any anticoagulants regardless of INR.    >>> Warfarin (Coumadin), Heparin, Enoxaparin (Lovenox), fondaparinux (Arixtra), bivalirudin (Angiomax), Argatroban, dabigatran (Pradaxa), rivaroxaban (Xarelto), or apixaban (Eliquis)        Zena Lovell RN  10/08/19 2013

## 2019-10-08 NOTE — ED NOTES
Patient resting on stretcher at this time with family at bedside. Patient and family updated on wait times and plan of care. No needs or concerns voiced at this time. Patient waiting on transport to [facility]. [place]. VSS. Will continue to monitor and follow plan of care. Call light within reach and side rails up x2.        Heron Godfrey RN  10/08/19 1999

## 2019-10-08 NOTE — ED NOTES
Patient resting on stretcher at this time with family at bedside. Patient and family updated on wait times and plan of care. No needs or concerns voiced at this time. Patient informed about [facility] transport to [facility]. NADA. VSS. Will continue to monitor and follow plan of care. Call light within reach and side rails up x2.        Heron Godfrey, SAMANTHA  10/08/19 [id number]

## 2019-10-08 NOTE — ED PROVIDER NOTES
Subjective   Patient is a 58-year-old male who has a history of epilepsy, has been well controlled, prior to today his last seizure was in May 2018.  Wife states that when he awoke this morning he was completely normal, dressed himself, took his morning medications.  Shortly thereafter he looked at his wife, got a strange look on his face, his right arm started jerking uncontrollably.  Wife states that this lasted for perhaps 2 to 3 minutes, and that afterwards he was confused, his speech was garbled, his right upper extremity and right lower extremity seemed weak.  He did have a stroke in 2017, that left him with a residual numbness and tingling in the fingertips of his right hand.  Family fears that he may have had another stroke.  Upon arrival here the wife states that his symptoms seem to be improving, but he is still not back to normal.  When I enter the room and introduced myself to the patient, we are only able to speak a few sentences to each other when he had a grand mal seizure.  He was therefore unable to provide me with any further history.  We measured 1 mg of intravenous Ativan, which slowed his seizure activity down significantly, but he did require a second milligram of IV Ativan.  After the second dose the seizure activity stopped and the patient was then somnolent.  Patient is scheduled for vascular surgery at [facility] tomorrow, sounds like probable aortobifemoral bypass.            Review of Systems   Unable to perform ROS: Other (Initially, grand mal seizure activity; then postictal state)       Past Medical History:   Diagnosis Date   • Arthritis    • Carotid artery disease (CMS/[facility])    • Carotid artery disorder (CMS/[facility])    • Depression    • Diabetes mellitus (CMS/HCC)     DIAGNOSED 4-2017 CHECKS FSBG 3X/WEEK    • Ear infection     about 2 weeks ago- cleared up - wax removed and cleaned out    • GERD (gastroesophageal reflux disease)     self diagnosed takes otc ppi   • Hyperlipidemia     • Hypertension    • Hypertension    • Inguinal hernia    • Seizure (CMS/HCC)    • Shoulder pain     bilat    • Stroke (CMS/HCC) 2017    EXPRESSIVE APHASIA RESIDUAL    • Tooth loose     5 - all over- will get teeth done after this surgery    • Wears reading eyeglasses        No Known Allergies    Past Surgical History:   Procedure Laterality Date   • ABDOMINAL HERNIA REPAIR     • CAROTID ENDARTERECTOMY Left 10/17/2017    Procedure: CAROTID ENDARTERECTOMY LEFT;  Surgeon: Alexx Bach MD;  Location: [facility];  Service:    • CAROTID ENDARTERECTOMY Left 10/17/2017   • INGUINAL HERNIA REPAIR Left        Family History   Adopted: Yes   Problem Relation Age of Onset   • Diabetes Mother    • COPD Father        Social History     Socioeconomic History   • Marital status:      Spouse name: Not on file   • Number of children: 2   • Years of education: Not on file   • Highest education level: Not on file   Occupational History   • Occupation: Disabled/allan     Comment: disabled from stroke   Tobacco Use   • Smoking status: Former Smoker     Packs/day: 0.05     Years: 40.00     Pack years: 2.00     Types: Cigarettes     Last attempt to quit: 2017     Years since quittin.4   • Smokeless tobacco: Former User     Types: Snuff   • Tobacco comment: quit snuff when 18 or 19 years old - only did for baseball - 2-3 years    Substance and Sexual Activity   • Alcohol use: No   • Drug use: No   • Sexual activity: Defer     Partners: Female     Comment: SPOUSE   Social History Narrative    Patient consumes 0-1serving of caffeine daily.     Patient lives at home with wife in [place]           Objective   Physical Exam   Constitutional: He is oriented to person, place, and time. He appears well-developed and well-nourished. No distress.   As described above, when I initially see the patient I had only just introduced myself when he had a seizure.  The following physical exam is after his  postictal phase when he has returned to normal.   HENT:   Head: Normocephalic and atraumatic.   Eyes: EOM are normal. Pupils are equal, round, and reactive to light. No scleral icterus.   Neck: Normal range of motion. Neck supple. No neck rigidity. No tracheal deviation present.   Cardiovascular: Normal rate, regular rhythm and intact distal pulses.   Pulmonary/Chest: Effort normal and breath sounds normal. No respiratory distress. He exhibits no tenderness.   Abdominal: Soft. Bowel sounds are normal. There is no tenderness. There is no rebound and no guarding.   Musculoskeletal: Normal range of motion. He exhibits no tenderness.   Neurological: He is alert and oriented to person, place, and time. He has normal strength. No cranial nerve deficit or sensory deficit. He exhibits normal muscle tone. Coordination normal. GCS eye subscore is 4. GCS verbal subscore is 5. GCS motor subscore is 6.   Skin: Skin is warm and dry. Capillary refill takes less than 2 seconds. He is not diaphoretic. No cyanosis. No pallor.   Psychiatric: He has a normal mood and affect. His behavior is normal.   Nursing note and vitals reviewed.      Procedures  EKG shows sinus rhythm with a rate of 87.  Nonspecific ST abnormality.  No apparent acute ischemia.  XR Chest 1 View   Final Result   PROBABLY BIBASILAR ATELECTASIS.       This report was finalized on 10/8/2019 9:54 AM by Dr. Mateo Monahan MD.          CT Head Without Contrast Stroke Protocol   Final Result   No acute intracranial abnormality.       This report was finalized on 10/8/2019 9:20 AM by Dr. Mateo Monahan MD.            Results for orders placed or performed during the hospital encounter of 10/08/19   Comprehensive Metabolic Panel   Result Value Ref Range    Glucose 232 (H) 65 - 99 mg/dL    BUN 8 6 - 20 mg/dL    Creatinine 0.83 0.76 - 1.27 mg/dL    Sodium 134 (L) 136 - 145 mmol/L    Potassium 3.7 3.5 - 5.2 mmol/L    Chloride 92 (L) 98 - 107 mmol/L    CO2 23.9 22.0 - 29.0 mmol/L     Calcium 9.7 8.6 - 10.5 mg/dL    Total Protein 8.0 6.0 - 8.5 g/dL    Albumin 4.74 3.50 - 5.20 g/dL    ALT (SGPT) 26 1 - 41 U/L    AST (SGOT) 16 1 - 40 U/L    Alkaline Phosphatase 82 39 - 117 U/L    Total Bilirubin 0.3 0.2 - 1.2 mg/dL    eGFR Non African Amer 95 >60 mL/min/1.73    Globulin 3.3 gm/dL    A/G Ratio 1.5 g/dL    BUN/Creatinine Ratio 9.6 7.0 - 25.0    Anion Gap 18.1 (H) 5.0 - 15.0 mmol/L   Protime-INR   Result Value Ref Range    Protime 13.4 11.0 - 15.4 Seconds    INR 0.97 0.90 - 1.10   aPTT   Result Value Ref Range    PTT 28.8 23.8 - 36.1 seconds   Troponin   Result Value Ref Range    Troponin T <0.010 0.000 - 0.030 ng/mL   CBC Auto Differential   Result Value Ref Range    WBC 13.98 (H) 3.40 - 10.80 10*3/mm3    RBC 5.09 4.14 - 5.80 10*6/mm3    Hemoglobin 16.3 13.0 - 17.7 g/dL    Hematocrit 46.4 37.5 - 51.0 %    MCV 91.2 79.0 - 97.0 fL    MCH 32.0 26.6 - 33.0 pg    MCHC 35.1 31.5 - 35.7 g/dL    RDW 13.4 12.3 - 15.4 %    RDW-SD 44.2 37.0 - 54.0 fl    MPV 9.0 6.0 - 12.0 fL    Platelets 274 140 - 450 10*3/mm3    Neutrophil % 73.5 42.7 - 76.0 %    Lymphocyte % 18.2 (L) 19.6 - 45.3 %    Monocyte % 7.6 5.0 - 12.0 %    Eosinophil % 0.3 0.3 - 6.2 %    Basophil % 0.1 0.0 - 1.5 %    Immature Grans % 0.3 0.0 - 0.5 %    Neutrophils, Absolute 10.27 (H) 1.70 - 7.00 10*3/mm3    Lymphocytes, Absolute 2.55 0.70 - 3.10 10*3/mm3    Monocytes, Absolute 1.06 (H) 0.10 - 0.90 10*3/mm3    Eosinophils, Absolute 0.04 0.00 - 0.40 10*3/mm3    Basophils, Absolute 0.02 0.00 - 0.20 10*3/mm3    Immature Grans, Absolute 0.04 0.00 - 0.05 10*3/mm3   Light Blue Top   Result Value Ref Range    Extra Tube hold for add-on    Green Top (Gel)   Result Value Ref Range    Extra Tube Hold for add-ons.    Lavender Top   Result Value Ref Range    Extra Tube hold for add-on    Gold Top - SST   Result Value Ref Range    Extra Tube Hold for add-ons.                 ED Course  ED Course as of Oct 08 1723   Tue Oct 08, 2019   1528 Case discussed with   Zain, neurology at [facility].  He advises give 1000 mg IV Keppra.  He accepts patient in transfer.  As soon as the hospital gives us a bed assignment we will make transport arrangements.  [CM]   1033 [facility] has released a bed to us for the patient.  Due to the weather we are unable to transport by helicopter, I am told until sometime after noon.  Our unit secretary is attempting to find ground transport for the patient.  [CM]   1547 After the seizure the patient had a period of somnolence/postictal phase.  Since then he has been awake, alert, neurologically normal except for the chronic tingling that he has in the fingertips of his right hand from his 2017 stroke.  He voices that he feels completely normal now.  He has eaten lunch, is sitting up in the bed, interacting with his family and watching television in no apparent distress.  We are still awaiting ambulance transport.  There is been quite a delay in ambulance transport today.  Patient reports that someone had mentioned possible air transport to him, that he does not want to fly, he wants to continue to await ground transport.  [CM]      ED Course User Index  [CM] Alexx Vaughan MD                  [facility]    Final diagnoses:   Nonintractable epilepsy without status epilepticus, unspecified epilepsy type (CMS/HCC)   Seizure (CMS/HCC)               Please note that portions of this note were completed with a voice recognition program.        Alexx Vaughan MD  10/08/19 [id number]

## 2019-10-08 NOTE — NURSING NOTE
"  ACC REVIEW REPORT: [facility]        PATIENT NAME: Vince Olivera    PATIENT ID: [id number]    BED: S320    BED TYPE: Tele    BED GIVEN TO: Zenaflakita PINON BED GIVEN: 0951    YOB: 1960    AGE: 59 yo    GENDER: Male    PREVIOUS ADMIT TO [facility]:     PREVIOUS ADMISSION DATE:     PATIENT CLASS:     TODAY'S DATE: 10/8/2019    TRANSFER DATE: 10/8/19    ETA:     TRANSFERRING FACILITY: [facility]    TRANSFERRING FACILITY PHONE # : [phone]    TRANSFERRING MD: Dr. Vaughan    DATE/TIME REQUEST RECEIVED: 10/8/19 at 0944    [facility] RN: Annie Bedoya RN BSN    REPORT FROM: Zena Lovell RN     TIME REPORT TAKEN: 0953    DIAGNOSIS: Siezure and possible CVA     REASON FOR TRANSFER TO [facility]: Higher Level of Care    TRANSPORTATION:     CLINICAL REASON FOR TRANSFER TO [facility]: Per SO the patient got up this morning in his normal state of health getting ready to come to [facility] for pre- admission testing for  Aorta bifem bypass scheduled for 10/9/19. At around 0630 the girlfriend reports that the patient looked at her and said he  \"didn't feel right and he might die\" After that the patient became nonverbal. He also had what appeared to be seizure- like activity but not similar to previous seizures. Upon arrival to the ED, the patient was initially non-verbal with spontaneous eye opening. His NIH stroke scale was 18. After some time he began to speak but was disoriented to place, time, and events. Speech was somewhat slurred.  When MD walked into the room the patient began having a grand mal seizure. He was given Ativan 1 mg x 2 which stopped the seizure activity. MD was unable to perform NIH scale secondary to the patient was sedated and postictal. Patient transferring to [facility] for neuro eval.       CLINICAL INFORMATION    HEIGHT:     WEIGHT: 88.5 kg    ALLERGIES: NKA    LI:     INFECTIOUS DISEASE: None    ISOLATION:     1ST VITAL SIGNS:   TIME:   TEMP:   PULSE:   B/P:   RESP:     LAST VITAL SIGNS:  TIME:   TEMP: 98.3  PULSE: " 76  B/P: 150/86  RESP: 18    LAB INFORMATION: WBC_ 13.9. Other labs pending.     CULTURE INFORMATION:     MEDS/IV FLUIDS: See MAR. Patient to receive 1 gram Keppra prior to transfer.  Has #18 LAC-SL and #18 RAC-SL.       CARDIAC SYSTEM:    CHEST PAIN:     RATE:     SCALE:     RHYTHM: NSR    Is patient taking or has patient been given any drugs that could increase bleeding? Plavix 75 mg. Patient stopped taking 1 week ago prior to scheduled surgery. Continued to take  mg.   (Plavix, Brilinta, Effient, Eliquis, Xarelto, Warfarin, Integrilin, Angiomax)        OXYGEN: 2 liters    O2 SAT: 96%    ADMINISTRATION ROUTE: Nasal Cannula    IMAGING FINDINGS:     PNEUMO LOCATION:     PNEUMO SIZE:     PNEUMO CHEST TUBE SEAL TYPE:     RADIOLOGY RESULTS:     RESPIRATORY STATUS: No acute distress      CNS/MUSCULOSKELETAL      EMILY COMA SCALE:    E:   M:   V:     LAST KNOWN WELL: 0630 today          NIHSS    Survey Item  0: Means Alert  1: Drowsy or Answer Correctly  2: Incorrect, Forced, Can't Resist Gravity  3: Complete or No Effort  4: No Movement  NT: Not Testable Acceptable As Noted Above    1A: Level of Consciousness (alert, drowsy, etc) : 0    1B: LOC Questions (month, age) : 2    1C: LOC Commands (open/close eyes, make a fist & let go): 0    2:  Best Gaze (eyes open-pt follows examiner's fingers or face): 0    3:  Visual (introduce visual stimulus/threat to pt's visual field quad. Cover 1 eye and hold up fingers in all 4 quadrants) : 0    4.  Facial Palsy (show teeth, raise eyebrows and squeeze eyes tightly shut): 1    5A: Motor Arm-Left (elevate extremity to 90 degrees and score drift/movement.  Count to 10 aloud and use fingers for visual cue): 0    5B:  Motor Arm-Right (elevate extremity to 90 degrees and score drift/movement.  Count to 10 aloud and use fingers for visual cue): 4    6A:  Motor Leg-Left (elevate extremity to 30 degrees and score drift/movement.  Count to 5 out loud and use fingers for visual cue):  0    6B:  Motor Leg-Right (elevate extremity to 30 degrees and score drift/movement.  Count to 5 out loud and use fingers for visual cue): 4    7:  Limb Ataxia- finger to nose, heel down shin: 2    8:  Sensory- pin prick to face, arms, trunk, and legs. Compare sharpness side to side: 0    9:  Best Language- name, items, describe picture, and read sentences.  Do not forget glasses if they normally wear them: 2    10: Dysarthria- elevate speech clarity by pt reading or repeating words on a list: 1    11: Extinction and Inattention- Use information from prior testing or double simultaneous stimuli testing to identify neglect. Face, arms, legs and visual field: 0    Total NIHSS Score: 16  Date: 10/8/19   Time of NIHSS Assessment: 0910    0: No stroke symptoms  1-4: minor stroke   5-15: moderate stroke  16-20: moderate to severe stroke  21-24: severe stroke        SIZE OF HEMORRHAGE:     CAT SCAN RESULTS: No acute findings    MRI RESULTS:     CNS/MUSCULOSKELETAL NOTES: At time of report the patient was talking with family about his grandchildren. He was alert but still disoriented to time and events. At times he answers questions appropriately and other times he does not. He is completley flaccid on the right side. Has some aphasia and rt facial droop (unknown if this is new or residual from old CVA) but the right side paralysis is new.       GI//GY      ABDOMINAL PAIN:     VOMITING:     DIARRHEA:     NAUSEA:     BOWEL SOUNDS:     OCCULT STOOL:     VAGINAL BLEEDING:     TESTICULAR PAIN:     HEMATURIA:     NG TUBE:    SIZE:   DATE INSERTED:       ULTRASOUND:     ULTRASOUND RESULTS:       ACUTE ABDOMEN:     ACUTE ABDOMEN RESULTS:       CT SCAN:     CT SCAN RESULTS:       GI//GY NOTES:     PAST MEDICAL HISTORY: CVA in 2017, HTN, High Cholesterol, acid reflux, Sz ( per girlfriend last in May 2018), DM II, Carotid stenosis, Lt CEA, PAD    OTHER SYMPTOM NOTES:     ADDITIONAL NOTES:           Annie Bedoya,  RN  10/8/2019  10:09 AM

## 2019-10-08 NOTE — ED NOTES
Patient resting on stretcher at this time with family at bedside. Patient eating at this time. Patient and family updated on wait times and plan of care. No needs or concerns voiced at this time. NADA. VSS. Will continue to monitor and follow plan of care. Call light within reach and side rails up x2.        Heron Godfrey RN  10/08/19 [id number]

## 2019-10-08 NOTE — ED NOTES
Patient resting on stretcher at this time and family at bedside. Patient and family updated on wait times and plan of care. No needs or concerns voiced at this time. NADA. VSS. Will continue to monitor and follow plan of care. Call light within reach and side rails up x2.        Heron Godfrey, SAMANTHA  10/08/19 [id number]

## 2019-10-09 ENCOUNTER — APPOINTMENT (OUTPATIENT)
Dept: CARDIOLOGY | Facility: HOSPITAL | Age: 59
End: 2019-10-09

## 2019-10-09 ENCOUNTER — ANESTHESIA (OUTPATIENT)
Dept: PERIOP | Facility: HOSPITAL | Age: 59
End: 2019-10-09

## 2019-10-09 ENCOUNTER — ANESTHESIA EVENT (OUTPATIENT)
Dept: PERIOP | Facility: HOSPITAL | Age: 59
End: 2019-10-09

## 2019-10-09 ENCOUNTER — APPOINTMENT (OUTPATIENT)
Dept: NEUROLOGY | Facility: HOSPITAL | Age: 59
End: 2019-10-09

## 2019-10-09 ENCOUNTER — APPOINTMENT (OUTPATIENT)
Dept: CT IMAGING | Facility: HOSPITAL | Age: 59
End: 2019-10-09

## 2019-10-09 PROBLEM — I70.0 AORTIC OCCLUSION: Status: ACTIVE | Noted: 2019-10-08

## 2019-10-09 LAB
ABO GROUP BLD: NORMAL
ANION GAP SERPL CALCULATED.3IONS-SCNC: 12 MMOL/L (ref 5–15)
BASOPHILS # BLD AUTO: 0.04 10*3/MM3 (ref 0–0.2)
BASOPHILS NFR BLD AUTO: 0.4 % (ref 0–1.5)
BH CV ECHO MEAS - AO ROOT AREA (BSA CORRECTED): 1.7
BH CV ECHO MEAS - AO ROOT AREA: 8.7 CM^2
BH CV ECHO MEAS - AO ROOT DIAM: 3.3 CM
BH CV ECHO MEAS - BSA(HAYCOCK): 2.1 M^2
BH CV ECHO MEAS - BSA(HAYCOCK): 2.1 M^2
BH CV ECHO MEAS - BSA: 2 M^2
BH CV ECHO MEAS - BSA: 2 M^2
BH CV ECHO MEAS - BZI_BMI: 31.5 KILOGRAMS/M^2
BH CV ECHO MEAS - BZI_BMI: 31.5 KILOGRAMS/M^2
BH CV ECHO MEAS - BZI_METRIC_HEIGHT: 167.6 CM
BH CV ECHO MEAS - BZI_METRIC_HEIGHT: 167.6 CM
BH CV ECHO MEAS - BZI_METRIC_WEIGHT: 88.5 KG
BH CV ECHO MEAS - BZI_METRIC_WEIGHT: 88.5 KG
BH CV ECHO MEAS - EDV(CUBED): 86.6 ML
BH CV ECHO MEAS - EDV(MOD-SP2): 75 ML
BH CV ECHO MEAS - EDV(MOD-SP4): 94 ML
BH CV ECHO MEAS - EDV(TEICH): 88.8 ML
BH CV ECHO MEAS - EF(CUBED): 75.2 %
BH CV ECHO MEAS - EF(MOD-BP): 62 %
BH CV ECHO MEAS - EF(MOD-SP2): 56 %
BH CV ECHO MEAS - EF(MOD-SP4): 66 %
BH CV ECHO MEAS - EF(TEICH): 67.3 %
BH CV ECHO MEAS - ESV(CUBED): 21.5 ML
BH CV ECHO MEAS - ESV(MOD-SP2): 33 ML
BH CV ECHO MEAS - ESV(MOD-SP4): 32 ML
BH CV ECHO MEAS - ESV(TEICH): 29 ML
BH CV ECHO MEAS - FS: 37.2 %
BH CV ECHO MEAS - IVS/LVPW: 0.93
BH CV ECHO MEAS - IVSD: 0.8 CM
BH CV ECHO MEAS - LAD MAJOR: 6.1 CM
BH CV ECHO MEAS - LAT PEAK E' VEL: 11.4 CM/SEC
BH CV ECHO MEAS - LATERAL E/E' RATIO: 6.3
BH CV ECHO MEAS - LV DIASTOLIC VOL/BSA (35-75): 47.5 ML/M^2
BH CV ECHO MEAS - LV IVRT: 0.09 SEC
BH CV ECHO MEAS - LV MASS(C)D: 115.7 GRAMS
BH CV ECHO MEAS - LV MASS(C)DI: 58.5 GRAMS/M^2
BH CV ECHO MEAS - LV SYSTOLIC VOL/BSA (12-30): 16.2 ML/M^2
BH CV ECHO MEAS - LVIDD: 4.4 CM
BH CV ECHO MEAS - LVIDS: 2.8 CM
BH CV ECHO MEAS - LVLD AP2: 6.5 CM
BH CV ECHO MEAS - LVLD AP4: 7.6 CM
BH CV ECHO MEAS - LVLS AP2: 5.4 CM
BH CV ECHO MEAS - LVLS AP4: 5 CM
BH CV ECHO MEAS - LVOT AREA (M): 3.5 CM^2
BH CV ECHO MEAS - LVOT AREA: 3.4 CM^2
BH CV ECHO MEAS - LVOT DIAM: 2.1 CM
BH CV ECHO MEAS - LVPWD: 0.86 CM
BH CV ECHO MEAS - MED PEAK E' VEL: 7.6 CM/SEC
BH CV ECHO MEAS - MEDIAL E/E' RATIO: 9.4
BH CV ECHO MEAS - MPA AREA: 7.5 CM^2
BH CV ECHO MEAS - MPA DIAM: 3.1 CM
BH CV ECHO MEAS - MV A MAX VEL: 77.5 CM/SEC
BH CV ECHO MEAS - MV DEC TIME: 0.27 SEC
BH CV ECHO MEAS - MV E MAX VEL: 72.6 CM/SEC
BH CV ECHO MEAS - MV E/A: 0.94
BH CV ECHO MEAS - PA ACC SLOPE: 884.4 CM/SEC^2
BH CV ECHO MEAS - PA ACC TIME: 0.09 SEC
BH CV ECHO MEAS - PA PR(ACCEL): 37 MMHG
BH CV ECHO MEAS - PULM A REVS VEL: 36.2 CM/SEC
BH CV ECHO MEAS - PULM DIAS VEL: 42 CM/SEC
BH CV ECHO MEAS - PULM S/D: 1.4
BH CV ECHO MEAS - PULM SYS VEL: 59 CM/SEC
BH CV ECHO MEAS - RAP SYSTOLE: 3 MMHG
BH CV ECHO MEAS - SI(CUBED): 32.9 ML/M^2
BH CV ECHO MEAS - SI(MOD-SP2): 21.2 ML/M^2
BH CV ECHO MEAS - SI(MOD-SP4): 31.3 ML/M^2
BH CV ECHO MEAS - SI(TEICH): 30.2 ML/M^2
BH CV ECHO MEAS - SV(CUBED): 65.1 ML
BH CV ECHO MEAS - SV(MOD-SP2): 42 ML
BH CV ECHO MEAS - SV(MOD-SP4): 62 ML
BH CV ECHO MEAS - SV(TEICH): 59.8 ML
BH CV ECHO MEAS - TAPSE (>1.6): 2.2 CM2
BH CV ECHO MEASUREMENTS AVERAGE E/E' RATIO: 7.64
BH CV VAS BP LEFT ARM: NORMAL MMHG
BH CV XLRA - RV BASE: 4.1 CM
BH CV XLRA - RV LENGTH: 7.4 CM
BH CV XLRA - RV MID: 3.6 CM
BH CV XLRA - TDI S': 8.44 CM/SEC
BH CV XLRA MEAS LEFT CCA RATIO VEL: 91.5 CM/SEC
BH CV XLRA MEAS LEFT DIST CCA EDV: 20.4 CM/SEC
BH CV XLRA MEAS LEFT DIST CCA PSV: 92.1 CM/SEC
BH CV XLRA MEAS LEFT DIST ICA EDV: 19.2 CM/SEC
BH CV XLRA MEAS LEFT DIST ICA PSV: 55.9 CM/SEC
BH CV XLRA MEAS LEFT ICA RATIO VEL: 127 CM/SEC
BH CV XLRA MEAS LEFT ICA/CCA RATIO: 1.4
BH CV XLRA MEAS LEFT MID CCA EDV: 19.3 CM/SEC
BH CV XLRA MEAS LEFT MID CCA PSV: 92.6 CM/SEC
BH CV XLRA MEAS LEFT MID ICA EDV: 22.3 CM/SEC
BH CV XLRA MEAS LEFT MID ICA PSV: 69 CM/SEC
BH CV XLRA MEAS LEFT PROX CCA EDV: 19.9 CM/SEC
BH CV XLRA MEAS LEFT PROX CCA PSV: 111.9 CM/SEC
BH CV XLRA MEAS LEFT PROX ECA EDV: 18.2 CM/SEC
BH CV XLRA MEAS LEFT PROX ECA PSV: 107.5 CM/SEC
BH CV XLRA MEAS LEFT PROX ICA EDV: 20.3 CM/SEC
BH CV XLRA MEAS LEFT PROX ICA PSV: 126.4 CM/SEC
BH CV XLRA MEAS LEFT PROX SCLA PSV: 156.2 CM/SEC
BH CV XLRA MEAS LEFT VERTEBRAL A EDV: 8.1 CM/SEC
BH CV XLRA MEAS LEFT VERTEBRAL A PSV: 30.4 CM/SEC
BH CV XLRA MEAS RIGHT CCA RATIO VEL: 82.9 CM/SEC
BH CV XLRA MEAS RIGHT DIST CCA EDV: 21 CM/SEC
BH CV XLRA MEAS RIGHT DIST CCA PSV: 83.4 CM/SEC
BH CV XLRA MEAS RIGHT DIST ICA EDV: 22.6 CM/SEC
BH CV XLRA MEAS RIGHT DIST ICA PSV: 64.8 CM/SEC
BH CV XLRA MEAS RIGHT ICA RATIO VEL: 166 CM/SEC
BH CV XLRA MEAS RIGHT ICA/CCA RATIO: 2
BH CV XLRA MEAS RIGHT MID CCA EDV: 16.2 CM/SEC
BH CV XLRA MEAS RIGHT MID CCA PSV: 82.9 CM/SEC
BH CV XLRA MEAS RIGHT MID ICA EDV: 19.6 CM/SEC
BH CV XLRA MEAS RIGHT MID ICA PSV: 89.6 CM/SEC
BH CV XLRA MEAS RIGHT PROX CCA EDV: 18.1 CM/SEC
BH CV XLRA MEAS RIGHT PROX CCA PSV: 98.2 CM/SEC
BH CV XLRA MEAS RIGHT PROX ECA EDV: 21.4 CM/SEC
BH CV XLRA MEAS RIGHT PROX ECA PSV: 225 CM/SEC
BH CV XLRA MEAS RIGHT PROX ICA EDV: 37.7 CM/SEC
BH CV XLRA MEAS RIGHT PROX ICA PSV: 166 CM/SEC
BH CV XLRA MEAS RIGHT PROX SCLA PSV: 150.1 CM/SEC
BH CV XLRA MEAS RIGHT VERTEBRAL A EDV: 5.9 CM/SEC
BH CV XLRA MEAS RIGHT VERTEBRAL A PSV: 37.3 CM/SEC
BLD GP AB SCN SERPL QL: NEGATIVE
BUN BLD-MCNC: 9 MG/DL (ref 6–20)
BUN/CREAT SERPL: 11.5 (ref 7–25)
CALCIUM SPEC-SCNC: 9.3 MG/DL (ref 8.6–10.5)
CHLORIDE SERPL-SCNC: 97 MMOL/L (ref 98–107)
CHOLEST SERPL-MCNC: 94 MG/DL (ref 0–200)
CO2 SERPL-SCNC: 28 MMOL/L (ref 22–29)
CREAT BLD-MCNC: 0.78 MG/DL (ref 0.76–1.27)
DEPRECATED RDW RBC AUTO: 42.5 FL (ref 37–54)
EOSINOPHIL # BLD AUTO: 0.05 10*3/MM3 (ref 0–0.4)
EOSINOPHIL NFR BLD AUTO: 0.5 % (ref 0.3–6.2)
ERYTHROCYTE [DISTWIDTH] IN BLOOD BY AUTOMATED COUNT: 12.9 % (ref 12.3–15.4)
GFR SERPL CREATININE-BSD FRML MDRD: 103 ML/MIN/1.73
GLUCOSE BLD-MCNC: 166 MG/DL (ref 65–99)
GLUCOSE BLDC GLUCOMTR-MCNC: 130 MG/DL (ref 70–130)
GLUCOSE BLDC GLUCOMTR-MCNC: 133 MG/DL (ref 70–130)
GLUCOSE BLDC GLUCOMTR-MCNC: 138 MG/DL (ref 70–130)
GLUCOSE BLDC GLUCOMTR-MCNC: 157 MG/DL (ref 70–130)
GLUCOSE BLDC GLUCOMTR-MCNC: 192 MG/DL (ref 70–130)
HBA1C MFR BLD: 6.6 % (ref 4.8–5.6)
HCT VFR BLD AUTO: 45.7 % (ref 37.5–51)
HDLC SERPL-MCNC: 26 MG/DL (ref 40–60)
HGB BLD-MCNC: 15.2 G/DL (ref 13–17.7)
IMM GRANULOCYTES # BLD AUTO: 0.04 10*3/MM3 (ref 0–0.05)
IMM GRANULOCYTES NFR BLD AUTO: 0.4 % (ref 0–0.5)
LDLC SERPL CALC-MCNC: 36 MG/DL (ref 0–100)
LDLC/HDLC SERPL: 1.37 {RATIO}
LEFT ATRIUM VOLUME INDEX: 23.2 ML/M^2
LEFT ATRIUM VOLUME: 46 ML
LV EF 2D ECHO EST: 65 %
LYMPHOCYTES # BLD AUTO: 3.56 10*3/MM3 (ref 0.7–3.1)
LYMPHOCYTES NFR BLD AUTO: 33 % (ref 19.6–45.3)
MCH RBC QN AUTO: 30.3 PG (ref 26.6–33)
MCHC RBC AUTO-ENTMCNC: 33.3 G/DL (ref 31.5–35.7)
MCV RBC AUTO: 91 FL (ref 79–97)
MONOCYTES # BLD AUTO: 0.89 10*3/MM3 (ref 0.1–0.9)
MONOCYTES NFR BLD AUTO: 8.2 % (ref 5–12)
NEUTROPHILS # BLD AUTO: 6.21 10*3/MM3 (ref 1.7–7)
NEUTROPHILS NFR BLD AUTO: 57.5 % (ref 42.7–76)
NRBC BLD AUTO-RTO: 0 /100 WBC (ref 0–0.2)
PLATELET # BLD AUTO: 259 10*3/MM3 (ref 140–450)
PMV BLD AUTO: 8.4 FL (ref 6–12)
POTASSIUM BLD-SCNC: 3.9 MMOL/L (ref 3.5–5.2)
RBC # BLD AUTO: 5.02 10*6/MM3 (ref 4.14–5.8)
RH BLD: POSITIVE
RIGHT ARM BP: NORMAL MMHG
SODIUM BLD-SCNC: 137 MMOL/L (ref 136–145)
T&S EXPIRATION DATE: NORMAL
TRIGL SERPL-MCNC: 162 MG/DL (ref 0–150)
TSH SERPL DL<=0.05 MIU/L-ACNC: 0.64 UIU/ML (ref 0.27–4.2)
VLDLC SERPL-MCNC: 32.4 MG/DL
WBC NRBC COR # BLD: 10.79 10*3/MM3 (ref 3.4–10.8)

## 2019-10-09 PROCEDURE — 99231 SBSQ HOSP IP/OBS SF/LOW 25: CPT | Performed by: THORACIC SURGERY (CARDIOTHORACIC VASCULAR SURGERY)

## 2019-10-09 PROCEDURE — 86850 RBC ANTIBODY SCREEN: CPT | Performed by: THORACIC SURGERY (CARDIOTHORACIC VASCULAR SURGERY)

## 2019-10-09 PROCEDURE — 97161 PT EVAL LOW COMPLEX 20 MIN: CPT

## 2019-10-09 PROCEDURE — 83036 HEMOGLOBIN GLYCOSYLATED A1C: CPT | Performed by: NURSE PRACTITIONER

## 2019-10-09 PROCEDURE — 25010000002 IOPAMIDOL 61 % SOLUTION: Performed by: FAMILY MEDICINE

## 2019-10-09 PROCEDURE — 84443 ASSAY THYROID STIM HORMONE: CPT | Performed by: NURSE PRACTITIONER

## 2019-10-09 PROCEDURE — 86901 BLOOD TYPING SEROLOGIC RH(D): CPT | Performed by: THORACIC SURGERY (CARDIOTHORACIC VASCULAR SURGERY)

## 2019-10-09 PROCEDURE — G0463 HOSPITAL OUTPT CLINIC VISIT: HCPCS | Performed by: THORACIC SURGERY (CARDIOTHORACIC VASCULAR SURGERY)

## 2019-10-09 PROCEDURE — 86900 BLOOD TYPING SEROLOGIC ABO: CPT | Performed by: THORACIC SURGERY (CARDIOTHORACIC VASCULAR SURGERY)

## 2019-10-09 PROCEDURE — 93010 ELECTROCARDIOGRAM REPORT: CPT | Performed by: INTERNAL MEDICINE

## 2019-10-09 PROCEDURE — 82962 GLUCOSE BLOOD TEST: CPT

## 2019-10-09 PROCEDURE — 99233 SBSQ HOSP IP/OBS HIGH 50: CPT | Performed by: FAMILY MEDICINE

## 2019-10-09 PROCEDURE — 95816 EEG AWAKE AND DROWSY: CPT

## 2019-10-09 PROCEDURE — 99255 IP/OBS CONSLTJ NEW/EST HI 80: CPT | Performed by: PSYCHIATRY & NEUROLOGY

## 2019-10-09 PROCEDURE — 80061 LIPID PANEL: CPT | Performed by: NURSE PRACTITIONER

## 2019-10-09 PROCEDURE — 93880 EXTRACRANIAL BILAT STUDY: CPT | Performed by: INTERNAL MEDICINE

## 2019-10-09 PROCEDURE — 85025 COMPLETE CBC W/AUTO DIFF WBC: CPT | Performed by: NURSE PRACTITIONER

## 2019-10-09 PROCEDURE — 86923 COMPATIBILITY TEST ELECTRIC: CPT

## 2019-10-09 PROCEDURE — 93306 TTE W/DOPPLER COMPLETE: CPT

## 2019-10-09 PROCEDURE — 93880 EXTRACRANIAL BILAT STUDY: CPT

## 2019-10-09 PROCEDURE — 71260 CT THORAX DX C+: CPT

## 2019-10-09 PROCEDURE — 97165 OT EVAL LOW COMPLEX 30 MIN: CPT | Performed by: OCCUPATIONAL THERAPIST

## 2019-10-09 PROCEDURE — 80048 BASIC METABOLIC PNL TOTAL CA: CPT | Performed by: NURSE PRACTITIONER

## 2019-10-09 PROCEDURE — 93306 TTE W/DOPPLER COMPLETE: CPT | Performed by: INTERNAL MEDICINE

## 2019-10-09 RX ORDER — SODIUM CHLORIDE 0.9 % (FLUSH) 0.9 %
3-10 SYRINGE (ML) INJECTION AS NEEDED
Status: DISCONTINUED | OUTPATIENT
Start: 2019-10-09 | End: 2019-10-09 | Stop reason: HOSPADM

## 2019-10-09 RX ORDER — LIDOCAINE HYDROCHLORIDE 10 MG/ML
0.5 INJECTION, SOLUTION EPIDURAL; INFILTRATION; INTRACAUDAL; PERINEURAL ONCE AS NEEDED
Status: DISCONTINUED | OUTPATIENT
Start: 2019-10-09 | End: 2019-10-09 | Stop reason: HOSPADM

## 2019-10-09 RX ORDER — SODIUM CHLORIDE 0.9 % (FLUSH) 0.9 %
3 SYRINGE (ML) INJECTION EVERY 12 HOURS SCHEDULED
Status: DISCONTINUED | OUTPATIENT
Start: 2019-10-09 | End: 2019-10-09 | Stop reason: HOSPADM

## 2019-10-09 RX ORDER — LACOSAMIDE 50 MG/1
100 TABLET ORAL EVERY 12 HOURS SCHEDULED
Status: DISCONTINUED | OUTPATIENT
Start: 2019-10-09 | End: 2019-10-14

## 2019-10-09 RX ORDER — SODIUM CHLORIDE 9 MG/ML
9 INJECTION, SOLUTION INTRAVENOUS CONTINUOUS PRN
Status: DISCONTINUED | OUTPATIENT
Start: 2019-10-09 | End: 2019-10-09 | Stop reason: HOSPADM

## 2019-10-09 RX ORDER — FAMOTIDINE 20 MG/1
20 TABLET, FILM COATED ORAL
Status: DISCONTINUED | OUTPATIENT
Start: 2019-10-09 | End: 2019-10-09 | Stop reason: HOSPADM

## 2019-10-09 RX ORDER — LACOSAMIDE 50 MG/1
50 TABLET ORAL ONCE
Status: COMPLETED | OUTPATIENT
Start: 2019-10-09 | End: 2019-10-09

## 2019-10-09 RX ADMIN — LOSARTAN POTASSIUM 50 MG: 50 TABLET ORAL at 22:08

## 2019-10-09 RX ADMIN — LEVETIRACETAM 1500 MG: 750 TABLET, FILM COATED ORAL at 09:10

## 2019-10-09 RX ADMIN — METOPROLOL SUCCINATE 200 MG: 100 TABLET, EXTENDED RELEASE ORAL at 09:09

## 2019-10-09 RX ADMIN — LACOSAMIDE 100 MG: 50 TABLET, FILM COATED ORAL at 22:08

## 2019-10-09 RX ADMIN — IOPAMIDOL 80 ML: 612 INJECTION, SOLUTION INTRAVENOUS at 14:54

## 2019-10-09 RX ADMIN — HYDROCHLOROTHIAZIDE 12.5 MG: 25 TABLET ORAL at 15:49

## 2019-10-09 RX ADMIN — ATORVASTATIN CALCIUM 80 MG: 40 TABLET, FILM COATED ORAL at 22:07

## 2019-10-09 RX ADMIN — PANTOPRAZOLE SODIUM 40 MG: 40 TABLET, DELAYED RELEASE ORAL at 05:22

## 2019-10-09 RX ADMIN — LACOSAMIDE 50 MG: 50 TABLET, FILM COATED ORAL at 09:10

## 2019-10-09 RX ADMIN — LEVETIRACETAM 1500 MG: 750 TABLET, FILM COATED ORAL at 06:56

## 2019-10-09 RX ADMIN — LACOSAMIDE 50 MG: 50 TABLET, FILM COATED ORAL at 11:21

## 2019-10-09 RX ADMIN — LEVETIRACETAM 1500 MG: 750 TABLET, FILM COATED ORAL at 22:07

## 2019-10-09 RX ADMIN — AMLODIPINE BESYLATE 10 MG: 10 TABLET ORAL at 09:10

## 2019-10-09 RX ADMIN — SODIUM CHLORIDE, PRESERVATIVE FREE 10 ML: 5 INJECTION INTRAVENOUS at 22:09

## 2019-10-09 RX ADMIN — LOSARTAN POTASSIUM 50 MG: 50 TABLET ORAL at 09:10

## 2019-10-09 RX ADMIN — ASPIRIN 325 MG: 325 TABLET, DELAYED RELEASE ORAL at 15:49

## 2019-10-09 RX ADMIN — INSULIN LISPRO 2 UNITS: 100 INJECTION, SOLUTION INTRAVENOUS; SUBCUTANEOUS at 17:31

## 2019-10-09 NOTE — CONSULTS
Consult received for diabetes education. Attempted to see patient for education. Patient not in room at this time. Will attempt to see patient later today.

## 2019-10-09 NOTE — ANESTHESIA PREPROCEDURE EVALUATION
Anesthesia Evaluation     Patient summary reviewed and Nursing notes reviewed                Airway   Dental      Pulmonary    (+) a smoker Former,   Cardiovascular     (+) hypertension, PVD, hyperlipidemia,  carotid artery disease      Neuro/Psych  (+) seizures, CVA, numbness, psychiatric history,     GI/Hepatic/Renal/Endo    (+)  GERD,  diabetes mellitus,     Musculoskeletal     Abdominal    Substance History      OB/GYN          Other   (+) arthritis                   Anesthesia Plan

## 2019-10-09 NOTE — PAYOR COMM NOTE
"Rohini Benavidez RN Utilization Review [phone]  Fax # [phone]      Notification of admission and initial clinicals.  Status is Inpatient.       Shaneka Tinoco (58 y.o. Male)     Date of Birth Social Security Number Address Home Phone MRN    1960  [address] [address] [phone] [id number]    [place] Marital Status          [facility] Single       Admission Date Admission Type Admitting Provider Attending Provider Department, Room/Bed    10/8/19 Urgent Lisa Hodges DO Hamilton, Olivia D, DO [facility] 3F, S320/1    Discharge Date Discharge Disposition Discharge Destination                       Attending Provider:  Lisa Hodges DO    Allergies:  No Known Allergies    Isolation:  None   Infection:  None   Code Status:  CPR    Ht:  167.6 cm (66\")   Wt:  88.5 kg (195 lb)    Admission Cmt:  None   Principal Problem:  CVA (cerebral vascular accident) (CMS/formerly [facility]), rule out  [I63.9]                 Active Insurance as of 10/8/2019     Primary Coverage     Payor Plan Insurance Group Employer/Plan Group    ANTHEM MEDICAID ANTHEM MEDICAID [id number]     Payor Plan Address Payor Plan Phone Number Payor Plan Fax Number Effective Dates    [address] [address] [phone]  2017 - None Entered    [facility] [id number]       Subscriber Name Subscriber Birth Date Member ID       SHANEKA TINOCO 1960 [id number]                 Emergency Contacts      (Rel.) Home Phone Work Phone Mobile Phone    Donna Davis (Power of ) [phone] -- [phone]    LaineySussy (Relative) [phone] -- [phone]               History & Physical      Manda Fernandez DO at 10/08/19 2107              [facility] Medicine Services  HISTORY AND PHYSICAL    Patient Name: Shaneka Tinoco  : 1960  MRN: [id number]  Primary Care Physician: Fortunato Marvin MD  Date of admission: " 10/8/2019      Subjective   Subjective     Chief Complaint:  Seizure activity, right sided weakness, confusion     HPI:  Vince Olivera is a 58 y.o. male with PMH significant for Carotid artery disease s/p Left CEA, CVA 2017, seizure disorder, DM2, HLD, HTN, GERD, and PAD who originally presented to [facility] with complaint of seizure with right sided weakness and confusion.   He states that he was getting ready to come to [place] for pre-admission testing for an aortic bifemoral bypass that was originally scheduled for tomorrow. His wife states that as he was getting ready, he had a seizure that involved his right arm and then he became confused and had garbled speech. Prior to that event, his seizures had been well controlled with his last one reported to be May 2018.   Initially, upon arrival to [facility], his NIH was 18 and he was nonverbal. Shortly after, he had a grand mal seizure that required a total of 2mg of ativan to resolve. He then became postictal and no repeat NIH was able to be performed. CT head was negative for acute findings. Decision was made to transfer to [facility] for further evaluation. He was given Keppra 1g PTA.   Upon arrival to [facility], pt has now returned to baseline. He no longer has any right sided weakness, and speech is at baseline. He will be admitted to Hospital Medicine for further evaluation.     Review of Systems   Constitutional: Negative for activity change, appetite change, fatigue and fever.   HENT: Negative.    Eyes: Positive for visual disturbance (chronic ).   Respiratory: Negative for cough, chest tightness, shortness of breath and wheezing.    Cardiovascular: Negative for chest pain, palpitations and leg swelling.   Gastrointestinal: Negative for abdominal distention, abdominal pain, constipation, diarrhea, nausea and vomiting.   Genitourinary: Negative for difficulty urinating, dysuria, frequency and urgency.   Musculoskeletal: Negative for arthralgias, gait problem, myalgias and neck  pain.   Skin: Negative for color change, pallor and rash.   Neurological: Positive for seizures, speech difficulty and weakness. Negative for dizziness, light-headedness and headaches.   Psychiatric/Behavioral: Positive for confusion. The patient is nervous/anxious.         All other systems reviewed and are negative.     Personal History     Past Medical History:   Diagnosis Date   • Arthritis    • Carotid artery disease (CMS/HCC)    • Carotid artery disorder (CMS/HCC)    • Depression    • Diabetes mellitus (CMS/HCC)     DIAGNOSED 4-2017 CHECKS FSBG 3X/WEEK    • Ear infection     about 2 weeks ago- cleared up - wax removed and cleaned out    • GERD (gastroesophageal reflux disease)     self diagnosed takes otc ppi   • Hyperlipidemia    • Hypertension    • Hypertension    • Inguinal hernia    • Seizure (CMS/HCC)    • Shoulder pain     bilat    • Stroke (CMS/HCC) 04/24/2017    EXPRESSIVE APHASIA RESIDUAL    • Tooth loose     5 - all over- will get teeth done after this surgery    • Wears reading eyeglasses        Past Surgical History:   Procedure Laterality Date   • ABDOMINAL HERNIA REPAIR  2010   • CAROTID ENDARTERECTOMY Left 10/17/2017    Procedure: CAROTID ENDARTERECTOMY LEFT;  Surgeon: Alexx Bach MD;  Location: [facility];  Service:    • CAROTID ENDARTERECTOMY Left 10/17/2017   • INGUINAL HERNIA REPAIR Left        Family History: family history includes COPD in his father; Diabetes in his mother. He was adopted. Otherwise pertinent FHx was reviewed and unremarkable.     Social History:  reports that he quit smoking about 2 years ago. His smoking use included cigarettes. He has a 2.00 pack-year smoking history. He has quit using smokeless tobacco. His smokeless tobacco use included snuff. He reports that he does not drink alcohol or use drugs.  Social History     Social History Narrative    Patient consumes 0-1serving of caffeine daily.     Patient lives at home with wife in [facility]        Medications:    Available home medication information reviewed.  Medications Prior to Admission   Medication Sig Dispense Refill Last Dose   • amLODIPine (NORVASC) 10 MG tablet Take 1 tablet by mouth Daily. 30 tablet 5 10/8/2019 at Unknown time   • aspirin  MG tablet Take 1 tablet by mouth Daily. 30 tablet 5 10/8/2019 at Unknown time   • atorvastatin (LIPITOR) 80 MG tablet Take 1 tablet by mouth Every Night. 30 tablet 5 10/7/2019 at Unknown time   • clopidogrel (PLAVIX) 75 MG tablet Take 1 tablet by mouth Daily. 30 tablet 5 Past Week at Unknown time   • hydrochlorothiazide (HYDRODIURIL) 12.5 MG tablet Take 1 tablet by mouth Daily. 30 tablet 6 10/8/2019 at Unknown time   • lansoprazole (PREVACID) 15 MG capsule Take 1 capsule by mouth Daily. 30 capsule 5 10/8/2019 at Unknown time   • levETIRAcetam (KEPPRA) 500 MG tablet Take 1500 mg  tablet 5 10/8/2019 at Unknown time   • losartan (COZAAR) 100 MG tablet Take 0.5 tablets by mouth 2 (Two) Times a Day. 60 tablet 5 10/8/2019 at Unknown time   • metFORMIN (GLUCOPHAGE) 500 MG tablet Take 2 tablets by mouth 2 (Two) Times a Day With Meals. 120 tablet 5 10/8/2019 at Unknown time   • metoprolol succinate XL (TOPROL XL) 200 MG 24 hr tablet Take 1 tablet by mouth Daily. 30 tablet 6 10/8/2019 at Unknown time   • omeprazole (priLOSEC) 20 MG capsule Take 20 mg by mouth Daily.   10/8/2019 at Unknown time   • VIMPAT 50 MG tablet tablet TAKE ONE TABLET BY MOUTH EVERY 12 HOURS 60 tablet 0 10/8/2019 at Unknown time   • carbamide peroxide (DEBROX) 6.5 % otic solution Administer 5 drops into both ears 2 (Two) Times a Day. 22 mL 0 Not Taking       No Known Allergies    Objective   Objective     Vital Signs:   Temp:  [98.3 °F (36.8 °C)-98.5 °F (36.9 °C)] 98.5 °F (36.9 °C)  Heart Rate:  [60-88] 88  Resp:  [18] 18  BP: ()/(60-92) 117/84        Physical Exam   Constitutional: He appears well-developed and well-nourished. No distress.   HENT:   Head: Normocephalic and  atraumatic.   Eyes: Pupils are equal, round, and reactive to light.   Neck: Normal range of motion. Neck supple. No JVD present.   Cardiovascular: Normal rate, regular rhythm, normal heart sounds and intact distal pulses. Exam reveals no gallop and no friction rub.   No murmur heard.  Pulmonary/Chest: Effort normal and breath sounds normal. No respiratory distress. He has no wheezes. He has no rales.   Abdominal: Soft. Bowel sounds are normal. He exhibits no distension and no mass. There is no tenderness. There is no guarding.   Musculoskeletal: Normal range of motion. He exhibits no edema or tenderness.   Neurological: He is alert.   Right side slightly weaker than left. Pt notes he feels like this is baseline for him    Skin: Skin is warm and dry. No rash noted. No erythema.   Psychiatric: He has a normal mood and affect. His behavior is normal.   Vitals reviewed.       Results Reviewed:  I have personally reviewed current lab and radiology data.    Results from last 7 days   Lab Units 10/08/19  0937   WBC 10*3/mm3 13.98*   HEMOGLOBIN g/dL 16.3   HEMATOCRIT % 46.4   PLATELETS 10*3/mm3 274   INR  0.97     Results from last 7 days   Lab Units 10/08/19  0937   SODIUM mmol/L 134*   POTASSIUM mmol/L 3.7   CHLORIDE mmol/L 92*   CO2 mmol/L 23.9   BUN mg/dL 8   CREATININE mg/dL 0.83   GLUCOSE mg/dL 232*   CALCIUM mg/dL 9.7   ALT (SGPT) U/L 26   AST (SGOT) U/L 16   TROPONIN T ng/mL <0.010     Estimated Creatinine Clearance: 102.4 mL/min (by C-G formula based on SCr of 0.83 mg/dL).  Brief Urine Lab Results     None        Imaging Results (last 24 hours)     ** No results found for the last 24 hours. **        Results for orders placed during the hospital encounter of 04/14/18   Adult Transthoracic Echo Complete W/ Cont if Necessary Per Protocol (With Agitated Saline)    Narrative · Left ventricular systolic function is normal. Estimated EF = 65%.  · Left ventricular diastolic dysfunction (grade I a) consistent with    impaired relaxation.  · Saline test results are negative for evidence of a PFO.          Assessment/Plan   Assessment / Plan     Active Hospital Problems    Diagnosis POA   • **CVA (cerebral vascular accident) (CMS/[facility]), rule out  [I63.9] Yes   • PAD (peripheral artery disease) (CMS/[facility]) [I73.9] Yes   • Type 2 diabetes mellitus without complication, without long-term current use of insulin (CMS/[facility]) [E11.9] Yes   • Seizure disorder as sequela of cerebrovascular accident (CMS/[facility]) [I69.398, G40.909] Not Applicable   • Status post left carotid endarterectomy [Z98.890] Not Applicable   • Gastroesophageal reflux disease without esophagitis [K21.9] Yes   • Dyslipidemia [E78.5] Yes   • Bilateral carotid artery stenosis [I65.23] Yes   • Essential hypertension [I10] Yes   • H/o Left MCA ischemic CVA 4/2017 [I63.9] Yes     58 year old male presenting originally to [facility] with complaint of seizure like activity and right sided weakness who was transferred to [facility] for higher level of care     CVA rule out  -neuro checks q 4 hours   -NIHSS q shift  -PT/OT/SLP consult in am   -ECHO in am   -Carotid duplex in am   -Neurology consult in am   -CBC, BMP, lipid panel, HgA1c in am     Seizure disorder   -Additional 1g Keppra tonight, then continue home dose of 1500mg BID tomorrow. Discussed with pharmacy  -continue home dose of Vimpat  -previously well controlled on home medications, last seizure was May 2018  -Neurology consult in am   -EEG in am     PVD  -was scheduled for Ao-femoral bypass surgery in am, will consult Dr Weaver    Dyslipidemia  -Lipid panel in am   -continue high dose atorvastatin     Diabetes Mellitus 2  -FSBG ACHS  -SS insulin   -HgA1c in am     Carotid artery disease  -s/p Left CEA  -carotid duplex in am     History of CVA  -plavix held for the past 5 days in prep for surgery, will continue to hold for now until CT surgery evals  -continue daily ASA     Hypertension   -controlled with amlodipine and losartan  -hold  for now pending MRI and CVA rule out   -Plan to continue amlodipine and losartan in am     GERD  -continue PPI    DVT prophylaxis:  Mechanical     CODE STATUS:    Code Status and Medical Interventions:   Ordered at: 10/08/19 2144     Code Status:    CPR     Medical Interventions (Level of Support Prior to Arrest):    Full       Admission Status:  I believe this patient meets OBSERVATION status, however if further evaluation or treatment plans warrant, status may change.  Based upon current information, I predict patient's care encounter to be less than or equal to 2 midnights.    Electronically signed by KINGSTON Baxter, 10/08/19, 9:07 PM.      Brief Attending Admission Attestation     I have seen and examined the patient, performing an independent face-to-face diagnostic evaluation with plan of care reviewed and developed with the advanced practice clinician (APC).      Brief Summary Statement:   Vince Olivera is a 58 y.o. male with PMHx significant for seizure disorder, CAD, PVD s/p left CEA, Prior CVA 2017 with minior residual memory deficits, seizure disorder, DM, HTN, HLD, GERD who presents to [facility] as transfer from [facility] after having two witnessed grand mal seizures, with some associated right sided weakness, slurred speech and confusion. He is supposed to have Ao-femoral bypass surgery tomorrow with Dr. Weaver. Patient's wife mentions that he has had a lot of anxiety surrounding the surgery and she thinks that is what triggered his seizures. He has been well controlled on his home AEDs for over a year. On arrival to [place] he had a witnessed seizures that was stopped with 2mg of ativan. CT head at [facility] was negative for acute findings, he was transferred here for further neuro evaluation. His family reports he has completely returned to his baseline at the time of my evaluation.    Remainder of detailed HPI is as noted above and has been reviewed and/or edited by me for completeness.      Attending  Physical Exam:  Constitutional: Awake, alert  Eyes: PERRLA, sclerae anicteric, no conjunctival injection  HENT: NCAT, mucous membranes moist  Neck: Supple, no thyromegaly, no lymphadenopathy, trachea midline  Respiratory: Clear to auscultation bilaterally, nonlabored respirations   Cardiovascular: RRR, no murmurs, rubs, or gallops, palpable pedal pulses bilaterally  Gastrointestinal: Positive bowel sounds, soft, nontender, nondistended  Musculoskeletal: No bilateral ankle edema, no clubbing or cyanosis to extremities  Psychiatric: Appropriate affect, cooperative  Neurologic: Oriented x 3, strength symmetric in all extremities, Cranial Nerves grossly intact to confrontation, speech clear  Skin: No rashes    Brief Assessment/Plan :  See above for further detailed assessment and plan developed with APC which I have reviewed and/or edited for completeness.      Electronically signed by Manda Fernandez DO, 10/08/19, 9:48 PM.             Electronically signed by Manda Fernandez DO at 10/08/19 2155       Emergency Department Notes     No notes of this type exist for this encounter.        Vital Signs (last day)     Date/Time   Temp   Temp src   Pulse   Resp   BP   Patient Position   SpO2    10/09/19 1119   97.9 (36.6)   Oral   64   16   138/77   Sitting   95    10/09/19 0828   98.3 (36.8)   Oral   63   17   130/78   Sitting   97    10/09/19 0623   98.3 (36.8)   Temporal   67   18   117/72   Sitting   92    10/09/19 0516   98.2 (36.8)   Oral   71   18   127/77   Lying   98    10/09/19 0013   98.3 (36.8)   Oral   72   20   138/81   Lying   98    10/08/19 1956   --   --   66   --   127/82   Lying   95    10/08/19 1954   98.1 (36.7)   Oral   71   20   119/76   Lying   --              Hospital Medications (active)       Dose Frequency Start End    acetaminophen (TYLENOL) suppository 650 mg 650 mg Every 4 Hours PRN 10/8/2019     Sig - Route: Insert 1 suppository into the rectum Every 4 (Four) Hours As Needed for Mild Pain   "or Fever (Temperature greater than or equal to 37 C). - Rectal    Linked Group 1:  \"Or\" Linked Group Details        acetaminophen (TYLENOL) tablet 650 mg 650 mg Every 4 Hours PRN 10/8/2019     Sig - Route: Take 2 tablets by mouth Every 4 (Four) Hours As Needed for Mild Pain  or Fever (Temperature greater than or equal to 37 C). - Oral    Linked Group 1:  \"Or\" Linked Group Details        amLODIPine (NORVASC) tablet 10 mg 10 mg Daily 10/9/2019     Sig - Route: Take 1 tablet by mouth Daily. - Oral    aspirin EC tablet 325 mg 325 mg Daily 10/9/2019     Sig - Route: Take 1 tablet by mouth Daily. - Oral    atorvastatin (LIPITOR) tablet 80 mg 80 mg Nightly 10/8/2019     Sig - Route: Take 2 tablets by mouth Every Night. - Oral    dextrose (D50W) 25 g/ 50mL Intravenous Solution 25 g 25 g Every 15 Minutes PRN 10/8/2019     Sig - Route: Infuse 50 mL into a venous catheter Every 15 (Fifteen) Minutes As Needed for Low Blood Sugar (Blood Sugar Less Than 70). - Intravenous    dextrose (GLUTOSE) oral gel 15 g (MAR Hold) ((MAR Hold) since 10/9/2019  5:42 AM) 15 g Every 15 Minutes PRN 10/8/2019     Sig - Route: Take 15 application by mouth Every 15 (Fifteen) Minutes As Needed for Low Blood Sugar (Blood sugar less than 70). - Oral    glucagon (human recombinant) (GLUCAGEN DIAGNOSTIC) injection 1 mg 1 mg Every 15 Minutes PRN 10/8/2019     Sig - Route: Inject 1 mg under the skin into the appropriate area as directed Every 15 (Fifteen) Minutes As Needed for Low Blood Sugar (Blood Glucose Less Than 70). - Subcutaneous    hydroCHLOROthiazide (HYDRODIURIL) tablet 12.5 mg 12.5 mg Daily 10/9/2019     Sig - Route: Take 0.5 tablets by mouth Daily. - Oral    insulin lispro (humaLOG) injection 0-9 Units 0-9 Units 4 Times Daily With Meals & Nightly 10/8/2019     Sig - Route: Inject 0-9 Units under the skin into the appropriate area as directed 4 (Four) Times a Day With Meals & at Bedtime. - Subcutaneous    iopamidol (ISOVUE-300) 61 % injection 100 " mL 100 mL Once in Imaging 10/9/2019 10/9/2019    Sig - Route: Infuse 100 mL into a venous catheter Once. - Intravenous    lacosamide (VIMPAT) tablet 100 mg 100 mg Every 12 Hours Scheduled 10/9/2019     Sig - Route: Take 2 tablets by mouth Every 12 (Twelve) Hours. - Oral    lacosamide (VIMPAT) tablet 50 mg 50 mg Once 10/9/2019 10/9/2019    Sig - Route: Take 1 tablet by mouth 1 (One) Time. - Oral    levETIRAcetam (KEPPRA) tablet 1,500 mg 1,500 mg Every 12 Hours Scheduled 10/9/2019     Sig - Route: Take 2 tablets by mouth Every 12 (Twelve) Hours. - Oral    levETIRAcetam in NaCl 0.75% (KEPPRA) IVPB 1,000 mg 1,000 mg Once 10/8/2019 10/8/2019    Sig - Route: Infuse 100 mL into a venous catheter 1 (One) Time. - Intravenous    LORazepam (ATIVAN) injection 1 mg 1 mg Once As Needed 10/8/2019     Sig - Route: Infuse 0.5 mL into a venous catheter 1 (One) Time As Needed for Seizures (for seizure activity only). - Intravenous    losartan (COZAAR) tablet 50 mg 50 mg 2 Times Daily 10/9/2019     Sig - Route: Take 1 tablet by mouth 2 (Two) Times a Day. - Oral    melatonin tablet 5 mg 5 mg Nightly PRN 10/8/2019     Sig - Route: Take 1 tablet by mouth At Night As Needed for Sleep. - Oral    metoprolol succinate XL (TOPROL-XL) 24 hr tablet 200 mg 200 mg Daily 10/9/2019     Sig - Route: Take 2 tablets by mouth Daily. - Oral    pantoprazole (PROTONIX) EC tablet 40 mg 40 mg Every Morning 10/9/2019     Sig - Route: Take 1 tablet by mouth Every Morning. - Oral    sodium chloride 0.9 % flush 10 mL 10 mL Every 12 Hours Scheduled 10/8/2019     Sig - Route: Infuse 10 mL into a venous catheter Every 12 (Twelve) Hours. - Intravenous    sodium chloride 0.9 % flush 10 mL 10 mL As Needed 10/8/2019     Sig - Route: Infuse 10 mL into a venous catheter As Needed for Line Care. - Intravenous    cefuroxime (ZINACEF) 1.5 g/100 mL 0.9% NS IVPB (mbp) (Discontinued) 1.5 g Once 10/9/2019 10/9/2019    Sig - Route: Infuse 100 mL into a venous catheter 1 (One)  "Time. - Intravenous    Reason for Discontinue: Patient Transfer    famotidine (PEPCID) tablet 20 mg (Discontinued) 20 mg 60 Minutes Pre-Op 10/9/2019 10/9/2019    Sig - Route: Take 1 tablet by mouth 60 Minutes Prior to Surgery. - Oral    Reason for Discontinue: Patient Transfer    lacosamide (VIMPAT) tablet 50 mg (Discontinued) 50 mg Every 12 Hours Scheduled 10/8/2019 10/9/2019    Sig - Route: Take 1 tablet by mouth Every 12 (Twelve) Hours. - Oral    lidocaine PF 1% (XYLOCAINE) injection 0.5 mL (Discontinued) 0.5 mL Once As Needed 10/9/2019 10/9/2019    Sig - Route: Inject 0.5 mL as directed 1 (One) Time As Needed (IV Start). - Injection    Reason for Discontinue: Patient Transfer    sodium chloride 0.9 % flush 10 mL (Discontinued) 10 mL As Needed 10/8/2019 10/8/2019    Sig - Route: Infuse 10 mL into a venous catheter As Needed for Line Care. - Intravenous    Reason for Discontinue: Patient Discharge    Linked Group 2:  \"And\" Linked Group Details        sodium chloride 0.9 % flush 3 mL (Discontinued) 3 mL Every 12 Hours Scheduled 10/9/2019 10/9/2019    Sig - Route: Infuse 3 mL into a venous catheter Every 12 (Twelve) Hours. - Intravenous    Reason for Discontinue: Patient Transfer    sodium chloride 0.9 % flush 3-10 mL (Discontinued) 3-10 mL As Needed 10/9/2019 10/9/2019    Sig - Route: Infuse 3-10 mL into a venous catheter As Needed for Line Care. - Intravenous    Reason for Discontinue: Patient Transfer    sodium chloride 0.9 % infusion (Discontinued) 9 mL/hr Continuous PRN 10/9/2019 10/9/2019    Sig - Route: Infuse 9 mL/hr into a venous catheter Continuous As Needed (Start Prior to Surgery). - Intravenous    Reason for Discontinue: Patient Transfer          Orders (active)     Start     Ordered    10/11/19 0001  NPO Diet  Diet Effective Midnight      10/09/19 1200    10/09/19 2100  lacosamide (VIMPAT) tablet 100 mg  Every 12 Hours Scheduled      10/09/19 0945    10/09/19 0900  amLODIPine (NORVASC) tablet 10 mg  " Daily      10/08/19 2144    10/09/19 0900  aspirin EC tablet 325 mg  Daily      10/08/19 2144    10/09/19 0900  hydroCHLOROthiazide (HYDRODIURIL) tablet 12.5 mg  Daily      10/08/19 2144    10/09/19 0900  levETIRAcetam (KEPPRA) tablet 1,500 mg  Every 12 Hours Scheduled      10/08/19 2144    10/09/19 0900  losartan (COZAAR) tablet 50 mg  2 Times Daily      10/08/19 2144    10/09/19 0900  metoprolol succinate XL (TOPROL-XL) 24 hr tablet 200 mg  Daily      10/08/19 2144    10/09/19 0843  DIET MESSAGE Patient has diet order now. Please send tray. Thanks!  Once     Comments:  Patient has diet order now. Please send tray. Thanks!    10/09/19 0842    10/09/19 0841  Diet Regular; Consistent Carbohydrate, Cardiac  Diet Effective Now      10/09/19 0840    10/09/19 0830  Inpatient Neurology Consult General  Once     Specialty:  Neurology  Provider:  Christiano Pittman MD    10/09/19 0830    10/09/19 0719  CT Chest With Contrast  1 Time Imaging      10/09/19 0720    10/09/19 0702  Inpatient Cardiothoracic Surgery Consult  IN AM     Specialty:  Cardiothoracic Surgery  Provider:  Dylan Weaver MD    10/08/19 2144    10/09/19 0700  pantoprazole (PROTONIX) EC tablet 40 mg  Every Morning      10/08/19 2144    10/09/19 0000  Vital Signs  Every 4 Hours      10/08/19 2144    10/09/19 0000  Intake and Output  Every 4 Hours      10/08/19 2144    10/09/19 0000  POC Glucose Q6H  Every 6 Hours     Comments:  May Discontinue After 2 Consecutive Readings Less Than 140Notify Provider if 2 Readings Greater Than 140      10/08/19 2144    10/08/19 2230  atorvastatin (LIPITOR) tablet 80 mg  Nightly      10/08/19 2144    10/08/19 2230  sodium chloride 0.9 % flush 10 mL  Every 12 Hours Scheduled      10/08/19 2144    10/08/19 2230  insulin lispro (humaLOG) injection 0-9 Units  4 Times Daily With Meals & Nightly      10/08/19 2144    10/08/19 2200  POC Glucose 4x Daily AC & at Bedtime  4 Times Daily Before Meals & at Bedtime      10/08/19 3938     10/08/19 2146  Please call APRN at [phone] if pt fails bedside dysphagia screening  Nursing Communication  Once     Comments:  Please call APRN at [phone] if pt fails bedside dysphagia screening    10/08/19 2146    10/08/19 2143  LORazepam (ATIVAN) injection 1 mg  Once As Needed      10/08/19 2144    10/08/19 2143  melatonin tablet 5 mg  Nightly PRN      10/08/19 2144    10/08/19 2143  Bilateral Carotid Duplex  Once      10/08/19 2144    10/08/19 2142  acetaminophen (TYLENOL) tablet 650 mg  Every 4 Hours PRN      10/08/19 2144    10/08/19 2142  acetaminophen (TYLENOL) suppository 650 mg  Every 4 Hours PRN      10/08/19 2144    10/08/19 2140  Fall Precautions  Continuous      10/08/19 2144    10/08/19 2140  Seizure Precautions  Continuous      10/08/19 2144    10/08/19 2136  EEG  Once      10/08/19 2144    10/08/19 2136  Code Status and Medical Interventions:  Continuous      10/08/19 2144    10/08/19 2136  Maintain Sequential Compression Device  Continuous      10/08/19 2144    10/08/19 2135  Maintain Sequential Compression Device  Continuous      10/08/19 2144    10/08/19 2135  Vital Signs Per Hospital Policy  Per Order Details     Comments:  For ICU Admission: Vital Signs Every 2 Hours  For Telemetry Unit Admission: Vital Signs Every 4 Hours  Keep Systolic Blood Pressure Less Than 220, Diastolic Blood Pressure Less Than 110    10/08/19 2144    10/08/19 2135  Cardiac Monitoring  Continuous      10/08/19 2144    10/08/19 2135  Turn Patient  Now Then Every 2 Hours      10/08/19 2144    10/08/19 2135  Intake & Output  Every Shift      10/08/19 2144    10/08/19 2135  Weigh Patient  Once      10/08/19 2144    10/08/19 2135  Neuro Checks  Per Order Details     Comments:  For ICU Admission: Neuro Checks to Include All Stroke Deficits Every Hour x10 Hours Then Every 2 Hours  For Telemetry Unit Admission: Neuro Checks to Include All Stroke Deficits Every 4 Hours    10/08/19 2144    10/08/19 2135  NIHSS Assessment  Every Shift      Comments:  Turn off all sedation medications prior to performing assessment. Assessment to be performed upon admission, transfer to another unit, discharge, and with neurological decline. If NIHSS change is greater than or equal to 4 and/or neurological decline is noted notify physician.    10/08/19 2144    10/08/19 2135  Provide Stroke Education Material  Prior to Discharge     Comments:  Educate patient PRN and daily during hospitalization.    10/08/19 2144    10/08/19 2135  Nursing Dysphagia Screening (Complete Prior to Giving Anything By Mouth)  Once      10/08/19 2144    10/08/19 2135  RN to Place Order SLP Consult - Eval & Treat Choosing Reason of RN Dysphagia Screen Failed  Per Order Details     Comments:  RN to Place Order SLP Consult - Eval & Treat Choosing Reason of RN Dysphagia Screen Failed    10/08/19 2144    10/08/19 2135  Nurse to Call MD or Nutrition Services for Diet if Patient Passes Dysphagia Screen  Once      10/08/19 2144    10/08/19 2135  Do NOT Hold Basal or Correction Scale Insulin When Patient is NPO, Hold Scheduled Mealtime (Bolus) Insulin if NPO  Continuous      10/08/19 2144    10/08/19 2135  Follow [facility] Hypoglycemia Standing Orders For Blood Glucose Less Than 70 mg/dL  Until Discontinued      10/08/19 2144    10/08/19 2135  [MAR Hold]  dextrose (GLUTOSE) oral gel 15 g  Every 15 Minutes PRN     (MAR Hold since 10/09/19 0542)    10/08/19 2144    10/08/19 2135  dextrose (D50W) 25 g/ 50mL Intravenous Solution 25 g  Every 15 Minutes PRN      10/08/19 2144    10/08/19 2135  glucagon (human recombinant) (GLUCAGEN DIAGNOSTIC) injection 1 mg  Every 15 Minutes PRN      10/08/19 2144    10/08/19 2135  Hypoglycemia Treatment - Alert Patient That is Not NPO and Can Safely Swallow  Until Discontinued     Comments:  Administer 4 oz Fruit Juice OR 4 oz Regular Soda OR 8 oz Milk OR 15-30 grams (1 tube) of Glucose Gel.  Recheck Blood Glucose 15 Minutes After Ingestion, Repeat Treatment & Continue to  Recheck Blood Sugar Every 15 Minutes Until Blood Glucose is 70 mg/dL or Higher.  Once Blood Glucose is 70 mg/dL or Higher and if It Will Be more than 60 Minutes Until the Next Meal, Provide Appropriate Snack (Including Carbohydrate Food) Based on Meal Plan Order. Give Meal Tray As Soon As Possible.    10/08/19 2144    10/08/19 2135  Hypoglycemia Treatment - Patient Has IV Access - Unresponsive, NPO or Unable To Safely Swallow  Until Discontinued     Comments:  Administer 25g (50ml) D50W IV Push.  Recheck Blood Glucose 15 Minutes After Administration, if Blood Glucose Remains Less Than 70 mg/dl, Repeat Treatment   Recheck Blood Glucose 15 Minutes After 2nd Administration, if Blood Glucose Remains Less Than 70 mg/dL After 2nd Dose of D50W, Contact Provider for Further Treatment Orders & Consider Adding IVF With D5W for Maintenance    10/08/19 2144    10/08/19 2135  Hypoglycemia Treatment - Patient Without IV Access - Unresponsive, NPO or Unable To Safely Swallow  Until Discontinued     Comments:  Administer 1mg Glucagon SQ & Establish IV Access.  Turn Patient on Side - Nausea / Vomiting May Occur.  Recheck Blood Glucose 15 Minutes After Administration.  If Blood Glucose Remains Less Than 70, Administer 25g D50W IV Push (50ml).  Recheck Blood Glucose 15 Minutes After Administration of D50W, if Blood Glucose Remains Less Than 70 mg/dl, Contact Provider for Further Treatment Orders & Consider Adding IVF With D5 for Maintenance    10/08/19 2144    10/08/19 2135  Hypoglycemia Treatment - Document Event & Patient Response to Interventions EMR, Document Medications on MAR  Continuous      10/08/19 2144    10/08/19 2135  Notify Provider - Hypoglycemia Treatment  Until Discontinued      10/08/19 2144    10/08/19 2135  Notify Provider  Until Discontinued      10/08/19 2144    10/08/19 2135  OT Consult: Eval & Treat  Once      10/08/19 2144    10/08/19 2135  SLP Consult: Eval & Treat Communication Disorder  Once     Comments:   Per stroke protocol.    10/08/19 2144    10/08/19 2135  Inpatient Diabetes Educator Consult  Once     Provider:  (Not yet assigned)    10/08/19 2144    10/08/19 2135  Insert Peripheral IV  Once      10/08/19 2144    10/08/19 2135  sodium chloride 0.9 % flush 10 mL  As Needed      10/08/19 2144    Unscheduled  Order CT Head Without Contrast for Neurological Decline  As Needed      10/08/19 2144    Unscheduled  Up With Assistance  As Needed      10/08/19 2144          Operative/Procedure Notes (all)     No notes of this type exist for this encounter.           Physician Progress Notes (all)      Dylan Weaver MD at 10/09/19 0819            Vince Olivera  [id number]  1960    Patient Care Team:  Fortunato Marvin MD as PCP - General (Family Medicine)  Oneida Augustin APRN as Nurse Practitioner (Neurology)  Alexx Bach MD as Consulting Physician (Neurosurgery)  Zena Alejandro as Speech Therapist (Speech Pathology)  Randi Llamas PTA as Physical Therapy Assistant (Physical Therapy)  Oneida Augustin APRN as Nurse Practitioner (Neurology)  Provider, No Known as Referring Physician  Keanu hSeth MD as Surgeon (Orthopedic Surgery)    CC: I feel pretty good I am ready to have the surgery    HPI: 58-year-old male with significant history of severe claudication.  He is unable to walk more than just a few steps without severe claudication of both calves and both thighs.  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  There is bilateral superficial femoral artery occlusion as well.  Runoff below the knee is satisfactory.  The patient has previously had a cerebrovascular accident and a left carotid endarterectomy.  He states that a couple of years ago he had 2 seizures.  He had a seizure yesterday and was admitted  to the hospital via the emergency department.  CT scan of the head and subsequent MRI of the brain were negative for new acute findings.  Patient is a past cigarette smoker.  His chest x-ray does reveal basilar atelectasis on the right with elevation of the right hemidiaphragm.  He is scheduled for aortobifemoral bypass.  I have discussed the situation at length with anesthesia.  They would prefer to have a CT scan of the chest and a neurologic clearance prior to a  anesthetic.  I discussed this at length with the patient he understands and is agreeable.      CVA (cerebral vascular accident) (CMS/[facility]), rule out     H/o Left MCA ischemic CVA 4/2017    Essential hypertension    Dyslipidemia    Bilateral carotid artery stenosis    Gastroesophageal reflux disease without esophagitis    Status post left carotid endarterectomy    Seizure disorder as sequela of cerebrovascular accident (CMS/[facility])    Type 2 diabetes mellitus without complication, without long-term current use of insulin (CMS/[facility])    Bilateral leg and foot pain    PAD (peripheral artery disease) (CMS/[facility])    Breakthrough seizure (CMS/[facility])      Review of Systems:  General: No anorexia, no weight loss, general malaise and weakness.  No fever chills or night sweats.  HEENT: No headaches no visual changes no hearing loss no rhinitis no pharyngitis  Pulmonary: No shortness of breath, no cough, no hemoptysis  Heart: No palpitations,  No malaise or shortness of breath, no atrial fibrillation, no bradycardia, no syncope.  No anginal quality chest pain.  Gastrointestinal: No nausea, vomiting, diarrhea, or constipation. No acholic stool, no jaundice.  Renal: No dysuria, no frequency, no hematuria.  Skin: No rash, no skin lesions, no skin tumors.  Neurologic: No seizures, no muscle weakness, no sensory deficit, no amaurosis,  Psychiatric: No anxiety, no history of psychosis  Hematologic: No bleeding history, no ease of bruising, no history of blood  disorder.  Extremities: Difficult and relatively severe claudication both calves and both thighs after walking only a few steps.  All other systems reviewed with no pertinent findings.    History  Past Medical History:   Diagnosis Date   • Arthritis    • Carotid artery disease (CMS/HCC)    • Carotid artery disorder (CMS/[facility])    • Depression    • Diabetes mellitus (CMS/HCC)     DIAGNOSED  CHECKS FSBG 3X/WEEK    • Ear infection     about 2 weeks ago- cleared up - wax removed and cleaned out    • GERD (gastroesophageal reflux disease)     self diagnosed takes otc ppi   • Hyperlipidemia    • Hypertension    • Hypertension    • Inguinal hernia    • Seizure (CMS/[facility])     last 10/8/2019   • Shoulder pain     bilat    • Stroke (CMS/[facility]) 2017    EXPRESSIVE APHASIA RESIDUAL    • Tooth loose     5 - all over- will get teeth done after this surgery    • Wears reading eyeglasses      Past Surgical History:   Procedure Laterality Date   • ABDOMINAL HERNIA REPAIR     • CAROTID ENDARTERECTOMY Left 10/17/2017    Procedure: CAROTID ENDARTERECTOMY LEFT;  Surgeon: Alexx Bach MD;  Location: [facility];  Service:    • INGUINAL HERNIA REPAIR Left      Family History   Adopted: Yes   Problem Relation Age of Onset   • Diabetes Mother    • COPD Father      Social History     Tobacco Use   • Smoking status: Former Smoker     Packs/day: 0.05     Years: 40.00     Pack years: 2.00     Types: Cigarettes     Last attempt to quit: 2017     Years since quittin.4   • Smokeless tobacco: Former User     Types: Snuff   • Tobacco comment: quit snuff when 18 or 19 years old - only did for baseball - 2-3 years    Substance Use Topics   • Alcohol use: No   • Drug use: No     Medications Prior to Admission   Medication Sig Dispense Refill Last Dose   • amLODIPine (NORVASC) 10 MG tablet Take 1 tablet by mouth Daily. 30 tablet 5 10/8/2019 at Unknown time   • aspirin  MG tablet Take 1 tablet by mouth Daily. 30 tablet  5 10/8/2019 at Unknown time   • atorvastatin (LIPITOR) 80 MG tablet Take 1 tablet by mouth Every Night. 30 tablet 5 10/7/2019 at Unknown time   • clopidogrel (PLAVIX) 75 MG tablet Take 1 tablet by mouth Daily. 30 tablet 5 Past Week at Unknown time   • hydrochlorothiazide (HYDRODIURIL) 12.5 MG tablet Take 1 tablet by mouth Daily. 30 tablet 6 10/8/2019 at Unknown time   • lansoprazole (PREVACID) 15 MG capsule Take 1 capsule by mouth Daily. 30 capsule 5 10/8/2019 at Unknown time   • levETIRAcetam (KEPPRA) 500 MG tablet Take 1500 mg  tablet 5 10/8/2019 at Unknown time   • losartan (COZAAR) 100 MG tablet Take 0.5 tablets by mouth 2 (Two) Times a Day. 60 tablet 5 10/8/2019 at Unknown time   • metFORMIN (GLUCOPHAGE) 500 MG tablet Take 2 tablets by mouth 2 (Two) Times a Day With Meals. 120 tablet 5 10/8/2019 at Unknown time   • metoprolol succinate XL (TOPROL XL) 200 MG 24 hr tablet Take 1 tablet by mouth Daily. 30 tablet 6 10/8/2019 at Unknown time   • omeprazole (priLOSEC) 20 MG capsule Take 20 mg by mouth Daily.   10/8/2019 at Unknown time   • VIMPAT 50 MG tablet tablet TAKE ONE TABLET BY MOUTH EVERY 12 HOURS 60 tablet 0 10/8/2019 at Unknown time   • carbamide peroxide (DEBROX) 6.5 % otic solution Administer 5 drops into both ears 2 (Two) Times a Day. 22 mL 0 Not Taking     Allergies:  Patient has no known allergies.    Objective    Vital Signs  Temp:  [98.1 °F (36.7 °C)-98.5 °F (36.9 °C)] 98.3 °F (36.8 °C)  Heart Rate:  [60-88] 67  Resp:  [18-20] 18  BP: ()/(60-92) 117/72    Physical Exam:  General Appearance: alert, appears stated age and cooperative  Head: normocephalic, without obvious abnormality and atraumatic  Ears/Nose: no rhinitis, external ears normal  Throat:  no oral lesions, no pharyngitis  Neck: no adenopathy, suppple, trachea midline, no thyromegaly, no carotid bruit and no JVD  Lungs: clear to auscultation, respirations regular, respirations even and respirations unlabored  Heart: regular  rhythm & normal rate, normal S1, S2, no murmur  Abdomen: normal bowel sounds, no masses, soft, non-tender  Extremities: moves extremities well and no edema  Pulses: pulses notpalpable but are present wit Doppler and are equal bilaterally (femoral, DP, PT)  Skin: no bleeding, bruising or rash  Neurologic: mental status orientated to person, place, time and situation, CN intact, no motor or sensory loss          Data Review:  Results from last 7 days   Lab Units 10/08/19  0937   WBC 10*3/mm3 13.98*   HEMOGLOBIN g/dL 16.3   HEMATOCRIT % 46.4   PLATELETS 10*3/mm3 274     Results from last 7 days   Lab Units 10/08/19  0937   SODIUM mmol/L 134*   POTASSIUM mmol/L 3.7   CHLORIDE mmol/L 92*   CO2 mmol/L 23.9   BUN mg/dL 8   CREATININE mg/dL 0.83   GLUCOSE mg/dL 232*   CALCIUM mg/dL 9.7     Coagulation: No results found for: INR, APTT  Cardiac markers:   Results from last 7 days   Lab Units 10/08/19  0937   TROPONIN T ng/mL <0.010     ABGs:       Invalid input(s): PO2      Imaging Results (last 72 hours)     Procedure Component Value Units Date/Time    MRI Brain Without Contrast [[id number]] Collected:  10/08/19 2353     Updated:  10/09/19 0554    Narrative:       MRI Brain WO     INDICATION:    Epilepsy. Right-sided weakness and confusion. Seizure today.    TECHNIQUE: Routine brain MRI without contrast.    COMPARISON: Previous brain MRI April 2018.    Brain MRI: Preliminary report    No hemorrhage. No acute stroke. No mass lesion. No significant hydrocephalus. Redemonstration of extensive encephalomalacic change in the MCA territory on the left from prior infarct.    This is a preliminary wet read by Dr. Daniel Layton at 2347 hours. Final interpretation will be provided by neuroradiology. Please refer to final interpretation for detailed findings and any further recommendations.    FINDINGS: Final interpretation    There is no MR evidence of acute ischemia or other acute restricted diffusion. Large area of left temporal and  occipital encephalomalacia is unchanged since the prior MRI as is some minimal additional nonspecific white matter change. There is no  hydrocephalus or extra-axial fluid collection flow voids are unchanged since the prior exam. There is no evidence of intracranial mass.    The extracranial soft tissues are unremarkable.      Impression:       Final interpretation    Chronic changes, no acute abnormality. No interval change since April 2018    Signer Name: William Aleman MD   Signed: 10/9/2019 5:52 AM   Workstation Name: [id number]    [facility]            Imaging:        Assessment: 58-year-old male with complete occlusion of the abdominal aorta and a recent seizure partially worked up.      Plan:  Long discussion with the patient and with Dr. Montana of the anesthesia department in regard to the timing of the planned aortobifemoral bypass.  The patient's MRI is negative ever his chest x-ray does show elevation of the right hemidiaphragm and some basilar atelectasis.  Dr. Montana and I feel that further evaluation and work-up will be necessary before we proceed with aortobifemoral bypass.  I have discussed this in detail with the patient.  He understands and is agreeable with the current plan.  I will convert his admission to a standard admission and will tentatively reschedule his surgery for Friday.  In the interim we will obtain a CT scan of the chest with contrast.  Will also pain a neurology consult for clearance for general anesthetic. I have reviewed, verified, and confirmed the above history and current status.  I have examined the patient and confirmed the above physical findings.Above plan and treatment regimen discussed in detail with patient.  Options of treatment, attendant risks vs benefits, and my recommendations were discussed and all questions answered.      Dylan Weaver MD  10/09/19  8:19 AM    Electronically signed by Dylan Weaver MD at 10/09/19 3226           Consult Notes (all)      Christiano Pittman MD at 10/09/19 0945      Consult Orders    1. Inpatient Neurology Consult Stroke [[id number]] ordered by Queenie Rosa APRN at 10/08/19 8562                Neurology    Referring provider:   Holly Kimbrough MD  [address]  [address] [address]    Reason for Consultation: Breakthrough seizures    Chief complaint: Seizure    History of present illness: 58-year-old man seen at the request of Dr. Hodges for evaluation of seizure.    He is known to me from previous admission with a large left occipitoparietal infarct on the left and a carotid endarterectomy in the left.    He has a known 50 to 70% right carotid stenosis which is asymptomatic.    Yesterday he had a sense of malaise followed by focal jerking of his right arm which is usually a herald grand mal seizure.    He had some mild confusion was on and was unable to stand.  On arrival to the local emergency room in [place] he had a grand mal seizure witnessed by the ER doctor and discussed with me personally.    He apparently got a milligram of Ativan and required a second milligram.    He was given the thousand milligrams of extra Keppra at my request at that point.    He subsequently was confused and has now returned back to normal.    He has not had a seizure since May 2018.    He is on Vimpat 50 mg twice daily and is followed by Ms. Haas with Dr. Marley.  He is also on Keppra 1500 mg twice daily.    He is faithful with his medications.    He was quite anxious about impending surgery today which he attributes as the cause for his symptoms.    He is usually on aspirin and Plavix.    The Plavix has been held for his surgery.    He is on high-dose Lipitor.        Review of Systems: He is a known diabetic with dyslipidemia, GERD ischemic changes in his right leg with occluded distal aorta for which she is going to be operated on Friday according to the patient.    All other systems  are reviewed and are negative.        Home meds:   Medications Prior to Admission   Medication Sig Dispense Refill Last Dose   • amLODIPine (NORVASC) 10 MG tablet Take 1 tablet by mouth Daily. 30 tablet 5 10/8/2019 at Unknown time   • aspirin  MG tablet Take 1 tablet by mouth Daily. 30 tablet 5 10/8/2019 at Unknown time   • atorvastatin (LIPITOR) 80 MG tablet Take 1 tablet by mouth Every Night. 30 tablet 5 10/7/2019 at Unknown time   • clopidogrel (PLAVIX) 75 MG tablet Take 1 tablet by mouth Daily. 30 tablet 5 Past Week at Unknown time   • hydrochlorothiazide (HYDRODIURIL) 12.5 MG tablet Take 1 tablet by mouth Daily. 30 tablet 6 10/8/2019 at Unknown time   • lansoprazole (PREVACID) 15 MG capsule Take 1 capsule by mouth Daily. 30 capsule 5 10/8/2019 at Unknown time   • levETIRAcetam (KEPPRA) 500 MG tablet Take 1500 mg  tablet 5 10/8/2019 at Unknown time   • losartan (COZAAR) 100 MG tablet Take 0.5 tablets by mouth 2 (Two) Times a Day. 60 tablet 5 10/8/2019 at Unknown time   • metFORMIN (GLUCOPHAGE) 500 MG tablet Take 2 tablets by mouth 2 (Two) Times a Day With Meals. 120 tablet 5 10/8/2019 at Unknown time   • metoprolol succinate XL (TOPROL XL) 200 MG 24 hr tablet Take 1 tablet by mouth Daily. 30 tablet 6 10/8/2019 at Unknown time   • omeprazole (priLOSEC) 20 MG capsule Take 20 mg by mouth Daily.   10/8/2019 at Unknown time   • VIMPAT 50 MG tablet tablet TAKE ONE TABLET BY MOUTH EVERY 12 HOURS 60 tablet 0 10/8/2019 at Unknown time   • carbamide peroxide (DEBROX) 6.5 % otic solution Administer 5 drops into both ears 2 (Two) Times a Day. 22 mL 0 Not Taking       History  Past Medical History:   Diagnosis Date   • Arthritis    • Carotid artery disease (CMS/HCC)    • Carotid artery disorder (CMS/HCC)    • Depression    • Diabetes mellitus (CMS/HCC)     DIAGNOSED 4-2017 CHECKS FSBG 3X/WEEK    • Ear infection     about 2 weeks ago- cleared up - wax removed and cleaned out    • GERD (gastroesophageal reflux  disease)     self diagnosed takes otc ppi   • Hyperlipidemia    • Hypertension    • Hypertension    • Inguinal hernia    • Seizure (CMS/HCC)     last 10/8/2019   • Shoulder pain     bilat    • Stroke (CMS/HCC) 2017    EXPRESSIVE APHASIA RESIDUAL    • Tooth loose     5 - all over- will get teeth done after this surgery    • Wears reading eyeglasses    ,   Past Surgical History:   Procedure Laterality Date   • ABDOMINAL HERNIA REPAIR     • CAROTID ENDARTERECTOMY Left 10/17/2017    Procedure: CAROTID ENDARTERECTOMY LEFT;  Surgeon: Alexx Bach MD;  [facility];  Service:    • INGUINAL HERNIA REPAIR Left    ,   Family History   Adopted: Yes   Problem Relation Age of Onset   • Diabetes Mother    • COPD Father    ,   Social History     Tobacco Use   • Smoking status: Former Smoker     Packs/day: 0.05     Years: 40.00     Pack years: 2.00     Types: Cigarettes     Last attempt to quit: 2017     Years since quittin.4   • Smokeless tobacco: Former User     Types: Snuff   • Tobacco comment: quit snuff when 18 or 19 years old - only did for baseball - 2-3 years    Substance Use Topics   • Alcohol use: No   • Drug use: No    and Allergies:  Patient has no known allergies.,    Vital Signs   Blood pressure 130/78, pulse 63, temperature 98.3 °F (36.8 °C), temperature source Oral, resp. rate 17, SpO2 97 %.  There is no height or weight on file to calculate BMI.    Physical Exam:   General: Pleasant white male in no distress              Head: No evidence of trauma              Neck: No bruit              Resp: Normal breath sounds              Cor: Regular rhythm              Extremities: No edema              Skin: Warm and dry              Neuro: Mentally alert and oriented with normal memory attention and concentration.    His speech is normal with no receptive problems.  He has no dysarthria.    Coordination is normal and finger-nose testing.    Cranial nerves show benign fundi with right  visual field cut homonymous hemianopsia.  Eye movements are full.    Facial movement and sensation are normal.    Palate elevates normally tongue protrudes normally.    Reflexes are 3+ and slightly more on the right than the left.    Motor testing shows normal power and tone in all muscle groups.    Sensory testing is normal including double simultaneous stimulation    Results Review: MRI of the brain is personally reviewed and shows a large old left occipital parietal infarct.  There is no evidence of new infarct.        Labs:  Lab Results (last 72 hours)     Procedure Component Value Units Date/Time    POC Glucose Once [[id number]]  (Abnormal) Collected:  10/09/19 0835    Specimen:  Blood Updated:  10/09/19 0836     Glucose 138 mg/dL     POC Glucose Once [[id number]]  (Abnormal) Collected:  10/09/19 0533    Specimen:  Blood Updated:  10/09/19 0534     Glucose 133 mg/dL     POC Glucose Once [[id number]]  (Abnormal) Collected:  10/08/19 2157    Specimen:  Blood Updated:  10/08/19 2158     Glucose 135 mg/dL           Rads:  Imaging Results (last 72 hours)     Procedure Component Value Units Date/Time    MRI Brain Without Contrast [[id number]] Collected:  10/08/19 2353     Updated:  10/09/19 0554    Narrative:       MRI Brain WO     INDICATION:    Epilepsy. Right-sided weakness and confusion. Seizure today.    TECHNIQUE: Routine brain MRI without contrast.    COMPARISON: Previous brain MRI April 2018.    Brain MRI: Preliminary report    No hemorrhage. No acute stroke. No mass lesion. No significant hydrocephalus. Redemonstration of extensive encephalomalacic change in the MCA territory on the left from prior infarct.    This is a preliminary wet read by Dr. Daniel Layton at 2347 hours. Final interpretation will be provided by neuroradiology. Please refer to final interpretation for detailed findings and any further recommendations.    FINDINGS: Final interpretation    There is no MR evidence of acute ischemia or other  acute restricted diffusion. Large area of left temporal and occipital encephalomalacia is unchanged since the prior MRI as is some minimal additional nonspecific white matter change. There is no  hydrocephalus or extra-axial fluid collection flow voids are unchanged since the prior exam. There is no evidence of intracranial mass.    The extracranial soft tissues are unremarkable.      Impression:       Final interpretation    Chronic changes, no acute abnormality. No interval change since April 2018    Signer Name: William Aleman MD   Signed: 10/9/2019 5:52 AM   Workstation Name: [facility]    [facility]            Assessment: Focal motor seizures with secondary generalization, breakthrough    Remote left occipitoparietal stroke.    Asymptomatic right carotid stenosis       Plan:    Increase Vimpat 200 mg twice daily with an extra 50 mg dose this morning.    Patient is okay for surgery from a neurologic standpoint.    Comment:   Given the patient's carotid stenosis on the right, great care should be taken to avoid hypotension intraoperatively.    The Plavix should be resumed postop as soon as feasible and at Dr. Weaver discretion.    I discussed the patients findings and my recommendations with patient and family      Christiano Pittman MD  10/09/19  9:46 AM          Electronically signed by Christiano Pittman MD at 10/09/19 0960

## 2019-10-09 NOTE — CONSULTS
Neurology    Referring provider:   Holly Kimbrough MD  [address] [facility]  [address] [place] [address]    Reason for Consultation: Breakthrough seizures    Chief complaint: Seizure    History of present illness: 58-year-old man seen at the request of Dr. Hodges for evaluation of seizure.    He is known to me from previous admission with a large left occipitoparietal infarct on the left and a carotid endarterectomy in the left.    He has a known 50 to 70% right carotid stenosis which is asymptomatic.    Yesterday he had a sense of malaise followed by focal jerking of his right arm which is usually a herald grand mal seizure.    He had some mild confusion was on and was unable to stand.  On arrival to the local emergency room in [place] he had a grand mal seizure witnessed by the ER doctor and discussed with me personally.    He apparently got a milligram of Ativan and required a second milligram.    He was given the thousand milligrams of extra Keppra at my request at that point.    He subsequently was confused and has now returned back to normal.    He has not had a seizure since May 2018.    He is on Vimpat 50 mg twice daily and is followed by Ms. Haas with Dr. Marley.  He is also on Keppra 1500 mg twice daily.    He is faithful with his medications.    He was quite anxious about impending surgery today which he attributes as the cause for his symptoms.    He is usually on aspirin and Plavix.    The Plavix has been held for his surgery.    He is on high-dose Lipitor.        Review of Systems: He is a known diabetic with dyslipidemia, GERD ischemic changes in his right leg with occluded distal aorta for which she is going to be operated on Friday according to the patient.    All other systems are reviewed and are negative.        Home meds:   Medications Prior to Admission   Medication Sig Dispense Refill Last Dose   • amLODIPine (NORVASC) 10 MG tablet Take 1 tablet by mouth Daily. 30 tablet 5  10/8/2019 at Unknown time   • aspirin  MG tablet Take 1 tablet by mouth Daily. 30 tablet 5 10/8/2019 at Unknown time   • atorvastatin (LIPITOR) 80 MG tablet Take 1 tablet by mouth Every Night. 30 tablet 5 10/7/2019 at Unknown time   • clopidogrel (PLAVIX) 75 MG tablet Take 1 tablet by mouth Daily. 30 tablet 5 Past Week at Unknown time   • hydrochlorothiazide (HYDRODIURIL) 12.5 MG tablet Take 1 tablet by mouth Daily. 30 tablet 6 10/8/2019 at Unknown time   • lansoprazole (PREVACID) 15 MG capsule Take 1 capsule by mouth Daily. 30 capsule 5 10/8/2019 at Unknown time   • levETIRAcetam (KEPPRA) 500 MG tablet Take 1500 mg  tablet 5 10/8/2019 at Unknown time   • losartan (COZAAR) 100 MG tablet Take 0.5 tablets by mouth 2 (Two) Times a Day. 60 tablet 5 10/8/2019 at Unknown time   • metFORMIN (GLUCOPHAGE) 500 MG tablet Take 2 tablets by mouth 2 (Two) Times a Day With Meals. 120 tablet 5 10/8/2019 at Unknown time   • metoprolol succinate XL (TOPROL XL) 200 MG 24 hr tablet Take 1 tablet by mouth Daily. 30 tablet 6 10/8/2019 at Unknown time   • omeprazole (priLOSEC) 20 MG capsule Take 20 mg by mouth Daily.   10/8/2019 at Unknown time   • VIMPAT 50 MG tablet tablet TAKE ONE TABLET BY MOUTH EVERY 12 HOURS 60 tablet 0 10/8/2019 at Unknown time   • carbamide peroxide (DEBROX) 6.5 % otic solution Administer 5 drops into both ears 2 (Two) Times a Day. 22 mL 0 Not Taking       History  Past Medical History:   Diagnosis Date   • Arthritis    • Carotid artery disease (CMS/HCC)    • Carotid artery disorder (CMS/HCC)    • Depression    • Diabetes mellitus (CMS/HCC)     DIAGNOSED 4-2017 CHECKS FSBG 3X/WEEK    • Ear infection     about 2 weeks ago- cleared up - wax removed and cleaned out    • GERD (gastroesophageal reflux disease)     self diagnosed takes otc ppi   • Hyperlipidemia    • Hypertension    • Hypertension    • Inguinal hernia    • Seizure (CMS/HCC)     last 10/8/2019   • Shoulder pain     bilat    • Stroke  (CMS/[facility]) 2017    EXPRESSIVE APHASIA RESIDUAL    • Tooth loose     5 - all over- will get teeth done after this surgery    • Wears reading eyeglasses    ,   Past Surgical History:   Procedure Laterality Date   • ABDOMINAL HERNIA REPAIR     • CAROTID ENDARTERECTOMY Left 10/17/2017    Procedure: CAROTID ENDARTERECTOMY LEFT;  Surgeon: Alexx Bach MD;  Location: [facility];  Service:    • INGUINAL HERNIA REPAIR Left    ,   Family History   Adopted: Yes   Problem Relation Age of Onset   • Diabetes Mother    • COPD Father    ,   Social History     Tobacco Use   • Smoking status: Former Smoker     Packs/day: 0.05     Years: 40.00     Pack years: 2.00     Types: Cigarettes     Last attempt to quit: 2017     Years since quittin.4   • Smokeless tobacco: Former User     Types: Snuff   • Tobacco comment: quit snuff when 18 or 19 years old - only did for baseball - 2-3 years    Substance Use Topics   • Alcohol use: No   • Drug use: No    and Allergies:  Patient has no known allergies.,    Vital Signs   Blood pressure 130/78, pulse 63, temperature 98.3 °F (36.8 °C), temperature source Oral, resp. rate 17, SpO2 97 %.  There is no height or weight on file to calculate BMI.    Physical Exam:   General: Pleasant white male in no distress              Head: No evidence of trauma              Neck: No bruit              Resp: Normal breath sounds              Cor: Regular rhythm              Extremities: No edema              Skin: Warm and dry              Neuro: Mentally alert and oriented with normal memory attention and concentration.    His speech is normal with no receptive problems.  He has no dysarthria.    Coordination is normal and finger-nose testing.    Cranial nerves show benign fundi with right visual field cut homonymous hemianopsia.  Eye movements are full.    Facial movement and sensation are normal.    Palate elevates normally tongue protrudes normally.    Reflexes are 3+ and slightly  more on the right than the left.    Motor testing shows normal power and tone in all muscle groups.    Sensory testing is normal including double simultaneous stimulation    Results Review: MRI of the brain is personally reviewed and shows a large old left occipital parietal infarct.  There is no evidence of new infarct.        Labs:  Lab Results (last 72 hours)     Procedure Component Value Units Date/Time    POC Glucose Once [[id number]]  (Abnormal) Collected:  10/09/19 0835    Specimen:  Blood Updated:  10/09/19 0836     Glucose 138 mg/dL     POC Glucose Once [[id number]]  (Abnormal) Collected:  10/09/19 0533    Specimen:  Blood Updated:  10/09/19 0534     Glucose 133 mg/dL     POC Glucose Once [[id number]]  (Abnormal) Collected:  10/08/19 2157    Specimen:  Blood Updated:  10/08/19 2158     Glucose 135 mg/dL           Rads:  Imaging Results (last 72 hours)     Procedure Component Value Units Date/Time    MRI Brain Without Contrast [[id number]] Collected:  10/08/19 2353     Updated:  10/09/19 0554    Narrative:       MRI Brain WO     INDICATION:    Epilepsy. Right-sided weakness and confusion. Seizure today.    TECHNIQUE: Routine brain MRI without contrast.    COMPARISON: Previous brain MRI April 2018.    Brain MRI: Preliminary report    No hemorrhage. No acute stroke. No mass lesion. No significant hydrocephalus. Redemonstration of extensive encephalomalacic change in the MCA territory on the left from prior infarct.    This is a preliminary wet read by Dr. Daniel Layton at 2347 hours. Final interpretation will be provided by neuroradiology. Please refer to final interpretation for detailed findings and any further recommendations.    FINDINGS: Final interpretation    There is no MR evidence of acute ischemia or other acute restricted diffusion. Large area of left temporal and occipital encephalomalacia is unchanged since the prior MRI as is some minimal additional nonspecific white matter change. There is  no  hydrocephalus or extra-axial fluid collection flow voids are unchanged since the prior exam. There is no evidence of intracranial mass.    The extracranial soft tissues are unremarkable.      Impression:       Final interpretation    Chronic changes, no acute abnormality. No interval change since April 2018    Signer Name: William Aleman MD   Signed: 10/9/2019 5:52 AM   Workstation Name: [email or web address]    [facility]            Assessment: Focal motor seizures with secondary generalization, breakthrough    Remote left occipitoparietal stroke.    Asymptomatic right carotid stenosis       Plan:    Increase Vimpat 200 mg twice daily with an extra 50 mg dose this morning.    Patient is okay for surgery from a neurologic standpoint.    Comment:   Given the patient's carotid stenosis on the right, great care should be taken to avoid hypotension intraoperatively.    The Plavix should be resumed postop as soon as feasible and at Dr. Weaver discretion.    I discussed the patients findings and my recommendations with patient and family      Christiano Pittman MD  10/09/19  9:46 AM

## 2019-10-09 NOTE — H&P
[facility]  HISTORY AND PHYSICAL    Patient Name: Vince Olivera  : 1960  MRN: [id number]  Primary Care Physician: Fortunato Marvin MD  Date of admission: 10/8/2019      Subjective   Subjective     Chief Complaint:  Seizure activity, right sided weakness, confusion     HPI:  Vince Olivera is a 58 y.o. male with PMH significant for Carotid artery disease s/p Left CEA, CVA , seizure disorder, DM2, HLD, HTN, GERD, and PAD who originally presented to [facility] with complaint of seizure with right sided weakness and confusion.   He states that he was getting ready to come to [place] for pre-admission testing for an aortic bifemoral bypass that was originally scheduled for tomorrow. His wife states that as he was getting ready, he had a seizure that involved his right arm and then he became confused and had garbled speech. Prior to that event, his seizures had been well controlled with his last one reported to be May 2018.   Initially, upon arrival to [facility], his NIH was 18 and he was nonverbal. Shortly after, he had a grand mal seizure that required a total of 2mg of ativan to resolve. He then became postictal and no repeat NIH was able to be performed. CT head was negative for acute findings. Decision was made to transfer to [facility] for further evaluation. He was given Keppra 1g PTA.   Upon arrival to [facility], pt has now returned to baseline. He no longer has any right sided weakness, and speech is at baseline. He will be admitted to Hospital Medicine for further evaluation.     Review of Systems   Constitutional: Negative for activity change, appetite change, fatigue and fever.   HENT: Negative.    Eyes: Positive for visual disturbance (chronic ).   Respiratory: Negative for cough, chest tightness, shortness of breath and wheezing.    Cardiovascular: Negative for chest pain, palpitations and leg swelling.   Gastrointestinal: Negative for abdominal distention,  abdominal pain, constipation, diarrhea, nausea and vomiting.   Genitourinary: Negative for difficulty urinating, dysuria, frequency and urgency.   Musculoskeletal: Negative for arthralgias, gait problem, myalgias and neck pain.   Skin: Negative for color change, pallor and rash.   Neurological: Positive for seizures, speech difficulty and weakness. Negative for dizziness, light-headedness and headaches.   Psychiatric/Behavioral: Positive for confusion. The patient is nervous/anxious.         All other systems reviewed and are negative.     Personal History     Past Medical History:   Diagnosis Date   • Arthritis    • Carotid artery disease (CMS/HCC)    • Carotid artery disorder (CMS/HCC)    • Depression    • Diabetes mellitus (CMS/HCC)     DIAGNOSED 4-2017 CHECKS FSBG 3X/WEEK    • Ear infection     about 2 weeks ago- cleared up - wax removed and cleaned out    • GERD (gastroesophageal reflux disease)     self diagnosed takes otc ppi   • Hyperlipidemia    • Hypertension    • Hypertension    • Inguinal hernia    • Seizure (CMS/HCC)    • Shoulder pain     bilat    • Stroke (CMS/HCC) 04/24/2017    EXPRESSIVE APHASIA RESIDUAL    • Tooth loose     5 - all over- will get teeth done after this surgery    • Wears reading eyeglasses        Past Surgical History:   Procedure Laterality Date   • ABDOMINAL HERNIA REPAIR  2010   • CAROTID ENDARTERECTOMY Left 10/17/2017    Procedure: CAROTID ENDARTERECTOMY LEFT;  Surgeon: Alexx Bach MD;  Location: [facility];  Service:    • CAROTID ENDARTERECTOMY Left 10/17/2017   • INGUINAL HERNIA REPAIR Left        Family History: family history includes COPD in his father; Diabetes in his mother. He was adopted. Otherwise pertinent FHx was reviewed and unremarkable.     Social History:  reports that he quit smoking about 2 years ago. His smoking use included cigarettes. He has a 2.00 pack-year smoking history. He has quit using smokeless tobacco. His smokeless tobacco use included  snuff. He reports that he does not drink alcohol or use drugs.  Social History     Social History Narrative    Patient consumes 0-1serving of caffeine daily.     Patient lives at home with wife in [facility]       Medications:    Available home medication information reviewed.  Medications Prior to Admission   Medication Sig Dispense Refill Last Dose   • amLODIPine (NORVASC) 10 MG tablet Take 1 tablet by mouth Daily. 30 tablet 5 10/8/2019 at Unknown time   • aspirin  MG tablet Take 1 tablet by mouth Daily. 30 tablet 5 10/8/2019 at Unknown time   • atorvastatin (LIPITOR) 80 MG tablet Take 1 tablet by mouth Every Night. 30 tablet 5 10/7/2019 at Unknown time   • clopidogrel (PLAVIX) 75 MG tablet Take 1 tablet by mouth Daily. 30 tablet 5 Past Week at Unknown time   • hydrochlorothiazide (HYDRODIURIL) 12.5 MG tablet Take 1 tablet by mouth Daily. 30 tablet 6 10/8/2019 at Unknown time   • lansoprazole (PREVACID) 15 MG capsule Take 1 capsule by mouth Daily. 30 capsule 5 10/8/2019 at Unknown time   • levETIRAcetam (KEPPRA) 500 MG tablet Take 1500 mg  tablet 5 10/8/2019 at Unknown time   • losartan (COZAAR) 100 MG tablet Take 0.5 tablets by mouth 2 (Two) Times a Day. 60 tablet 5 10/8/2019 at Unknown time   • metFORMIN (GLUCOPHAGE) 500 MG tablet Take 2 tablets by mouth 2 (Two) Times a Day With Meals. 120 tablet 5 10/8/2019 at Unknown time   • metoprolol succinate XL (TOPROL XL) 200 MG 24 hr tablet Take 1 tablet by mouth Daily. 30 tablet 6 10/8/2019 at Unknown time   • omeprazole (priLOSEC) 20 MG capsule Take 20 mg by mouth Daily.   10/8/2019 at Unknown time   • VIMPAT 50 MG tablet tablet TAKE ONE TABLET BY MOUTH EVERY 12 HOURS 60 tablet 0 10/8/2019 at Unknown time   • carbamide peroxide (DEBROX) 6.5 % otic solution Administer 5 drops into both ears 2 (Two) Times a Day. 22 mL 0 Not Taking       No Known Allergies    Objective   Objective     Vital Signs:   Temp:  [98.3 °F (36.8 °C)-98.5 °F (36.9 °C)] 98.5 °F  (36.9 °C)  Heart Rate:  [60-88] 88  Resp:  [18] 18  BP: ()/(60-92) 117/84        Physical Exam   Constitutional: He appears well-developed and well-nourished. No distress.   HENT:   Head: Normocephalic and atraumatic.   Eyes: Pupils are equal, round, and reactive to light.   Neck: Normal range of motion. Neck supple. No JVD present.   Cardiovascular: Normal rate, regular rhythm, normal heart sounds and intact distal pulses. Exam reveals no gallop and no friction rub.   No murmur heard.  Pulmonary/Chest: Effort normal and breath sounds normal. No respiratory distress. He has no wheezes. He has no rales.   Abdominal: Soft. Bowel sounds are normal. He exhibits no distension and no mass. There is no tenderness. There is no guarding.   Musculoskeletal: Normal range of motion. He exhibits no edema or tenderness.   Neurological: He is alert.   Right side slightly weaker than left. Pt notes he feels like this is baseline for him    Skin: Skin is warm and dry. No rash noted. No erythema.   Psychiatric: He has a normal mood and affect. His behavior is normal.   Vitals reviewed.       Results Reviewed:  I have personally reviewed current lab and radiology data.    Results from last 7 days   Lab Units 10/08/19  0937   WBC 10*3/mm3 13.98*   HEMOGLOBIN g/dL 16.3   HEMATOCRIT % 46.4   PLATELETS 10*3/mm3 274   INR  0.97     Results from last 7 days   Lab Units 10/08/19  0937   SODIUM mmol/L 134*   POTASSIUM mmol/L 3.7   CHLORIDE mmol/L 92*   CO2 mmol/L 23.9   BUN mg/dL 8   CREATININE mg/dL 0.83   GLUCOSE mg/dL 232*   CALCIUM mg/dL 9.7   ALT (SGPT) U/L 26   AST (SGOT) U/L 16   TROPONIN T ng/mL <0.010     Estimated Creatinine Clearance: 102.4 mL/min (by C-G formula based on SCr of 0.83 mg/dL).  Brief Urine Lab Results     None        Imaging Results (last 24 hours)     ** No results found for the last 24 hours. **        Results for orders placed during the hospital encounter of 04/14/18   Adult Transthoracic Echo Complete W/  Cont if Necessary Per Protocol (With Agitated Saline)    Narrative · Left ventricular systolic function is normal. Estimated EF = 65%.  · Left ventricular diastolic dysfunction (grade I a) consistent with   impaired relaxation.  · Saline test results are negative for evidence of a PFO.          Assessment/Plan   Assessment / Plan     Active Hospital Problems    Diagnosis POA   • **CVA (cerebral vascular accident) (CMS/[facility]), rule out  [I63.9] Yes   • PAD (peripheral artery disease) (CMS/[facility]) [I73.9] Yes   • Type 2 diabetes mellitus without complication, without long-term current use of insulin (CMS/[facility]) [E11.9] Yes   • Seizure disorder as sequela of cerebrovascular accident (CMS/[facility]) [I69.398, G40.909] Not Applicable   • Status post left carotid endarterectomy [Z98.890] Not Applicable   • Gastroesophageal reflux disease without esophagitis [K21.9] Yes   • Dyslipidemia [E78.5] Yes   • Bilateral carotid artery stenosis [I65.23] Yes   • Essential hypertension [I10] Yes   • H/o Left MCA ischemic CVA 4/2017 [I63.9] Yes     58 year old male presenting originally to [facility] with complaint of seizure like activity and right sided weakness who was transferred to [facility] for higher level of care     CVA rule out  -neuro checks q 4 hours   -NIHSS q shift  -PT/OT/SLP consult in am   -ECHO in am   -Carotid duplex in am   -Neurology consult in am   -CBC, BMP, lipid panel, HgA1c in am     Seizure disorder   -Additional 1g Keppra tonight, then continue home dose of 1500mg BID tomorrow. Discussed with pharmacy  -continue home dose of Vimpat  -previously well controlled on home medications, last seizure was May 2018  -Neurology consult in am   -EEG in am     PVD  -was scheduled for Ao-femoral bypass surgery in am, will consult Dr Weaver    Dyslipidemia  -Lipid panel in am   -continue high dose atorvastatin     Diabetes Mellitus 2  -FSBG ACHS  -SS insulin   -HgA1c in am     Carotid artery disease  -s/p Left CEA  -carotid duplex in am      History of CVA  -plavix held for the past 5 days in prep for surgery, will continue to hold for now until CT surgery evals  -continue daily ASA     Hypertension   -controlled with amlodipine and losartan  -hold for now pending MRI and CVA rule out   -Plan to continue amlodipine and losartan in am     GERD  -continue PPI    DVT prophylaxis:  Mechanical     CODE STATUS:    Code Status and Medical Interventions:   Ordered at: 10/08/19 2144     Code Status:    CPR     Medical Interventions (Level of Support Prior to Arrest):    Full       Admission Status:  I believe this patient meets OBSERVATION status, however if further evaluation or treatment plans warrant, status may change.  Based upon current information, I predict patient's care encounter to be less than or equal to 2 midnights.    Electronically signed by KINGSTON Baxter, 10/08/19, 9:07 PM.      Brief Attending Admission Attestation     I have seen and examined the patient, performing an independent face-to-face diagnostic evaluation with plan of care reviewed and developed with the advanced practice clinician (APC).      Brief Summary Statement:   Vince Olivera is a 58 y.o. male with PMHx significant for seizure disorder, CAD, PVD s/p left CEA, Prior CVA 2017 with minior residual memory deficits, seizure disorder, DM, HTN, HLD, GERD who presents to [facility] as transfer from [facility] after having two witnessed grand mal seizures, with some associated right sided weakness, slurred speech and confusion. He is supposed to have Ao-femoral bypass surgery tomorrow with Dr. Weaver. Patient's wife mentions that he has had a lot of anxiety surrounding the surgery and she thinks that is what triggered his seizures. He has been well controlled on his home AEDs for over a year. On arrival to [place] he had a witnessed seizures that was stopped with 2mg of ativan. CT head at [place] was negative for acute findings, he was transferred here for further neuro evaluation. His  family reports he has completely returned to his baseline at the time of my evaluation.    Remainder of detailed HPI is as noted above and has been reviewed and/or edited by me for completeness.      Attending Physical Exam:  Constitutional: Awake, alert  Eyes: PERRLA, sclerae anicteric, no conjunctival injection  HENT: NCAT, mucous membranes moist  Neck: Supple, no thyromegaly, no lymphadenopathy, trachea midline  Respiratory: Clear to auscultation bilaterally, nonlabored respirations   Cardiovascular: RRR, no murmurs, rubs, or gallops, palpable pedal pulses bilaterally  Gastrointestinal: Positive bowel sounds, soft, nontender, nondistended  Musculoskeletal: No bilateral ankle edema, no clubbing or cyanosis to extremities  Psychiatric: Appropriate affect, cooperative  Neurologic: Oriented x 3, strength symmetric in all extremities, Cranial Nerves grossly intact to confrontation, speech clear  Skin: No rashes    Brief Assessment/Plan :  See above for further detailed assessment and plan developed with APC which I have reviewed and/or edited for completeness.      Electronically signed by Manda Fernandez DO, 10/08/19, 9:48 PM.

## 2019-10-09 NOTE — PLAN OF CARE
Problem: Patient Care Overview  Goal: Plan of Care Review  Outcome: Ongoing (interventions implemented as appropriate)   10/09/19 1809   Coping/Psychosocial   Plan of Care Reviewed With patient   OTHER   Outcome Summary Pt VSS during shift. Alert and oriented to self, and situation. Disoriented to time and situation. NSR on monitor. O2 above 90% on RA. No c/o pain during shift. Pt had two episodes of increased BG, treated with insulin. Plan for pt is to be NPO after midnight tomorrow. Surgery Friday. CT, echo, carotids done today. Patient denies further needs at this time. Will continue to monitor at 1812 on 10/9/19.

## 2019-10-09 NOTE — THERAPY DISCHARGE NOTE
Patient Name: Vince Olivera  : 1960    MRN: [id number]                              Today's Date: 10/9/2019       Admit Date: 10/8/2019    Visit Dx:     ICD-10-CM ICD-9-CM   1. Aortic occlusion (CMS/[facility]) I74.10 747.22     Patient Active Problem List   Diagnosis   • H/o Left MCA ischemic CVA 2017   • Essential hypertension   • Dyslipidemia   • Bilateral carotid artery stenosis   • Residual mild expressive Aphasia   • Gastroesophageal reflux disease without esophagitis   • Combined receptive and expressive aphasia due to cerebrovascular accident   • Abnormal peripheral vision of right eye   • GERD (gastroesophageal reflux disease)   • Hyperlipidemia   • Palpitations   • Bilateral Carotid artery stenosis. Left greater than right    • Internal carotid artery stenosis, left   • Status post left carotid endarterectomy   • Seizure disorder as sequela of cerebrovascular accident (CMS/[facility])   • Type 2 diabetes mellitus without complication, without long-term current use of insulin (CMS/[facility])   • Bilateral leg and foot pain   • Bilateral carpal tunnel syndrome   • PAD (peripheral artery disease) (CMS/[facility])   • CVA (cerebral vascular accident) (CMS/[facility]), rule out    • Breakthrough seizure (CMS/[facility])   • Aortic occlusion (CMS/[facility])     Past Medical History:   Diagnosis Date   • Arthritis    • Carotid artery disease (CMS/[facility])    • Carotid artery disorder (CMS/[facility])    • Depression    • Diabetes mellitus (CMS/[facility])     DIAGNOSED  CHECKS FSBG 3X/WEEK    • Ear infection     about 2 weeks ago- cleared up - wax removed and cleaned out    • GERD (gastroesophageal reflux disease)     self diagnosed takes otc ppi   • Hyperlipidemia    • Hypertension    • Hypertension    • Inguinal hernia    • Seizure (CMS/[facility])     last 10/8/2019   • Shoulder pain     bilat    • Stroke (CMS/[facility]) 2017    EXPRESSIVE APHASIA RESIDUAL    • Tooth loose     5 - all over- will get teeth done after this surgery    • Wears reading eyeglasses       Past Surgical History:   Procedure Laterality Date   • ABDOMINAL HERNIA REPAIR  2010   • CAROTID ENDARTERECTOMY Left 10/17/2017    Procedure: CAROTID ENDARTERECTOMY LEFT;  Surgeon: Alexx Bach MD;  Location: [facility];  Service:    • INGUINAL HERNIA REPAIR Left      General Information     Row Name 10/09/19 1329          PT Evaluation Time/Intention    Document Type  evaluation;discharge evaluation/summary  -LS     Mode of Treatment  physical therapy  -LS     Row Name 10/09/19 1329          General Information    Patient Profile Reviewed?  yes  -LS     Prior Level of Function  independent:;gait;transfer;ADL's;bathing;dressing  -LS     Existing Precautions/Restrictions  seizures hx remote CVA with residual speech deficits  -LS     Row Name 10/09/19 1329          Relationship/Environment    Lives With  significant other  -LS     Row Name 10/09/19 1329          Resource/Environmental Concerns    Current Living Arrangements  home/apartment/condo  -LS     Row Name 10/09/19 1329          Home Main Entrance    Number of Stairs, Main Entrance  one  -LS     Row Name 10/09/19 1329          Cognitive Assessment/Intervention- PT/OT    Orientation Status (Cognition)  oriented x 4  -LS       User Key  (r) = Recorded By, (t) = Taken By, (c) = Cosigned By    Initials Name Provider Type    LS Rae Stoddard, PT Physical Therapist        Mobility     Row Name 10/09/19 1330          Bed Mobility Assessment/Treatment    Bed Mobility Assessment/Treatment  supine-sit;sit-supine  -LS     Supine-Sit [place] (Bed Mobility)  conditional independence  -LS     Sit-Supine [place] (Bed Mobility)  conditional independence  -LS     Row Name 10/09/19 1330          Sit-Stand Transfer    Sit-Stand [place] (Transfers)  independent  -LS     Row Name 10/09/19 1330          Gait/Stairs Assessment/Training    [place] Level (Gait)  independent  -LS     Distance in Feet (Gait)  400  -LS     Comment (Gait/Stairs)  Cues  for slow pace for safety; no noted LOB. Pt reports quality of gait at baseline.   -       User Key  (r) = Recorded By, (t) = Taken By, (c) = Cosigned By    Initials Name Provider Type    Rae Mahoney, PT Physical Therapist        Obj/Interventions     Row Name 10/09/19 1331          General ROM    GENERAL ROM COMMENTS  BLE WFL; heel-shin slides WFL for gross motor coordination  -     Row Name 10/09/19 1331          MMT (Manual Muscle Testing)    General MMT Comments  BLE grossly 4+/5  -     Row Name 10/09/19 1331          Static Sitting Balance    Level of [place] (Unsupported Sitting, Static Balance)  independent  -     Row Name 10/09/19 1331          Static Standing Balance    Level of [place] (Supported Standing, Static Balance)  independent  -     Row Name 10/09/19 1331          Dynamic Standing Balance    Level of [place], Reaches Outside Midline (Standing, Dynamic Balance)  independent  -     Comment, Reaches Outside Midline (Standing, Dynamic Balance)  backwards walking  -     Row Name 10/09/19 1331          Sensory Assessment/Intervention    Sensory General Assessment  no sensation deficits identified BLE  -       User Key  (r) = Recorded By, (t) = Taken By, (c) = Cosigned By    Initials Name Provider Type    Rae Mahoney, PT Physical Therapist        Goals/Plan    No documentation.       Clinical Impression     Row Name 10/09/19 1332          Pain Assessment    Additional Documentation  Pain Scale: Numbers Pre/Post-Treatment (Group)  -[facility] Name 10/09/19 1332          Pain Scale: Numbers Pre/Post-Treatment    Pain Scale: Numbers, Pretreatment  0/10 - no pain  -     Pain Scale: Numbers, Post-Treatment  0/10 - no pain  -     Row Name 10/09/19 1332          Plan of Care Review    Plan of Care Reviewed With  patient;significant other  -     Row Name 10/09/19 1332          Physical Therapy Clinical Impression    Patient/Family Goals Statement (PT Clinical  Impression)  return to PLOF; go home  -LS     Criteria for Skilled Interventions Met (PT Clinical Impression)  no;no problems identified which require skilled intervention;current level of function same as previous level of function  -LS     Row Name 10/09/19 1332          Vital Signs    Pre Systolic BP Rehab  138  -LS     Pre Treatment Diastolic BP  77  -LS     Post Systolic BP Rehab  -- staff present to initiate EEG  -LS     Pretreatment Heart Rate (beats/min)  66  -LS     Posttreatment Heart Rate (beats/min)  70  -LS     Pre SpO2 (%)  96  -LS     O2 Delivery Pre Treatment  room air  -LS     O2 Delivery Intra Treatment  room air  -LS     Post SpO2 (%)  97  -LS     O2 Delivery Post Treatment  room air  -LS     Pre Patient Position  Supine  -LS     Intra Patient Position  Standing  -LS     Post Patient Position  Supine  -LS     Row Name 10/09/19 1332          Positioning and Restraints    Pre-Treatment Position  in bed  -LS     Post Treatment Position  bed  -LS     In Bed  notified nsg;fowlers;call light within reach;encouraged to call for assist;with other staff;with family/caregiver  -       User Key  (r) = Recorded By, (t) = Taken By, (c) = Cosigned By    Initials Name Provider Type    LS Rae Stoddard, PT Physical Therapist        Outcome Measures     Row Name 10/09/19 8067          How much help from another person do you currently need...    Turning from your back to your side while in flat bed without using bedrails?  4  -LS     Moving from lying on back to sitting on the side of a flat bed without bedrails?  4  -LS     Moving to and from a bed to a chair (including a wheelchair)?  4  -LS     Standing up from a chair using your arms (e.g., wheelchair, bedside chair)?  4  -LS     Climbing 3-5 steps with a railing?  4  -LS     To walk in hospital room?  4  -LS     AM-PAC 6 Clicks Score (PT)  24  -LS     Row Name 10/09/19 9027          Modified [place] Scale    Pre-Stroke Modified [place] Scale  1 - No  significant disability despite symptoms.  Able to carry out all usual duties and activities. speech/word finding deficits at baseline due to hx CVA  -LS     Modified [place] Scale  1 - No significant disability despite symptoms.  Able to carry out all usual duties and activities.  -     Row Name 10/09/19 1337          Functional Assessment    Outcome Measure Options  AM-PAC 6 Clicks Basic Mobility (PT)  -       User Key  (r) = Recorded By, (t) = Taken By, (c) = Cosigned By    Initials Name Provider Type     Rae Stoddard, PT Physical Therapist        Physical Therapy Education     Title: PT OT SLP Therapies (In Progress)     Topic: Physical Therapy (In Progress)     Point: Mobility training (Done)     Learning Progress Summary           Patient Acceptance, E,D, VU,DU by  at 10/9/2019  1:35 PM   Significant Other Acceptance, E,D, VU,DU by  at 10/9/2019  1:35 PM                   Point: Body mechanics (Done)     Learning Progress Summary           Patient Acceptance, E,D, VU,DU by  at 10/9/2019  1:35 PM   Significant Other Acceptance, E,D, VU,DU by  at 10/9/2019  1:35 PM                   Point: Precautions (Done)     Learning Progress Summary           Patient Acceptance, E,D, VU,DU by  at 10/9/2019  1:35 PM   Significant Other Acceptance, E,D, VU,DU by  at 10/9/2019  1:35 PM                               User Key     Initials Effective Dates Name Provider Type [facility] 06/19/15 -  Rae Stoddard, PT Physical Therapist PT              PT Recommendation and Plan     Outcome Summary/Treatment Plan (PT)  Anticipated Discharge Disposition (PT): home with assist  Plan of Care Reviewed With: patient, significant other  Outcome Summary: PT initial evaluation completed. No further IP PT goals established as pt demonstrates safety and appears to be at baseline re: strength and functional mobility. Edu pt/S.O. re: home safety and benefit of further gait with NSG. Will d/c PT at this time.      Time  Calculation:   PT Charges     Row Name 10/09/19 1303             Time Calculation    Start Time  1303  -LS      PT Received On  10/09/19  -        User Key  (r) = Recorded By, (t) = Taken By, (c) = Cosigned By    Initials Name Provider Type    Rae Mahoney, PT Physical Therapist        Therapy Charges for Today     Code Description Service Date Service Provider Modifiers Qty    07889626501 HC PT EVAL LOW COMPLEXITY 4 10/9/2019 Rae Stoddard, PT GP 1          PT G-Codes  Outcome Measure Options: AM-PAC 6 Clicks Basic Mobility (PT)  AM-PAC 6 Clicks Score (PT): 24  Modified [place] Scale: 1 - No significant disability despite symptoms.  Able to carry out all usual duties and activities.    PT Discharge Summary  Anticipated Discharge Disposition (PT): home with assist    Rae Stoddard, PT  10/9/2019

## 2019-10-09 NOTE — PLAN OF CARE
Problem: Diabetes, Type 2 (Adult)  Goal: Signs and Symptoms of Listed Potential Problems Will be Absent, Minimized or Managed (Diabetes, Type 2)  Outcome: Ongoing (interventions implemented as appropriate)   10/09/19 1809   Goal/Outcome Evaluation   Problems Assessed (Type 2 Diabetes) all   Problems Present (Type 2 Diabetes) hyperglycemia       Problem: Seizure Disorder/Epilepsy (Adult)  Goal: Signs and Symptoms of Listed Potential Problems Will be Absent, Minimized or Managed (Seizure Disorder/Epilepsy)  Outcome: Ongoing (interventions implemented as appropriate)   10/09/19 1809   Goal/Outcome Evaluation   Problems Assessed (Seizure Disorder/Epilepsy) all   Problems Present (Seizure/Epilepsy) none

## 2019-10-09 NOTE — PLAN OF CARE
Problem: Patient Care Overview  Goal: Plan of Care Review  Outcome: Outcome(s) achieved Date Met: 10/09/19   10/09/19 1520   Coping/Psychosocial   Plan of Care Reviewed With patient   Plan of Care Review   Progress improving   OTHER   Outcome Summary Pt reports functioning at baseline status. BUE strength, coordination, sensation WNL. Pt presents with mild expressive aphasia and right periphereal visual deficit and he reports these are both baseline status for him. OT DC d/t no skilled needs at this time, pt in agreement with this plan.       Problem: Stroke (Ischemic) (Adult)  Goal: Signs and Symptoms of Listed Potential Problems Will be Absent, Minimized or Managed (Stroke)  Outcome: Outcome(s) achieved Date Met: 10/09/19   10/09/19 1520   Goal/Outcome Evaluation   Problems Assessed (Stroke (Ischemic)) motor/sensory impairment   Problems Assessed (Stroke (Ischemic)) motor/sensory impairment

## 2019-10-09 NOTE — THERAPY DISCHARGE NOTE
Acute Care - Occupational Therapy Initial Eval/Discharge  [facility]     Patient Name: Vince Olivera  : 1960  MRN: [id number]  Today's Date: 10/9/2019  Onset of Illness/Injury or Date of Surgery: 10/08/19  Date of Referral to OT: 10/08/19         Admit Date: 10/8/2019       ICD-10-CM ICD-9-CM   1. Impaired mobility and ADLs Z74.09 799.89   2. Aortic occlusion (CMS/HCC) I74.10 747.22     Patient Active Problem List   Diagnosis   • H/o Left MCA ischemic CVA 2017   • Essential hypertension   • Dyslipidemia   • Bilateral carotid artery stenosis   • Residual mild expressive Aphasia   • Gastroesophageal reflux disease without esophagitis   • Combined receptive and expressive aphasia due to cerebrovascular accident   • Abnormal peripheral vision of right eye   • GERD (gastroesophageal reflux disease)   • Hyperlipidemia   • Palpitations   • Bilateral Carotid artery stenosis. Left greater than right    • Internal carotid artery stenosis, left   • Status post left carotid endarterectomy   • Seizure disorder as sequela of cerebrovascular accident (CMS/HCC)   • Type 2 diabetes mellitus without complication, without long-term current use of insulin (CMS/HCC)   • Bilateral leg and foot pain   • Bilateral carpal tunnel syndrome   • PAD (peripheral artery disease) (CMS/HCC)   • CVA (cerebral vascular accident) (CMS/HCC), rule out    • Breakthrough seizure (CMS/HCC)   • Aortic occlusion (CMS/HCC)     Past Medical History:   Diagnosis Date   • Arthritis    • Carotid artery disease (CMS/HCC)    • Carotid artery disorder (CMS/HCC)    • Depression    • Diabetes mellitus (CMS/HCC)     DIAGNOSED  CHECKS FSBG 3X/WEEK    • Ear infection     about 2 weeks ago- cleared up - wax removed and cleaned out    • GERD (gastroesophageal reflux disease)     self diagnosed takes otc ppi   • Hyperlipidemia    • Hypertension    • Hypertension    • Inguinal hernia    • Seizure (CMS/HCC)     last 10/8/2019   • Shoulder pain     bilat     • Stroke (CMS/[facility]) 04/24/2017    EXPRESSIVE APHASIA RESIDUAL    • Tooth loose     5 - all over- will get teeth done after this surgery    • Wears reading eyeglasses      Past Surgical History:   Procedure Laterality Date   • ABDOMINAL HERNIA REPAIR  2010   • CAROTID ENDARTERECTOMY Left 10/17/2017    Procedure: CAROTID ENDARTERECTOMY LEFT;  Surgeon: Alexx Bach MD;  Location: [facility] OR;  Service:    • INGUINAL HERNIA REPAIR Left           OT ASSESSMENT FLOWSHEET (last 12 hours)      Occupational Therapy Evaluation     Row Name 10/09/19 1520                   OT Evaluation Time/Intention    Subjective Information  no complaints  -AR        Document Type  evaluation;discharge treatment  -AR        Mode of Treatment  occupational therapy  -AR        Patient Effort  excellent  -AR        Symptoms Noted During/After Treatment  none  -AR           General Information    Patient Profile Reviewed?  yes  -AR        Onset of Illness/Injury or Date of Surgery  10/08/19  -AR        Patient Observations  alert;cooperative;agree to therapy  -AR        Patient/Family Observations  pt supine  -AR        Prior Level of Function  independent:;all household mobility;community mobility;gait;transfer;ADL's not driven for several months  -AR        Equipment Currently Used at Home  cane, straight  -AR        Pertinent History of Current Functional Problem  Pt is a 58 yom presented [facility] with seizure-like activity and right-sided weakness, transferred [facility]. Of note, pt was scheduled for bypass surgery today. Also, remote h/o CVA with residual speech deficits.   -AR        Existing Precautions/Restrictions  seizures  -AR        Risks Reviewed  patient:;LOB;nausea/vomiting;dizziness;change in vital signs;increased discomfort;increased drainage;lines disloged  -AR        Benefits Reviewed  patient:;decrease risk of DVT;increase knowledge  -AR        Barriers to Rehab  none identified  -AR           Relationship/Environment     Primary Source of Support/Comfort  significant other  -AR        Lives With  significant other  -AR           Resource/Environmental Concerns    Current Living Arrangements  home/apartment/condo  -AR        Resource/Environmental Concerns  home accessibility  -AR        Home Accessibility Concerns  stairs to enter home  -AR        Transportation Concerns  car, none  -AR           Home Main Entrance    Number of Stairs, Main Entrance  one  -AR           Cognitive Assessment/Interventions    Additional Documentation  Cognitive Assessment/Intervention (Group)  -AR           Cognitive Assessment/Intervention- PT/OT    Affect/Mental Status (Cognitive)  WNL  -AR        Orientation Status (Cognition)  oriented x 4;verbal cues/prompts needed for orientation  -AR        Follows Commands (Cognition)  WFL  -AR        Cognitive Function (Cognitive)  WFL  -AR           Bed Mobility Assessment/Treatment    Bed Mobility Assessment/Treatment  scooting/bridging;supine-sit;sit-supine  -AR        Scooting/Bridging [place] (Bed Mobility)  independent  -AR        Supine-Sit [place] (Bed Mobility)  conditional independence  -AR        Sit-Supine [place] (Bed Mobility)  conditional independence  -AR           Functional Mobility    Functional Mobility- Ind. Level  independent  -AR        Functional Mobility-Distance (Feet)  200  -AR           Transfer Assessment/Treatment    Transfer Assessment/Treatment  sit-stand transfer;stand-sit transfer  -AR           Sit-Stand Transfer    Sit-Stand [place] (Transfers)  independent  -AR           Stand-Sit Transfer    Stand-Sit [place] (Transfers)  independent  -AR           ADL Assessment/Intervention    BADL Assessment/Intervention  upper body dressing;lower body dressing  -AR           Upper Body Dressing Assessment/Training    Upper Body Dressing [place] Level  don;alfonsoma/june;independent  -AR        Upper Body Dressing Position  edge of bed sitting  -AR            Lower Body Dressing Assessment/Training    Lower Body Dressing [place] Level  don;socks;doff;independent  -AR           General ROM    GENERAL ROM COMMENTS  WFL BUE  -AR           MMT (Manual Muscle Testing)    General MMT Comments  5/5 BUE  -AR           Motor Assessment/Interventions    Additional Documentation  Balance Interventions (Group);Balance (Group);Fine Motor Testing & Training (Group);Gross Motor Coordination (Group)  -AR           Gross Motor Coordination    Gross Motor Impairments  finger to nose intact BUE eyes open and closed  -AR           Balance    Balance  static sitting balance;static standing balance  -AR           Static Sitting Balance    Level of [place] (Unsupported Sitting, Static Balance)  independent  -AR        Sitting Position (Unsupported Sitting, Static Balance)  sitting on edge of bed  -AR        Time Able to Maintain Position (Unsupported Sitting, Static Balance)  more than 5 minutes  -AR           Static Standing Balance    Level of [place] (Supported Standing, Static Balance)  independent  -AR        Time Able to Maintain Position (Supported Standing, Static Balance)  1 to 2 minutes  -AR           Fine Motor Testing & Training    Comment, Fine Motor Coordination  B opposition intact  -AR           Sensory Assessment/Intervention    Sensory General Assessment  no sensation deficits identified BUE  -AR        Additional Documentation  Vision Assessment/Intervention (Group)  -AR           Vision Assessment/Intervention    Visual Impairment/Limitations  peripheral vision impaired right pt reports baseline  -AR           Positioning and Restraints    Pre-Treatment Position  in bed  -AR        Post Treatment Position  bed  -AR        In Bed  supine;call light within reach;encouraged to call for assist;with nsg;with other staff  -AR           Pain Scale: Numbers Pre/Post-Treatment    Pain Scale: Numbers, Pretreatment  0/10 - no pain  -AR        Pain Scale: Numbers,  Post-Treatment  0/10 - no pain  -AR           Plan of Care Review    Plan of Care Reviewed With  patient  -AR           Clinical Impression (OT)    Date of Referral to OT  10/08/19  -AR        OT Diagnosis  decreased independence with ADLS  -AR        Patient/Family Goals Statement (OT Eval)  return home  -AR        Criteria for Skilled Therapeutic Interventions Met (OT Eval)  yes;treatment indicated  -AR        Rehab Potential (OT Eval)  good, to achieve stated therapy goals  -AR        Therapy Frequency (OT Eval)  evaluation only  -AR        Anticipated Discharge Disposition (OT)  home  -AR           Vital Signs    Pre Systolic BP Rehab  125  -AR        Pre Treatment Diastolic BP  73  -AR        Post Systolic BP Rehab  155  -AR        Post Treatment Diastolic BP  83  -AR        Pretreatment Heart Rate (beats/min)  64  -AR        Posttreatment Heart Rate (beats/min)  69  -AR        Pre SpO2 (%)  95  -AR        O2 Delivery Pre Treatment  room air  -AR        Post SpO2 (%)  97  -AR        O2 Delivery Post Treatment  room air  -AR        Pre Patient Position  Supine  -AR        Intra Patient Position  Standing  -AR        Post Patient Position  Supine  -AR           Discharge Summary (Occupational Therapy)    Additional Documentation  Discharge Summary, OT Eval (Group)  -AR           Discharge Summary, OT Eval    Reason for Discharge (OT Discharge Summary)  no further needs identified  -AR           Living Environment    Home Accessibility  stairs to enter home  -AR          User Key  (r) = Recorded By, (t) = Taken By, (c) = Cosigned By    Initials Name Effective Dates    Rohini Sanchez, OT 06/22/15 -           Occupational Therapy Education     Title: PT OT SLP Therapies (In Progress)     Topic: Occupational Therapy (Done)     Point: ADL training (Done)     Description: Instruct learner(s) on proper safety adaptation and remediation techniques during self care or transfers.   Instruct in proper use of  assistive devices.    Learning Progress Summary           Patient THEA [place] VU by AR at 10/9/2019  3:20 PM                   Point: Home exercise program (Done)     Description: Instruct learner(s) on appropriate technique for monitoring, assisting and/or progressing therapeutic exercises/activities.    Learning Progress Summary           Patient Eager, E, VU by AR at 10/9/2019  3:20 PM                   Point: Precautions (Done)     Description: Instruct learner(s) on prescribed precautions during self-care and functional transfers.    Learning Progress Summary           Patient THEA [place] VU by AR at 10/9/2019  3:20 PM                   Point: Body mechanics (Done)     Description: Instruct learner(s) on proper positioning and spine alignment during self-care, functional mobility activities and/or exercises.    Learning Progress Summary           Patient THEA [place] VU by AR at 10/9/2019  3:20 PM                               User Key     Initials Effective Dates Name Provider Type Discipline    AR 06/22/15 -  Rohini Wong, OT Occupational Therapist OT                OT Recommendation and Plan  Outcome Summary/Treatment Plan (OT)  Anticipated Discharge Disposition (OT): home  Reason for Discharge (OT Discharge Summary): no further needs identified  Therapy Frequency (OT Eval): evaluation only  Plan of Care Review  Plan of Care Reviewed With: patient  Plan of Care Reviewed With: patient  Outcome Summary: Pt reports functioning at baseline status. BUE strength, coordination, sensation WNL. Pt presents with mild expressive aphasia and right periphereal visual deficit and he reports these are both baseline status for him. OT DC d/t no skilled needs at this time, pt in agreement with this plan.         Outcome Measures     Row Name 10/09/19 3280             How much help from another is currently needed...    Putting on and taking off regular lower body clothing?  4  -AR      Bathing (including washing, rinsing,  and drying)  4  -AR      Toileting (which includes using toilet bed pan or urinal)  4  -AR      Putting on and taking off regular upper body clothing  4  -AR      Taking care of personal grooming (such as brushing teeth)  4  -AR      Eating meals  4  -AR      AM-PAC 6 Clicks Score (OT)  24  -AR         Modified [place] Scale    Modified [place] Scale  1 - No significant disability despite symptoms.  Able to carry out all usual duties and activities. baseline deficits  -AR         Functional Assessment    Outcome Measure Options  AM-PAC 6 Clicks Daily Activity (OT);Modified Melly  -AR        User Key  (r) = Recorded By, (t) = Taken By, (c) = Cosigned By    Initials Name Provider Type    Rohini Sanchez OT Occupational Therapist          Time Calculation:   Time Calculation- OT     Row Name 10/09/19 1520             Time Calculation- OT    OT Start Time  1520  -AR      OT Received On  10/09/19  -AR      OT Goal Re-Cert Due Date  10/19/19  -AR        User Key  (r) = Recorded By, (t) = Taken By, (c) = Cosigned By    Initials Name Provider Type    Rohini Sanchez OT Occupational Therapist        Therapy Suggested Charges     Code   Minutes Charges    None           Therapy Charges for Today     Code Description Service Date Service Provider Modifiers Qty    06103321737 HC OT EVAL LOW COMPLEXITY 4 10/9/2019 Rohini Wong OT GO 1               OT Discharge Summary  Anticipated Discharge Disposition (OT): home  Reason for Discharge: Independent  Discharge Destination: Home    Rohini Wong OT  10/9/2019

## 2019-10-09 NOTE — PROGRESS NOTES
[facility] Medicine Services  PROGRESS NOTE    Patient Name: Vince Olivera  : 1960  MRN: [id number]    Date of Admission: 10/8/2019  Primary Care Physician: Fortunato Marvin MD    Subjective   Subjective     CC:  F/U Seizure    HPI:  Pt seen and examined, nursing notes reviewed, no acute events overnight. Pt was planned for surgery today due to PVD but cancelled due to breakthrough seizure. Pt has no complaints at this time. States he has not missed any doses of his medications. Last seizure May 2018.    Review of Systems  Gen- No fevers, chills  CV- No chest pain, palpitations  Resp- No cough, dyspnea  GI- No N/V/D, abd pain  All other systems reviewed and negative except per HPI.     Objective   Objective     Vital Signs:   Temp:  [97.9 °F (36.6 °C)-98.5 °F (36.9 °C)] 98.2 °F (36.8 °C)  Heart Rate:  [63-88] 63  Resp:  [16-20] 16  BP: (117-138)/(72-84) 125/73  Total (NIH Stroke Scale): 6     Physical Exam:  Constitutional: No acute distress, awake, alert  HENT: NCAT, mucous membranes moist  Respiratory: Clear to auscultation bilaterally, respiratory effort normal   Cardiovascular: RRR, no murmurs, rubs, or gallops, decreased pedal pulses b/l  Gastrointestinal: Positive bowel sounds, soft, nontender, nondistended  Musculoskeletal: No bilateral ankle edema  Psychiatric: Appropriate affect, cooperative  Neurologic: Oriented x 3, strength symmetric in all extremities, Cranial Nerves grossly intact to confrontation, speech clear  Skin: No rashes    Results Reviewed:    Results from last 7 days   Lab Units 10/09/19  1227 10/08/19  0937   WBC 10*3/mm3 10.79 13.98*   HEMOGLOBIN g/dL 15.2 16.3   HEMATOCRIT % 45.7 46.4   PLATELETS 10*3/mm3 259 274   INR   --  0.97     Results from last 7 days   Lab Units 10/09/19  1227 10/08/19  0937   SODIUM mmol/L 137 134*   POTASSIUM mmol/L 3.9 3.7   CHLORIDE mmol/L 97* 92*   CO2 mmol/L 28.0 23.9   BUN mg/dL 9 8   CREATININE mg/dL 0.78 0.83    GLUCOSE mg/dL 166* 232*   CALCIUM mg/dL 9.3 9.7   ALT (SGPT) U/L  --  26   AST (SGOT) U/L  --  16   TROPONIN T ng/mL  --  <0.010     Estimated Creatinine Clearance: 108.9 mL/min (by C-G formula based on SCr of 0.78 mg/dL).    Microbiology Results Abnormal     None          Imaging Results (last 24 hours)     Procedure Component Value Units Date/Time    CT Chest With Contrast [[id number]] Updated:  10/09/19 1456    MRI Brain Without Contrast [[id number]] Collected:  10/08/19 9753     Updated:  10/09/19 0554    Narrative:       MRI Brain WO     INDICATION:    Epilepsy. Right-sided weakness and confusion. Seizure today.    TECHNIQUE: Routine brain MRI without contrast.    COMPARISON: Previous brain MRI April 2018.    Brain MRI: Preliminary report    No hemorrhage. No acute stroke. No mass lesion. No significant hydrocephalus. Redemonstration of extensive encephalomalacic change in the MCA territory on the left from prior infarct.    This is a preliminary wet read by Dr. Daniel Layton at 2347 hours. Final interpretation will be provided by neuroradiology. Please refer to final interpretation for detailed findings and any further recommendations.    FINDINGS: Final interpretation    There is no MR evidence of acute ischemia or other acute restricted diffusion. Large area of left temporal and occipital encephalomalacia is unchanged since the prior MRI as is some minimal additional nonspecific white matter change. There is no  hydrocephalus or extra-axial fluid collection flow voids are unchanged since the prior exam. There is no evidence of intracranial mass.    The extracranial soft tissues are unremarkable.      Impression:       Final interpretation    Chronic changes, no acute abnormality. No interval change since April 2018    Signer Name: William Aleman MD   Signed: 10/9/2019 5:52 AM   Workstation Name: [email or web address]    [facility]          Results for orders placed during the hospital  encounter of 10/08/19   Adult Transthoracic Echo Complete W/ Cont if Necessary Per Protocol (With Agitated Saline)    Narrative · Left ventricular systolic function is normal. Estimated EF = 65%.  · Left ventricular diastolic function is normal.          I have reviewed the medications:  Scheduled Meds:  amLODIPine 10 mg Oral Daily   aspirin  mg Oral Daily   atorvastatin 80 mg Oral Nightly   hydroCHLOROthiazide 12.5 mg Oral Daily   insulin lispro 0-9 Units Subcutaneous 4x Daily With Meals & Nightly   lacosamide 100 mg Oral Q12H   levETIRAcetam 1,500 mg Oral Q12H   losartan 50 mg Oral BID   metoprolol succinate  mg Oral Daily   pantoprazole 40 mg Oral QAM   sodium chloride 10 mL Intravenous Q12H     Continuous Infusions:   PRN Meds:.•  acetaminophen **OR** acetaminophen  •  dextrose  •  [MAR Hold] dextrose  •  glucagon (human recombinant)  •  LORazepam  •  melatonin  •  sodium chloride      Assessment/Plan   Assessment / Plan     Active Hospital Problems    Diagnosis  POA   • **CVA (cerebral vascular accident) (CMS/HCC), rule out  [I63.9]  Yes   • Breakthrough seizure (CMS/HCC) [G40.919]  Yes   • Aortic occlusion (CMS/HCC) [I74.10]  Unknown   • PAD (peripheral artery disease) (CMS/HCC) [I73.9]  Yes   • Bilateral leg and foot pain [M79.604, M79.605, M79.671, M79.672]  Unknown   • Type 2 diabetes mellitus without complication, without long-term current use of insulin (CMS/HCC) [E11.9]  Yes   • Seizure disorder as sequela of cerebrovascular accident (CMS/HCC) [I69.398, G40.909]  Not Applicable   • Status post left carotid endarterectomy [Z98.890]  Not Applicable   • Gastroesophageal reflux disease without esophagitis [K21.9]  Yes   • Dyslipidemia [E78.5]  Yes   • Bilateral carotid artery stenosis [I65.23]  Yes   • Essential hypertension [I10]  Yes   • H/o Left MCA ischemic CVA 4/2017 [I63.9]  Yes      Resolved Hospital Problems   No resolved problems to display.        Brief Hospital Course to date:  Vince LUKE  Jarod is a 58 y.o. male presenting originally to [facility] with complaint of seizure like activity and right sided weakness who was transferred to [facility] for higher level of care     CVA rule out  -neuro checks q 4 hours   -NIHSS q shift  -PT/OT/SLP consult in am   -ECHO obtained, normal EF, normal diastolic function, results reviewed and discussed with patient  -Carotid duplex obtained: Right internal carotid artery stenosis of 50-69%. Right ICA Prox: Calcified heterogeneous plaque present. Left internal carotid artery stenosis of 0-49%. results reviewed and discussed with patient. Pt aware of R carotid stenosis, great care should be taken to avoid hypotension intraoperatively.  -MRI brain: chronic changes, no acute abnormality, reviewed and discussed with patient  -Neurology following, appreciate recs   -Lipid panel shows elevated triglycerides, low HDL,  results reviewed and discussed with patient  -Hga1c 6.6, diabetes educator consulted     Seizure disorder with breakthrough seizure  -previously well controlled on home medications, last seizure was May 2018  -Increased Vimpat to 200 mg twice daily with an extra 50 mg dose this morning per Neuro recs  -EEG negative for epileptiform activity      PVD  -was scheduled for Ao-femoral bypass surgery today  -Dr. Weaver following, plan for surgery Friday  -Plavix on hold      Dyslipidemia  -continue high dose atorvastatin      Diabetes Mellitus 2  -FSBG ACHS  -SS insulin   -A1c 6.6     Carotid artery disease  -s/p Left CEA  -per above     History of CVA  -plavix held for the past 5 days in prep for surgery, will continue to hold until after surgery, resume at discretion of CT surgery  -continue daily ASA      Hypertension   -controlled with amlodipine and losartan, continue     GERD  -continue PPI    Bibasilar Atelectasis  -CT surgery wanted a CT chest prior to surgery, this has been performed, awaiting report    DVT Prophylaxis:  Mechanical     Disposition: I expect the  patient to be discharged home once medically stable    CODE STATUS:   Code Status and Medical Interventions:   Ordered at: 10/08/19 2144     Code Status:    CPR     Medical Interventions (Level of Support Prior to Arrest):    Full         Electronically signed by Lisa Hodges DO, 10/09/19, 3:57 PM.

## 2019-10-09 NOTE — PLAN OF CARE
Problem: Patient Care Overview  Goal: Plan of Care Review  Outcome: Ongoing (interventions implemented as appropriate)   10/09/19 7155   Coping/Psychosocial   Plan of Care Reviewed With patient;significant other   OTHER   Outcome Summary PT initial evaluation completed. No further IP PT goals established as pt demonstrates safety and appears to be at baseline re: strength and functional mobility. Edu pt/S.O. re: home safety and benefit of further gait with NSG. Will d/c PT at this time.        Problem: Stroke (Ischemic) (Adult)  Goal: Signs and Symptoms of Listed Potential Problems Will be Absent, Minimized or Managed (Stroke)  Outcome: Ongoing (interventions implemented as appropriate)   10/09/19 8814   Goal/Outcome Evaluation   Problems Assessed (Stroke (Ischemic)) cognitive impairment;communication impairment;motor/sensory impairment   Problems Assessed (Stroke (Ischemic)) communication impairment

## 2019-10-09 NOTE — SIGNIFICANT NOTE
10/09/19 1311   SLP Deferred Reason   SLP Deferred Reason Patient unavailable for evaluation  (Attempted to see for cognitive-communication eval per stroke protocol. Pt w/ other service @ this time. Will f/u as able.)

## 2019-10-09 NOTE — CONSULTS
Attempted to see patient for dm education, procedure at bedside. Will attempt later today or tomorrow.

## 2019-10-09 NOTE — PROGRESS NOTES
Vince Olivera  [id number]  1960    Patient Care Team:  Fortunato Marvin MD as PCP - General (Family Medicine)  Oneida Augustin APRN as Nurse Practitioner (Neurology)  Alexx Bach MD as Consulting Physician (Neurosurgery)  Zena Alejandro as Speech Therapist (Speech Pathology)  Randi Llamas PTA as Physical Therapy Assistant (Physical Therapy)  Oneida Augustin APRN as Nurse Practitioner (Neurology)  Provider, No Known as Referring Physician  Keanu Sheth MD as Surgeon (Orthopedic Surgery)    CC: I feel pretty good I am ready to have the surgery    HPI: 58-year-old male with significant history of severe claudication.  He is unable to walk more than just a few steps without severe claudication of both calves and both thighs.  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  There is bilateral superficial femoral artery occlusion as well.  Runoff below the knee is satisfactory.  The patient has previously had a cerebrovascular accident and a left carotid endarterectomy.  He states that a couple of years ago he had 2 seizures.  He had a seizure yesterday and was admitted to the hospital via the emergency department.  CT scan of the head and subsequent MRI of the brain were negative for new acute findings.  Patient is a past cigarette smoker.  His chest x-ray does reveal basilar atelectasis on the right with elevation of the right hemidiaphragm.  He is scheduled for aortobifemoral bypass.  I have discussed the situation at length with anesthesia.  They would prefer to have a CT scan of the chest and a neurologic clearance prior to a  anesthetic.  I discussed this at length with the patient he understands and is agreeable.      CVA (cerebral vascular accident) (CMS/[facility]), rule out     H/o Left  MCA ischemic CVA 4/2017    Essential hypertension    Dyslipidemia    Bilateral carotid artery stenosis    Gastroesophageal reflux disease without esophagitis    Status post left carotid endarterectomy    Seizure disorder as sequela of cerebrovascular accident (CMS/[facility])    Type 2 diabetes mellitus without complication, without long-term current use of insulin (CMS/[facility])    Bilateral leg and foot pain    PAD (peripheral artery disease) (CMS/[facility])    Breakthrough seizure (CMS/[facility])      Review of Systems:  General: No anorexia, no weight loss, general malaise and weakness.  No fever chills or night sweats.  HEENT: No headaches no visual changes no hearing loss no rhinitis no pharyngitis  Pulmonary: No shortness of breath, no cough, no hemoptysis  Heart: No palpitations,  No malaise or shortness of breath, no atrial fibrillation, no bradycardia, no syncope.  No anginal quality chest pain.  Gastrointestinal: No nausea, vomiting, diarrhea, or constipation. No acholic stool, no jaundice.  Renal: No dysuria, no frequency, no hematuria.  Skin: No rash, no skin lesions, no skin tumors.  Neurologic: No seizures, no muscle weakness, no sensory deficit, no amaurosis,  Psychiatric: No anxiety, no history of psychosis  Hematologic: No bleeding history, no ease of bruising, no history of blood disorder.  Extremities: Difficult and relatively severe claudication both calves and both thighs after walking only a few steps.  All other systems reviewed with no pertinent findings.    History  Past Medical History:   Diagnosis Date   • Arthritis    • Carotid artery disease (CMS/[facility])    • Carotid artery disorder (CMS/[facility])    • Depression    • Diabetes mellitus (CMS/[facility])     DIAGNOSED 4-2017 CHECKS FSBG 3X/WEEK    • Ear infection     about 2 weeks ago- cleared up - wax removed and cleaned out    • GERD (gastroesophageal reflux disease)     self diagnosed takes otc ppi   • Hyperlipidemia    • Hypertension    • Hypertension    • Inguinal hernia     • Seizure (CMS/HCC)     last 10/8/2019   • Shoulder pain     bilat    • Stroke (CMS/HCC) 2017    EXPRESSIVE APHASIA RESIDUAL    • Tooth loose     5 - all over- will get teeth done after this surgery    • Wears reading eyeglasses      Past Surgical History:   Procedure Laterality Date   • ABDOMINAL HERNIA REPAIR     • CAROTID ENDARTERECTOMY Left 10/17/2017    Procedure: CAROTID ENDARTERECTOMY LEFT;  Surgeon: Alexx Bach MD;  Location: [facility];  Service:    • INGUINAL HERNIA REPAIR Left      Family History   Adopted: Yes   Problem Relation Age of Onset   • Diabetes Mother    • COPD Father      Social History     Tobacco Use   • Smoking status: Former Smoker     Packs/day: 0.05     Years: 40.00     Pack years: 2.00     Types: Cigarettes     Last attempt to quit: 2017     Years since quittin.4   • Smokeless tobacco: Former User     Types: Snuff   • Tobacco comment: quit snuff when 18 or 19 years old - only did for baseball - 2-3 years    Substance Use Topics   • Alcohol use: No   • Drug use: No     Medications Prior to Admission   Medication Sig Dispense Refill Last Dose   • amLODIPine (NORVASC) 10 MG tablet Take 1 tablet by mouth Daily. 30 tablet 5 10/8/2019 at Unknown time   • aspirin  MG tablet Take 1 tablet by mouth Daily. 30 tablet 5 10/8/2019 at Unknown time   • atorvastatin (LIPITOR) 80 MG tablet Take 1 tablet by mouth Every Night. 30 tablet 5 10/7/2019 at Unknown time   • clopidogrel (PLAVIX) 75 MG tablet Take 1 tablet by mouth Daily. 30 tablet 5 Past Week at Unknown time   • hydrochlorothiazide (HYDRODIURIL) 12.5 MG tablet Take 1 tablet by mouth Daily. 30 tablet 6 10/8/2019 at Unknown time   • lansoprazole (PREVACID) 15 MG capsule Take 1 capsule by mouth Daily. 30 capsule 5 10/8/2019 at Unknown time   • levETIRAcetam (KEPPRA) 500 MG tablet Take 1500 mg  tablet 5 10/8/2019 at Unknown time   • losartan (COZAAR) 100 MG tablet Take 0.5 tablets by mouth 2 (Two) Times  a Day. 60 tablet 5 10/8/2019 at Unknown time   • metFORMIN (GLUCOPHAGE) 500 MG tablet Take 2 tablets by mouth 2 (Two) Times a Day With Meals. 120 tablet 5 10/8/2019 at Unknown time   • metoprolol succinate XL (TOPROL XL) 200 MG 24 hr tablet Take 1 tablet by mouth Daily. 30 tablet 6 10/8/2019 at Unknown time   • omeprazole (priLOSEC) 20 MG capsule Take 20 mg by mouth Daily.   10/8/2019 at Unknown time   • VIMPAT 50 MG tablet tablet TAKE ONE TABLET BY MOUTH EVERY 12 HOURS 60 tablet 0 10/8/2019 at Unknown time   • carbamide peroxide (DEBROX) 6.5 % otic solution Administer 5 drops into both ears 2 (Two) Times a Day. 22 mL 0 Not Taking     Allergies:  Patient has no known allergies.    Objective    Vital Signs  Temp:  [98.1 °F (36.7 °C)-98.5 °F (36.9 °C)] 98.3 °F (36.8 °C)  Heart Rate:  [60-88] 67  Resp:  [18-20] 18  BP: ()/(60-92) 117/72    Physical Exam:  General Appearance: alert, appears stated age and cooperative  Head: normocephalic, without obvious abnormality and atraumatic  Ears/Nose: no rhinitis, external ears normal  Throat:  no oral lesions, no pharyngitis  Neck: no adenopathy, suppple, trachea midline, no thyromegaly, no carotid bruit and no JVD  Lungs: clear to auscultation, respirations regular, respirations even and respirations unlabored  Heart: regular rhythm & normal rate, normal S1, S2, no murmur  Abdomen: normal bowel sounds, no masses, soft, non-tender  Extremities: moves extremities well and no edema  Pulses: pulses notpalpable but are present wit Doppler and are equal bilaterally (femoral, DP, PT)  Skin: no bleeding, bruising or rash  Neurologic: mental status orientated to person, place, time and situation, CN intact, no motor or sensory loss          Data Review:  Results from last 7 days   Lab Units 10/08/19  0937   WBC 10*3/mm3 13.98*   HEMOGLOBIN g/dL 16.3   HEMATOCRIT % 46.4   PLATELETS 10*3/mm3 274     Results from last 7 days   Lab Units 10/08/19  0937   SODIUM mmol/L 134*    POTASSIUM mmol/L 3.7   CHLORIDE mmol/L 92*   CO2 mmol/L 23.9   BUN mg/dL 8   CREATININE mg/dL 0.83   GLUCOSE mg/dL 232*   CALCIUM mg/dL 9.7     Coagulation: No results found for: INR, APTT  Cardiac markers:   Results from last 7 days   Lab Units 10/08/19  0937   TROPONIN T ng/mL <0.010     ABGs:       Invalid input(s): PO2      Imaging Results (last 72 hours)     Procedure Component Value Units Date/Time    MRI Brain Without Contrast [[id number]] Collected:  10/08/19 2353     Updated:  10/09/19 0554    Narrative:       MRI Brain WO     INDICATION:    Epilepsy. Right-sided weakness and confusion. Seizure today.    TECHNIQUE: Routine brain MRI without contrast.    COMPARISON: Previous brain MRI April 2018.    Brain MRI: Preliminary report    No hemorrhage. No acute stroke. No mass lesion. No significant hydrocephalus. Redemonstration of extensive encephalomalacic change in the MCA territory on the left from prior infarct.    This is a preliminary wet read by Dr. Daniel Layton at 2347 hours. Final interpretation will be provided by neuroradiology. Please refer to final interpretation for detailed findings and any further recommendations.    FINDINGS: Final interpretation    There is no MR evidence of acute ischemia or other acute restricted diffusion. Large area of left temporal and occipital encephalomalacia is unchanged since the prior MRI as is some minimal additional nonspecific white matter change. There is no  hydrocephalus or extra-axial fluid collection flow voids are unchanged since the prior exam. There is no evidence of intracranial mass.    The extracranial soft tissues are unremarkable.      Impression:       Final interpretation    Chronic changes, no acute abnormality. No interval change since April 2018    Signer Name: William Aleman MD   Signed: 10/9/2019 5:52 AM   Workstation Name: [id number]    [facility]            Imaging:        Assessment: 58-year-old male with  complete occlusion of the abdominal aorta and a recent seizure partially worked up.      Plan:  Long discussion with the patient and with Dr. Montana of the anesthesia department in regard to the timing of the planned aortobifemoral bypass.  The patient's MRI is negative ever his chest x-ray does show elevation of the right hemidiaphragm and some basilar atelectasis.  Dr. Montana and I feel that further evaluation and work-up will be necessary before we proceed with aortobifemoral bypass.  I have discussed this in detail with the patient.  He understands and is agreeable with the current plan.  I will convert his admission to a standard admission and will tentatively reschedule his surgery for Friday.  In the interim we will obtain a CT scan of the chest with contrast.  Will also pain a neurology consult for clearance for general anesthetic. I have reviewed, verified, and confirmed the above history and current status.  I have examined the patient and confirmed the above physical findings.Above plan and treatment regimen discussed in detail with patient.  Options of treatment, attendant risks vs benefits, and my recommendations were discussed and all questions answered.      Dylan Weaver MD  10/09/19  8:19 AM

## 2019-10-09 NOTE — PROGRESS NOTES
Discharge Planning Assessment  [facility]     Patient Name: Vince Olivera  MRN: [id number]  Today's Date: 10/9/2019    Admit Date: 10/8/2019    Discharge Needs Assessment     Row Name 10/09/19 1121       Living Environment    Lives With  significant other    Name(s) of Who Lives With Patient  Donna VELEZ) [phone]    Current Living Arrangements  home/apartment/condo    Primary Care Provided by  self    Provides Primary Care For  no one    Family Caregiver if Needed  significant other    Family Caregiver Names  Donna VazquezSO) [phone]    Quality of Family Relationships  helpful;involved;supportive    Able to Return to Prior Arrangements  yes       Resource/Environmental Concerns    Resource/Environmental Concerns  none    Transportation Concerns  car, none       Transition Planning    Patient/Family Anticipates Transition to  home with family    Patient/Family Anticipated Services at Transition  none    Transportation Anticipated  family or friend will provide       Discharge Needs Assessment    Readmission Within the Last 30 Days  no previous admission in last 30 days    Concerns to be Addressed  denies needs/concerns at this time    Equipment Currently Used at Home  glucometer    Anticipated Changes Related to Illness  none    Equipment Needed After Discharge  none    Offered/Gave Vendor List  no        Discharge Plan     Row Name 10/09/19 1122       Plan    Plan  Home with family    Patient/Family in Agreement with Plan  yes    Plan Comments  Spoke with patient and significant other at bedside. Lives with Donna VazquezSO) [phone] [facility]. Is independent with ADL's. No problems with insurance or medications. Has a glucometer at home. Denies any needs at this time. CM will continue to follow.    Final Discharge Disposition Code  01 - home or self-care        Destination      No service coordination in this encounter.      Durable Medical Equipment      No service coordination in this  encounter.      Dialysis/Infusion      No service coordination in this encounter.      Home Medical Care      No service coordination in this encounter.      Therapy      No service coordination in this encounter.      Community Resources      No service coordination in this encounter.          Demographic Summary     Row Name 10/09/19 1120       General Information    Admission Type  inpatient    Arrived From  hospital    Referral Source  admission list    Reason for Consult  discharge planning    Preferred Language  English     Used During This Interaction  no       Contact Information    Permission Granted to Share Info With      Contact Information Obtained for      Contact Information Comments  PCP is Fortunato Marvin MD        Functional Status     Row Name 10/09/19 1121       Functional Status    Usual Activity Tolerance  good    Current Activity Tolerance  good       Functional Status, IADL    Medications  independent    Meal Preparation  independent    Housekeeping  independent    Laundry  independent    Shopping  independent       Mental Status    General Appearance WDL  WDL       Mental Status Summary    Recent Changes in Mental Status/Cognitive Functioning  no changes       Employment/    Employment Status  disabled        Psychosocial    No documentation.       Abuse/Neglect    No documentation.       Legal    No documentation.       Substance Abuse    No documentation.       Patient Forms    No documentation.           Elliott Mittal RN

## 2019-10-10 LAB
ANION GAP SERPL CALCULATED.3IONS-SCNC: 10 MMOL/L (ref 5–15)
BUN BLD-MCNC: 10 MG/DL (ref 6–20)
BUN/CREAT SERPL: 10.4 (ref 7–25)
CALCIUM SPEC-SCNC: 9 MG/DL (ref 8.6–10.5)
CHLORIDE SERPL-SCNC: 98 MMOL/L (ref 98–107)
CO2 SERPL-SCNC: 30 MMOL/L (ref 22–29)
CREAT BLD-MCNC: 0.96 MG/DL (ref 0.76–1.27)
DEPRECATED RDW RBC AUTO: 43.1 FL (ref 37–54)
ERYTHROCYTE [DISTWIDTH] IN BLOOD BY AUTOMATED COUNT: 12.8 % (ref 12.3–15.4)
GFR SERPL CREATININE-BSD FRML MDRD: 81 ML/MIN/1.73
GLUCOSE BLD-MCNC: 112 MG/DL (ref 65–99)
GLUCOSE BLDC GLUCOMTR-MCNC: 137 MG/DL (ref 70–130)
GLUCOSE BLDC GLUCOMTR-MCNC: 155 MG/DL (ref 70–130)
GLUCOSE BLDC GLUCOMTR-MCNC: 174 MG/DL (ref 70–130)
GLUCOSE BLDC GLUCOMTR-MCNC: 174 MG/DL (ref 70–130)
HCT VFR BLD AUTO: 46.8 % (ref 37.5–51)
HGB BLD-MCNC: 15.8 G/DL (ref 13–17.7)
MCH RBC QN AUTO: 30.7 PG (ref 26.6–33)
MCHC RBC AUTO-ENTMCNC: 33.8 G/DL (ref 31.5–35.7)
MCV RBC AUTO: 91.1 FL (ref 79–97)
PLATELET # BLD AUTO: 236 10*3/MM3 (ref 140–450)
PMV BLD AUTO: 8.4 FL (ref 6–12)
POTASSIUM BLD-SCNC: 4.2 MMOL/L (ref 3.5–5.2)
RBC # BLD AUTO: 5.14 10*6/MM3 (ref 4.14–5.8)
SODIUM BLD-SCNC: 138 MMOL/L (ref 136–145)
WBC NRBC COR # BLD: 10.64 10*3/MM3 (ref 3.4–10.8)

## 2019-10-10 PROCEDURE — 99232 SBSQ HOSP IP/OBS MODERATE 35: CPT | Performed by: FAMILY MEDICINE

## 2019-10-10 PROCEDURE — 92523 SPEECH SOUND LANG COMPREHEN: CPT

## 2019-10-10 PROCEDURE — 85027 COMPLETE CBC AUTOMATED: CPT | Performed by: FAMILY MEDICINE

## 2019-10-10 PROCEDURE — G0108 DIAB MANAGE TRN  PER INDIV: HCPCS | Performed by: REGISTERED NURSE

## 2019-10-10 PROCEDURE — 80048 BASIC METABOLIC PNL TOTAL CA: CPT | Performed by: FAMILY MEDICINE

## 2019-10-10 PROCEDURE — 99231 SBSQ HOSP IP/OBS SF/LOW 25: CPT | Performed by: PSYCHIATRY & NEUROLOGY

## 2019-10-10 PROCEDURE — 82962 GLUCOSE BLOOD TEST: CPT

## 2019-10-10 RX ADMIN — ATORVASTATIN CALCIUM 80 MG: 40 TABLET, FILM COATED ORAL at 20:46

## 2019-10-10 RX ADMIN — LACOSAMIDE 100 MG: 50 TABLET, FILM COATED ORAL at 20:46

## 2019-10-10 RX ADMIN — ASPIRIN 325 MG: 325 TABLET, DELAYED RELEASE ORAL at 09:19

## 2019-10-10 RX ADMIN — SODIUM CHLORIDE, PRESERVATIVE FREE 10 ML: 5 INJECTION INTRAVENOUS at 22:52

## 2019-10-10 RX ADMIN — MELATONIN TAB 5 MG 5 MG: 5 TAB at 20:46

## 2019-10-10 RX ADMIN — LACOSAMIDE 100 MG: 50 TABLET, FILM COATED ORAL at 09:19

## 2019-10-10 RX ADMIN — PANTOPRAZOLE SODIUM 40 MG: 40 TABLET, DELAYED RELEASE ORAL at 09:20

## 2019-10-10 RX ADMIN — SODIUM CHLORIDE, PRESERVATIVE FREE 10 ML: 5 INJECTION INTRAVENOUS at 09:20

## 2019-10-10 RX ADMIN — INSULIN LISPRO 2 UNITS: 100 INJECTION, SOLUTION INTRAVENOUS; SUBCUTANEOUS at 18:11

## 2019-10-10 RX ADMIN — LEVETIRACETAM 1500 MG: 750 TABLET, FILM COATED ORAL at 09:19

## 2019-10-10 RX ADMIN — AMLODIPINE BESYLATE 10 MG: 10 TABLET ORAL at 09:20

## 2019-10-10 RX ADMIN — INSULIN LISPRO 2 UNITS: 100 INJECTION, SOLUTION INTRAVENOUS; SUBCUTANEOUS at 22:52

## 2019-10-10 RX ADMIN — LOSARTAN POTASSIUM 50 MG: 50 TABLET ORAL at 09:19

## 2019-10-10 RX ADMIN — INSULIN LISPRO 2 UNITS: 100 INJECTION, SOLUTION INTRAVENOUS; SUBCUTANEOUS at 13:04

## 2019-10-10 RX ADMIN — HYDROCHLOROTHIAZIDE 12.5 MG: 25 TABLET ORAL at 08:30

## 2019-10-10 RX ADMIN — LEVETIRACETAM 1500 MG: 750 TABLET, FILM COATED ORAL at 20:46

## 2019-10-10 RX ADMIN — LOSARTAN POTASSIUM 50 MG: 50 TABLET ORAL at 20:46

## 2019-10-10 RX ADMIN — METOPROLOL SUCCINATE 200 MG: 100 TABLET, EXTENDED RELEASE ORAL at 09:19

## 2019-10-10 NOTE — PLAN OF CARE
Problem: Patient Care Overview  Goal: Plan of Care Review  Outcome: Ongoing (interventions implemented as appropriate)   10/10/19 1203   Coping/Psychosocial   Plan of Care Reviewed With patient       Problem: Stroke (Ischemic) (Adult)  Goal: Signs and Symptoms of Listed Potential Problems Will be Absent, Minimized or Managed (Stroke)  Outcome: Ongoing (interventions implemented as appropriate)   10/10/19 1203   Goal/Outcome Evaluation   Problems Assessed (Stroke (Ischemic)) cognitive impairment;communication impairment   Problems Assessed (Stroke (Ischemic)) none   SLP evaluation completed. Will sign-off as speech, language and cognition are at baseline and WFL. Please see note for further details and recommendations.

## 2019-10-10 NOTE — THERAPY DISCHARGE NOTE
Acute Care - Speech Language Pathology Initial Eval/Discharge  [facility] Cognitive-Communication Evaluation       Patient Name: Vince Olivera  : 1960  MRN: [id number]  Today's Date: 10/10/2019  Onset of Illness/Injury or Date of Surgery: 10/08/19            Admit Date: 10/8/2019     Visit Dx:    ICD-10-CM ICD-9-CM   1. Impaired mobility and ADLs Z74.09 799.89   2. Aortic occlusion (CMS/HCC) I74.10 747.22     Patient Active Problem List   Diagnosis   • H/o Left MCA ischemic CVA 2017   • Essential hypertension   • Dyslipidemia   • Bilateral carotid artery stenosis   • Residual mild expressive Aphasia   • Gastroesophageal reflux disease without esophagitis   • Combined receptive and expressive aphasia due to cerebrovascular accident   • Abnormal peripheral vision of right eye   • GERD (gastroesophageal reflux disease)   • Hyperlipidemia   • Palpitations   • Bilateral Carotid artery stenosis. Left greater than right    • Internal carotid artery stenosis, left   • Status post left carotid endarterectomy   • Seizure disorder as sequela of cerebrovascular accident (CMS/HCC)   • Type 2 diabetes mellitus without complication, without long-term current use of insulin (CMS/HCC)   • Bilateral leg and foot pain   • Bilateral carpal tunnel syndrome   • PAD (peripheral artery disease) (CMS/HCC)   • CVA (cerebral vascular accident) (CMS/HCC), rule out    • Breakthrough seizure (CMS/HCC)   • Aortic occlusion (CMS/HCC)     Past Medical History:   Diagnosis Date   • Arthritis    • Carotid artery disease (CMS/HCC)    • Carotid artery disorder (CMS/HCC)    • Depression    • Diabetes mellitus (CMS/HCC)     DIAGNOSED  CHECKS FSBG 3X/WEEK    • Ear infection     about 2 weeks ago- cleared up - wax removed and cleaned out    • GERD (gastroesophageal reflux disease)     self diagnosed takes otc ppi   • Hyperlipidemia    • Hypertension    • Hypertension    • Inguinal hernia    • Seizure (CMS/HCC)     last 10/8/2019   •  Shoulder pain     bilat    • Stroke (CMS/[facility]) 04/24/2017    EXPRESSIVE APHASIA RESIDUAL    • Tooth loose     5 - all over- will get teeth done after this surgery    • Wears reading eyeglasses      Past Surgical History:   Procedure Laterality Date   • ABDOMINAL HERNIA REPAIR  2010   • CAROTID ENDARTERECTOMY Left 10/17/2017    Procedure: CAROTID ENDARTERECTOMY LEFT;  Surgeon: Alexx Bach MD;  Location: [facility] OR;  Service:    • INGUINAL HERNIA REPAIR Left           SLP EVALUATION (last 72 hours)      SLP SLC Evaluation     Row Name 10/10/19 1100                   Communication Assessment/Intervention    Document Type  evaluation  -        Subjective Information  no complaints  -        Patient Observations  alert;cooperative;agree to therapy  -        Patient/Family Observations  No family present  -        Patient Effort  good  -           General Information    Patient Profile Reviewed  yes  -        Pertinent History Of Current Problem  Pt. is a 58 y.o. male presenting originally to [facility] with complaint of seizure like activity and right sided weakness. MRI (-) , NIH 3. Patien with hx of CVA in 2017 with baseline aphasia and R visual deficit. SLC evaluation completed per stroke protocol.   -        Precautions/Limitations, Vision  WFL;for purposes of eval;vision impairment, right  -        Precautions/Limitations, Hearing  WFL;for purposes of eval  -        Prior Level of Function-Communication  expressive language impairment  -        Plans/Goals Discussed with  patient;agreed upon  -        Barriers to Rehab  none identified  -        Patient's Goals for Discharge  return to home  -           Pain Assessment    Additional Documentation  Pain Scale: FACES Pre/Post-Treatment (Group)  -           Pain Scale: FACES Pre/Post-Treatment    Pain: FACES Scale, Pretreatment  0-->no hurt  -        Pain: FACES Scale, Post-Treatment  0-->no hurt  -           Comprehension  Assessment/Intervention    Comprehension Assessment/Intervention  Auditory Comprehension  -CH           Auditory Comprehension Assessment/Intervention    Auditory Comprehension (Communication)  mild impairment  -CH        Able to Identify Objects/Pictures (Communication)  familiar objects;WFL  -CH        Answers Questions (Communication)  yes/no;wh questions;personal;simple;concrete;WFL  -CH        Able to Follow Commands (Communication)  1-step;2-step;WFL  -CH        Narrative Discourse  conversational level;WFL  -CH           Expression Assessment/Intervention    Expression Assessment/Intervention  verbal expression  -CH           Verbal Expression Assessment/Intervention    Verbal Expression  mild impairment;other (see comments) mild expressive language deficits at baseline from prior CVA  -CH        Automatic Speech (Communication)  WNL  -CH        Phrase Completion  WFL  -CH        Responsive Naming  mild impairment  -CH        Confrontational Naming  WFL  -CH        Conversational Discourse/Fluency  WFL  -           Oral Motor Structure and Function    Oral Motor Structure and Function  WFL  -           Oral Musculature and Cranial Nerve Assessment    Oral Motor General Assessment  WFL  -CH           Motor Speech Assessment/Intervention    Motor Speech Function  WNL  -CH           Cognitive Assessment Intervention- SLP    Cognitive Function (Cognition)  WFL  -        Orientation Status (Cognition)  awareness of basic personal information;person;place;time;situation;WNL  -CH        Memory (Cognitive)  simple;functional;immediate;short-term;long-term;WFL  -        Attention (Cognitive)  selective;sustained;WNL  -CH        Thought Organization (Cognitive)  concrete divergent;abstract divergent;concrete convergent;abstract convergent;verbal sequencing;WNL  -CH        Reasoning (Cognitive)  simple;deductive;WFL  -CH        Problem Solving (Cognitive)  simple;WNL  -CH        Functional Math (Cognitive)   simple;word problems;WFL  -        Executive Function (Cognition)  WF  -           SLP Clinical Impressions    SLP Diagnosis  Patient with mild expressive language deficits at baseline. No additional deficits noted with speech, language or cognition. Language is WFL and patient is able to make needs known without difficulty.   -[facility] Criteria for Skilled Therapy Interventions Met  no problems identified which require skilled intervention;baseline status  -        Functional Impact  no impact on function  -           Recommendations    Therapy Frequency (SLP SLC)  evaluation only  -        Anticipated Dischage Disposition  unknown  -           SLP Discharge Summary    Discharge Destination  unknown  -        Discharge Diagnostic Statement  Patient with mild expressive language deficits at baseline. No additional deficits noted with speech, language or cognition. Language is WFL and patient is able to make needs known without difficulty.   -        Progress Toward Achieving Short/long Term Goals  discharge on same date as initial evaluation  -        Reason for Discharge  all goals and outcomes met, no further needs identified  -          User Key  (r) = Recorded By, (t) = Taken By, (c) = Cosigned By    Initials Name Effective Dates     Nohemi Jones MS CCC-SLP 02/14/19 -            EDUCATION  The patient has been educated in the following areas:   Cognitive Impairment Communication Impairment.      SLP Recommendation and Plan  SLP Diagnosis: Patient with mild expressive language deficits at baseline. No additional deficits noted with speech, language or cognition. Language is WFL and patient is able to make needs known without difficulty.      [facility] Criteria for Skilled Therapy Interventions Met: no problems identified which require skilled intervention, baseline status  Anticipated Dischage Disposition: unknown                         Time Calculation:   Time Calculation- SLP      Row Name 10/10/19 1201             Time Calculation- SLP    SLP Start Time  1100  -      SLP Received On  10/10/19  -        User Key  (r) = Recorded By, (t) = Taken By, (c) = Cosigned By    Initials Name Provider Type    Nohemi Seymour MS CCC-SLP Speech and Language Pathologist          Therapy Charges for Today     Code Description Service Date Service Provider Modifiers Qty    [id number] HC ST EVAL SPEECH AND PROD W LANG  4 10/10/2019 Nohemi Jones MS CCC-SLP GN 1                   SLP Discharge Summary  Anticipated Dischage Disposition: unknown  Reason for Discharge: all goals and outcomes met, no further needs identified  Progress Toward Achieving Short/long Term Goals: discharge on same date as initial evaluation  Discharge Destination: unknown    Nohemi Jones MS CCC-SLP  10/10/2019

## 2019-10-10 NOTE — PROGRESS NOTES
[facility] Medicine Services  PROGRESS NOTE    Patient Name: Vince Olivera  : 1960  MRN: [id number]    Date of Admission: 10/8/2019  Primary Care Physician: Fortunato Marvin MD    Subjective   Subjective     CC:  F/U Seizure    HPI:  Pt seen and examined, nursing notes reviewed, no acute events overnight. Pt denies any seizure activity. States he feels well and is ready for surgery tomorrow.    Review of Systems  Gen- No fevers, chills  CV- No chest pain, palpitations  Resp- No cough, dyspnea  GI- No N/V/D, abd pain  All other systems reviewed and negative except per HPI.     Objective   Objective     Vital Signs:   Temp:  [97.7 °F (36.5 °C)-98.2 °F (36.8 °C)] 97.7 °F (36.5 °C)  Heart Rate:  [53-82] 61  Resp:  [16-18] 16  BP: ()/(62-89) 129/89  Total (NIH Stroke Scale): 3     Physical Exam:  Constitutional: No acute distress, awake, alert  HENT: NCAT, mucous membranes moist  Respiratory: Clear to auscultation bilaterally, respiratory effort normal   Cardiovascular: RRR, no murmurs, rubs, or gallops, decreased pedal pulses b/l  Gastrointestinal: Positive bowel sounds, soft, nontender, nondistended  Musculoskeletal: No bilateral ankle edema  Psychiatric: Appropriate affect, cooperative  Neurologic: Oriented x 3, strength symmetric in all extremities, Cranial Nerves grossly intact to confrontation, speech clear  Skin: No rashes    Results Reviewed:    Results from last 7 days   Lab Units 10/10/19  0458 10/09/19  1227 10/08/19  0937   WBC 10*3/mm3 10.64 10.79 13.98*   HEMOGLOBIN g/dL 15.8 15.2 16.3   HEMATOCRIT % 46.8 45.7 46.4   PLATELETS 10*3/mm3 236 259 274   INR   --   --  0.97     Results from last 7 days   Lab Units 10/10/19  0458 10/09/19  1227 10/08/19  0937   SODIUM mmol/L 138 137 134*   POTASSIUM mmol/L 4.2 3.9 3.7   CHLORIDE mmol/L 98 97* 92*   CO2 mmol/L 30.0* 28.0 23.9   BUN mg/dL 10 9 8   CREATININE mg/dL 0.96 0.78 0.83   GLUCOSE mg/dL 112* 166* 232*    CALCIUM mg/dL 9.0 9.3 9.7   ALT (SGPT) U/L  --   --  26   AST (SGOT) U/L  --   --  16   TROPONIN T ng/mL  --   --  <0.010     Estimated Creatinine Clearance: 88.5 mL/min (by C-G formula based on SCr of 0.96 mg/dL).    Microbiology Results Abnormal     None          Imaging Results (last 24 hours)     Procedure Component Value Units Date/Time    CT Chest With Contrast [[id number]] Collected:  10/10/19 0809     Updated:  10/10/19 0839    Narrative:       EXAMINATION: CT CHEST W CONTRAST-      INDICATION: Elevated right hemidiaphragm with atelectasis, rule out lung  cancer; I74.10-Embolism and thrombosis of unspecified parts of aorta.      TECHNIQUE: Post IV contrast 5 mm images through the chest and upper  abdomen.     The radiation dose reduction device was turned on for each scan per the  ALARA (As Low as Reasonably Achievable) protocol.     COMPARISON: None.     FINDINGS: Patient history indicates elevated right hemidiaphragm,  atelectasis.     Mediastinal images show no evidence of mass, adenopathy, pericardial or  pleural effusion. Thoracic aorta appears grossly normal. Lung window  images show only slight relative elevation of the right hemidiaphragm  compared to the left, and trace associated right middle lobe linear  scarring or atelectasis. There is minimal dependent atelectasis of the  posterior lower lobes. Lungs otherwise appear clear. Included images of  the upper abdomen show expected degree of fatty liver change for body  habitus. No significant abnormalities are noted of the gallbladder,  spleen, pancreatic tail, adrenal glands, or upper renal poles.  Incidental note is made of moderate multilevel lower thoracic and upper  lumbar degenerative disc disease.       Impression:       Minimal lung scarring as noted. No evidence of active chest  disease.     D:  10/10/2019  E:  10/10/2019             Results for orders placed during the hospital encounter of 10/08/19   Adult Transthoracic Echo Complete W/  Cont if Necessary Per Protocol (With Agitated Saline)    Narrative · Left ventricular systolic function is normal. Estimated EF = 65%.  · Left ventricular diastolic function is normal.          I have reviewed the medications:  Scheduled Meds:    amLODIPine 10 mg Oral Daily   aspirin  mg Oral Daily   atorvastatin 80 mg Oral Nightly   [START ON 10/11/2019] cefuroxime 1.5 g Intravenous Once   hydroCHLOROthiazide 12.5 mg Oral Daily   insulin lispro 0-9 Units Subcutaneous 4x Daily With Meals & Nightly   lacosamide 100 mg Oral Q12H   levETIRAcetam 1,500 mg Oral Q12H   losartan 50 mg Oral BID   metoprolol succinate  mg Oral Daily   pantoprazole 40 mg Oral QAM   sodium chloride 10 mL Intravenous Q12H     Continuous Infusions:   PRN Meds:.•  acetaminophen **OR** acetaminophen  •  dextrose  •  [MAR Hold] dextrose  •  glucagon (human recombinant)  •  LORazepam  •  melatonin  •  sodium chloride      Assessment/Plan   Assessment / Plan     Active Hospital Problems    Diagnosis  POA   • **CVA (cerebral vascular accident) (CMS/HCC), rule out  [I63.9]  Yes   • Breakthrough seizure (CMS/HCC) [G40.919]  Yes   • Aortic occlusion (CMS/HCC) [I74.10]  Unknown   • PAD (peripheral artery disease) (CMS/HCC) [I73.9]  Yes   • Bilateral leg and foot pain [M79.604, M79.605, M79.671, M79.672]  Unknown   • Type 2 diabetes mellitus without complication, without long-term current use of insulin (CMS/HCC) [E11.9]  Yes   • Seizure disorder as sequela of cerebrovascular accident (CMS/HCC) [I69.398, G40.909]  Not Applicable   • Status post left carotid endarterectomy [Z98.890]  Not Applicable   • Gastroesophageal reflux disease without esophagitis [K21.9]  Yes   • Dyslipidemia [E78.5]  Yes   • Bilateral carotid artery stenosis [I65.23]  Yes   • Essential hypertension [I10]  Yes   • H/o Left MCA ischemic CVA 4/2017 [I63.9]  Yes      Resolved Hospital Problems   No resolved problems to display.        Brief Hospital Course to date:  Vince LUKE  Jarod is a 58 y.o. male presenting originally to [facility] with complaint of seizure like activity and right sided weakness who was transferred to [facility] for higher level of care     CVA rule out  -neuro checks q 4 hours   -NIHSS q shift  -PT/OT have seen patient and signed off  -SLP to see patient today as he was out most of the day yesterday for testing  -ECHO obtained, normal EF, normal diastolic function, results reviewed and discussed with patient  -Carotid duplex obtained: Right internal carotid artery stenosis of 50-69%. Right ICA Prox: Calcified heterogeneous plaque present. Left internal carotid artery stenosis of 0-49%. results reviewed and discussed with patient. Pt aware of R carotid stenosis, great care should be taken to avoid hypotension intraoperatively.  -MRI brain: chronic changes, no acute abnormality, reviewed and discussed with patient  -Neurology following, appreciate recs   -Lipid panel shows elevated triglycerides, low HDL,  results reviewed and discussed with patient, will continue Lipitor 80mg  -Hga1c 6.6, diabetes educator consulted     Seizure disorder with breakthrough seizure  -previously well controlled on home medications, last seizure was May 2018  -Increased Vimpat to 100 mg twice daily   -EEG negative for epileptiform activity      PVD  -was scheduled for Ao-femoral bypass surgery 10/9  -Dr. Weaver following, plan for surgery Friday  -Plavix on hold      Dyslipidemia  -continue high dose atorvastatin      Diabetes Mellitus 2  -FSBG ACHS  -SS insulin   -A1c 6.6     Carotid artery disease  -s/p Left CEA  -per above     History of CVA  -plavix held for the past 5 days in prep for surgery, will continue to hold until after surgery, resume at discretion of CT surgery  -continue daily ASA      Hypertension   -controlled with amlodipine and losartan, will continue     GERD  -continue PPI    Bibasilar Atelectasis  -CT chest reviewed, results discussed with patient, scarring, no active chest  disease     DVT Prophylaxis:  Mechanical     Disposition: I expect the patient to be discharged home once medically stable    CODE STATUS:   Code Status and Medical Interventions:   Ordered at: 10/08/19 2144     Code Status:    CPR     Medical Interventions (Level of Support Prior to Arrest):    Full         Electronically signed by Lisa Hodges DO, 10/10/19, 9:47 AM.

## 2019-10-10 NOTE — PROGRESS NOTES
"   LOS: 2 days   Patient Care Team:  Fortunato Marvin MD as PCP - General (Family Medicine)  Oneida Augustin APRN as Nurse Practitioner (Neurology)  Alexx Bach MD as Consulting Physician (Neurosurgery)  Zena Alejandro as Speech Therapist (Speech Pathology)  Randi Llamas PTA as Physical Therapy Assistant (Physical Therapy)  Oneida Augustin APRN as Nurse Practitioner (Neurology)  Provider, No Known as Referring Physician  Keanu Sheth MD as Surgeon (Orthopedic Surgery)    Chief Complaint: seizure        History of Present Illness    Subjective    58 y.o. male seen in follow up for seizure.      History taken from: patient chart     Objective:  Vital signs: (most recent): Blood pressure 120/82, pulse 59, temperature 97.7 °F (36.5 °C), temperature source Oral, resp. rate 16, height 167.6 cm (66\"), weight 88.5 kg (195 lb), SpO2 96 %.    Neurological: Normal strength.            Neurological Exam  Mental Status  Awake, alert and oriented to person, place and time.    Cranial Nerves  CN III, IV, VI: Extraocular movements intact bilaterally.  CN VII: Full and symmetric facial movement.    Motor   Strength is 5/5 throughout all four extremities.    Coordination  Finger-to-nose, rapid alternating movements and heel-to-shin normal bilaterally without dysmetria.      Results Review:     I reviewed the patient's new clinical results.  I reviewed the patient's new imaging results and agree with the interpretation.  I reviewed the patient's other test results and agree with the interpretation    EEG - L temporal slowing        Assessment/Plan       CVA (cerebral vascular accident) (CMS/[facility]), rule out     H/o Left MCA ischemic CVA 4/2017    Essential hypertension    Dyslipidemia    Bilateral carotid artery stenosis    Gastroesophageal reflux disease without esophagitis    Status post left carotid endarterectomy    Seizure disorder as sequela of cerebrovascular " accident (CMS/HCC)    Type 2 diabetes mellitus without complication, without long-term current use of insulin (CMS/HCC)    Bilateral leg and foot pain    PAD (peripheral artery disease) (CMS/HCC)    Breakthrough seizure (CMS/HCC)    Aortic occlusion (CMS/HCC)    1.  Seizure disorder - continue Vimpat and Raymundo Rivas MD  10/10/19  12:58 PM

## 2019-10-10 NOTE — CONSULTS
Discussed and taught patient about type 2 diabetes self-management, risk factors, and importance of blood glucose control to reduce complications. He stated he has been diagnosed several years and has never had an a1C over 7.0%. He stated his daughter is IDDM and other family members as well. Target blood glucose readings and A1c goals per ADA were reviewed. Reviewed with patient current A1c and discussed its significance. Signs, symptoms and treatment of hyperglycemia and hypoglycemia were discussed. Lifestyle changes such as physical activity with MD approval and healthy eating were encouraged. Stressed the importance of strict blood sugar control after surgery to prevent complications such as infection and to promote healing of incision.  Pt instructed to  check blood sugar 4 times per day and to call PCP if  Blood glucose is higher than 180 two times or more.  Patient was encouraged to keep record of blood glucose readings to take to follow up appointment with PCP.  Pt was offered a free op follow up appointment however declined at this time.

## 2019-10-10 NOTE — PLAN OF CARE
Problem: Patient Care Overview  Goal: Plan of Care Review  Outcome: Ongoing (interventions implemented as appropriate)   10/10/19 1528   Coping/Psychosocial   Plan of Care Reviewed With patient   Plan of Care Review   Progress improving   OTHER   Outcome Summary VSS. Denies pain. Pt up at paula. Daughter at bedside. Pt scheduled for sx in am. No s/s of distress noted. Bed locked in low position, call bell in place, continue to monitor.        Problem: Stroke (Ischemic) (Adult)  Goal: Signs and Symptoms of Listed Potential Problems Will be Absent, Minimized or Managed (Stroke)  Outcome: Ongoing (interventions implemented as appropriate)   10/10/19 1528   Goal/Outcome Evaluation   Problems Assessed (Stroke (Ischemic)) cognitive impairment   Problems Assessed (Stroke (Ischemic)) none       Problem: Diabetes, Type 2 (Adult)  Goal: Signs and Symptoms of Listed Potential Problems Will be Absent, Minimized or Managed (Diabetes, Type 2)  Outcome: Ongoing (interventions implemented as appropriate)   10/10/19 1528   Goal/Outcome Evaluation   Problems Assessed (Type 2 Diabetes) all   Problems Present (Type 2 Diabetes) hyperglycemia       Problem: Seizure Disorder/Epilepsy (Adult)  Goal: Signs and Symptoms of Listed Potential Problems Will be Absent, Minimized or Managed (Seizure Disorder/Epilepsy)  Outcome: Ongoing (interventions implemented as appropriate)   10/09/19 1809 10/10/19 1528   Goal/Outcome Evaluation   Problems Assessed (Seizure Disorder/Epilepsy) all --    Problems Present (Seizure/Epilepsy) --  none

## 2019-10-10 NOTE — PROGRESS NOTES
Continued Stay Note  [facility]     Patient Name: Vince Olivera  MRN: [id number]  Today's Date: 10/10/2019    Admit Date: 10/8/2019    Discharge Plan     Row Name 10/10/19 1105       Plan    Plan  Home with family    Patient/Family in Agreement with Plan  yes    Plan Comments  Spoke to patient at bedside. Patient is having Fem-Pop surgery tomorrow. Denies any needs at this time. CM will continue to follow.    Final Discharge Disposition Code  01 - home or self-care        Discharge Codes    No documentation.             Elliott Mittal RN

## 2019-10-10 NOTE — PROGRESS NOTES
CTS Progress Note         LOS: 2 days     Chief Complaint:  I feel pretty good    Subjective  Aphasia and right visual deficit at baseline per patient.  Neurology consulted and cleared patient for surgery yesterday.  Patient lying in bed and states that he feels well.  Denies any other seizure activity.    Objective    Vital Signs  Temp:  [97.7 °F (36.5 °C)-98.3 °F (36.8 °C)] 97.7 °F (36.5 °C)  Heart Rate:  [53-82] 55  Resp:  [16-18] 16  BP: ()/(62-89) 129/89    Physical Exam:   General Appearance: alert, appears stated age and cooperative   Lungs: clear to auscultation     Heart: regular rhythm & normal rate, normal S1, S2 and no murmur, no gallop, no rub   Abdomen:  Soft, nontender, normoactive bowel sounds     Results   Results from last 7 days   Lab Units 10/10/19  0458   WBC 10*3/mm3 10.64   HEMOGLOBIN g/dL 15.8   HEMATOCRIT % 46.8   PLATELETS 10*3/mm3 236     Results from last 7 days   Lab Units 10/10/19  0458   SODIUM mmol/L 138   POTASSIUM mmol/L 4.2   CHLORIDE mmol/L 98   CO2 mmol/L 30.0*   BUN mg/dL 10   CREATININE mg/dL 0.96   GLUCOSE mg/dL 112*   CALCIUM mg/dL 9.0       Imaging Results (last 24 hours)     Procedure Component Value Units Date/Time    CT Chest With Contrast [[id number]] Updated:  10/09/19 1456        Carotid Duplex 10/9/19:  Interpretation Summary     · Right internal carotid artery stenosis of 50-69%. Right ICA Prox: Calcified heterogeneous plaque present.  · Left internal carotid artery stenosis of 0-49%.         Assessment    CVA (cerebral vascular accident) (CMS/[facility]), rule out     H/o Left MCA ischemic CVA 4/2017    Essential hypertension    Dyslipidemia    Bilateral carotid artery stenosis    Gastroesophageal reflux disease without esophagitis    Status post left carotid endarterectomy    Seizure disorder as sequela of cerebrovascular accident (CMS/[facility])    Type 2 diabetes mellitus without complication, without long-term current use of insulin (CMS/[facility])    Bilateral leg and foot  pain    PAD (peripheral artery disease) (CMS/HCC)    Breakthrough seizure (CMS/HCC)    Aortic occlusion (CMS/HCC)      Plan   Awaiting CT of chest official read  NPO after midnight for tentative aortobifemoral bypass tomorrow      Jessica Nogueira PA-C  10/10/19  8:01 AM

## 2019-10-11 ENCOUNTER — ANESTHESIA EVENT (OUTPATIENT)
Dept: PERIOP | Facility: HOSPITAL | Age: 59
End: 2019-10-11

## 2019-10-11 ENCOUNTER — ANESTHESIA (OUTPATIENT)
Dept: PERIOP | Facility: HOSPITAL | Age: 59
End: 2019-10-11

## 2019-10-11 LAB
ANION GAP SERPL CALCULATED.3IONS-SCNC: 15 MMOL/L (ref 5–15)
ANION GAP SERPL CALCULATED.3IONS-SCNC: 7 MMOL/L (ref 5–15)
BUN BLD-MCNC: 9 MG/DL (ref 6–20)
BUN BLD-MCNC: 9 MG/DL (ref 6–20)
BUN/CREAT SERPL: 12.3 (ref 7–25)
BUN/CREAT SERPL: 14.1 (ref 7–25)
CALCIUM SPEC-SCNC: 7.8 MG/DL (ref 8.6–10.5)
CALCIUM SPEC-SCNC: 9 MG/DL (ref 8.6–10.5)
CHLORIDE SERPL-SCNC: 106 MMOL/L (ref 98–107)
CHLORIDE SERPL-SCNC: 99 MMOL/L (ref 98–107)
CO2 SERPL-SCNC: 24 MMOL/L (ref 22–29)
CO2 SERPL-SCNC: 25 MMOL/L (ref 22–29)
CREAT BLD-MCNC: 0.64 MG/DL (ref 0.76–1.27)
CREAT BLD-MCNC: 0.73 MG/DL (ref 0.76–1.27)
D-LACTATE SERPL-SCNC: 1.8 MMOL/L (ref 0.5–2)
DEPRECATED RDW RBC AUTO: 43.2 FL (ref 37–54)
DEPRECATED RDW RBC AUTO: 44.3 FL (ref 37–54)
ERYTHROCYTE [DISTWIDTH] IN BLOOD BY AUTOMATED COUNT: 12.8 % (ref 12.3–15.4)
ERYTHROCYTE [DISTWIDTH] IN BLOOD BY AUTOMATED COUNT: 12.9 % (ref 12.3–15.4)
GFR SERPL CREATININE-BSD FRML MDRD: 111 ML/MIN/1.73
GFR SERPL CREATININE-BSD FRML MDRD: 129 ML/MIN/1.73
GLUCOSE BLD-MCNC: 155 MG/DL (ref 65–99)
GLUCOSE BLD-MCNC: 171 MG/DL (ref 65–99)
GLUCOSE BLDC GLUCOMTR-MCNC: 161 MG/DL (ref 70–130)
GLUCOSE BLDC GLUCOMTR-MCNC: 166 MG/DL (ref 70–130)
GLUCOSE BLDC GLUCOMTR-MCNC: 168 MG/DL (ref 70–130)
GLUCOSE BLDC GLUCOMTR-MCNC: 183 MG/DL (ref 70–130)
HCT VFR BLD AUTO: 32.5 % (ref 37.5–51)
HCT VFR BLD AUTO: 35.7 % (ref 37.5–51)
HCT VFR BLD AUTO: 46.9 % (ref 37.5–51)
HGB BLD-MCNC: 10.8 G/DL (ref 13–17.7)
HGB BLD-MCNC: 11.6 G/DL (ref 13–17.7)
HGB BLD-MCNC: 15.8 G/DL (ref 13–17.7)
MCH RBC QN AUTO: 30.6 PG (ref 26.6–33)
MCH RBC QN AUTO: 30.9 PG (ref 26.6–33)
MCHC RBC AUTO-ENTMCNC: 32.5 G/DL (ref 31.5–35.7)
MCHC RBC AUTO-ENTMCNC: 33.7 G/DL (ref 31.5–35.7)
MCV RBC AUTO: 91.6 FL (ref 79–97)
MCV RBC AUTO: 94.2 FL (ref 79–97)
PLATELET # BLD AUTO: 243 10*3/MM3 (ref 140–450)
PLATELET # BLD AUTO: 266 10*3/MM3 (ref 140–450)
PMV BLD AUTO: 8.6 FL (ref 6–12)
PMV BLD AUTO: 8.7 FL (ref 6–12)
POTASSIUM BLD-SCNC: 4 MMOL/L (ref 3.5–5.2)
POTASSIUM BLD-SCNC: 4.2 MMOL/L (ref 3.5–5.2)
RBC # BLD AUTO: 3.79 10*6/MM3 (ref 4.14–5.8)
RBC # BLD AUTO: 5.12 10*6/MM3 (ref 4.14–5.8)
SODIUM BLD-SCNC: 138 MMOL/L (ref 136–145)
SODIUM BLD-SCNC: 138 MMOL/L (ref 136–145)
WBC NRBC COR # BLD: 15.14 10*3/MM3 (ref 3.4–10.8)
WBC NRBC COR # BLD: 9.59 10*3/MM3 (ref 3.4–10.8)

## 2019-10-11 PROCEDURE — P9041 ALBUMIN (HUMAN),5%, 50ML: HCPCS | Performed by: NURSE ANESTHETIST, CERTIFIED REGISTERED

## 2019-10-11 PROCEDURE — 83605 ASSAY OF LACTIC ACID: CPT | Performed by: PHYSICIAN ASSISTANT

## 2019-10-11 PROCEDURE — 25010000002 DEXAMETHASONE PER 1 MG: Performed by: NURSE ANESTHETIST, CERTIFIED REGISTERED

## 2019-10-11 PROCEDURE — 99233 SBSQ HOSP IP/OBS HIGH 50: CPT | Performed by: INTERNAL MEDICINE

## 2019-10-11 PROCEDURE — 04100JK BYPASS ABDOMINAL AORTA TO BILATERAL FEMORAL ARTERIES WITH SYNTHETIC SUBSTITUTE, OPEN APPROACH: ICD-10-PCS | Performed by: THORACIC SURGERY (CARDIOTHORACIC VASCULAR SURGERY)

## 2019-10-11 PROCEDURE — 85027 COMPLETE CBC AUTOMATED: CPT | Performed by: FAMILY MEDICINE

## 2019-10-11 PROCEDURE — 80048 BASIC METABOLIC PNL TOTAL CA: CPT | Performed by: FAMILY MEDICINE

## 2019-10-11 PROCEDURE — 80048 BASIC METABOLIC PNL TOTAL CA: CPT | Performed by: PHYSICIAN ASSISTANT

## 2019-10-11 PROCEDURE — 35646 BPG AORTOBIFEMORAL: CPT | Performed by: THORACIC SURGERY (CARDIOTHORACIC VASCULAR SURGERY)

## 2019-10-11 PROCEDURE — 85014 HEMATOCRIT: CPT | Performed by: INTERNAL MEDICINE

## 2019-10-11 PROCEDURE — 88304 TISSUE EXAM BY PATHOLOGIST: CPT | Performed by: THORACIC SURGERY (CARDIOTHORACIC VASCULAR SURGERY)

## 2019-10-11 PROCEDURE — 25010000003 CEFUROXIME SODIUM 1.5 G RECONSTITUTED SOLUTION: Performed by: PHYSICIAN ASSISTANT

## 2019-10-11 PROCEDURE — 04CL0ZZ EXTIRPATION OF MATTER FROM LEFT FEMORAL ARTERY, OPEN APPROACH: ICD-10-PCS | Performed by: THORACIC SURGERY (CARDIOTHORACIC VASCULAR SURGERY)

## 2019-10-11 PROCEDURE — 25010000002 ALBUMIN HUMAN 5% PER 50 ML: Performed by: NURSE ANESTHETIST, CERTIFIED REGISTERED

## 2019-10-11 PROCEDURE — C1755 CATHETER, INTRASPINAL: HCPCS | Performed by: ANESTHESIOLOGY

## 2019-10-11 PROCEDURE — 25010000002 HEPARIN (PORCINE) PER 1000 UNITS: Performed by: NURSE ANESTHETIST, CERTIFIED REGISTERED

## 2019-10-11 PROCEDURE — 85018 HEMOGLOBIN: CPT | Performed by: INTERNAL MEDICINE

## 2019-10-11 PROCEDURE — 25010000002 PROTAMINE SULFATE PER 10 MG: Performed by: NURSE ANESTHETIST, CERTIFIED REGISTERED

## 2019-10-11 PROCEDURE — 88311 DECALCIFY TISSUE: CPT | Performed by: THORACIC SURGERY (CARDIOTHORACIC VASCULAR SURGERY)

## 2019-10-11 PROCEDURE — 25010000002 CEFUROXIME: Performed by: PHYSICIAN ASSISTANT

## 2019-10-11 PROCEDURE — 85027 COMPLETE CBC AUTOMATED: CPT | Performed by: PHYSICIAN ASSISTANT

## 2019-10-11 PROCEDURE — 25010000002 NEOSTIGMINE 10 MG/10ML SOLUTION: Performed by: NURSE ANESTHETIST, CERTIFIED REGISTERED

## 2019-10-11 PROCEDURE — 04CK0ZZ EXTIRPATION OF MATTER FROM RIGHT FEMORAL ARTERY, OPEN APPROACH: ICD-10-PCS | Performed by: THORACIC SURGERY (CARDIOTHORACIC VASCULAR SURGERY)

## 2019-10-11 PROCEDURE — 82962 GLUCOSE BLOOD TEST: CPT

## 2019-10-11 PROCEDURE — 25010000003 MORPHINE PER 10 MG: Performed by: NURSE ANESTHETIST, CERTIFIED REGISTERED

## 2019-10-11 PROCEDURE — 25010000002 MORPHINE PER 10 MG: Performed by: NURSE ANESTHETIST, CERTIFIED REGISTERED

## 2019-10-11 PROCEDURE — C1768 GRAFT, VASCULAR: HCPCS | Performed by: THORACIC SURGERY (CARDIOTHORACIC VASCULAR SURGERY)

## 2019-10-11 PROCEDURE — 25010000002 HEPARIN (PORCINE) PER 1000 UNITS: Performed by: THORACIC SURGERY (CARDIOTHORACIC VASCULAR SURGERY)

## 2019-10-11 PROCEDURE — 25010000002 PROPOFOL 10 MG/ML EMULSION: Performed by: NURSE ANESTHETIST, CERTIFIED REGISTERED

## 2019-10-11 DEVICE — IMPLANTABLE DEVICE: Type: IMPLANTABLE DEVICE | Site: AORTA | Status: FUNCTIONAL

## 2019-10-11 DEVICE — CLIP LIGAT VASC HORIZON TI LG ORNG 6CT: Type: IMPLANTABLE DEVICE | Status: FUNCTIONAL

## 2019-10-11 DEVICE — CLIP LIGAT VASC HORIZON TI MD/LG GRN 6CT: Type: IMPLANTABLE DEVICE | Status: FUNCTIONAL

## 2019-10-11 RX ORDER — ATRACURIUM BESYLATE 10 MG/ML
INJECTION, SOLUTION INTRAVENOUS AS NEEDED
Status: DISCONTINUED | OUTPATIENT
Start: 2019-10-11 | End: 2019-10-11 | Stop reason: SURG

## 2019-10-11 RX ORDER — SODIUM CHLORIDE 0.9 % (FLUSH) 0.9 %
1-10 SYRINGE (ML) INJECTION AS NEEDED
Status: DISCONTINUED | OUTPATIENT
Start: 2019-10-11 | End: 2019-10-14

## 2019-10-11 RX ORDER — MORPHINE SULFATE 4 MG/ML
2 INJECTION, SOLUTION INTRAMUSCULAR; INTRAVENOUS
Status: DISCONTINUED | OUTPATIENT
Start: 2019-10-11 | End: 2019-10-16 | Stop reason: HOSPADM

## 2019-10-11 RX ORDER — GLYCOPYRROLATE 0.2 MG/ML
INJECTION INTRAMUSCULAR; INTRAVENOUS AS NEEDED
Status: DISCONTINUED | OUTPATIENT
Start: 2019-10-11 | End: 2019-10-11 | Stop reason: SURG

## 2019-10-11 RX ORDER — SODIUM CHLORIDE 9 MG/ML
INJECTION, SOLUTION INTRAVENOUS CONTINUOUS PRN
Status: DISCONTINUED | OUTPATIENT
Start: 2019-10-11 | End: 2019-10-11 | Stop reason: SURG

## 2019-10-11 RX ORDER — SODIUM CHLORIDE 0.9 % (FLUSH) 0.9 %
3 SYRINGE (ML) INJECTION EVERY 12 HOURS SCHEDULED
Status: DISCONTINUED | OUTPATIENT
Start: 2019-10-11 | End: 2019-10-11 | Stop reason: HOSPADM

## 2019-10-11 RX ORDER — NEOSTIGMINE METHYLSULFATE 1 MG/ML
INJECTION, SOLUTION INTRAVENOUS AS NEEDED
Status: DISCONTINUED | OUTPATIENT
Start: 2019-10-11 | End: 2019-10-11 | Stop reason: SURG

## 2019-10-11 RX ORDER — FAMOTIDINE 20 MG/1
20 TABLET, FILM COATED ORAL ONCE
Status: COMPLETED | OUTPATIENT
Start: 2019-10-11 | End: 2019-10-11

## 2019-10-11 RX ORDER — ONDANSETRON 2 MG/ML
4 INJECTION INTRAMUSCULAR; INTRAVENOUS EVERY 6 HOURS PRN
Status: DISCONTINUED | OUTPATIENT
Start: 2019-10-11 | End: 2019-10-16 | Stop reason: HOSPADM

## 2019-10-11 RX ORDER — MORPHINE SULFATE 0.5 MG/ML
INJECTION, SOLUTION EPIDURAL; INTRATHECAL; INTRAVENOUS AS NEEDED
Status: DISCONTINUED | OUTPATIENT
Start: 2019-10-11 | End: 2019-10-11 | Stop reason: SURG

## 2019-10-11 RX ORDER — OXYCODONE HYDROCHLORIDE AND ACETAMINOPHEN 5; 325 MG/1; MG/1
1 TABLET ORAL EVERY 4 HOURS PRN
Status: DISCONTINUED | OUTPATIENT
Start: 2019-10-11 | End: 2019-10-11 | Stop reason: HOSPADM

## 2019-10-11 RX ORDER — SODIUM CHLORIDE, SODIUM LACTATE, POTASSIUM CHLORIDE, CALCIUM CHLORIDE 600; 310; 30; 20 MG/100ML; MG/100ML; MG/100ML; MG/100ML
9 INJECTION, SOLUTION INTRAVENOUS CONTINUOUS
Status: DISCONTINUED | OUTPATIENT
Start: 2019-10-11 | End: 2019-10-12 | Stop reason: SDUPTHER

## 2019-10-11 RX ORDER — ONDANSETRON 2 MG/ML
4 INJECTION INTRAMUSCULAR; INTRAVENOUS EVERY 6 HOURS PRN
Status: DISCONTINUED | OUTPATIENT
Start: 2019-10-11 | End: 2019-10-11 | Stop reason: HOSPADM

## 2019-10-11 RX ORDER — ACETAMINOPHEN 500 MG
1000 TABLET ORAL ONCE
Status: COMPLETED | OUTPATIENT
Start: 2019-10-11 | End: 2019-10-11

## 2019-10-11 RX ORDER — FAMOTIDINE 10 MG/ML
20 INJECTION, SOLUTION INTRAVENOUS ONCE
Status: CANCELLED | OUTPATIENT
Start: 2019-10-11 | End: 2019-10-11

## 2019-10-11 RX ORDER — SODIUM CHLORIDE, SODIUM LACTATE, POTASSIUM CHLORIDE, CALCIUM CHLORIDE 600; 310; 30; 20 MG/100ML; MG/100ML; MG/100ML; MG/100ML
INJECTION, SOLUTION INTRAVENOUS CONTINUOUS PRN
Status: DISCONTINUED | OUTPATIENT
Start: 2019-10-11 | End: 2019-10-11 | Stop reason: SURG

## 2019-10-11 RX ORDER — HYDRALAZINE HYDROCHLORIDE 20 MG/ML
5 INJECTION INTRAMUSCULAR; INTRAVENOUS
Status: DISCONTINUED | OUTPATIENT
Start: 2019-10-11 | End: 2019-10-11 | Stop reason: HOSPADM

## 2019-10-11 RX ORDER — HYDROMORPHONE HYDROCHLORIDE 1 MG/ML
0.25 INJECTION, SOLUTION INTRAMUSCULAR; INTRAVENOUS; SUBCUTANEOUS
Status: DISCONTINUED | OUTPATIENT
Start: 2019-10-11 | End: 2019-10-11 | Stop reason: HOSPADM

## 2019-10-11 RX ORDER — SODIUM CHLORIDE 0.9 % (FLUSH) 0.9 %
3-10 SYRINGE (ML) INJECTION AS NEEDED
Status: DISCONTINUED | OUTPATIENT
Start: 2019-10-11 | End: 2019-10-11 | Stop reason: HOSPADM

## 2019-10-11 RX ORDER — SODIUM CHLORIDE 9 MG/ML
INJECTION, SOLUTION INTRAVENOUS AS NEEDED
Status: DISCONTINUED | OUTPATIENT
Start: 2019-10-11 | End: 2019-10-11 | Stop reason: HOSPADM

## 2019-10-11 RX ORDER — IPRATROPIUM BROMIDE AND ALBUTEROL SULFATE 2.5; .5 MG/3ML; MG/3ML
3 SOLUTION RESPIRATORY (INHALATION) ONCE AS NEEDED
Status: DISCONTINUED | OUTPATIENT
Start: 2019-10-11 | End: 2019-10-11 | Stop reason: HOSPADM

## 2019-10-11 RX ORDER — HYDROCODONE BITARTRATE AND ACETAMINOPHEN 5; 325 MG/1; MG/1
1 TABLET ORAL ONCE AS NEEDED
Status: DISCONTINUED | OUTPATIENT
Start: 2019-10-11 | End: 2019-10-11 | Stop reason: HOSPADM

## 2019-10-11 RX ORDER — PROTAMINE SULFATE 10 MG/ML
INJECTION, SOLUTION INTRAVENOUS AS NEEDED
Status: DISCONTINUED | OUTPATIENT
Start: 2019-10-11 | End: 2019-10-11 | Stop reason: SURG

## 2019-10-11 RX ORDER — MORPHINE SULFATE 4 MG/ML
4 INJECTION, SOLUTION INTRAMUSCULAR; INTRAVENOUS
Status: DISCONTINUED | OUTPATIENT
Start: 2019-10-11 | End: 2019-10-16 | Stop reason: HOSPADM

## 2019-10-11 RX ORDER — ONDANSETRON 4 MG/1
4 TABLET, FILM COATED ORAL EVERY 6 HOURS PRN
Status: DISCONTINUED | OUTPATIENT
Start: 2019-10-11 | End: 2019-10-16 | Stop reason: HOSPADM

## 2019-10-11 RX ORDER — LIDOCAINE HYDROCHLORIDE 10 MG/ML
0.5 INJECTION, SOLUTION EPIDURAL; INFILTRATION; INTRACAUDAL; PERINEURAL ONCE AS NEEDED
Status: DISCONTINUED | OUTPATIENT
Start: 2019-10-11 | End: 2019-10-11 | Stop reason: HOSPADM

## 2019-10-11 RX ORDER — DEXAMETHASONE SODIUM PHOSPHATE 4 MG/ML
INJECTION, SOLUTION INTRA-ARTICULAR; INTRALESIONAL; INTRAMUSCULAR; INTRAVENOUS; SOFT TISSUE AS NEEDED
Status: DISCONTINUED | OUTPATIENT
Start: 2019-10-11 | End: 2019-10-11 | Stop reason: SURG

## 2019-10-11 RX ORDER — SODIUM CHLORIDE 450 MG/100ML
75 INJECTION, SOLUTION INTRAVENOUS CONTINUOUS
Status: DISCONTINUED | OUTPATIENT
Start: 2019-10-11 | End: 2019-10-14

## 2019-10-11 RX ORDER — NALOXONE HCL 0.4 MG/ML
0.4 VIAL (ML) INJECTION
Status: DISCONTINUED | OUTPATIENT
Start: 2019-10-11 | End: 2019-10-11 | Stop reason: HOSPADM

## 2019-10-11 RX ORDER — IPRATROPIUM BROMIDE AND ALBUTEROL SULFATE 2.5; .5 MG/3ML; MG/3ML
3 SOLUTION RESPIRATORY (INHALATION) EVERY 6 HOURS PRN
Status: DISCONTINUED | OUTPATIENT
Start: 2019-10-11 | End: 2019-10-16 | Stop reason: HOSPADM

## 2019-10-11 RX ORDER — PROMETHAZINE HYDROCHLORIDE 25 MG/ML
6.25 INJECTION, SOLUTION INTRAMUSCULAR; INTRAVENOUS ONCE AS NEEDED
Status: DISCONTINUED | OUTPATIENT
Start: 2019-10-11 | End: 2019-10-11 | Stop reason: HOSPADM

## 2019-10-11 RX ORDER — SODIUM CHLORIDE 0.9 % (FLUSH) 0.9 %
10 SYRINGE (ML) INJECTION EVERY 12 HOURS SCHEDULED
Status: DISCONTINUED | OUTPATIENT
Start: 2019-10-11 | End: 2019-10-14

## 2019-10-11 RX ORDER — HEPARIN SODIUM 1000 [USP'U]/ML
INJECTION, SOLUTION INTRAVENOUS; SUBCUTANEOUS AS NEEDED
Status: DISCONTINUED | OUTPATIENT
Start: 2019-10-11 | End: 2019-10-11 | Stop reason: SURG

## 2019-10-11 RX ORDER — NALOXONE HCL 0.4 MG/ML
0.4 VIAL (ML) INJECTION AS NEEDED
Status: DISCONTINUED | OUTPATIENT
Start: 2019-10-11 | End: 2019-10-11 | Stop reason: HOSPADM

## 2019-10-11 RX ORDER — BUPIVACAINE HYDROCHLORIDE 2.5 MG/ML
INJECTION, SOLUTION EPIDURAL; INFILTRATION; INTRACAUDAL AS NEEDED
Status: DISCONTINUED | OUTPATIENT
Start: 2019-10-11 | End: 2019-10-11 | Stop reason: SURG

## 2019-10-11 RX ORDER — LABETALOL HYDROCHLORIDE 5 MG/ML
5 INJECTION, SOLUTION INTRAVENOUS
Status: DISCONTINUED | OUTPATIENT
Start: 2019-10-11 | End: 2019-10-11 | Stop reason: HOSPADM

## 2019-10-11 RX ORDER — MEPERIDINE HYDROCHLORIDE 25 MG/ML
12.5 INJECTION INTRAMUSCULAR; INTRAVENOUS; SUBCUTANEOUS
Status: DISCONTINUED | OUTPATIENT
Start: 2019-10-11 | End: 2019-10-11 | Stop reason: HOSPADM

## 2019-10-11 RX ORDER — PROMETHAZINE HYDROCHLORIDE 25 MG/1
25 TABLET ORAL ONCE AS NEEDED
Status: DISCONTINUED | OUTPATIENT
Start: 2019-10-11 | End: 2019-10-11 | Stop reason: HOSPADM

## 2019-10-11 RX ORDER — PREGABALIN 75 MG/1
75 CAPSULE ORAL ONCE
Status: COMPLETED | OUTPATIENT
Start: 2019-10-11 | End: 2019-10-11

## 2019-10-11 RX ORDER — PROPOFOL 10 MG/ML
VIAL (ML) INTRAVENOUS AS NEEDED
Status: DISCONTINUED | OUTPATIENT
Start: 2019-10-11 | End: 2019-10-11 | Stop reason: SURG

## 2019-10-11 RX ORDER — HYDROCODONE BITARTRATE AND ACETAMINOPHEN 7.5; 325 MG/1; MG/1
1 TABLET ORAL EVERY 4 HOURS PRN
Status: DISCONTINUED | OUTPATIENT
Start: 2019-10-11 | End: 2019-10-16 | Stop reason: HOSPADM

## 2019-10-11 RX ORDER — HYDROMORPHONE HYDROCHLORIDE 1 MG/ML
0.5 INJECTION, SOLUTION INTRAMUSCULAR; INTRAVENOUS; SUBCUTANEOUS
Status: DISCONTINUED | OUTPATIENT
Start: 2019-10-11 | End: 2019-10-11 | Stop reason: HOSPADM

## 2019-10-11 RX ORDER — PROMETHAZINE HYDROCHLORIDE 25 MG/1
25 SUPPOSITORY RECTAL ONCE AS NEEDED
Status: DISCONTINUED | OUTPATIENT
Start: 2019-10-11 | End: 2019-10-11 | Stop reason: HOSPADM

## 2019-10-11 RX ORDER — ONDANSETRON 2 MG/ML
4 INJECTION INTRAMUSCULAR; INTRAVENOUS ONCE AS NEEDED
Status: DISCONTINUED | OUTPATIENT
Start: 2019-10-11 | End: 2019-10-11 | Stop reason: HOSPADM

## 2019-10-11 RX ORDER — PHENYLEPHRINE HCL IN 0.9% NACL 0.5 MG/5ML
.5-3 SYRINGE (ML) INTRAVENOUS
Status: DISCONTINUED | OUTPATIENT
Start: 2019-10-11 | End: 2019-10-16 | Stop reason: HOSPADM

## 2019-10-11 RX ORDER — PROMETHAZINE HYDROCHLORIDE 25 MG/ML
6.25 INJECTION, SOLUTION INTRAMUSCULAR; INTRAVENOUS EVERY 4 HOURS PRN
Status: DISCONTINUED | OUTPATIENT
Start: 2019-10-11 | End: 2019-10-11 | Stop reason: HOSPADM

## 2019-10-11 RX ORDER — FENTANYL CITRATE 50 UG/ML
50 INJECTION, SOLUTION INTRAMUSCULAR; INTRAVENOUS
Status: DISCONTINUED | OUTPATIENT
Start: 2019-10-11 | End: 2019-10-11 | Stop reason: HOSPADM

## 2019-10-11 RX ORDER — ALBUMIN, HUMAN INJ 5% 5 %
SOLUTION INTRAVENOUS CONTINUOUS PRN
Status: DISCONTINUED | OUTPATIENT
Start: 2019-10-11 | End: 2019-10-11 | Stop reason: SURG

## 2019-10-11 RX ADMIN — SODIUM CHLORIDE, POTASSIUM CHLORIDE, SODIUM LACTATE AND CALCIUM CHLORIDE: 600; 310; 30; 20 INJECTION, SOLUTION INTRAVENOUS at 14:15

## 2019-10-11 RX ADMIN — LEVETIRACETAM 1500 MG: 750 TABLET, FILM COATED ORAL at 08:49

## 2019-10-11 RX ADMIN — PHENYLEPHRINE HYDROCHLORIDE 0.5 MCG/KG/MIN: 10 INJECTION INTRAVENOUS at 14:28

## 2019-10-11 RX ADMIN — ATRACURIUM BESYLATE 20 MG: 10 INJECTION, SOLUTION INTRAVENOUS at 10:24

## 2019-10-11 RX ADMIN — SODIUM CHLORIDE: 9 INJECTION, SOLUTION INTRAVENOUS at 11:47

## 2019-10-11 RX ADMIN — SODIUM CHLORIDE: 9 INJECTION, SOLUTION INTRAVENOUS at 09:30

## 2019-10-11 RX ADMIN — ATRACURIUM BESYLATE 50 MG: 10 INJECTION, SOLUTION INTRAVENOUS at 09:37

## 2019-10-11 RX ADMIN — BUPIVACAINE HYDROCHLORIDE 2.5 ML: 2.5 INJECTION, SOLUTION EPIDURAL; INFILTRATION; INTRACAUDAL; PERINEURAL at 10:50

## 2019-10-11 RX ADMIN — ALBUMIN HUMAN: 0.05 INJECTION, SOLUTION INTRAVENOUS at 12:30

## 2019-10-11 RX ADMIN — EPHEDRINE SULFATE 10 MG: 50 INJECTION INTRAMUSCULAR; INTRAVENOUS; SUBCUTANEOUS at 10:34

## 2019-10-11 RX ADMIN — ALBUMIN HUMAN: 0.05 INJECTION, SOLUTION INTRAVENOUS at 12:04

## 2019-10-11 RX ADMIN — BUPIVACAINE HYDROCHLORIDE: 5 INJECTION, SOLUTION EPIDURAL; INTRACAUDAL; PERINEURAL at 14:53

## 2019-10-11 RX ADMIN — CEFUROXIME SODIUM 1.5 G: 1.5 INJECTION, POWDER, FOR SOLUTION INTRAVENOUS at 18:44

## 2019-10-11 RX ADMIN — SODIUM CHLORIDE, PRESERVATIVE FREE 10 ML: 5 INJECTION INTRAVENOUS at 20:51

## 2019-10-11 RX ADMIN — EPHEDRINE SULFATE 7.5 MG: 50 INJECTION INTRAMUSCULAR; INTRAVENOUS; SUBCUTANEOUS at 12:22

## 2019-10-11 RX ADMIN — NEOSTIGMINE METHYLSULFATE 2.5 MG: 1 INJECTION, SOLUTION INTRAVENOUS at 13:50

## 2019-10-11 RX ADMIN — LACOSAMIDE 100 MG: 50 TABLET, FILM COATED ORAL at 08:31

## 2019-10-11 RX ADMIN — LOSARTAN POTASSIUM 50 MG: 50 TABLET ORAL at 20:50

## 2019-10-11 RX ADMIN — SODIUM CHLORIDE, POTASSIUM CHLORIDE, SODIUM LACTATE AND CALCIUM CHLORIDE: 600; 310; 30; 20 INJECTION, SOLUTION INTRAVENOUS at 12:04

## 2019-10-11 RX ADMIN — ATRACURIUM BESYLATE 10 MG: 10 INJECTION, SOLUTION INTRAVENOUS at 13:08

## 2019-10-11 RX ADMIN — PREGABALIN 75 MG: 75 CAPSULE ORAL at 09:10

## 2019-10-11 RX ADMIN — BUPIVACAINE HYDROCHLORIDE: 5 INJECTION, SOLUTION EPIDURAL; INTRACAUDAL; PERINEURAL at 19:58

## 2019-10-11 RX ADMIN — ACETAMINOPHEN 1000 MG: 500 TABLET ORAL at 09:10

## 2019-10-11 RX ADMIN — GLYCOPYRROLATE 0.4 MG: 0.2 INJECTION, SOLUTION INTRAMUSCULAR; INTRAVENOUS at 13:50

## 2019-10-11 RX ADMIN — SODIUM CHLORIDE, POTASSIUM CHLORIDE, SODIUM LACTATE AND CALCIUM CHLORIDE 9 ML/HR: 600; 310; 30; 20 INJECTION, SOLUTION INTRAVENOUS at 08:54

## 2019-10-11 RX ADMIN — SODIUM CHLORIDE, POTASSIUM CHLORIDE, SODIUM LACTATE AND CALCIUM CHLORIDE: 600; 310; 30; 20 INJECTION, SOLUTION INTRAVENOUS at 13:15

## 2019-10-11 RX ADMIN — CEFUROXIME 1.5 G: 1.5 INJECTION, POWDER, FOR SOLUTION INTRAVENOUS at 09:30

## 2019-10-11 RX ADMIN — PROPOFOL 150 MG: 10 INJECTION, EMULSION INTRAVENOUS at 09:37

## 2019-10-11 RX ADMIN — PROPOFOL 30 MCG/KG/MIN: 10 INJECTION, EMULSION INTRAVENOUS at 10:50

## 2019-10-11 RX ADMIN — LACOSAMIDE 100 MG: 50 TABLET, FILM COATED ORAL at 20:50

## 2019-10-11 RX ADMIN — FAMOTIDINE 20 MG: 20 TABLET ORAL at 08:50

## 2019-10-11 RX ADMIN — HEPARIN SODIUM 7500 UNITS: 1000 INJECTION, SOLUTION INTRAVENOUS; SUBCUTANEOUS at 11:22

## 2019-10-11 RX ADMIN — SODIUM CHLORIDE 125 ML/HR: 4.5 INJECTION, SOLUTION INTRAVENOUS at 18:45

## 2019-10-11 RX ADMIN — HEPARIN SODIUM 5000 UNITS: 1000 INJECTION, SOLUTION INTRAVENOUS; SUBCUTANEOUS at 12:08

## 2019-10-11 RX ADMIN — MORPHINE SULFATE 1.5 MG: 0.5 INJECTION, SOLUTION EPIDURAL; INTRATHECAL; INTRAVENOUS at 13:52

## 2019-10-11 RX ADMIN — DEXAMETHASONE SODIUM PHOSPHATE 4 MG: 4 INJECTION, SOLUTION INTRAMUSCULAR; INTRAVENOUS at 09:52

## 2019-10-11 RX ADMIN — PROTAMINE SULFATE 50 MG: 10 INJECTION, SOLUTION INTRAVENOUS at 13:10

## 2019-10-11 RX ADMIN — ATORVASTATIN CALCIUM 80 MG: 40 TABLET, FILM COATED ORAL at 20:50

## 2019-10-11 RX ADMIN — BUPIVACAINE HYDROCHLORIDE 2.5 ML: 2.5 INJECTION, SOLUTION EPIDURAL; INFILTRATION; INTRACAUDAL; PERINEURAL at 10:15

## 2019-10-11 RX ADMIN — MORPHINE SULFATE 3.5 MG: 0.5 INJECTION, SOLUTION EPIDURAL; INTRATHECAL; INTRAVENOUS at 10:08

## 2019-10-11 RX ADMIN — BUPIVACAINE HYDROCHLORIDE 2.5 ML: 2.5 INJECTION, SOLUTION EPIDURAL; INFILTRATION; INTRACAUDAL; PERINEURAL at 13:57

## 2019-10-11 RX ADMIN — LEVETIRACETAM 1500 MG: 750 TABLET, FILM COATED ORAL at 20:50

## 2019-10-11 NOTE — NURSING NOTE
Anesthesia notified that pump is still showing air in line after further trouble shooting by nursing staff. Anesthesia to come assess, pump off

## 2019-10-11 NOTE — OP NOTE
Operative Report      Vince Olivera    : 1960  MRN: [id number]  [facility]: [id number]      Date:10/11/19      Preop Diagnosis: Atherosclerotic disease with severe claudication secondary to occlusion of the abdominal aorta and extensive peripheral vascular disease of the iliac arteries and femoral arteries.      Postop Diagnosis: Atherosclerotic disease with severe claudication secondary to occlusion of the abdominal aorta and extensive peripheral vascular disease of the iliac arteries and femoral arteries.      Procedure:  Aortobifemoral bypass (18 x 9 mm bifurcated graft).  Right common femoral artery endarterectomy.  Left common femoral endarterectomy.      Surgeon: Dylan Weaver MD      Assistant: Jessica Nogueira PA-C      Indications: 58-year-old male with severe claudication found on angiography to have occlusion of the aorta and both common iliac arteries.  His claudication is lifestyle limiting.  Is brought to the operating room at this point in time for elective aortobifemoral bypass.  I have discussed the procedure in detail with the patient.  Risks of the procedure such as stroke, heart attack, death, bleeding, respiratory insufficiency, renal insufficiency etc. have been discussed.  I have also told the patient that he has bilateral superficial femoral artery occlusions and may in fact require bilateral femoral-popliteal bypasses to complete his revascularization.  This will be reassessed after his aortobifemoral bypass.      Operative Findings: Occlusion of the distal aorta with extensive atherosclerotic disease of the aorta both common iliac arteries both external iliac arteries and both common femoral arteries.  Bilateral femoral endarterectomy was performed to provide landing spots for the aortobifemoral bypass graft.      EBL: 1200 cc      Operative Narrative: The patient was brought to the operating room and prepared for surgical intervention in the usual fashion.  Prior to entering the  operating room anesthesia placed an epidural catheter.  Following satisfactory inhalation anesthesia a Rucker catheter was placed and the patient was prepped.  Following a 3-minute pause the patient was draped in the usual manner.  A timeout was called.  The patient's identity, the proposed procedure, the availability of appropriate equipment and personnel, the patient's allergies, administration of preoperative antibiotics, and possible planning variations were discussed with the operative team.  All members of the team agreed.  A vertical incision in the right groin was carried down to the common femoral artery.  The common femoral artery superficial femoral artery and profunda were isolated with Vesseloops.  Collateral branches of the common femoral artery were isolated with Randall ties.  Similarly the left groin was opened in the left common femoral artery was identified.  The common femoral artery, superficial femoral artery, and profunda were isolated with Vesseloops.  Collateral branches were controlled with Randall ties.  Sterile pads were placed in each groin.  A standard  Incision from just below the xiphoid to just above the pubis was carried down through the fascia.  The peritoneum was opened and the peritoneal cavity was explored.  No untoward rings were noted in the peritoneal cavity there was one adhesion of the small intestine to the left sigmoid colon.  This was lysed.  A Bookwalter retractor was then placed.  The abdominal contents were eviscerated into an intestinal bag with moistened laps in the bag.  The retroperitoneum was opened up and the aorta was explored and identified from the crossing point of the left renal vein to the distal bifurcation.  There was extensive atherosclerosis noted.  The large inferior mesenteric artery was identified and noted to have pulsatile flow.  The aorta was dissected free.  The patient was systemically heparinized.  A side-biting clamp was applied to the soft  portion of the aorta just below the takeoff of the renal arteries.  A 20 mm incision was made in the aorta.  Thrombus was removed from the distal portion and the aorta was flushed.  An 18 x 9 mm bifurcated graft was then anastomosed to the aorta with 4-0 Prolene.  A single repair stitch was utilized to obtain satisfactory hemostasis.  The anastomosis was packed.  Limbs of the graft were tunneled into each groin.  On the left the common femoral artery was clamped in the left common femoral artery was opened.  A standard endarterectomy was performed with removal of a large heavily calcified plaque from the common femoral artery and the orifices of the superficial femoral and femoral profunda.  Excellent backbleeding was noted.  Left limb of the graft was then anastomosed to the left common femoral artery with 5-0 Prolene.  Similarly on the right side the common femoral artery was clamped and opened and extensive endarterectomy was performed down into the superficial femoral artery and the profunda.  Backbleeding was noted to be excellent.  The graft was anastomosed to the right common femoral artery with 5-0 Prolene.  All clamps were removed Doppler flow was documented in the profunda bilaterally and in the superficial femoral artery bilaterally.  Inspection revealed hemostasis to be adequate.  100 mg of protamine was administered.  The groins were closed with 3 layers of 2-0 Vicryl 3-0 Vicryl and skin staples.  The abdomen was examined.  Instrument and sponge counts were correct.  No excessive bleeding was noted.  The retroperitoneum was closed with 2-0 Vicryl the linea alba and fascia was closed with a running #2 PDS.  The remainder the wound was closed with 0 Vicryl 2-0 Vicryl and skin staples.  Sterile dressings were placed the patient was transported to the recovery room in stable condition.          Dylan Weaver MD  CTSurgery  10/11/19   2:39 PM

## 2019-10-11 NOTE — NURSING NOTE
Anesthesia notified staff tried to trouble shoot pump without success. Continues to say air in line

## 2019-10-11 NOTE — BRIEF OP NOTE
AORTA FEMORAL BYPASS  Progress Note    Vince Olivera  10/11/2019    Pre-op Diagnosis:   Aortic occlusion (CMS/Former[facility]) [I74.10]       Post-Op Diagnosis Codes:     * Aortic occlusion (CMS/[facility]) [I74.10]    Procedure/CPT® Codes:      Procedure(s):  AORTA BI-FEMORAL BYPASS, BILATERAL FEMORAL ENDARTERECTOMY    Surgeon(s):  Dylan Weaver MD    Anesthesia: General    Staff:   Circulator: Prakash Solorio RN; Haase, Sherri L, RN; Jacinda Dejesus RN; Mirian Hernandez RN; Rodo Platt RN  Scrub Person: Lisa Doherty; Drake Darling  Nursing Assistant: Vandana Adame  Assistant: Jessica Nogueira PA-C    Estimated Blood Loss: 1200 mL    Urine Voided: 500 mL    Specimens:    Aortic thrombus  Left common femoral plaque  Right common femoral plaque                        Drains:   Urethral Catheter Double-lumen 16 Fr. (Active)       Findings: As predicted by angiography.  Complete occlusion of the distal aorta both common iliac arteries and the right external iliac artery.  Sensitive atherosclerotic disease and both common femoral arteries with occlusion of both superficial femoral arteries.    Complications: None      Dylan Weaver MD     Date: 10/11/2019  Time: 2:37 PM

## 2019-10-11 NOTE — ANESTHESIA PREPROCEDURE EVALUATION
Anesthesia Evaluation     Patient summary reviewed and Nursing notes reviewed   NPO Solid Status: > 8 hours  NPO Liquid Status: > 8 hours           Airway   Dental      Pulmonary    Cardiovascular         Neuro/Psych  GI/Hepatic/Renal/Endo      Musculoskeletal     Abdominal    Substance History      OB/GYN          Other                        Anesthesia Plan    ASA 3     general     intravenous induction   Anesthetic plan, all risks, benefits, and alternatives have been provided, discussed and informed consent has been obtained with: patient.

## 2019-10-11 NOTE — ANESTHESIA PROCEDURE NOTES
Airway  Urgency: elective    Date/Time: 10/11/2019 9:39 AM  Airway not difficult    General Information and Staff    Patient location during procedure: OR  CRNA: Wyatt Tran CRNA    Indications and Patient Condition  Indications for airway management: airway protection    Preoxygenated: yes  MILS not maintained throughout  Mask difficulty assessment: 1 - vent by mask    Final Airway Details  Final airway type: endotracheal airway      Successful airway: ETT  Cuffed: yes   Successful intubation technique: direct laryngoscopy  Endotracheal tube insertion site: oral  Blade: Rosa  Blade size: 2  ETT size (mm): 7.0  Cormack-Lehane Classification: grade I - full view of glottis  Placement verified by: chest auscultation and capnometry   Measured from: lips  ETT/EBT  to lips (cm): 20  Number of attempts at approach: 1    Additional Comments  Negative epigastric sounds, Breath sound equal bilaterally with symmetric chest rise and fall

## 2019-10-11 NOTE — ANESTHESIA PROCEDURE NOTES
Arterial Line      Patient reassessed immediately prior to procedure    Patient location during procedure: pre-op  Start time: 10/11/2019 9:43 AM   Line placed for hemodynamic monitoring.  Performed By   CRNA: Wyatt Tran CRNA  Preanesthetic Checklist  Completed: patient identified, site marked, surgical consent, pre-op evaluation, timeout performed, IV checked, risks and benefits discussed and monitors and equipment checked  Arterial Line Prep   Sterile Tech: cap, gloves and sterile barriers  Prep: ChloraPrep  Patient monitoring: blood pressure monitoring, continuous pulse oximetry and EKG  Arterial Line Procedure   Laterality:right  Location:  radial artery  Catheter size: 20 G   Guidance: palpation technique  Number of attempts: 1  Successful placement: yes  Post Assessment   Dressing Type: line sutured, occlusive dressing applied, secured with tape and wrist guard applied.   Complications no  Circ/Move/Sens Assessment: normal and unchanged.   Patient Tolerance: patient tolerated the procedure well with no apparent complications

## 2019-10-11 NOTE — PROGRESS NOTES
Continued Stay Note   [place]     Patient Name: Vicne Olivera  MRN: [id number]  Today's Date: 10/11/2019    Admit Date: 10/8/2019    Discharge Plan     Row Name 10/11/19 1313       Plan    Plan  Update    Patient/Family in Agreement with Plan  other (see comments)    Plan Comments  The pt is in OR. CM will follow up for post op needs.        Discharge Codes    No documentation.       Expected Discharge Date and Time     Expected Discharge Date Expected Discharge Time    Oct 13, 2019             Idania Bravo RN

## 2019-10-11 NOTE — PROGRESS NOTES
Critical Care Note     LOS: 3 days   Patient Care Team:  Fortunato Marvin MD as PCP - General (Family Medicine)  Oneida Augustin APRN as Nurse Practitioner (Neurology)  Alexx Bach MD as Consulting Physician (Neurosurgery)  Zena Alejandro as Speech Therapist (Speech Pathology)  Randi Llamas PTA as Physical Therapy Assistant (Physical Therapy)  Oneida Augustin APRN as Nurse Practitioner (Neurology)  Provider, No Known as Referring Physician  Keanu Sheth MD as Surgeon (Orthopedic Surgery)    Chief Complaint/Reason for visit:  No chief complaint on file.    Subjective      Vince Olivera is a 58 y.o. male with PMH carotid artery disease s/p L CEA, CVA 2017, known seizure disorder on Vimpat, T2DM, HLD, HTN, GERD, and PAD admitted to [facility] from [facility] on 10/8 with complaints of right-sided weakness, confusion, and seizure.     The patient was originally coming to [place] for pre-admission testing for scheduled aortic-bifemoral bypass surgery and had a witnessed focal seizure per the wife involving the right upper extremity associated with confusion and garbled speech. Wife reports that prior to this event seizures have been well-controlled with the last one in May of 2018. On [facility] arrival [facility] 18 and later had witnessed grad mal seizure aborted with ativan. CT head negative and he was loaded with Keppra and transferred to our facility for further care.     Mr. Olivera was admitted to telemetry with neurology consult and continued on Keppra and Vimpat. EEG negative for epileptiform activity and MRI showed no acute abnormality with chronic changes including left temporal and occipital encephalomalacia, unchanged from 2018 comparison. There were no additional findings concerning for seizure and as there is a known history of carotid 50-70% carotid stenosis neurology cleared him for surgery but strongly suggested avoiding intra-operative hypotension. He was  "evaluated by Dr. Weaver who ordered CT chest with unremarkable findings and decided to proceed with pre-scheduled bypass surgery.     Interval History:     On 10/11 he underwent open aortobifemoral bypass, right common femoral artery endarterectomy, left common femoral endarterectomy for an occluded distal abdominal aorta and extensive peripheral vascular disease involving the iliac and femoral arteries Per Dr. Weaver he did not require blood products.  He is currently in the ICU on 4 L nasal cannula with a saturation of 97%.  He denies nausea or vomiting.  Nasogastric tube is in place  He is in sinus rhythm.  He denies chest pain or palpitations.  He denies any abdominal pain.  He does have an epidural for postoperative pain management    History taken from: patient    Review of Systems:    All systems were reviewed and negative except as noted in subjective.    Medical history, surgical history, social history, family history reviewed    Objective     Intake/Output:    Intake/Output Summary (Last 24 hours) at 10/11/2019 1405  Last data filed at 10/11/2019 1339  Gross per 24 hour   Intake 4740 ml   Output 1700 ml   Net 3040 ml       Nutrition:  NPO Diet    Infusions:    lactated ringers 9 mL/hr Last Rate: 9 mL/hr (10/11/19 0854)   morphine and bupivacaine epidural           Telemetry: Sinus rhythm             Vital Signs  Blood pressure 129/82, pulse 55, temperature 97.4 °F (36.3 °C), temperature source Tympanic, resp. rate 16, height 167.6 cm (66\"), weight 88.5 kg (195 lb), SpO2 99 %.    Physical Exam:  General Appearance:   Overweight middle-aged gentleman in no distress   Head:   Normocephalic, atraumatic   Eyes:          Pupils small, equal, reactive   Ears:     Throat:  Oral mucosa dry   Neck:  Trachea midline, healed left neck surgical scar   Back:      Lungs:    Symmetric chest expansion without wheeze or rhonchi    Heart:   Regular rhythm, S1, S2 auscultated   Abdomen:    Hypoactive bowel sounds, vertical " surgical dressing dry and intact, soft   Rectal:   Deferred   Extremities:  No pitting edema.  Feet are warm to touch.  Right wrist arterial line.  Right hand with good capillary refill   Pulses:  Doppler pulses both feet   Skin:  Warm and dry   Lymph nodes:    Neurologic:  Drowsy but awakens and oriented,  equal      Results Review:     I reviewed the patient's new clinical results.   Results from last 7 days   Lab Units 10/11/19  0630 10/10/19  0458 10/09/19  1227 10/08/19  0937   SODIUM mmol/L 138 138 137 134*   POTASSIUM mmol/L 4.0 4.2 3.9 3.7   CHLORIDE mmol/L 99 98 97* 92*   CO2 mmol/L 24.0 30.0* 28.0 23.9   BUN mg/dL 9 10 9 8   CREATININE mg/dL 0.73* 0.96 0.78 0.83   CALCIUM mg/dL 9.0 9.0 9.3 9.7   BILIRUBIN mg/dL  --   --   --  0.3   ALK PHOS U/L  --   --   --  82   ALT (SGPT) U/L  --   --   --  26   AST (SGOT) U/L  --   --   --  16   GLUCOSE mg/dL 155* 112* 166* 232*     Results from last 7 days   Lab Units 10/11/19  0630 10/10/19  0458 10/09/19  1227   WBC 10*3/mm3 9.59 10.64 10.79   HEMOGLOBIN g/dL 15.8 15.8 15.2   HEMATOCRIT % 46.9 46.8 45.7   PLATELETS 10*3/mm3 243 236 259         Lab Results   Component Value Date    BLOODCX Mixed Gram Positive Susi (A) 04/24/2017     No results found for: URINECX    I reviewed the patient's new imaging including images and reports.      All medications reviewed.     amLODIPine 10 mg Oral Daily   [MAR Hold] aspirin  mg Oral Daily   [MAR Hold] atorvastatin 80 mg Oral Nightly   cefuroxime 1.5 g Intravenous Once   [MAR Hold] hydroCHLOROthiazide 12.5 mg Oral Daily   [MAR Hold] insulin lispro 0-9 Units Subcutaneous 4x Daily With Meals & Nightly   lacosamide 100 mg Oral Q12H   levETIRAcetam 1,500 mg Oral Q12H   losartan 50 mg Oral BID   metoprolol succinate  mg Oral Daily   [MAR Hold] pantoprazole 40 mg Oral QAM   [MAR Hold] sodium chloride 10 mL Intravenous Q12H   sodium chloride 10 mL Intravenous Q12H   sodium chloride 3 mL Intravenous Q12H   sodium  chloride 3 mL Intravenous Q12H         Assessment/Plan       Seizure disorder on Keppra and Vimpat     Aortic occlusion s/p aorto-femoral bypass per Dr. Weaver     Bilateral carotid artery stenosis    Type 2 diabetes mellitus     PAD    H/o Left MCA ischemic CVA 4/2017    Essential hypertension    Dyslipidemia    Gastroesophageal reflux disease without esophagitis    Bilateral leg and foot pain    #1 occluded distal aorta status post aortofemoral bypass today without any immediate difficulties.  Feet are warm and viable.  He is currently on phenylephrine for blood pressure support    #2 seizure disorder, currently on Keppra and Vimpat    #3 history of left middle cerebral artery stroke with subsequent carotid endarterectomy in 2017, he had no significant neurologic sequelae    #4 diabetes mellitus type 2, A1c is 6.6.  He is on metformin as an outpatient.  He is currently on sliding scale insulin with blood sugars less than 200    #5 hypertension postoperative blood pressure 134/66 on oral agents    PLAN:    Epidural for postoperative pain management  Monitor H&H, creatinine, urine output  Norvasc, Cozaar, metoprolol, hold for systolic blood pressure less than 120  Hold diuretics  Continue Keppra, Vimpat for seizures  Aspirin, statin  Nebulized bronchodilators  Incentive spirometry  Monitor pedal pulses        Melissa An, APRN, AGACNP-BC, FNP-BC   Pulmonary and Critical Care     I reviewed his record, interviewed the patient, performed the above physical examination, reviewed his laboratory data, x-ray data, medication list, formulated the assessment and plan and extensively modified the note to reflect my additions and find    Marissa Reynaga MD      Time: 32min  I personally provided care to this critically ill patient as documented above.  Critical care time does not include time spent on separately billed procedures.  Non of my critical care time was concurrent with other critical care providers.

## 2019-10-11 NOTE — ANESTHESIA PROCEDURE NOTES
Epidural Block    Pre-sedation assessment completed: 10/11/2019 9:10 AM    Patient reassessed immediately prior to procedure    Patient location during procedure: pre-op  Start Time: 10/11/2019 9:10 AM  Indication:at surgeon's request and post-op pain management  Performed By  CRNA: Wyatt Tran CRNA  Preanesthetic Checklist  Completed: patient identified, site marked, surgical consent, pre-op evaluation, timeout performed, IV checked, risks and benefits discussed and monitors and equipment checked  Additional Notes  Procedure:                                                                                   The pt. was placed in the sitting position and the legs placed over the side of bed in position of comfort.  The pt was instructed in optimal spine presentation and the insertion site was prepped and draped in sterile fashion.  The insertion site was anesthetized with 1% Lidocaine 2 ml.  The Epidural needle was placed without difficulty and Loss of Resistance was achieved with a Plastic syringe and Air.  The labeled Epidural  Catheter was  secured at the insertion site with skin afix, mastisol,  steri strips and dressed with CHG Tegaderm.  Remaining catheter was taped to the back in a cephalad direction.  Test dose with Lidocaine 1.5% with Epinephrine 1:200,000 mcg 2ml was negative for intravascular injection  and negative for spinal injection.  Thank you.      Prep:  Pt Position:sitting  Sterile Tech:cap, gloves, mask and sterile barrier  Prep:chlorhexidine gluconate and isopropyl alcohol  Monitoring:blood pressure monitoring, continuous pulse oximetry and EKG  Epidural Block Procedure:  Approach:midline  Guidance:landmark technique and palpation technique  Location:thoracic  Level:10-11  Needle Type:Tuohy  Needle Gauge:18 G  Cath Size: 20 G  Loss of Resistance: 8cm  Cath Depth at skin:12 cm  Paresthesia: none  Aspiration:negative  Test Dose:negative  Post Assessment:  Dressing:biopatch applied, occlusive  dressing applied and secured with tape  Pt Tolerance:patient tolerated the procedure well with no apparent complications  Complications:no

## 2019-10-11 NOTE — NURSING NOTE
Report given to nurse in pre-op. Per pre op nurse administer vImpat and Keppra and to send lopressor with pt to pre-op. Concent on pt chart, preop check list completed, pt paula MARIA bath x's 2 and new gown placed. Lopressor labeled and sent with pre op transporter. Pts family at bedside and going to pre op with pt.

## 2019-10-12 ENCOUNTER — APPOINTMENT (OUTPATIENT)
Dept: GENERAL RADIOLOGY | Facility: HOSPITAL | Age: 59
End: 2019-10-12

## 2019-10-12 LAB
ALBUMIN SERPL-MCNC: 3.9 G/DL (ref 3.5–5.2)
ALBUMIN/GLOB SERPL: 2.1 G/DL
ALP SERPL-CCNC: 36 U/L (ref 39–117)
ALT SERPL W P-5'-P-CCNC: 19 U/L (ref 1–41)
ANION GAP SERPL CALCULATED.3IONS-SCNC: 9 MMOL/L (ref 5–15)
AST SERPL-CCNC: 15 U/L (ref 1–40)
BILIRUB SERPL-MCNC: 0.5 MG/DL (ref 0.2–1.2)
BUN BLD-MCNC: 12 MG/DL (ref 6–20)
BUN/CREAT SERPL: 15.6 (ref 7–25)
CALCIUM SPEC-SCNC: 7.9 MG/DL (ref 8.6–10.5)
CHLORIDE SERPL-SCNC: 105 MMOL/L (ref 98–107)
CO2 SERPL-SCNC: 25 MMOL/L (ref 22–29)
CREAT BLD-MCNC: 0.77 MG/DL (ref 0.76–1.27)
D-LACTATE SERPL-SCNC: 1.9 MMOL/L (ref 0.5–2)
D-LACTATE SERPL-SCNC: 3 MMOL/L (ref 0.5–2)
DEPRECATED RDW RBC AUTO: 45.1 FL (ref 37–54)
ERYTHROCYTE [DISTWIDTH] IN BLOOD BY AUTOMATED COUNT: 13.2 % (ref 12.3–15.4)
GFR SERPL CREATININE-BSD FRML MDRD: 104 ML/MIN/1.73
GLOBULIN UR ELPH-MCNC: 1.9 GM/DL
GLUCOSE BLD-MCNC: 185 MG/DL (ref 65–99)
GLUCOSE BLDC GLUCOMTR-MCNC: 144 MG/DL (ref 70–130)
GLUCOSE BLDC GLUCOMTR-MCNC: 152 MG/DL (ref 70–130)
GLUCOSE BLDC GLUCOMTR-MCNC: 153 MG/DL (ref 70–130)
GLUCOSE BLDC GLUCOMTR-MCNC: 165 MG/DL (ref 70–130)
GLUCOSE BLDC GLUCOMTR-MCNC: 200 MG/DL (ref 70–130)
HCT VFR BLD AUTO: 28.8 % (ref 37.5–51)
HCT VFR BLD AUTO: 32.1 % (ref 37.5–51)
HGB BLD-MCNC: 10.7 G/DL (ref 13–17.7)
HGB BLD-MCNC: 9.2 G/DL (ref 13–17.7)
HOLD SPECIMEN: NORMAL
MAGNESIUM SERPL-MCNC: 1.5 MG/DL (ref 1.6–2.6)
MCH RBC QN AUTO: 31.1 PG (ref 26.6–33)
MCHC RBC AUTO-ENTMCNC: 33.3 G/DL (ref 31.5–35.7)
MCV RBC AUTO: 93.3 FL (ref 79–97)
PHOSPHATE SERPL-MCNC: 1.9 MG/DL (ref 2.5–4.5)
PHOSPHATE SERPL-MCNC: 2.3 MG/DL (ref 2.5–4.5)
PLATELET # BLD AUTO: 276 10*3/MM3 (ref 140–450)
PMV BLD AUTO: 8.7 FL (ref 6–12)
POTASSIUM BLD-SCNC: 4.1 MMOL/L (ref 3.5–5.2)
PROT SERPL-MCNC: 5.8 G/DL (ref 6–8.5)
RBC # BLD AUTO: 3.44 10*6/MM3 (ref 4.14–5.8)
SODIUM BLD-SCNC: 139 MMOL/L (ref 136–145)
WBC NRBC COR # BLD: 15.15 10*3/MM3 (ref 3.4–10.8)

## 2019-10-12 PROCEDURE — 82962 GLUCOSE BLOOD TEST: CPT

## 2019-10-12 PROCEDURE — 80053 COMPREHEN METABOLIC PANEL: CPT | Performed by: INTERNAL MEDICINE

## 2019-10-12 PROCEDURE — 25010000002 MAGNESIUM SULFATE 2 GM/50ML SOLUTION: Performed by: INTERNAL MEDICINE

## 2019-10-12 PROCEDURE — 63710000001 INSULIN LISPRO (HUMAN) PER 5 UNITS: Performed by: INTERNAL MEDICINE

## 2019-10-12 PROCEDURE — 85018 HEMOGLOBIN: CPT | Performed by: INTERNAL MEDICINE

## 2019-10-12 PROCEDURE — 83735 ASSAY OF MAGNESIUM: CPT | Performed by: INTERNAL MEDICINE

## 2019-10-12 PROCEDURE — 83605 ASSAY OF LACTIC ACID: CPT | Performed by: PHYSICIAN ASSISTANT

## 2019-10-12 PROCEDURE — 82533 TOTAL CORTISOL: CPT | Performed by: INTERNAL MEDICINE

## 2019-10-12 PROCEDURE — 85027 COMPLETE CBC AUTOMATED: CPT | Performed by: PHYSICIAN ASSISTANT

## 2019-10-12 PROCEDURE — 71045 X-RAY EXAM CHEST 1 VIEW: CPT

## 2019-10-12 PROCEDURE — 25010000002 PHENYLEPHRINE 10 MG/ML SOLUTION: Performed by: THORACIC SURGERY (CARDIOTHORACIC VASCULAR SURGERY)

## 2019-10-12 PROCEDURE — 84100 ASSAY OF PHOSPHORUS: CPT | Performed by: INTERNAL MEDICINE

## 2019-10-12 PROCEDURE — 85014 HEMATOCRIT: CPT | Performed by: INTERNAL MEDICINE

## 2019-10-12 PROCEDURE — 99233 SBSQ HOSP IP/OBS HIGH 50: CPT | Performed by: INTERNAL MEDICINE

## 2019-10-12 PROCEDURE — 25010000003 CEFUROXIME SODIUM 1.5 G RECONSTITUTED SOLUTION: Performed by: PHYSICIAN ASSISTANT

## 2019-10-12 RX ORDER — POTASSIUM CHLORIDE 750 MG/1
40 CAPSULE, EXTENDED RELEASE ORAL AS NEEDED
Status: DISCONTINUED | OUTPATIENT
Start: 2019-10-12 | End: 2019-10-16 | Stop reason: HOSPADM

## 2019-10-12 RX ORDER — DOCUSATE SODIUM 100 MG/1
100 CAPSULE, LIQUID FILLED ORAL 2 TIMES DAILY
Status: DISCONTINUED | OUTPATIENT
Start: 2019-10-12 | End: 2019-10-16 | Stop reason: HOSPADM

## 2019-10-12 RX ORDER — BISACODYL 5 MG/1
10 TABLET, DELAYED RELEASE ORAL DAILY PRN
Status: DISCONTINUED | OUTPATIENT
Start: 2019-10-12 | End: 2019-10-16 | Stop reason: HOSPADM

## 2019-10-12 RX ORDER — POTASSIUM CHLORIDE 7.45 MG/ML
10 INJECTION INTRAVENOUS
Status: DISCONTINUED | OUTPATIENT
Start: 2019-10-12 | End: 2019-10-16 | Stop reason: HOSPADM

## 2019-10-12 RX ORDER — POTASSIUM CHLORIDE 1.5 G/1.77G
40 POWDER, FOR SOLUTION ORAL AS NEEDED
Status: DISCONTINUED | OUTPATIENT
Start: 2019-10-12 | End: 2019-10-16 | Stop reason: HOSPADM

## 2019-10-12 RX ORDER — MAGNESIUM SULFATE HEPTAHYDRATE 40 MG/ML
4 INJECTION, SOLUTION INTRAVENOUS AS NEEDED
Status: DISCONTINUED | OUTPATIENT
Start: 2019-10-12 | End: 2019-10-16 | Stop reason: HOSPADM

## 2019-10-12 RX ORDER — BISACODYL 10 MG
10 SUPPOSITORY, RECTAL RECTAL DAILY PRN
Status: DISCONTINUED | OUTPATIENT
Start: 2019-10-12 | End: 2019-10-16 | Stop reason: HOSPADM

## 2019-10-12 RX ORDER — MAGNESIUM SULFATE HEPTAHYDRATE 40 MG/ML
2 INJECTION, SOLUTION INTRAVENOUS AS NEEDED
Status: DISCONTINUED | OUTPATIENT
Start: 2019-10-12 | End: 2019-10-16 | Stop reason: HOSPADM

## 2019-10-12 RX ADMIN — MAGNESIUM SULFATE HEPTAHYDRATE 4 G: 40 INJECTION, SOLUTION INTRAVENOUS at 13:42

## 2019-10-12 RX ADMIN — LACOSAMIDE 100 MG: 50 TABLET, FILM COATED ORAL at 09:35

## 2019-10-12 RX ADMIN — SODIUM CHLORIDE, PRESERVATIVE FREE 10 ML: 5 INJECTION INTRAVENOUS at 09:18

## 2019-10-12 RX ADMIN — ASPIRIN 325 MG: 325 TABLET, DELAYED RELEASE ORAL at 09:16

## 2019-10-12 RX ADMIN — CEFUROXIME SODIUM 1.5 G: 1.5 INJECTION, POWDER, FOR SOLUTION INTRAVENOUS at 09:13

## 2019-10-12 RX ADMIN — MAGNESIUM SULFATE 2 G: 2 INJECTION INTRAVENOUS at 11:40

## 2019-10-12 RX ADMIN — INSULIN LISPRO 4 UNITS: 100 INJECTION, SOLUTION INTRAVENOUS; SUBCUTANEOUS at 02:42

## 2019-10-12 RX ADMIN — LEVETIRACETAM 1500 MG: 750 TABLET, FILM COATED ORAL at 20:15

## 2019-10-12 RX ADMIN — PHENYLEPHRINE HYDROCHLORIDE 0.5 MCG/KG/MIN: 10 INJECTION INTRAVENOUS at 09:51

## 2019-10-12 RX ADMIN — POTASSIUM & SODIUM PHOSPHATES POWDER PACK 280-160-250 MG 2 PACKET: 280-160-250 PACK at 20:15

## 2019-10-12 RX ADMIN — ATORVASTATIN CALCIUM 80 MG: 40 TABLET, FILM COATED ORAL at 20:15

## 2019-10-12 RX ADMIN — LEVETIRACETAM 1500 MG: 750 TABLET, FILM COATED ORAL at 09:16

## 2019-10-12 RX ADMIN — INSULIN LISPRO 2 UNITS: 100 INJECTION, SOLUTION INTRAVENOUS; SUBCUTANEOUS at 06:08

## 2019-10-12 RX ADMIN — LACOSAMIDE 100 MG: 50 TABLET, FILM COATED ORAL at 20:15

## 2019-10-12 RX ADMIN — SODIUM CHLORIDE, PRESERVATIVE FREE 10 ML: 5 INJECTION INTRAVENOUS at 20:16

## 2019-10-12 RX ADMIN — INSULIN LISPRO 2 UNITS: 100 INJECTION, SOLUTION INTRAVENOUS; SUBCUTANEOUS at 13:42

## 2019-10-12 RX ADMIN — POTASSIUM & SODIUM PHOSPHATES POWDER PACK 280-160-250 MG 2 PACKET: 280-160-250 PACK at 11:40

## 2019-10-12 RX ADMIN — PANTOPRAZOLE SODIUM 40 MG: 40 TABLET, DELAYED RELEASE ORAL at 06:08

## 2019-10-12 RX ADMIN — CEFUROXIME SODIUM 1.5 G: 1.5 INJECTION, POWDER, FOR SOLUTION INTRAVENOUS at 00:46

## 2019-10-12 RX ADMIN — AMLODIPINE BESYLATE 10 MG: 10 TABLET ORAL at 09:16

## 2019-10-12 RX ADMIN — LOSARTAN POTASSIUM 50 MG: 50 TABLET ORAL at 09:16

## 2019-10-12 RX ADMIN — SODIUM CHLORIDE 125 ML/HR: 4.5 INJECTION, SOLUTION INTRAVENOUS at 21:51

## 2019-10-12 NOTE — PLAN OF CARE
Problem: Patient Care Overview  Goal: Plan of Care Review  Outcome: Ongoing (interventions implemented as appropriate)   10/12/19 1832   Coping/Psychosocial   Plan of Care Reviewed With patient;significant other   Plan of Care Review   Progress improving   OTHER   Outcome Summary Pt alert and oriented. No complaints of pain. Sat up in chair for 4 hours. Arterial line out this am. Marty titrated. Vitals Stable. Will continue to monitor.        Problem: Diabetes, Type 2 (Adult)  Goal: Signs and Symptoms of Listed Potential Problems Will be Absent, Minimized or Managed (Diabetes, Type 2)  Outcome: Ongoing (interventions implemented as appropriate)   10/11/19 0521 10/12/19 1832   Goal/Outcome Evaluation   Problems Assessed (Type 2 Diabetes) all --    Problems Present (Type 2 Diabetes) --  hyperglycemia;situational response       Problem: Seizure Disorder/Epilepsy (Adult)  Goal: Signs and Symptoms of Listed Potential Problems Will be Absent, Minimized or Managed (Seizure Disorder/Epilepsy)  Outcome: Ongoing (interventions implemented as appropriate)   10/11/19 0521 10/12/19 1832   Goal/Outcome Evaluation   Problems Assessed (Seizure Disorder/Epilepsy) all --    Problems Present (Seizure/Epilepsy) --  none

## 2019-10-12 NOTE — PLAN OF CARE
Problem: Patient Care Overview  Goal: Plan of Care Review  Outcome: Ongoing (interventions implemented as appropriate)   10/12/19 4068   Coping/Psychosocial   Plan of Care Reviewed With patient   Plan of Care Review   Progress improving   OTHER   Outcome Summary VSS. Pt alert and oriented after surgery yesterday. UOP adequate tonight. No complaints of pain. Pt's lactic acid was 3.0 on AM labs, and a recheck was ordered in 3 hours. Will continue to monitor.

## 2019-10-12 NOTE — PROGRESS NOTES
[facility]    Acute pain service Inpatient Progress Note    Patient Name: Vince Olivera  :  1960  MRN:  [id number]        Acute Pain  Service Inpatient Progress Note:    Analgesia:Excellent  Pain Score:0/10  LOC: alert and awake  Resp Status: room air  Cardiac: VS stable  Side Effects:None  Catheter Site:clean, dressing intact and dry  Cath type: Epidural cath(MOOG pump)  Infusion rate: 4ml/hr  Catheter Plan:Catheter to remain Insitu and Continue catheter infusion rate unchanged

## 2019-10-12 NOTE — PROGRESS NOTES
"Vince Olivera  [id number]  1960     LOS: 4 days   Patient Care Team:  Fortunato Marvin MD as PCP - General (Family Medicine)  Oneida Augustin APRN as Nurse Practitioner (Neurology)  Alexx Bach MD as Consulting Physician (Neurosurgery)  Zena Alejandro as Speech Therapist (Speech Pathology)  Randi Llamas PTA as Physical Therapy Assistant (Physical Therapy)  Oneida Augustin APRN as Nurse Practitioner (Neurology)  Provider, No Known as Referring Physician  Keanu Sheth MD as Surgeon (Orthopedic Surgery)    Chief Complaint: Peripheral vascular disease      Subjective: No complaints    Objective:     Vital Sign Min/Max for last 24 hours  Temp  Min: 96.5 °F (35.8 °C)  Max: 98.3 °F (36.8 °C)   BP  Min: 66/36  Max: 130/60   Pulse  Min: 55  Max: 98   Resp  Min: 16  Max: 18   SpO2  Min: 92 %  Max: 100 %   No Data Recorded   Weight  Min: 88.5 kg (195 lb)  Max: 94.8 kg (208 lb 15.9 oz)     Flowsheet Rows      First Filed Value   Admission Height  167.6 cm (66\") Documented at 10/09/2019 1117   Admission Weight  88.5 kg (195 lb) Documented at 10/09/2019 1117          Physical Exam:    Wound: Satisfactory    Pulses:     Mediastinal and Chest Tube Drainage:       Results Review:   Results from last 7 days   Lab Units 10/12/19  0438   WBC 10*3/mm3 15.15*   HEMOGLOBIN g/dL 10.7*   HEMATOCRIT % 32.1*   PLATELETS 10*3/mm3 276     Results from last 7 days   Lab Units 10/12/19  0438   SODIUM mmol/L 139   POTASSIUM mmol/L 4.1   CHLORIDE mmol/L 105   CO2 mmol/L 25.0   BUN mg/dL 12   CREATININE mg/dL 0.77   GLUCOSE mg/dL 185*   CALCIUM mg/dL 7.9*             Assessment      Seizure disorder on Keppra and Vimpat. 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 50-69% R carotid stenosis    Gastroesophageal reflux disease without esophagitis    Type 2 diabetes " mellitus     Bilateral leg and foot pain    PAD. Occluded distal aorta, common and external iliacs, and superior femoral arteries    Aortic occlusion s/p aorto-femoral bypass per Dr. Weaver       Doing satisfactory        Tigre Burnett MD  10/12/19  8:21 AM      Please note that portions of this note were completed with a voice recognition program. Efforts were made to edit the dictations, but words may be mistranscribed

## 2019-10-13 LAB
ABO + RH BLD: NORMAL
ABO + RH BLD: NORMAL
ALBUMIN SERPL-MCNC: 3.5 G/DL (ref 3.5–5.2)
ANION GAP SERPL CALCULATED.3IONS-SCNC: 3 MMOL/L (ref 5–15)
BASOPHILS # BLD AUTO: 0.02 10*3/MM3 (ref 0–0.2)
BASOPHILS NFR BLD AUTO: 0.1 % (ref 0–1.5)
BH BB BLOOD EXPIRATION DATE: NORMAL
BH BB BLOOD EXPIRATION DATE: NORMAL
BH BB BLOOD TYPE BARCODE: 7300
BH BB BLOOD TYPE BARCODE: 7300
BH BB DISPENSE STATUS: NORMAL
BH BB DISPENSE STATUS: NORMAL
BH BB PRODUCT CODE: NORMAL
BH BB PRODUCT CODE: NORMAL
BH BB UNIT NUMBER: NORMAL
BH BB UNIT NUMBER: NORMAL
BUN BLD-MCNC: 10 MG/DL (ref 6–20)
BUN/CREAT SERPL: 15.4 (ref 7–25)
CALCIUM SPEC-SCNC: 7.8 MG/DL (ref 8.6–10.5)
CHLORIDE SERPL-SCNC: 103 MMOL/L (ref 98–107)
CO2 SERPL-SCNC: 32 MMOL/L (ref 22–29)
CORTIS SERPL-MCNC: 13.41 MCG/DL
CREAT BLD-MCNC: 0.65 MG/DL (ref 0.76–1.27)
DEPRECATED RDW RBC AUTO: 48 FL (ref 37–54)
EOSINOPHIL # BLD AUTO: 0 10*3/MM3 (ref 0–0.4)
EOSINOPHIL NFR BLD AUTO: 0 % (ref 0.3–6.2)
ERYTHROCYTE [DISTWIDTH] IN BLOOD BY AUTOMATED COUNT: 13.7 % (ref 12.3–15.4)
GFR SERPL CREATININE-BSD FRML MDRD: 127 ML/MIN/1.73
GLUCOSE BLD-MCNC: 133 MG/DL (ref 65–99)
GLUCOSE BLDC GLUCOMTR-MCNC: 128 MG/DL (ref 70–130)
GLUCOSE BLDC GLUCOMTR-MCNC: 139 MG/DL (ref 70–130)
GLUCOSE BLDC GLUCOMTR-MCNC: 145 MG/DL (ref 70–130)
HCT VFR BLD AUTO: 29 % (ref 37.5–51)
HGB BLD-MCNC: 9.1 G/DL (ref 13–17.7)
IMM GRANULOCYTES # BLD AUTO: 0.07 10*3/MM3 (ref 0–0.05)
IMM GRANULOCYTES NFR BLD AUTO: 0.5 % (ref 0–0.5)
LYMPHOCYTES # BLD AUTO: 2.34 10*3/MM3 (ref 0.7–3.1)
LYMPHOCYTES NFR BLD AUTO: 16.4 % (ref 19.6–45.3)
MAGNESIUM SERPL-MCNC: 2.4 MG/DL (ref 1.6–2.6)
MCH RBC QN AUTO: 29.8 PG (ref 26.6–33)
MCHC RBC AUTO-ENTMCNC: 31.4 G/DL (ref 31.5–35.7)
MCV RBC AUTO: 95.1 FL (ref 79–97)
MONOCYTES # BLD AUTO: 1.25 10*3/MM3 (ref 0.1–0.9)
MONOCYTES NFR BLD AUTO: 8.8 % (ref 5–12)
NEUTROPHILS # BLD AUTO: 10.59 10*3/MM3 (ref 1.7–7)
NEUTROPHILS NFR BLD AUTO: 74.2 % (ref 42.7–76)
NRBC BLD AUTO-RTO: 0 /100 WBC (ref 0–0.2)
PHOSPHATE SERPL-MCNC: 1.4 MG/DL (ref 2.5–4.5)
PHOSPHATE SERPL-MCNC: 1.9 MG/DL (ref 2.5–4.5)
PHOSPHATE SERPL-MCNC: 1.9 MG/DL (ref 2.5–4.5)
PLATELET # BLD AUTO: 228 10*3/MM3 (ref 140–450)
PMV BLD AUTO: 8.7 FL (ref 6–12)
POTASSIUM BLD-SCNC: 4 MMOL/L (ref 3.5–5.2)
RBC # BLD AUTO: 3.05 10*6/MM3 (ref 4.14–5.8)
SODIUM BLD-SCNC: 138 MMOL/L (ref 136–145)
UNIT  ABO: NORMAL
UNIT  ABO: NORMAL
UNIT  RH: NORMAL
UNIT  RH: NORMAL
WBC NRBC COR # BLD: 14.27 10*3/MM3 (ref 3.4–10.8)

## 2019-10-13 PROCEDURE — 80069 RENAL FUNCTION PANEL: CPT | Performed by: INTERNAL MEDICINE

## 2019-10-13 PROCEDURE — 84100 ASSAY OF PHOSPHORUS: CPT | Performed by: INTERNAL MEDICINE

## 2019-10-13 PROCEDURE — 99233 SBSQ HOSP IP/OBS HIGH 50: CPT | Performed by: INTERNAL MEDICINE

## 2019-10-13 PROCEDURE — 84100 ASSAY OF PHOSPHORUS: CPT | Performed by: NURSE PRACTITIONER

## 2019-10-13 PROCEDURE — 85025 COMPLETE CBC W/AUTO DIFF WBC: CPT | Performed by: INTERNAL MEDICINE

## 2019-10-13 PROCEDURE — 25010000002 MORPHINE PER 10 MG: Performed by: NURSE ANESTHETIST, CERTIFIED REGISTERED

## 2019-10-13 PROCEDURE — 83735 ASSAY OF MAGNESIUM: CPT | Performed by: INTERNAL MEDICINE

## 2019-10-13 PROCEDURE — 94799 UNLISTED PULMONARY SVC/PX: CPT

## 2019-10-13 PROCEDURE — 82962 GLUCOSE BLOOD TEST: CPT

## 2019-10-13 RX ORDER — CALCIUM CARBONATE 750 MG/1
750 TABLET, CHEWABLE ORAL 3 TIMES DAILY PRN
Status: DISCONTINUED | OUTPATIENT
Start: 2019-10-13 | End: 2019-10-16 | Stop reason: HOSPADM

## 2019-10-13 RX ADMIN — ASPIRIN 325 MG: 325 TABLET, DELAYED RELEASE ORAL at 09:25

## 2019-10-13 RX ADMIN — POTASSIUM & SODIUM PHOSPHATES POWDER PACK 280-160-250 MG 2 PACKET: 280-160-250 PACK at 14:54

## 2019-10-13 RX ADMIN — LACOSAMIDE 100 MG: 50 TABLET, FILM COATED ORAL at 09:26

## 2019-10-13 RX ADMIN — SODIUM CHLORIDE, PRESERVATIVE FREE 10 ML: 5 INJECTION INTRAVENOUS at 20:31

## 2019-10-13 RX ADMIN — SODIUM CHLORIDE, PRESERVATIVE FREE 10 ML: 5 INJECTION INTRAVENOUS at 09:27

## 2019-10-13 RX ADMIN — DOCUSATE SODIUM 100 MG: 100 CAPSULE, LIQUID FILLED ORAL at 09:26

## 2019-10-13 RX ADMIN — LOSARTAN POTASSIUM 50 MG: 50 TABLET ORAL at 20:30

## 2019-10-13 RX ADMIN — SODIUM CHLORIDE, PRESERVATIVE FREE 10 ML: 5 INJECTION INTRAVENOUS at 20:30

## 2019-10-13 RX ADMIN — LEVETIRACETAM 1500 MG: 750 TABLET, FILM COATED ORAL at 09:26

## 2019-10-13 RX ADMIN — SODIUM CHLORIDE 125 ML/HR: 4.5 INJECTION, SOLUTION INTRAVENOUS at 21:50

## 2019-10-13 RX ADMIN — SODIUM CHLORIDE 125 ML/HR: 4.5 INJECTION, SOLUTION INTRAVENOUS at 14:54

## 2019-10-13 RX ADMIN — POTASSIUM & SODIUM PHOSPHATES POWDER PACK 280-160-250 MG 2 PACKET: 280-160-250 PACK at 06:10

## 2019-10-13 RX ADMIN — CALCIUM CARBONATE 750 MG: 750 TABLET ORAL at 17:16

## 2019-10-13 RX ADMIN — BUPIVACAINE HYDROCHLORIDE: 5 INJECTION, SOLUTION EPIDURAL; INTRACAUDAL; PERINEURAL at 15:01

## 2019-10-13 RX ADMIN — ATORVASTATIN CALCIUM 80 MG: 40 TABLET, FILM COATED ORAL at 20:30

## 2019-10-13 RX ADMIN — INSULIN LISPRO 2 UNITS: 100 INJECTION, SOLUTION INTRAVENOUS; SUBCUTANEOUS at 00:28

## 2019-10-13 RX ADMIN — LACOSAMIDE 100 MG: 50 TABLET, FILM COATED ORAL at 20:31

## 2019-10-13 RX ADMIN — MAGNESIUM SULFATE HEPTAHYDRATE 4 G: 40 INJECTION, SOLUTION INTRAVENOUS at 14:54

## 2019-10-13 RX ADMIN — SODIUM CHLORIDE, PRESERVATIVE FREE 10 ML: 5 INJECTION INTRAVENOUS at 09:26

## 2019-10-13 RX ADMIN — PANTOPRAZOLE SODIUM 40 MG: 40 TABLET, DELAYED RELEASE ORAL at 09:25

## 2019-10-13 RX ADMIN — DOCUSATE SODIUM 100 MG: 100 CAPSULE, LIQUID FILLED ORAL at 20:30

## 2019-10-13 RX ADMIN — LEVETIRACETAM 1500 MG: 750 TABLET, FILM COATED ORAL at 20:30

## 2019-10-13 NOTE — PROGRESS NOTES
[facility]    Acute pain service Inpatient Progress Note    Patient Name: Vince Olivera  :  1960  MRN:  [id number]        Acute Pain  Service Inpatient Progress Note:    Analgesia:Good  Pain Score:10  LOC: alert and awake  Resp Status: room air  Cardiac: VS stable  Side Effects:None  Catheter Site:clean, dressing intact and dry  Cath type: peripheral nerve cath with ON Q  Infusion rate: 4ml/hr  Catheter Plan:Catheter to remain Insitu and Continue catheter infusion rate unchanged

## 2019-10-13 NOTE — PLAN OF CARE
Problem: Patient Care Overview  Goal: Plan of Care Review  Outcome: Ongoing (interventions implemented as appropriate)   10/13/19 0430   Plan of Care Review   Progress improving   OTHER   Outcome Summary No neuro changes throughout shift. VSS. Still requiring nenita to sustain sbp >90. + doppler post tib, no dp pulses. UOP adequate. Will continue to monitor.     Goal: Individualization and Mutuality  Outcome: Ongoing (interventions implemented as appropriate)    Goal: Discharge Needs Assessment  Outcome: Ongoing (interventions implemented as appropriate)    Goal: Interprofessional Rounds/Family Conf  Outcome: Ongoing (interventions implemented as appropriate)      Problem: Diabetes, Type 2 (Adult)  Goal: Signs and Symptoms of Listed Potential Problems Will be Absent, Minimized or Managed (Diabetes, Type 2)  Outcome: Ongoing (interventions implemented as appropriate)      Problem: Seizure Disorder/Epilepsy (Adult)  Goal: Signs and Symptoms of Listed Potential Problems Will be Absent, Minimized or Managed (Seizure Disorder/Epilepsy)  Outcome: Ongoing (interventions implemented as appropriate)

## 2019-10-13 NOTE — PROGRESS NOTES
"Adult Nutrition  Assessment/PES    Patient Name:  Vince Olivera  YOB: 1960  MRN: [id number]  Admit Date:  10/8/2019    Assessment Date:  10/13/2019    Comments:      Reason for Assessment     Row Name 10/13/19 1156          Reason for Assessment    Reason For Assessment  other (see comments) LOS/15\"     Diagnosis  -- SEIZURE, C.O. ON KEPPRA & VIMPAT, RECURRENT GRAND MAL SEIZURE, A-F BYPASS & B/L FEM ENDARTERECTOMY 10/11/19. PMH: ISCHEMIC CVA, HTN, HLP, B/L CAROTID STENOSIS, GERD, DM2, B/L LEG & FOOT PAIN, PAD, MILD EXPRESSIVE APHASIA, ABN, PERIPH VISION R EYE, B/L      Identified At Risk by Screening Criteria  -- CARPAL TUNNEL. PSH: L CEA         Nutrition/Diet History     Row Name 10/13/19 1159          Nutrition/Diet History    Typical Food/Fluid Intake  NO APPETITE CHANGE OR WT HX CHANGE PER NURSING ADMISSION DATA BASE.         Anthropometrics     Row Name 10/13/19 1200 10/13/19 0600       Anthropometrics    Height  167.6 cm (66\")  --    Current Weight Method  measured BEDSCALE  --    Weight  92.2 kg (203 lb 4.8 oz)  92.2 kg (203 lb 4.2 oz)       Admit Weight    Admit Weight Method  stated  --    Admit Weight  88.5 kg (195 lb)  --       Ideal Body Weight (IBW)    Ideal Body Weight (IBW) (kg)  65.3  --    % Ideal Body Weight  141.21  --       Body Mass Index (BMI)    BMI (kg/m2)  32.88  --        Labs/Tests/Procedures/Meds     Row Name 10/13/19 1201          Labs/Procedures/Meds    Lab Results Reviewed  reviewed     Lab Results Comments  PHOS         Medications    Pertinent Medications Comments  NOTED SS PHOS REPLACEMENT RX         Physical Findings     Row Name 10/13/19 1202          Physical Findings    Gastrointestinal  other (see comments) WDL PER NURSING DOCUMENTATION     Skin  other (see comments) INCISIONAL WOUNDS PER NURSING DOCUMENTATION         Estimated/Assessed Needs     Row Name 10/13/19 1200          Calculation Measurements    Height  167.6 cm (66\")         Nutrition Prescription " Ordered     Row Name 10/13/19 1203          Nutrition Prescription PO    Current PO Diet  NPO         Evaluation of Received Nutrient/Fluid Intake     Row Name 10/13/19 1203          PO Evaluation    Number of Meals  -- PT AVERAGED 92% OF 3 MEALS PRIOR TO NPO STATUS WHILE ON HEALTHY HEART, CONSISTENT CHO DIET               Problem/Interventions:  Problem 1     Row Name 10/13/19 1205          Nutrition Diagnoses Problem 1    Problem 1  No Nutrition Diagnosis at this Time     Etiology (related to)  Other (comment) NUTRITION REVIEW     Signs/Symptoms (evidenced by)  PO Intake     Percent (%) intake recorded  92 %     Over number of meals  3 PRIOR TO NPO STATUS WHILE ON HEALTHY HEART, CONSISTENT CHO DIET                 Intervention Goal     Row Name 10/13/19 1206          Intervention Goal    General  Nutrition support treatment     PO  Advance diet AS MEDICALLY APPROPRIATE         Nutrition Intervention     Row Name 10/13/19 1206          Nutrition Intervention    RD/Tech Action  Await begin PO;Follow Tx progress;Care plan reviewd           Education/Evaluation     Row Name 10/13/19 1206          Monitor/Evaluation    Monitor  Per protocol           Electronically signed by:  Lynnette Coleman RD  10/13/19 12:06 PM

## 2019-10-13 NOTE — PLAN OF CARE
Problem: Patient Care Overview  Goal: Plan of Care Review  Outcome: Ongoing (interventions implemented as appropriate)   10/13/19 1630   Coping/Psychosocial   Plan of Care Reviewed With patient;significant other   Plan of Care Review   Progress improving   OTHER   Outcome Summary Patient alert and oriented. Marty turned off this morning. Held his Metoprolol, losartan and norvasc. Discontinued NG and Rucker this shift. Patient voiding spontaneously via urinal. No complaints of pain. Patient receiving basal rate of 4ml morphine/hr. Positive posterior tibial pulses, no dorsalis pedis pulses (discussed with Dr. Burnett, see note). Patient sat up in chair. Ambulating in room. Replaced Mg and phosphorus. Lab rechecks ordered. Vitals stable. Will continue to monitor.        Problem: Diabetes, Type 2 (Adult)  Goal: Signs and Symptoms of Listed Potential Problems Will be Absent, Minimized or Managed (Diabetes, Type 2)  Outcome: Ongoing (interventions implemented as appropriate)   10/13/19 1630   Goal/Outcome Evaluation   Problems Assessed (Type 2 Diabetes) all   Problems Present (Type 2 Diabetes) none       Problem: Seizure Disorder/Epilepsy (Adult)  Goal: Signs and Symptoms of Listed Potential Problems Will be Absent, Minimized or Managed (Seizure Disorder/Epilepsy)  Outcome: Ongoing (interventions implemented as appropriate)   10/13/19 0430 10/13/19 1630   Goal/Outcome Evaluation   Problems Assessed (Seizure Disorder/Epilepsy) all --    Problems Present (Seizure/Epilepsy) --  none

## 2019-10-13 NOTE — NURSING NOTE
Spoke with Dr. Nadeem Burnett at bedside with patient and significant other present. Notified Dr. Burnett that patient had good posterior tibial pulses. However, unable to find pulses with doppler on dorsalis pedis. Patients feet warm to touch, moving without difficulty. Ambulating in room. Dr. Burnett stated that it is normal with patients history. Clarifying this information to avoid future misunderstanding.

## 2019-10-13 NOTE — PROGRESS NOTES
Intensivist Note     10/12/2019  Hospital Day: 4  1 Day Post-Op      Mr. Vince Olivera, 58 y.o. male is followed for:    Seizure disorder on Keppra and Vimpat. Recurrent Grand mal 10/8/19    H/o Left MCA ischemic CVA 4/2017    Bilateral carotid artery stenosis s/p L CEA 4/27/17. 50-69% R carotid stenosis    Bilateral leg and foot pain    PAD. Occluded distal aorta, common and external iliacs, and superior femoral arteries    Aortic occlusion s/p aorto-femoral bypass 10/11/19 per Dr. Weaver     Essential hypertension    Dyslipidemia    Type 2 diabetes mellitus. HbA1c 6.6    Gastroesophageal reflux disease without esophagitis       SUBJECTIVE     58-year-old  white male with a history of carotid artery stenosis status post left CEA, CVA 4/2017, seizure disorder on Vimpat, diabetes mellitus, hypertension, hyperlipidemia, and hypertension.  Has known peripheral vascular disease with claudication and angiography revealed an occluded distal aorta, bilateral occluded common and iliac arteries, and bilateral occluded superior femoral arteries.  Was electively scheduled for aorto bifemoral bypass with bilateral common femoral endarterectomy.  Before that could occur he developed recurrent grand mal seizures on 10/8/2019 despite being on Keppra and Vimpat.  He presented to [facility] ER with confusion, seizure, dysarthria, and right-sided weakness, was loaded with Keppra, and was transferred to [facility].  Since admission here he has rapidly recovered, EEG was negative, and MRI revealed only chronic changes including left temporal and occipital encephalomalacia from previous CVA unchanged from 2018. Patient subsequently underwent his aortobifemoral bypass and bilateral common femoral endarterectomy 10/11/2019.     Interval history: Today is POD #1 and he tells me he is doing well with only minimal pain at the operative site.  He has had no further seizure activity, denies chest pain, dyspnea, lower extremity edema or pain.  Feet  "are warm with good pulses.  Still has an epidural catheter in place.  Only complaint is of constipation.  He does remain on some phenylephrine as blood pressure while adequate is on the low side and he has known 50 to 60% carotid stenosis therefore hypotension is to be avoided    ROS: Per subjective, all other systems reviewed and were negative.    The patient's relevant PMH, PSH, FH, and SH were reviewed and updated in Epic as appropriate. Allergies and Medications reviewed.    OBJECTIVE     BP 92/51   Pulse 92   Temp 97.5 °F (36.4 °C) (Oral)   Resp 18   Ht 167.6 cm (66\")   Wt 94.8 kg (208 lb 15.9 oz)   SpO2 92%   BMI 33.73 kg/m²      Flow (L/min): 2    Flowsheet Rows      First Filed Value   Admission Height  167.6 cm (66\") Documented at 10/09/2019 1117   Admission Weight  88.5 kg (195 lb) Documented at 10/09/2019 1117        Intake & Output (last day):          Intake: 6562 cc               output: 3115 cc       Exam:  General Exam:  Well-developed middle-aged white male propped up in bed in NAD  HEENT: Pupils equal and reactive.  NG tube in place  Neck:                          Supple, no JVD, thyromegaly, or adenopathy  Lungs: Clear to auscultation and percussion anteriorly and posteriorly.  Breath sounds however are diminished  Back:                          Epidural catheter in place  Cardiovascular: Regular rate and rhythm without murmurs or gallops.  HR 92 bpm  Abdomen: Soft nontender without organomegaly or masses.   and rectal: Rucker catheter in place  Extremities: No cyanosis clubbing edema.  Feet both warm with good pulses.  Femoral incisions with clean and dry dressings  Neurologic:                 Symmetric strength. No focal deficits.    Chest X-Ray: Small lung volumes with some mild bibasilar atelectasis.  NG tube with the tip in the fundus    INFUSIONS    morphine and bupivacaine epidural     phenylephrine 0.5-3 mcg/kg/min Last Rate: 0.5 mcg/kg/min (10/12/19 2316)   sodium chloride 125 " mL/hr Last Rate: 125 mL/hr (10/11/19 1845)       Results from last 7 days   Lab Units 10/12/19  0438 10/11/19  2120 10/11/19  1508 10/11/19  0630   WBC 10*3/mm3 15.15*  --  15.14* 9.59   HEMOGLOBIN g/dL 10.7* 10.8* 11.6* 15.8   HEMATOCRIT % 32.1* 32.5* 35.7* 46.9   PLATELETS 10*3/mm3 276  --  266 243     Results from last 7 days   Lab Units 10/12/19  0438 10/11/19  1508   SODIUM mmol/L 139 138   POTASSIUM mmol/L 4.1 4.2   CHLORIDE mmol/L 105 106   CO2 mmol/L 25.0 25.0   BUN mg/dL 12 9   CREATININE mg/dL 0.77 0.64*   GLUCOSE mg/dL 185* 171*   CALCIUM mg/dL 7.9* 7.8*     Results from last 7 days   Lab Units 10/12/19  1745 10/12/19  0438   MAGNESIUM mg/dL  --  1.5*   PHOSPHORUS mg/dL 1.9* 2.3*     Results from last 7 days   Lab Units 10/12/19  0438 10/08/19  0937   ALK PHOS U/L 36* 82   BILIRUBIN mg/dL 0.5 0.3   ALT (SGPT) U/L 19 26   AST (SGOT) U/L 15 16       No results found for: SEDRATE  No results found for: BNP  Lab Results   Component Value Date    CKTOTAL 60 08/03/2017    TROPONINT <0.010 10/08/2019     Lab Results   Component Value Date    TSH 0.638 10/09/2019     Lab Results   Component Value Date    LACTATE 1.9 10/12/2019     Lab Results   Component Value Date    CORTISOL 23.40 04/25/2017         I reviewed the patient's results, images and medication.    Assessment/Plan   ASSESSMENT        Seizure disorder on Keppra and Vimpat. Recurrent Grand mal 10/8/19    H/o Left MCA ischemic CVA 4/2017    Bilateral carotid artery stenosis s/p L CEA 4/27/17. 50-69% R carotid stenosis    Bilateral leg and foot pain    PAD. Occluded distal aorta, common and external iliacs, and superior femoral arteries    Aortic occlusion s/p aorto-femoral bypass 10/11/19 per Dr. Weaver     Essential hypertension    Dyslipidemia    Type 2 diabetes mellitus. HbA1c 6.6    Gastroesophageal reflux disease without esophagitis      DISCUSSION: Acceptable postop course for aorto bifemoral bypass.  Blood pressure is on the low side requiring  fluids and phenylephrine but otherwise he looks good and should recover with this therapy.  I note that his hematocrit dropped from 47 preop to today's value of 32.1.  It appears to have stabilized however from 9 PM last evening.    PLAN     1.  Maintain systolic blood pressure greater than 100  2.  Continue fluids  3.  Wean off phenylephrine when blood pressure recovers  4.  Baseline serum cortisol just in case  5.  Follow-up hematocrit  6.  Continue glycemic control  7.  Hold Cozaar and decrease dose of metoprolol if still requiring phenylephrine maintain blood pressure  8.  Follow-up renal function  9.  Continue Keppra and Vimpat  10.  Oral intake when cleared by surgery    Plan of care and goals reviewed with mulitdisciplinary team at daily rounds.    I discussed the patient's findings and my recommendations with patient, family and nursing staff    High level of risk due to: severe exacerbation of chronic illness, illness with threat to life or bodily function and parenteral controlled substances.    Time spent Critical care 25 min (It does not include procedure time).    Jack Menon MD  Intensive Care Medicine  10/12/19 9:04 PM

## 2019-10-13 NOTE — PROGRESS NOTES
"Vince Olivera  [id number]  1960     LOS: 5 days   Patient Care Team:  Fortunato Marvin MD as PCP - General (Family Medicine)  Oneida Augustin APRN as Nurse Practitioner (Neurology)  Alexx Bach MD as Consulting Physician (Neurosurgery)  Zena Alejandro as Speech Therapist (Speech Pathology)  Randi Llamas PTA as Physical Therapy Assistant (Physical Therapy)  Oneida Augustin APRN as Nurse Practitioner (Neurology)  Provider, No Known as Referring Physician  Keanu Sheth MD as Surgeon (Orthopedic Surgery)    Chief Complaint: Peripheral vascular disease      Subjective: No complaints, having flatus    Objective:     Vital Sign Min/Max for last 24 hours  Temp  Min: 97.4 °F (36.3 °C)  Max: 99.9 °F (37.7 °C)   BP  Min: 88/53  Max: 120/73   Pulse  Min: 79  Max: 100   Resp  Min: 16  Max: 18   SpO2  Min: 90 %  Max: 96 %   No Data Recorded   Weight  Min: 92.2 kg (203 lb 4.2 oz)  Max: 92.2 kg (203 lb 4.2 oz)     Flowsheet Rows      First Filed Value   Admission Height  167.6 cm (66\") Documented at 10/09/2019 1117   Admission Weight  88.5 kg (195 lb) Documented at 10/09/2019 1117          Physical Exam:    Wound:    Pulses: Intact     Mediastinal and Chest Tube Drainage:       Results Review:   Results from last 7 days   Lab Units 10/13/19  0307   WBC 10*3/mm3 14.27*   HEMOGLOBIN g/dL 9.1*   HEMATOCRIT % 29.0*   PLATELETS 10*3/mm3 228     Results from last 7 days   Lab Units 10/13/19  0307   SODIUM mmol/L 138   POTASSIUM mmol/L 4.0   CHLORIDE mmol/L 103   CO2 mmol/L 32.0*   BUN mg/dL 10   CREATININE mg/dL 0.65*   GLUCOSE mg/dL 133*   CALCIUM mg/dL 7.8*             Assessment      Seizure disorder on Keppra and Vimpat. 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 50-69% R carotid stenosis    Gastroesophageal reflux disease without esophagitis    " Type 2 diabetes mellitus. HbA1c 6.6    Bilateral leg and foot pain    PAD. Occluded distal aorta, common and external iliacs, and superior femoral arteries    Aortic occlusion s/p aorto-femoral bypass 10/11/19 per Dr. Weaver       Will DC NG tube since it is not been dissection.  Give a few ice chips.        Tigre Burnett MD  10/13/19  8:06 AM      Please note that portions of this note were completed with a voice recognition program. Efforts were made to edit the dictations, but words may be mistranscribed

## 2019-10-14 LAB
ALBUMIN SERPL-MCNC: 3.4 G/DL (ref 3.5–5.2)
ANION GAP SERPL CALCULATED.3IONS-SCNC: 11 MMOL/L (ref 5–15)
BASOPHILS # BLD AUTO: 0.04 10*3/MM3 (ref 0–0.2)
BASOPHILS NFR BLD AUTO: 0.3 % (ref 0–1.5)
BUN BLD-MCNC: 10 MG/DL (ref 6–20)
BUN/CREAT SERPL: 14.7 (ref 7–25)
CALCIUM SPEC-SCNC: 8.2 MG/DL (ref 8.6–10.5)
CHLORIDE SERPL-SCNC: 99 MMOL/L (ref 98–107)
CO2 SERPL-SCNC: 25 MMOL/L (ref 22–29)
CREAT BLD-MCNC: 0.68 MG/DL (ref 0.76–1.27)
CYTO UR: NORMAL
DEPRECATED RDW RBC AUTO: 46.4 FL (ref 37–54)
EOSINOPHIL # BLD AUTO: 0.01 10*3/MM3 (ref 0–0.4)
EOSINOPHIL NFR BLD AUTO: 0.1 % (ref 0.3–6.2)
ERYTHROCYTE [DISTWIDTH] IN BLOOD BY AUTOMATED COUNT: 13.4 % (ref 12.3–15.4)
GFR SERPL CREATININE-BSD FRML MDRD: 120 ML/MIN/1.73
GLUCOSE BLD-MCNC: 156 MG/DL (ref 65–99)
GLUCOSE BLDC GLUCOMTR-MCNC: 138 MG/DL (ref 70–130)
GLUCOSE BLDC GLUCOMTR-MCNC: 143 MG/DL (ref 70–130)
GLUCOSE BLDC GLUCOMTR-MCNC: 147 MG/DL (ref 70–130)
GLUCOSE BLDC GLUCOMTR-MCNC: 156 MG/DL (ref 70–130)
GLUCOSE BLDC GLUCOMTR-MCNC: 169 MG/DL (ref 70–130)
HCT VFR BLD AUTO: 32.2 % (ref 37.5–51)
HGB BLD-MCNC: 10.4 G/DL (ref 13–17.7)
IMM GRANULOCYTES # BLD AUTO: 0.06 10*3/MM3 (ref 0–0.05)
IMM GRANULOCYTES NFR BLD AUTO: 0.4 % (ref 0–0.5)
LAB AP CASE REPORT: NORMAL
LAB AP CLINICAL INFORMATION: NORMAL
LYMPHOCYTES # BLD AUTO: 2.54 10*3/MM3 (ref 0.7–3.1)
LYMPHOCYTES NFR BLD AUTO: 17.7 % (ref 19.6–45.3)
MAGNESIUM SERPL-MCNC: 2.3 MG/DL (ref 1.6–2.6)
MCH RBC QN AUTO: 30.9 PG (ref 26.6–33)
MCHC RBC AUTO-ENTMCNC: 32.3 G/DL (ref 31.5–35.7)
MCV RBC AUTO: 95.5 FL (ref 79–97)
MONOCYTES # BLD AUTO: 1.07 10*3/MM3 (ref 0.1–0.9)
MONOCYTES NFR BLD AUTO: 7.5 % (ref 5–12)
NEUTROPHILS # BLD AUTO: 10.61 10*3/MM3 (ref 1.7–7)
NEUTROPHILS NFR BLD AUTO: 74 % (ref 42.7–76)
NRBC BLD AUTO-RTO: 0 /100 WBC (ref 0–0.2)
PATH REPORT.FINAL DX SPEC: NORMAL
PATH REPORT.GROSS SPEC: NORMAL
PHOSPHATE SERPL-MCNC: 2.1 MG/DL (ref 2.5–4.5)
PHOSPHATE SERPL-MCNC: 2.4 MG/DL (ref 2.5–4.5)
PHOSPHATE SERPL-MCNC: 2.4 MG/DL (ref 2.5–4.5)
PLATELET # BLD AUTO: 261 10*3/MM3 (ref 140–450)
PMV BLD AUTO: 8.8 FL (ref 6–12)
POTASSIUM BLD-SCNC: 4 MMOL/L (ref 3.5–5.2)
RBC # BLD AUTO: 3.37 10*6/MM3 (ref 4.14–5.8)
SODIUM BLD-SCNC: 135 MMOL/L (ref 136–145)
WBC NRBC COR # BLD: 14.33 10*3/MM3 (ref 3.4–10.8)

## 2019-10-14 PROCEDURE — 85025 COMPLETE CBC W/AUTO DIFF WBC: CPT | Performed by: INTERNAL MEDICINE

## 2019-10-14 PROCEDURE — 99232 SBSQ HOSP IP/OBS MODERATE 35: CPT | Performed by: INTERNAL MEDICINE

## 2019-10-14 PROCEDURE — 84100 ASSAY OF PHOSPHORUS: CPT | Performed by: INTERNAL MEDICINE

## 2019-10-14 PROCEDURE — 82962 GLUCOSE BLOOD TEST: CPT

## 2019-10-14 PROCEDURE — 80069 RENAL FUNCTION PANEL: CPT | Performed by: INTERNAL MEDICINE

## 2019-10-14 PROCEDURE — 99231 SBSQ HOSP IP/OBS SF/LOW 25: CPT | Performed by: PSYCHIATRY & NEUROLOGY

## 2019-10-14 PROCEDURE — 83735 ASSAY OF MAGNESIUM: CPT | Performed by: INTERNAL MEDICINE

## 2019-10-14 PROCEDURE — 99024 POSTOP FOLLOW-UP VISIT: CPT | Performed by: THORACIC SURGERY (CARDIOTHORACIC VASCULAR SURGERY)

## 2019-10-14 RX ORDER — LACOSAMIDE 50 MG/1
75 TABLET ORAL EVERY 12 HOURS SCHEDULED
Status: DISCONTINUED | OUTPATIENT
Start: 2019-10-14 | End: 2019-10-16 | Stop reason: HOSPADM

## 2019-10-14 RX ADMIN — ATORVASTATIN CALCIUM 80 MG: 40 TABLET, FILM COATED ORAL at 20:25

## 2019-10-14 RX ADMIN — INSULIN LISPRO 2 UNITS: 100 INJECTION, SOLUTION INTRAVENOUS; SUBCUTANEOUS at 18:09

## 2019-10-14 RX ADMIN — LOSARTAN POTASSIUM 50 MG: 50 TABLET ORAL at 08:07

## 2019-10-14 RX ADMIN — LEVETIRACETAM 1500 MG: 750 TABLET, FILM COATED ORAL at 20:25

## 2019-10-14 RX ADMIN — CALCIUM CARBONATE 750 MG: 750 TABLET ORAL at 01:43

## 2019-10-14 RX ADMIN — CALCIUM CARBONATE 750 MG: 750 TABLET ORAL at 18:08

## 2019-10-14 RX ADMIN — DOCUSATE SODIUM 100 MG: 100 CAPSULE, LIQUID FILLED ORAL at 08:07

## 2019-10-14 RX ADMIN — METOPROLOL SUCCINATE 200 MG: 100 TABLET, EXTENDED RELEASE ORAL at 08:07

## 2019-10-14 RX ADMIN — LACOSAMIDE 75 MG: 50 TABLET, FILM COATED ORAL at 20:24

## 2019-10-14 RX ADMIN — SODIUM CHLORIDE, PRESERVATIVE FREE 10 ML: 5 INJECTION INTRAVENOUS at 20:25

## 2019-10-14 RX ADMIN — DOCUSATE SODIUM 100 MG: 100 CAPSULE, LIQUID FILLED ORAL at 20:25

## 2019-10-14 RX ADMIN — POTASSIUM & SODIUM PHOSPHATES POWDER PACK 280-160-250 MG 2 PACKET: 280-160-250 PACK at 14:48

## 2019-10-14 RX ADMIN — ASPIRIN 325 MG: 325 TABLET, DELAYED RELEASE ORAL at 08:08

## 2019-10-14 RX ADMIN — INSULIN LISPRO 2 UNITS: 100 INJECTION, SOLUTION INTRAVENOUS; SUBCUTANEOUS at 05:57

## 2019-10-14 RX ADMIN — LACOSAMIDE 100 MG: 50 TABLET, FILM COATED ORAL at 08:07

## 2019-10-14 RX ADMIN — POTASSIUM & SODIUM PHOSPHATES POWDER PACK 280-160-250 MG 2 PACKET: 280-160-250 PACK at 23:57

## 2019-10-14 RX ADMIN — LOSARTAN POTASSIUM 50 MG: 50 TABLET ORAL at 20:25

## 2019-10-14 RX ADMIN — LEVETIRACETAM 1500 MG: 750 TABLET, FILM COATED ORAL at 08:08

## 2019-10-14 RX ADMIN — AMLODIPINE BESYLATE 10 MG: 10 TABLET ORAL at 08:08

## 2019-10-14 RX ADMIN — PANTOPRAZOLE SODIUM 40 MG: 40 TABLET, DELAYED RELEASE ORAL at 06:00

## 2019-10-14 NOTE — PAYOR COMM NOTE
"Continued stay.    Nilda Hooks RN CM   Phone [phone]  Fax [phone]    Shaneka Tinoco (58 y.o. Male)     Date of Birth Social Security Number Address Home Phone MRN    1960  [address] [address] [phone] [id number]    Confucianism Marital Status          Congregational Single       Admission Date Admission Type Admitting Provider Attending Provider Department, Room/Bed    10/8/19 Urgent Marissa Reynaga MD Gerhardstein, Donna C, MD [facility] 2B ICU, N235/1    Discharge Date Discharge Disposition Discharge Destination                       Attending Provider:  Marissa Reynaga MD    Allergies:  No Known Allergies    Isolation:  None   Infection:  None   Code Status:  CPR    Ht:  167.6 cm (66\")   Wt:  95 kg (209 lb 7 oz)    Admission Cmt:  None   Principal Problem:  Seizure disorder on Keppra and Vimpat. Recurrent Grand mal 10/8/19 [I69.398,G40.909]                 Active Insurance as of 10/8/2019     Primary Coverage     Payor Plan Insurance Group Employer/Plan Group    ANTHEM MEDICAID ANTHEM MEDICAID [id number]     Payor Plan Address Payor Plan Phone Number Payor Plan Fax Number Effective Dates    [address] [address] [phone]  2/1/2017 - None Entered    [facility] [id number]       Subscriber Name Subscriber Birth Date Member ID       SHANEKA TINOCO 1960 [id number]                 Emergency Contacts      (Rel.) Home Phone Work Phone Mobile Phone    Donna Davis (Power of ) [phone] -- [phone]    Sussy Retana (Relative) [phone] -- [phone]               Physician Progress Notes (last 72 hours) (Notes from 10/11/19 0923 through 10/14/19 0923)      Abundio Bautista PA at 10/14/19 0851          CTS Progress Note      POD #3 s/p Aortobifemoral bypass with bilateral common femoral endarterectomies      Subjective  Patient states that overall he is feeling quite well and has some mild incisional pain.  " Patient has tolerated ice chips over the previous 24 hours with no nausea or vomiting.  Patient admits to intermittent flatus.  The patient has been out of bed and up to the chair.      Objective    Physical Exam:   Vital Signs   Temp:  [97.9 °F (36.6 °C)-99.2 °F (37.3 °C)] 97.9 °F (36.6 °C)  Heart Rate:  [] 114  Resp:  [16-18] 18  BP: ()/(60-92) 144/88   GEN: NAD   RESP: Clear to auscultation bilaterally no wheezes, rales or rhonchi    CV: Regular rate and rhythm no murmurs, rubs or gallops   ABD: Soft, mild tenderness, nondistended with hypoactive bowel sounds    EXT: Warm good color well-perfused with no bilateral lower extremity edema and dopplerable bilateral dorsalis pedis and posterior tibialis pulses   INT: Incision dressings c/d/i      Intake/Output Summary (Last 24 hours) at 10/14/2019 0851  Last data filed at 10/14/2019 0800  Gross per 24 hour   Intake 2851.1 ml   Output 1050 ml   Net 1801.1 ml     Results     Results from last 7 days   Lab Units 10/14/19  0514   WBC 10*3/mm3 14.33*   HEMOGLOBIN g/dL 10.4*   HEMATOCRIT % 32.2*   PLATELETS 10*3/mm3 261     Results from last 7 days   Lab Units 10/13/19  0307   SODIUM mmol/L 138   POTASSIUM mmol/L 4.0   CHLORIDE mmol/L 103   CO2 mmol/L 32.0*   BUN mg/dL 10   CREATININE mg/dL 0.65*   GLUCOSE mg/dL 133*   CALCIUM mg/dL 7.8*     Results from last 7 days   Lab Units 10/08/19  0937   INR  0.97   APTT seconds 28.8         Assessment/Plan       Seizure disorder on Keppra and Vimpat. 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 50-69% R carotid stenosis    Gastroesophageal reflux disease without esophagitis    Type 2 diabetes mellitus. HbA1c 6.6    Bilateral leg and foot pain    PAD. Occluded distal aorta, common and external iliacs, and superior femoral arteries    Aortic occlusion s/p aorto-femoral bypass 10/11/19 per Dr. Weaver          Plan   Slowly advance diet to clear liquids today  Ambulate with assistance  PT/OT  Leave in ICU one more day    KILEY De La Cruz  10/14/19  8:51 AM                    Electronically signed by Abundio Bautista PA at 10/14/19 0875     Jack Menon MD at 10/13/19 3328          Intensivist Note     10/14/2019  Hospital Day: 6  2 Days Post-Op      Mr. Vince Olivera, 58 y.o. male is followed for:    Seizure disorder on Keppra and Vimpat. Recurrent Grand mal 10/8/19    H/o Left MCA ischemic CVA 4/2017    Bilateral carotid artery stenosis s/p L CEA 4/27/17. 50-69% R carotid stenosis    Bilateral leg and foot pain    PAD. Occluded distal aorta, common and external iliacs, and superior femoral arteries    Aortic occlusion s/p aorto-femoral bypass 10/11/19 per Dr. Weaver     Essential hypertension    Dyslipidemia    Type 2 diabetes mellitus. HbA1c 6.6    Gastroesophageal reflux disease without esophagitis       SUBJECTIVE     58-year-old  white male with a history of carotid artery stenosis status post left CEA, CVA 4/2017, seizure disorder on Vimpat, diabetes mellitus, hypertension, hyperlipidemia, and hypertension.  Has known peripheral vascular disease with claudication and angiography revealed an occluded distal aorta, bilateral occluded common and iliac arteries, and bilateral occluded superior femoral arteries.  Was electively scheduled for aorto bifemoral bypass with bilateral common femoral endarterectomy but before that could occur, he developed recurrent grand mal seizures on 10/8/2019 despite being on Keppra and Vimpat.  He presented to [facility] ER with confusion, seizure, dysarthria, and right-sided weakness, was loaded with Keppra, and was transferred to [facility].  Since admission here he has rapidly recovered, EEG was negative, and MRI revealed only chronic changes including left temporal and occipital encephalomalacia from previous CVA unchanged from 2018. Patient subsequently underwent his  "aortobifemoral bypass and bilateral common femoral endarterectomy 10/11/2019.      Interval history: Today is POD #2 and is doing well with the usual amount of postop pain.  Still has had no recurrence of seizure activity, denies chest pain, dyspnea, lower extremity edema or pain.  Feet are warm with good pulses.  Still has an epidural catheter in place but is not been required to use it.  Still no bowel movement but is passing gas and is now receiving ice chips with plans to advance diet as tolerated.  Nausea, vomiting, melena or hematochezia.  He remains on phenylephrine as well as half-normal saline at 125 cc an hour but blood pressure looks good today and should be able to wean off of it.  Main concern in keeping his blood pressure up was a known 50 to 60% carotid stenosis in the wish to avoid decreased cerebral perfusion.    ROS: Per subjective, all other systems reviewed and were negative.    The patient's relevant PMH, PSH, FH, and SH were reviewed and updated in Epic as appropriate. Allergies and Medications reviewed.    OBJECTIVE     /82 (BP Location: Left arm, Patient Position: Lying)   Pulse 113   Temp 98.7 °F (37.1 °C) (Oral)   Resp 18   Ht 167.6 cm (66\")   Wt 92.2 kg (203 lb 4.8 oz)   SpO2 92%   BMI 32.81 kg/m²       Flow (L/min): 2    Flowsheet Rows      First Filed Value   Admission Height  167.6 cm (66\") Documented at 10/09/2019 1117   Admission Weight  88.5 kg (195 lb) Documented at 10/09/2019 1117        Intake & Output (last day)    intake: 2154 cc                  output: 550 cc       Exam:  General Exam:  Well-developed middle-aged white male sitting up in a chair in NAD  HEENT: Pupils equal and reactive.  NG tube in place  Neck:                          Supple, no JVD, thyromegaly, or adenopathy  Lungs: Clear anteriorly and laterally.  Cardiovascular: RRR without murmurs or gallops.  HR 97 bpm  Abdomen: Abdominal dressing clean and dry.   and rectal: Deferred.  Extremities: No " cyanosis clubbing edema.  Neurologic:                 Symmetric strength. No focal deficits.    Chest X-Ray 10/12/2019: Normal lung volumes and possibly some minimal basilar atelectasis    INFUSIONS    morphine and bupivacaine epidural     phenylephrine 0.5-3 mcg/kg/min Last Rate: Stopped (10/13/19 0830)   sodium chloride 125 mL/hr Last Rate: 125 mL/hr (10/13/19 2150)       Results from last 7 days   Lab Units 10/13/19  0307 10/12/19  2153 10/12/19  0438  10/11/19  1508   WBC 10*3/mm3 14.27*  --  15.15*  --  15.14*   HEMOGLOBIN g/dL 9.1* 9.2* 10.7*   < > 11.6*   HEMATOCRIT % 29.0* 28.8* 32.1*   < > 35.7*   PLATELETS 10*3/mm3 228  --  276  --  266    < > = values in this interval not displayed.     Results from last 7 days   Lab Units 10/13/19  0307 10/12/19  0438   SODIUM mmol/L 138 139   POTASSIUM mmol/L 4.0 4.1   CHLORIDE mmol/L 103 105   CO2 mmol/L 32.0* 25.0   BUN mg/dL 10 12   CREATININE mg/dL 0.65* 0.77   GLUCOSE mg/dL 133* 185*   CALCIUM mg/dL 7.8* 7.9*     Results from last 7 days   Lab Units 10/13/19  1957 10/13/19  1144 10/13/19  0307  10/12/19  0438   MAGNESIUM mg/dL  --   --  2.4  --  1.5*   PHOSPHORUS mg/dL 1.9* 1.9* 1.4*   < > 2.3*    < > = values in this interval not displayed.     Results from last 7 days   Lab Units 10/12/19  0438 10/08/19  0937   ALK PHOS U/L 36* 82   BILIRUBIN mg/dL 0.5 0.3   ALT (SGPT) U/L 19 26   AST (SGOT) U/L 15 16       No results found for: SEDRATE  No results found for: BNP  Lab Results   Component Value Date    CKTOTAL 60 08/03/2017    TROPONINT <0.010 10/08/2019     Lab Results   Component Value Date    TSH 0.638 10/09/2019     Lab Results   Component Value Date    LACTATE 1.9 10/12/2019     Lab Results   Component Value Date    CORTISOL 13.41 10/12/2019         I reviewed the patient's results, images and medication.    Assessment/Plan   ASSESSMENT        Seizure disorder on Keppra and Vimpat. Recurrent Grand mal 10/8/19    H/o Left MCA ischemic CVA 4/2017    Bilateral  carotid artery stenosis s/p L CEA 4/27/17. 50-69% R carotid stenosis    Bilateral leg and foot pain    PAD. Occluded distal aorta, common and external iliacs, and superior femoral arteries    Aortic occlusion s/p aorto-femoral bypass 10/11/19 per Dr. Weaver     Essential hypertension    Dyslipidemia    Type 2 diabetes mellitus. HbA1c 6.6    Gastroesophageal reflux disease without esophagitis      DISCUSSION: Acceptable postoperative course, wean off phenylephrine, advancing oral intake, no evidence of recurrent seizures, and diabetes well controlled.    PLAN   1.  Decrease IV fluids  2.  NG discontinued and oral intake being advanced per surgery  3.  Continue antiseizure medicines  4.  Should be able to take epidural at soon      Plan of care and goals reviewed with mulitdisciplinary team at daily rounds.    I discussed the patient's findings and my recommendations with patient and nursing staff    High level of risk due to: severe exacerbation of chronic illness, illness with threat to life or bodily function and parenteral controlled substances.    Time spent Critical care 25 min (It does not include procedure time).    Jack Menon MD  Intensive Care Medicine  10/14/19 12:09 AM       Electronically signed by Jack Menon MD at 10/14/19 0019     Tigre Burnett MD at 10/13/19 0806          Vince Olivera  [id number]  1960     LOS: 5 days   Patient Care Team:  Fortunato Marvin MD as PCP - General (Family Medicine)  Oneida Augustin APRN as Nurse Practitioner (Neurology)  Alexx Bach MD as Consulting Physician (Neurosurgery)  Zena Alejandro as Speech Therapist (Speech Pathology)  Randi Llamas PTA as Physical Therapy Assistant (Physical Therapy)  Oneida Augustin APRN as Nurse Practitioner (Neurology)  Provider, No Known as Referring Physician  Keanu Sheth MD as Surgeon (Orthopedic Surgery)    Chief Complaint: Peripheral vascular  "disease      Subjective: No complaints, having flatus    Objective:     Vital Sign Min/Max for last 24 hours  Temp  Min: 97.4 °F (36.3 °C)  Max: 99.9 °F (37.7 °C)   BP  Min: 88/53  Max: 120/73   Pulse  Min: 79  Max: 100   Resp  Min: 16  Max: 18   SpO2  Min: 90 %  Max: 96 %   No Data Recorded   Weight  Min: 92.2 kg (203 lb 4.2 oz)  Max: 92.2 kg (203 lb 4.2 oz)     Flowsheet Rows      First Filed Value   Admission Height  167.6 cm (66\") Documented at 10/09/2019 1117   Admission Weight  88.5 kg (195 lb) Documented at 10/09/2019 1117          Physical Exam:    Wound:    Pulses: Intact     Mediastinal and Chest Tube Drainage:       Results Review:   Results from last 7 days   Lab Units 10/13/19  0307   WBC 10*3/mm3 14.27*   HEMOGLOBIN g/dL 9.1*   HEMATOCRIT % 29.0*   PLATELETS 10*3/mm3 228     Results from last 7 days   Lab Units 10/13/19  0307   SODIUM mmol/L 138   POTASSIUM mmol/L 4.0   CHLORIDE mmol/L 103   CO2 mmol/L 32.0*   BUN mg/dL 10   CREATININE mg/dL 0.65*   GLUCOSE mg/dL 133*   CALCIUM mg/dL 7.8*             Assessment      Seizure disorder on Keppra and Vimpat. 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 50-69% R carotid stenosis    Gastroesophageal reflux disease without esophagitis    Type 2 diabetes mellitus. HbA1c 6.6    Bilateral leg and foot pain    PAD. Occluded distal aorta, common and external iliacs, and superior femoral arteries    Aortic occlusion s/p aorto-femoral bypass 10/11/19 per Dr. Weaver       Will DC NG tube since it is not been dissection.  Give a few ice chips.        Tigre Burnett MD  10/13/19  8:06 AM      Please note that portions of this note were completed with a voice recognition program. Efforts were made to edit the dictations, but words may be mistranscribed    Electronically signed by Tigre Burnett MD at 10/13/19 0807     Jack Menon MD at " 10/12/19 2102          Intensivist Note     10/12/2019  Hospital Day: 4  1 Day Post-Op      Mr. Vince Olivera, 58 y.o. male is followed for:    Seizure disorder on Keppra and Vimpat. Recurrent Grand mal 10/8/19    H/o Left MCA ischemic CVA 4/2017    Bilateral carotid artery stenosis s/p L CEA 4/27/17. 50-69% R carotid stenosis    Bilateral leg and foot pain    PAD. Occluded distal aorta, common and external iliacs, and superior femoral arteries    Aortic occlusion s/p aorto-femoral bypass 10/11/19 per Dr. Weaver     Essential hypertension    Dyslipidemia    Type 2 diabetes mellitus. HbA1c 6.6    Gastroesophageal reflux disease without esophagitis       SUBJECTIVE     58-year-old  white male with a history of carotid artery stenosis status post left CEA, CVA 4/2017, seizure disorder on Vimpat, diabetes mellitus, hypertension, hyperlipidemia, and hypertension.  Has known peripheral vascular disease with claudication and angiography revealed an occluded distal aorta, bilateral occluded common and iliac arteries, and bilateral occluded superior femoral arteries.  Was electively scheduled for aorto bifemoral bypass with bilateral common femoral endarterectomy.  Before that could occur he developed recurrent grand mal seizures on 10/8/2019 despite being on Keppra and Vimpat.  He presented to [facility] ER with confusion, seizure, dysarthria, and right-sided weakness, was loaded with Keppra, and was transferred to [facility].  Since admission here he has rapidly recovered, EEG was negative, and MRI revealed only chronic changes including left temporal and occipital encephalomalacia from previous CVA unchanged from 2018. Patient subsequently underwent his aortobifemoral bypass and bilateral common femoral endarterectomy 10/11/2019.     Interval history: Today is POD #1 and he tells me he is doing well with only minimal pain at the operative site.  He has had no further seizure activity, denies chest pain, dyspnea, lower  "extremity edema or pain.  Feet are warm with good pulses.  Still has an epidural catheter in place.  Only complaint is of constipation.  He does remain on some phenylephrine as blood pressure while adequate is on the low side and he has known 50 to 60% carotid stenosis therefore hypotension is to be avoided    ROS: Per subjective, all other systems reviewed and were negative.    The patient's relevant PMH, PSH, FH, and SH were reviewed and updated in Epic as appropriate. Allergies and Medications reviewed.    OBJECTIVE     BP 92/51   Pulse 92   Temp 97.5 °F (36.4 °C) (Oral)   Resp 18   Ht 167.6 cm (66\")   Wt 94.8 kg (208 lb 15.9 oz)   SpO2 92%   BMI 33.73 kg/m²       Flow (L/min): 2    Flowsheet Rows      First Filed Value   Admission Height  167.6 cm (66\") Documented at 10/09/2019 1117   Admission Weight  88.5 kg (195 lb) Documented at 10/09/2019 1117        Intake & Output (last day):          Intake: 6562 cc               output: 3115 cc       Exam:  General Exam:  Well-developed middle-aged white male propped up in bed in NAD  HEENT: Pupils equal and reactive.  NG tube in place  Neck:                          Supple, no JVD, thyromegaly, or adenopathy  Lungs: Clear to auscultation and percussion anteriorly and posteriorly.  Breath sounds however are diminished  Back:                          Epidural catheter in place  Cardiovascular: Regular rate and rhythm without murmurs or gallops.  HR 92 bpm  Abdomen: Soft nontender without organomegaly or masses.   and rectal: Rucker catheter in place  Extremities: No cyanosis clubbing edema.  Feet both warm with good pulses.  Femoral incisions with clean and dry dressings  Neurologic:                 Symmetric strength. No focal deficits.    Chest X-Ray: Small lung volumes with some mild bibasilar atelectasis.  NG tube with the tip in the fundus    INFUSIONS    morphine and bupivacaine epidural     phenylephrine 0.5-3 mcg/kg/min Last Rate: 0.5 mcg/kg/min (10/12/19 " 0951)   sodium chloride 125 mL/hr Last Rate: 125 mL/hr (10/11/19 1845)       Results from last 7 days   Lab Units 10/12/19  0438 10/11/19  2120 10/11/19  1508 10/11/19  0630   WBC 10*3/mm3 15.15*  --  15.14* 9.59   HEMOGLOBIN g/dL 10.7* 10.8* 11.6* 15.8   HEMATOCRIT % 32.1* 32.5* 35.7* 46.9   PLATELETS 10*3/mm3 276  --  266 243     Results from last 7 days   Lab Units 10/12/19  0438 10/11/19  1508   SODIUM mmol/L 139 138   POTASSIUM mmol/L 4.1 4.2   CHLORIDE mmol/L 105 106   CO2 mmol/L 25.0 25.0   BUN mg/dL 12 9   CREATININE mg/dL 0.77 0.64*   GLUCOSE mg/dL 185* 171*   CALCIUM mg/dL 7.9* 7.8*     Results from last 7 days   Lab Units 10/12/19  1745 10/12/19  0438   MAGNESIUM mg/dL  --  1.5*   PHOSPHORUS mg/dL 1.9* 2.3*     Results from last 7 days   Lab Units 10/12/19  0438 10/08/19  0937   ALK PHOS U/L 36* 82   BILIRUBIN mg/dL 0.5 0.3   ALT (SGPT) U/L 19 26   AST (SGOT) U/L 15 16       No results found for: SEDRATE  No results found for: BNP  Lab Results   Component Value Date    CKTOTAL 60 08/03/2017    TROPONINT <0.010 10/08/2019     Lab Results   Component Value Date    TSH 0.638 10/09/2019     Lab Results   Component Value Date    LACTATE 1.9 10/12/2019     Lab Results   Component Value Date    CORTISOL 23.40 04/25/2017         I reviewed the patient's results, images and medication.    Assessment/Plan   ASSESSMENT        Seizure disorder on Keppra and Vimpat. Recurrent Grand mal 10/8/19    H/o Left MCA ischemic CVA 4/2017    Bilateral carotid artery stenosis s/p L CEA 4/27/17. 50-69% R carotid stenosis    Bilateral leg and foot pain    PAD. Occluded distal aorta, common and external iliacs, and superior femoral arteries    Aortic occlusion s/p aorto-femoral bypass 10/11/19 per Dr. Weaver     Essential hypertension    Dyslipidemia    Type 2 diabetes mellitus. HbA1c 6.6    Gastroesophageal reflux disease without esophagitis      DISCUSSION: Acceptable postop course for aorto bifemoral bypass.  Blood pressure is  on the low side requiring fluids and phenylephrine but otherwise he looks good and should recover with this therapy.  I note that his hematocrit dropped from 47 preop to today's value of 32.1.  It appears to have stabilized however from 9 PM last evening.    PLAN     1.  Maintain systolic blood pressure greater than 100  2.  Continue fluids  3.  Wean off phenylephrine when blood pressure recovers  4.  Baseline serum cortisol just in case  5.  Follow-up hematocrit  6.  Continue glycemic control  7.  Hold Cozaar and decrease dose of metoprolol if still requiring phenylephrine maintain blood pressure  8.  Follow-up renal function  9.  Continue Keppra and Vimpat  10.  Oral intake when cleared by surgery    Plan of care and goals reviewed with mulitdisciplinary team at daily rounds.    I discussed the patient's findings and my recommendations with patient, family and nursing staff    High level of risk due to: severe exacerbation of chronic illness, illness with threat to life or bodily function and parenteral controlled substances.    Time spent Critical care 25 min (It does not include procedure time).    Jack Menon MD  Intensive Care Medicine  10/12/19 9:04 PM       Electronically signed by Jack Menon MD at 10/12/19 2128     Tigre Burnett MD at 10/12/19 0820          Vince Olivera  [id number]  1960     LOS: 4 days   Patient Care Team:  Fortunato Marvin MD as PCP - General (Family Medicine)  Oneida Augustin APRN as Nurse Practitioner (Neurology)  Alexx Bach MD as Consulting Physician (Neurosurgery)  Zena Alejandro as Speech Therapist (Speech Pathology)  Randi Llamas PTA as Physical Therapy Assistant (Physical Therapy)  Oneida Augustin APRN as Nurse Practitioner (Neurology)  Provider, No Known as Referring Physician  Keanu Sheth MD as Surgeon (Orthopedic Surgery)    Chief Complaint: Peripheral vascular disease      Subjective:  "No complaints    Objective:     Vital Sign Min/Max for last 24 hours  Temp  Min: 96.5 °F (35.8 °C)  Max: 98.3 °F (36.8 °C)   BP  Min: 66/36  Max: 130/60   Pulse  Min: 55  Max: 98   Resp  Min: 16  Max: 18   SpO2  Min: 92 %  Max: 100 %   No Data Recorded   Weight  Min: 88.5 kg (195 lb)  Max: 94.8 kg (208 lb 15.9 oz)     Flowsheet Rows      First Filed Value   Admission Height  167.6 cm (66\") Documented at 10/09/2019 1117   Admission Weight  88.5 kg (195 lb) Documented at 10/09/2019 1117          Physical Exam:    Wound: Satisfactory    Pulses:     Mediastinal and Chest Tube Drainage:       Results Review:   Results from last 7 days   Lab Units 10/12/19  0438   WBC 10*3/mm3 15.15*   HEMOGLOBIN g/dL 10.7*   HEMATOCRIT % 32.1*   PLATELETS 10*3/mm3 276     Results from last 7 days   Lab Units 10/12/19  0438   SODIUM mmol/L 139   POTASSIUM mmol/L 4.1   CHLORIDE mmol/L 105   CO2 mmol/L 25.0   BUN mg/dL 12   CREATININE mg/dL 0.77   GLUCOSE mg/dL 185*   CALCIUM mg/dL 7.9*             Assessment      Seizure disorder on Keppra and Vimpat. 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 50-69% R carotid stenosis    Gastroesophageal reflux disease without esophagitis    Type 2 diabetes mellitus     Bilateral leg and foot pain    PAD. Occluded distal aorta, common and external iliacs, and superior femoral arteries    Aortic occlusion s/p aorto-femoral bypass per Dr. Weaver       Doing satisfactory        Tigre Burnett MD  10/12/19  8:21 AM      Please note that portions of this note were completed with a voice recognition program. Efforts were made to edit the dictations, but words may be mistranscribed    Electronically signed by Tigre Burnett MD at 10/12/19 6944     Marissa Reynaga MD at 10/11/19 8556          Critical Care Note     LOS: 3 days   Patient Care Team:  Fortunato Marvin MD as " PCP - General (Family Medicine)  Oneida Augustin APRN as Nurse Practitioner (Neurology)  Alexx Bach MD as Consulting Physician (Neurosurgery)  Zena Alejandro as Speech Therapist (Speech Pathology)  Randi Llamas PTA as Physical Therapy Assistant (Physical Therapy)  Oneida Augustin APRN as Nurse Practitioner (Neurology)  Provider, No Known as Referring Physician  Keanu Sheth MD as Surgeon (Orthopedic Surgery)    Chief Complaint/Reason for visit:  No chief complaint on file.    Subjective      Vince Olivera is a 58 y.o. male with PMH carotid artery disease s/p L CEA, CVA 2017, known seizure disorder on Vimpat, T2DM, HLD, HTN, GERD, and PAD admitted to [facility] from [facility] on 10/8 with complaints of right-sided weakness, confusion, and seizure.     The patient was originally coming to [place] for pre-admission testing for scheduled aortic-bifemoral bypass surgery and had a witnessed focal seizure per the wife involving the right upper extremity associated with confusion and garbled speech. Wife reports that prior to this event seizures have been well-controlled with the last one in May of 2018. On [facility] arrival [facility] 18 and later had witnessed grad mal seizure aborted with ativan. CT head negative and he was loaded with Keppra and transferred to our facility for further care.     Mr. Olivera was admitted to telemetry with neurology consult and continued on Keppra and Vimpat. EEG negative for epileptiform activity and MRI showed no acute abnormality with chronic changes including left temporal and occipital encephalomalacia, unchanged from 2018 comparison. There were no additional findings concerning for seizure and as there is a known history of carotid 50-70% carotid stenosis neurology cleared him for surgery but strongly suggested avoiding intra-operative hypotension. He was evaluated by Dr. Weaver who ordered CT chest with unremarkable findings and decided to  "proceed with pre-scheduled bypass surgery.     Interval History:     On 10/11 he underwent open aortobifemoral bypass, right common femoral artery endarterectomy, left common femoral endarterectomy for an occluded distal abdominal aorta and extensive peripheral vascular disease involving the iliac and femoral arteries Per Dr. Weaver he did not require blood products.  He is currently in the ICU on 4 L nasal cannula with a saturation of 97%.  He denies nausea or vomiting.  Nasogastric tube is in place  He is in sinus rhythm.  He denies chest pain or palpitations.  He denies any abdominal pain.  He does have an epidural for postoperative pain management    History taken from: patient    Review of Systems:    All systems were reviewed and negative except as noted in subjective.    Medical history, surgical history, social history, family history reviewed    Objective     Intake/Output:    Intake/Output Summary (Last 24 hours) at 10/11/2019 1405  Last data filed at 10/11/2019 1339  Gross per 24 hour   Intake 4740 ml   Output 1700 ml   Net 3040 ml       Nutrition:  NPO Diet    Infusions:    lactated ringers 9 mL/hr Last Rate: 9 mL/hr (10/11/19 0854)   morphine and bupivacaine epidural           Telemetry: Sinus rhythm             Vital Signs  Blood pressure 129/82, pulse 55, temperature 97.4 °F (36.3 °C), temperature source Tympanic, resp. rate 16, height 167.6 cm (66\"), weight 88.5 kg (195 lb), SpO2 99 %.    Physical Exam:  General Appearance:   Overweight middle-aged gentleman in no distress   Head:   Normocephalic, atraumatic   Eyes:          Pupils small, equal, reactive   Ears:     Throat:  Oral mucosa dry   Neck:  Trachea midline, healed left neck surgical scar   Back:      Lungs:    Symmetric chest expansion without wheeze or rhonchi    Heart:   Regular rhythm, S1, S2 auscultated   Abdomen:    Hypoactive bowel sounds, vertical surgical dressing dry and intact, soft   Rectal:   Deferred   Extremities:  No pitting " edema.  Feet are warm to touch.  Right wrist arterial line.  Right hand with good capillary refill   Pulses:  Doppler pulses both feet   Skin:  Warm and dry   Lymph nodes:    Neurologic:  Drowsy but awakens and oriented,  equal      Results Review:     I reviewed the patient's new clinical results.   Results from last 7 days   Lab Units 10/11/19  0630 10/10/19  0458 10/09/19  1227 10/08/19  0937   SODIUM mmol/L 138 138 137 134*   POTASSIUM mmol/L 4.0 4.2 3.9 3.7   CHLORIDE mmol/L 99 98 97* 92*   CO2 mmol/L 24.0 30.0* 28.0 23.9   BUN mg/dL 9 10 9 8   CREATININE mg/dL 0.73* 0.96 0.78 0.83   CALCIUM mg/dL 9.0 9.0 9.3 9.7   BILIRUBIN mg/dL  --   --   --  0.3   ALK PHOS U/L  --   --   --  82   ALT (SGPT) U/L  --   --   --  26   AST (SGOT) U/L  --   --   --  16   GLUCOSE mg/dL 155* 112* 166* 232*     Results from last 7 days   Lab Units 10/11/19  0630 10/10/19  0458 10/09/19  1227   WBC 10*3/mm3 9.59 10.64 10.79   HEMOGLOBIN g/dL 15.8 15.8 15.2   HEMATOCRIT % 46.9 46.8 45.7   PLATELETS 10*3/mm3 243 236 259         Lab Results   Component Value Date    BLOODCX Mixed Gram Positive Susi (A) 04/24/2017     No results found for: URINECX    I reviewed the patient's new imaging including images and reports.      All medications reviewed.     amLODIPine 10 mg Oral Daily   [MAR Hold] aspirin  mg Oral Daily   [MAR Hold] atorvastatin 80 mg Oral Nightly   cefuroxime 1.5 g Intravenous Once   [MAR Hold] hydroCHLOROthiazide 12.5 mg Oral Daily   [MAR Hold] insulin lispro 0-9 Units Subcutaneous 4x Daily With Meals & Nightly   lacosamide 100 mg Oral Q12H   levETIRAcetam 1,500 mg Oral Q12H   losartan 50 mg Oral BID   metoprolol succinate  mg Oral Daily   [MAR Hold] pantoprazole 40 mg Oral QAM   [MAR Hold] sodium chloride 10 mL Intravenous Q12H   sodium chloride 10 mL Intravenous Q12H   sodium chloride 3 mL Intravenous Q12H   sodium chloride 3 mL Intravenous Q12H         Assessment/Plan       Seizure disorder on Keppra and  Vimpat     Aortic occlusion s/p aorto-femoral bypass per Dr. Weaver     Bilateral carotid artery stenosis    Type 2 diabetes mellitus     PAD    H/o Left MCA ischemic CVA 4/2017    Essential hypertension    Dyslipidemia    Gastroesophageal reflux disease without esophagitis    Bilateral leg and foot pain    #1 occluded distal aorta status post aortofemoral bypass today without any immediate difficulties.  Feet are warm and viable.  He is currently on phenylephrine for blood pressure support    #2 seizure disorder, currently on Keppra and Vimpat    #3 history of left middle cerebral artery stroke with subsequent carotid endarterectomy in 2017, he had no significant neurologic sequelae    #4 diabetes mellitus type 2, A1c is 6.6.  He is on metformin as an outpatient.  He is currently on sliding scale insulin with blood sugars less than 200    #5 hypertension postoperative blood pressure 134/66 on oral agents    PLAN:    Epidural for postoperative pain management  Monitor H&H, creatinine, urine output  Norvasc, Cozaar, metoprolol, hold for systolic blood pressure less than 120  Hold diuretics  Continue Keppra, Vimpat for seizures  Aspirin, statin  Nebulized bronchodilators  Incentive spirometry  Monitor pedal pulses        Melissa An, APRN, AGACNP-BC, FNP-BC   Pulmonary and Critical Care     I reviewed his record, interviewed the patient, performed the above physical examination, reviewed his laboratory data, x-ray data, medication list, formulated the assessment and plan and extensively modified the note to reflect my additions and find    Marissa Reynaga MD      Time: 32min  I personally provided care to this critically ill patient as documented above.  Critical care time does not include time spent on separately billed procedures.  Non of my critical care time was concurrent with other critical care providers.     Electronically signed by Marissa Reynaga MD at 10/11/19 8908       Consult Notes (last  72 hours) (Notes from 10/11/19 0923 through 10/14/19 0923)     No notes of this type exist for this encounter.

## 2019-10-14 NOTE — PLAN OF CARE
Problem: Patient Care Overview  Goal: Plan of Care Review  Outcome: Ongoing (interventions implemented as appropriate)      Problem: Diabetes, Type 2 (Adult)  Goal: Signs and Symptoms of Listed Potential Problems Will be Absent, Minimized or Managed (Diabetes, Type 2)   10/13/19 1630   Goal/Outcome Evaluation   Problems Assessed (Type 2 Diabetes) all   Problems Present (Type 2 Diabetes) none       Problem: Seizure Disorder/Epilepsy (Adult)  Goal: Signs and Symptoms of Listed Potential Problems Will be Absent, Minimized or Managed (Seizure Disorder/Epilepsy)  Outcome: Ongoing (interventions implemented as appropriate)   10/13/19 0430 10/13/19 1630   Goal/Outcome Evaluation   Problems Assessed (Seizure Disorder/Epilepsy) all --    Problems Present (Seizure/Epilepsy) --  none

## 2019-10-14 NOTE — PROGRESS NOTES
"Clinical Nutrition Note      Patient Name: Vince Olivera  MRN: [id number]  Admission date: 10/8/2019      Multidisciplinary Rounds    Additional information obtained during MDR:  RN reports pt transferred to ICU post-op for grand mal seizure, pt on Keppra and Vimpat no further seizure activity noticed. Pt has epidural for pain - well controlled; eating and drinking some.    Current diet: Diet Regular; Cardiac, Consistent Carbohydrate    Pertinent medical data reviewed  No nutrition risk identified on nursing screen; MST score \"0\"    Intervention:  Menu provided, advised of alternate selections  Plan of care and goals reviewed    Monitor:  RD to follow per protocol      Abida Galloway MS,RD,LD  10/14/19 12:38 PM  Time: 20 mins       "

## 2019-10-14 NOTE — PROGRESS NOTES
Intensive Care Follow-up     Hospital:  LOS: 6 days   Mr. Vince Olivera, 58 y.o. male is followed for:   Seizure disorder as sequela of cerebrovascular accident (CMS/[facility])          History of present illness:   58-year-old  white male with a history of carotid artery stenosis status post left CEA, CVA 4/2017, seizure disorder on Vimpat, diabetes mellitus, hypertension, hyperlipidemia, and hypertension.  Has known peripheral vascular disease with claudication and angiography revealed an occluded distal aorta, bilateral occluded common and iliac arteries, and bilateral occluded superior femoral arteries.  Was electively scheduled for aorto bifemoral bypass with bilateral common femoral endarterectomy but before that could occur, he developed recurrent grand mal seizures on 10/8/2019 despite being on Keppra and Vimpat.  He presented to [facility] ER with confusion, seizure, dysarthria, and right-sided weakness, was loaded with Keppra, and was transferred to [facility].  Since admission here he has rapidly recovered, EEG was negative, and MRI revealed only chronic changes including left temporal and occipital encephalomalacia from previous CVA unchanged from 2018. Patient subsequently underwent his aortobifemoral bypass and bilateral common femoral endarterectomy 10/11/2019.       Subjective   Interval History:  No further seizure activity.  Patient feels that his pain is very well controlled.  Afebrile.  Normotensive.  She has not yet been out of bed as he continues to have an epidural, although this plans to be removed today and the patient will be able to ambulate with assistance.  Plan to slowly increase the patient's diet today.  Phenylephrine was stopped on 10/13/2019.           The patient's past medical, surgical and social history were reviewed and updated in Epic as appropriate.       Objective     Infusions:    morphine and bupivacaine epidural     phenylephrine 0.5-3 mcg/kg/min Last Rate: Stopped (10/13/19 8779)      Medications:    Pharmacy Consult  Does not apply Daily   amLODIPine 10 mg Oral Daily   aspirin  mg Oral Daily   atorvastatin 80 mg Oral Nightly   docusate sodium 100 mg Oral BID   insulin lispro 0-9 Units Subcutaneous Q6H   lacosamide 75 mg Oral Q12H   levETIRAcetam 1,500 mg Oral Q12H   losartan 50 mg Oral BID   metoprolol succinate  mg Oral Daily   pantoprazole 40 mg Oral QAM   sodium chloride 10 mL Intravenous Q12H   sodium chloride 10 mL Intravenous Q12H     I reviewed the patient's medications.    Vital Sign Min/Max for last 24 hours  Temp  Min: 97.9 °F (36.6 °C)  Max: 99.2 °F (37.3 °C)   BP  Min: 100/75  Max: 144/88   Pulse  Min: 90  Max: 121   Resp  Min: 16  Max: 18   SpO2  Min: 86 %  Max: 96 %   Flow (L/min)  Min: 2  Max: 2       Input/Output for last 24 hour shift  10/13 0701 - 10/14 0700  In: 2925.3 [P.O.:150; I.V.:2775.3]  Out: 1250 [Urine:1250]      GENERAL : NAD, conversant  RESPIRATORY/THORAX : normal respiratory effort and no intercostal retractions, clear to auscultation bilaterally  CARDIOVASCULAR : Normal S1/S2, RRR. 1+ lower ext edema.  GASTROINTESTINAL : Soft, NT/ND. BS x 4 normoactive. No hepatosplenomegaly.  MUSCULOSKELETAL : No cyanosis, clubbing, or ischemia  NEUROLOGICAL: alert and oriented to person, place and time  PSYCHOLOGICAL : Appropriate affect    Results from last 7 days   Lab Units 10/14/19  0514 10/13/19  0307 10/12/19  2153 10/12/19  0438   WBC 10*3/mm3 14.33* 14.27*  --  15.15*   HEMOGLOBIN g/dL 10.4* 9.1* 9.2* 10.7*   PLATELETS 10*3/mm3 261 228  --  276     Results from last 7 days   Lab Units 10/14/19  0925 10/14/19  0514 10/13/19  1957  10/13/19  0307  10/12/19  0438   SODIUM mmol/L 135*  --   --   --  138  --  139   POTASSIUM mmol/L 4.0  --   --   --  4.0  --  4.1   CO2 mmol/L 25.0  --   --   --  32.0*  --  25.0   BUN mg/dL 10  --   --   --  10  --  12   CREATININE mg/dL 0.68*  --   --   --  0.65*  --  0.77   MAGNESIUM mg/dL  --  2.3  --   --  2.4  --  1.5*    PHOSPHORUS mg/dL 2.4* 2.4* 1.9*   < > 1.4*   < > 2.3*   GLUCOSE mg/dL 156*  --   --   --  133*  --  185*    < > = values in this interval not displayed.     Estimated Creatinine Clearance: 129.3 mL/min (A) (by C-G formula based on SCr of 0.68 mg/dL (L)).          I reviewed the patient's new clinical results.  I reviewed the patient's new imaging results/reports including actual images and agree with reports.       Imaging Results (last 24 hours)     Procedure Component Value Units Date/Time    CT Chest With Contrast [[id number]] Collected:  10/10/19 0809     Updated:  10/14/19 1050    Narrative:       EXAMINATION: CT CHEST W CONTRAST-      INDICATION: Elevated right hemidiaphragm with atelectasis, rule out lung  cancer; I74.10-Embolism and thrombosis of unspecified parts of aorta.      TECHNIQUE: Post IV contrast 5 mm images through the chest and upper  abdomen.     The radiation dose reduction device was turned on for each scan per the  ALARA (As Low as Reasonably Achievable) protocol.     COMPARISON: None.     FINDINGS: Patient history indicates elevated right hemidiaphragm,  atelectasis.     Mediastinal images show no evidence of mass, adenopathy, pericardial or  pleural effusion. Thoracic aorta appears grossly normal. Lung window  images show only slight relative elevation of the right hemidiaphragm  compared to the left, and trace associated right middle lobe linear  scarring or atelectasis. There is minimal dependent atelectasis of the  posterior lower lobes. Lungs otherwise appear clear. Included images of  the upper abdomen show expected degree of fatty liver change for body  habitus. No significant abnormalities are noted of the gallbladder,  spleen, pancreatic tail, adrenal glands, or upper renal poles.  Incidental note is made of moderate multilevel lower thoracic and upper  lumbar degenerative disc disease.       Impression:       Minimal lung scarring as noted. No evidence of active chest  disease.      D:  10/10/2019  E:  10/10/2019     This report was finalized on 10/14/2019 10:47 AM by DR. Nishant Ortega MD.             Assessment/Plan   Impression        Seizure disorder on Keppra and Vimpat. 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 50-69% R carotid stenosis    Gastroesophageal reflux disease without esophagitis    Type 2 diabetes mellitus. HbA1c 6.6    Bilateral leg and foot pain    PAD. Occluded distal aorta, common and external iliacs, and superior femoral arteries    Aortic occlusion s/p aorto-femoral bypass 10/11/19 per Dr. Weaver        Plan      Status post aorto-femoral bypass on 10/11/2019 per Dr. Weaver  -Epidural removed today  -Encourage mobilization per surgery recommendations  -We will continue to monitor in the ICU  -Blood pressure currently well controlled    Seizure disorder  History of a left MCA ischemic stroke  -Continue aspirin and Lipitor  -Patient on Vimpat 75 mg every 12 hours and Keppra 200 mg every 12 hours    Essential hypertension  -Continue amlodipine 10 mg daily and Cozaar 50 mg twice daily    Hyperlipidemia  GERD  Continue Protonix    Diabetes type 2  -Blood sugars well controlled today continued insulin    SCDs for DVT prophylaxis  Patient on PPI     Disposition: Plan to monitor in the ICU 1 more day per surgery request, will slowly advance the patient's diet and increase his mobility.    Plan of care and goals reviewed with mulitdisciplinary/antibiotic stewardship team during rounds.   I discussed the patient's findings and my recommendations with patient and nursing staff     High level of risk due to:  severe exacerbation of chronic illness.    Trish Lassiter DO  Pulmonary, Critical care and Sleep Medicine

## 2019-10-14 NOTE — PROGRESS NOTES
Continued Stay Note  [facility]     Patient Name: Vince Olivera  MRN: [id number]  Today's Date: 10/14/2019    Admit Date: 10/8/2019    Discharge Plan     Row Name 10/14/19 1514       Plan    Plan  update    Patient/Family in Agreement with Plan  yes    Plan Comments  PT has recommended home w Home Health. Pt epidural was removed today and he was going to start mobility training and go to floor. He is fine with using any home health company in [place]. PT has not worked w this pt since 10/9. Updated notes are needed. They know to work w pt per rounds. When these notes are updated send referral to Home Health company in [place]. there are only two - [facility] and [facility]  CM contacted [facility] and they wanted the updated notes w the referral.  PT may have changed recommendations as well. CM will f/u on this tomorrow.    Final Discharge Disposition Code  06 - home with home health care        Discharge Codes    No documentation.       Expected Discharge Date and Time     Expected Discharge Date Expected Discharge Time    Oct 13, 2019             Emili Flores, RN

## 2019-10-14 NOTE — PROGRESS NOTES
Intensivist Note     10/14/2019  Hospital Day: 6  2 Days Post-Op      Mr. Vince Olivera, 58 y.o. male is followed for:    Seizure disorder on Keppra and Vimpat. Recurrent Grand mal 10/8/19    H/o Left MCA ischemic CVA 4/2017    Bilateral carotid artery stenosis s/p L CEA 4/27/17. 50-69% R carotid stenosis    Bilateral leg and foot pain    PAD. Occluded distal aorta, common and external iliacs, and superior femoral arteries    Aortic occlusion s/p aorto-femoral bypass 10/11/19 per Dr. Weaver     Essential hypertension    Dyslipidemia    Type 2 diabetes mellitus. HbA1c 6.6    Gastroesophageal reflux disease without esophagitis       SUBJECTIVE     58-year-old  white male with a history of carotid artery stenosis status post left CEA, CVA 4/2017, seizure disorder on Vimpat, diabetes mellitus, hypertension, hyperlipidemia, and hypertension.  Has known peripheral vascular disease with claudication and angiography revealed an occluded distal aorta, bilateral occluded common and iliac arteries, and bilateral occluded superior femoral arteries.  Was electively scheduled for aorto bifemoral bypass with bilateral common femoral endarterectomy but before that could occur, he developed recurrent grand mal seizures on 10/8/2019 despite being on Keppra and Vimpat.  He presented to [facility] ER with confusion, seizure, dysarthria, and right-sided weakness, was loaded with Keppra, and was transferred to [facility].  Since admission here he has rapidly recovered, EEG was negative, and MRI revealed only chronic changes including left temporal and occipital encephalomalacia from previous CVA unchanged from 2018. Patient subsequently underwent his aortobifemoral bypass and bilateral common femoral endarterectomy 10/11/2019.      Interval history: Today is POD #2 and is doing well with the usual amount of postop pain.  Still has had no recurrence of seizure activity, denies chest pain, dyspnea, lower extremity edema or pain.  Feet are warm  "with good pulses.  Still has an epidural catheter in place but is not been required to use it.  Still no bowel movement but is passing gas and is now receiving ice chips with plans to advance diet as tolerated.  Nausea, vomiting, melena or hematochezia.  He remains on phenylephrine as well as half-normal saline at 125 cc an hour but blood pressure looks good today and should be able to wean off of it.  Main concern in keeping his blood pressure up was a known 50 to 60% carotid stenosis in the wish to avoid decreased cerebral perfusion.    ROS: Per subjective, all other systems reviewed and were negative.    The patient's relevant PMH, PSH, FH, and SH were reviewed and updated in Epic as appropriate. Allergies and Medications reviewed.    OBJECTIVE     /82 (BP Location: Left arm, Patient Position: Lying)   Pulse 113   Temp 98.7 °F (37.1 °C) (Oral)   Resp 18   Ht 167.6 cm (66\")   Wt 92.2 kg (203 lb 4.8 oz)   SpO2 92%   BMI 32.81 kg/m²      Flow (L/min): 2    Flowsheet Rows      First Filed Value   Admission Height  167.6 cm (66\") Documented at 10/09/2019 1117   Admission Weight  88.5 kg (195 lb) Documented at 10/09/2019 1117        Intake & Output (last day)    intake: 2154 cc                  output: 550 cc       Exam:  General Exam:  Well-developed middle-aged white male sitting up in a chair in NAD  HEENT: Pupils equal and reactive.  NG tube in place  Neck:                          Supple, no JVD, thyromegaly, or adenopathy  Lungs: Clear anteriorly and laterally.  Cardiovascular: RRR without murmurs or gallops.  HR 97 bpm  Abdomen: Abdominal dressing clean and dry.   and rectal: Deferred.  Extremities: No cyanosis clubbing edema.  Neurologic:                 Symmetric strength. No focal deficits.    Chest X-Ray 10/12/2019: Normal lung volumes and possibly some minimal basilar atelectasis    INFUSIONS    morphine and bupivacaine epidural     phenylephrine 0.5-3 mcg/kg/min Last Rate: Stopped (10/13/19 " 0830)   sodium chloride 125 mL/hr Last Rate: 125 mL/hr (10/13/19 2150)       Results from last 7 days   Lab Units 10/13/19  0307 10/12/19  2153 10/12/19  0438  10/11/19  1508   WBC 10*3/mm3 14.27*  --  15.15*  --  15.14*   HEMOGLOBIN g/dL 9.1* 9.2* 10.7*   < > 11.6*   HEMATOCRIT % 29.0* 28.8* 32.1*   < > 35.7*   PLATELETS 10*3/mm3 228  --  276  --  266    < > = values in this interval not displayed.     Results from last 7 days   Lab Units 10/13/19  0307 10/12/19  0438   SODIUM mmol/L 138 139   POTASSIUM mmol/L 4.0 4.1   CHLORIDE mmol/L 103 105   CO2 mmol/L 32.0* 25.0   BUN mg/dL 10 12   CREATININE mg/dL 0.65* 0.77   GLUCOSE mg/dL 133* 185*   CALCIUM mg/dL 7.8* 7.9*     Results from last 7 days   Lab Units 10/13/19  1957 10/13/19  1144 10/13/19  0307  10/12/19  0438   MAGNESIUM mg/dL  --   --  2.4  --  1.5*   PHOSPHORUS mg/dL 1.9* 1.9* 1.4*   < > 2.3*    < > = values in this interval not displayed.     Results from last 7 days   Lab Units 10/12/19  0438 10/08/19  0937   ALK PHOS U/L 36* 82   BILIRUBIN mg/dL 0.5 0.3   ALT (SGPT) U/L 19 26   AST (SGOT) U/L 15 16       No results found for: SEDRATE  No results found for: BNP  Lab Results   Component Value Date    CKTOTAL 60 08/03/2017    TROPONINT <0.010 10/08/2019     Lab Results   Component Value Date    TSH 0.638 10/09/2019     Lab Results   Component Value Date    LACTATE 1.9 10/12/2019     Lab Results   Component Value Date    CORTISOL 13.41 10/12/2019         I reviewed the patient's results, images and medication.    Assessment/Plan   ASSESSMENT        Seizure disorder on Keppra and Vimpat. Recurrent Grand mal 10/8/19    H/o Left MCA ischemic CVA 4/2017    Bilateral carotid artery stenosis s/p L CEA 4/27/17. 50-69% R carotid stenosis    Bilateral leg and foot pain    PAD. Occluded distal aorta, common and external iliacs, and superior femoral arteries    Aortic occlusion s/p aorto-femoral bypass 10/11/19 per Dr. Weaver     Essential hypertension     Dyslipidemia    Type 2 diabetes mellitus. HbA1c 6.6    Gastroesophageal reflux disease without esophagitis      DISCUSSION: Acceptable postoperative course, wean off phenylephrine, advancing oral intake, no evidence of recurrent seizures, and diabetes well controlled.    PLAN   1.  Decrease IV fluids  2.  NG discontinued and oral intake being advanced per surgery  3.  Continue antiseizure medicines  4.  Should be able to take epidural at soon      Plan of care and goals reviewed with mulitdisciplinary team at daily rounds.    I discussed the patient's findings and my recommendations with patient and nursing staff    High level of risk due to: severe exacerbation of chronic illness, illness with threat to life or bodily function and parenteral controlled substances.    Time spent Critical care 25 min (It does not include procedure time).    Jack Menon MD  Intensive Care Medicine  10/14/19 12:09 AM

## 2019-10-14 NOTE — PROGRESS NOTES
[facility]    Acute pain service Inpatient Progress Note    Patient Name: Vince Olivera  :  1960  MRN:  [id number]        Acute Pain  Service Inpatient Progress Note:    Analgesia:Excellent  LOC: alert and awake  Resp Status: supplemental oxygen  Cardiac: VS stable  Side Effects:None  Catheter Site:clean and dressing intact  Cath type: Epidural cath(MOOG pump)  Infusion rate: 4ml/hr  Catheter Plan:Catheter to remain Insitu and Continue catheter infusion rate unchanged  Comments: Pt doing well, no complaints.

## 2019-10-14 NOTE — PROGRESS NOTES
Neurology       Patient Care Team:  Fortunato Marvin MD as PCP - General (Family Medicine)  Oneida Augustin APRN as Nurse Practitioner (Neurology)  Alexx Bach MD as Consulting Physician (Neurosurgery)  Zena Alejandro as Speech Therapist (Speech Pathology)  Randi Llamas PTA as Physical Therapy Assistant (Physical Therapy)  Oneida Augustin APRN as Nurse Practitioner (Neurology)  Provider, No Known as Referring Physician  Keanu Sheth MD as Surgeon (Orthopedic Surgery)    Chief complaint: I am a little sleepier than usual    History: Patient is had successful surgery.    He is awake and alert.    He does tell me that he is a little more fatigued and sleepy than usual.  No seizures.      Past Medical History:   Diagnosis Date   • Arthritis    • Carotid artery disease (CMS/HCC)    • Carotid artery disorder (CMS/HCC)    • Depression    • Diabetes mellitus (CMS/HCC)     DIAGNOSED 4-2017 CHECKS FSBG 3X/WEEK    • Ear infection     about 2 weeks ago- cleared up - wax removed and cleaned out    • GERD (gastroesophageal reflux disease)     self diagnosed takes otc ppi   • Hyperlipidemia    • Hypertension    • Hypertension    • Inguinal hernia    • Seizure (CMS/HCC)     last 10/8/2019   • Shoulder pain     bilat    • Stroke (CMS/HCC) 04/24/2017    EXPRESSIVE APHASIA RESIDUAL    • Tooth loose     5 - all over- will get teeth done after this surgery    • Wears reading eyeglasses        Vital Signs   Vitals:    10/14/19 0730 10/14/19 0800 10/14/19 0830 10/14/19 0900   BP: 133/79 131/70 144/88 131/92   BP Location:       Patient Position:       Pulse: 111 (!) 121 114 105   Resp:  18  18   Temp:  97.9 °F (36.6 °C)     TempSrc:  Oral     SpO2: (!) 89% 91% 90% 94%   Weight:       Height:           Physical Exam:   General: Awake alert              Neuro: Person place and time.    Speech is normal.    He moves all extremities well.        Results Review:  Labs are  reviewed  Results from last 7 days   Lab Units 10/14/19  0514   WBC 10*3/mm3 14.33*   HEMOGLOBIN g/dL 10.4*   HEMATOCRIT % 32.2*   PLATELETS 10*3/mm3 261     Results from last 7 days   Lab Units 10/14/19  0925 10/13/19  0307 10/12/19  0438  10/08/19  0937   SODIUM mmol/L 135* 138 139   < > 134*   POTASSIUM mmol/L 4.0 4.0 4.1   < > 3.7   CHLORIDE mmol/L 99 103 105   < > 92*   CO2 mmol/L 25.0 32.0* 25.0   < > 23.9   BUN mg/dL 10 10 12   < > 8   CREATININE mg/dL 0.68* 0.65* 0.77   < > 0.83   CALCIUM mg/dL 8.2* 7.8* 7.9*   < > 9.7   BILIRUBIN mg/dL  --   --  0.5  --  0.3   ALK PHOS U/L  --   --  36*  --  82   ALT (SGPT) U/L  --   --  19  --  26   AST (SGOT) U/L  --   --  15  --  16   GLUCOSE mg/dL 156* 133* 185*   < > 232*    < > = values in this interval not displayed.       Imaging Results (last 24 hours)     ** No results found for the last 24 hours. **          Assessment:  Partial complex epilepsy    Plan:  Decrease Vimpat to 75 mg twice daily    Comment:  Okay to discharge anytime         I discussed the patients findings and my recommendations with patient and consulting provider    Christiano Pittman MD  10/14/19  10:41 AM

## 2019-10-15 ENCOUNTER — APPOINTMENT (OUTPATIENT)
Dept: GENERAL RADIOLOGY | Facility: HOSPITAL | Age: 59
End: 2019-10-15

## 2019-10-15 LAB
ANION GAP SERPL CALCULATED.3IONS-SCNC: 11 MMOL/L (ref 5–15)
BUN BLD-MCNC: 11 MG/DL (ref 6–20)
BUN/CREAT SERPL: 17.2 (ref 7–25)
CALCIUM SPEC-SCNC: 9.2 MG/DL (ref 8.6–10.5)
CHLORIDE SERPL-SCNC: 99 MMOL/L (ref 98–107)
CO2 SERPL-SCNC: 31 MMOL/L (ref 22–29)
CREAT BLD-MCNC: 0.64 MG/DL (ref 0.76–1.27)
GFR SERPL CREATININE-BSD FRML MDRD: 129 ML/MIN/1.73
GLUCOSE BLD-MCNC: 164 MG/DL (ref 65–99)
GLUCOSE BLDC GLUCOMTR-MCNC: 163 MG/DL (ref 70–130)
GLUCOSE BLDC GLUCOMTR-MCNC: 164 MG/DL (ref 70–130)
GLUCOSE BLDC GLUCOMTR-MCNC: 169 MG/DL (ref 70–130)
GLUCOSE BLDC GLUCOMTR-MCNC: 175 MG/DL (ref 70–130)
GLUCOSE BLDC GLUCOMTR-MCNC: 194 MG/DL (ref 70–130)
MAGNESIUM SERPL-MCNC: 1.8 MG/DL (ref 1.6–2.6)
PHOSPHATE SERPL-MCNC: 3.2 MG/DL (ref 2.5–4.5)
POTASSIUM BLD-SCNC: 4.1 MMOL/L (ref 3.5–5.2)
SODIUM BLD-SCNC: 141 MMOL/L (ref 136–145)

## 2019-10-15 PROCEDURE — 97164 PT RE-EVAL EST PLAN CARE: CPT

## 2019-10-15 PROCEDURE — 99231 SBSQ HOSP IP/OBS SF/LOW 25: CPT | Performed by: PSYCHIATRY & NEUROLOGY

## 2019-10-15 PROCEDURE — 99232 SBSQ HOSP IP/OBS MODERATE 35: CPT | Performed by: INTERNAL MEDICINE

## 2019-10-15 PROCEDURE — 80048 BASIC METABOLIC PNL TOTAL CA: CPT | Performed by: INTERNAL MEDICINE

## 2019-10-15 PROCEDURE — 97530 THERAPEUTIC ACTIVITIES: CPT

## 2019-10-15 PROCEDURE — 83735 ASSAY OF MAGNESIUM: CPT | Performed by: INTERNAL MEDICINE

## 2019-10-15 PROCEDURE — 99024 POSTOP FOLLOW-UP VISIT: CPT | Performed by: THORACIC SURGERY (CARDIOTHORACIC VASCULAR SURGERY)

## 2019-10-15 PROCEDURE — 82962 GLUCOSE BLOOD TEST: CPT

## 2019-10-15 PROCEDURE — 84100 ASSAY OF PHOSPHORUS: CPT | Performed by: INTERNAL MEDICINE

## 2019-10-15 PROCEDURE — 71045 X-RAY EXAM CHEST 1 VIEW: CPT

## 2019-10-15 RX ORDER — SUCRALFATE 1 G/1
1 TABLET ORAL 2 TIMES DAILY PRN
Status: DISCONTINUED | OUTPATIENT
Start: 2019-10-15 | End: 2019-10-16 | Stop reason: HOSPADM

## 2019-10-15 RX ORDER — CLOPIDOGREL BISULFATE 75 MG/1
75 TABLET ORAL DAILY
Status: DISCONTINUED | OUTPATIENT
Start: 2019-10-15 | End: 2019-10-16 | Stop reason: HOSPADM

## 2019-10-15 RX ADMIN — METOPROLOL SUCCINATE 200 MG: 100 TABLET, EXTENDED RELEASE ORAL at 08:11

## 2019-10-15 RX ADMIN — AMLODIPINE BESYLATE 10 MG: 10 TABLET ORAL at 08:10

## 2019-10-15 RX ADMIN — CALCIUM CARBONATE 750 MG: 750 TABLET ORAL at 08:15

## 2019-10-15 RX ADMIN — LEVETIRACETAM 1500 MG: 750 TABLET, FILM COATED ORAL at 20:31

## 2019-10-15 RX ADMIN — CLOPIDOGREL BISULFATE 75 MG: 75 TABLET ORAL at 09:44

## 2019-10-15 RX ADMIN — INSULIN LISPRO 2 UNITS: 100 INJECTION, SOLUTION INTRAVENOUS; SUBCUTANEOUS at 17:24

## 2019-10-15 RX ADMIN — PANTOPRAZOLE SODIUM 40 MG: 40 TABLET, DELAYED RELEASE ORAL at 06:14

## 2019-10-15 RX ADMIN — ATORVASTATIN CALCIUM 80 MG: 40 TABLET, FILM COATED ORAL at 20:30

## 2019-10-15 RX ADMIN — LOSARTAN POTASSIUM 50 MG: 50 TABLET ORAL at 20:31

## 2019-10-15 RX ADMIN — HYDROCODONE BITARTRATE AND ACETAMINOPHEN 1 TABLET: 7.5; 325 TABLET ORAL at 17:35

## 2019-10-15 RX ADMIN — LOSARTAN POTASSIUM 50 MG: 50 TABLET ORAL at 08:11

## 2019-10-15 RX ADMIN — ASPIRIN 325 MG: 325 TABLET, DELAYED RELEASE ORAL at 08:11

## 2019-10-15 RX ADMIN — DOCUSATE SODIUM 100 MG: 100 CAPSULE, LIQUID FILLED ORAL at 08:11

## 2019-10-15 RX ADMIN — HYDROCODONE BITARTRATE AND ACETAMINOPHEN 1 TABLET: 7.5; 325 TABLET ORAL at 08:10

## 2019-10-15 RX ADMIN — DOCUSATE SODIUM 100 MG: 100 CAPSULE, LIQUID FILLED ORAL at 20:31

## 2019-10-15 RX ADMIN — LACOSAMIDE 75 MG: 50 TABLET, FILM COATED ORAL at 20:23

## 2019-10-15 RX ADMIN — INSULIN LISPRO 2 UNITS: 100 INJECTION, SOLUTION INTRAVENOUS; SUBCUTANEOUS at 06:14

## 2019-10-15 RX ADMIN — SUCRALFATE 1 G: 1 TABLET ORAL at 09:43

## 2019-10-15 RX ADMIN — LACOSAMIDE 75 MG: 50 TABLET, FILM COATED ORAL at 08:11

## 2019-10-15 RX ADMIN — INSULIN LISPRO 2 UNITS: 100 INJECTION, SOLUTION INTRAVENOUS; SUBCUTANEOUS at 11:32

## 2019-10-15 RX ADMIN — MAGNESIUM SULFATE HEPTAHYDRATE 4 G: 40 INJECTION, SOLUTION INTRAVENOUS at 08:29

## 2019-10-15 RX ADMIN — LEVETIRACETAM 1500 MG: 750 TABLET, FILM COATED ORAL at 08:11

## 2019-10-15 RX ADMIN — SODIUM CHLORIDE, PRESERVATIVE FREE 10 ML: 5 INJECTION INTRAVENOUS at 20:32

## 2019-10-15 NOTE — PROGRESS NOTES
CTS Progress Note      POD 4 s/p aorta bifemoral bypass with bilateral common femoral artery endarterectomies      Subjective  No complaint      Objective    Physical Exam:   Vital Signs   Temp:  [97.8 °F (36.6 °C)-98.8 °F (37.1 °C)] 97.8 °F (36.6 °C)  Heart Rate:  [] 104  Resp:  [16-20] 18  BP: (100-172)/() 165/70   GEN: NAD   CV: RRR    RESP: unlabored   EXT:warm pos pulses     Incision:c/d/i     Results     Results from last 7 days   Lab Units 10/14/19  0514   WBC 10*3/mm3 14.33*   HEMOGLOBIN g/dL 10.4*   HEMATOCRIT % 32.2*   PLATELETS 10*3/mm3 261     Results from last 7 days   Lab Units 10/15/19  0424   SODIUM mmol/L 141   POTASSIUM mmol/L 4.1   CHLORIDE mmol/L 99   CO2 mmol/L 31.0*   BUN mg/dL 11   CREATININE mg/dL 0.64*   GLUCOSE mg/dL 164*   CALCIUM mg/dL 9.2     Results from last 7 days   Lab Units 10/08/19  0937   INR  0.97   APTT seconds 28.8       CXR: Trachea midline.  No definite pneumothorax.  No significant pleural effusion      Assessment/Plan   Postoperative day 4 status post AF bypass with femoral endarterectomies    Seizure disorder on Keppra and Vimpat. 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 50-69% R carotid stenosis    Gastroesophageal reflux disease without esophagitis    Type 2 diabetes mellitus. HbA1c 6.6    Bilateral leg and foot pain    PAD. Occluded distal aorta, common and external iliacs, and superior femoral arteries    Aortic occlusion s/p aorto-femoral bypass 10/11/19 per Dr. Weaver         Plan   Transfer tele   Home in am    KILEY Bentley  10/15/19  7:56 AM    Status as outlined above.  Patient is doing well tolerating his diet.  Pedal pulses are unchanged feet are warm and pink with 2-second capillary refill.  Patient is beginning to ambulate so far has not had claudication.  Will transfer to telemetry today with plans to discharge tomorrow.   Staples will stay in until the patient returns to the office next week. I have reviewed, verified, and confirmed the above history and current status.  I have examined the patient and confirmed the above physical findings.Above plan and treatment regimen discussed in detail with patient.  Options of treatment, attendant risks vs benefits, and my recommendations were discussed and all questions answered.    Dylan Weaver MD  CTSurgery  10/15/19   12:44 PM

## 2019-10-15 NOTE — PLAN OF CARE
Problem: Patient Care Overview  Goal: Plan of Care Review  Outcome: Ongoing (interventions implemented as appropriate)   10/15/19 1050   Coping/Psychosocial   Plan of Care Reviewed With patient   OTHER   Outcome Summary PT eval completed patient ambulates 250 ft with rolling walker and CGA no pain with activity today. anticipate patient will go home with assist at D/C

## 2019-10-15 NOTE — PAYOR COMM NOTE
"Nilda Albert RN    Phone [phone]    Shaneka Tinoco (58 y.o. Male)     Date of Birth Social Security Number Address Home Phone MRN    1960  [address] [place] [id number] [phone] [id number]    Sabianism Marital Status          Confucianism Single       Admission Date Admission Type Admitting Provider Attending Provider Department, Room/Bed    10/8/19 Urgent Fredis Lassiter DO Meenach, Christopher Caleb, DO [facility] 2B ICU, N235/1    Discharge Date Discharge Disposition Discharge Destination                       Attending Provider:  Fredis Lassiter DO    Allergies:  No Known Allergies    Isolation:  None   Infection:  None   Code Status:  CPR    Ht:  167.6 cm (66\")   Wt:  91.9 kg (202 lb 9.6 oz)    Admission Cmt:  None   Principal Problem:  Seizure disorder on Keppra and Vimpat. Recurrent Grand mal 10/8/19 [I69.398,G40.909]                 Active Insurance as of 10/8/2019     Primary Coverage     Payor Plan Insurance Group Employer/Plan Group    ANTHEM MEDICAID ANTHEM MEDICAID [id number]     Payor Plan Address Payor Plan Phone Number Payor Plan Fax Number Effective Dates    [address] [address] [phone]  2/1/2017 - None Entered    [facility] [phone]       Subscriber Name Subscriber Birth Date Member ID       SHANEKA TINOCO 1960 [id number]                 Emergency Contacts      (Rel.) Home Phone Work Phone Mobile Phone    Donna Davis (Power of ) [phone] -- [phone]    Sussy Retana (Relative) [phone] -- [phone]            Vital Signs (last day)     Date/Time   Temp   Temp src   Pulse   Resp   BP   Patient Position   SpO2    10/15/19 1200   99 (37.2)   Axillary   85   18   148/101  (Abnormal)    --   96    10/15/19 1100   --   --   102   --   147/109  (Abnormal)    --   92    10/15/19 1000   --   --   90   20   140/79   --   91    10/15/19 0830   --   --   102   --   172/103  (Abnormal) "    --   91    10/15/19 0810   --   --   112   --   159/95   --   --    10/15/19 0800   97.8 (36.6)   Oral   108   18   159/95   --   91    10/15/19 0600   --   --   104   18   165/70   Lying   93    10/15/19 0530   --   --   102   --   165/98   --   93    10/15/19 0500   --   --   99   --   157/98   --   90    10/15/19 0430   --   --   101   --   165/85   --   94    10/15/19 0400   97.8 (36.6)   Oral   104   18   156/96   Lying   91    10/15/19 0330   --   --   100   --   166/98   --   94    10/15/19 0300   --   --   95   --   172/95   --   87  (Abnormal)     10/15/19 0202   --   --   102   16   166/94   Lying   92    10/15/19 0200   --   --   102   --   156/107  (Abnormal)    --   92    10/15/19 0130   --   --   99   --   147/100   --   91    10/15/19 0100   --   --   98   --   154/96   --   92    10/15/19 0030   --   --   101   --   160/102  (Abnormal)    --   90    10/15/19 0000   98.8 (37.1)   Oral   105   18   163/95   Lying   92    10/14/19 2330   --   --   99   --   144/94   --   89  (Abnormal)     10/14/19 2300   --   --   98   --   134/92   --   90    10/14/19 2230   --   --   101   --   136/90   --   90    10/14/19 2200   --   --   93   18   139/96   Lying   89  (Abnormal)     10/14/19 2130   --   --   94   --   144/97   --   91    10/14/19 2100   --   --   90   --   136/95   --   92    10/14/19 2030   --   --   93   --   134/82   --   92    10/14/19 2000   98.7 (37.1)   Oral   98   18   152/96   Lying   94    10/14/19 1930   --   --   94   --   139/90   --   95    10/14/19 1900   --   --   94   --   127/81   --   95    10/14/19 1600   98.2 (36.8)   Axillary   --   18   --   --   --    10/14/19 1530   --   --   92   --   113/78   --   88  (Abnormal)     10/14/19 1500   --   --   92   --   131/83   --   92    10/14/19 1430   --   --   95   --   109/77   --   90    10/14/19 1400   --   --   93   20   106/96   --   92    10/14/19 1330   --   --   93   --   105/74   --   97    10/14/19 1300   --   --   101    "--   106/91   --   95    10/14/19 1230   --   --   103   --   128/86   --   91    10/14/19 1200   98 (36.7)   Axillary   92   20   107/77   --   93    10/14/19 1130   --   --   91   --   100/75   --   95    10/14/19 1100   --   --   93   --   107/76   --   96    10/14/19 1030   --   --   101   --   135/81   --   96    10/14/19 1000   --   --   112   18   123/76   --   94    10/14/19 0930   --   --   112   --   119/85   --   94    10/14/19 0900   --   --   105   18   131/92   --   94    10/14/19 0830   --   --   114   --   144/88   --   90    10/14/19 0800   97.9 (36.6)   Oral   121  (Abnormal)    18   131/70   --   91    10/14/19 0730   --   --   111   --   133/79   --   89  (Abnormal)     10/14/19 0700   --   --   113   --   130/92   --   89  (Abnormal)     10/14/19 0600   --   --   108   18   128/92   Lying   89  (Abnormal)     10/14/19 0530   --   --   115   --   123/85   --   91    10/14/19 0500   --   --   114   --   125/78   --   87  (Abnormal)     10/14/19 0430   --   --   112   --   122/90   --   89  (Abnormal)     10/14/19 0400   98.7 (37.1)   Oral   112   18   126/87   Lying   91    10/14/19 0330   --   --   101   --   129/75   --   93    10/14/19 0300   --   --   107   --   118/84   --   94    10/14/19 0200   --   --   114   16   126/75   Lying   86  (Abnormal)     10/14/19 0130   --   --   110   --   137/89   --   90    10/14/19 0100   --   --   110   --   137/89   --   89  (Abnormal)     10/14/19 0030   --   --   111   --   134/84   --   90    10/14/19 0000   99.2 (37.3)   Oral   106   18   132/90   Lying   89  (Abnormal)               Hospital Medications (active)       Dose Frequency Start End    acetaminophen (TYLENOL) suppository 650 mg 650 mg Every 4 Hours PRN 10/8/2019     Sig - Route: Insert 1 suppository into the rectum Every 4 (Four) Hours As Needed for Mild Pain  or Fever (Temperature greater than or equal to 37 C). - Rectal    Linked Group 1:  \"Or\" Linked Group Details        acetaminophen " "(TYLENOL) tablet 650 mg 650 mg Every 4 Hours PRN 10/8/2019     Sig - Route: Take 2 tablets by mouth Every 4 (Four) Hours As Needed for Mild Pain  or Fever (Temperature greater than or equal to 37 C). - Oral    Linked Group 1:  \"Or\" Linked Group Details        amLODIPine (NORVASC) tablet 10 mg 10 mg Daily 10/9/2019     Sig - Route: Take 1 tablet by mouth Daily. - Oral    aspirin EC tablet 325 mg 325 mg Daily 10/9/2019     Sig - Route: Take 1 tablet by mouth Daily. - Oral    atorvastatin (LIPITOR) tablet 80 mg 80 mg Nightly 10/8/2019     Sig - Route: Take 2 tablets by mouth Every Night. - Oral    bisacodyl (DULCOLAX) EC tablet 10 mg 10 mg Daily PRN 10/12/2019     Sig - Route: Take 2 tablets by mouth Daily As Needed for Constipation. - Oral    bisacodyl (DULCOLAX) suppository 10 mg 10 mg Daily PRN 10/12/2019     Sig - Route: Insert 1 suppository into the rectum Daily As Needed for Constipation. - Rectal    calcium carbonate EX (TUMS EX) chewable tablet 750 mg 750 mg 3 Times Daily PRN 10/13/2019     Sig - Route: Chew 1 tablet 3 (Three) Times a Day As Needed for Indigestion or Heartburn. - Oral    clopidogrel (PLAVIX) tablet 75 mg 75 mg Daily 10/15/2019     Sig - Route: Take 1 tablet by mouth Daily. - Oral    dextrose (D50W) 25 g/ 50mL Intravenous Solution 25 g 25 g Every 15 Minutes PRN 10/8/2019     Sig - Route: Infuse 50 mL into a venous catheter Every 15 (Fifteen) Minutes As Needed for Low Blood Sugar (Blood Sugar Less Than 70). - Intravenous    dextrose (GLUTOSE) oral gel 15 g 15 g Every 15 Minutes PRN 10/8/2019     Sig - Route: Take 15 application by mouth Every 15 (Fifteen) Minutes As Needed for Low Blood Sugar (Blood sugar less than 70). - Oral    docusate sodium (COLACE) capsule 100 mg 100 mg 2 Times Daily 10/12/2019     Sig - Route: Take 1 capsule by mouth 2 (Two) Times a Day. - Oral    glucagon (human recombinant) (GLUCAGEN DIAGNOSTIC) injection 1 mg 1 mg Every 15 Minutes PRN 10/8/2019     Sig - Route: Inject 1 " mg under the skin into the appropriate area as directed Every 15 (Fifteen) Minutes As Needed for Low Blood Sugar (Blood Glucose Less Than 70). - Subcutaneous    HYDROcodone-acetaminophen (NORCO) 7.5-325 MG per tablet 1 tablet 1 tablet Every 4 Hours PRN 10/11/2019 10/21/2019    Sig - Route: Take 1 tablet by mouth Every 4 (Four) Hours As Needed for Moderate Pain . - Oral    insulin lispro (humaLOG) injection 0-9 Units 0-9 Units Every 6 Hours 10/11/2019     Sig - Route: Inject 0-9 Units under the skin into the appropriate area as directed Every 6 (Six) Hours. - Subcutaneous    Cosign for Ordering: Accepted by Marissa Reynaga MD on 10/14/2019 12:33 PM    ipratropium-albuterol (DUO-NEB) nebulizer solution 3 mL 3 mL Every 6 Hours PRN 10/11/2019     Sig - Route: Take 3 mL by nebulization Every 6 (Six) Hours As Needed for Shortness of Air. - Nebulization    lacosamide (VIMPAT) tablet 75 mg 75 mg Every 12 Hours Scheduled 10/14/2019     Sig - Route: Take 1.5 tablets by mouth Every 12 (Twelve) Hours. - Oral    levETIRAcetam (KEPPRA) tablet 1,500 mg 1,500 mg Every 12 Hours Scheduled 10/9/2019     Sig - Route: Take 2 tablets by mouth Every 12 (Twelve) Hours. - Oral    LORazepam (ATIVAN) injection 1 mg 1 mg Once As Needed 10/8/2019     Sig - Route: Infuse 0.5 mL into a venous catheter 1 (One) Time As Needed for Seizures (for seizure activity only). - Intravenous    losartan (COZAAR) tablet 50 mg 50 mg 2 Times Daily 10/9/2019     Sig - Route: Take 1 tablet by mouth 2 (Two) Times a Day. - Oral    magnesium hydroxide (MILK OF MAGNESIA) suspension 2400 mg/10mL 10 mL 10 mL Daily PRN 10/12/2019     Sig - Route: Take 10 mL by mouth Daily As Needed for Constipation. - Oral    Magnesium Sulfate 2 gram / 50mL Infusion (GIVE X 3 BAGS TO EQUAL 6GM TOTAL DOSE) - Mg 1.1 - 1.5 mg/dl 2 g As Needed 10/12/2019     Sig - Route: Infuse 50 mL into a venous catheter As Needed (See Administration Instructions). - Intravenous    Linked Group 2:   "\"Or\" Linked Group Details        Magnesium Sulfate 2 gram Bolus, followed by 8 gram infusion (total Mg dose 10 grams)- Mg less than or equal to 1mg/dL 2 g As Needed 10/12/2019     Sig - Route: Infuse 50 mL into a venous catheter As Needed (See Administration Instructions). - Intravenous    Linked Group 2:  \"Or\" Linked Group Details        Magnesium Sulfate 4 gram infusion- Mg 1.6-1.9 mg/dL 4 g As Needed 10/12/2019     Sig - Route: Infuse 100 mL into a venous catheter As Needed (See Administration Instructions). - Intravenous    Linked Group 2:  \"Or\" Linked Group Details        melatonin tablet 5 mg 5 mg Nightly PRN 10/8/2019     Sig - Route: Take 1 tablet by mouth At Night As Needed for Sleep. - Oral    metoprolol succinate XL (TOPROL-XL) 24 hr tablet 200 mg 200 mg Daily 10/9/2019     Sig - Route: Take 2 tablets by mouth Daily. - Oral    Morphine sulfate (PF) injection 2 mg 2 mg Every 3 Hours PRN 10/11/2019 10/21/2019    Sig - Route: Infuse 0.5 mL into a venous catheter Every 3 (Three) Hours As Needed for Moderate Pain . - Intravenous    Morphine sulfate (PF) injection 4 mg 4 mg Every 3 Hours PRN 10/11/2019 10/21/2019    Sig - Route: Infuse 1 mL into a venous catheter Every 3 (Three) Hours As Needed for Severe Pain . - Intravenous    ondansetron (ZOFRAN) injection 4 mg 4 mg Every 6 Hours PRN 10/11/2019     Sig - Route: Infuse 2 mL into a venous catheter Every 6 (Six) Hours As Needed for Nausea or Vomiting. - Intravenous    Linked Group 3:  \"Or\" Linked Group Details        ondansetron (ZOFRAN) tablet 4 mg 4 mg Every 6 Hours PRN 10/11/2019     Sig - Route: Take 1 tablet by mouth Every 6 (Six) Hours As Needed for Nausea or Vomiting. - Oral    Linked Group 3:  \"Or\" Linked Group Details        pantoprazole (PROTONIX) EC tablet 40 mg 40 mg Every Morning 10/9/2019     Sig - Route: Take 1 tablet by mouth Every Morning. - Oral    phenylephrine (HOMERO-SYNEPHRINE) 50 mg/250 mL (0.2 mg/mL) in 0.9% NS  infusion 0.5-3 mcg/kg/min × " "88.5 kg Titrated 10/11/2019     Sig - Route: Infuse 44..5 mcg/min into a venous catheter Dose Adjusted By Provider As Needed. - Intravenous    potassium & sodium phosphates (PHOS-NAK) 280-160-250 MG packet - for Phosphorus 1.25 - 2.5 mg/dL 2 packet Every 6 Hours PRN 10/12/2019     Sig - Route: Take 2 packets by mouth Every 6 (Six) Hours As Needed (Phosphorus 1.25 - 2.5 mg/dL). - Oral    Linked Group 4:  \"Or\" Linked Group Details        potassium & sodium phosphates (PHOS-NAK) 280-160-250 MG packet - for Phosphorus less than 1.25 mg/dL 2 packet Every 6 Hours PRN 10/12/2019     Sig - Route: Take 2 packets by mouth Every 6 (Six) Hours As Needed (Phosphorus less than 1.25 mg/dL). - Oral    Linked Group 4:  \"Or\" Linked Group Details        potassium chloride (KLOR-CON) packet 40 mEq 40 mEq As Needed 10/12/2019     Sig - Route: Take 40 mEq by mouth As Needed (potassium replacement, see admin instructions). - Oral    Linked Group 5:  \"Or\" Linked Group Details        potassium chloride (MICRO-K) CR capsule 40 mEq 40 mEq As Needed 10/12/2019     Sig - Route: Take 4 capsules by mouth As Needed (Potassium Replacement.  See Admin Instructions). - Oral    Linked Group 5:  \"Or\" Linked Group Details        potassium chloride 10 mEq in 100 mL IVPB 10 mEq Every 1 Hour PRN 10/12/2019     Sig - Route: Infuse 100 mL into a venous catheter Every 1 (One) Hour As Needed (Potassium Replacement - See Admin Instructions). - Intravenous    Linked Group 5:  \"Or\" Linked Group Details        potassium phosphate 15 mmol in sodium chloride 0.9 % 100 mL infusion 15 mmol As Needed 10/12/2019     Sig - Route: Infuse 15 mmol into a venous catheter As Needed (Peripheral IV - Phosphorus 1.8 - 2.5 mg/dL). - Intravenous    Linked Group 6:  \"Or\" Linked Group Details        potassium phosphate 30 mmol in sodium chloride 0.9 % 250 mL infusion 30 mmol As Needed 10/12/2019     Sig - Route: Infuse 30 mmol into a venous catheter As Needed (Peripheral IV - " "Phosphorus 1.3 - 1.7 mg/dL). - Intravenous    Linked Group 6:  \"Or\" Linked Group Details        potassium phosphate 45 mmol in sodium chloride 0.9 % 500 mL infusion 45 mmol As Needed 10/12/2019     Sig - Route: Infuse 45 mmol into a venous catheter As Needed (Peripheral IV - Phosphorus Less Than 1.3 mg/dL). - Intravenous    Linked Group 6:  \"Or\" Linked Group Details        sodium chloride 0.9 % flush 10 mL 10 mL Every 12 Hours Scheduled 10/8/2019     Sig - Route: Infuse 10 mL into a venous catheter Every 12 (Twelve) Hours. - Intravenous    sodium chloride 0.9 % flush 10 mL 10 mL As Needed 10/8/2019     Sig - Route: Infuse 10 mL into a venous catheter As Needed for Line Care. - Intravenous    sodium phosphates 15 mmol in sodium chloride 0.9 % 250 mL IVPB 15 mmol As Needed 10/12/2019     Sig - Route: Infuse 15 mmol into a venous catheter As Needed (Peripheral IV - Phosphorus 1.8 - 2.5 & Potassium Greater Than 4). - Intravenous    Linked Group 6:  \"Or\" Linked Group Details        sodium phosphates 30 mmol in sodium chloride 0.9 % 250 mL IVPB 30 mmol As Needed 10/12/2019     Sig - Route: Infuse 30 mmol into a venous catheter As Needed (Peripheral IV - Phosphorus 1.3 - 1.7 mg/dL & Potassium Greater Than 4). - Intravenous    Linked Group 6:  \"Or\" Linked Group Details        sodium phosphates 45 mmol in sodium chloride 0.9 % 250 mL IVPB 45 mmol As Needed 10/12/2019     Sig - Route: Infuse 45 mmol into a venous catheter As Needed (Peripheral IV - Phosphorus Less Than 1.3 mg/dL & Potassium Greater Than 4). - Intravenous    Linked Group 6:  \"Or\" Linked Group Details        sucralfate (CARAFATE) tablet 1 g 1 g 2 Times Daily PRN 10/15/2019     Sig - Route: Take 1 tablet by mouth 2 (Two) Times a Day As Needed (indegestion). - Oral    !Epidural (Discontinued)  Daily 10/11/2019 10/14/2019    Sig - Route: Daily. - Does not apply    Morphine PF 0.05 mg/mL, bupivacaine (PF) (MARCAINE) 0.5 % 0.0625 % in sodium chloride 0.9 % 200 mL " epidural (Discontinued)  Continuous 10/11/2019 10/14/2019    Sig - Route: by Epidural route Continuous. - Epidural    Notes to Pharmacy: Order is incorrect, local concentration option should be 0.0625%  Marcaine    nalbuphine (NUBAIN) 5 mg in sodium chloride 0.9 % 50 mL IVPB (Discontinued) 5 mg Every 4 Hours PRN 10/11/2019 10/14/2019    Sig - Route: Infuse 5 mg into a venous catheter Every 4 (Four) Hours As Needed (itching). - Intravenous    sodium chloride 0.9 % flush 1-10 mL (Discontinued) 1-10 mL As Needed 10/11/2019 10/14/2019    Sig - Route: Infuse 1-10 mL into a venous catheter As Needed for Line Care. - Intravenous    sodium chloride 0.9 % flush 10 mL (Discontinued) 10 mL Every 12 Hours Scheduled 10/11/2019 10/14/2019    Sig - Route: Infuse 10 mL into a venous catheter Every 12 (Twelve) Hours. - Intravenous          Lab Results (last 24 hours)     Procedure Component Value Units Date/Time    POC Glucose Once [[id number]]  (Abnormal) Collected:  10/15/19 1105    Specimen:  Blood Updated:  10/15/19 1106     Glucose 194 mg/dL     POC Glucose Once [[id number]]  (Abnormal) Collected:  10/15/19 0707    Specimen:  Blood Updated:  10/15/19 0710     Glucose 164 mg/dL     POC Glucose Once [[id number]]  (Abnormal) Collected:  10/15/19 0613    Specimen:  Blood Updated:  10/15/19 0616     Glucose 169 mg/dL     Phosphorus [[id number]]  (Normal) Collected:  10/15/19 0424    Specimen:  Blood Updated:  10/15/19 0516     Phosphorus 3.2 mg/dL     Basic Metabolic Panel [[id number]]  (Abnormal) Collected:  10/15/19 0424    Specimen:  Blood Updated:  10/15/19 0516     Glucose 164 mg/dL      BUN 11 mg/dL      Creatinine 0.64 mg/dL      Sodium 141 mmol/L      Potassium 4.1 mmol/L      Chloride 99 mmol/L      CO2 31.0 mmol/L      Calcium 9.2 mg/dL      eGFR Non African Amer 129 mL/min/1.73      BUN/Creatinine Ratio 17.2     Anion Gap 11.0 mmol/L     Narrative:       GFR Normal >60  Chronic Kidney Disease <60  Kidney Failure <15     Magnesium [[id number]]  (Normal) Collected:  10/15/19 0424    Specimen:  Blood Updated:  10/15/19 0510     Magnesium 1.8 mg/dL     POC Glucose Once [[id number]]  (Abnormal) Collected:  10/14/19 2152    Specimen:  Blood Updated:  10/14/19 2155     Glucose 147 mg/dL     Phosphorus [[id number]]  (Abnormal) Collected:  10/14/19 2055    Specimen:  Blood Updated:  10/14/19 2145     Phosphorus 2.1 mg/dL     Tissue Pathology Exam [[id number]] Collected:  10/11/19 1131    Specimen:  Tissue from Aortic Artery, Tissue from Femoral Plaque, Tissue from Femoral Plaque Updated:  10/14/19 1738     Case Report --     Surgical Pathology Report                         Case: [id number]                                  Authorizing Provider:  Dylan Weaver MD         Collected:           10/11/2019 11:31 AM          Ordering Location:     [facility]   Received:            10/11/2019 03:13 PM                                 OR                                                                           Pathologist:           Sam Aguilar MD                                                           Specimens:   1) - Aortic Artery, AORTIC THROMBUS                                                                 2) - Femoral Plaque, left femoral plaque                                                            3) - Femoral Plaque, right femoral plaque                                                   Clinical Information --     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        Final Diagnosis --     1. TISSUE SUBMITTED AS AORTIC THROMBUS:  Calcific fibroatherosclerotic plaque and fibrin thrombus.   2. LEFT FEMORAL PLAQUE:  Occlusive calcific fibroatherosclerotic plaque with near total luminal obliteration.   3. RIGHT FEMORAL PLAQUE:  Calcific fibroatherosclerotic plaque with partial occlusion.   DGD/klb        Gross Description --     Specimen 1 received in formalin labeled aortic thrombus is a 1.7x0.5x0.4 cm aggregate of fragmented  pink/red blood clot and possible tissue, filtered and submitted entirely in block 1A.    Specimen 2 received in formalin labeled left femoral plaque is a 5.5 cm long by 0.9 cm in diameter mildly disrupted tubular portion of white/yellow atheromatous material. Sectioning reveals mild to moderate calcification. Representative sections are submitted in block 1A following decalcification.     Specimen 3 received in formalin labeled right femoral plaque is a 3.5x2.5x0.9 cm aggregate of fragmented tan/red atheromatous material. Sectioning reveals moderate calcification. Representative sections are submitted in block 3A following decalcification. LED/mbc        Microscopic Description --     The slides are reviewed and demonstrate histopathologic features supporting the above rendered diagnosis.          POC Glucose Once [[id number]]  (Abnormal) Collected:  10/14/19 1656    Specimen:  Blood Updated:  10/14/19 1732     Glucose 169 mg/dL         Imaging Results (last 24 hours)     Procedure Component Value Units Date/Time    XR Chest 1 View [[id number]] Collected:  10/15/19 0820     Updated:  10/15/19 0943    Narrative:       EXAMINATION: XR CHEST 1 VW-      INDICATION: Postop aortobifemoral bypass; Z74.09-Other reduced mobility;  I74.10-Embolism and thrombosis of unspecified parts of aorta.      COMPARISON: 10/12/2019.     FINDINGS: Interval removal of esophagogastric tube. Cardiac silhouette  unchanged. No pneumothorax or significant effusion. No new parenchymal  process with minimal bibasilar opacifications left greater than right.       Impression:       Interval removal of esophagogastric tube with persistent  opacifications of the lung bases left greater than right may represent  atelectasis without new consolidative component or parenchymal process.     D:  10/15/2019  E:  10/15/2019                 Physician Progress Notes (last 24 hours) (Notes from 10/14/19 1405 through 10/15/19 1405)      Fredis Lassiter,  DO at 10/15/19 1048          Intensive Care Follow-up     Hospital:  LOS: 7 days   Mr. Vince Olivera, 58 y.o. male is followed for:   Seizure disorder as sequela of cerebrovascular accident (CMS/[facility])     Subjective       History of present illness:   58-year-old  white male with a history of carotid artery stenosis status post left CEA, CVA 4/2017, seizure disorder on Vimpat, diabetes mellitus, hypertension, hyperlipidemia, and hypertension.  Has known peripheral vascular disease with claudication and angiography revealed an occluded distal aorta, bilateral occluded common and iliac arteries, and bilateral occluded superior femoral arteries.  Was electively scheduled for aorto bifemoral bypass with bilateral common femoral endarterectomy but before that could occur, he developed recurrent grand mal seizures on 10/8/2019 despite being on Keppra and Vimpat.  He presented to [facility] ER with confusion, seizure, dysarthria, and right-sided weakness, was loaded with Keppra, and was transferred to [facility].  Since admission here he has rapidly recovered, EEG was negative, and MRI revealed only chronic changes including left temporal and occipital encephalomalacia from previous CVA unchanged from 2018. Patient subsequently underwent his aortobifemoral bypass and bilateral common femoral endarterectomy 10/11/2019.       Subjective   Interval History:  Patient doing well this morning.  Says his pain is currently well controlled.  He is been able to get up to the chair after the epidural was taken out yesterday.  He is tolerating a diet without difficulty.  He does note that he has some midepigastric pain with some reflux, he is currently on Protonix and Tums without relief of his symptoms.  He notes that he has significant reflux at home as well.  He has not had a bowel movement as of yet but is passing gas.         The patient's past medical, surgical and social history were reviewed and updated in Epic as  appropriate.    Objective   Objective     Infusions:    phenylephrine 0.5-3 mcg/kg/min Last Rate: Stopped (10/13/19 0830)     Medications:    amLODIPine 10 mg Oral Daily   aspirin  mg Oral Daily   atorvastatin 80 mg Oral Nightly   clopidogrel 75 mg Oral Daily   docusate sodium 100 mg Oral BID   insulin lispro 0-9 Units Subcutaneous Q6H   lacosamide 75 mg Oral Q12H   levETIRAcetam 1,500 mg Oral Q12H   losartan 50 mg Oral BID   metoprolol succinate  mg Oral Daily   pantoprazole 40 mg Oral QAM   sodium chloride 10 mL Intravenous Q12H     I reviewed the patient's medications.    Vital Sign Min/Max for last 24 hours  Temp  Min: 97.8 °F (36.6 °C)  Max: 98.8 °F (37.1 °C)   BP  Min: 100/75  Max: 172/103   Pulse  Min: 90  Max: 112   Resp  Min: 16  Max: 20   SpO2  Min: 87 %  Max: 97 %   Flow (L/min)  Min: 2  Max: 2       Input/Output for last 24 hour shift  10/14 0701 - 10/15 0700  In: 1240.4 [P.O.:1050; I.V.:190.4]  Out: 2025 [Urine:2025]      GENERAL : NAD, conversant  RESPIRATORY/THORAX : normal respiratory effort and no intercostal retractions, clear to auscultation bilaterally  CARDIOVASCULAR : Normal S1/S2, RRR. 1+ lower ext edema.  GASTROINTESTINAL : Soft, NT/ND. BS x 4 normoactive. No hepatosplenomegaly.  MUSCULOSKELETAL : No cyanosis, clubbing, or ischemia  NEUROLOGICAL: alert and oriented to person, place and time  PSYCHOLOGICAL : Appropriate affect    Results from last 7 days   Lab Units 10/14/19  0514 10/13/19  0307 10/12/19  2153 10/12/19  0438   WBC 10*3/mm3 14.33* 14.27*  --  15.15*   HEMOGLOBIN g/dL 10.4* 9.1* 9.2* 10.7*   PLATELETS 10*3/mm3 261 228  --  276     Results from last 7 days   Lab Units 10/15/19  0424 10/14/19  2055 10/14/19  0925 10/14/19  0514  10/13/19  0307   SODIUM mmol/L 141  --  135*  --   --  138   POTASSIUM mmol/L 4.1  --  4.0  --   --  4.0   CO2 mmol/L 31.0*  --  25.0  --   --  32.0*   BUN mg/dL 11  --  10  --   --  10   CREATININE mg/dL 0.64*  --  0.68*  --   --  0.65*    MAGNESIUM mg/dL 1.8  --   --  2.3  --  2.4   PHOSPHORUS mg/dL 3.2 2.1* 2.4* 2.4*   < > 1.4*   GLUCOSE mg/dL 164*  --  156*  --   --  133*    < > = values in this interval not displayed.     Estimated Creatinine Clearance: 135.1 mL/min (A) (by C-G formula based on SCr of 0.64 mg/dL (L)).          I reviewed the patient's new clinical results.  I reviewed the patient's new imaging results/reports including actual images and agree with reports.              Imaging Results (last 24 hours)     Procedure Component Value Units Date/Time    XR Chest 1 View [[id number]] Collected:  10/15/19 0820     Updated:  10/15/19 0943    Narrative:       EXAMINATION: XR CHEST 1 VW-      INDICATION: Postop aortobifemoral bypass; Z74.09-Other reduced mobility;  I74.10-Embolism and thrombosis of unspecified parts of aorta.      COMPARISON: 10/12/2019.     FINDINGS: Interval removal of esophagogastric tube. Cardiac silhouette  unchanged. No pneumothorax or significant effusion. No new parenchymal  process with minimal bibasilar opacifications left greater than right.       Impression:       Interval removal of esophagogastric tube with persistent  opacifications of the lung bases left greater than right may represent  atelectasis without new consolidative component or parenchymal process.     D:  10/15/2019  E:  10/15/2019          CT Chest With Contrast [[id number]] Collected:  10/10/19 0809     Updated:  10/14/19 1050    Narrative:       EXAMINATION: CT CHEST W CONTRAST-      INDICATION: Elevated right hemidiaphragm with atelectasis, rule out lung  cancer; I74.10-Embolism and thrombosis of unspecified parts of aorta.      TECHNIQUE: Post IV contrast 5 mm images through the chest and upper  abdomen.     The radiation dose reduction device was turned on for each scan per the  ALARA (As Low as Reasonably Achievable) protocol.     COMPARISON: None.     FINDINGS: Patient history indicates elevated right hemidiaphragm,  atelectasis.      Mediastinal images show no evidence of mass, adenopathy, pericardial or  pleural effusion. Thoracic aorta appears grossly normal. Lung window  images show only slight relative elevation of the right hemidiaphragm  compared to the left, and trace associated right middle lobe linear  scarring or atelectasis. There is minimal dependent atelectasis of the  posterior lower lobes. Lungs otherwise appear clear. Included images of  the upper abdomen show expected degree of fatty liver change for body  habitus. No significant abnormalities are noted of the gallbladder,  spleen, pancreatic tail, adrenal glands, or upper renal poles.  Incidental note is made of moderate multilevel lower thoracic and upper  lumbar degenerative disc disease.       Impression:       Minimal lung scarring as noted. No evidence of active chest  disease.     D:  10/10/2019  E:  10/10/2019     This report was finalized on 10/14/2019 10:47 AM by DR. Nishant Ortega MD.             Assessment/Plan   Impression        Seizure disorder on Keppra and Vimpat. 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 50-69% R carotid stenosis    Gastroesophageal reflux disease without esophagitis    Type 2 diabetes mellitus. HbA1c 6.6    Bilateral leg and foot pain    PAD. Occluded distal aorta, common and external iliacs, and superior femoral arteries    Aortic occlusion s/p aorto-femoral bypass 10/11/19 per Dr. Weaver        Plan      Status post aorto-femoral bypass on 10/11/2019 per Dr. Weaver  -Encourage mobilization per surgery recommendations  -Blood pressure currently well controlled     Seizure disorder  History of a left MCA ischemic stroke  Hyperlipidemia  -Restart Plavix  -Continue aspirin and Lipitor  -Patient on Vimpat 75 mg every 12 hours and Keppra 200 mg every 12 hours     Essential hypertension  -Continue amlodipine 10 mg daily and Cozaar  50 mg twice daily    GERD  -We will add Tums and sucralfate  -Continue Protonix    Diabetes type 2  -Blood sugars well controlled today, continue insulin     SCDs for DVT prophylaxis  Patient on PPI      Disposition:   Patient likely able to be moved to the floor, but will await evaluation by CT surgery prior to moving.    Plan of care and goals reviewed with mulitdisciplinary/antibiotic stewardship team during rounds.   I discussed the patient's findings and my recommendations with patient and nursing staff       Trish Lassiter DO  Pulmonary, Critical care and Sleep Medicine       Electronically signed by Fredis Lassiter DO at 10/15/19 1059     Dylan Weaver MD at 10/15/19 4116            CTS Progress Note      POD 4 s/p aorta bifemoral bypass with bilateral common femoral artery endarterectomies      Subjective  No complaint      Objective    Physical Exam:   Vital Signs   Temp:  [97.8 °F (36.6 °C)-98.8 °F (37.1 °C)] 97.8 °F (36.6 °C)  Heart Rate:  [] 104  Resp:  [16-20] 18  BP: (100-172)/() 165/70   GEN: NAD   CV: RRR    RESP: unlabored   EXT:warm pos pulses     Incision:c/d/i     Results     Results from last 7 days   Lab Units 10/14/19  0514   WBC 10*3/mm3 14.33*   HEMOGLOBIN g/dL 10.4*   HEMATOCRIT % 32.2*   PLATELETS 10*3/mm3 261     Results from last 7 days   Lab Units 10/15/19  0424   SODIUM mmol/L 141   POTASSIUM mmol/L 4.1   CHLORIDE mmol/L 99   CO2 mmol/L 31.0*   BUN mg/dL 11   CREATININE mg/dL 0.64*   GLUCOSE mg/dL 164*   CALCIUM mg/dL 9.2     Results from last 7 days   Lab Units 10/08/19  0937   INR  0.97   APTT seconds 28.8       CXR: Trachea midline.  No definite pneumothorax.  No significant pleural effusion      Assessment/Plan   Postoperative day 4 status post AF bypass with femoral endarterectomies    Seizure disorder on Keppra and Vimpat. Recurrent Grand mal 10/8/19    H/o Left MCA ischemic CVA 4/2017    Essential hypertension    Dyslipidemia    Bilateral carotid  artery stenosis s/p L CEA 4/27/17. 50-69% R carotid stenosis    Gastroesophageal reflux disease without esophagitis    Type 2 diabetes mellitus. HbA1c 6.6    Bilateral leg and foot pain    PAD. Occluded distal aorta, common and external iliacs, and superior femoral arteries    Aortic occlusion s/p aorto-femoral bypass 10/11/19 per Dr. Weaver         Plan   Transfer tele   Home in am    KILEY Bentley  10/15/19  7:56 AM    Status as outlined above.  Patient is doing well tolerating his diet.  Pedal pulses are unchanged feet are warm and pink with 2-second capillary refill.  Patient is beginning to ambulate so far has not had claudication.  Will transfer to telemetry today with plans to discharge tomorrow.  [place] will stay in until the patient returns to the office next week. I have reviewed, verified, and confirmed the above history and current status.  I have examined the patient and confirmed the above physical findings.Above plan and treatment regimen discussed in detail with patient.  Options of treatment, attendant risks vs benefits, and my recommendations were discussed and all questions answered.    Dylan Weaver MD  [facility]  10/15/19   12:44 PM              Electronically signed by Dylan Weaver MD at 10/15/19 1244       Consult Notes (last 24 hours) (Notes from 10/14/19 1405 through 10/15/19 1405)     No notes of this type exist for this encounter.

## 2019-10-15 NOTE — PLAN OF CARE
Problem: Patient Care Overview  Goal: Plan of Care Review  Outcome: Ongoing (interventions implemented as appropriate)   10/14/19 1838 10/15/19 0459   Coping/Psychosocial   Plan of Care Reviewed With --  patient   Plan of Care Review   Progress improving --    OTHER   Outcome Summary --  VSS. AFEB. A/Ox4. Phos replaced per protocol. Phos lab timed for 0600. Periods of HTN throughout shift, but not sustained. Pain controlled. Awaiting transfer orders. Will continue to monitor.       Problem: Diabetes, Type 2 (Adult)  Goal: Signs and Symptoms of Listed Potential Problems Will be Absent, Minimized or Managed (Diabetes, Type 2)  Outcome: Ongoing (interventions implemented as appropriate)   10/13/19 1630   Goal/Outcome Evaluation   Problems Assessed (Type 2 Diabetes) all   Problems Present (Type 2 Diabetes) none       Problem: Seizure Disorder/Epilepsy (Adult)  Goal: Signs and Symptoms of Listed Potential Problems Will be Absent, Minimized or Managed (Seizure Disorder/Epilepsy)  Outcome: Ongoing (interventions implemented as appropriate)   10/13/19 0430 10/13/19 1630   Goal/Outcome Evaluation   Problems Assessed (Seizure Disorder/Epilepsy) all --    Problems Present (Seizure/Epilepsy) --  none

## 2019-10-15 NOTE — PROGRESS NOTES
"Clinical Nutrition Note      Patient Name: Vince Olivera  MRN: [id number]  Admission date: 10/8/2019      Multidisciplinary Rounds    Additional information obtained during MDR:  RN reports doing fine except for indigestion; tums and pepcid helping but only eating about 25 % of meals so supplements ordered. Neurology okay w/ dc.    Current diet: Diet Regular; Cardiac, Consistent Carbohydrate    Supplement: Boost Glucose Control w/ meals      Pertinent medical data reviewed  No nutrition risk identified on nursing screen; MST score \"0\"    Intervention:  Plan of care and goals reviewed    Monitor:  RD to follow per protocol      Abida Galloway MS,RD,LD  10/15/19 3:35 PM  Time: 20 mins       "

## 2019-10-15 NOTE — PROGRESS NOTES
Intensive Care Follow-up     Hospital:  LOS: 7 days   Mr. Vince Olivera, 58 y.o. male is followed for:   Seizure disorder as sequela of cerebrovascular accident (CMS/[facility])            History of present illness:   58-year-old  white male with a history of carotid artery stenosis status post left CEA, CVA 4/2017, seizure disorder on Vimpat, diabetes mellitus, hypertension, hyperlipidemia, and hypertension.  Has known peripheral vascular disease with claudication and angiography revealed an occluded distal aorta, bilateral occluded common and iliac arteries, and bilateral occluded superior femoral arteries.  Was electively scheduled for aorto bifemoral bypass with bilateral common femoral endarterectomy but before that could occur, he developed recurrent grand mal seizures on 10/8/2019 despite being on Keppra and Vimpat.  He presented to [facility] ER with confusion, seizure, dysarthria, and right-sided weakness, was loaded with Keppra, and was transferred to [facility].  Since admission here he has rapidly recovered, EEG was negative, and MRI revealed only chronic changes including left temporal and occipital encephalomalacia from previous CVA unchanged from 2018. Patient subsequently underwent his aortobifemoral bypass and bilateral common femoral endarterectomy 10/11/2019.       Subjective   Interval History:  Patient doing well this morning.  Says his pain is currently well controlled.  He is been able to get up to the chair after the epidural was taken out yesterday.  He is tolerating a diet without difficulty.  He does note that he has some midepigastric pain with some reflux, he is currently on Protonix and Tums without relief of his symptoms.  He notes that he has significant reflux at home as well.  He has not had a bowel movement as of yet but is passing gas.         The patient's past medical, surgical and social history were reviewed and updated in Epic as appropriate.       Objective      Infusions:    phenylephrine 0.5-3 mcg/kg/min Last Rate: Stopped (10/13/19 0830)     Medications:    amLODIPine 10 mg Oral Daily   aspirin  mg Oral Daily   atorvastatin 80 mg Oral Nightly   clopidogrel 75 mg Oral Daily   docusate sodium 100 mg Oral BID   insulin lispro 0-9 Units Subcutaneous Q6H   lacosamide 75 mg Oral Q12H   levETIRAcetam 1,500 mg Oral Q12H   losartan 50 mg Oral BID   metoprolol succinate  mg Oral Daily   pantoprazole 40 mg Oral QAM   sodium chloride 10 mL Intravenous Q12H     I reviewed the patient's medications.    Vital Sign Min/Max for last 24 hours  Temp  Min: 97.8 °F (36.6 °C)  Max: 98.8 °F (37.1 °C)   BP  Min: 100/75  Max: 172/103   Pulse  Min: 90  Max: 112   Resp  Min: 16  Max: 20   SpO2  Min: 87 %  Max: 97 %   Flow (L/min)  Min: 2  Max: 2       Input/Output for last 24 hour shift  10/14 0701 - 10/15 0700  In: 1240.4 [P.O.:1050; I.V.:190.4]  Out: 2025 [Urine:2025]      GENERAL : NAD, conversant  RESPIRATORY/THORAX : normal respiratory effort and no intercostal retractions, clear to auscultation bilaterally  CARDIOVASCULAR : Normal S1/S2, RRR. 1+ lower ext edema.  GASTROINTESTINAL : Soft, NT/ND. BS x 4 normoactive. No hepatosplenomegaly.  MUSCULOSKELETAL : No cyanosis, clubbing, or ischemia  NEUROLOGICAL: alert and oriented to person, place and time  PSYCHOLOGICAL : Appropriate affect    Results from last 7 days   Lab Units 10/14/19  0514 10/13/19  0307 10/12/19  2153 10/12/19  0438   WBC 10*3/mm3 14.33* 14.27*  --  15.15*   HEMOGLOBIN g/dL 10.4* 9.1* 9.2* 10.7*   PLATELETS 10*3/mm3 261 228  --  276     Results from last 7 days   Lab Units 10/15/19  0424 10/14/19  2055 10/14/19  0925 10/14/19  0514  10/13/19  0307   SODIUM mmol/L 141  --  135*  --   --  138   POTASSIUM mmol/L 4.1  --  4.0  --   --  4.0   CO2 mmol/L 31.0*  --  25.0  --   --  32.0*   BUN mg/dL 11  --  10  --   --  10   CREATININE mg/dL 0.64*  --  0.68*  --   --  0.65*   MAGNESIUM mg/dL 1.8  --   --  2.3  --  2.4    PHOSPHORUS mg/dL 3.2 2.1* 2.4* 2.4*   < > 1.4*   GLUCOSE mg/dL 164*  --  156*  --   --  133*    < > = values in this interval not displayed.     Estimated Creatinine Clearance: 135.1 mL/min (A) (by C-G formula based on SCr of 0.64 mg/dL (L)).          I reviewed the patient's new clinical results.  I reviewed the patient's new imaging results/reports including actual images and agree with reports.              Imaging Results (last 24 hours)     Procedure Component Value Units Date/Time    XR Chest 1 View [[id number]] Collected:  10/15/19 0820     Updated:  10/15/19 0943    Narrative:       EXAMINATION: XR CHEST 1 VW-      INDICATION: Postop aortobifemoral bypass; Z74.09-Other reduced mobility;  I74.10-Embolism and thrombosis of unspecified parts of aorta.      COMPARISON: 10/12/2019.     FINDINGS: Interval removal of esophagogastric tube. Cardiac silhouette  unchanged. No pneumothorax or significant effusion. No new parenchymal  process with minimal bibasilar opacifications left greater than right.       Impression:       Interval removal of esophagogastric tube with persistent  opacifications of the lung bases left greater than right may represent  atelectasis without new consolidative component or parenchymal process.     D:  10/15/2019  E:  10/15/2019          CT Chest With Contrast [[id number]] Collected:  10/10/19 0809     Updated:  10/14/19 1050    Narrative:       EXAMINATION: CT CHEST W CONTRAST-      INDICATION: Elevated right hemidiaphragm with atelectasis, rule out lung  cancer; I74.10-Embolism and thrombosis of unspecified parts of aorta.      TECHNIQUE: Post IV contrast 5 mm images through the chest and upper  abdomen.     The radiation dose reduction device was turned on for each scan per the  ALARA (As Low as Reasonably Achievable) protocol.     COMPARISON: None.     FINDINGS: Patient history indicates elevated right hemidiaphragm,  atelectasis.     Mediastinal images show no evidence of mass,  adenopathy, pericardial or  pleural effusion. Thoracic aorta appears grossly normal. Lung window  images show only slight relative elevation of the right hemidiaphragm  compared to the left, and trace associated right middle lobe linear  scarring or atelectasis. There is minimal dependent atelectasis of the  posterior lower lobes. Lungs otherwise appear clear. Included images of  the upper abdomen show expected degree of fatty liver change for body  habitus. No significant abnormalities are noted of the gallbladder,  spleen, pancreatic tail, adrenal glands, or upper renal poles.  Incidental note is made of moderate multilevel lower thoracic and upper  lumbar degenerative disc disease.       Impression:       Minimal lung scarring as noted. No evidence of active chest  disease.     D:  10/10/2019  E:  10/10/2019     This report was finalized on 10/14/2019 10:47 AM by DR. Nishant Ortega MD.             Assessment/Plan   Impression        Seizure disorder on Keppra and Vimpat. 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 50-69% R carotid stenosis    Gastroesophageal reflux disease without esophagitis    Type 2 diabetes mellitus. HbA1c 6.6    Bilateral leg and foot pain    PAD. Occluded distal aorta, common and external iliacs, and superior femoral arteries    Aortic occlusion s/p aorto-femoral bypass 10/11/19 per Dr. Weaver        Plan      Status post aorto-femoral bypass on 10/11/2019 per Dr. Weaver  -Encourage mobilization per surgery recommendations  -Blood pressure currently well controlled     Seizure disorder  History of a left MCA ischemic stroke  Hyperlipidemia  -Restart Plavix  -Continue aspirin and Lipitor  -Patient on Vimpat 75 mg every 12 hours and Keppra 200 mg every 12 hours     Essential hypertension  -Continue amlodipine 10 mg daily and Cozaar 50 mg twice daily    GERD  -We will add Tums and  sucralfate  -Continue Protonix    Diabetes type 2  -Blood sugars well controlled today, continue insulin     SCDs for DVT prophylaxis  Patient on PPI      Disposition:  Patient likely able to be moved to the floor, but will await evaluation by CT surgery prior to moving.    Plan of care and goals reviewed with mulitdisciplinary/antibiotic stewardship team during rounds.   I discussed the patient's findings and my recommendations with patient and nursing staff       Trish Lassiter,   Pulmonary, Critical care and Sleep Medicine

## 2019-10-15 NOTE — PROGRESS NOTES
Neurology       Patient Care Team:  Fortunato Marvin MD as PCP - General (Family Medicine)  Oneida Augustin APRN as Nurse Practitioner (Neurology)  Alexx Bach MD as Consulting Physician (Neurosurgery)  Zena Alejandro as Speech Therapist (Speech Pathology)  Randi Llamas PTA as Physical Therapy Assistant (Physical Therapy)  Oneida Augustin APRN as Nurse Practitioner (Neurology)  Provider, No Known as Referring Physician  Keanu Sheth MD as Surgeon (Orthopedic Surgery)    Chief complaint: Breakthrough seizure    History: Patient is significantly less sleepy on Vimpat 75 mg twice daily.    He is had no further seizures.      Past Medical History:   Diagnosis Date   • Arthritis    • Carotid artery disease (CMS/HCC)    • Carotid artery disorder (CMS/HCC)    • Depression    • Diabetes mellitus (CMS/HCC)     DIAGNOSED 4-2017 CHECKS FSBG 3X/WEEK    • Ear infection     about 2 weeks ago- cleared up - wax removed and cleaned out    • GERD (gastroesophageal reflux disease)     self diagnosed takes otc ppi   • Hyperlipidemia    • Hypertension    • Hypertension    • Inguinal hernia    • Seizure (CMS/HCC)     last 10/8/2019   • Shoulder pain     bilat    • Stroke (CMS/HCC) 04/24/2017    EXPRESSIVE APHASIA RESIDUAL    • Tooth loose     5 - all over- will get teeth done after this surgery    • Wears reading eyeglasses        Vital Signs   Vitals:    10/15/19 1100 10/15/19 1200 10/15/19 1300 10/15/19 1400   BP: (!) 147/109 (!) 148/101 122/73 133/66   BP Location:       Patient Position:       Pulse: 102 85 70 81   Resp:  18  18   Temp:  99 °F (37.2 °C)     TempSrc:  Axillary     SpO2: 92% 96% 90% 92%   Weight:       Height:           Physical Exam:   General: Awake and alert              Neuro: Oriented to person place and time.    Speech is normal.  Para    Results Review:  Reviewed  Results from last 7 days   Lab Units 10/14/19  0514   WBC 10*3/mm3 14.33*   HEMOGLOBIN  g/dL 10.4*   HEMATOCRIT % 32.2*   PLATELETS 10*3/mm3 261     Results from last 7 days   Lab Units 10/15/19  0424 10/14/19  0925 10/13/19  0307 10/12/19  0438   SODIUM mmol/L 141 135* 138 139   POTASSIUM mmol/L 4.1 4.0 4.0 4.1   CHLORIDE mmol/L 99 99 103 105   CO2 mmol/L 31.0* 25.0 32.0* 25.0   BUN mg/dL 11 10 10 12   CREATININE mg/dL 0.64* 0.68* 0.65* 0.77   CALCIUM mg/dL 9.2 8.2* 7.8* 7.9*   BILIRUBIN mg/dL  --   --   --  0.5   ALK PHOS U/L  --   --   --  36*   ALT (SGPT) U/L  --   --   --  19   AST (SGOT) U/L  --   --   --  15   GLUCOSE mg/dL 164* 156* 133* 185*       Imaging Results (last 24 hours)     Procedure Component Value Units Date/Time    XR Chest 1 View [[id number]] Collected:  10/15/19 0820     Updated:  10/15/19 0943    Narrative:       EXAMINATION: XR CHEST 1 VW-      INDICATION: Postop aortobifemoral bypass; Z74.09-Other reduced mobility;  I74.10-Embolism and thrombosis of unspecified parts of aorta.      COMPARISON: 10/12/2019.     FINDINGS: Interval removal of esophagogastric tube. Cardiac silhouette  unchanged. No pneumothorax or significant effusion. No new parenchymal  process with minimal bibasilar opacifications left greater than right.       Impression:       Interval removal of esophagogastric tube with persistent  opacifications of the lung bases left greater than right may represent  atelectasis without new consolidative component or parenchymal process.     D:  10/15/2019  E:  10/15/2019                Assessment:  Breakthrough seizure, likely associated with stress    Plan:  Continue Vimpat 75 mg twice daily    Comment:  Okay to discharge anytime.         I discussed the patients findings and my recommendations with patient    Christiano Pittman MD  10/15/19  2:48 PM

## 2019-10-15 NOTE — THERAPY RE-EVALUATION
Patient Name: Vince Olivera  : 1960    MRN: [id number]                              Today's Date: 10/15/2019       Admit Date: 10/8/2019    Visit Dx:     ICD-10-CM ICD-9-CM   1. Impaired mobility and ADLs Z74.09 799.89   2. Aortic occlusion (CMS/HCC) I74.10 747.22     Patient Active Problem List   Diagnosis   • H/o Left MCA ischemic CVA 2017   • Essential hypertension   • Dyslipidemia   • Bilateral carotid artery stenosis s/p L CEA 17. 50-69% R carotid stenosis   • Residual mild expressive Aphasia   • Gastroesophageal reflux disease without esophagitis   • Combined receptive and expressive aphasia due to cerebrovascular accident   • Abnormal peripheral vision of right eye   • GERD (gastroesophageal reflux disease)   • Hyperlipidemia   • Palpitations   • Bilateral Carotid artery stenosis. Left greater than right    • Internal carotid artery stenosis, left   • Status post left carotid endarterectomy   • Seizure disorder on Keppra and Vimpat. Recurrent Grand mal 10/8/19   • Type 2 diabetes mellitus. HbA1c 6.6   • Bilateral leg and foot pain   • Bilateral carpal tunnel syndrome   • PAD. Occluded distal aorta, common and external iliacs, and superior femoral arteries   • CVA (cerebral vascular accident) (CMS/HCC), rule out    • Breakthrough seizure (CMS/HCC)   • Aortic occlusion s/p aorto-femoral bypass 10/11/19 per Dr. Weaver      Past Medical History:   Diagnosis Date   • Arthritis    • Carotid artery disease (CMS/HCC)    • Carotid artery disorder (CMS/HCC)    • Depression    • Diabetes mellitus (CMS/HCC)     DIAGNOSED  CHECKS FSBG 3X/WEEK    • Ear infection     about 2 weeks ago- cleared up - wax removed and cleaned out    • GERD (gastroesophageal reflux disease)     self diagnosed takes otc ppi   • Hyperlipidemia    • Hypertension    • Hypertension    • Inguinal hernia    • Seizure (CMS/HCC)     last 10/8/2019   • Shoulder pain     bilat    • Stroke (CMS/HCC) 2017    EXPRESSIVE APHASIA  RESIDUAL    • Tooth loose     5 - all over- will get teeth done after this surgery    • Wears reading eyeglasses      Past Surgical History:   Procedure Laterality Date   • ABDOMINAL HERNIA REPAIR  2010   • AORTA FEMORAL BYPASS N/A 10/11/2019    Procedure: AORTA FEMORAL BYPASS, BILATERAL FEMORAL ENDARTERECTOMY;  Surgeon: Dylan Weaver MD;  Location: [facility] OR;  Service: Vascular   • CAROTID ENDARTERECTOMY Left 10/17/2017    Procedure: CAROTID ENDARTERECTOMY LEFT;  Surgeon: Alexx Bach MD;  Location: [facility] OR;  Service:    • INGUINAL HERNIA REPAIR Left      General Information     Row Name 10/15/19 1253          PT Evaluation Time/Intention    Document Type  evaluation  -LORELEI     Mode of Treatment  physical therapy  -LORELEI     Row Name 10/15/19 1253          General Information    Patient Profile Reviewed?  yes  -LORELEI     Prior Level of Function  independent:;gait;transfer;bed mobility;ADL's  -LORELEI     Existing Precautions/Restrictions  seizures  -LORELEI     Barriers to Rehab  none identified  -LORELEI     Row Name 10/15/19 1253          Relationship/Environment    Lives With  significant other  -LORELEI     Row Name 10/15/19 1253          Resource/Environmental Concerns    Current Living Arrangements  home/apartment/condo  -LORELEI     Row Name 10/15/19 1253          Home Main Entrance    Number of Stairs, Main Entrance  one  -LORELEI     Row Name 10/15/19 1253          Cognitive Assessment/Intervention- PT/OT    Orientation Status (Cognition)  oriented x 4  -LORELEI     Row Name 10/15/19 1253          Safety Issues, Functional Mobility    Safety Issues Affecting Function (Mobility)  safety precautions follow-through/compliance;safety precaution awareness  -LORELEI     Impairments Affecting Function (Mobility)  balance;endurance/activity tolerance;strength;shortness of breath  -LORELEI       User Key  (r) = Recorded By, (t) = Taken By, (c) = Cosigned By    Initials Name Provider Type    Rocío Ornelas PT Physical Therapist        Mobility      Row Name 10/15/19 1254          Bed Mobility Assessment/Treatment    Bed Mobility Assessment/Treatment  rolling right;scooting/bridging;rolling left;supine-sit  -LORELEI     Rolling Left [address] (Bed Mobility)  conditional independence  -LORELEI     Rolling Right [address] (Bed Mobility)  conditional independence  -LORELEI     Scooting/Bridging Avenue (Bed Mobility)  contact guard  -LORELEI     Supine-Sit Avenue (Bed Mobility)  contact guard  -LORELEI     Assistive Device (Bed Mobility)  bed rails;head of bed elevated  -LORELEI     Row Name 10/15/19 1254          Bed-Chair Transfer    Bed-Chair Avenue (Transfers)  contact guard  -LORELEI     Assistive Device (Bed-Chair Transfers)  walker, front-wheeled  -LORELEI     Row Name 10/15/19 1254          Sit-Stand Transfer    Sit-Stand Avenue (Transfers)  contact guard  -LORELEI     Assistive Device (Sit-Stand Transfers)  walker, front-wheeled  -LORELEI     Row Name 10/15/19 1254          Gait/Stairs Assessment/Training    Gait/Stairs Assessment/Training  gait/ambulation independence  -LORELEI     Avenue Level (Gait)  contact guard;1 person to manage equipment  -LORELEI     Assistive Device (Gait)  walker, front-wheeled  -LORELEI     Distance in Feet (Gait)  250  -LORELEI     Pattern (Gait)  step-through  -LORELEI     Comment (Gait/Stairs)  no LOB no pain with ambulation today  -LORELEI       User Key  (r) = Recorded By, (t) = Taken By, (c) = Cosigned By    Initials Name Provider Type    Rocío Ornelas, PT Physical Therapist        Obj/Interventions     Row Name 10/15/19 1256          General ROM    GENERAL ROM COMMENTS  no ROM deficits  -LORELEI     Row Name 10/15/19 1256          MMT (Manual Muscle Testing)    General MMT Comments  generalized weakness 4/5  -LORELEI     Row Name 10/15/19 1256          Therapeutic Exercise    Lower Extremity Range of Motion (Therapeutic Exercise)  hip flexion/extension, bilateral;knee flexion/extension, bilateral;ankle dorsiflexion/plantar flexion, bilateral  -LORELEI     Exercise  Type (Therapeutic Exercise)  AROM (active range of motion)  -LORELEI     Position (Therapeutic Exercise)  seated  -LORELEI     Sets/Reps (Therapeutic Exercise)  1/10  -LORELEI     Expected Outcome (Therapeutic Exercise)  facilitate normal movement patterns;improve functional tolerance, self-care activity;improve functional stability  -LORELEI     Row Name 10/15/19 1256          Static Sitting Balance    Level of [place] (Unsupported Sitting, Static Balance)  independent  -LORELEI     Sitting Position (Unsupported Sitting, Static Balance)  sitting on edge of bed  -LORELEI     Time Able to Maintain Position (Unsupported Sitting, Static Balance)  more than 5 minutes  -LORELEI     Row Name 10/15/19 1256          Dynamic Sitting Balance    Level of [place], Reaches Outside Midline (Sitting, Dynamic Balance)  independent  -LORELEI     Sitting Position, Reaches Outside Midline (Sitting, Dynamic Balance)  sitting on edge of bed  -LORELEI     Row Name 10/15/19 1256          Static Standing Balance    Level of [place] (Supported Standing, Static Balance)  contact guard assist  -LORELEI     Time Able to Maintain Position (Supported Standing, Static Balance)  1 to 2 minutes  -LORELEI     Assistive Device Utilized (Supported Standing, Static Balance)  walker, rolling  -LORELEI     Row Name 10/15/19 1256          Dynamic Standing Balance    Level of [place], Reaches Outside Midline (Standing, Dynamic Balance)  contact guard assist  -LORELEI     Time Able to Maintain Position, Reaches Outside Midline (Standing, Dynamic Balance)  more than 5 minutes  -LORELEI     Assistive Device Utilized (Supported Standing, Dynamic Balance)  walker, rolling  -LORELEI       User Key  (r) = Recorded By, (t) = Taken By, (c) = Cosigned By    Initials Name Provider Type    LORELEI Rocío Claudio, PT Physical Therapist        Goals/Plan     Row Name 10/15/19 1254          Bed Mobility Goal 1 (PT)    Activity/Assistive Device (Bed Mobility Goal 1, PT)  sit to supine/supine to sit  -LORELEI     [place]  Level/Cues Needed (Bed Mobility Goal 1, PT)  independent  -LORELEI     Time Frame (Bed Mobility Goal 1, PT)  long term goal (LTG);10 days  -LORELEI     Progress/Outcomes (Bed Mobility Goal 1, PT)  goal ongoing  -LORELEI     Row Name 10/15/19 1259          Transfer Goal 1 (PT)    Activity/Assistive Device (Transfer Goal 1, PT)  sit-to-stand/stand-to-sit  -LORELEI     [place] Level/Cues Needed (Transfer Goal 1, PT)  independent  -LORELEI     Time Frame (Transfer Goal 1, PT)  long term goal (LTG);10 days  -LORELEI     Progress/Outcome (Transfer Goal 1, PT)  goal ongoing  -LORELEI     Row Name 10/15/19 1259          Gait Training Goal 1 (PT)    Activity/Assistive Device (Gait Training Goal 1, PT)  gait (walking locomotion)  -LORELEI     [place] Level (Gait Training Goal 1, PT)  independent  -LORELEI     Distance (Gait Goal 1, PT)  400  -LORELEI     Time Frame (Gait Training Goal 1, PT)  long term goal (LTG);10 days  -LORELEI     Progress/Outcome (Gait Training Goal 1, PT)  goal ongoing  -LORELEI     Row Name 10/15/19 1259          Patient Education Goal (PT)    Activity (Patient Education Goal, PT)  HEP  -LORELEI     [place]/Cues/Accuracy (Memory Goal 2, PT)  verbalizes understanding  -LORELEI     Time Frame (Patient Education Goal, PT)  long term goal (LTG);10 days  -LORELEI     Progress/Outcome (Patient Education Goal, PT)  goal ongoing  -LORELEI       User Key  (r) = Recorded By, (t) = Taken By, (c) = Cosigned By    Initials Name Provider Type    Rocío Ornelas, PT Physical Therapist        Clinical Impression     Row Name 10/15/19 1257          Pain Assessment    Additional Documentation  Pain Scale: Numbers Pre/Post-Treatment (Group)  -LORELEI     Row Name 10/15/19 1257          Pain Scale: Numbers Pre/Post-Treatment    Pain Scale: Numbers, Pretreatment  0/10 - no pain  -LORELEI     Pain Scale: Numbers, Post-Treatment  0/10 - no pain  -LORELEI     Row Name 10/15/19 1257          Plan of Care Review    Plan of Care Reviewed With  patient  -LORELEI     Row Name 10/15/19 1257          Physical  Therapy Clinical Impression    Patient/Family Goals Statement (PT Clinical Impression)  go home with assist  -LORELEI     Criteria for Skilled Interventions Met (PT Clinical Impression)  yes;treatment indicated  -LORELEI     Rehab Potential (PT Clinical Summary)  good, to achieve stated therapy goals  -LORELEI     Row Name 10/15/19 1257          Vital Signs    Pre Systolic BP Rehab  147 RN aware of BP  -LORELEI     Pre Treatment Diastolic BP  100  -LORELEI     Post Systolic BP Rehab  154  -LORELEI     Post Treatment Diastolic BP  98  -LORELEI     Pretreatment Heart Rate (beats/min)  95  -LORELEI     Posttreatment Heart Rate (beats/min)  93  -LORELEI     Pre SpO2 (%)  90  -LORELEI     O2 Delivery Pre Treatment  nasal cannula  -LORELEI     Post SpO2 (%)  96  -LORELEI     O2 Delivery Post Treatment  nasal cannula  -LORELEI     Pre Patient Position  Supine  -LORELEI     Intra Patient Position  Standing  -LORELEI     Post Patient Position  Sitting  -LORELEI     Row Name 10/15/19 1257          Positioning and Restraints    Pre-Treatment Position  in bed  -LORELEI     Post Treatment Position  chair  -LORELEI     In Chair  notified nsg;reclined;sitting;call light within reach;exit alarm on  -LORELEI       User Key  (r) = Recorded By, (t) = Taken By, (c) = Cosigned By    Initials Name Provider Type    Rocío Ornelas, PT Physical Therapist        Outcome Measures     Row Name 10/15/19 1302          How much help from another person do you currently need...    Turning from your back to your side while in flat bed without using bedrails?  4  -LORELEI     Moving from lying on back to sitting on the side of a flat bed without bedrails?  3  -LORELEI     Moving to and from a bed to a chair (including a wheelchair)?  3  -LORELEI     Standing up from a chair using your arms (e.g., wheelchair, bedside chair)?  3  -LORELIE     Climbing 3-5 steps with a railing?  3  -LORELEI     To walk in hospital room?  3  -LORELEI     AM-PAC 6 Clicks Score (PT)  19  -LORELEI     Row Name 10/15/19 1302          Functional Assessment    Outcome Measure Options  AM-PAC 6  Clicks Basic Mobility (PT)  -LORELEI       User Key  (r) = Recorded By, (t) = Taken By, (c) = Cosigned By    Initials Name Provider Type    Rocío Ornelas, PT Physical Therapist        Physical Therapy Education     Title: PT OT SLP Therapies (In Progress)     Topic: Physical Therapy (In Progress)     Point: Mobility training (In Progress)     Learning Progress Summary           Patient Acceptance, E, NR by LORELEI at 10/15/2019 10:50 AM    Acceptance, E,TB, VU by LAZARUS at 10/14/2019  5:38 AM    Acceptance, E,D, VU,DU by GAYLA at 10/9/2019  1:35 PM   Significant Other Acceptance, E,D, VU,DU by GAYLA at 10/9/2019  1:35 PM                   Point: Home exercise program (In Progress)     Learning Progress Summary           Patient Acceptance, E, NR by LORELEI at 10/15/2019 10:50 AM    Acceptance, E,TB, VU by LAZARUS at 10/14/2019  5:38 AM                   Point: Body mechanics (In Progress)     Learning Progress Summary           Patient Acceptance, E, NR by LORELEI at 10/15/2019 10:50 AM    Acceptance, E,TB, VU by LAZARUS at 10/14/2019  5:38 AM    Acceptance, E,D, VU,DU by  at 10/9/2019  1:35 PM   Significant Other Acceptance, E,D, VU,DU by  at 10/9/2019  1:35 PM                   Point: Precautions (In Progress)     Learning Progress Summary           Patient Acceptance, E, NR by LORELEI at 10/15/2019 10:50 AM    Acceptance, E,TB, VU by LAZARUS at 10/14/2019  5:38 AM    Acceptance, E,D, VU,DU by  at 10/9/2019  1:35 PM   Significant Other Acceptance, E,D, VU,DU by  at 10/9/2019  1:35 PM                               User Key     Initials Effective Dates Name Provider Type Discipline    LORELEI 06/19/15 -  Rocío Claudio, PT Physical Therapist PT     06/19/15 -  Rae Stoddard, PT Physical Therapist PT     08/14/19 -  Edwin Mensah, RN Registered Nurse Nurse              PT Recommendation and Plan  Planned Therapy Interventions (PT Eval): balance training, bed mobility training, gait training, home exercise program, strengthening, transfer  training  Outcome Summary/Treatment Plan (PT)  Anticipated Discharge Disposition (PT): home with assist  Plan of Care Reviewed With: patient  Outcome Summary: PT eval completed patient ambulates 250 ft with rolling walker and CGA  no pain with activity today. anticipate patient will go home with assist at D/C     Time Calculation:   PT Charges     Row Name 10/15/19 1050             Time Calculation    Start Time  1050  -LORELEI      PT Received On  10/15/19  -LORELEI      PT Goal Re-Cert Due Date  10/25/19  -LORELEI         Time Calculation- PT    Total Timed Code Minutes- PT  23 minute(s)  -LORELEI         Timed Charges    18141 - PT Therapeutic Activity Minutes  23  -LORELEI        User Key  (r) = Recorded By, (t) = Taken By, (c) = Cosigned By    Initials Name Provider Type    Rocío Ornelas, PT Physical Therapist        Therapy Charges for Today     Code Description Service Date Service Provider Modifiers Qty    [id number]  PT THERAPEUTIC ACT EA 15 MIN 10/15/2019 Rocío Claudio, PT GP 2    [id number]  PT RE-EVAL ESTABLISHED PLAN 2 10/15/2019 Rocío Claudio, PT GP 1    [id number]  PT THER SUPP EA 15 MIN 10/15/2019 Rocío Claudio, PT GP 2          PT G-Codes  Outcome Measure Options: AM-PAC 6 Clicks Basic Mobility (PT)  AM-PAC 6 Clicks Score (PT): 19  AM-PAC 6 Clicks Score (OT): 24  Modified Hundred Scale: 1 - No significant disability despite symptoms.  Able to carry out all usual duties and activities.(baseline deficits)    Rocío Claudio, EMILIANO  10/15/2019

## 2019-10-16 VITALS
SYSTOLIC BLOOD PRESSURE: 169 MMHG | OXYGEN SATURATION: 96 % | BODY MASS INDEX: 32.56 KG/M2 | HEART RATE: 88 BPM | DIASTOLIC BLOOD PRESSURE: 101 MMHG | HEIGHT: 66 IN | WEIGHT: 202.6 LBS | RESPIRATION RATE: 18 BRPM | TEMPERATURE: 98.6 F

## 2019-10-16 PROBLEM — M79.672 BILATERAL LEG AND FOOT PAIN: Status: RESOLVED | Noted: 2019-04-18 | Resolved: 2019-10-16

## 2019-10-16 PROBLEM — M79.605 BILATERAL LEG AND FOOT PAIN: Status: RESOLVED | Noted: 2019-04-18 | Resolved: 2019-10-16

## 2019-10-16 PROBLEM — M79.671 BILATERAL LEG AND FOOT PAIN: Status: RESOLVED | Noted: 2019-04-18 | Resolved: 2019-10-16

## 2019-10-16 PROBLEM — M79.604 BILATERAL LEG AND FOOT PAIN: Status: RESOLVED | Noted: 2019-04-18 | Resolved: 2019-10-16

## 2019-10-16 LAB
GLUCOSE BLDC GLUCOMTR-MCNC: 153 MG/DL (ref 70–130)
GLUCOSE BLDC GLUCOMTR-MCNC: 155 MG/DL (ref 70–130)
GLUCOSE BLDC GLUCOMTR-MCNC: 179 MG/DL (ref 70–130)

## 2019-10-16 PROCEDURE — 82962 GLUCOSE BLOOD TEST: CPT

## 2019-10-16 PROCEDURE — 99238 HOSP IP/OBS DSCHRG MGMT 30/<: CPT | Performed by: INTERNAL MEDICINE

## 2019-10-16 PROCEDURE — 99024 POSTOP FOLLOW-UP VISIT: CPT | Performed by: THORACIC SURGERY (CARDIOTHORACIC VASCULAR SURGERY)

## 2019-10-16 RX ORDER — HYDROCODONE BITARTRATE AND ACETAMINOPHEN 7.5; 325 MG/1; MG/1
1 TABLET ORAL EVERY 6 HOURS PRN
Qty: 30 TABLET | Refills: 0 | Status: SHIPPED | OUTPATIENT
Start: 2019-10-16 | End: 2019-10-21

## 2019-10-16 RX ORDER — LEVETIRACETAM 500 MG/1
TABLET ORAL
Qty: 180 TABLET | Refills: 5 | Status: SHIPPED | OUTPATIENT
Start: 2019-10-16 | End: 2019-10-25

## 2019-10-16 RX ORDER — LACOSAMIDE 50 MG/1
50 TABLET ORAL EVERY 12 HOURS
Qty: 60 TABLET | Refills: 0 | Status: SHIPPED | OUTPATIENT
Start: 2019-10-16 | End: 2019-10-31 | Stop reason: SDUPTHER

## 2019-10-16 RX ADMIN — LOSARTAN POTASSIUM 50 MG: 50 TABLET ORAL at 08:03

## 2019-10-16 RX ADMIN — PANTOPRAZOLE SODIUM 40 MG: 40 TABLET, DELAYED RELEASE ORAL at 08:03

## 2019-10-16 RX ADMIN — DOCUSATE SODIUM 100 MG: 100 CAPSULE, LIQUID FILLED ORAL at 08:02

## 2019-10-16 RX ADMIN — LEVETIRACETAM 1500 MG: 750 TABLET, FILM COATED ORAL at 08:04

## 2019-10-16 RX ADMIN — METOPROLOL SUCCINATE 200 MG: 100 TABLET, EXTENDED RELEASE ORAL at 08:04

## 2019-10-16 RX ADMIN — SODIUM CHLORIDE, PRESERVATIVE FREE 10 ML: 5 INJECTION INTRAVENOUS at 08:05

## 2019-10-16 RX ADMIN — CLOPIDOGREL BISULFATE 75 MG: 75 TABLET ORAL at 08:02

## 2019-10-16 RX ADMIN — ASPIRIN 325 MG: 325 TABLET, DELAYED RELEASE ORAL at 08:03

## 2019-10-16 RX ADMIN — LACOSAMIDE 75 MG: 50 TABLET, FILM COATED ORAL at 08:02

## 2019-10-16 RX ADMIN — INSULIN LISPRO 2 UNITS: 100 INJECTION, SOLUTION INTRAVENOUS; SUBCUTANEOUS at 06:57

## 2019-10-16 RX ADMIN — AMLODIPINE BESYLATE 10 MG: 10 TABLET ORAL at 08:03

## 2019-10-16 NOTE — PLAN OF CARE
Problem: Diabetes, Type 2 (Adult)  Goal: Signs and Symptoms of Listed Potential Problems Will be Absent, Minimized or Managed (Diabetes, Type 2)  Outcome: Ongoing (interventions implemented as appropriate)   10/16/19 1125   Goal/Outcome Evaluation   Problems Assessed (Type 2 Diabetes) all;situational response   Problems Present (Type 2 Diabetes) situational response

## 2019-10-16 NOTE — PLAN OF CARE
Problem: Patient Care Overview  Goal: Plan of Care Review  Outcome: Ongoing (interventions implemented as appropriate)   10/15/19 1559 10/16/19 0200 10/16/19 0550   Coping/Psychosocial   Plan of Care Reviewed With --  patient --    Plan of Care Review   Progress improving --  --    OTHER   Outcome Summary --  --  Patient VSS over night; no complaints of indegestion or pain; patient to go home this AM; will continue to monitor.       Problem: Diabetes, Type 2 (Adult)  Goal: Signs and Symptoms of Listed Potential Problems Will be Absent, Minimized or Managed (Diabetes, Type 2)  Outcome: Ongoing (interventions implemented as appropriate)   10/13/19 1630   Goal/Outcome Evaluation   Problems Assessed (Type 2 Diabetes) all   Problems Present (Type 2 Diabetes) none       Problem: Seizure Disorder/Epilepsy (Adult)  Goal: Signs and Symptoms of Listed Potential Problems Will be Absent, Minimized or Managed (Seizure Disorder/Epilepsy)  Outcome: Ongoing (interventions implemented as appropriate)   10/13/19 0430 10/13/19 1630   Goal/Outcome Evaluation   Problems Assessed (Seizure Disorder/Epilepsy) all --    Problems Present (Seizure/Epilepsy) --  none

## 2019-10-16 NOTE — DISCHARGE SUMMARY
Discharge Summary    Patient name: Vince Olivera  CSN: [id number]  MRN: [id number]  : 1960  Today's date: 10/16/2019     Date of Admission: 10/8/2019    Date of Discharge:  10/16/2019    Admitting Physician: LINCOLN Fernandez MD    Attending Physician: HUGO Lassiter MD    Primary Care Provider: Fortunato Marvin MD    Consultations:   Dr. Pittman- Neurology    Admission Diagnosis:    CVA (cerebral vascular accident) (CMS/[facility]), rule out     H/o Left MCA ischemic CVA 2017    Essential hypertension    Dyslipidemia    Bilateral carotid artery stenosis    Gastroesophageal reflux disease without esophagitis    Status post left carotid endarterectomy    Seizure disorder as sequela of cerebrovascular accident (CMS/[facility])    Type 2 diabetes mellitus without complication, without long-term current use of insulin (CMS/HCC)    Bilateral leg and foot pain    PAD (peripheral artery disease) (CMS/[facility])    Breakthrough seizure (CMS/HCC)    Discharge Diagnoses:     Seizure disorder on Keppra and Vimpat. Recurrent Grand mal 10/8/19    H/o Left MCA ischemic CVA 2017    Essential hypertension    Bilateral carotid artery stenosis s/p L CEA 17. 50-69% R carotid stenosis    Type 2 diabetes mellitus. HbA1c 6.6    PAD. Occluded distal aorta, common and external iliacs, and superior femoral arteries    Aortic occlusion s/p aorto-femoral bypass 10/11/19 per Dr. Weaver     Dyslipidemia    Gastroesophageal reflux disease without esophagitis    Procedures:  Procedure(s):  AORTA FEMORAL BYPASS, BILATERAL FEMORAL ENDARTERECTOMY       History of Present Illness:  Vince Olivera is a 58 y.o. male with PMH significant for Carotid artery disease s/p Left CEA, CVA , seizure disorder, DM2, HLD, HTN, GERD, and PAD who originally presented to [facility] with complaint of seizure with right sided weakness and confusion.   He states that he was getting ready to come to [place] for pre-admission testing for an aortic bifemoral bypass that  was originally scheduled for tomorrow. His wife states that as he was getting ready, he had a seizure that involved his right arm and then he became confused and had garbled speech. Prior to that event, his seizures had been well controlled with his last one reported to be May 2018.   Initially, upon arrival to [facility], his NIH was 18 and he was nonverbal. Shortly after, he had a grand mal seizure that required a total of 2mg of ativan to resolve. He then became postictal and no repeat NIH was able to be performed. CT head was negative for acute findings. Decision was made to transfer to [facility] for further evaluation. He was given Keppra 1g PTA.   Upon arrival to [facility], pt has now returned to baseline. He no longer has any right sided weakness, and speech is at baseline. He will be admitted to Hospital Medicine for further evaluation.     Hospital Course:  Vince Olivera is a 58 y.o. male who presented to [facility] as discussed in HPI.  He was initially admitted by the hospitalist service and seizure protocol was activated.  Neurology consult, and echo were ordered.  Cardiothoracic surgery was asked to see the patient for aortobifemoral bypass due to his history of severe bilateral lower extremity claudication.  Abdominal angiography showed complete occlusion of the distal abdominal aorta, both common iliac arteries, and both external iliac arteries.  He was taken by cardiothoracic surgery and underwent bilateral femoral endarterectomy on 10/11/2019.  He transferred to ICU with epidural catheter.  There was some concern about maintaining adequate blood pressure on postoperative day #2 and required phenylephrine.  This eventually improved and the patient was able to ambulate to chair.  He remained seizure-free through hospital day #6.  Was able to tolerate a diet and complained of mid epigastric pain with reflux.  As per day #8 the patient was noted to be stable and deemed able be discharged  "home.    Vitals:  BP (!) 169/101   Pulse 88   Temp 98.6 °F (37 °C) (Oral)   Resp 18   Ht 167.6 cm (66\")   Wt 91.9 kg (202 lb 9.6 oz)   SpO2 96%   BMI 32.70 kg/m²     Advance Directives: Code Status and Medical Interventions:   Ordered at: 10/08/19 7850     Code Status:    CPR     Medical Interventions (Level of Support Prior to Arrest):    Full        Physical Exam:  GENERAL : NAD, conversant  RESPIRATORY/THORAX : normal respiratory effort and no intercostal retractions, clear to auscultation bilaterally  CARDIOVASCULAR : Normal S1/S2, RRR. 1+ lower ext edema.  GASTROINTESTINAL : Soft, NT/ND. BS x 4 normoactive. No hepatosplenomegaly.  MUSCULOSKELETAL : No cyanosis, clubbing, or ischemia  NEUROLOGICAL: alert and oriented to person, place and time  PSYCHOLOGICAL : Appropriate affect    Labs:  Results from last 7 days   Lab Units 10/14/19  0514   WBC 10*3/mm3 14.33*   HEMOGLOBIN g/dL 10.4*   HEMATOCRIT % 32.2*   PLATELETS 10*3/mm3 261     Results from last 7 days   Lab Units 10/15/19  0424  10/12/19  0438   SODIUM mmol/L 141   < > 139   POTASSIUM mmol/L 4.1   < > 4.1   CHLORIDE mmol/L 99   < > 105   CO2 mmol/L 31.0*   < > 25.0   BUN mg/dL 11   < > 12   CREATININE mg/dL 0.64*   < > 0.77   CALCIUM mg/dL 9.2   < > 7.9*   BILIRUBIN mg/dL  --   --  0.5   ALK PHOS U/L  --   --  36*   ALT (SGPT) U/L  --   --  19   AST (SGOT) U/L  --   --  15   GLUCOSE mg/dL 164*   < > 185*    < > = values in this interval not displayed.         Magnesium   Date Value Ref Range Status   10/15/2019 1.8 1.6 - 2.6 mg/dL Final   10/14/2019 2.3 1.6 - 2.6 mg/dL Final     Phosphorus   Date Value Ref Range Status   10/15/2019 3.2 2.5 - 4.5 mg/dL Final   10/14/2019 2.1 (L) 2.5 - 4.5 mg/dL Final   10/14/2019 2.4 (L) 2.5 - 4.5 mg/dL Final                 Discharge Medications:     Discharge Medications      New Medications      Instructions Start Date   HYDROcodone-acetaminophen 7.5-325 MG per tablet  Commonly known as:  NORCO   1 tablet, Oral, " Every 6 Hours PRN         Changes to Medications      Instructions Start Date   lacosamide 50 MG tablet tablet  Commonly known as:  VIMPAT  What changed:  how much to take   50 mg, Oral, Every 12 Hours         Continue These Medications      Instructions Start Date   amLODIPine 10 MG tablet  Commonly known as:  NORVASC   10 mg, Oral, Daily      aspirin  MG tablet   325 mg, Oral, Daily      atorvastatin 80 MG tablet  Commonly known as:  LIPITOR   80 mg, Oral, Nightly      carbamide peroxide 6.5 % otic solution  Commonly known as:  DEBROX   5 drops, Both Ears, 2 Times Daily      clopidogrel 75 MG tablet  Commonly known as:  PLAVIX   75 mg, Oral, Daily      hydroCHLOROthiazide 12.5 MG tablet  Commonly known as:  HYDRODIURIL   12.5 mg, Oral, Daily      levETIRAcetam 500 MG tablet  Commonly known as:  KEPPRA   Take 1500 mg BID      losartan 100 MG tablet  Commonly known as:  COZAAR   50 mg, Oral, 2 Times Daily      metFORMIN 500 MG tablet  Commonly known as:  GLUCOPHAGE   1,000 mg, Oral, 2 Times Daily With Meals      metoprolol succinate  MG 24 hr tablet  Commonly known as:  TOPROL XL   200 mg, Oral, Daily      omeprazole 20 MG capsule  Commonly known as:  priLOSEC   20 mg, Oral, Daily         Stop These Medications    lansoprazole 15 MG capsule  Commonly known as:  PREVACID            Discharge Diet:       Activity at Discharge:   Activity Instructions     Driving Restrictions      Type of Restriction:  Driving    Driving Restrictions:  No Driving While Taking Narcotics    No driving until released in follow up appt    Driving Restrictions      Type of Restriction:  Driving    Driving Restrictions:  No Driving (Time Limited)    Length:  1 Week    Lifting Restrictions      Type of Restriction:  Lifting    Lifting Restrictions:  Lifting Restriction (Indicate Limit)    Weight Limit (Pounds):  10    Length of Lifting Restriction:  until cleared in follow up appt    Lifting Restrictions      Type of Restriction:   Lifting    Lifting Restrictions:  Avoid Straining to Lift    Length of Lifting Restriction:  Until follow up with Dr. Gracia          Follow-up Appointments  Future Appointments   Date Time Provider Department Center   10/24/2019  1:15 PM Fortunato Marvin MD [facility] None   1/23/2020  2:45 PM Glo Kyle APRN [facility] None     Additional Instructions for the Follow-ups that You Need to Schedule     Call MD With Problems / Concerns   As directed      Instructions: Call with groin pain or if you develop a temp greater than 101    Order Comments:  Instructions: Call with groin pain or if you develop a temp greater than 101          Discharge Follow-up with PCP   As directed       Currently Documented PCP:    Fortunato Marvin MD    PCP Phone Number:    [phone]     Follow Up Details:  post d/c update         Discharge Follow-up with PCP   As directed       Currently Documented PCP:    Fortunato Marvin MD    PCP Phone Number:    [phone]     Follow Up Details:  2 weeks         Discharge Follow-up with Specified Provider: Dylan Gracia MD; 1 Week   As directed      To:  Dylan Gracia MD    Follow Up:  1 Week         Discharge Follow-up with Specified Provider: Dr Weaver; 1 Week   As directed      To:  Dr Weaver    Follow Up:  1 Week         Discharge Follow-up with Specified Provider: Neurology; 1 Month   As directed      To:  Neurology    Follow Up:  1 Month               Discharge Instructions:  Pt to follow up as noted above.       KINGSTON Zamudio, YONATAN-BC  Pulmonary & Critical Care Medicine  10/16/19 1:24 PM    Time: Discharge 20 min    CC: Fortunato Marvin MD         [unfilled]    I have personally seen, interviewed and examined the patient and verified all the key components of the history, physical examination, assessment and plan with KINGSTON Novoa, YONATAN-BC and reviewed the note, which reflects my changes and contributions.

## 2019-10-16 NOTE — NURSING NOTE
Pt has discharge orders. Discharge pending med reconciliation. Pt stable for discharge with spouse at bedside for transport

## 2019-10-16 NOTE — PROGRESS NOTES
"Clinical Nutrition Note      Patient Name: Vince Olivera  MRN: [id number]  Admission date: 10/8/2019      Multidisciplinary Rounds    Additional information obtained during MDR:  RN reports pt doing well; plan for discharge to home later today.    Current diet: Diet Regular; Cardiac, Consistent Carbohydrate    Pertinent medical data reviewed  No nutrition risk identified on nursing screen; MST score \"0\"    Intervention:  Plan of care and goals reviewed    Monitor:  RD to follow per protocol      Abida Galloway MS,RD,LD  10/16/19 10:09 AM  Time: 20 mins       "

## 2019-10-16 NOTE — PROGRESS NOTES
Continued Stay Note  [facility]     Patient Name: Vince Olivera  MRN: [id number]  Today's Date: 10/16/2019    Admit Date: 10/8/2019    Discharge Plan     Row Name 10/16/19 1007       Plan    Plan  Home    Patient/Family in Agreement with Plan  yes    Plan Comments  Per PT note, he is doing well and will not need HH at this time.  Goal is to return home c assist from family when medically ready to go.     Final Discharge Disposition Code  01 - home or self-care        Discharge Codes    No documentation.       Expected Discharge Date and Time     Expected Discharge Date Expected Discharge Time    Oct 16, 2019             Daniel Gold RN

## 2019-10-16 NOTE — PROGRESS NOTES
CTS Progress Note      POD 5 s/p aortobifemoral bypass with bilateral common femoral artery endarterectomies    Subjective  Sitting up in bed. Conversant. No complaint. Ready for d/c home      Objective    Physical Exam:   Vital Signs   Temp:  [97.8 °F (36.6 °C)-99 °F (37.2 °C)] 98.6 °F (37 °C)  Heart Rate:  [] 76  Resp:  [16-20] 20  BP: (122-172)/() 153/142   GEN: NAD   CV: RRR    RESP: clear to auscultation   ABD:soft non tender positive bowel sounds    EXT:warm no significant edema  Incision:abd incison healing well staples in place. Fem incisons with aquaseal bandage. Soft no surrounding erythema or drainage     Results     Results from last 7 days   Lab Units 10/14/19  0514   WBC 10*3/mm3 14.33*   HEMOGLOBIN g/dL 10.4*   HEMATOCRIT % 32.2*   PLATELETS 10*3/mm3 261     Results from last 7 days   Lab Units 10/15/19  0424   SODIUM mmol/L 141   POTASSIUM mmol/L 4.1   CHLORIDE mmol/L 99   CO2 mmol/L 31.0*   BUN mg/dL 11   CREATININE mg/dL 0.64*   GLUCOSE mg/dL 164*   CALCIUM mg/dL 9.2               Assessment/Plan   Postop day 5 status post aortobifemoral bypass grafting    Seizure disorder on Keppra and Vimpat. 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 50-69% R carotid stenosis    Gastroesophageal reflux disease without esophagitis    Type 2 diabetes mellitus. HbA1c 6.6    Bilateral leg and foot pain    PAD. Occluded distal aorta, common and external iliacs, and superior femoral arteries    Aortic occlusion s/p aorto-femoral bypass 10/11/19 per Dr. Weaver         Plan   D/c home if ok by all   Follow up with Dr Weaver in 1-2 weeks    KILEY Bentley  10/16/19  7:49 AM     Status as outlined above.  Patient ambulating without difficulty tolerating his diet.  Wounds are healing and are clean and dry.  Agree with discharge. I have reviewed, verified, and confirmed the above history  and current status.  I have examined the patient and confirmed the above physical findings.Above plan and treatment regimen discussed in detail with patient.  Options of treatment, attendant risks vs benefits, and my recommendations were discussed and all questions answered.    Dylan Weaver MD  CTSurgery  10/16/19   10:05 AM

## 2019-10-17 ENCOUNTER — READMISSION MANAGEMENT (OUTPATIENT)
Dept: CALL CENTER | Facility: HOSPITAL | Age: 59
End: 2019-10-17

## 2019-10-17 NOTE — OUTREACH NOTE
Prep Survey      Responses   Facility patient discharged from?  [place]   Is patient eligible?  Yes   Discharge diagnosis  AORTA FEMORAL BYPASS, BILATERAL FEMORAL ENDARTERECTOMY,  Focal motor seizures   Does the patient have one of the following disease processes/diagnoses(primary or secondary)?  Cardiothoracic surgery   Does the patient have Home health ordered?  No   Is there a DME ordered?  No   Prep survey completed?  Yes          Candida Landry RN

## 2019-10-17 NOTE — PROGRESS NOTES
Continued Stay Note  [facility]     Patient Name: Vince Olivera  MRN: [id number]  Today's Date: 10/17/2019    Admit Date: 10/8/2019    Discharge Plan     Row Name 10/17/19 0734       Plan    Plan Comments  D/C home 10/16/19.  No d/c needs noted.    Final Note  Home        Discharge Codes    No documentation.       Expected Discharge Date and Time     Expected Discharge Date Expected Discharge Time    Oct 16, 2019             Daniel Gold RN

## 2019-10-21 ENCOUNTER — READMISSION MANAGEMENT (OUTPATIENT)
Dept: CALL CENTER | Facility: HOSPITAL | Age: 59
End: 2019-10-21

## 2019-10-21 NOTE — OUTREACH NOTE
CT Surgery Week 1 Survey      Responses   Facility patient discharged from?  [place]   Does the patient have one of the following disease processes/diagnoses(primary or secondary)?  Cardiothoracic surgery   Is there a successful TCM telephone encounter documented?  No   Week 1 attempt successful?  Yes   Call start time  0855   Call end time  0859   Is patient permission given to speak with other caregiver?  Yes   List who call center can speak with  wife Donna   Person spoke with today (if not patient) and relationship  Donna   Meds reviewed with patient/caregiver?  Yes   Is the patient having any side effects they believe may be caused by any medication additions or changes?  No   Does the patient have all medications related to this admission filled (includes all antibiotics, pain medications, cardiac medications, etc.)  Yes   Is the patient taking all medications as directed (includes completed medication regime)?  Yes   Does the patient have a primary care provider?   Yes   Does the patient have an appointment scheduled with their C/T surgeon?  Yes   Comments regarding PCP  Fortunato Marvin 10/24   Has the patient kept scheduled appointments due by today?  Yes   Comments  10/31 CT Surgeon   Has home health visited the patient within 72 hours of discharge?  N/A   Psychosocial issues?  No   Did the patient receive a copy of their discharge instructions?  Yes   Nursing interventions  Reviewed instructions with patient, Educated on MyChart   What is the patient's perception of their health status since discharge?  Improving   Nursing interventions  Nurse provided patient education   Is the patient/caregiver able to teach back normal signs of recovery?  Nausea and lack of appetite, Constipation, Depression or irritability, Pain or discomfort at incisional site   Is the patient /caregiver able to teach back basic post-op care?  Continue use of incentive spirometry at least 1 week post discharge, Practice 'cough and  deep breath', Drive as instructed by MD in discharge instructions, Take showers only when approved by MD-sponge bathe until then, No tub bath, swimming, or hot tub until instructed by MD, Keep incision areas clean, dry and protected, Do not remove steri-strips, Lifting as instructed by MD in discharge instructions   Is the patient/caregiver able to teach back signs and symptoms of incisional infection?  Increased redness, swelling or pain at the incisonal site, Increased drainage or bleeding, Incisional warmth, Pus or odor from incision, Fever   Is the patient/caregiver able to teach back steps to recovery at home?  Set small, achievable goals for return to baseline health, Weigh daily, Practice good oral hygiene, Eat a well-balance diet, Make a list of questions for surgeon's appointment   Is the patient /caregiver able to teach back the importance of cardiac rehab?  Yes   Nursing interventions  Provided education on importance of cardiac rehab   Is the patient/caregiver able to teach back the hierarchy of who to call/visit for symptoms/problems? PCP, Specialist, Home health nurse, Urgent Care, ED, 911  Yes   Week 1 call completed?  Yes   Wrap up additional comments  Wife states he is doing fine, no new issues, has appt. with PCP this week            Edwina Davis RN

## 2019-10-25 ENCOUNTER — OFFICE VISIT (OUTPATIENT)
Dept: FAMILY MEDICINE CLINIC | Facility: CLINIC | Age: 59
End: 2019-10-25

## 2019-10-25 VITALS
TEMPERATURE: 97.5 F | SYSTOLIC BLOOD PRESSURE: 138 MMHG | HEIGHT: 66 IN | BODY MASS INDEX: 28.82 KG/M2 | WEIGHT: 179.3 LBS | DIASTOLIC BLOOD PRESSURE: 76 MMHG | OXYGEN SATURATION: 98 % | HEART RATE: 97 BPM

## 2019-10-25 DIAGNOSIS — G40.909 SEIZURE DISORDER AS SEQUELA OF CEREBROVASCULAR ACCIDENT (HCC): ICD-10-CM

## 2019-10-25 DIAGNOSIS — I69.398 SEIZURE DISORDER AS SEQUELA OF CEREBROVASCULAR ACCIDENT (HCC): ICD-10-CM

## 2019-10-25 DIAGNOSIS — Z23 NEED FOR INFLUENZA VACCINATION: Primary | ICD-10-CM

## 2019-10-25 DIAGNOSIS — I65.23 BILATERAL CAROTID ARTERY STENOSIS: ICD-10-CM

## 2019-10-25 DIAGNOSIS — I10 ESSENTIAL HYPERTENSION: ICD-10-CM

## 2019-10-25 DIAGNOSIS — R53.83 FATIGUE, UNSPECIFIED TYPE: ICD-10-CM

## 2019-10-25 DIAGNOSIS — I73.9 PAD (PERIPHERAL ARTERY DISEASE) (HCC): ICD-10-CM

## 2019-10-25 PROCEDURE — 90471 IMMUNIZATION ADMIN: CPT | Performed by: NURSE PRACTITIONER

## 2019-10-25 PROCEDURE — 99213 OFFICE O/P EST LOW 20 MIN: CPT | Performed by: NURSE PRACTITIONER

## 2019-10-25 PROCEDURE — 90674 CCIIV4 VAC NO PRSV 0.5 ML IM: CPT | Performed by: NURSE PRACTITIONER

## 2019-10-25 RX ORDER — LEVETIRACETAM 500 MG/1
1500 TABLET ORAL 2 TIMES DAILY
Qty: 180 TABLET | Refills: 0
Start: 2019-10-25 | End: 2019-10-31 | Stop reason: SDUPTHER

## 2019-10-25 RX ORDER — CYANOCOBALAMIN 1000 UG/ML
1000 INJECTION, SOLUTION INTRAMUSCULAR; SUBCUTANEOUS
Status: SHIPPED | OUTPATIENT
Start: 2019-10-25

## 2019-10-25 RX ORDER — LOSARTAN POTASSIUM 100 MG/1
50 TABLET ORAL 2 TIMES DAILY
Qty: 180 TABLET | Refills: 3 | Status: SHIPPED | OUTPATIENT
Start: 2019-10-25 | End: 2020-07-22 | Stop reason: SDUPTHER

## 2019-10-25 RX ORDER — ASPIRIN 325 MG
325 TABLET, DELAYED RELEASE (ENTERIC COATED) ORAL DAILY
Qty: 90 TABLET | Refills: 3 | Status: SHIPPED | OUTPATIENT
Start: 2019-10-25 | End: 2020-07-22 | Stop reason: SDUPTHER

## 2019-10-25 NOTE — PROGRESS NOTES
Subjective   Vince Olivera is a 58 y.o. male.     Chief Complaint   Patient presents with   • surgery follow up       He presents for hospital follow up of CVA, grand mal seizure, and aorta femoral bypass, bilateral femoral endarterectomy. He states he is doing well except he has no appetite. His wife reports he ate good until surgery but he doesn't want to eat now. She states they told him that was normal but now he is not forcing himself to eat. He has lost over 20lb. He stayed in hospital for 5 days. Discharge summary reviewed. He has a follow up with surgery and neurology on the 31st.     His wife reports he had seizure while getting ready to go to [place] for his surgery prep. She reports the ambulance took him to [place], he was then transferred to [place]. His seizure medicine was increased per neurology. Tests were ran for cardiac clearance. Surgery was scheduled on 9th but was put off for clearance until the 11th. He was seizure free for the rest of the hospital stay. Now he c/o no energy.         The following portions of the patient's history were reviewed and updated as appropriate: allergies, current medications, past family history, past medical history, past social history, past surgical history and problem list.    Review of Systems   Constitutional: Positive for appetite change and fatigue. Negative for fever and unexpected weight change.   HENT: Negative for ear pain, rhinorrhea, sore throat and trouble swallowing.    Eyes: Negative for visual disturbance.   Respiratory: Negative for cough, shortness of breath and wheezing.    Cardiovascular: Negative for chest pain and palpitations.   Gastrointestinal: Negative for abdominal pain, blood in stool, constipation, diarrhea, nausea and vomiting.   Genitourinary: Negative for dysuria and hematuria.   Musculoskeletal: Negative for arthralgias and myalgias.   Skin: Negative for color change.   Allergic/Immunologic: Negative for environmental  "allergies.   Neurological: Positive for dizziness.   Hematological: Negative for adenopathy.   Psychiatric/Behavioral: Negative for sleep disturbance and suicidal ideas. The patient is not nervous/anxious.        Objective     /76 (BP Location: Right arm, Patient Position: Sitting, Cuff Size: Adult)   Pulse 97   Temp 97.5 °F (36.4 °C) (Oral)   Ht 167.6 cm (65.98\")   Wt 81.3 kg (179 lb 4.8 oz)   SpO2 98%   BMI 28.95 kg/m²     Physical Exam   Constitutional: He is oriented to person, place, and time. Vital signs are normal. He appears well-developed and well-nourished. No distress.   HENT:   Head: Normocephalic and atraumatic.   Cardiovascular: Normal rate, regular rhythm, normal heart sounds and intact distal pulses. Exam reveals no gallop and no friction rub.   No murmur heard.  Pulmonary/Chest: Effort normal and breath sounds normal. No respiratory distress.   Abdominal: Soft. Bowel sounds are normal. He exhibits no distension. There is no tenderness.   Neurological: He is alert and oriented to person, place, and time.   Skin: Skin is warm and dry. He is not diaphoretic.        Large linear incision midline abdomen with staples. No erythema, edema, or drainage. Appears to be healing well. Incisions right lower abd/groin and left lower abd/groin with staples. No erythema, edema, drainage. Appear to be healing well.   Psychiatric: He has a normal mood and affect. His behavior is normal. Judgment and thought content normal.       Assessment/Plan     Problem List Items Addressed This Visit        Cardiovascular and Mediastinum    Essential hypertension    Relevant Medications    losartan (COZAAR) 100 MG tablet    Bilateral carotid artery stenosis s/p L CEA 4/27/17. 50-69% R carotid stenosis    Overview     S/P L CEA in 2017   50-69% stenosis likely of right proximal internal carotid artery in 7/2019          Relevant Medications    aspirin  MG tablet       Nervous and Auditory    Seizure disorder on " Keppra and Vimpat. Recurrent Grand mal 10/8/19    Relevant Medications    levETIRAcetam (KEPPRA) 500 MG tablet      Other Visit Diagnoses     Need for influenza vaccination    -  Primary    Relevant Orders    Flucelvax Quad=>4Years (Vial) (Completed)    Fatigue, unspecified type        Relevant Medications    cyanocobalamin injection 1,000 mcg          Plan: I have encouraged him to eat to help his body to heal. Report any redness, swelling, or drainage from incision sites. Energy levels should improve with time. Flu vaccine ordered today. Keep your follow up in January. Keep your follow ups with vascular surgery and neurology.    Patient's Body mass index is 28.95 kg/m². BMI is above normal parameters. Recommendations include: educational material.   (Normal BMI:  18.5-24.9, OW 25-29.9, Obesity 30 or greater)              Understands disease processes and need for medications.  Understands reasons for urgent and emergent care.  Patient (& family) verbalized agreement for treatment plan.   Emotional support and active listening provided.  Patient provided time to verbalize feelings.               This document has been electronically signed by KINGSTON Gallardo   October 25, 2019 2:21 PM

## 2019-10-25 NOTE — PATIENT INSTRUCTIONS

## 2019-10-29 ENCOUNTER — READMISSION MANAGEMENT (OUTPATIENT)
Dept: CALL CENTER | Facility: HOSPITAL | Age: 59
End: 2019-10-29

## 2019-10-29 NOTE — OUTREACH NOTE
CT Surgery Week 2 Survey      Responses   Facility patient discharged from?  [facility]   Does the patient have one of the following disease processes/diagnoses(primary or secondary)?  Cardiothoracic surgery   Week 2 attempt successful?  Yes   Call start time  1118   Call end time  1119   Discharge diagnosis  AORTA FEMORAL BYPASS, BILATERAL FEMORAL ENDARTERECTOMY,  Focal motor seizures   Is patient permission given to speak with other caregiver?  Yes   List who call center can speak with  wife Donna   Person spoke with today (if not patient) and relationship  Donna   Meds reviewed with patient/caregiver?  Yes   Is the patient having any side effects they believe may be caused by any medication additions or changes?  No   Does the patient have all medications related to this admission filled (includes all antibiotics, pain medications, cardiac medications, etc.)  Yes   Is the patient taking all medications as directed (includes completed medication regime)?  Yes   Does the patient have a primary care provider?   Yes   Does the patient have an appointment scheduled with their C/T surgeon?  Yes   Comments regarding PCP  Fortunato Marvin 10/24   Has the patient kept scheduled appointments due by today?  Yes   Has home health visited the patient within 72 hours of discharge?  N/A   Psychosocial issues?  No   Did the patient receive a copy of their discharge instructions?  Yes   Nursing interventions  Reviewed instructions with patient, Educated on MyChart   What is the patient's perception of their health status since discharge?  Improving   Nursing interventions  Nurse provided patient education   Is the patient/caregiver able to teach back normal signs of recovery?  Nausea and lack of appetite, Constipation, Depression or irritability, Pain or discomfort at incisional site   Nursing interventions  Reassured on normal signs of recovery   Is the patient /caregiver able to teach back basic post-op care?  Continue use of  incentive spirometry at least 1 week post discharge, Practice 'cough and deep breath', Drive as instructed by MD in discharge instructions, Take showers only when approved by MD-sponge bathe until then, No tub bath, swimming, or hot tub until instructed by MD, Keep incision areas clean, dry and protected, Do not remove steri-strips, Lifting as instructed by MD in discharge instructions   Is the patient/caregiver able to teach back signs and symptoms of incisional infection?  Increased redness, swelling or pain at the incisonal site, Increased drainage or bleeding, Incisional warmth, Pus or odor from incision, Fever   Is the patient/caregiver able to teach back steps to recovery at home?  Set small, achievable goals for return to baseline health, Weigh daily, Practice good oral hygiene, Eat a well-balance diet, Make a list of questions for surgeon's appointment   Is the patient /caregiver able to teach back the importance of cardiac rehab?  Yes   Nursing interventions  Provided education on importance of cardiac rehab   Is the patient/caregiver able to teach back the hierarchy of who to call/visit for symptoms/problems? PCP, Specialist, Home health nurse, Urgent Care, ED, 911  Yes   Week 2 call completed?  Yes          Tim Baca RN

## 2019-10-31 ENCOUNTER — OFFICE VISIT (OUTPATIENT)
Dept: CARDIAC SURGERY | Facility: CLINIC | Age: 59
End: 2019-10-31

## 2019-10-31 ENCOUNTER — OFFICE VISIT (OUTPATIENT)
Dept: NEUROLOGY | Facility: CLINIC | Age: 59
End: 2019-10-31

## 2019-10-31 VITALS
HEART RATE: 110 BPM | BODY MASS INDEX: 29.99 KG/M2 | HEIGHT: 65 IN | DIASTOLIC BLOOD PRESSURE: 86 MMHG | OXYGEN SATURATION: 97 % | WEIGHT: 180 LBS | SYSTOLIC BLOOD PRESSURE: 146 MMHG

## 2019-10-31 VITALS
TEMPERATURE: 98.4 F | HEART RATE: 111 BPM | WEIGHT: 180 LBS | BODY MASS INDEX: 28.93 KG/M2 | SYSTOLIC BLOOD PRESSURE: 118 MMHG | HEIGHT: 66 IN | OXYGEN SATURATION: 98 % | DIASTOLIC BLOOD PRESSURE: 81 MMHG

## 2019-10-31 DIAGNOSIS — I69.398 SEIZURE DISORDER AS SEQUELA OF CEREBROVASCULAR ACCIDENT (HCC): Primary | ICD-10-CM

## 2019-10-31 DIAGNOSIS — G40.909 SEIZURE DISORDER AS SEQUELA OF CEREBROVASCULAR ACCIDENT (HCC): ICD-10-CM

## 2019-10-31 DIAGNOSIS — I63.412 CEREBROVASCULAR ACCIDENT (CVA) DUE TO EMBOLISM OF LEFT MIDDLE CEREBRAL ARTERY (HCC): ICD-10-CM

## 2019-10-31 DIAGNOSIS — I69.398 SEIZURE DISORDER AS SEQUELA OF CEREBROVASCULAR ACCIDENT (HCC): ICD-10-CM

## 2019-10-31 DIAGNOSIS — I70.0 AORTIC OCCLUSION (HCC): Primary | ICD-10-CM

## 2019-10-31 DIAGNOSIS — Z95.828 S/P AORTIC BIFURCATION BYPASS GRAFT: ICD-10-CM

## 2019-10-31 DIAGNOSIS — G40.909 SEIZURE DISORDER AS SEQUELA OF CEREBROVASCULAR ACCIDENT (HCC): Primary | ICD-10-CM

## 2019-10-31 DIAGNOSIS — E11.9 TYPE 2 DIABETES MELLITUS WITHOUT COMPLICATION, WITHOUT LONG-TERM CURRENT USE OF INSULIN (HCC): ICD-10-CM

## 2019-10-31 DIAGNOSIS — E78.49 OTHER HYPERLIPIDEMIA: ICD-10-CM

## 2019-10-31 PROCEDURE — 99213 OFFICE O/P EST LOW 20 MIN: CPT | Performed by: NURSE PRACTITIONER

## 2019-10-31 PROCEDURE — 99024 POSTOP FOLLOW-UP VISIT: CPT | Performed by: THORACIC SURGERY (CARDIOTHORACIC VASCULAR SURGERY)

## 2019-10-31 RX ORDER — LEVETIRACETAM 500 MG/1
1500 TABLET ORAL 2 TIMES DAILY
Qty: 180 TABLET | Refills: 0
Start: 2019-10-31 | End: 2020-02-05 | Stop reason: SDUPTHER

## 2019-10-31 RX ORDER — LACOSAMIDE 50 MG/1
TABLET ORAL
Qty: 75 TABLET | Refills: 5 | Status: SHIPPED | OUTPATIENT
Start: 2019-10-31 | End: 2020-01-17

## 2019-10-31 NOTE — PROGRESS NOTES
10/31/2019  Patient Information  Vince Olivera                                                                                          [address] [facility] [address]   1960  'PCP/Referring Physician'  Fortunato Marvin MD  [phone]  No ref. provider found    Chief Complaint   Patient presents with   • Post-op     Hospital follow up s/p AF bypass 10/11/19.   • Peripheral Vascular Disease     CC: I am here to get my staples out    History of Present Illness: 58-year-old  male now 3 weeks status post aortobifemoral bypass.  The patient has done exceptionally well.  He has not had fever, chills, or night sweats.  His claudication is no longer present. He has not had rest pain or problems with either foot.  His wounds are healed without drainage and without erythema.      Patient Active Problem List   Diagnosis   • H/o Left MCA ischemic CVA 4/2017   • Essential hypertension   • Dyslipidemia   • Bilateral carotid artery stenosis s/p L CEA 4/27/17. 50-69% R carotid stenosis   • Residual mild expressive Aphasia   • Gastroesophageal reflux disease without esophagitis   • Combined receptive and expressive aphasia due to cerebrovascular accident   • Abnormal peripheral vision of right eye   • GERD (gastroesophageal reflux disease)   • Hyperlipidemia   • Palpitations   • Bilateral Carotid artery stenosis. Left greater than right    • Internal carotid artery stenosis, left   • Status post left carotid endarterectomy   • Seizure disorder on Keppra and Vimpat. Recurrent Grand mal 10/8/19   • Type 2 diabetes mellitus. HbA1c 6.6   • Bilateral carpal tunnel syndrome   • PAD. Occluded distal aorta, common and external iliacs, and superior femoral arteries   • CVA (cerebral vascular accident) (CMS/HCC), rule out    • Breakthrough seizure (CMS/HCC)   • Aortic occlusion s/p aorto-femoral bypass 10/11/19 per Dr. Weaver      Past Medical History:   Diagnosis Date   • Arthritis    • Carotid artery  disease (CMS/[facility])    • Carotid artery disorder (CMS/HCC)    • Depression    • Diabetes mellitus (CMS/HCC)     DIAGNOSED 4-2017 CHECKS FSBG 3X/WEEK    • Ear infection     about 2 weeks ago- cleared up - wax removed and cleaned out    • GERD (gastroesophageal reflux disease)     self diagnosed takes otc ppi   • Hyperlipidemia    • Hypertension    • Hypertension    • Inguinal hernia    • Seizure (CMS/HCC)     last 10/8/2019   • Shoulder pain     bilat    • Stroke (CMS/[facility]) 04/24/2017    EXPRESSIVE APHASIA RESIDUAL    • Tooth loose     5 - all over- will get teeth done after this surgery    • Wears reading eyeglasses      Past Surgical History:   Procedure Laterality Date   • ABDOMINAL HERNIA REPAIR  2010   • AORTA FEMORAL BYPASS N/A 10/11/2019    Procedure: AORTA FEMORAL BYPASS, BILATERAL FEMORAL ENDARTERECTOMY;  Surgeon: Dylan Weaver MD;  Location:  [facility];  Service: Vascular   • CAROTID ENDARTERECTOMY Left 10/17/2017    Procedure: CAROTID ENDARTERECTOMY LEFT;  Surgeon: Alexx Bach MD;  Location:  [facility];  Service:    • INGUINAL HERNIA REPAIR Left        Current Outpatient Medications:   •  amLODIPine (NORVASC) 10 MG tablet, Take 1 tablet by mouth Daily., Disp: 30 tablet, Rfl: 5  •  aspirin  MG tablet, Take 1 tablet by mouth Daily., Disp: 90 tablet, Rfl: 3  •  atorvastatin (LIPITOR) 80 MG tablet, Take 1 tablet by mouth Every Night., Disp: 30 tablet, Rfl: 5  •  carbamide peroxide (DEBROX) 6.5 % otic solution, Administer 5 drops into both ears 2 (Two) Times a Day., Disp: 22 mL, Rfl: 0  •  clopidogrel (PLAVIX) 75 MG tablet, Take 1 tablet by mouth Daily., Disp: 30 tablet, Rfl: 5  •  hydrochlorothiazide (HYDRODIURIL) 12.5 MG tablet, Take 1 tablet by mouth Daily., Disp: 30 tablet, Rfl: 6  •  lacosamide (VIMPAT) 50 MG tablet tablet, Take 1.5 PO BID, Disp: 75 tablet, Rfl: 5  •  levETIRAcetam (KEPPRA) 500 MG tablet, Take 3 tablets by mouth 2 (Two) Times a Day., Disp: 180 tablet, Rfl: 0  •  losartan  (COZAAR) 100 MG tablet, Take 0.5 tablets by mouth 2 (Two) Times a Day., Disp: 180 tablet, Rfl: 3  •  metFORMIN (GLUCOPHAGE) 500 MG tablet, Take 2 tablets by mouth 2 (Two) Times a Day With Meals., Disp: 120 tablet, Rfl: 5  •  metoprolol succinate XL (TOPROL XL) 200 MG 24 hr tablet, Take 1 tablet by mouth Daily., Disp: 30 tablet, Rfl: 6  •  omeprazole (priLOSEC) 20 MG capsule, Take 20 mg by mouth Daily., Disp: , Rfl:     Current Facility-Administered Medications:   •  cyanocobalamin injection 1,000 mcg, 1,000 mcg, Intramuscular, Q28 Days, Glo Kyle APRN  No Known Allergies  Social History     Socioeconomic History   • Marital status: Single     Spouse name: Not on file   • Number of children: 2   • Years of education: Not on file   • Highest education level: Not on file   Occupational History   • Occupation: Disabled/allan     Comment: disabled from stroke   Tobacco Use   • Smoking status: Former Smoker     Packs/day: 0.05     Years: 40.00     Pack years: 2.00     Types: Cigarettes     Last attempt to quit: 2017     Years since quittin.5   • Smokeless tobacco: Former User     Types: Snuff   • Tobacco comment: quit snuff when 18 or 19 years old - only did for baseball - 2-3 years    Substance and Sexual Activity   • Alcohol use: No   • Drug use: No   • Sexual activity: Defer     Partners: Female     Comment: SPOUSE   Social History Narrative    Patient consumes 0-1serving of caffeine daily.     Patient lives at home with wife in [facility]     Family History   Adopted: Yes   Problem Relation Age of Onset   • Diabetes Mother    • COPD Father      Review of Systems   Constitution: Negative for chills, fever, malaise/fatigue, night sweats and weight loss.   HENT: Negative for hearing loss, odynophagia and sore throat.    Eyes: Negative for blurred vision, vision loss in left eye, vision loss in right eye and visual disturbance.   Cardiovascular: Negative for chest pain, dyspnea on exertion,  "leg swelling, orthopnea and palpitations.   Respiratory: Negative for cough, hemoptysis, shortness of breath and wheezing.    Endocrine: Negative for cold intolerance, heat intolerance, polydipsia, polyphagia and polyuria.   Hematologic/Lymphatic: Does not bruise/bleed easily.   Skin: Negative for itching and rash.   Musculoskeletal: Negative for joint pain, joint swelling and myalgias.   Gastrointestinal: Negative for abdominal pain, constipation, diarrhea, hematemesis, hematochezia, melena, nausea and vomiting.   Genitourinary: Negative for dysuria, frequency and hematuria.   Neurological: Negative for focal weakness, headaches, numbness and seizures.   Psychiatric/Behavioral: Negative for altered mental status and suicidal ideas. The patient is not nervous/anxious.    All other systems reviewed and are negative.    Vitals:    10/31/19 1028   BP: 118/81   BP Location: Right arm   Patient Position: Sitting   Pulse: 111   Temp: 98.4 °F (36.9 °C)   SpO2: 98%   Weight: 81.6 kg (180 lb)   Height: 167.6 cm (66\")      Physical Exam   Constitutional: He is oriented to person, place, and time. He appears well-developed and well-nourished. No distress.   HENT:   Head: Normocephalic.   Right Ear: External ear normal.   Left Ear: External ear normal.   Nose: Nose normal.   Eyes: Pupils are equal, round, and reactive to light.   Neck: Normal range of motion. Neck supple. No tracheal deviation present. No thyromegaly present.   Cardiovascular: Normal rate, regular rhythm, normal heart sounds and intact distal pulses.   No murmur heard.  Pulmonary/Chest: Effort normal and breath sounds normal. No respiratory distress. He has no wheezes. He has no rales. He exhibits no tenderness.   Abdominal: Soft. Bowel sounds are normal. He exhibits no distension and no mass. There is no tenderness.   Musculoskeletal: Normal range of motion. He exhibits no edema.   Abdominal incision in both groin incisions are healed without erythema and " without drainage.  All staples removed.  Dorsalis pedis and posterior tibial pulses palpable bilaterally.  No skin or nail changes suggestive of ischemia.  Capillary refill 2 seconds bilaterally.   Lymphadenopathy:     He has no cervical adenopathy.   Neurological: He is alert and oriented to person, place, and time. He has normal reflexes. No cranial nerve deficit.   Skin: Skin is warm and dry. No rash noted. No erythema.   Psychiatric: He has a normal mood and affect.       Labs/Imaging: None    Assessment: Postoperative aortobifemoral bypass.  Stable postoperative course.    Plan: Staples removed.  Wounds are healing without problems.  Patient will return to this clinic in 6 months.    Patient Active Problem List   Diagnosis   • H/o Left MCA ischemic CVA 4/2017   • Essential hypertension   • Dyslipidemia   • Bilateral carotid artery stenosis s/p L CEA 4/27/17. 50-69% R carotid stenosis   • Residual mild expressive Aphasia   • Gastroesophageal reflux disease without esophagitis   • Combined receptive and expressive aphasia due to cerebrovascular accident   • Abnormal peripheral vision of right eye   • GERD (gastroesophageal reflux disease)   • Hyperlipidemia   • Palpitations   • Bilateral Carotid artery stenosis. Left greater than right    • Internal carotid artery stenosis, left   • Status post left carotid endarterectomy   • Seizure disorder on Keppra and Vimpat. Recurrent Grand mal 10/8/19   • Type 2 diabetes mellitus. HbA1c 6.6   • Bilateral carpal tunnel syndrome   • PAD. Occluded distal aorta, common and external iliacs, and superior femoral arteries   • CVA (cerebral vascular accident) (CMS/HCC), rule out    • Breakthrough seizure (CMS/HCC)   • Aortic occlusion s/p aorto-femoral bypass 10/11/19 per Dr. Meg Weaver MD  CTSurgery  10/31/19   12:51 PM

## 2019-10-31 NOTE — PROGRESS NOTES
Subjective:     Patient ID: Vince Olivera is a 58 y.o. male.    CC:   Chief Complaint   Patient presents with   • Seizures       HPI:   History of Present Illness     Mr Olivera is here today for follow-up.  He is status post ischemic left MCA CVA 2017 with subsequent seizure disorder.  He was referred to me after having 2 seizures post CVA.  He has a history of carotid stenosis status post intervention.  Throughout the past couple years he has struggled with dizziness post CVA, dizziness did intensify after Dilantin was started after hospitalization in April 2018.  Over the course of several months Dilantin was tapered off, Keppra was increased and he was started on low-dose Vimpat.  He reported that his dizziness was greatly improved. He had been seizure free until 10/8/19.  Scheduled for aortobifemoral bypass that day, he reports he was extremely nervous and had a seizure at home, he was taken to [facility] where he had another seizure in the ER he was subsequently transferred to [facility].  At that time he was loaded with Keppra and his Vimpat was increased to 200 mg twice daily.  He did undergo surgery the following day, recovery was uneventful, no further seizures.  He was followed by neurology while inpatient, Dr. Pittman did eventually lower his Vimpat dose to 75 mg twice daily due to sedation.  He was discharged home on this dose however he tells me today he is back to his previous dose of 50 mg twice daily.  He has been doing well otherwise, no seizures since that day.        In regards to stroke Last carotid ultrasound performed in 9/2019 showing stable carotid disease by report. He continues on Lipitor 80 mg, Plavix and aspirin for secondary stroke prevention.  He has had no stroke symptoms, he is doing very well overall     The following portions of the patient's history were reviewed and updated as appropriate: allergies, current medications, past family history, past medical history,  past social history, past surgical history and problem list.    Past Medical History:   Diagnosis Date   • Arthritis    • Carotid artery disease (CMS/[facility])    • Carotid artery disorder (CMS/[facility])    • Depression    • Diabetes mellitus (CMS/[facility])     DIAGNOSED - CHECKS FSBG 3X/WEEK    • Ear infection     about 2 weeks ago- cleared up - wax removed and cleaned out    • GERD (gastroesophageal reflux disease)     self diagnosed takes otc ppi   • Hyperlipidemia    • Hypertension    • Hypertension    • Inguinal hernia    • Seizure (CMS/[facility])     last 10/8/2019   • Shoulder pain     bilat    • Stroke (CMS/[facility]) 2017    EXPRESSIVE APHASIA RESIDUAL    • Tooth loose     5 - all over- will get teeth done after this surgery    • Wears reading eyeglasses        Past Surgical History:   Procedure Laterality Date   • ABDOMINAL HERNIA REPAIR     • AORTA FEMORAL BYPASS N/A 10/11/2019    Procedure: AORTA FEMORAL BYPASS, BILATERAL FEMORAL ENDARTERECTOMY;  Surgeon: Dylan Weaver MD;  Location:  [facility];  Service: Vascular   • CAROTID ENDARTERECTOMY Left 10/17/2017    Procedure: CAROTID ENDARTERECTOMY LEFT;  Surgeon: Alexx Bach MD;  Location:  [facility];  Service:    • INGUINAL HERNIA REPAIR Left        Social History     Socioeconomic History   • Marital status: Single     Spouse name: Not on file   • Number of children: 2   • Years of education: Not on file   • Highest education level: Not on file   Occupational History   • Occupation: Disabled/allan     Comment: disabled from stroke   Tobacco Use   • Smoking status: Former Smoker     Packs/day: 0.05     Years: 40.00     Pack years: 2.00     Types: Cigarettes     Last attempt to quit: 2017     Years since quittin.5   • Smokeless tobacco: Former User     Types: Snuff   • Tobacco comment: quit snuff when 18 or 19 years old - only did for baseball - 2-3 years    Substance and Sexual Activity   • Alcohol use: No   • Drug use: No   • Sexual activity:  Defer     Partners: Female     Comment: SPOUSE   Social History Narrative    Patient consumes 0-1serving of caffeine daily.     Patient lives at home with wife in [facility]       Family History   Adopted: Yes   Problem Relation Age of Onset   • Diabetes Mother    • COPD Father         Review of Systems   Constitutional: Negative.    HENT: Negative.    Eyes: Negative.    Respiratory: Negative.    Cardiovascular: Negative.    Gastrointestinal: Negative.    Endocrine: Negative.    Genitourinary: Negative.    Musculoskeletal: Negative.    Allergic/Immunologic: Negative.    Neurological: Negative.    Hematological: Negative.    Psychiatric/Behavioral: Negative.         Objective:    Neurologic Exam     Mental Status   Oriented to person, place, and time.   Attention: normal. Concentration: normal.   Speech: speech is normal   Level of consciousness: alert  Knowledge: consistent with education.   Able to read. Able to write. Normal comprehension.     Cranial Nerves   Cranial nerves II through XII intact.     CN III, IV, VI   Pupils are equal, round, and reactive to light.  Extraocular motions are normal.     Motor Exam   Muscle bulk: normal  Overall muscle tone: normal  Right arm tone: normal  Left arm tone: normal  Right arm pronator drift: absent  Left arm pronator drift: absent  Right leg tone: normal  Left leg tone: normal    Strength   Strength 5/5 throughout.     Gait, Coordination, and Reflexes     Gait  Gait: normal    Coordination   Romberg: negative  Finger to nose coordination: normal    Tremor   Resting tremor: absent  Intention tremor: absent  Action tremor: absent    Reflexes   Reflexes 2+ except as noted.   Right Rick: absent  Left Rick: absent      Physical Exam   Constitutional: He is oriented to person, place, and time. He appears well-developed and well-nourished. No distress.   HENT:   Head: Normocephalic and atraumatic.   Eyes: EOM are normal. Pupils are equal, round, and reactive to  light.   Neck: Neck supple.   Pulmonary/Chest: Effort normal.   Neurological: He is alert and oriented to person, place, and time. He has normal strength. He has a normal Finger-Nose-Finger Test and a normal Romberg Test. Gait normal.   Skin: Skin is warm.   Psychiatric: He has a normal mood and affect. His speech is normal and behavior is normal. Judgment and thought content normal.   Vitals reviewed.      Assessment/Plan:       Vince was seen today for seizures.    Diagnoses and all orders for this visit:    Seizure disorder as sequela of cerebrovascular accident (CMS/HCC)  -     levETIRAcetam (KEPPRA) 500 MG tablet; Take 3 tablets by mouth 2 (Two) Times a Day.  -     lacosamide (VIMPAT) 50 MG tablet tablet; Take 1.5 PO BID    Seizure disorder on Keppra and Vimpat. 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  -     lacosamide (VIMPAT) 50 MG tablet tablet; Take 1.5 PO BID    Seizure disorder as sequela of cerebrovascular accident (CMS/HCC)  Comments:  no seizures in nearly one year, cont current medications  Orders:  -     levETIRAcetam (KEPPRA) 500 MG tablet; Take 3 tablets by mouth 2 (Two) Times a Day.  -     lacosamide (VIMPAT) 50 MG tablet tablet; Take 1.5 PO BID    I would like Vince to remain on Vimpat 75 mg twice daily for now along with Keppra 1500 mg twice daily.  He has been seizure-free since 10/8/2019.  He is not driving.  He is doing well status post    aortobifemoral bypass.  I will see him back in 3 months or sooner if needed, he will notify me with any seizure activity.  Reviewed medications, potential side effects and signs and symptoms to report. Discussed risk versus benefits of treatment plan with patient and/or family-including medications, labs and radiology that may be ordered. Addressed questions and concerns during visit. Patient and/or family verbalized understanding and agree with plan.  Patient instructions  include: No driving or operating heavy machinery for 3 months from onset of most recent seizure. Minimize stress as much as possible. Recommended 7-8 hours of sleep each night. Abstain from alcohol intake. Educated on Antiepileptic medications with possible side effects and signs and symptoms to report if prescribed during visit. Instructed to take seizure medication daily if prescribed. Reviewed potential seizure risk factors. Instructed to call 911 or our office if another seizure does occur.  EMR Dragon/Transcription Disclaimer:  Much of this encounter note is an electronic transcription of spoken language to printed text. Electronic transcription of spoken language may permit erroneous words or phrases to be inadvertently transcribed. Although I have reviewed the note for such errors, some may still exist in this documentation.           Ani Haas, APRN  10/31/2019

## 2019-11-07 ENCOUNTER — READMISSION MANAGEMENT (OUTPATIENT)
Dept: CALL CENTER | Facility: HOSPITAL | Age: 59
End: 2019-11-07

## 2019-11-07 NOTE — OUTREACH NOTE
CT Surgery Week 3 Survey      Responses   Facility patient discharged from?  [facility]   Does the patient have one of the following disease processes/diagnoses(primary or secondary)?  Cardiothoracic surgery   Week 3 attempt successful?  Yes   Call start time  1119   Call end time  1120   Discharge diagnosis  AORTA FEMORAL BYPASS, BILATERAL FEMORAL ENDARTERECTOMY,  Focal motor seizures   Person spoke with today (if not patient) and relationship  Donna   Meds reviewed with patient/caregiver?  Yes   Is the patient taking all medications as directed (includes completed medication regime)?  Yes   Has the patient kept scheduled appointments due by today?  Yes   Has home health visited the patient within 72 hours of discharge?  N/A   What is the patient's perception of their health status since discharge?  Improving   Week 3 call completed?  Yes   Graduated/Revoked comments  Goals met   Wrap up additional comments  Goals met, has had appt, going well. no questions.           Gwen Funes RN

## 2019-12-29 DIAGNOSIS — E78.5 DYSLIPIDEMIA: ICD-10-CM

## 2019-12-30 RX ORDER — ATORVASTATIN CALCIUM 80 MG/1
TABLET, FILM COATED ORAL
Qty: 30 TABLET | Refills: 5 | Status: SHIPPED | OUTPATIENT
Start: 2019-12-30 | End: 2020-07-01 | Stop reason: SDUPTHER

## 2020-01-02 ENCOUNTER — TELEPHONE (OUTPATIENT)
Dept: NEUROLOGY | Facility: CLINIC | Age: 60
End: 2020-01-02

## 2020-01-02 NOTE — TELEPHONE ENCOUNTER
Just called the pharmacy. He actually has plenty of rf and tablet left.The issue is that his insurance will only pay for #60 a month. He said there is a 150 mg tablet if we send it in like that they may cover it. He takes 75 mg bid is it ok if they give him 150 mg daily. (The pharmacist said it is not ER or time release) Please advise.

## 2020-01-02 NOTE — TELEPHONE ENCOUNTER
Pt needs a refill on his Vimpat. He is taking 1 1/2 twice daily and the last rx was sent in for # 60. He is out. The pharmacy did say they will give him enough for today. Can you send in a new rx for #90. He has a follow up on 1/21/20

## 2020-01-03 NOTE — TELEPHONE ENCOUNTER
RX called in for 150mg 1/2 bid. The pharmacy will let me know if they have a problem. I did call the patient and let him know.

## 2020-01-03 NOTE — TELEPHONE ENCOUNTER
He needs BID dosing  Please have pharmacy send a PA and start the process to get the 50 mg 1.5 pills BID approved

## 2020-01-08 DIAGNOSIS — I10 ESSENTIAL HYPERTENSION: ICD-10-CM

## 2020-01-08 RX ORDER — METOPROLOL SUCCINATE 200 MG/1
200 TABLET, EXTENDED RELEASE ORAL DAILY
Qty: 30 TABLET | Refills: 6 | Status: SHIPPED | OUTPATIENT
Start: 2020-01-08 | End: 2020-07-22 | Stop reason: SDUPTHER

## 2020-01-08 NOTE — TELEPHONE ENCOUNTER
Patient left message on voicemail requesting refill sent to [place]    Metoprolol  5-20-19 # 30 refill x 6    Last appointment: 10-25-19  Next appointment: 1-23-20

## 2020-01-10 DIAGNOSIS — E11.9 DIABETES MELLITUS TYPE 2 IN NONOBESE (HCC): ICD-10-CM

## 2020-01-10 NOTE — TELEPHONE ENCOUNTER
Interface request.    Metformin 2-22-19 # 120 refill x 5    Last appointment: 10-25-19  Next appointment: 1-

## 2020-01-17 ENCOUNTER — TELEPHONE (OUTPATIENT)
Dept: NEUROLOGY | Facility: CLINIC | Age: 60
End: 2020-01-17

## 2020-01-17 DIAGNOSIS — I69.398 SEIZURE DISORDER AS SEQUELA OF CEREBROVASCULAR ACCIDENT (HCC): Primary | ICD-10-CM

## 2020-01-17 DIAGNOSIS — G40.909 SEIZURE DISORDER AS SEQUELA OF CEREBROVASCULAR ACCIDENT (HCC): Primary | ICD-10-CM

## 2020-01-17 RX ORDER — LACOSAMIDE 150 MG/1
75 TABLET ORAL 2 TIMES DAILY
Qty: 30 TABLET | Refills: 2 | Status: SHIPPED | OUTPATIENT
Start: 2020-01-17 | End: 2020-02-05 | Stop reason: SDUPTHER

## 2020-01-17 NOTE — TELEPHONE ENCOUNTER
Vince needs a refill on Vimpat. He had 150 mg take 1/2 bid called in last time so his ins will cover enough for the month. ross ran

## 2020-01-22 ENCOUNTER — TELEPHONE (OUTPATIENT)
Dept: NEUROLOGY | Facility: CLINIC | Age: 60
End: 2020-01-22

## 2020-01-22 DIAGNOSIS — G40.909 SEIZURE DISORDER AS SEQUELA OF CEREBROVASCULAR ACCIDENT (HCC): ICD-10-CM

## 2020-01-22 DIAGNOSIS — I69.398 SEIZURE DISORDER AS SEQUELA OF CEREBROVASCULAR ACCIDENT (HCC): ICD-10-CM

## 2020-01-23 ENCOUNTER — OFFICE VISIT (OUTPATIENT)
Dept: FAMILY MEDICINE CLINIC | Facility: CLINIC | Age: 60
End: 2020-01-23

## 2020-01-23 VITALS
TEMPERATURE: 97.6 F | BODY MASS INDEX: 28.88 KG/M2 | SYSTOLIC BLOOD PRESSURE: 138 MMHG | HEART RATE: 64 BPM | OXYGEN SATURATION: 99 % | WEIGHT: 179.7 LBS | DIASTOLIC BLOOD PRESSURE: 82 MMHG | HEIGHT: 66 IN

## 2020-01-23 DIAGNOSIS — K21.9 GASTROESOPHAGEAL REFLUX DISEASE WITHOUT ESOPHAGITIS: ICD-10-CM

## 2020-01-23 DIAGNOSIS — I10 ESSENTIAL HYPERTENSION: Primary | ICD-10-CM

## 2020-01-23 DIAGNOSIS — E78.49 OTHER HYPERLIPIDEMIA: ICD-10-CM

## 2020-01-23 DIAGNOSIS — E11.9 DIABETES MELLITUS TYPE 2 IN NONOBESE (HCC): ICD-10-CM

## 2020-01-23 PROCEDURE — 83036 HEMOGLOBIN GLYCOSYLATED A1C: CPT | Performed by: NURSE PRACTITIONER

## 2020-01-23 PROCEDURE — 85025 COMPLETE CBC W/AUTO DIFF WBC: CPT | Performed by: NURSE PRACTITIONER

## 2020-01-23 PROCEDURE — 84443 ASSAY THYROID STIM HORMONE: CPT | Performed by: NURSE PRACTITIONER

## 2020-01-23 PROCEDURE — 96372 THER/PROPH/DIAG INJ SC/IM: CPT | Performed by: NURSE PRACTITIONER

## 2020-01-23 PROCEDURE — 99214 OFFICE O/P EST MOD 30 MIN: CPT | Performed by: NURSE PRACTITIONER

## 2020-01-23 PROCEDURE — 82043 UR ALBUMIN QUANTITATIVE: CPT | Performed by: NURSE PRACTITIONER

## 2020-01-23 PROCEDURE — 80061 LIPID PANEL: CPT | Performed by: NURSE PRACTITIONER

## 2020-01-23 PROCEDURE — 80053 COMPREHEN METABOLIC PANEL: CPT | Performed by: NURSE PRACTITIONER

## 2020-01-23 RX ORDER — LANSOPRAZOLE 15 MG/1
15 CAPSULE, DELAYED RELEASE ORAL DAILY
COMMUNITY
End: 2023-03-27

## 2020-01-23 RX ORDER — LANCETS 30 GAUGE
1 EACH MISCELLANEOUS DAILY
Qty: 100 EACH | Refills: 12 | Status: SHIPPED | OUTPATIENT
Start: 2020-01-23

## 2020-01-23 RX ORDER — BLOOD-GLUCOSE METER
1 KIT MISCELLANEOUS DAILY
Qty: 1 EACH | Refills: 0 | Status: SHIPPED | OUTPATIENT
Start: 2020-01-23

## 2020-01-23 RX ADMIN — CYANOCOBALAMIN 1000 MCG: 1000 INJECTION, SOLUTION INTRAMUSCULAR; SUBCUTANEOUS at 15:30

## 2020-01-23 NOTE — PROGRESS NOTES
Subjective   Vince Olivera is a 59 y.o. male.     Chief Complaint   Patient presents with   • Hypertension       He presents for follow up of hypertension, mixed hyperlipidemia, diabetes, and gerd. He reports he is compliant with medications. He follows up with vascular surgery as needed. He states he has a follow up with Neurology Feb 5th. He reports no more seizures. He states his blood sugar has not been checked in a while.   He presents with c/o left hand shaking when he wakes up of the mornings, when his blood sugar gets low. He reports it goes away quickly and has been happening the past few weeks. He states it resolves when he eats a little.       The following portions of the patient's history were reviewed and updated as appropriate: allergies, current medications, past family history, past medical history, past social history, past surgical history and problem list.    Review of Systems   Constitutional: Negative for fever and unexpected weight change.   HENT: Negative for ear pain, rhinorrhea, sore throat and trouble swallowing.    Eyes: Negative for visual disturbance.   Respiratory: Negative for cough, shortness of breath and wheezing.    Cardiovascular: Negative for chest pain and palpitations.   Gastrointestinal: Negative for abdominal pain, blood in stool, constipation, diarrhea, nausea and vomiting.   Genitourinary: Negative for dysuria.   Musculoskeletal: Negative for arthralgias and myalgias.   Skin: Negative for color change.   Allergic/Immunologic: Negative for environmental allergies.   Neurological: Positive for dizziness (first wakes up or jumps up too quick), tremors and headaches (every now and then). Negative for seizures.   Hematological: Negative for adenopathy.   Psychiatric/Behavioral: Negative for sleep disturbance and suicidal ideas. The patient is not nervous/anxious.        Objective     /82 (BP Location: Left arm, Patient Position: Sitting, Cuff Size: Adult)   Pulse 64   " Temp 97.6 °F (36.4 °C)   Ht 166.4 cm (65.5\")   Wt 81.5 kg (179 lb 11.2 oz)   SpO2 99%   BMI 29.45 kg/m²     Physical Exam   Constitutional: He is oriented to person, place, and time. Vital signs are normal. He appears well-developed and well-nourished. No distress.   HENT:   Head: Normocephalic and atraumatic.   Cardiovascular: Normal rate, regular rhythm, normal heart sounds and intact distal pulses. Exam reveals no gallop and no friction rub.   No murmur heard.  Pulmonary/Chest: Effort normal and breath sounds normal. No respiratory distress.   Abdominal: Soft. Bowel sounds are normal. He exhibits no distension. There is no tenderness. There is no rigidity, no rebound and no guarding.       Scar on abdomen   Neurological: He is alert and oriented to person, place, and time.   Skin: Skin is warm and dry. He is not diaphoretic.   Psychiatric: He has a normal mood and affect. His behavior is normal. Judgment and thought content normal.       Assessment/Plan     Problem List Items Addressed This Visit        Cardiovascular and Mediastinum    Essential hypertension - Primary    Relevant Orders    CBC & Differential    Comprehensive Metabolic Panel    Lipid Panel    TSH    CBC Auto Differential    Hyperlipidemia    Relevant Orders    Lipid Panel    TSH       Digestive    GERD (gastroesophageal reflux disease)    Overview     self diagnosed takes otc ppi         Relevant Medications    lansoprazole (PREVACID) 15 MG capsule      Other Visit Diagnoses     Diabetes mellitus type 2 in nonobese (CMS/[facility])        Relevant Medications    metFORMIN (GLUCOPHAGE) 500 MG tablet    glucose blood test strip    glucose monitor monitoring kit    Lancets misc    Other Relevant Orders    Hemoglobin A1c    MicroAlbumin, Urine, Random - Urine, Clean Catch          Plan: Continue present medications. Get labs today. Check your blood sugar when your hand is shaking, it could be low. Normal blood sugar is . He does not have a " glucometer. Glucometer and supplies ordered. Follow up and labs in 6 months.    Patient's Body mass index is 29.45 kg/m². BMI is above normal parameters. Recommendations include: educational material.   (Normal BMI:  18.5-24.9, OW 25-29.9, Obesity 30 or greater)           Understands disease processes and need for medications.  Understands reasons for urgent and emergent care.  Patient (& family) verbalized agreement for treatment plan.   Emotional support and active listening provided.  Patient provided time to verbalize feelings.               This document has been electronically signed by KINGSTON Gallardo   January 23, 2020 3:24 PM

## 2020-01-23 NOTE — PATIENT INSTRUCTIONS

## 2020-01-24 LAB
ALBUMIN SERPL-MCNC: 4.9 G/DL (ref 3.5–5.2)
ALBUMIN UR-MCNC: <1.2 MG/DL
ALBUMIN/GLOB SERPL: 1.9 G/DL
ALP SERPL-CCNC: 78 U/L (ref 39–117)
ALT SERPL W P-5'-P-CCNC: 5 U/L (ref 1–41)
ANION GAP SERPL CALCULATED.3IONS-SCNC: 17.2 MMOL/L (ref 5–15)
AST SERPL-CCNC: 10 U/L (ref 1–40)
BASOPHILS # BLD AUTO: 0.07 10*3/MM3 (ref 0–0.2)
BASOPHILS NFR BLD AUTO: 0.6 % (ref 0–1.5)
BILIRUB SERPL-MCNC: 0.2 MG/DL (ref 0.2–1.2)
BUN BLD-MCNC: 7 MG/DL (ref 6–20)
BUN/CREAT SERPL: 9.6 (ref 7–25)
CALCIUM SPEC-SCNC: 9.8 MG/DL (ref 8.6–10.5)
CHLORIDE SERPL-SCNC: 100 MMOL/L (ref 98–107)
CHOLEST SERPL-MCNC: 111 MG/DL (ref 0–200)
CO2 SERPL-SCNC: 25.8 MMOL/L (ref 22–29)
CREAT BLD-MCNC: 0.73 MG/DL (ref 0.76–1.27)
DEPRECATED RDW RBC AUTO: 42 FL (ref 37–54)
EOSINOPHIL # BLD AUTO: 0.15 10*3/MM3 (ref 0–0.4)
EOSINOPHIL NFR BLD AUTO: 1.4 % (ref 0.3–6.2)
ERYTHROCYTE [DISTWIDTH] IN BLOOD BY AUTOMATED COUNT: 13.7 % (ref 12.3–15.4)
GFR SERPL CREATININE-BSD FRML MDRD: 110 ML/MIN/1.73
GLOBULIN UR ELPH-MCNC: 2.6 GM/DL
GLUCOSE BLD-MCNC: 130 MG/DL (ref 65–99)
HBA1C MFR BLD: 6.5 % (ref 4.8–5.6)
HCT VFR BLD AUTO: 41.9 % (ref 37.5–51)
HDLC SERPL-MCNC: 30 MG/DL (ref 40–60)
HGB BLD-MCNC: 13.6 G/DL (ref 13–17.7)
IMM GRANULOCYTES # BLD AUTO: 0.04 10*3/MM3 (ref 0–0.05)
IMM GRANULOCYTES NFR BLD AUTO: 0.4 % (ref 0–0.5)
LDLC SERPL CALC-MCNC: 48 MG/DL (ref 0–100)
LDLC/HDLC SERPL: 1.59 {RATIO}
LYMPHOCYTES # BLD AUTO: 4.36 10*3/MM3 (ref 0.7–3.1)
LYMPHOCYTES NFR BLD AUTO: 40 % (ref 19.6–45.3)
MCH RBC QN AUTO: 27.2 PG (ref 26.6–33)
MCHC RBC AUTO-ENTMCNC: 32.5 G/DL (ref 31.5–35.7)
MCV RBC AUTO: 83.8 FL (ref 79–97)
MONOCYTES # BLD AUTO: 0.86 10*3/MM3 (ref 0.1–0.9)
MONOCYTES NFR BLD AUTO: 7.9 % (ref 5–12)
NEUTROPHILS # BLD AUTO: 5.43 10*3/MM3 (ref 1.7–7)
NEUTROPHILS NFR BLD AUTO: 49.7 % (ref 42.7–76)
NRBC BLD AUTO-RTO: 0 /100 WBC (ref 0–0.2)
PLATELET # BLD AUTO: 341 10*3/MM3 (ref 140–450)
PMV BLD AUTO: 9.1 FL (ref 6–12)
POTASSIUM BLD-SCNC: 4.6 MMOL/L (ref 3.5–5.2)
PROT SERPL-MCNC: 7.5 G/DL (ref 6–8.5)
RBC # BLD AUTO: 5 10*6/MM3 (ref 4.14–5.8)
SODIUM BLD-SCNC: 143 MMOL/L (ref 136–145)
TRIGL SERPL-MCNC: 166 MG/DL (ref 0–150)
TSH SERPL DL<=0.05 MIU/L-ACNC: 1.71 UIU/ML (ref 0.27–4.2)
VLDLC SERPL-MCNC: 33.2 MG/DL (ref 5–40)
WBC NRBC COR # BLD: 10.91 10*3/MM3 (ref 3.4–10.8)

## 2020-01-30 NOTE — TELEPHONE ENCOUNTER
Patient is taking 150 mg 1/2 twice dialy now. His insurance will not pay for 60 pills monthly. (75mg 1.5 bid) I spoke to the Pharmacy and he still has refills. They will notify patient and let them know.

## 2020-02-05 ENCOUNTER — OFFICE VISIT (OUTPATIENT)
Dept: NEUROLOGY | Facility: CLINIC | Age: 60
End: 2020-02-05

## 2020-02-05 VITALS
DIASTOLIC BLOOD PRESSURE: 60 MMHG | HEART RATE: 62 BPM | BODY MASS INDEX: 35.14 KG/M2 | OXYGEN SATURATION: 98 % | HEIGHT: 60 IN | WEIGHT: 179 LBS | SYSTOLIC BLOOD PRESSURE: 110 MMHG

## 2020-02-05 DIAGNOSIS — I69.398 SEIZURE DISORDER AS SEQUELA OF CEREBROVASCULAR ACCIDENT (HCC): ICD-10-CM

## 2020-02-05 DIAGNOSIS — G40.909 SEIZURE DISORDER AS SEQUELA OF CEREBROVASCULAR ACCIDENT (HCC): ICD-10-CM

## 2020-02-05 PROCEDURE — 99213 OFFICE O/P EST LOW 20 MIN: CPT | Performed by: NURSE PRACTITIONER

## 2020-02-05 RX ORDER — LEVETIRACETAM 500 MG/1
1500 TABLET ORAL 2 TIMES DAILY
Qty: 180 TABLET | Refills: 5
Start: 2020-02-05 | End: 2020-06-12 | Stop reason: SDUPTHER

## 2020-02-05 RX ORDER — LACOSAMIDE 150 MG/1
75 TABLET ORAL 2 TIMES DAILY
Qty: 30 TABLET | Refills: 5 | Status: SHIPPED | OUTPATIENT
Start: 2020-02-05 | End: 2020-08-05 | Stop reason: SDUPTHER

## 2020-02-05 NOTE — PROGRESS NOTES
Subjective:     Patient ID: Vince Olivera is a 59 y.o. male.    CC:   Chief Complaint   Patient presents with   • Seizures       HPI:   History of Present Illness     Mr. Olievra is here today for six-month follow-up on seizure disorder.  He has a seizures as a sequel of CVA in 2017.  He is currently managed on Keppra 1500 mg twice daily as well as Vimpat 75 mg daily, (he currently takes half of a 150 daily (.  He has had no seizure activity since his last seizure immediately before his surgery, other than that his last seizure was nearly 2 years ago.  He is doing very well overall.  He is status post aortofemoral bypass and is doing well postoperatively.  He has no other complaints today, he has a mild degree of dizziness, this greatly improved after stopping Dilantin and starting Vimpat however I do suspect that he has chronic dizziness from his prior CVA    The following portions of the patient's history were reviewed and updated as appropriate: allergies, current medications, past family history, past medical history, past social history, past surgical history and problem list.    Past Medical History:   Diagnosis Date   • Arthritis    • Carotid artery disease (CMS/HCC)    • Carotid artery disorder (CMS/HCC)    • Depression    • Diabetes mellitus (CMS/HCC)     DIAGNOSED 4-2017 CHECKS FSBG 3X/WEEK    • Ear infection     about 2 weeks ago- cleared up - wax removed and cleaned out    • GERD (gastroesophageal reflux disease)     self diagnosed takes otc ppi   • Hyperlipidemia    • Hypertension    • Hypertension    • Inguinal hernia    • Seizure (CMS/HCC)     last 10/8/2019   • Shoulder pain     bilat    • Stroke (CMS/HCC) 04/24/2017    EXPRESSIVE APHASIA RESIDUAL    • Tooth loose     5 - all over- will get teeth done after this surgery    • Wears reading eyeglasses        Past Surgical History:   Procedure Laterality Date   • ABDOMINAL HERNIA REPAIR  2010   • AORTA FEMORAL BYPASS N/A 10/11/2019    Procedure:  AORTA FEMORAL BYPASS, BILATERAL FEMORAL ENDARTERECTOMY;  Surgeon: Dylan Weaver MD;  Location: [facility] OR;  Service: Vascular   • CAROTID ENDARTERECTOMY Left 10/17/2017    Procedure: CAROTID ENDARTERECTOMY LEFT;  Surgeon: Alexx Bach MD;  Location: [facility] OR;  Service:    • INGUINAL HERNIA REPAIR Left        Social History     Socioeconomic History   • Marital status: Single     Spouse name: Not on file   • Number of children: 2   • Years of education: Not on file   • Highest education level: Not on file   Occupational History   • Occupation: Disabled/allan     Comment: disabled from stroke   Tobacco Use   • Smoking status: Former Smoker     Packs/day: 0.05     Years: 40.00     Pack years: 2.00     Types: Cigarettes     Last attempt to quit: 2017     Years since quittin.7   • Smokeless tobacco: Former User     Types: Snuff   • Tobacco comment: quit snuff when 18 or 19 years old - only did for baseball - 2-3 years    Substance and Sexual Activity   • Alcohol use: No   • Drug use: No   • Sexual activity: Defer     Partners: Female     Comment: SPOUSE   Social History Narrative    Patient consumes 0-1serving of caffeine daily.     Patient lives at home with wife in [facility]       Family History   Adopted: Yes   Problem Relation Age of Onset   • Diabetes Mother    • COPD Father         Review of Systems   Constitutional: Negative.    HENT: Negative.    Eyes: Negative.    Respiratory: Negative.    Cardiovascular: Negative.    Gastrointestinal: Negative.    Endocrine: Negative.    Genitourinary: Negative.    Musculoskeletal: Negative.    Skin: Negative.    Allergic/Immunologic: Negative.    Neurological: Positive for dizziness. Negative for tremors, seizures, syncope, facial asymmetry (chronic since CVA), speech difficulty, weakness, light-headedness, numbness and headaches.   Hematological: Negative.    Psychiatric/Behavioral: Negative.         Objective:  /60   Pulse 62   Ht  "65.5 cm (25.79\")   Wt 81.2 kg (179 lb)   SpO2 98%   .26 kg/m²     Neurologic Exam     Mental Status   Oriented to person, place, and time.   Attention: normal. Concentration: normal.   Speech: speech is normal   Level of consciousness: alert  Knowledge: consistent with education.   Able to read. Able to write. Normal comprehension.     Cranial Nerves   Cranial nerves II through XII intact.     CN II   Visual fields full to confrontation.   Right visual field deficit: none  Left visual field deficit: none     CN III, IV, VI   Pupils are equal, round, and reactive to light.  Extraocular motions are normal.   Right pupil: Shape: regular. Reactivity: brisk. Consensual response: intact. Accommodation: intact.   Left pupil: Shape: regular. Reactivity: brisk. Consensual response: intact. Accommodation: intact.   CN III: no CN III palsy  CN VI: no CN VI palsy  Nystagmus: none   Upgaze: normal  Downgaze: normal    CN V   Facial sensation intact.     CN VII   Facial expression full, symmetric.     CN VIII   CN VIII normal.     CN IX, X   CN IX normal.   CN X normal.     CN XI   CN XI normal.     CN XII   CN XII normal.     Motor Exam   Muscle bulk: normal  Overall muscle tone: normal  Right arm tone: normal  Left arm tone: normal  Right arm pronator drift: absent  Left arm pronator drift: absent  Right leg tone: normal  Left leg tone: normal    Strength   Strength 5/5 throughout.     Sensory Exam   Light touch normal.     Gait, Coordination, and Reflexes     Gait  Gait: normal    Coordination   Romberg: negative  Finger to nose coordination: normal  Heel to shin coordination: normal  Tandem walking coordination: normal    Tremor   Resting tremor: absent  Intention tremor: absent  Action tremor: absent    Reflexes   Reflexes 2+ except as noted.   Right plantar: normal  Left plantar: normal  Right Rick: absent  Left Rick: absent      Physical Exam   Constitutional: He is oriented to person, place, and time. He " appears well-developed and well-nourished. No distress.   HENT:   Head: Normocephalic and atraumatic.   Eyes: Pupils are equal, round, and reactive to light. Conjunctivae and EOM are normal. No scleral icterus.   Neck: Normal range of motion. Neck supple.   Pulmonary/Chest: Effort normal. No respiratory distress.   Neurological: He is alert and oriented to person, place, and time. He has normal strength. He has a normal Finger-Nose-Finger Test, a normal Heel to Shin Test, a normal Romberg Test and a normal Tandem Gait Test. Gait normal.   Skin: Skin is warm. Capillary refill takes less than 2 seconds.   Psychiatric: He has a normal mood and affect. His speech is normal and behavior is normal. Judgment and thought content normal.   Vitals reviewed.      Assessment/Plan:       Vince was seen today for seizures.    Diagnoses and all orders for this visit:    Seizure disorder on Keppra and Vimpat. 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  -     Lacosamide (VIMPAT) 150 MG tablet; Take 75 mg by mouth 2 (Two) Times a Day.    Seizure disorder as sequela of cerebrovascular accident (CMS/HCC)  -     levETIRAcetam (KEPPRA) 500 MG tablet; Take 3 tablets by mouth 2 (Two) Times a Day.  -     Lacosamide (VIMPAT) 150 MG tablet; Take 75 mg by mouth 2 (Two) Times a Day.    Vince is doing well, he has had no seizure activity.  He will continue Keppra 1500 mg twice daily as well as Vimpat 75 mg twice a day.  No medication changes today.  He is aware should he have any seizure activity he cannot drive for 90 days  We will see him back in 6 months or sooner if needed, he is encouraged to notify office with any questions concerns or problems prior to follow-up appointment  Patient instructions include: No driving or operating heavy machinery for 3 months from onset of most recent seizure. Minimize stress as much as possible. Recommended 7-8 hours of sleep each  night. Abstain from alcohol intake. Educated on Antiepileptic medications with possible side effects and signs and symptoms to report if prescribed during visit. Instructed to take seizure medication daily if prescribed. Reviewed potential seizure risk factors. Instructed to call 911 or our office if another seizure does occur.  As part of this patient's treatment plan I am prescribing controlled substances. The patient has been made aware of appropriate use of such medications, including potential risk of somnolence, limited ability to drive and/or work safely, and potential for dependence or overdose. It has also been made clear that these medications are for use by the patient only, without concomitant use of alcohol or other substances unless prescribed. Keep out of reach of children.  Jose C report has been reviewed. If this is going to be prescribed long term, [facility] Controlled Substance Agreement Contract has also been read and signed by patient and myself.           Reviewed medications, potential side effects and signs and symptoms to report. Discussed risk versus benefits of treatment plan with patient and/or family-including medications, labs and radiology that may be ordered. Addressed questions and concerns during visit. Patient and/or family verbalized understanding and agree with plan.    During this visit the following were done:  Labs Reviewed []    Labs Ordered []    Radiology Reports Reviewed []    Radiology Ordered []    PCP Records Reviewed []    Referring Provider Records Reviewed []    ER Records Reviewed []    Hospital Records Reviewed []    History Obtained From Family []    Radiology Images Reviewed []    Other Reviewed []    Records Requested []      EMR Dragon/Transcription Disclaimer:  Much of this encounter note is an electronic transcription of spoken language to printed text. Electronic transcription of spoken language may permit erroneous words or phrases to be inadvertently  transcribed. Although I have reviewed the note for such errors, some may still exist in this documentation.    Ani Haas, APRN  2/5/2020

## 2020-03-23 ENCOUNTER — TELEPHONE (OUTPATIENT)
Dept: FAMILY MEDICINE CLINIC | Facility: CLINIC | Age: 60
End: 2020-03-23

## 2020-03-23 DIAGNOSIS — I63.9 CEREBROVASCULAR ACCIDENT (CVA), UNSPECIFIED MECHANISM (HCC): ICD-10-CM

## 2020-03-24 RX ORDER — CLOPIDOGREL BISULFATE 75 MG/1
75 TABLET ORAL DAILY
Qty: 30 TABLET | Refills: 5 | Status: SHIPPED | OUTPATIENT
Start: 2020-03-24 | End: 2020-03-25

## 2020-03-25 DIAGNOSIS — I63.9 CEREBROVASCULAR ACCIDENT (CVA), UNSPECIFIED MECHANISM (HCC): ICD-10-CM

## 2020-03-25 RX ORDER — CLOPIDOGREL BISULFATE 75 MG/1
TABLET ORAL
Qty: 30 TABLET | Refills: 5 | Status: SHIPPED | OUTPATIENT
Start: 2020-03-25 | End: 2020-07-22 | Stop reason: SDUPTHER

## 2020-04-04 DIAGNOSIS — I10 ESSENTIAL HYPERTENSION: ICD-10-CM

## 2020-04-04 RX ORDER — AMLODIPINE BESYLATE 10 MG/1
TABLET ORAL
Qty: 30 TABLET | Refills: 5 | Status: SHIPPED | OUTPATIENT
Start: 2020-04-04 | End: 2020-07-22 | Stop reason: SDUPTHER

## 2020-04-15 ENCOUNTER — TELEPHONE (OUTPATIENT)
Dept: CARDIAC SURGERY | Facility: CLINIC | Age: 60
End: 2020-04-15

## 2020-04-24 ENCOUNTER — TELEPHONE (OUTPATIENT)
Dept: FAMILY MEDICINE CLINIC | Facility: CLINIC | Age: 60
End: 2020-04-24

## 2020-04-24 DIAGNOSIS — I65.23 BILATERAL CAROTID ARTERY STENOSIS: ICD-10-CM

## 2020-04-24 DIAGNOSIS — I10 ESSENTIAL HYPERTENSION: ICD-10-CM

## 2020-04-24 RX ORDER — HYDROCHLOROTHIAZIDE 12.5 MG/1
12.5 TABLET ORAL DAILY
Qty: 30 TABLET | Refills: 6 | Status: SHIPPED | OUTPATIENT
Start: 2020-04-24 | End: 2020-12-02 | Stop reason: SDUPTHER

## 2020-05-26 NOTE — ED NOTES
Telehealth Visit    Date of Visit: 2020  Encounter Provider: PAM Garza  Place of Service: [facility] CARDIOLOGY  Patient Name: Toño Foss Jr.  :1944  Primary Cardiologist: Dr. Bismark Chavez    Chief Complaint   Patient presents with   • Follow-up     Hospital    :     Dear PAM Quiroga,    HPI: Toño Foss Jr. is a pleasant 75 y.o. male who is an established patient of our practice. Due to COVID-19 virus, I am conducting a telehealth visit via telephone with patient and he has consented to this visit today.      He has a known history of ischemic and nonischemic cardiomyopathy with initial EF of 20% in .  He has known coronary artery disease with chronic occlusion of the left circumflex with left to left collaterals.  He has known PAD with toe ischemia.    2016, his EF declined to 15%.  He underwent right and left heart catheterization with medical treatment recommended in severe pulmonary hypertension.  He had declined ICD implantation in the past was not felt to be a good candidate for a biventricular pacemaker lateral myocardial infarction.    In May 2020, he presented to the hospital sudden onset of acute shortness of breath and lower extremity edema.  He was diagnosed with a pulmonary embolism and Dr. Keenan did not feel that this was due to his previous history of lung cancer.  He was started on rivaroxaban.  He had acute kidney injury and his medications were adjusted during the hospitalization. On 2020 a 2D echocardiogram revealed the following: EF 30%, grade 2 diastolic dysfunction mild pulmonary hypertension, moderately reduced right ventricular systolic function, moderate to severe mitral regurgitation, mild to moderate tricuspid regurgitation, and wall motion abnormalities.    On 2020, he followed up with PAM Quiroga in the office.  She said his shortness of breath was at baseline.  She consulted home health  EMTALA form signed      Nila Ardon RN  04/14/18 4325     for strength training, CHF program, and COPD management he had gained 3-4 pounds in 3 days and developed +2 pedal edema.  She increased his furosemide to 40 mg twice per day.    Today is his follow-up hospital visit.  His wife is doing most of the talking, because he is hard of hearing.  She is asking him questions I can hear him respond.  His wife says he is currently taking 80 mg twice per day (not 40 mg twice per day).  He is lost 14 pounds since May 20 and his lower extremity edema is almost gone.  He reports improved shortness of breath and some mild lightheadedness.  She has checked his oxygen saturation at times at 90%.  He is currently receiving services through home health and physical therapy.    Past Medical History:   Diagnosis Date   • APC (atrial premature contractions)    • Arthritis    • Basal cell carcinoma of back 02/05/2018    [facility]    • CAD (coronary artery disease) 12/2013    Cath 9/2016: 10% LM, 30% LAD, 10% diag, 50% prox RCA (normal FFR), 100% mid LCx with L-L collaterals   • Cardiomyopathy (CMS/HCC)     Mixed ICM/NICM, LVEF has ranged from 20-35%, but dropped to 15% in 9/2016, then 27% in 1/2017   • Cerumen impaction    • Chronic systolic (congestive) heart failure (CMS/HCC) 11/22/2016   • CKD (chronic kidney disease) stage 3, GFR 30-59 ml/min (CMS/HCC)    • Colon polyp    • COPD (chronic obstructive pulmonary disease) (CMS/HCC)    • Depression    • Diabetes mellitus (CMS/HCC) 2013    type II; diagnosed 2013, but poorly controlled (AIC 9%)   • Diabetic foot ulcer (CMS/HCC)    • Elevated PSA    • H/O colonoscopy 2011    Complete   • Hyperlipidemia    • Hypertension    • Hypogonadism male    • Inguinal hernia    • Ischemia of toe 03/22/2016    Followed by Dr Marte/Brennan   • Left bundle branch block    • Lung cancer (CMS/HCC) 2013    s/p left pneumonectomy   • Obstructive chronic bronchitis with acute bronchitis (CMS/HCC)    • Orthostatic hypotension    •  PAD (peripheral artery disease) (CMS/[facility])    • Vitamin D deficiency        Past Surgical History:   Procedure Laterality Date   • BRONCHOSCOPY      Left Lung   • CARDIAC CATHETERIZATION N/A 2016    Procedure: Coronary angiography;  Surgeon: Toño Montelongo MD;  Location: [facility] CATH INVASIVE LOCATION;  Service:    • CARDIAC CATHETERIZATION N/A 2016    Procedure: Left heart cath NO LV;  Surgeon: Toño Montelongo MD;  Location: [facility] CATH INVASIVE LOCATION;  Service:    • CARDIAC CATHETERIZATION N/A 2016    Procedure: Right Heart Cath;  Surgeon: Toño Montelongo MD;  Location: [facility] CATH INVASIVE LOCATION;  Service:    • COLONOSCOPY     • COLONOSCOPY N/A 2018    Procedure: COLONOSCOPY TO CECUM AND TERM. ILEUM  WITH COLD POLYPECTOMIES;  Surgeon: Dylan Richardson MD;  Location: [facility] ENDOSCOPY;  Service: Gastroenterology   • LUNG REMOVAL, PARTIAL Left    • LUNG REMOVAL, TOTAL         Social History     Socioeconomic History   • Marital status:      Spouse name: Mela   • Number of children: 1   • Years of education: High School   • Highest education level: Not on file   Occupational History     Employer: RETIRED   Tobacco Use   • Smoking status: Former Smoker     Packs/day: 2.00     Years: 50.00     Pack years: 100.00     Types: Cigarettes     Last attempt to quit: 2012     Years since quittin.8   • Smokeless tobacco: Never Used   Substance and Sexual Activity   • Alcohol use: Yes     Comment: Rare//caffeine use: one cup of coffee daily.    • Drug use: No   • Sexual activity: Defer       Family History   Problem Relation Age of Onset   • Melanoma Sister    • Heart attack Father    • Heart disease Father    • Lung cancer Brother        The following portion of the patient's history were reviewed and updated as appropriate: past medical history, past surgical history, past social history, past family history, allergies, current medications, and problem list.    Review of Systems    Constitution: Positive for malaise/fatigue and weight loss.   Cardiovascular: Positive for dyspnea on exertion and leg swelling. Negative for chest pain, irregular heartbeat, near-syncope, orthopnea, paroxysmal nocturnal dyspnea and syncope.   Respiratory: Positive for shortness of breath. Negative for cough and wheezing.    Hematologic/Lymphatic: Negative for bleeding problem.   Neurological: Negative.        Allergies   Allergen Reactions   • Sulfa Antibiotics Unknown - Low Severity         Current Outpatient Medications:   •  aspirin 81 MG chewable tablet, Chew 81 mg Daily., Disp: , Rfl:   •  carvedilol (COREG) 6.25 MG tablet, Take 1.5 tablets by mouth 2 (Two) Times a Day. (Patient taking differently: Take 6.25 mg by mouth 2 (Two) Times a Day.), Disp: 270 tablet, Rfl: 1  •  desvenlafaxine (PRISTIQ) 100 MG 24 hr tablet, Take 1 tablet by mouth Daily., Disp: 30 tablet, Rfl: 2  •  Ferrous Sulfate (IRON PO), Take 65 mg by mouth Daily., Disp: , Rfl:   •  Fluticasone-Umeclidin-Vilant (Trelegy Ellipta) 100-62.5-25 MCG/INH aerosol powder , Inhale 1 puff Daily., Disp: , Rfl:   •  furosemide (LASIX) 40 MG tablet, Take 1 tablet by mouth 2 (Two) Times a Day. (Patient taking differently: Take 40 mg by mouth. 80MG AM and 120MG PM), Disp: 90 tablet, Rfl: 1  •  gabapentin (NEURONTIN) 100 MG capsule, TAKE ONE CAPSULE BY MOUTH THREE TIMES A DAY (Patient taking differently: Take 100 mg by mouth 3 (Three) Times a Day.), Disp: 90 capsule, Rfl: 0  •  O2 (OXYGEN), Inhale 2 L/min As Needed (nightly)., Disp: , Rfl:   •  OLANZapine (zyPREXA) 5 MG tablet, Take 1 tablet by mouth Every Night., Disp: 30 tablet, Rfl: 0  •  rivaroxaban (XARELTO) 15 MG tablet, Take 1 tablet by mouth 2 (Two) Times a Day With Meals. Indications: DVT/PE (active thrombosis), Disp: 42 tablet, Rfl: 0  •  simvastatin (ZOCOR) 20 MG tablet, TAKE ONE TABLET BY MOUTH ONCE NIGHTLY, Disp: 30 tablet, Rfl: 0  •  SITagliptin-metFORMIN HCl ER (JANUMET XR) 100-1000 MG tablet,  "Take 1 tablet by mouth Daily., Disp: 90 tablet, Rfl: 1  •  Spacer/Aero-Holding Chambers (AEROCHAMBER MV) inhaler, Use as instructed, Disp: 1 each, Rfl: 0  •  VENTOLIN  (90 BASE) MCG/ACT inhaler, Inhale 2 puffs Every 4 (Four) Hours As Needed., Disp: , Rfl:         Objective:     Vitals:    05/26/20 1258   Weight: 58 kg (127 lb 12.8 oz)   Height: 172.7 cm (68\")     Body mass index is 19.43 kg/m².    Due to telehealth visit, there was no EKG, vitals, or weight performed in our office.  Vitals/Weight were reported by the patient and conducted at home.       Assessment:       Diagnosis Plan   1. Acute pulmonary embolism without acute cor pulmonale, unspecified pulmonary embolism type (CMS/HCC)     2. Chronic systolic (congestive) heart failure     3. Coronary artery disease involving native coronary artery of native heart without angina pectoris     4. Essential hypertension     5. CKD (chronic kidney disease) stage 2, GFR 60-89 ml/min            Plan:       1.  Pulmonary Embolism: Currently on rivaroxaban and denies bleeding.    2.  Systolic Heart Failure: NYHA Class IIIA. EF 30% per echo 5/2020. I received a message from Carla Villegas NP stating that she had increased his furosemide to 40 mg twice per day.  His wife said he is actually taking 80 mg twice per day and he has lost 14 pounds in a week.  I have asked him to decrease the furosemide back to the 40 mg twice daily dosage and ask for home health to repeat a BMP this week.  His shortness of breath and lower extremity edema have improved.    3.  CAD: Stable and denies angina.    4.  Hypertension: Well-controlled in the past.    5.  Chronic Kidney Disease: Continue to monitor.    6.  Valvular Heart Disease: Continue to monitor.    7.  I recommended a follow-up in the [place] office in the next couple of weeks.    This patient has consented to a telehealth visit via telephone. The visit was scheduled as a telephone visit to comply with patient safety concerns " in accordance with CDC recommendations.  All vitals recorded within this visit are reported by the patient.  I spent 25 minutes in total including but not limited to the 11 minutes spent in direct conversation with this patient.      As always, it has been a pleasure to participate in your patient's care. Thank you.       Sincerely,         PAM Del Castillo        Dictated utilizing Dragon dictation

## 2020-06-12 DIAGNOSIS — I69.398 SEIZURE DISORDER AS SEQUELA OF CEREBROVASCULAR ACCIDENT (HCC): ICD-10-CM

## 2020-06-12 DIAGNOSIS — G40.909 SEIZURE DISORDER AS SEQUELA OF CEREBROVASCULAR ACCIDENT (HCC): ICD-10-CM

## 2020-06-12 RX ORDER — LEVETIRACETAM 500 MG/1
1500 TABLET ORAL 2 TIMES DAILY
Qty: 180 TABLET | Refills: 5
Start: 2020-06-12 | End: 2020-06-15 | Stop reason: SDUPTHER

## 2020-06-15 DIAGNOSIS — I69.398 SEIZURE DISORDER AS SEQUELA OF CEREBROVASCULAR ACCIDENT (HCC): ICD-10-CM

## 2020-06-15 DIAGNOSIS — G40.909 SEIZURE DISORDER AS SEQUELA OF CEREBROVASCULAR ACCIDENT (HCC): ICD-10-CM

## 2020-06-15 NOTE — TELEPHONE ENCOUNTER
Pt requests Keppra be sent to [facility]  Neurologist did it on 6/12/20 but marked as not print.  Let wife know and she is going to call them to resend.

## 2020-06-16 RX ORDER — LEVETIRACETAM 500 MG/1
1500 TABLET ORAL 2 TIMES DAILY
Qty: 180 TABLET | Refills: 5
Start: 2020-06-16 | End: 2020-06-19 | Stop reason: SDUPTHER

## 2020-06-16 NOTE — TELEPHONE ENCOUNTER
PHARMACY SAID IT STILL WAS NOT RECEIVED CAN THE KEPPRA 500MG- 3TABLET 2X A DAY BE CALLED IN? PATIENT HAS NOT MEDICATION FOR TONIGHTS DOSE.

## 2020-06-17 NOTE — TELEPHONE ENCOUNTER
PT'S REFERRING OFFICE CALLED  OVER TO STATE THEY ARE STILL HAVING ISSUES GETTING  THE MEDICATIONA AT THE PHARMACY AND ARE REQUESTING  IT TO BE CALLED IN   . THEY ASKED IF WE COULD CALL THEM WHEN IT IS DONE -[phone] THANK YOU .

## 2020-06-17 NOTE — TELEPHONE ENCOUNTER
has missed 4 doses of his Keppra as of 06/7/2020.  called to inform clinical staff that the [facility] has still not received this refill request.     I was able to speak with  who confirmed approving and faxing over the request. Upon informing  of that information she  states that the pharmacist has agreed to take the request over the phone if someone would call it in. Please advise. If called in please inform  per her request.

## 2020-06-18 NOTE — TELEPHONE ENCOUNTER
ATTN: ALCIDES MATHUR, PT GIRLFRIEND CALLED BECAUSE SHE SPOKE WITH [facility], AND WAS TOLD TO ASK IF THE RX FOR KEPPRA 500MG CAN BE CALLED IN BECAUSE THEY STILL HAVE NOT RECEIVED FAX FOR REFILL.   PATIENT IS OUT OF MEDICATION AND NEEDS ASAP.

## 2020-06-19 ENCOUNTER — TELEPHONE (OUTPATIENT)
Dept: FAMILY MEDICINE CLINIC | Facility: CLINIC | Age: 60
End: 2020-06-19

## 2020-06-19 DIAGNOSIS — I69.398 SEIZURE DISORDER AS SEQUELA OF CEREBROVASCULAR ACCIDENT (HCC): ICD-10-CM

## 2020-06-19 DIAGNOSIS — G40.909 SEIZURE DISORDER AS SEQUELA OF CEREBROVASCULAR ACCIDENT (HCC): ICD-10-CM

## 2020-06-19 RX ORDER — LEVETIRACETAM 500 MG/1
1500 TABLET ORAL 2 TIMES DAILY
Qty: 180 TABLET | Refills: 0
Start: 2020-06-19 | End: 2020-06-19 | Stop reason: SDUPTHER

## 2020-06-19 RX ORDER — LEVETIRACETAM 500 MG/1
1500 TABLET ORAL 2 TIMES DAILY
Qty: 180 TABLET | Refills: 0 | Status: SHIPPED | OUTPATIENT
Start: 2020-06-19 | End: 2020-07-16 | Stop reason: SDUPTHER

## 2020-06-19 NOTE — TELEPHONE ENCOUNTER
PATIENT WIFE CALLED AND STATES SHE HAS BEEN TRYING FOR OVER A WEEK TO GET A REFILL ON PATIENTS KEPPRA. SHE HAS CALLED PRESCRIBING OFFICE SEVERAL TIME, PHARMACY HAS CALLED SEVERAL TIMES AND NO RESPONSE BACK. CAN YOU SEND IN A 30 DAY SUPPLY TO [facility]?

## 2020-07-01 DIAGNOSIS — E78.5 DYSLIPIDEMIA: ICD-10-CM

## 2020-07-01 NOTE — TELEPHONE ENCOUNTER
atorvastatin (LIPITOR) 80 MG tablet    [facility] [place], [place] - [address] [place] [address] [phone] [facility] [phone] FX

## 2020-07-02 RX ORDER — ATORVASTATIN CALCIUM 80 MG/1
80 TABLET, FILM COATED ORAL
Qty: 30 TABLET | Refills: 5 | Status: SHIPPED | OUTPATIENT
Start: 2020-07-02 | End: 2020-12-29

## 2020-07-16 DIAGNOSIS — G40.909 SEIZURE DISORDER AS SEQUELA OF CEREBROVASCULAR ACCIDENT (HCC): ICD-10-CM

## 2020-07-16 DIAGNOSIS — I69.398 SEIZURE DISORDER AS SEQUELA OF CEREBROVASCULAR ACCIDENT (HCC): ICD-10-CM

## 2020-07-16 RX ORDER — LEVETIRACETAM 500 MG/1
1500 TABLET ORAL 2 TIMES DAILY
Qty: 180 TABLET | Refills: 0 | Status: SHIPPED | OUTPATIENT
Start: 2020-07-16 | End: 2020-07-22 | Stop reason: SDUPTHER

## 2020-07-16 NOTE — TELEPHONE ENCOUNTER
Per [facility] please call this script in as they are having trouble receiving faxes.

## 2020-07-17 ENCOUNTER — LAB (OUTPATIENT)
Dept: FAMILY MEDICINE CLINIC | Facility: CLINIC | Age: 60
End: 2020-07-17

## 2020-07-17 DIAGNOSIS — I10 ESSENTIAL HYPERTENSION: ICD-10-CM

## 2020-07-17 DIAGNOSIS — E78.49 OTHER HYPERLIPIDEMIA: ICD-10-CM

## 2020-07-17 DIAGNOSIS — E11.9 DIABETES MELLITUS TYPE 2 IN NONOBESE (HCC): ICD-10-CM

## 2020-07-17 PROCEDURE — 83036 HEMOGLOBIN GLYCOSYLATED A1C: CPT | Performed by: NURSE PRACTITIONER

## 2020-07-17 PROCEDURE — 85025 COMPLETE CBC W/AUTO DIFF WBC: CPT | Performed by: NURSE PRACTITIONER

## 2020-07-17 PROCEDURE — 82043 UR ALBUMIN QUANTITATIVE: CPT | Performed by: NURSE PRACTITIONER

## 2020-07-17 PROCEDURE — 36415 COLL VENOUS BLD VENIPUNCTURE: CPT | Performed by: NURSE PRACTITIONER

## 2020-07-17 PROCEDURE — 84443 ASSAY THYROID STIM HORMONE: CPT | Performed by: NURSE PRACTITIONER

## 2020-07-17 PROCEDURE — 80061 LIPID PANEL: CPT | Performed by: NURSE PRACTITIONER

## 2020-07-17 PROCEDURE — 80053 COMPREHEN METABOLIC PANEL: CPT | Performed by: NURSE PRACTITIONER

## 2020-07-18 LAB
ALBUMIN SERPL-MCNC: 4.4 G/DL (ref 3.5–5.2)
ALBUMIN UR-MCNC: <1.2 MG/DL
ALBUMIN/GLOB SERPL: 1.4 G/DL
ALP SERPL-CCNC: 69 U/L (ref 39–117)
ALT SERPL W P-5'-P-CCNC: 16 U/L (ref 1–41)
ANION GAP SERPL CALCULATED.3IONS-SCNC: 11.2 MMOL/L (ref 5–15)
AST SERPL-CCNC: 11 U/L (ref 1–40)
BASOPHILS # BLD AUTO: 0.07 10*3/MM3 (ref 0–0.2)
BASOPHILS NFR BLD AUTO: 0.6 % (ref 0–1.5)
BILIRUB SERPL-MCNC: 0.4 MG/DL (ref 0–1.2)
BUN SERPL-MCNC: 10 MG/DL (ref 6–20)
BUN/CREAT SERPL: 11.2 (ref 7–25)
CALCIUM SPEC-SCNC: 10.2 MG/DL (ref 8.6–10.5)
CHLORIDE SERPL-SCNC: 97 MMOL/L (ref 98–107)
CHOLEST SERPL-MCNC: 96 MG/DL (ref 0–200)
CO2 SERPL-SCNC: 25.8 MMOL/L (ref 22–29)
CREAT SERPL-MCNC: 0.89 MG/DL (ref 0.76–1.27)
DEPRECATED RDW RBC AUTO: 46.1 FL (ref 37–54)
EOSINOPHIL # BLD AUTO: 0.09 10*3/MM3 (ref 0–0.4)
EOSINOPHIL NFR BLD AUTO: 0.7 % (ref 0.3–6.2)
ERYTHROCYTE [DISTWIDTH] IN BLOOD BY AUTOMATED COUNT: 14.5 % (ref 12.3–15.4)
GFR SERPL CREATININE-BSD FRML MDRD: 88 ML/MIN/1.73
GLOBULIN UR ELPH-MCNC: 3.1 GM/DL
GLUCOSE SERPL-MCNC: 116 MG/DL (ref 65–99)
HBA1C MFR BLD: 6.55 % (ref 4.8–5.6)
HCT VFR BLD AUTO: 46 % (ref 37.5–51)
HDLC SERPL-MCNC: 28 MG/DL (ref 40–60)
HGB BLD-MCNC: 16.1 G/DL (ref 13–17.7)
IMM GRANULOCYTES # BLD AUTO: 0.03 10*3/MM3 (ref 0–0.05)
IMM GRANULOCYTES NFR BLD AUTO: 0.2 % (ref 0–0.5)
LDLC SERPL CALC-MCNC: 36 MG/DL (ref 0–100)
LDLC/HDLC SERPL: 1.29 {RATIO}
LYMPHOCYTES # BLD AUTO: 4.72 10*3/MM3 (ref 0.7–3.1)
LYMPHOCYTES NFR BLD AUTO: 38.6 % (ref 19.6–45.3)
MCH RBC QN AUTO: 30.4 PG (ref 26.6–33)
MCHC RBC AUTO-ENTMCNC: 35 G/DL (ref 31.5–35.7)
MCV RBC AUTO: 87 FL (ref 79–97)
MONOCYTES # BLD AUTO: 0.76 10*3/MM3 (ref 0.1–0.9)
MONOCYTES NFR BLD AUTO: 6.2 % (ref 5–12)
NEUTROPHILS NFR BLD AUTO: 53.7 % (ref 42.7–76)
NEUTROPHILS NFR BLD AUTO: 6.56 10*3/MM3 (ref 1.7–7)
NRBC BLD AUTO-RTO: 0 /100 WBC (ref 0–0.2)
PLATELET # BLD AUTO: 278 10*3/MM3 (ref 140–450)
PMV BLD AUTO: 9.1 FL (ref 6–12)
POTASSIUM SERPL-SCNC: 4.4 MMOL/L (ref 3.5–5.2)
PROT SERPL-MCNC: 7.5 G/DL (ref 6–8.5)
RBC # BLD AUTO: 5.29 10*6/MM3 (ref 4.14–5.8)
SODIUM SERPL-SCNC: 134 MMOL/L (ref 136–145)
TRIGL SERPL-MCNC: 160 MG/DL (ref 0–150)
TSH SERPL DL<=0.05 MIU/L-ACNC: 2.2 UIU/ML (ref 0.27–4.2)
VLDLC SERPL-MCNC: 32 MG/DL (ref 5–40)
WBC # BLD AUTO: 12.23 10*3/MM3 (ref 3.4–10.8)

## 2020-07-22 ENCOUNTER — OFFICE VISIT (OUTPATIENT)
Dept: FAMILY MEDICINE CLINIC | Facility: CLINIC | Age: 60
End: 2020-07-22

## 2020-07-22 VITALS
WEIGHT: 189.6 LBS | HEIGHT: 66 IN | DIASTOLIC BLOOD PRESSURE: 70 MMHG | BODY MASS INDEX: 30.47 KG/M2 | TEMPERATURE: 98.7 F | OXYGEN SATURATION: 97 % | HEART RATE: 69 BPM | SYSTOLIC BLOOD PRESSURE: 112 MMHG

## 2020-07-22 DIAGNOSIS — I69.398 SEIZURE DISORDER AS SEQUELA OF CEREBROVASCULAR ACCIDENT (HCC): ICD-10-CM

## 2020-07-22 DIAGNOSIS — I65.23 BILATERAL CAROTID ARTERY STENOSIS: ICD-10-CM

## 2020-07-22 DIAGNOSIS — I63.9 CEREBROVASCULAR ACCIDENT (CVA), UNSPECIFIED MECHANISM (HCC): ICD-10-CM

## 2020-07-22 DIAGNOSIS — G40.909 SEIZURE DISORDER AS SEQUELA OF CEREBROVASCULAR ACCIDENT (HCC): ICD-10-CM

## 2020-07-22 DIAGNOSIS — E11.9 DIABETES MELLITUS TYPE 2 IN NONOBESE (HCC): ICD-10-CM

## 2020-07-22 DIAGNOSIS — J30.9 ALLERGIC RHINITIS, UNSPECIFIED SEASONALITY, UNSPECIFIED TRIGGER: Primary | ICD-10-CM

## 2020-07-22 DIAGNOSIS — I10 ESSENTIAL HYPERTENSION: ICD-10-CM

## 2020-07-22 PROCEDURE — 99214 OFFICE O/P EST MOD 30 MIN: CPT | Performed by: NURSE PRACTITIONER

## 2020-07-22 RX ORDER — FLUTICASONE PROPIONATE 50 MCG
2 SPRAY, SUSPENSION (ML) NASAL DAILY
Qty: 15.8 ML | Refills: 2 | Status: SHIPPED | OUTPATIENT
Start: 2020-07-22

## 2020-07-22 RX ORDER — LOSARTAN POTASSIUM 100 MG/1
50 TABLET ORAL 2 TIMES DAILY
Qty: 180 TABLET | Refills: 1 | Status: SHIPPED | OUTPATIENT
Start: 2020-07-22 | End: 2020-11-02

## 2020-07-22 RX ORDER — CLOPIDOGREL BISULFATE 75 MG/1
75 TABLET ORAL DAILY
Qty: 90 TABLET | Refills: 1 | Status: SHIPPED | OUTPATIENT
Start: 2020-07-22 | End: 2021-04-26 | Stop reason: SDUPTHER

## 2020-07-22 RX ORDER — ASPIRIN 325 MG
325 TABLET, DELAYED RELEASE (ENTERIC COATED) ORAL DAILY
Qty: 90 TABLET | Refills: 1 | Status: SHIPPED | OUTPATIENT
Start: 2020-07-22 | End: 2021-01-29 | Stop reason: SDUPTHER

## 2020-07-22 RX ORDER — METOPROLOL SUCCINATE 200 MG/1
200 TABLET, EXTENDED RELEASE ORAL DAILY
Qty: 90 TABLET | Refills: 1 | Status: SHIPPED | OUTPATIENT
Start: 2020-07-22 | End: 2021-01-29 | Stop reason: SDUPTHER

## 2020-07-22 RX ORDER — AMLODIPINE BESYLATE 10 MG/1
10 TABLET ORAL DAILY
Qty: 90 TABLET | Refills: 1 | Status: SHIPPED | OUTPATIENT
Start: 2020-07-22 | End: 2020-10-01

## 2020-07-22 RX ORDER — LEVETIRACETAM 500 MG/1
1500 TABLET ORAL 2 TIMES DAILY
Qty: 540 TABLET | Refills: 1 | Status: SHIPPED | OUTPATIENT
Start: 2020-07-22 | End: 2020-08-05 | Stop reason: SDUPTHER

## 2020-07-22 RX ORDER — AZELASTINE HYDROCHLORIDE 137 UG/1
2 SPRAY, METERED NASAL 2 TIMES DAILY
Qty: 30 ML | Refills: 2 | Status: SHIPPED | OUTPATIENT
Start: 2020-07-22 | End: 2023-03-27

## 2020-07-22 NOTE — PROGRESS NOTES
"Subjective   Vince Olivera is a 59 y.o. male.     Chief Complaint   Patient presents with   • essential hypertension       He presents for follow up of essential hypertension, mixed hyperlipidemia, and type II dm. He states he is doing well. He reports no new seizures. He saw Neurology who made some medication changes and he has a follow up on August 5 with neurology. He c/o postnasal drainage. He had been taking allegra but it isn't helping anymore. He c/o pain in his left lower abdomen/groin. He states he has been lifting a lot. He has history of aortobifemoral bypass last year and hernia repair in the past. He has a follow up with cardiothoracic surgery but he doesn't want to go because it would be difficult to drive to [place] 2 days in a row.He reports he is doing well. Weed eating and working.        The following portions of the patient's history were reviewed and updated as appropriate: allergies, current medications, past family history, past medical history, past social history, past surgical history and problem list.    Review of Systems   HENT: Positive for postnasal drip and rhinorrhea.    Allergic/Immunologic: Positive for environmental allergies.       Objective     /70 (BP Location: Right arm, Patient Position: Sitting, Cuff Size: Adult)   Pulse 69   Temp 98.7 °F (37.1 °C) (Temporal)   Ht 166.4 cm (65.51\")   Wt 86 kg (189 lb 9.6 oz)   SpO2 97%   BMI 31.06 kg/m²     Physical Exam   Constitutional: He is oriented to person, place, and time. He appears well-developed and well-nourished. No distress.   HENT:   Head: Normocephalic and atraumatic.   Cardiovascular: Normal rate, regular rhythm, normal heart sounds and intact distal pulses. Exam reveals no gallop and no friction rub.   No murmur heard.  Pulmonary/Chest: Effort normal and breath sounds normal. No respiratory distress.   Abdominal: Soft. Bowel sounds are normal. He exhibits no distension. There is no tenderness.   NO bulges " noted in LLQ. Scar midline abdomen   Neurological: He is alert and oriented to person, place, and time.   Skin: Skin is warm and dry. He is not diaphoretic.   Psychiatric: He has a normal mood and affect. His behavior is normal. Judgment and thought content normal.       Assessment/Plan     Problem List Items Addressed This Visit        Cardiovascular and Mediastinum    H/o Left MCA ischemic CVA 4/2017    Relevant Medications    clopidogrel (PLAVIX) 75 MG tablet    Essential hypertension    Relevant Medications    metoprolol succinate XL (Toprol XL) 200 MG 24 hr tablet    amLODIPine (NORVASC) 10 MG tablet    losartan (COZAAR) 100 MG tablet    Bilateral carotid artery stenosis s/p L CEA 4/27/17. 50-69% R carotid stenosis    Overview     S/P L CEA in 2017   50-69% stenosis likely of right proximal internal carotid artery in 7/2019          Relevant Medications    aspirin  MG tablet       Nervous and Auditory    Seizure disorder on Keppra and Vimpat. Recurrent Grand mal 10/8/19    Relevant Medications    levETIRAcetam (Keppra) 500 MG tablet      Other Visit Diagnoses     Allergic rhinitis, unspecified seasonality, unspecified trigger    -  Primary    Relevant Medications    fluticasone (Flonase) 50 MCG/ACT nasal spray    Azelastine HCl 137 MCG/SPRAY solution    Diabetes mellitus type 2 in nonobese (CMS/[facility], [facility])        Relevant Medications    metFORMIN (GLUCOPHAGE) 500 MG tablet          Plan: CBC, wbc slighly elevated. CMP sodium 134. A1C 6.55. LDL nl. Azelastine and flonase ordered for allergic rhinitis. I recommend you see the cardiothoracic surgeon as scheduled on August 6th to have him look at your left lower quadrant and groin pain. No heavy lifting. Follow up in 6 months and as needed.     Patient's Body mass index is 31.06 kg/m². BMI is above normal parameters. Recommendations include: educational material.   (Normal BMI:  18.5-24.9, OW 25-29.9, Obesity 30 or greater)        Understands disease processes and  need for medications.  Understands reasons for urgent and emergent care.  Patient (& family) verbalized agreement for treatment plan.   Emotional support and active listening provided.  Patient provided time to verbalize feelings.               This document has been electronically signed by KINGSTON Gallardo   July 22, 2020 15:16

## 2020-07-22 NOTE — PATIENT INSTRUCTIONS

## 2020-08-05 ENCOUNTER — OFFICE VISIT (OUTPATIENT)
Dept: NEUROLOGY | Facility: CLINIC | Age: 60
End: 2020-08-05

## 2020-08-05 VITALS
TEMPERATURE: 97.1 F | WEIGHT: 189 LBS | HEIGHT: 66 IN | SYSTOLIC BLOOD PRESSURE: 120 MMHG | DIASTOLIC BLOOD PRESSURE: 80 MMHG | HEART RATE: 60 BPM | OXYGEN SATURATION: 98 % | BODY MASS INDEX: 30.37 KG/M2

## 2020-08-05 DIAGNOSIS — I69.398 SEIZURE DISORDER AS SEQUELA OF CEREBROVASCULAR ACCIDENT (HCC): ICD-10-CM

## 2020-08-05 DIAGNOSIS — G40.909 SEIZURE DISORDER AS SEQUELA OF CEREBROVASCULAR ACCIDENT (HCC): ICD-10-CM

## 2020-08-05 DIAGNOSIS — I63.412 CEREBROVASCULAR ACCIDENT (CVA) DUE TO EMBOLISM OF LEFT MIDDLE CEREBRAL ARTERY (HCC): ICD-10-CM

## 2020-08-05 DIAGNOSIS — G40.909 SEIZURE DISORDER (HCC): Primary | ICD-10-CM

## 2020-08-05 PROBLEM — R47.01 APHASIA: Status: RESOLVED | Noted: 2017-05-04 | Resolved: 2020-08-05

## 2020-08-05 PROCEDURE — 99213 OFFICE O/P EST LOW 20 MIN: CPT | Performed by: NURSE PRACTITIONER

## 2020-08-05 RX ORDER — LEVETIRACETAM 500 MG/1
1500 TABLET ORAL 2 TIMES DAILY
Qty: 540 TABLET | Refills: 1 | Status: SHIPPED | OUTPATIENT
Start: 2020-08-05 | End: 2021-01-18

## 2020-08-05 RX ORDER — LACOSAMIDE 150 MG/1
75 TABLET ORAL 2 TIMES DAILY
Qty: 30 TABLET | Refills: 5 | Status: SHIPPED | OUTPATIENT
Start: 2020-08-05 | End: 2020-12-24 | Stop reason: SDUPTHER

## 2020-08-06 ENCOUNTER — OFFICE VISIT (OUTPATIENT)
Dept: CARDIAC SURGERY | Facility: CLINIC | Age: 60
End: 2020-08-06

## 2020-08-06 VITALS
WEIGHT: 189.6 LBS | BODY MASS INDEX: 30.47 KG/M2 | HEART RATE: 63 BPM | TEMPERATURE: 97.7 F | DIASTOLIC BLOOD PRESSURE: 81 MMHG | SYSTOLIC BLOOD PRESSURE: 143 MMHG | OXYGEN SATURATION: 98 % | HEIGHT: 66 IN

## 2020-08-06 DIAGNOSIS — I65.23 BILATERAL CAROTID ARTERY STENOSIS: Primary | ICD-10-CM

## 2020-08-06 DIAGNOSIS — I70.0 AORTIC OCCLUSION (HCC): ICD-10-CM

## 2020-08-06 DIAGNOSIS — I63.412 CEREBROVASCULAR ACCIDENT (CVA) DUE TO EMBOLISM OF LEFT MIDDLE CEREBRAL ARTERY (HCC): ICD-10-CM

## 2020-08-06 DIAGNOSIS — Z95.828 S/P AORTIC BIFURCATION BYPASS GRAFT: ICD-10-CM

## 2020-08-06 PROCEDURE — 99214 OFFICE O/P EST MOD 30 MIN: CPT | Performed by: THORACIC SURGERY (CARDIOTHORACIC VASCULAR SURGERY)

## 2020-08-06 NOTE — PROGRESS NOTES
08/06/2020  Patient Information  Vince Olivera                                                                                          [address] [facility] [address]   1960  'PCP/Referring Physician'  Fortunato Marvin MD  [phone]  No ref. provider found    Chief Complaint   Patient presents with   • Peripheral Vascular Disease     6 months follow-up for PVD. Patient states he is doing well since surgery. He does have some pains in the left groin area at times.     CC: I am here for a checkup    History of Present Illness: 59-year-old  male now 10 months status post aortobifemoral bypass for a distal aortic occlusion.  The patient is done well.  He currently walks without difficulty and with no claudication.  He is back doing physical labor without problems and without complication.  His wounds have completely healed he has not had fever, chills, or night sweats.  As noted he does not have claudication.      Patient Active Problem List   Diagnosis   • H/o Left MCA ischemic CVA 4/2017   • Essential hypertension   • Dyslipidemia   • Bilateral carotid artery stenosis s/p L CEA 4/27/17. 50-69% R carotid stenosis   • Gastroesophageal reflux disease without esophagitis   • Combined receptive and expressive aphasia due to cerebrovascular accident   • Abnormal peripheral vision of right eye   • GERD (gastroesophageal reflux disease)   • Hyperlipidemia   • Palpitations   • Bilateral Carotid artery stenosis. Left greater than right    • Internal carotid artery stenosis, left   • Status post left carotid endarterectomy   • Type 2 diabetes mellitus. HbA1c 6.6   • Bilateral carpal tunnel syndrome   • PAD. Occluded distal aorta, common and external iliacs, and superior femoral arteries   • CVA (cerebral vascular accident) (CMS/[facility]), rule out    • Breakthrough seizure (CMS/[facility])   • Aortic occlusion s/p aorto-femoral bypass 10/11/19 per Dr. Weaver    • Seizure disorder (CMS/HCC)     Past  Medical History:   Diagnosis Date   • Arthritis    • Carotid artery disease (CMS/HCC)    • Carotid artery disorder (CMS/[facility])    • Depression    • Diabetes mellitus (CMS/[facility])     DIAGNOSED 4-2017 CHECKS FSBG 3X/WEEK    • Ear infection     about 2 weeks ago- cleared up - wax removed and cleaned out    • GERD (gastroesophageal reflux disease)     self diagnosed takes otc ppi   • Hyperlipidemia    • Hypertension    • Hypertension    • Inguinal hernia    • Seizure (CMS/[facility])     last 10/8/2019   • Shoulder pain     bilat    • Stroke (CMS/[facility]) 04/24/2017    EXPRESSIVE APHASIA RESIDUAL    • Tooth loose     5 - all over- will get teeth done after this surgery    • Wears reading eyeglasses      Past Surgical History:   Procedure Laterality Date   • ABDOMINAL HERNIA REPAIR  2010   • AORTA FEMORAL BYPASS N/A 10/11/2019    Procedure: AORTA FEMORAL BYPASS, BILATERAL FEMORAL ENDARTERECTOMY;  Surgeon: Dylan Weaver MD;  Location:  [facility];  Service: Vascular   • CAROTID ENDARTERECTOMY Left 10/17/2017    Procedure: CAROTID ENDARTERECTOMY LEFT;  Surgeon: Alexx Bach MD;  Location:  [facility];  Service:    • INGUINAL HERNIA REPAIR Left        Current Outpatient Medications:   •  amLODIPine (NORVASC) 10 MG tablet, Take 1 tablet by mouth Daily., Disp: 90 tablet, Rfl: 1  •  aspirin  MG tablet, Take 1 tablet by mouth Daily., Disp: 90 tablet, Rfl: 1  •  atorvastatin (LIPITOR) 80 MG tablet, Take 1 tablet by mouth every night at bedtime., Disp: 30 tablet, Rfl: 5  •  Azelastine HCl 137 MCG/SPRAY solution, 2 sprays into the nostril(s) as directed by provider 2 (two) times a day., Disp: 30 mL, Rfl: 2  •  clopidogrel (PLAVIX) 75 MG tablet, Take 1 tablet by mouth Daily., Disp: 90 tablet, Rfl: 1  •  fluticasone (Flonase) 50 MCG/ACT nasal spray, 2 sprays into the nostril(s) as directed by provider Daily., Disp: 15.8 mL, Rfl: 2  •  glucose blood test strip, Check glucose daily, Disp: 100 each, Rfl: 3  •  glucose monitor  monitoring kit, 1 each Daily., Disp: 1 each, Rfl: 0  •  hydroCHLOROthiazide (HYDRODIURIL) 12.5 MG tablet, Take 1 tablet by mouth Daily., Disp: 30 tablet, Rfl: 6  •  Lacosamide (Vimpat) 150 MG tablet, Take 75 mg by mouth 2 (Two) Times a Day., Disp: 30 tablet, Rfl: 5  •  Lancets misc, 1 Device Daily., Disp: 100 each, Rfl: 12  •  levETIRAcetam (Keppra) 500 MG tablet, Take 3 tablets by mouth 2 (Two) Times a Day., Disp: 540 tablet, Rfl: 1  •  losartan (COZAAR) 100 MG tablet, Take 0.5 tablets by mouth 2 (Two) Times a Day., Disp: 180 tablet, Rfl: 1  •  metFORMIN (GLUCOPHAGE) 500 MG tablet, Take 2 tablets by mouth 2 (Two) Times a Day With Meals., Disp: 360 tablet, Rfl: 1  •  metoprolol succinate XL (Toprol XL) 200 MG 24 hr tablet, Take 1 tablet by mouth Daily., Disp: 90 tablet, Rfl: 1  •  lansoprazole (PREVACID) 15 MG capsule, Take 15 mg by mouth Daily., Disp: , Rfl:     Current Facility-Administered Medications:   •  cyanocobalamin injection 1,000 mcg, 1,000 mcg, Intramuscular, Q28 Days, Glo Kyle APRN, 1,000 mcg at 01/23/20 1530  No Known Allergies  Social History     Socioeconomic History   • Marital status: Single     Spouse name: Not on file   • Number of children: 2   • Years of education: Not on file   • Highest education level: Not on file   Occupational History   • Occupation: Disabled/allan     Comment: disabled from stroke   Tobacco Use   • Smoking status: Former Smoker     Packs/day: 0.05     Years: 40.00     Pack years: 2.00     Types: Cigarettes     Last attempt to quit: 4/26/2017     Years since quitting: 3.2   • Smokeless tobacco: Former User     Types: Snuff   • Tobacco comment: quit snuff when 18 or 19 years old - only did for baseball - 2-3 years    Substance and Sexual Activity   • Alcohol use: No   • Drug use: No   • Sexual activity: Defer     Partners: Female     Comment: SPOUSE   Social History Narrative    Patient consumes 0-1serving of caffeine daily.     Patient lives at home with  "wife in [place]     Family History   Adopted: Yes   Problem Relation Age of Onset   • Diabetes Mother    • COPD Father      Review of Systems   Constitution: Negative for chills, fever, malaise/fatigue, night sweats and weight loss.   HENT: Negative for hearing loss, odynophagia and sore throat.    Cardiovascular: Negative for chest pain, dyspnea on exertion, leg swelling, orthopnea and palpitations.   Respiratory: Negative for cough and hemoptysis.    Endocrine: Negative for cold intolerance, heat intolerance, polydipsia, polyphagia and polyuria.   Hematologic/Lymphatic: Does not bruise/bleed easily.   Skin: Negative for itching and rash.   Musculoskeletal: Negative for joint pain, joint swelling and myalgias.   Gastrointestinal: Negative for abdominal pain, constipation, diarrhea, hematemesis, hematochezia, melena, nausea and vomiting.   Genitourinary: Negative for dysuria, frequency and hematuria.   Neurological: Positive for dizziness, light-headedness, seizures and tremors. Negative for focal weakness, headaches and numbness.   Psychiatric/Behavioral: Negative for depression and suicidal ideas. The patient is not nervous/anxious.    All other systems reviewed and are negative.    Vitals:    08/06/20 1018   BP: 143/81   BP Location: Right arm   Patient Position: Sitting   Pulse: 63   Temp: 97.7 °F (36.5 °C)   SpO2: 98%   Weight: 86 kg (189 lb 9.6 oz)   Height: 167.6 cm (66\")      Physical Exam   Constitutional: He is oriented to person, place, and time. He appears well-developed and well-nourished. No distress.   HENT:   Head: Normocephalic.   Right Ear: External ear normal.   Left Ear: External ear normal.   Nose: Nose normal.   Eyes: Pupils are equal, round, and reactive to light.   Neck: Normal range of motion. Neck supple. No tracheal deviation present. No thyromegaly present.   Cardiovascular: Normal rate, regular rhythm, normal heart sounds and intact distal pulses.   No murmur " heard.  Pulmonary/Chest: Effort normal and breath sounds normal. No respiratory distress. He has no wheezes. He has no rales. He exhibits no tenderness.   Abdominal: Soft. Bowel sounds are normal. He exhibits no distension and no mass. There is no tenderness.   Musculoskeletal: Normal range of motion. He exhibits no edema.   Abdominal incision is healed.  Femoral pulses are 2+ without bruits.  Popliteal, dorsalis pedis, and posterior tibial pulses all palpable.  Hair, skin, nails of both lower extremities is normal with no evidence of ischemia.   Lymphadenopathy:     He has no cervical adenopathy.   Neurological: He is alert and oriented to person, place, and time. He has normal reflexes. No cranial nerve deficit.   Skin: Skin is warm and dry. No rash noted. No erythema.   Psychiatric: He has a normal mood and affect. His behavior is normal. Judgment and thought content normal.       The past medical history, surgical history, family history, social history, review of systems and vitals were reviewed by myself and corrected as needed.      Labs/Imaging: None    Assessment/Plan: Stable postoperative course.  Patient will return to this clinic in 1 year for follow-up.    Patient Active Problem List   Diagnosis   • H/o Left MCA ischemic CVA 4/2017   • Essential hypertension   • Dyslipidemia   • Bilateral carotid artery stenosis s/p L CEA 4/27/17. 50-69% R carotid stenosis   • Gastroesophageal reflux disease without esophagitis   • Combined receptive and expressive aphasia due to cerebrovascular accident   • Abnormal peripheral vision of right eye   • GERD (gastroesophageal reflux disease)   • Hyperlipidemia   • Palpitations   • Bilateral Carotid artery stenosis. Left greater than right    • Internal carotid artery stenosis, left   • Status post left carotid endarterectomy   • Type 2 diabetes mellitus. HbA1c 6.6   • Bilateral carpal tunnel syndrome   • PAD. Occluded distal aorta, common and external iliacs, and superior  femoral arteries   • CVA (cerebral vascular accident) (CMS/HCC), rule out    • Breakthrough seizure (CMS/HCC)   • Aortic occlusion s/p aorto-femoral bypass 10/11/19 per Dr. Weaver    • Seizure disorder (CMS/HCC)

## 2020-08-07 DIAGNOSIS — I10 ESSENTIAL HYPERTENSION: ICD-10-CM

## 2020-08-07 RX ORDER — METOPROLOL SUCCINATE 200 MG/1
200 TABLET, EXTENDED RELEASE ORAL DAILY
Qty: 90 TABLET | Refills: 1 | Status: CANCELLED | OUTPATIENT
Start: 2020-08-07

## 2020-08-07 NOTE — TELEPHONE ENCOUNTER
Caller: Jarod Vince W    Relationship: Self    Best call back number: [phone]     Medication needed:   Requested Prescriptions     Pending Prescriptions Disp Refills   • metoprolol succinate XL (Toprol XL) 200 MG 24 hr tablet 90 tablet 1     Sig: Take 1 tablet by mouth Daily.       When do you need the refill by: ASAP    What details did the patient provide when requesting the medication: PATIENT HAS 2 PILLS LEFT    Does the patient have less than a 3 day supply:  [x] Yes  [] No    What is the patient's preferred pharmacy: [facility] [address] [phone] [facility] [phone]

## 2020-09-09 DIAGNOSIS — E11.9 DIABETES MELLITUS TYPE 2 IN NONOBESE (HCC): ICD-10-CM

## 2020-10-01 DIAGNOSIS — I10 ESSENTIAL HYPERTENSION: ICD-10-CM

## 2020-10-01 RX ORDER — AMLODIPINE BESYLATE 10 MG/1
10 TABLET ORAL DAILY
Qty: 30 TABLET | Refills: 5 | Status: CANCELLED | OUTPATIENT
Start: 2020-10-01

## 2020-10-01 RX ORDER — AMLODIPINE BESYLATE 10 MG/1
TABLET ORAL
Qty: 30 TABLET | Refills: 5 | Status: SHIPPED | OUTPATIENT
Start: 2020-10-01 | End: 2021-04-02

## 2020-10-01 NOTE — TELEPHONE ENCOUNTER
Caller: DavisDonna    Relationship: Emergency Contact    Best call back number: [phone]    Medication needed:   Requested Prescriptions     Pending Prescriptions Disp Refills   • amLODIPine (NORVASC) 10 MG tablet 30 tablet 5     Sig: Take 1 tablet by mouth Daily.       When do you need the refill by: AS SOON AS AVAILABLE    What details did the patient provide when requesting the medication: PATIENT HAS 2 PILLS LEFT.     Does the patient have less than a 3 day supply:  [x] Yes  [] No    What is the patient's preferred pharmacy: [facility] [address] [phone] [facility] [phone]

## 2020-11-02 DIAGNOSIS — I10 ESSENTIAL HYPERTENSION: ICD-10-CM

## 2020-11-02 RX ORDER — LOSARTAN POTASSIUM 100 MG/1
TABLET ORAL
Qty: 180 TABLET | Refills: 3 | Status: SHIPPED | OUTPATIENT
Start: 2020-11-02 | End: 2021-04-26

## 2020-12-02 DIAGNOSIS — I10 ESSENTIAL HYPERTENSION: ICD-10-CM

## 2020-12-02 DIAGNOSIS — I65.23 BILATERAL CAROTID ARTERY STENOSIS: ICD-10-CM

## 2020-12-02 RX ORDER — HYDROCHLOROTHIAZIDE 12.5 MG/1
12.5 TABLET ORAL DAILY
Qty: 30 TABLET | Refills: 6 | Status: SHIPPED | OUTPATIENT
Start: 2020-12-02 | End: 2021-06-30

## 2020-12-02 NOTE — TELEPHONE ENCOUNTER
Caller: Donna Davis    Relationship: Emergency Contact    Best call back number: [phone]    Medication needed:   Requested Prescriptions     Pending Prescriptions Disp Refills   • hydroCHLOROthiazide (HYDRODIURIL) 12.5 MG tablet 30 tablet 6     Sig: Take 1 tablet by mouth Daily.       What details did the patient provide when requesting the medication: PATIENT TOTALLY OUT OF MEDICATION    Does the patient have less than a 3 day supply:  [x] Yes  [] No    What is the patient's preferred pharmacy: [facility] [address] [phone] [facility] [phone]

## 2020-12-21 ENCOUNTER — TELEPHONE (OUTPATIENT)
Dept: NEUROLOGY | Facility: CLINIC | Age: 60
End: 2020-12-21

## 2020-12-21 NOTE — TELEPHONE ENCOUNTER
KAREEN CANDELARIO (GIRLFRIEND)  [phone]    KAREEN CALLED IN BECAUSE THE PHARMACY TOLD THE PT THAT HIS VIMPAT PRESCRIPTION WILL NEED ANOTHER PRE-AUTHORIZATION. SHE ALSO STATED THAT THE PHARMACY WOULD LIKE FOR THE OFFICE TO GIVE THEM A CALL BECAUSE THE PHARMACY'S FAX IS NOT WORKING AT THE MOMENT. PH: [phone]    PLEASE GIVE KAREEN A CALL TO UPDATE HER ON WHEN THE PRE-AUTH FOR THE MEDICATION IS DONE.

## 2020-12-23 NOTE — TELEPHONE ENCOUNTER
Provider: *KINGSTON GONSALEZ  Caller:KAREEN CANDELARIO  Relationship to Patient: PT'S GIRLFRIEND  Pharmacy: [facility]    Reason for Call: PT'S GIRLFRIEND IS CALLING STATING THAT PT IS OUT OF MEDICATION Lacosamide (Vimpat) 150 MG tablet AND IS WANTING WANTING TO LET KINGSTON ABRTH THAT THE [facility] FAX IS STILL NOT WORKING AND THAT THE PRESCRIPTION FOR TH VIMPAT AND PA NEEDS TO BE CALLED INTO THE PHARMACY.    PLEASE CALL HER BACK [phone]

## 2020-12-23 NOTE — TELEPHONE ENCOUNTER
I called pharmacy and pt did have one more refill and it was approved until 12- and they will request another script which I will forward to provider to fill due to controlled and let pt know this as well.

## 2020-12-24 DIAGNOSIS — G40.909 SEIZURE DISORDER AS SEQUELA OF CEREBROVASCULAR ACCIDENT (HCC): ICD-10-CM

## 2020-12-24 DIAGNOSIS — I69.398 SEIZURE DISORDER AS SEQUELA OF CEREBROVASCULAR ACCIDENT (HCC): ICD-10-CM

## 2020-12-24 RX ORDER — LACOSAMIDE 150 MG/1
75 TABLET, FILM COATED ORAL 2 TIMES DAILY
Qty: 30 TABLET | Refills: 5 | Status: SHIPPED | OUTPATIENT
Start: 2020-12-24 | End: 2021-01-18

## 2020-12-29 DIAGNOSIS — E78.5 DYSLIPIDEMIA: ICD-10-CM

## 2020-12-29 RX ORDER — ATORVASTATIN CALCIUM 80 MG/1
TABLET, FILM COATED ORAL
Qty: 30 TABLET | Refills: 5 | Status: SHIPPED | OUTPATIENT
Start: 2020-12-29 | End: 2021-07-06

## 2021-01-18 ENCOUNTER — OFFICE VISIT (OUTPATIENT)
Dept: NEUROLOGY | Facility: CLINIC | Age: 61
End: 2021-01-18

## 2021-01-18 VITALS
OXYGEN SATURATION: 97 % | DIASTOLIC BLOOD PRESSURE: 90 MMHG | WEIGHT: 189 LBS | BODY MASS INDEX: 30.37 KG/M2 | HEIGHT: 66 IN | TEMPERATURE: 96.9 F | SYSTOLIC BLOOD PRESSURE: 150 MMHG | HEART RATE: 68 BPM

## 2021-01-18 DIAGNOSIS — I65.23 BILATERAL CAROTID ARTERY STENOSIS: ICD-10-CM

## 2021-01-18 DIAGNOSIS — I69.398 SEIZURE DISORDER AS SEQUELA OF CEREBROVASCULAR ACCIDENT (HCC): ICD-10-CM

## 2021-01-18 DIAGNOSIS — I65.23 BILATERAL CAROTID ARTERY STENOSIS: Primary | ICD-10-CM

## 2021-01-18 DIAGNOSIS — G40.909 SEIZURE DISORDER AS SEQUELA OF CEREBROVASCULAR ACCIDENT (HCC): ICD-10-CM

## 2021-01-18 PROCEDURE — 99214 OFFICE O/P EST MOD 30 MIN: CPT | Performed by: NURSE PRACTITIONER

## 2021-01-18 RX ORDER — LEVETIRACETAM 500 MG/1
500 TABLET ORAL 2 TIMES DAILY
Qty: 60 TABLET | Refills: 5 | Status: SHIPPED | OUTPATIENT
Start: 2021-01-18 | End: 2021-07-08 | Stop reason: SDUPTHER

## 2021-01-18 RX ORDER — LACOSAMIDE 150 MG/1
75 TABLET, FILM COATED ORAL 2 TIMES DAILY
Qty: 30 TABLET | Refills: 5 | Status: SHIPPED | OUTPATIENT
Start: 2021-01-18 | End: 2021-07-16 | Stop reason: SDUPTHER

## 2021-01-18 RX ORDER — LEVETIRACETAM 1000 MG/1
1000 TABLET ORAL 2 TIMES DAILY
Qty: 60 TABLET | Refills: 5 | Status: SHIPPED | OUTPATIENT
Start: 2021-01-18 | End: 2021-07-08 | Stop reason: SDUPTHER

## 2021-01-18 NOTE — PROGRESS NOTES
Subjective:     Patient ID: Vince Olivera is a 60 y.o. male.    CC:   Chief Complaint   Patient presents with   • Seizures       HPI:   History of Present Illness   Mr. Olivera is here today for six-month follow-up on seizure disorder.    He has seizures as a sequela of CVA in 2017.  He is currently managed on Keppra 1500 mg twice daily as well as Vimpat 75 mg daily, (he currently takes half of a 150 daily)       He has no complaints today, he tells me he is doing great. He has had no seizure activity since  immediately before his femoropopliteal bypass in October 2019.  He is doing very well overall.  He is status post aortofemoral bypass and is doing well postoperatively.  He has no other complaints today, he has a mild degree of dizziness, this greatly improved after stopping Dilantin and starting Vimpat however I do suspect that he has chronic dizziness from his prior CVA  He has a history of carotid stenosis, last carotid ultrasound showed a 50 to 69% carotid stenosis, he was seen by Dr. Bach in follow-up. He has not had a carotid ultrasound since October 2019, will order repeat carotid studies today, if abnormal will refer back to Dr. Bach for annual follow-up.  He has had no stroke symptoms, he is very active around his house and has no complaints today    The following portions of the patient's history were reviewed and updated as appropriate: allergies, current medications, past family history, past medical history, past social history, past surgical history and problem list.    Past Medical History:   Diagnosis Date   • Arthritis    • Carotid artery disease (CMS/HCC)    • Carotid artery disorder (CMS/HCC)    • Depression    • Diabetes mellitus (CMS/HCC)     DIAGNOSED 4-2017 CHECKS FSBG 3X/WEEK    • Ear infection     about 2 weeks ago- cleared up - wax removed and cleaned out    • GERD (gastroesophageal reflux disease)     self diagnosed takes otc ppi   • Hyperlipidemia    • Hypertension    •  Hypertension    • Inguinal hernia    • Seizure (CMS/HCC)     last 10/8/2019   • Shoulder pain     bilat    • Stroke (CMS/HCC) 04/24/2017    EXPRESSIVE APHASIA RESIDUAL    • Tooth loose     5 - all over- will get teeth done after this surgery    • Wears reading eyeglasses        Past Surgical History:   Procedure Laterality Date   • ABDOMINAL HERNIA REPAIR  2010   • AORTA FEMORAL BYPASS N/A 10/11/2019    Procedure: AORTA FEMORAL BYPASS, BILATERAL FEMORAL ENDARTERECTOMY;  Surgeon: Dylan Weaver MD;  Location:  [facility];  Service: Vascular   • CAROTID ENDARTERECTOMY Left 10/17/2017    Procedure: CAROTID ENDARTERECTOMY LEFT;  Surgeon: Alexx Bach MD;  Location:  [facility];  Service:    • INGUINAL HERNIA REPAIR Left        Social History     Socioeconomic History   • Marital status: Single     Spouse name: Not on file   • Number of children: 2   • Years of education: Not on file   • Highest education level: Not on file   Occupational History   • Occupation: Disabled/allan     Comment: disabled from stroke   Tobacco Use   • Smoking status: Former Smoker     Packs/day: 0.05     Years: 40.00     Pack years: 2.00     Types: Cigarettes     Quit date: 4/26/2017     Years since quitting: 3.7   • Smokeless tobacco: Former User     Types: Snuff   • Tobacco comment: quit snuff when 18 or 19 years old - only did for baseball - 2-3 years    Substance and Sexual Activity   • Alcohol use: No   • Drug use: No   • Sexual activity: Defer     Partners: Female     Comment: SPOUSE   Social History Narrative    Patient consumes 0-1serving of caffeine daily.     Patient lives at home with wife in [facility]       Family History   Adopted: Yes   Problem Relation Age of Onset   • Diabetes Mother    • COPD Father         Review of Systems   Constitutional: Negative.    HENT: Negative.    Eyes: Negative.    Respiratory: Negative.    Cardiovascular: Negative.    Gastrointestinal: Negative.    Endocrine: Negative.   "  Genitourinary: Negative.    Musculoskeletal: Negative.    Skin: Negative.    Allergic/Immunologic: Negative.    Neurological: Positive for seizures. Negative for dizziness, tremors, syncope, facial asymmetry, speech difficulty, weakness, light-headedness, numbness and headaches.   Hematological: Negative.    Psychiatric/Behavioral: Negative.         Objective:  /90   Pulse 68   Temp 96.9 °F (36.1 °C)   Ht 167.6 cm (66\")   Wt 85.7 kg (189 lb)   SpO2 97%   BMI 30.51 kg/m²     Neurologic Exam     Mental Status   Oriented to person, place, and time.   Attention: normal. Concentration: normal.   Speech: speech is normal   Level of consciousness: alert  Knowledge: consistent with education.   Able to read. Able to write. Normal comprehension.     Cranial Nerves   Cranial nerves II through XII intact.     CN II   Visual fields full to confrontation.   Right visual field deficit: none  Left visual field deficit: none     CN III, IV, VI   Pupils are equal, round, and reactive to light.  Right pupil: Shape: regular. Reactivity: brisk. Consensual response: intact. Accommodation: intact.   Left pupil: Shape: regular. Reactivity: brisk. Consensual response: intact. Accommodation: intact.   CN III: no CN III palsy  CN VI: no CN VI palsy  Nystagmus: none   Upgaze: normal  Downgaze: normal    CN V   Facial sensation intact.     CN VII   Facial expression full, symmetric.     CN VIII   CN VIII normal.     CN IX, X   CN IX normal.   CN X normal.     CN XI   CN XI normal.     CN XII   CN XII normal.     Motor Exam   Muscle bulk: normal  Overall muscle tone: normal  Right arm tone: normal  Left arm tone: normal  Right arm pronator drift: absent  Left arm pronator drift: absent  Right leg tone: normal  Left leg tone: normal    Strength   Strength 5/5 throughout.     Sensory Exam   Light touch normal.     Gait, Coordination, and Reflexes     Gait  Gait: normal    Coordination   Finger to nose coordination: normal    Tremor "   Resting tremor: absent  Intention tremor: absent  Action tremor: absent    Reflexes   Reflexes 2+ except as noted.   Right Rick: absent  Left Rick: absent      Physical Exam  Vitals signs reviewed.   Constitutional:       Appearance: He is well-developed.   HENT:      Head: Normocephalic and atraumatic.   Eyes:      General: No scleral icterus.     Extraocular Movements: Extraocular movements intact.      Conjunctiva/sclera: Conjunctivae normal.      Pupils: Pupils are equal, round, and reactive to light.   Neck:      Musculoskeletal: Normal range of motion and neck supple.   Pulmonary:      Effort: Pulmonary effort is normal. No respiratory distress.      Breath sounds: Normal breath sounds.   Skin:     General: Skin is warm.      Capillary Refill: Capillary refill takes less than 2 seconds.   Neurological:      General: No focal deficit present.      Mental Status: He is alert and oriented to person, place, and time.      Coordination: Finger-Nose-Finger Test normal.      Gait: Gait is intact.      Deep Tendon Reflexes: Strength normal.   Psychiatric:         Mood and Affect: Mood normal.         Speech: Speech normal.         Behavior: Behavior normal.         Thought Content: Thought content normal.         Judgment: Judgment normal.         Assessment/Plan:       Diagnoses and all orders for this visit:    1. Bilateral carotid artery stenosis s/p L CEA 4/27/17. 50-69% R carotid stenosis (Primary)  -     Duplex Carotid Ultrasound CAR    2. Seizure disorder on Keppra and Vimpat. 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  Dispense: 60 tablet; Refill: 5  -     levETIRAcetam (KEPPRA) 1000 MG tablet; Take 1 tablet by mouth 2 (Two) Times a Day.  Dispense: 60 tablet; Refill: 5  -     Lacosamide (Vimpat) 150 MG tablet; Take 75 mg by mouth 2 (two) times a day.  Dispense: 30 tablet; Refill: 5    Vince is doing well, he has had no seizure  activity since 2019. Continue current dose of Keppra and Vimpat.  He will continue follow-up with his primary care, continue aspirin, statin and Plavix for stroke prevention  Repeat carotid artery ultrasound, recommend annual follow-up with Dr. Bach  I will see him back in 6 months or sooner if needed, if he has any questions concerns or problems prior to follow-up appointment he is encouraged to notify my office ASAP  Discussed signs and symptoms of stroke and when to call 911. Instructed to follow a low fat diet including the Mediterranean diet. Instructed to take all medications daily as prescribed. Encouraged 30 minutes of exercise 3-4 times a week as tolerated. Stay well hydrated. Discussed potential side effects of new medications and signs and symptoms to report. If patient is currently using tobacco products, smoking cessation was encouraged. Reviewed stroke risk factors and stroke prevention plan. Patient and/or family verbalizes understanding and agrees with plan.     Patient instructions include: No driving or operating heavy machinery for 3 months from onset of most recent seizure. Minimize stress as much as possible. Recommended 7-8 hours of sleep each night. Abstain from alcohol intake. Educated on Antiepileptic medications with possible side effects and signs and symptoms to report if prescribed during visit. Instructed to take seizure medication daily if prescribed. Reviewed potential seizure risk factors. Instructed to call 911 or our office if another seizure does occur.           Reviewed medications, potential side effects and signs and symptoms to report. Discussed risk versus benefits of treatment plan with patient and/or family-including medications, labs and radiology that may be ordered. Addressed questions and concerns during visit. Patient and/or family verbalized understanding and agree with plan.    AS THE PROVIDER, I PERSONALLY WORE PPE DURING ENTIRE FACE TO FACE ENCOUNTER IN CLINIC  WITH THE PATIENT. PATIENT ALSO WORE PPE DURING ENTIRE FACE TO FACE ENCOUNTER EXCEPT FOR A MAX OF 30 SECONDS DURING NEUROLOGICAL EVALUATION OF CRANIAL NERVES AND THEN MASK WAS PLACED BACK OVER PATIENT FACE FOR REMAINDER OF VISIT. I WASHED MY HANDS BEFORE AND AFTER VISIT.        Ani Haas, APRN  1/18/2021

## 2021-01-21 DIAGNOSIS — Z12.5 SCREENING PSA (PROSTATE SPECIFIC ANTIGEN): ICD-10-CM

## 2021-01-21 DIAGNOSIS — E11.9 TYPE 2 DIABETES MELLITUS WITHOUT COMPLICATION, WITHOUT LONG-TERM CURRENT USE OF INSULIN (HCC): ICD-10-CM

## 2021-01-21 DIAGNOSIS — I10 ESSENTIAL HYPERTENSION: Primary | ICD-10-CM

## 2021-01-21 DIAGNOSIS — E78.5 DYSLIPIDEMIA: ICD-10-CM

## 2021-01-22 ENCOUNTER — LAB (OUTPATIENT)
Dept: FAMILY MEDICINE CLINIC | Facility: CLINIC | Age: 61
End: 2021-01-22

## 2021-01-22 DIAGNOSIS — I10 ESSENTIAL HYPERTENSION: ICD-10-CM

## 2021-01-22 DIAGNOSIS — E78.5 DYSLIPIDEMIA: ICD-10-CM

## 2021-01-22 DIAGNOSIS — Z12.5 SCREENING PSA (PROSTATE SPECIFIC ANTIGEN): ICD-10-CM

## 2021-01-22 DIAGNOSIS — E11.9 TYPE 2 DIABETES MELLITUS WITHOUT COMPLICATION, WITHOUT LONG-TERM CURRENT USE OF INSULIN (HCC): ICD-10-CM

## 2021-01-22 DIAGNOSIS — Z23 NEED FOR INFLUENZA VACCINATION: Primary | ICD-10-CM

## 2021-01-22 LAB
ALBUMIN SERPL-MCNC: 4.4 G/DL (ref 3.5–5.2)
ALBUMIN UR-MCNC: <1.2 MG/DL
ALBUMIN/GLOB SERPL: 1.7 G/DL
ALP SERPL-CCNC: 61 U/L (ref 39–117)
ALT SERPL W P-5'-P-CCNC: 19 U/L (ref 1–41)
ANION GAP SERPL CALCULATED.3IONS-SCNC: 8.1 MMOL/L (ref 5–15)
AST SERPL-CCNC: 8 U/L (ref 1–40)
BASOPHILS # BLD AUTO: 0.05 10*3/MM3 (ref 0–0.2)
BASOPHILS NFR BLD AUTO: 0.4 % (ref 0–1.5)
BILIRUB SERPL-MCNC: 0.3 MG/DL (ref 0–1.2)
BUN SERPL-MCNC: 10 MG/DL (ref 6–20)
BUN/CREAT SERPL: 10 (ref 7–25)
CALCIUM SPEC-SCNC: 9.7 MG/DL (ref 8.6–10.5)
CHLORIDE SERPL-SCNC: 100 MMOL/L (ref 98–107)
CHOLEST SERPL-MCNC: 92 MG/DL (ref 0–200)
CO2 SERPL-SCNC: 28.9 MMOL/L (ref 22–29)
CREAT SERPL-MCNC: 1 MG/DL (ref 0.76–1.27)
DEPRECATED RDW RBC AUTO: 42 FL (ref 37–54)
EOSINOPHIL # BLD AUTO: 0.09 10*3/MM3 (ref 0–0.4)
EOSINOPHIL NFR BLD AUTO: 0.8 % (ref 0.3–6.2)
ERYTHROCYTE [DISTWIDTH] IN BLOOD BY AUTOMATED COUNT: 13 % (ref 12.3–15.4)
GFR SERPL CREATININE-BSD FRML MDRD: 76 ML/MIN/1.73
GLOBULIN UR ELPH-MCNC: 2.6 GM/DL
GLUCOSE SERPL-MCNC: 138 MG/DL (ref 65–99)
HBA1C MFR BLD: 6.5 % (ref 4.8–5.6)
HCT VFR BLD AUTO: 45.4 % (ref 37.5–51)
HDLC SERPL-MCNC: 27 MG/DL (ref 40–60)
HGB BLD-MCNC: 15.7 G/DL (ref 13–17.7)
IMM GRANULOCYTES # BLD AUTO: 0.05 10*3/MM3 (ref 0–0.05)
IMM GRANULOCYTES NFR BLD AUTO: 0.4 % (ref 0–0.5)
LDLC SERPL CALC-MCNC: 40 MG/DL (ref 0–100)
LDLC/HDLC SERPL: 1.37 {RATIO}
LYMPHOCYTES # BLD AUTO: 4.29 10*3/MM3 (ref 0.7–3.1)
LYMPHOCYTES NFR BLD AUTO: 37.4 % (ref 19.6–45.3)
MCH RBC QN AUTO: 31.3 PG (ref 26.6–33)
MCHC RBC AUTO-ENTMCNC: 34.6 G/DL (ref 31.5–35.7)
MCV RBC AUTO: 90.6 FL (ref 79–97)
MONOCYTES # BLD AUTO: 0.78 10*3/MM3 (ref 0.1–0.9)
MONOCYTES NFR BLD AUTO: 6.8 % (ref 5–12)
NEUTROPHILS NFR BLD AUTO: 54.2 % (ref 42.7–76)
NEUTROPHILS NFR BLD AUTO: 6.2 10*3/MM3 (ref 1.7–7)
NRBC BLD AUTO-RTO: 0.1 /100 WBC (ref 0–0.2)
PLATELET # BLD AUTO: 281 10*3/MM3 (ref 140–450)
PMV BLD AUTO: 8.9 FL (ref 6–12)
POTASSIUM SERPL-SCNC: 4.8 MMOL/L (ref 3.5–5.2)
PROT SERPL-MCNC: 7 G/DL (ref 6–8.5)
PSA SERPL-MCNC: 1.98 NG/ML (ref 0–4)
RBC # BLD AUTO: 5.01 10*6/MM3 (ref 4.14–5.8)
SODIUM SERPL-SCNC: 137 MMOL/L (ref 136–145)
TRIGL SERPL-MCNC: 140 MG/DL (ref 0–150)
VLDLC SERPL-MCNC: 25 MG/DL (ref 5–40)
WBC # BLD AUTO: 11.46 10*3/MM3 (ref 3.4–10.8)

## 2021-01-22 PROCEDURE — 83036 HEMOGLOBIN GLYCOSYLATED A1C: CPT | Performed by: NURSE PRACTITIONER

## 2021-01-22 PROCEDURE — 80061 LIPID PANEL: CPT | Performed by: NURSE PRACTITIONER

## 2021-01-22 PROCEDURE — 80053 COMPREHEN METABOLIC PANEL: CPT | Performed by: NURSE PRACTITIONER

## 2021-01-22 PROCEDURE — 90471 IMMUNIZATION ADMIN: CPT | Performed by: NURSE PRACTITIONER

## 2021-01-22 PROCEDURE — G0103 PSA SCREENING: HCPCS | Performed by: NURSE PRACTITIONER

## 2021-01-22 PROCEDURE — 82043 UR ALBUMIN QUANTITATIVE: CPT | Performed by: NURSE PRACTITIONER

## 2021-01-22 PROCEDURE — 85025 COMPLETE CBC W/AUTO DIFF WBC: CPT | Performed by: NURSE PRACTITIONER

## 2021-01-22 PROCEDURE — 90686 IIV4 VACC NO PRSV 0.5 ML IM: CPT | Performed by: NURSE PRACTITIONER

## 2021-01-25 NOTE — PROGRESS NOTES
His Average blood sugar is stable. His white blood cell count has been elevated but stable for some time now. Has he been evaluated for elevated white blood cell count?

## 2021-01-27 ENCOUNTER — APPOINTMENT (OUTPATIENT)
Dept: CARDIOLOGY | Facility: HOSPITAL | Age: 61
End: 2021-01-27

## 2021-01-27 DIAGNOSIS — I65.23 BILATERAL CAROTID ARTERY STENOSIS: ICD-10-CM

## 2021-01-27 DIAGNOSIS — I63.9 CEREBROVASCULAR ACCIDENT (CVA), UNSPECIFIED MECHANISM (HCC): ICD-10-CM

## 2021-01-28 RX ORDER — CLOPIDOGREL BISULFATE 75 MG/1
TABLET ORAL
Qty: 90 TABLET | Refills: 1 | OUTPATIENT
Start: 2021-01-28

## 2021-01-28 RX ORDER — ASPIRIN 325 MG
TABLET, DELAYED RELEASE (ENTERIC COATED) ORAL
Qty: 90 TABLET | Refills: 1 | OUTPATIENT
Start: 2021-01-28

## 2021-01-29 ENCOUNTER — OFFICE VISIT (OUTPATIENT)
Dept: FAMILY MEDICINE CLINIC | Facility: CLINIC | Age: 61
End: 2021-01-29

## 2021-01-29 VITALS
DIASTOLIC BLOOD PRESSURE: 75 MMHG | BODY MASS INDEX: 31.82 KG/M2 | WEIGHT: 198 LBS | SYSTOLIC BLOOD PRESSURE: 115 MMHG | TEMPERATURE: 97.5 F | HEART RATE: 55 BPM | OXYGEN SATURATION: 98 % | HEIGHT: 66 IN | RESPIRATION RATE: 16 BRPM

## 2021-01-29 DIAGNOSIS — E78.5 DYSLIPIDEMIA: Chronic | ICD-10-CM

## 2021-01-29 DIAGNOSIS — E11.9 DIABETES MELLITUS TYPE 2 IN NONOBESE (HCC): Chronic | ICD-10-CM

## 2021-01-29 DIAGNOSIS — I65.23 BILATERAL CAROTID ARTERY STENOSIS: ICD-10-CM

## 2021-01-29 DIAGNOSIS — I10 ESSENTIAL HYPERTENSION: Primary | Chronic | ICD-10-CM

## 2021-01-29 PROCEDURE — 99214 OFFICE O/P EST MOD 30 MIN: CPT | Performed by: NURSE PRACTITIONER

## 2021-01-29 RX ORDER — METOPROLOL SUCCINATE 200 MG/1
200 TABLET, EXTENDED RELEASE ORAL DAILY
Qty: 90 TABLET | Refills: 1 | Status: SHIPPED | OUTPATIENT
Start: 2021-01-29 | End: 2021-02-01

## 2021-01-29 RX ORDER — ASPIRIN 325 MG
325 TABLET, DELAYED RELEASE (ENTERIC COATED) ORAL DAILY
Qty: 90 TABLET | Refills: 1 | Status: SHIPPED | OUTPATIENT
Start: 2021-01-29 | End: 2021-08-03

## 2021-01-29 NOTE — PATIENT INSTRUCTIONS

## 2021-01-29 NOTE — PROGRESS NOTES
"Subjective   Vince Olivera is a 60 y.o. male.     Chief Complaint   Patient presents with   • Diabetes       He presents for follow up of essential hypertension, mixed hyperlipidemia, and type II dm. He reports good compliance and tolerance with medications. He has not been checking his blood sugar. He had blood drawn Monday. He has an appointment Monday to get an ultrasound of the carotids. Neurology and cardiology notes reviewed.        The following portions of the patient's history were reviewed and updated as appropriate: allergies, current medications, past family history, past medical history, past social history, past surgical history and problem list.    Review of Systems   Constitutional: Negative for fever and unexpected weight change.   HENT: Negative for ear pain, rhinorrhea, sore throat and trouble swallowing.    Eyes: Negative for visual disturbance.   Respiratory: Negative for cough, shortness of breath and wheezing.    Cardiovascular: Negative for chest pain and palpitations.   Gastrointestinal: Negative for abdominal pain, blood in stool, constipation, diarrhea, nausea and vomiting.   Genitourinary: Negative for dysuria and hematuria.   Musculoskeletal: Negative for arthralgias and myalgias.   Skin: Negative for color change.   Allergic/Immunologic: Negative for environmental allergies.   Neurological: Negative for dizziness and headaches.   Hematological: Negative for adenopathy.   Psychiatric/Behavioral: Negative for sleep disturbance and suicidal ideas. The patient is not nervous/anxious.        Objective     /75 (BP Location: Right arm, Patient Position: Sitting, Cuff Size: Adult)   Pulse 55   Temp 97.5 °F (36.4 °C) (Temporal)   Resp 16   Ht 167.6 cm (65.98\")   Wt 89.8 kg (198 lb)   SpO2 98%   BMI 31.97 kg/m²     Physical Exam  Vitals signs reviewed.   Constitutional:       General: He is not in acute distress.     Appearance: He is well-developed. He is not diaphoretic.   HENT: "      Head: Normocephalic and atraumatic.   Cardiovascular:      Rate and Rhythm: Normal rate and regular rhythm.      Heart sounds: Normal heart sounds. No murmur. No friction rub. No gallop.    Pulmonary:      Effort: Pulmonary effort is normal. No respiratory distress.      Breath sounds: Normal breath sounds.   Abdominal:      General: Bowel sounds are normal. There is no distension.      Palpations: Abdomen is soft.      Tenderness: There is no abdominal tenderness.   Skin:     General: Skin is warm and dry.   Neurological:      Mental Status: He is alert and oriented to person, place, and time.   Psychiatric:         Behavior: Behavior normal.         Thought Content: Thought content normal.         Judgment: Judgment normal.         Assessment/Plan     Problem List Items Addressed This Visit        Cardiac and Vasculature    Essential hypertension - Primary    Relevant Medications    metoprolol succinate XL (Toprol XL) 200 MG 24 hr tablet    Other Relevant Orders    CBC & Differential    Comprehensive Metabolic Panel    Dyslipidemia    Relevant Orders    Lipid Panel    Bilateral carotid artery stenosis s/p L CEA 4/27/17. 50-69% R carotid stenosis    Overview     S/P L CEA in 2017   50-69% stenosis likely of right proximal internal carotid artery in 7/2019          Relevant Medications    aspirin  MG tablet      Other Visit Diagnoses     Diabetes mellitus type 2 in nonobese (CMS/[facility])  (Chronic)       Relevant Medications    metFORMIN (GLUCOPHAGE) 500 MG tablet    Other Relevant Orders    CBC & Differential    Comprehensive Metabolic Panel    Hemoglobin A1c    MicroAlbumin, Urine, Random - Urine, Clean Catch          Plan: Continue current meds. CBC, CMP, Lipids, PSA, normal, A1C 6.55. Keep follow ups with neurology and cardiology. Follow up in 6 months and as needed.     Patient's Body mass index is 31.97 kg/m². BMI is above normal parameters. Recommendations include: educational material.   (Normal BMI:   18.5-24.9, OW 25-29.9, Obesity 30 or greater)           Understands disease processes and need for medications.  Understands reasons for urgent and emergent care.  Patient (& family) verbalized agreement for treatment plan.   Emotional support and active listening provided.  Patient provided time to verbalize feelings.               This document has been electronically signed by KINGSTON Gallardo   January 29, 2021 15:05 EST

## 2021-02-01 ENCOUNTER — HOSPITAL ENCOUNTER (OUTPATIENT)
Dept: CARDIOLOGY | Facility: HOSPITAL | Age: 61
Discharge: HOME | End: 2021-02-01
Admitting: NURSE PRACTITIONER

## 2021-02-01 DIAGNOSIS — I10 ESSENTIAL HYPERTENSION: Chronic | ICD-10-CM

## 2021-02-01 PROCEDURE — 93880 EXTRACRANIAL BILAT STUDY: CPT

## 2021-02-01 PROCEDURE — 93880 EXTRACRANIAL BILAT STUDY: CPT | Performed by: INTERNAL MEDICINE

## 2021-02-01 RX ORDER — METOPROLOL SUCCINATE 200 MG/1
TABLET, EXTENDED RELEASE ORAL
Qty: 90 TABLET | Refills: 1 | Status: SHIPPED | OUTPATIENT
Start: 2021-02-01 | End: 2021-07-30 | Stop reason: SDUPTHER

## 2021-02-02 LAB
BH CV ECHO MEAS - BSA(HAYCOCK): 2.1 M^2
BH CV ECHO MEAS - BSA: 2 M^2
BH CV ECHO MEAS - BZI_BMI: 33.3 KILOGRAMS/M^2
BH CV ECHO MEAS - BZI_METRIC_HEIGHT: 165.1 CM
BH CV ECHO MEAS - BZI_METRIC_WEIGHT: 90.7 KG
BH CV XLRA MEAS LEFT DIST CCA EDV: 21.5 CM/SEC
BH CV XLRA MEAS LEFT DIST CCA PSV: 74.4 CM/SEC
BH CV XLRA MEAS LEFT DIST ICA EDV: 35.3 CM/SEC
BH CV XLRA MEAS LEFT DIST ICA PSV: 86 CM/SEC
BH CV XLRA MEAS LEFT ICA/CCA RATIO: 1.2
BH CV XLRA MEAS LEFT MID CCA EDV: 22.1 CM/SEC
BH CV XLRA MEAS LEFT MID CCA PSV: 86 CM/SEC
BH CV XLRA MEAS LEFT MID ICA EDV: 35.8 CM/SEC
BH CV XLRA MEAS LEFT MID ICA PSV: 89.9 CM/SEC
BH CV XLRA MEAS LEFT PROX CCA EDV: 19.9 CM/SEC
BH CV XLRA MEAS LEFT PROX CCA PSV: 72.2 CM/SEC
BH CV XLRA MEAS LEFT PROX ECA EDV: 19.3 CM/SEC
BH CV XLRA MEAS LEFT PROX ECA PSV: 98.7 CM/SEC
BH CV XLRA MEAS LEFT PROX ICA EDV: 19.3 CM/SEC
BH CV XLRA MEAS LEFT PROX ICA PSV: 75 CM/SEC
BH CV XLRA MEAS LEFT PROX SCLA EDV: 0 CM/SEC
BH CV XLRA MEAS LEFT PROX SCLA PSV: 99.9 CM/SEC
BH CV XLRA MEAS LEFT VERTEBRAL A EDV: 13.1 CM/SEC
BH CV XLRA MEAS LEFT VERTEBRAL A PSV: 37.7 CM/SEC
BH CV XLRA MEAS RIGHT DIST CCA EDV: 20.4 CM/SEC
BH CV XLRA MEAS RIGHT DIST CCA PSV: 71.1 CM/SEC
BH CV XLRA MEAS RIGHT DIST ICA EDV: 31.4 CM/SEC
BH CV XLRA MEAS RIGHT DIST ICA PSV: 112 CM/SEC
BH CV XLRA MEAS RIGHT ICA/CCA RATIO: 2
BH CV XLRA MEAS RIGHT MID CCA EDV: 19.3 CM/SEC
BH CV XLRA MEAS RIGHT MID CCA PSV: 68.4 CM/SEC
BH CV XLRA MEAS RIGHT MID ICA EDV: 37.3 CM/SEC
BH CV XLRA MEAS RIGHT MID ICA PSV: 139 CM/SEC
BH CV XLRA MEAS RIGHT PROX CCA EDV: 19.3 CM/SEC
BH CV XLRA MEAS RIGHT PROX CCA PSV: 71.7 CM/SEC
BH CV XLRA MEAS RIGHT PROX ECA EDV: 25.5 CM/SEC
BH CV XLRA MEAS RIGHT PROX ECA PSV: 168 CM/SEC
BH CV XLRA MEAS RIGHT PROX ICA EDV: 56 CM/SEC
BH CV XLRA MEAS RIGHT PROX ICA PSV: 147 CM/SEC
BH CV XLRA MEAS RIGHT PROX SCLA EDV: 0 CM/SEC
BH CV XLRA MEAS RIGHT PROX SCLA PSV: 106 CM/SEC
BH CV XLRA MEAS RIGHT VERTEBRAL A EDV: 0 CM/SEC
BH CV XLRA MEAS RIGHT VERTEBRAL A PSV: 23 CM/SEC

## 2021-04-02 DIAGNOSIS — I10 ESSENTIAL HYPERTENSION: ICD-10-CM

## 2021-04-02 RX ORDER — AMLODIPINE BESYLATE 10 MG/1
TABLET ORAL
Qty: 30 TABLET | Refills: 5 | Status: SHIPPED | OUTPATIENT
Start: 2021-04-02 | End: 2021-07-30 | Stop reason: SDUPTHER

## 2021-04-06 NOTE — PROGRESS NOTES
CTS Progress Note      POD #3 s/p Aortobifemoral bypass with bilateral common femoral endarterectomies      Subjective  Patient states that overall he is feeling quite well and has some mild incisional pain.  Patient has tolerated ice chips over the previous 24 hours with no nausea or vomiting.  Patient admits to intermittent flatus.  The patient has been out of bed and up to the chair.      Objective    Physical Exam:   Vital Signs   Temp:  [97.9 °F (36.6 °C)-99.2 °F (37.3 °C)] 97.9 °F (36.6 °C)  Heart Rate:  [] 114  Resp:  [16-18] 18  BP: ()/(60-92) 144/88   GEN: NAD   RESP: Clear to auscultation bilaterally no wheezes, rales or rhonchi    CV: Regular rate and rhythm no murmurs, rubs or gallops   ABD: Soft, mild tenderness, nondistended with hypoactive bowel sounds    EXT: Warm good color well-perfused with no bilateral lower extremity edema and dopplerable bilateral dorsalis pedis and posterior tibialis pulses   INT: Incision dressings c/d/i      Intake/Output Summary (Last 24 hours) at 10/14/2019 0851  Last data filed at 10/14/2019 0800  Gross per 24 hour   Intake 2851.1 ml   Output 1050 ml   Net 1801.1 ml     Results     Results from last 7 days   Lab Units 10/14/19  0514   WBC 10*3/mm3 14.33*   HEMOGLOBIN g/dL 10.4*   HEMATOCRIT % 32.2*   PLATELETS 10*3/mm3 261     Results from last 7 days   Lab Units 10/13/19  0307   SODIUM mmol/L 138   POTASSIUM mmol/L 4.0   CHLORIDE mmol/L 103   CO2 mmol/L 32.0*   BUN mg/dL 10   CREATININE mg/dL 0.65*   GLUCOSE mg/dL 133*   CALCIUM mg/dL 7.8*     Results from last 7 days   Lab Units 10/08/19  0937   INR  0.97   APTT seconds 28.8         Assessment/Plan       Seizure disorder on Keppra and Vimpat. 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 50-69% R carotid stenosis    Gastroesophageal reflux disease without esophagitis    Type 2 diabetes  Form received at [facility] on 4/6/2021.     Please note that it takes 7-10 business days for completion.     Signed authorization received with form.       mellitus. HbA1c 6.6    Bilateral leg and foot pain    PAD. Occluded distal aorta, common and external iliacs, and superior femoral arteries    Aortic occlusion s/p aorto-femoral bypass 10/11/19 per Dr. Weaver         Plan   Slowly advance diet to clear liquids today  Ambulate with assistance  PT/OT  Leave in ICU one more day    KILEY De La Cruz  10/14/19  8:51 AM     Status as outlined above.  Stable postoperative course.  Patient needs to ambulate and advance diet as tolerated.  Will restart Plavix when epidural is out. I have reviewed, verified, and confirmed the above history and current status.  I have examined the patient and confirmed the above physical findings.Above plan and treatment regimen discussed in detail with patient.  Options of treatment, attendant risks vs benefits, and my recommendations were discussed and all questions answered.    Dylan Weaver MD  [facility]  10/14/19   10:22 AM

## 2021-04-26 DIAGNOSIS — I63.9 CEREBROVASCULAR ACCIDENT (CVA), UNSPECIFIED MECHANISM (HCC): ICD-10-CM

## 2021-04-26 DIAGNOSIS — I10 ESSENTIAL HYPERTENSION: ICD-10-CM

## 2021-04-26 RX ORDER — CLOPIDOGREL BISULFATE 75 MG/1
75 TABLET ORAL DAILY
Qty: 90 TABLET | Refills: 1 | Status: SHIPPED | OUTPATIENT
Start: 2021-04-26 | End: 2021-07-30

## 2021-04-26 RX ORDER — LOSARTAN POTASSIUM 100 MG/1
TABLET ORAL
Qty: 180 TABLET | Refills: 3 | Status: SHIPPED | OUTPATIENT
Start: 2021-04-26 | End: 2021-07-30 | Stop reason: SDUPTHER

## 2021-04-26 RX ORDER — CLOPIDOGREL BISULFATE 75 MG/1
TABLET ORAL
Qty: 30 TABLET | Refills: 5 | Status: SHIPPED | OUTPATIENT
Start: 2021-04-26 | End: 2021-07-30 | Stop reason: SDUPTHER

## 2021-04-26 NOTE — TELEPHONE ENCOUNTER
Caller: Donna Davis    Relationship: Emergency Contact    Best call back number:[phone]    Medication needed:   Requested Prescriptions     Pending Prescriptions Disp Refills   • clopidogrel (PLAVIX) 75 MG tablet 90 tablet 1     Sig: Take 1 tablet by mouth Daily.       When do you need the refill by: 04/26/2021    What additional details did the patient provide when requesting the medication: DONNA STATED THAT PATIENT HAS TWO PILLS LEFT AND NEEDS A REFILL. DONNA IS REQUESTING A CALLBACK WHEN PRESCRIPTION HAS BEEN SEND OVER.    Does the patient have less than a 3 day supply:  [x] Yes  [] No    What is the patient's preferred pharmacy: [facility] [address] [phone] [facility] [phone]

## 2021-05-25 DIAGNOSIS — E11.9 DIABETES MELLITUS TYPE 2 IN NONOBESE (HCC): Chronic | ICD-10-CM

## 2021-05-25 NOTE — TELEPHONE ENCOUNTER
Caller: Donna Davis    Relationship: Emergency Contact    Best call back number:     [phone]    Medication needed:   Requested Prescriptions     Pending Prescriptions Disp Refills   • metFORMIN (GLUCOPHAGE) 500 MG tablet 120 tablet 5     Sig: Take 2 tablets by mouth 2 (Two) Times a Day With Meals.     When do you need the refill by:    ASAP    What additional details did the patient provide when requesting the medication:     CALLER STATED PATIENT IS DOWN TO (3) TABLETS LEFT    Does the patient have less than a 3 day supply:  [] Yes  [x] No    What is the patient's preferred pharmacy:      [facility] [place], [place]    TELEPHONE CONTACT:    [phone]    FAX:    [phone]    DR MADRID

## 2021-06-30 DIAGNOSIS — I10 ESSENTIAL HYPERTENSION: ICD-10-CM

## 2021-06-30 DIAGNOSIS — I65.23 BILATERAL CAROTID ARTERY STENOSIS: ICD-10-CM

## 2021-06-30 RX ORDER — HYDROCHLOROTHIAZIDE 12.5 MG/1
TABLET ORAL
Qty: 30 TABLET | Refills: 6 | Status: SHIPPED | OUTPATIENT
Start: 2021-06-30 | End: 2021-07-01 | Stop reason: SDUPTHER

## 2021-07-01 DIAGNOSIS — I10 ESSENTIAL HYPERTENSION: ICD-10-CM

## 2021-07-01 DIAGNOSIS — I65.23 BILATERAL CAROTID ARTERY STENOSIS: ICD-10-CM

## 2021-07-01 DIAGNOSIS — E78.5 DYSLIPIDEMIA: ICD-10-CM

## 2021-07-01 NOTE — TELEPHONE ENCOUNTER
Caller: DavisDonna kumar    Relationship: Emergency Contact    Best call back number: [phone]    Medication needed:   Requested Prescriptions     Pending Prescriptions Disp Refills   • atorvastatin (LIPITOR) 80 MG tablet [Pharmacy Med Name: atorvastatin 80 mg tablet] 30 tablet 5     Sig: TAKE 1 TABLET BY MOUTH IN THE EVENING TO HELP LOWER CHOLESTEROL   • hydroCHLOROthiazide (HYDRODIURIL) 12.5 MG tablet 30 tablet 6     Sig: Take 1 tablet by mouth Daily.       When do you need the refill by: ASAP    What additional details did the patient provide when requesting the medication: DONNA STATED THAT PATIENT WOULD NEED REFILL FOR THESE MEDICATIONS    Does the patient have less than a 3 day supply:  [x] Yes  [] No    What is the patient's preferred pharmacy: [facility] [address] [phone] [facility] [phone]

## 2021-07-06 RX ORDER — HYDROCHLOROTHIAZIDE 12.5 MG/1
12.5 TABLET ORAL DAILY
Qty: 30 TABLET | Refills: 6 | Status: SHIPPED | OUTPATIENT
Start: 2021-07-06 | End: 2022-01-27

## 2021-07-06 RX ORDER — ATORVASTATIN CALCIUM 80 MG/1
TABLET, FILM COATED ORAL
Qty: 30 TABLET | Refills: 5 | Status: SHIPPED | OUTPATIENT
Start: 2021-07-06 | End: 2021-12-30

## 2021-07-08 ENCOUNTER — TELEPHONE (OUTPATIENT)
Dept: NEUROLOGY | Facility: CLINIC | Age: 61
End: 2021-07-08

## 2021-07-08 DIAGNOSIS — I69.398 SEIZURE DISORDER AS SEQUELA OF CEREBROVASCULAR ACCIDENT (HCC): ICD-10-CM

## 2021-07-08 DIAGNOSIS — G40.909 SEIZURE DISORDER AS SEQUELA OF CEREBROVASCULAR ACCIDENT (HCC): ICD-10-CM

## 2021-07-08 RX ORDER — LEVETIRACETAM 1000 MG/1
1000 TABLET ORAL 2 TIMES DAILY
Qty: 60 TABLET | Refills: 5 | Status: SHIPPED | OUTPATIENT
Start: 2021-07-08 | End: 2021-09-22 | Stop reason: SDUPTHER

## 2021-07-08 RX ORDER — LEVETIRACETAM 500 MG/1
500 TABLET ORAL 2 TIMES DAILY
Qty: 60 TABLET | Refills: 5 | Status: SHIPPED | OUTPATIENT
Start: 2021-07-08 | End: 2021-09-22 | Stop reason: SDUPTHER

## 2021-07-08 NOTE — TELEPHONE ENCOUNTER
Donna called to check on Keppra Rx's. She said the pharmacy said they did not have them. I called and spoke to the pharmacy and they do have both the 500 mg and the 1000 mg keppra RX's.

## 2021-07-16 DIAGNOSIS — G40.909 SEIZURE DISORDER AS SEQUELA OF CEREBROVASCULAR ACCIDENT (HCC): ICD-10-CM

## 2021-07-16 DIAGNOSIS — I69.398 SEIZURE DISORDER AS SEQUELA OF CEREBROVASCULAR ACCIDENT (HCC): ICD-10-CM

## 2021-07-16 RX ORDER — LACOSAMIDE 150 MG/1
75 TABLET, FILM COATED ORAL 2 TIMES DAILY
Qty: 30 TABLET | Refills: 0 | Status: SHIPPED | OUTPATIENT
Start: 2021-07-16 | End: 2021-09-13 | Stop reason: SDUPTHER

## 2021-07-16 NOTE — TELEPHONE ENCOUNTER
Caller: KAREEN     Relationship: SIGNIFIC[employer] OTHER     Best call back number: [phone]    Medication needed:   Requested Prescriptions     Pending Prescriptions Disp Refills   • Lacosamide (Vimpat) 150 MG tablet 30 tablet 5     Sig: Take 75 mg by mouth 2 (two) times a day.       When do you need the refill by: TODAY     Does the patient have less than a 3 day supply:  [x] Yes  [] No    What is the patient's preferred pharmacy:  [facility]    PT COMPLETELY OUT.

## 2021-07-26 ENCOUNTER — LAB (OUTPATIENT)
Dept: FAMILY MEDICINE CLINIC | Facility: CLINIC | Age: 61
End: 2021-07-26

## 2021-07-26 DIAGNOSIS — E78.5 DYSLIPIDEMIA: Chronic | ICD-10-CM

## 2021-07-26 DIAGNOSIS — E11.9 DIABETES MELLITUS TYPE 2 IN NONOBESE (HCC): ICD-10-CM

## 2021-07-26 DIAGNOSIS — I10 ESSENTIAL HYPERTENSION: ICD-10-CM

## 2021-07-26 PROCEDURE — 36415 COLL VENOUS BLD VENIPUNCTURE: CPT | Performed by: NURSE PRACTITIONER

## 2021-07-26 PROCEDURE — 80053 COMPREHEN METABOLIC PANEL: CPT | Performed by: NURSE PRACTITIONER

## 2021-07-26 PROCEDURE — 85007 BL SMEAR W/DIFF WBC COUNT: CPT

## 2021-07-26 PROCEDURE — 82043 UR ALBUMIN QUANTITATIVE: CPT | Performed by: NURSE PRACTITIONER

## 2021-07-26 PROCEDURE — 85025 COMPLETE CBC W/AUTO DIFF WBC: CPT | Performed by: NURSE PRACTITIONER

## 2021-07-26 PROCEDURE — 80061 LIPID PANEL: CPT | Performed by: NURSE PRACTITIONER

## 2021-07-26 PROCEDURE — 83036 HEMOGLOBIN GLYCOSYLATED A1C: CPT | Performed by: NURSE PRACTITIONER

## 2021-07-27 LAB
ALBUMIN SERPL-MCNC: 4.8 G/DL (ref 3.5–5.2)
ALBUMIN UR-MCNC: <1.2 MG/DL
ALBUMIN/GLOB SERPL: 2 G/DL
ALP SERPL-CCNC: 57 U/L (ref 39–117)
ALT SERPL W P-5'-P-CCNC: 15 U/L (ref 1–41)
ANION GAP SERPL CALCULATED.3IONS-SCNC: 11.5 MMOL/L (ref 5–15)
AST SERPL-CCNC: 16 U/L (ref 1–40)
BILIRUB SERPL-MCNC: 0.3 MG/DL (ref 0–1.2)
BUN SERPL-MCNC: 7 MG/DL (ref 6–20)
BUN/CREAT SERPL: 8.8 (ref 7–25)
CALCIUM SPEC-SCNC: 9.5 MG/DL (ref 8.6–10.5)
CHLORIDE SERPL-SCNC: 98 MMOL/L (ref 98–107)
CHOLEST SERPL-MCNC: 90 MG/DL (ref 0–200)
CO2 SERPL-SCNC: 25.5 MMOL/L (ref 22–29)
CREAT SERPL-MCNC: 0.8 MG/DL (ref 0.76–1.27)
DEPRECATED RDW RBC AUTO: 43.3 FL (ref 37–54)
EOSINOPHIL # BLD MANUAL: 0.13 10*3/MM3 (ref 0–0.4)
EOSINOPHIL NFR BLD MANUAL: 1 % (ref 0.3–6.2)
ERYTHROCYTE [DISTWIDTH] IN BLOOD BY AUTOMATED COUNT: 13.1 % (ref 12.3–15.4)
GFR SERPL CREATININE-BSD FRML MDRD: 99 ML/MIN/1.73
GLOBULIN UR ELPH-MCNC: 2.4 GM/DL
GLUCOSE SERPL-MCNC: 105 MG/DL (ref 65–99)
HBA1C MFR BLD: 6.5 % (ref 4.8–5.6)
HCT VFR BLD AUTO: 44.8 % (ref 37.5–51)
HDLC SERPL-MCNC: 28 MG/DL (ref 40–60)
HGB BLD-MCNC: 15.4 G/DL (ref 13–17.7)
LDLC SERPL CALC-MCNC: 37 MG/DL (ref 0–100)
LDLC/HDLC SERPL: 1.17 {RATIO}
LYMPHOCYTES # BLD MANUAL: 3.83 10*3/MM3 (ref 0.7–3.1)
LYMPHOCYTES NFR BLD MANUAL: 15 % (ref 5–12)
LYMPHOCYTES NFR BLD MANUAL: 29 % (ref 19.6–45.3)
MCH RBC QN AUTO: 31.2 PG (ref 26.6–33)
MCHC RBC AUTO-ENTMCNC: 34.4 G/DL (ref 31.5–35.7)
MCV RBC AUTO: 90.9 FL (ref 79–97)
MONOCYTES # BLD AUTO: 1.98 10*3/MM3 (ref 0.1–0.9)
NEUTROPHILS # BLD AUTO: 7.26 10*3/MM3 (ref 1.7–7)
NEUTROPHILS NFR BLD MANUAL: 55 % (ref 42.7–76)
PLAT MORPH BLD: NORMAL
PLATELET # BLD AUTO: 257 10*3/MM3 (ref 140–450)
PMV BLD AUTO: 8.8 FL (ref 6–12)
POTASSIUM SERPL-SCNC: 4.5 MMOL/L (ref 3.5–5.2)
PROT SERPL-MCNC: 7.2 G/DL (ref 6–8.5)
RBC # BLD AUTO: 4.93 10*6/MM3 (ref 4.14–5.8)
RBC MORPH BLD: NORMAL
SMUDGE CELLS BLD QL SMEAR: ABNORMAL
SODIUM SERPL-SCNC: 135 MMOL/L (ref 136–145)
TRIGL SERPL-MCNC: 146 MG/DL (ref 0–150)
VLDLC SERPL-MCNC: 25 MG/DL (ref 5–40)
WBC # BLD AUTO: 13.2 10*3/MM3 (ref 3.4–10.8)

## 2021-07-28 DIAGNOSIS — D72.829 LEUKOCYTOSIS, UNSPECIFIED TYPE: Primary | ICD-10-CM

## 2021-07-30 ENCOUNTER — OFFICE VISIT (OUTPATIENT)
Dept: FAMILY MEDICINE CLINIC | Facility: CLINIC | Age: 61
End: 2021-07-30

## 2021-07-30 VITALS
HEIGHT: 66 IN | WEIGHT: 196.6 LBS | TEMPERATURE: 97.8 F | HEART RATE: 69 BPM | DIASTOLIC BLOOD PRESSURE: 78 MMHG | OXYGEN SATURATION: 97 % | SYSTOLIC BLOOD PRESSURE: 116 MMHG | BODY MASS INDEX: 31.6 KG/M2

## 2021-07-30 DIAGNOSIS — Z71.6 ENCOUNTER FOR SMOKING CESSATION COUNSELING: ICD-10-CM

## 2021-07-30 DIAGNOSIS — I10 ESSENTIAL HYPERTENSION: Primary | ICD-10-CM

## 2021-07-30 DIAGNOSIS — I63.9 CEREBROVASCULAR ACCIDENT (CVA), UNSPECIFIED MECHANISM (HCC): ICD-10-CM

## 2021-07-30 DIAGNOSIS — D72.829 LEUKOCYTOSIS, UNSPECIFIED TYPE: ICD-10-CM

## 2021-07-30 DIAGNOSIS — Z12.5 SCREENING PSA (PROSTATE SPECIFIC ANTIGEN): ICD-10-CM

## 2021-07-30 DIAGNOSIS — E78.5 DYSLIPIDEMIA: Chronic | ICD-10-CM

## 2021-07-30 DIAGNOSIS — E11.9 DIABETES MELLITUS TYPE 2 IN NONOBESE (HCC): Chronic | ICD-10-CM

## 2021-07-30 PROCEDURE — 99214 OFFICE O/P EST MOD 30 MIN: CPT | Performed by: NURSE PRACTITIONER

## 2021-07-30 RX ORDER — CLOPIDOGREL BISULFATE 75 MG/1
75 TABLET ORAL DAILY
Qty: 90 TABLET | Refills: 1 | Status: SHIPPED | OUTPATIENT
Start: 2021-07-30 | End: 2021-10-26 | Stop reason: SDUPTHER

## 2021-07-30 RX ORDER — METOPROLOL SUCCINATE 200 MG/1
200 TABLET, EXTENDED RELEASE ORAL DAILY
Qty: 90 TABLET | Refills: 1 | Status: SHIPPED | OUTPATIENT
Start: 2021-07-30 | End: 2022-01-25

## 2021-07-30 RX ORDER — VARENICLINE TARTRATE 1 MG/1
1 TABLET, FILM COATED ORAL 2 TIMES DAILY
Qty: 56 TABLET | Refills: 4 | Status: SHIPPED | OUTPATIENT
Start: 2021-08-27 | End: 2022-01-14

## 2021-07-30 RX ORDER — AMLODIPINE BESYLATE 10 MG/1
10 TABLET ORAL DAILY
Qty: 90 TABLET | Refills: 1 | Status: SHIPPED | OUTPATIENT
Start: 2021-07-30 | End: 2022-01-25

## 2021-07-30 RX ORDER — LOSARTAN POTASSIUM 100 MG/1
100 TABLET ORAL DAILY
Qty: 180 TABLET | Refills: 3 | Status: SHIPPED | OUTPATIENT
Start: 2021-07-30 | End: 2022-07-15 | Stop reason: SDUPTHER

## 2021-07-30 NOTE — PROGRESS NOTES
"Subjective   Vince Olivera is a 60 y.o. male.     Chief Complaint   Patient presents with   • Hypertension     Follow Up       He presents for follow up of essential hypertension, mixed hyperlipidemia, and type II dm. He states he is doing well. He was supposed to see neurology but rescheduled for September. WBCs have been chronically elevated.        The following portions of the patient's history were reviewed and updated as appropriate: allergies, current medications, past family history, past medical history, past social history, past surgical history and problem list.    Review of Systems   Constitutional: Negative for fever and unexpected weight change.   HENT: Negative for ear pain, rhinorrhea, sore throat and trouble swallowing.    Eyes: Negative for visual disturbance.   Respiratory: Negative for cough, shortness of breath and wheezing.    Cardiovascular: Negative for chest pain and palpitations.   Gastrointestinal: Negative for abdominal pain, blood in stool, constipation, diarrhea, nausea and vomiting.   Genitourinary: Negative for dysuria and hematuria.   Musculoskeletal: Negative for arthralgias and myalgias.   Skin: Negative for color change.   Allergic/Immunologic: Negative for environmental allergies.   Neurological: Negative for dizziness.   Hematological: Negative for adenopathy.   Psychiatric/Behavioral: Negative for sleep disturbance and suicidal ideas. The patient is not nervous/anxious.        Objective     /78 (BP Location: Right arm, Patient Position: Sitting, Cuff Size: Adult)   Pulse 69   Temp 97.8 °F (36.6 °C) (Temporal)   Ht 167.6 cm (65.98\")   Wt 89.2 kg (196 lb 9.6 oz)   SpO2 97%   BMI 31.75 kg/m²     Physical Exam  Vitals reviewed.   Constitutional:       General: He is not in acute distress.     Appearance: He is well-developed. He is not diaphoretic.   HENT:      Head: Normocephalic and atraumatic.   Cardiovascular:      Rate and Rhythm: Normal rate and regular rhythm. "      Heart sounds: Normal heart sounds. No murmur heard.   No friction rub. No gallop.    Pulmonary:      Effort: Pulmonary effort is normal. No respiratory distress.      Breath sounds: Normal breath sounds.   Abdominal:      General: Bowel sounds are normal. There is no distension.      Palpations: Abdomen is soft.      Tenderness: There is no abdominal tenderness.   Skin:     General: Skin is warm and dry.   Neurological:      Mental Status: He is alert and oriented to person, place, and time.   Psychiatric:         Behavior: Behavior normal.         Thought Content: Thought content normal.         Judgment: Judgment normal.         Assessment/Plan     Problem List Items Addressed This Visit        Cardiac and Vasculature    Essential hypertension - Primary    Relevant Medications    amLODIPine (NORVASC) 10 MG tablet    losartan (COZAAR) 100 MG tablet    metoprolol succinate XL (TOPROL-XL) 200 MG 24 hr tablet    Other Relevant Orders    CBC & Differential    Comprehensive Metabolic Panel    Lipid Panel    Dyslipidemia    Relevant Orders    Lipid Panel       Neuro    H/o Left MCA ischemic CVA 4/2017    Relevant Medications    clopidogrel (PLAVIX) 75 MG tablet      Other Visit Diagnoses     Encounter for smoking cessation counseling        Relevant Medications    varenicline (CHANTIX UDAY) 0.5 MG X 11 & 1 MG X 42 tablet    varenicline (CHANTIX) 1 MG tablet (Start on 8/27/2021)    Diabetes mellitus type 2 in nonobese (CMS/[facility])  (Chronic)       Relevant Medications    metFORMIN (GLUCOPHAGE) 500 MG tablet    Other Relevant Orders    Hemoglobin A1c    MicroAlbumin, Urine, Random - Urine, Clean Catch    Leukocytosis, unspecified type              Plan: Continue current meds. Start chantix to help you stop smoking. Keep referral with hematology to further evaluate leukocytosis. Follow up in 6 months and as needed.     @Body mass index is 31.75 kg/m².                Understands disease processes and need for medications.   Understands reasons for urgent and emergent care.  Patient (& family) verbalized agreement for treatment plan.   Emotional support and active listening provided.  Patient provided time to verbalize feelings.               This document has been electronically signed by KINGSTON Gallardo   July 30, 2021 15:13 EDT

## 2021-08-02 DIAGNOSIS — I10 ESSENTIAL HYPERTENSION: Chronic | ICD-10-CM

## 2021-08-02 RX ORDER — METOPROLOL SUCCINATE 200 MG/1
TABLET, EXTENDED RELEASE ORAL
Qty: 90 TABLET | Refills: 1 | OUTPATIENT
Start: 2021-08-02

## 2021-08-03 DIAGNOSIS — I65.23 BILATERAL CAROTID ARTERY STENOSIS: ICD-10-CM

## 2021-08-03 RX ORDER — ASPIRIN 325 MG
TABLET, DELAYED RELEASE (ENTERIC COATED) ORAL
Qty: 90 TABLET | Refills: 1 | Status: SHIPPED | OUTPATIENT
Start: 2021-08-03 | End: 2022-01-31 | Stop reason: SDUPTHER

## 2021-08-23 ENCOUNTER — CONSULT (OUTPATIENT)
Dept: ONCOLOGY | Facility: CLINIC | Age: 61
End: 2021-08-23

## 2021-08-23 VITALS
TEMPERATURE: 97.5 F | WEIGHT: 193.4 LBS | OXYGEN SATURATION: 95 % | HEIGHT: 65 IN | RESPIRATION RATE: 18 BRPM | SYSTOLIC BLOOD PRESSURE: 141 MMHG | BODY MASS INDEX: 32.22 KG/M2 | DIASTOLIC BLOOD PRESSURE: 82 MMHG | HEART RATE: 59 BPM

## 2021-08-23 DIAGNOSIS — D72.829 LEUKOCYTOSIS, UNSPECIFIED TYPE: Primary | ICD-10-CM

## 2021-08-23 LAB
ALBUMIN SERPL-MCNC: 4.83 G/DL (ref 3.5–5.2)
ALBUMIN/GLOB SERPL: 2 G/DL
ALP SERPL-CCNC: 79 U/L (ref 39–117)
ALT SERPL W P-5'-P-CCNC: 14 U/L (ref 1–41)
ANION GAP SERPL CALCULATED.3IONS-SCNC: 10.2 MMOL/L (ref 5–15)
AST SERPL-CCNC: 12 U/L (ref 1–40)
BASOPHILS # BLD MANUAL: 0.23 10*3/MM3 (ref 0–0.2)
BASOPHILS NFR BLD AUTO: 2 % (ref 0–1.5)
BILIRUB SERPL-MCNC: 0.3 MG/DL (ref 0–1.2)
BUN SERPL-MCNC: 9 MG/DL (ref 6–20)
BUN/CREAT SERPL: 9.8 (ref 7–25)
CALCIUM SPEC-SCNC: 9.8 MG/DL (ref 8.6–10.5)
CHLORIDE SERPL-SCNC: 99 MMOL/L (ref 98–107)
CHROMATIN AB SERPL-ACNC: 35.9 IU/ML (ref 0–14)
CO2 SERPL-SCNC: 27.8 MMOL/L (ref 22–29)
CREAT SERPL-MCNC: 0.92 MG/DL (ref 0.76–1.27)
CRP SERPL-MCNC: <0.3 MG/DL (ref 0–0.5)
DEPRECATED RDW RBC AUTO: 44.3 FL (ref 37–54)
EOSINOPHIL # BLD MANUAL: 0.12 10*3/MM3 (ref 0–0.4)
EOSINOPHIL NFR BLD MANUAL: 1 % (ref 0.3–6.2)
ERYTHROCYTE [DISTWIDTH] IN BLOOD BY AUTOMATED COUNT: 13.2 % (ref 12.3–15.4)
ERYTHROCYTE [SEDIMENTATION RATE] IN BLOOD: 1 MM/HR (ref 0–20)
GFR SERPL CREATININE-BSD FRML MDRD: 84 ML/MIN/1.73
GLOBULIN UR ELPH-MCNC: 2.4 GM/DL
GLUCOSE SERPL-MCNC: 128 MG/DL (ref 65–99)
HAV IGM SERPL QL IA: NORMAL
HBV CORE IGM SERPL QL IA: NORMAL
HBV SURFACE AG SERPL QL IA: NORMAL
HCT VFR BLD AUTO: 46.9 % (ref 37.5–51)
HCV AB SER DONR QL: NORMAL
HGB BLD-MCNC: 15.7 G/DL (ref 13–17.7)
HIV1+2 AB SER QL: NORMAL
LYMPHOCYTES # BLD MANUAL: 4.33 10*3/MM3 (ref 0.7–3.1)
LYMPHOCYTES NFR BLD MANUAL: 37 % (ref 19.6–45.3)
LYMPHOCYTES NFR BLD MANUAL: 4 % (ref 5–12)
MCH RBC QN AUTO: 30.5 PG (ref 26.6–33)
MCHC RBC AUTO-ENTMCNC: 33.5 G/DL (ref 31.5–35.7)
MCV RBC AUTO: 91.1 FL (ref 79–97)
MONOCYTES # BLD AUTO: 0.47 10*3/MM3 (ref 0.1–0.9)
NEUTROPHILS # BLD AUTO: 6.55 10*3/MM3 (ref 1.7–7)
NEUTROPHILS NFR BLD MANUAL: 56 % (ref 42.7–76)
PLAT MORPH BLD: NORMAL
PLATELET # BLD AUTO: 247 10*3/MM3 (ref 140–450)
PMV BLD AUTO: 8 FL (ref 6–12)
POTASSIUM SERPL-SCNC: 4.3 MMOL/L (ref 3.5–5.2)
PROT SERPL-MCNC: 7.2 G/DL (ref 6–8.5)
RBC # BLD AUTO: 5.15 10*6/MM3 (ref 4.14–5.8)
RBC MORPH BLD: NORMAL
SCAN SLIDE: NORMAL
SODIUM SERPL-SCNC: 137 MMOL/L (ref 136–145)
WBC # BLD AUTO: 11.69 10*3/MM3 (ref 3.4–10.8)

## 2021-08-23 PROCEDURE — 80053 COMPREHEN METABOLIC PANEL: CPT | Performed by: NURSE PRACTITIONER

## 2021-08-23 PROCEDURE — 85060 BLOOD SMEAR INTERPRETATION: CPT | Performed by: NURSE PRACTITIONER

## 2021-08-23 PROCEDURE — 85025 COMPLETE CBC W/AUTO DIFF WBC: CPT | Performed by: NURSE PRACTITIONER

## 2021-08-23 PROCEDURE — 81206 BCR/ABL1 GENE MAJOR BP: CPT

## 2021-08-23 PROCEDURE — 86431 RHEUMATOID FACTOR QUANT: CPT | Performed by: NURSE PRACTITIONER

## 2021-08-23 PROCEDURE — 86038 ANTINUCLEAR ANTIBODIES: CPT | Performed by: NURSE PRACTITIONER

## 2021-08-23 PROCEDURE — 85007 BL SMEAR W/DIFF WBC COUNT: CPT | Performed by: NURSE PRACTITIONER

## 2021-08-23 PROCEDURE — 80074 ACUTE HEPATITIS PANEL: CPT | Performed by: NURSE PRACTITIONER

## 2021-08-23 PROCEDURE — 99203 OFFICE O/P NEW LOW 30 MIN: CPT | Performed by: NURSE PRACTITIONER

## 2021-08-23 PROCEDURE — 86140 C-REACTIVE PROTEIN: CPT | Performed by: NURSE PRACTITIONER

## 2021-08-23 PROCEDURE — G0432 EIA HIV-1/HIV-2 SCREEN: HCPCS | Performed by: NURSE PRACTITIONER

## 2021-08-23 PROCEDURE — 81207 BCR/ABL1 GENE MINOR BP: CPT

## 2021-08-23 PROCEDURE — 85652 RBC SED RATE AUTOMATED: CPT | Performed by: NURSE PRACTITIONER

## 2021-08-23 NOTE — PROGRESS NOTES
DATE OF CONSULTATION:  8/23/2021    REASON FOR REFERRAL: Leukocytosis    REFERRING PHYSICIAN:  KINGSTON Caruso    CHIEF COMPLAINT:  None.      HISTORY OF PRESENT ILLNESS:   Vince Olivera is a very pleasant 60 y.o. male who is being seen today at the request of KINGSTON Caruso for evaluation and treatment of leukocytosis. Mr. Olivera reports following with his PCP every 3-6 months with routine lab testing. He reports that he was only recently aware of his elevated WBC count. Previous available CBCs were reviewed and patient has had chronic elevation in white count since at least October 2019. Also noted low H/H in October 2019 and he relates this to hospitalization during that time. At present H/H and platelets are within normal. He is a chronic, heavy smoker, 1.5 PPD for the last 32 years. He denies fever/chills or drenching night sweats. Denies tender or enlarged LNs. He reports a good appetite and stable weight. Denies recurrent or difficult to treat infections. Denies fatigue. He denies any other specific complaints today.    PAST MEDICAL HISTORY:  Past Medical History:   Diagnosis Date   • Arthritis    • Carotid artery disease (CMS/HCC)    • Carotid artery disorder (CMS/HCC)    • Depression    • Diabetes mellitus (CMS/HCC)     DIAGNOSED 4-2017 CHECKS FSBG 3X/WEEK    • Ear infection     about 2 weeks ago- cleared up - wax removed and cleaned out    • GERD (gastroesophageal reflux disease)     self diagnosed takes otc ppi   • Hyperlipidemia    • Hypertension    • Hypertension    • Inguinal hernia    • Seizure (CMS/HCC)     last 10/8/2019   • Shoulder pain     bilat    • Stroke (CMS/HCC) 04/24/2017    EXPRESSIVE APHASIA RESIDUAL    • Tooth loose     5 - all over- will get teeth done after this surgery    • Wears reading eyeglasses        PAST SURGICAL HISTORY:  Past Surgical History:   Procedure Laterality Date   • ABDOMINAL HERNIA REPAIR  2010   • AORTA FEMORAL BYPASS N/A 10/11/2019     Procedure: AORTA FEMORAL BYPASS, BILATERAL FEMORAL ENDARTERECTOMY;  Surgeon: Dylan Weaver MD;  Location: [facility] OR;  Service: Vascular   • CAROTID ENDARTERECTOMY Left 10/17/2017    Procedure: CAROTID ENDARTERECTOMY LEFT;  Surgeon: Alexx Bahc MD;  Location:  [facility] OR;  Service:    • INGUINAL HERNIA REPAIR Left        FAMILY HISTORY:  Family History   Adopted: Yes   Problem Relation Age of Onset   • Diabetes Mother    • COPD Father        SOCIAL HISTORY:  Social History     Socioeconomic History   • Marital status: Single     Spouse name: Not on file   • Number of children: 2   • Years of education: Not on file   • Highest education level: Not on file   Tobacco Use   • Smoking status: Former Smoker     Packs/day: 0.05     Years: 40.00     Pack years: 2.00     Types: Cigarettes     Quit date: 2017     Years since quittin.3   • Smokeless tobacco: Former User     Types: Snuff   • Tobacco comment: quit snuff when 18 or 19 years old - only did for baseball - 2-3 years    Vaping Use   • Vaping Use: Never used   Substance and Sexual Activity   • Alcohol use: No   • Drug use: No   • Sexual activity: Defer     Partners: Female     Comment: SPOUSE         MEDICATIONS:  The current medication list was reviewed in the EMR    Current Outpatient Medications:   •  amLODIPine (NORVASC) 10 MG tablet, Take 1 tablet by mouth Daily., Disp: 90 tablet, Rfl: 1  •  aspirin  MG tablet, TAKE 1 TABLET BY MOUTH EVERY DAY, Disp: 90 tablet, Rfl: 1  •  atorvastatin (LIPITOR) 80 MG tablet, TAKE 1 TABLET BY MOUTH IN THE EVENING TO HELP LOWER CHOLESTEROL, Disp: 30 tablet, Rfl: 5  •  clopidogrel (PLAVIX) 75 MG tablet, Take 1 tablet by mouth Daily., Disp: 90 tablet, Rfl: 1  •  glucose blood test strip, Check glucose daily, Disp: 100 each, Rfl: 3  •  glucose monitor monitoring kit, 1 each Daily., Disp: 1 each, Rfl: 0  •  hydroCHLOROthiazide (HYDRODIURIL) 12.5 MG tablet, Take 1 tablet by mouth Daily., Disp: 30 tablet, Rfl:  "6  •  Lacosamide (Vimpat) 150 MG tablet, Take 75 mg by mouth 2 (two) times a day., Disp: 30 tablet, Rfl: 0  •  Lancets misc, 1 Device Daily., Disp: 100 each, Rfl: 12  •  levETIRAcetam (KEPPRA) 1000 MG tablet, Take 1 tablet by mouth 2 (Two) Times a Day., Disp: 60 tablet, Rfl: 5  •  levETIRAcetam (KEPPRA) 500 MG tablet, Take 1 tablet by mouth 2 (Two) Times a Day., Disp: 60 tablet, Rfl: 5  •  losartan (COZAAR) 100 MG tablet, Take 1 tablet by mouth Daily. Take 1/2 tab twice daily, Disp: 180 tablet, Rfl: 3  •  metFORMIN (GLUCOPHAGE) 500 MG tablet, Take 2 tablets by mouth 2 (Two) Times a Day With Meals., Disp: 360 tablet, Rfl: 1  •  metoprolol succinate XL (TOPROL-XL) 200 MG 24 hr tablet, Take 1 tablet by mouth Daily., Disp: 90 tablet, Rfl: 1  •  Azelastine HCl 137 MCG/SPRAY solution, 2 sprays into the nostril(s) as directed by provider 2 (two) times a day., Disp: 30 mL, Rfl: 2  •  fluticasone (Flonase) 50 MCG/ACT nasal spray, 2 sprays into the nostril(s) as directed by provider Daily., Disp: 15.8 mL, Rfl: 2  •  lansoprazole (PREVACID) 15 MG capsule, Take 15 mg by mouth Daily., Disp: , Rfl:   •  varenicline (CHANTIX UDAY) 0.5 MG X 11 & 1 MG X 42 tablet, Take 0.5 mg po daily x 3 days, then 0.5 mg po bid x 4 days, then 1 mg po bid, Disp: 53 tablet, Rfl: 0  •  [START ON 8/27/2021] varenicline (CHANTIX) 1 MG tablet, Take 1 tablet by mouth 2 (Two) Times a Day for 140 days., Disp: 56 tablet, Rfl: 4    Current Facility-Administered Medications:   •  cyanocobalamin injection 1,000 mcg, 1,000 mcg, Intramuscular, Q28 Days, Glo Kyle APRN, 1,000 mcg at 01/23/20 1530    ALLERGIES:  No Known Allergies      REVIEW OF SYSTEMS:    A comprehensive 14 point review of systems was performed.  Significant findings as mentioned above.  All other systems reviewed and are negative.        Physical Exam   Vital Signs: /82   Pulse 59   Temp 97.5 °F (36.4 °C) (Temporal)   Resp 18   Ht 165.1 cm (65\")   Wt 87.7 kg (193 lb 6.4 oz) "   SpO2 95%   BMI 32.18 kg/m²  Patient's Body mass index is 32.18 kg/m².     General: Well developed, well nourished, white male, in no acute distress.   Head: ATNC   Eyes: PERRL, No evidence of conjunctivitis.   Nose: No nasal discharge.   Mouth: Oral mucosal membranes moist.  Neck: Neck supple. No thyromegaly. No JVD.   Lungs: Clear in all fields to A&P without rales, rhonchi or wheezing.   Heart: S1, S2. Regular rate and rhythm. No murmurs, rubs, or gallops.   Abdomen: Soft. Bowel sounds are normoactive. Nontender with palpation. No Hepatosplenomegaly can be appreciated.   Extremities: No cyanosis or edema. Peripheral pulses palpable and equal bilaterally.   Integumentary: No rash, sores, erythema or nodules. No blistering, bruising, or dry skin.   Hem/Lymph Nodes: No palpable cervical, submandibular, supraclavicular, axillary  lymphadenopathy noted. No petechiae, purpura or ecchymosis noted.   Neurologic: Grossly non-focal exam    Pain Score:  Pain Score    08/23/21 0828   PainSc: 0-No pain       PHQ-Score Total:  PHQ-9 Total Score: 0         RECENT LABS:  Lab Results   Component Value Date    WBC 13.20 (H) 07/26/2021    HGB 15.4 07/26/2021    HCT 44.8 07/26/2021    MCV 90.9 07/26/2021    RDW 13.1 07/26/2021     07/26/2021    NEUTRORELPCT 54.2 01/22/2021    LYMPHORELPCT 37.4 01/22/2021    MONORELPCT 6.8 01/22/2021    EOSRELPCT 0.8 01/22/2021    BASORELPCT 0.4 01/22/2021    NEUTROABS 7.26 (H) 07/26/2021    LYMPHSABS 4.29 (H) 01/22/2021       Lab Results   Component Value Date     (L) 07/26/2021    K 4.5 07/26/2021    CO2 25.5 07/26/2021    CL 98 07/26/2021    BUN 7 07/26/2021    CREATININE 0.80 07/26/2021    EGFRIFNONA 99 07/26/2021    GLUCOSE 105 (H) 07/26/2021    CALCIUM 9.5 07/26/2021    ALKPHOS 57 07/26/2021    AST 16 07/26/2021    ALT 15 07/26/2021    BILITOT 0.3 07/26/2021    ALBUMIN 4.80 07/26/2021    PROTEINTOT 7.2 07/26/2021    MG 1.8 10/15/2019    PHOS 3.2 10/15/2019           ASSESSMENT &  PLAN:  Vince Olivrea is a very pleasant 60 y.o. male with    1. Leukocytosis  - Previous available CBCs were reviewed and patient has had chronic elevation in white count since at least October 2019. Also noted low H/H in October 2019 and he relates this to hospitalization during that time. At present H/H and platelets are within normal.   - He is a chronic, heavy smoker, 1.5 PPD for the last 32 years.  - Clinically, he is doing well and denies any significant B-symptoms.  - Based on history, smoking is likely contributing to his chronically elevated white blood cell count.  - Obtained repeat CBC today which showed ongoing leukocytosis with WBC of ~11.69 which has improved with normal H/H and platelets.  - Sent additional labs today to further evaluate including PBS, ESR, CRP, Acute Hepatitis panel, HIV panel, JANN and RF. Also sent flow cytometry and BCR/ABL to evaluate for possible underlying myeloproliferative disorder.  - Will follow up in 4 weeks with repeat CBC and to discuss the above, pending work up. Based on findings, will discuss further management.   - In the meantime, strongly advised patient to quit smoking. Would also recommend low dose CT chest for lung cancer screening.       ACO / TEE/Other  Quality measures  -  Vince Olivera received 2020 flu vaccine.  -  Vince Olivera reports a pain score of 0.    -  Current outpatient and discharge medications have been reconciled for the patient.  Reviewed by: KINGSTON Lindsay      The patient was in agreement with the plan and all questions were answered to his satisfaction.     Thank you so much for allowing us to participate in the care of Vince Olivera . Please do not hesitate to contact us with any questions or concerns.     A total of 45 minutes were spent coordinating this patient’s care in clinic today; more than 50% of this time was face-to-face with the patient, reviewing his interim medical history and counseling on the current  treatment and followup plan. All questions were answered to his satisfaction.      Electronically Signed by: KINGSTON Laguna , August 23, 2021 09:35 EDT           Electronically Signed by: KINGSTON Laguna , August 23, 2021 09:35 EDT       CC:   KINGSTON Caruso Brandy Jean, APRN

## 2021-08-24 LAB
ANA SER QL: NEGATIVE
CYTOLOGIST CVX/VAG CYTO: NORMAL
PATH INTERP BLD-IMP: NORMAL

## 2021-08-27 LAB
INTERPRETATION: NEGATIVE
LAB DIRECTOR NAME PROVIDER: NORMAL
LABORATORY COMMENT REPORT: NORMAL
REF LAB TEST METHOD: NORMAL
T(ABL1,BCR)B2A2/CONTROL BLD/T: NORMAL %
T(ABL1,BCR)B3A2/CONTROL BLD/T: NORMAL %
T(ABL1,BCR)E1A2/CONTROL BLD/T: NORMAL %

## 2021-09-13 DIAGNOSIS — I69.398 SEIZURE DISORDER AS SEQUELA OF CEREBROVASCULAR ACCIDENT (HCC): ICD-10-CM

## 2021-09-13 DIAGNOSIS — G40.909 SEIZURE DISORDER AS SEQUELA OF CEREBROVASCULAR ACCIDENT (HCC): ICD-10-CM

## 2021-09-13 RX ORDER — LACOSAMIDE 150 MG/1
75 TABLET, FILM COATED ORAL 2 TIMES DAILY
Qty: 30 TABLET | Refills: 1 | Status: SHIPPED | OUTPATIENT
Start: 2021-09-13 | End: 2021-09-22 | Stop reason: SDUPTHER

## 2021-09-13 NOTE — TELEPHONE ENCOUNTER
Caller: Donna Davis    Relationship: Emergency Contact    Best call back number: [phone]  Medication needed:   Requested Prescriptions     Pending Prescriptions Disp Refills   • Lacosamide (Vimpat) 150 MG tablet 30 tablet 0     Sig: Take 75 mg by mouth 2 (two) times a day.       When do you need the refill by: ASAP    What additional details did the patient provide when requesting the medication: PATIENT IS OUT OF MEDS- TOOK LAST PILL THIS MORNING.    Does the patient have less than a 3 day supply:  [x] Yes  [] No    What is the patient's preferred pharmacy: [facility]- [phone]      PHARMACY IS HAVING ISSUES WITH THEIR FAX MACHINE AND DIRECTED PATIENT TO HAVE THE SCRIPT CALLED IN OVER THE TELEPHONE. ALIYA

## 2021-09-13 NOTE — TELEPHONE ENCOUNTER
Rx Refill Note  Requested Prescriptions     Pending Prescriptions Disp Refills   • Lacosamide (Vimpat) 150 MG tablet 30 tablet 0     Sig: Take 75 mg by mouth 2 (two) times a day.      Last office visit with prescribing clinician: 1/18/2021      Next office visit with prescribing clinician: 9/22/2021            Jocelin Baca MA  09/13/21, 15:41 EDT

## 2021-09-20 ENCOUNTER — TELEPHONE (OUTPATIENT)
Dept: ONCOLOGY | Facility: CLINIC | Age: 61
End: 2021-09-20

## 2021-09-20 LAB — LEUK/LYMPH PHENO: NORMAL

## 2021-09-20 NOTE — TELEPHONE ENCOUNTER
Caller: KAREEN    Relationship to patient: SABI    Best call back number: [phone]    Patient is needing: TO R/S LAB AND F/U APPT ON 9- TO THE FOLLOWING WEEK. PT WILL BE LATE GETTING BACK IN TOWN FROM ANOTHER DOCTOR APPT THE NIGHT BEFORE.

## 2021-09-22 ENCOUNTER — OFFICE VISIT (OUTPATIENT)
Dept: NEUROLOGY | Facility: CLINIC | Age: 61
End: 2021-09-22

## 2021-09-22 VITALS
OXYGEN SATURATION: 98 % | SYSTOLIC BLOOD PRESSURE: 142 MMHG | BODY MASS INDEX: 32.15 KG/M2 | HEIGHT: 65 IN | TEMPERATURE: 97.4 F | WEIGHT: 193 LBS | DIASTOLIC BLOOD PRESSURE: 80 MMHG | HEART RATE: 64 BPM

## 2021-09-22 DIAGNOSIS — I69.398 SEIZURE DISORDER AS SEQUELA OF CEREBROVASCULAR ACCIDENT (HCC): ICD-10-CM

## 2021-09-22 DIAGNOSIS — D72.829 LEUKOCYTOSIS, UNSPECIFIED TYPE: Primary | ICD-10-CM

## 2021-09-22 DIAGNOSIS — G40.909 SEIZURE DISORDER AS SEQUELA OF CEREBROVASCULAR ACCIDENT (HCC): ICD-10-CM

## 2021-09-22 PROCEDURE — 99213 OFFICE O/P EST LOW 20 MIN: CPT | Performed by: NURSE PRACTITIONER

## 2021-09-22 RX ORDER — LEVETIRACETAM 1000 MG/1
1000 TABLET ORAL 2 TIMES DAILY
Qty: 60 TABLET | Refills: 5 | Status: SHIPPED | OUTPATIENT
Start: 2021-09-22 | End: 2022-06-06 | Stop reason: SDUPTHER

## 2021-09-22 RX ORDER — LEVETIRACETAM 500 MG/1
500 TABLET ORAL 2 TIMES DAILY
Qty: 60 TABLET | Refills: 5 | Status: SHIPPED | OUTPATIENT
Start: 2021-09-22 | End: 2022-03-03 | Stop reason: SDUPTHER

## 2021-09-22 RX ORDER — LACOSAMIDE 150 MG/1
75 TABLET, FILM COATED ORAL 2 TIMES DAILY
Qty: 30 TABLET | Refills: 5 | Status: SHIPPED | OUTPATIENT
Start: 2021-09-22 | End: 2022-03-16 | Stop reason: SDUPTHER

## 2021-09-22 NOTE — PROGRESS NOTES
Subjective:     Patient ID: Vince Olivera is a 60 y.o. male.    CC:   Chief Complaint   Patient presents with   • Seizures       HPI:   History of Present Illness     Mr. Olivera is here today for six-month follow-up on seizure disorder.    He has seizures as a sequela of CVA in 2017.  He is currently managed on Keppra 1500 mg twice daily as well as Vimpat 75 mg BID, (he currently takes half of a 150 daily)    Today he tells me he is feeling well, he has had no seizure activity at all since this last episode prior to his femoropopliteal bypass in October 2019. He has had stents placed in his legs, legs feel much better. He denies fatigue, dizziness or headaches. He has had no stroke symptoms. Denies memory concerns.  He is recently establish care with hematology for chronic leukocytosis, thought to be related to chronic tobacco use. He denies fatigue or any complaints today.        The following portions of the patient's history were reviewed and updated as appropriate: allergies, current medications, past family history, past medical history, past social history, past surgical history and problem list.    Past Medical History:   Diagnosis Date   • Arthritis    • Carotid artery disease (CMS/HCC)    • Carotid artery disorder (CMS/HCC)    • Depression    • Diabetes mellitus (CMS/HCC)     DIAGNOSED 4-2017 CHECKS FSBG 3X/WEEK    • Ear infection     about 2 weeks ago- cleared up - wax removed and cleaned out    • GERD (gastroesophageal reflux disease)     self diagnosed takes otc ppi   • Hyperlipidemia    • Hypertension    • Hypertension    • Inguinal hernia    • Seizure (CMS/HCC)     last 10/8/2019   • Shoulder pain     bilat    • Stroke (CMS/HCC) 04/24/2017    EXPRESSIVE APHASIA RESIDUAL    • Tooth loose     5 - all over- will get teeth done after this surgery    • Wears reading eyeglasses        Past Surgical History:   Procedure Laterality Date   • ABDOMINAL HERNIA REPAIR  2010   • AORTA FEMORAL BYPASS N/A  "10/11/2019    Procedure: AORTA FEMORAL BYPASS, BILATERAL FEMORAL ENDARTERECTOMY;  Surgeon: Dylan Weaver MD;  Location:  [facility] OR;  Service: Vascular   • CAROTID ENDARTERECTOMY Left 10/17/2017    Procedure: CAROTID ENDARTERECTOMY LEFT;  Surgeon: Alexx Bach MD;  Location:  [facility] OR;  Service:    • INGUINAL HERNIA REPAIR Left        Social History     Socioeconomic History   • Marital status: Single     Spouse name: Not on file   • Number of children: 2   • Years of education: Not on file   • Highest education level: Not on file   Tobacco Use   • Smoking status: Current Every Day Smoker     Packs/day: 0.05     Years: 40.00     Pack years: 2.00     Types: Cigarettes     Last attempt to quit: 2017     Years since quittin.4   • Smokeless tobacco: Former User     Types: Snuff   • Tobacco comment: quit snuff when 18 or 19 years old - only did for baseball - 2-3 years    Vaping Use   • Vaping Use: Never used   Substance and Sexual Activity   • Alcohol use: No   • Drug use: No   • Sexual activity: Defer     Partners: Female     Comment: SPOUSE       Family History   Adopted: Yes   Problem Relation Age of Onset   • Diabetes Mother    • COPD Father         Review of Systems   Constitutional: Negative.    HENT: Negative.    Eyes: Negative.    Respiratory: Negative.    Cardiovascular: Negative.    Gastrointestinal: Negative.    Endocrine: Negative.    Genitourinary: Negative.    Musculoskeletal: Negative.    Skin: Negative.    Allergic/Immunologic: Negative.    Neurological: Positive for seizures (last oct 2019). Negative for dizziness, tremors, syncope, facial asymmetry, speech difficulty, weakness, light-headedness, numbness and headaches.   Hematological: Negative.    Psychiatric/Behavioral: Negative.         Objective:  /80   Pulse 64   Temp 97.4 °F (36.3 °C)   Ht 165.1 cm (65\")   Wt 87.5 kg (193 lb)   SpO2 98%   BMI 32.12 kg/m²     Neurologic Exam     Mental Status   Oriented to person, " place, and time.   Alert and oriented, speech clear and articulate     Cranial Nerves   Cranial nerves II through XII intact.     CN III, IV, VI   Pupils are equal, round, and reactive to light.    Motor Exam   Muscle bulk: normal  Overall muscle tone: normal  Right arm pronator drift: absent  Left arm pronator drift: absent    Strength   Strength 5/5 throughout.     Gait, Coordination, and Reflexes     Gait  Gait: normal    Coordination   Finger to nose coordination: normal    Tremor   Resting tremor: absent  Intention tremor: absent  Action tremor: absent1+ DTRs       Physical Exam  Vitals reviewed.   Constitutional:       General: He is not in acute distress.     Appearance: He is well-developed. He is obese. He is not ill-appearing or toxic-appearing.   HENT:      Head: Normocephalic and atraumatic.      Mouth/Throat:      Mouth: Mucous membranes are moist.   Eyes:      General: No scleral icterus.     Conjunctiva/sclera: Conjunctivae normal.      Pupils: Pupils are equal, round, and reactive to light.   Pulmonary:      Effort: Pulmonary effort is normal. No respiratory distress.   Musculoskeletal:      Cervical back: Normal range of motion and neck supple.   Skin:     General: Skin is warm.      Capillary Refill: Capillary refill takes less than 2 seconds.   Neurological:      General: No focal deficit present.      Mental Status: He is alert and oriented to person, place, and time.      Coordination: Finger-Nose-Finger Test normal.      Gait: Gait is intact.      Deep Tendon Reflexes: Strength normal.   Psychiatric:         Mood and Affect: Mood normal.         Behavior: Behavior normal.         Thought Content: Thought content normal.         Judgment: Judgment normal.         Assessment/Plan:       Diagnoses and all orders for this visit:    1. Seizure disorder as sequela of cerebrovascular accident (HCC) [I69.398, G40.909]  -     levETIRAcetam (KEPPRA) 1000 MG tablet; Take 1 tablet by mouth 2 (Two) Times a  Day.  Dispense: 60 tablet; Refill: 5  -     levETIRAcetam (KEPPRA) 500 MG tablet; Take 1 tablet by mouth 2 (Two) Times a Day.  Dispense: 60 tablet; Refill: 5  -     Lacosamide (Vimpat) 150 MG tablet; Take 75 mg by mouth 2 (two) times a day.  Dispense: 30 tablet; Refill: 5    2. Seizure disorder on Keppra and Vimpat. Recurrent Grand mal 10/8/19  -     levETIRAcetam (KEPPRA) 1000 MG tablet; Take 1 tablet by mouth 2 (Two) Times a Day.  Dispense: 60 tablet; Refill: 5  -     levETIRAcetam (KEPPRA) 500 MG tablet; Take 1 tablet by mouth 2 (Two) Times a Day.  Dispense: 60 tablet; Refill: 5  -     Lacosamide (Vimpat) 150 MG tablet; Take 75 mg by mouth 2 (two) times a day.  Dispense: 30 tablet; Refill: 5    Mr. Olivera is doing well, he will continue current dose of Keppra and Vimpat. Recent labs performed, platelets normal, mild chronic leukocytosis followed by hematology.  He has had no recurrent stroke symptoms, he denies dizziness or any problems today  He will continue current medicines and follow-up here in 6 months or sooner if needed. He knows to notify us with any seizure activity, to ER with any stroke symptoms  Patient instructions include: No driving or operating heavy machinery for 3 months from onset of most recent seizure. Minimize stress as much as possible. Recommended 7-8 hours of sleep each night. Abstain from alcohol intake. Educated on Antiepileptic medications with possible side effects and signs and symptoms to report if prescribed during visit. Instructed to take seizure medication daily if prescribed. Reviewed potential seizure risk factors. Instructed to call 911 or our office if another seizure does occur.  Discussed signs and symptoms of stroke and when to call 911. Instructed to follow a low fat diet including the Mediterranean diet. Instructed to take all medications daily as prescribed. Encouraged 30 minutes of exercise 3-4 times a week as tolerated. Stay well hydrated. Discussed potential side  effects of new medications and signs and symptoms to report. If patient is currently using tobacco products, smoking cessation was encouraged. Reviewed stroke risk factors and stroke prevention plan. Patient and/or family verbalizes understanding and agrees with plan.            Reviewed medications, potential side effects and signs and symptoms to report. Discussed risk versus benefits of treatment plan with patient and/or family-including medications, labs and radiology that may be ordered. Addressed questions and concerns during visit. Patient and/or family verbalized understanding and agree with plan.    AS THE PROVIDER, I PERSONALLY WORE PPE DURING ENTIRE FACE TO FACE ENCOUNTER IN CLINIC WITH THE PATIENT. PATIENT ALSO WORE PPE DURING ENTIRE FACE TO FACE ENCOUNTER EXCEPT FOR A MAX OF 30 SECONDS DURING NEUROLOGICAL EVALUATION OF CRANIAL NERVES AND THEN MASK WAS PLACED BACK OVER PATIENT FACE FOR REMAINDER OF VISIT. I WASHED MY HANDS BEFORE AND AFTER VISIT.    During this visit the following were done:  Labs Reviewed []    Labs Ordered []    Radiology Reports Reviewed []    Radiology Ordered []    PCP Records Reviewed []    Referring Provider Records Reviewed []    ER Records Reviewed []    Hospital Records Reviewed []    History Obtained From Family []    Radiology Images Reviewed []    Other Reviewed []    Records Requested []      Ani Haas, KINGSTON  9/22/2021

## 2021-09-27 ENCOUNTER — OFFICE VISIT (OUTPATIENT)
Dept: ONCOLOGY | Facility: CLINIC | Age: 61
End: 2021-09-27

## 2021-09-27 VITALS
DIASTOLIC BLOOD PRESSURE: 81 MMHG | HEART RATE: 66 BPM | WEIGHT: 192.6 LBS | BODY MASS INDEX: 32.05 KG/M2 | SYSTOLIC BLOOD PRESSURE: 141 MMHG | RESPIRATION RATE: 18 BRPM | OXYGEN SATURATION: 97 % | TEMPERATURE: 97.5 F

## 2021-09-27 DIAGNOSIS — C91.10 CHRONIC LYMPHOCYTIC LEUKEMIA (HCC): ICD-10-CM

## 2021-09-27 DIAGNOSIS — D72.829 LEUKOCYTOSIS, UNSPECIFIED TYPE: Primary | ICD-10-CM

## 2021-09-27 LAB
ALBUMIN SERPL-MCNC: 4.77 G/DL (ref 3.5–5.2)
ALBUMIN/GLOB SERPL: 1.7 G/DL
ALP SERPL-CCNC: 78 U/L (ref 39–117)
ALT SERPL W P-5'-P-CCNC: 18 U/L (ref 1–41)
ANION GAP SERPL CALCULATED.3IONS-SCNC: 13.6 MMOL/L (ref 5–15)
AST SERPL-CCNC: 13 U/L (ref 1–40)
BASOPHILS # BLD AUTO: 0.06 10*3/MM3 (ref 0–0.2)
BASOPHILS NFR BLD AUTO: 0.6 % (ref 0–1.5)
BILIRUB SERPL-MCNC: 0.3 MG/DL (ref 0–1.2)
BUN SERPL-MCNC: 10 MG/DL (ref 6–20)
BUN/CREAT SERPL: 10 (ref 7–25)
CALCIUM SPEC-SCNC: 9.7 MG/DL (ref 8.6–10.5)
CHLORIDE SERPL-SCNC: 98 MMOL/L (ref 98–107)
CO2 SERPL-SCNC: 26.4 MMOL/L (ref 22–29)
CREAT SERPL-MCNC: 1 MG/DL (ref 0.76–1.27)
DEPRECATED RDW RBC AUTO: 45.4 FL (ref 37–54)
EOSINOPHIL # BLD AUTO: 0.11 10*3/MM3 (ref 0–0.4)
EOSINOPHIL NFR BLD AUTO: 1 % (ref 0.3–6.2)
ERYTHROCYTE [DISTWIDTH] IN BLOOD BY AUTOMATED COUNT: 13.3 % (ref 12.3–15.4)
GFR SERPL CREATININE-BSD FRML MDRD: 76 ML/MIN/1.73
GLOBULIN UR ELPH-MCNC: 2.8 GM/DL
GLUCOSE SERPL-MCNC: 121 MG/DL (ref 65–99)
HCT VFR BLD AUTO: 47.7 % (ref 37.5–51)
HGB BLD-MCNC: 15.9 G/DL (ref 13–17.7)
IMM GRANULOCYTES # BLD AUTO: 0.04 10*3/MM3 (ref 0–0.05)
IMM GRANULOCYTES NFR BLD AUTO: 0.4 % (ref 0–0.5)
LDH SERPL-CCNC: 142 U/L (ref 135–225)
LYMPHOCYTES # BLD AUTO: 3.83 10*3/MM3 (ref 0.7–3.1)
LYMPHOCYTES NFR BLD AUTO: 36.1 % (ref 19.6–45.3)
MCH RBC QN AUTO: 30.6 PG (ref 26.6–33)
MCHC RBC AUTO-ENTMCNC: 33.3 G/DL (ref 31.5–35.7)
MCV RBC AUTO: 91.9 FL (ref 79–97)
MONOCYTES # BLD AUTO: 0.74 10*3/MM3 (ref 0.1–0.9)
MONOCYTES NFR BLD AUTO: 7 % (ref 5–12)
NEUTROPHILS NFR BLD AUTO: 5.84 10*3/MM3 (ref 1.7–7)
NEUTROPHILS NFR BLD AUTO: 54.9 % (ref 42.7–76)
NRBC BLD AUTO-RTO: 0 /100 WBC (ref 0–0.2)
PLATELET # BLD AUTO: 252 10*3/MM3 (ref 140–450)
PMV BLD AUTO: 8.3 FL (ref 6–12)
POTASSIUM SERPL-SCNC: 3.9 MMOL/L (ref 3.5–5.2)
PROT SERPL-MCNC: 7.6 G/DL (ref 6–8.5)
RBC # BLD AUTO: 5.19 10*6/MM3 (ref 4.14–5.8)
SODIUM SERPL-SCNC: 138 MMOL/L (ref 136–145)
URATE SERPL-MCNC: 3.6 MG/DL (ref 3.4–7)
WBC # BLD AUTO: 10.62 10*3/MM3 (ref 3.4–10.8)

## 2021-09-27 PROCEDURE — 84550 ASSAY OF BLOOD/URIC ACID: CPT | Performed by: NURSE PRACTITIONER

## 2021-09-27 PROCEDURE — 86334 IMMUNOFIX E-PHORESIS SERUM: CPT | Performed by: NURSE PRACTITIONER

## 2021-09-27 PROCEDURE — 80053 COMPREHEN METABOLIC PANEL: CPT | Performed by: NURSE PRACTITIONER

## 2021-09-27 PROCEDURE — 83615 LACTATE (LD) (LDH) ENZYME: CPT | Performed by: NURSE PRACTITIONER

## 2021-09-27 PROCEDURE — 82784 ASSAY IGA/IGD/IGG/IGM EACH: CPT | Performed by: NURSE PRACTITIONER

## 2021-09-27 PROCEDURE — 99214 OFFICE O/P EST MOD 30 MIN: CPT | Performed by: NURSE PRACTITIONER

## 2021-09-27 PROCEDURE — 84165 PROTEIN E-PHORESIS SERUM: CPT | Performed by: NURSE PRACTITIONER

## 2021-09-27 PROCEDURE — 85025 COMPLETE CBC W/AUTO DIFF WBC: CPT | Performed by: NURSE PRACTITIONER

## 2021-09-27 NOTE — PROGRESS NOTES
DATE:  9/27/2021    DIAGNOSIS: CLL    CHIEF COMPLAINT:  Left hip pain    HISTORY OF PRESENT ILLNESS:   Vince Olivera is a very pleasant 60 y.o. male who is being seen today at the request of KINGSTON Caruso for evaluation and treatment of leukocytosis. Mr. Olivera reports following with his PCP every 3-6 months with routine lab testing. He reports that he was only recently aware of his elevated WBC count. Previous available CBCs were reviewed and patient has had chronic elevation in white count since at least October 2019. Also noted low H/H in October 2019 and he relates this to hospitalization during that time. At present H/H and platelets are within normal. He is a chronic, heavy smoker, 1.5 PPD for the last 32 years. He denies fever/chills or drenching night sweats. Denies tender or enlarged LNs. He reports a good appetite and stable weight. Denies recurrent or difficult to treat infections. Denies fatigue. He denies any other specific complaints today.    Interval History:  Mr. Olivera presents today for follow up of leukocytosis. Overall, he reports that he has been doing well since his last visit. He continues to smoke at least 1.5 PPD but he reports today that he is thinking about trying to cut back. He reports a fair appetite, stable weight. Denies fever/chills or drenching night sweats. Denies any recurrent or difficult to treat infections. He does report that he feels like he has problems with his sinuses but this has been recurrent since breaking his nose many years ago. He states that he does not require frequent antibiotics. Denies tender/enlarged LAD. Denies fatigue. He reports that he is very active and continues to mow his lawn. He is otherwise well and denies any specific complaints today.     The following portions of the patient's history were reviewed and updated as appropriate: allergies, current medications, past family history, past medical history, past social history, past  surgical history and problem list.    PAST MEDICAL HISTORY:  Past Medical History:   Diagnosis Date   • Arthritis    • Carotid artery disease (CMS/[facility])    • Carotid artery disorder (CMS/[facility])    • Depression    • Diabetes mellitus (CMS/[facility])     DIAGNOSED  CHECKS FSBG 3X/WEEK    • Ear infection     about 2 weeks ago- cleared up - wax removed and cleaned out    • GERD (gastroesophageal reflux disease)     self diagnosed takes otc ppi   • Hyperlipidemia    • Hypertension    • Hypertension    • Inguinal hernia    • Seizure (CMS/[facility])     last 10/8/2019   • Shoulder pain     bilat    • Stroke (CMS/[facility]) 2017    EXPRESSIVE APHASIA RESIDUAL    • Tooth loose     5 - all over- will get teeth done after this surgery    • Wears reading eyeglasses        PAST SURGICAL HISTORY:  Past Surgical History:   Procedure Laterality Date   • ABDOMINAL HERNIA REPAIR     • AORTA FEMORAL BYPASS N/A 10/11/2019    Procedure: AORTA FEMORAL BYPASS, BILATERAL FEMORAL ENDARTERECTOMY;  Surgeon: Dylan Weaver MD;  Location: [facility];  Service: Vascular   • CAROTID ENDARTERECTOMY Left 10/17/2017    Procedure: CAROTID ENDARTERECTOMY LEFT;  Surgeon: Alexx Bach MD;  Location:  [facility] OR;  Service:    • INGUINAL HERNIA REPAIR Left        SOCIAL HISTORY:  Social History     Socioeconomic History   • Marital status: Single     Spouse name: Not on file   • Number of children: 2   • Years of education: Not on file   • Highest education level: Not on file   Tobacco Use   • Smoking status: Current Every Day Smoker     Packs/day: 0.05     Years: 40.00     Pack years: 2.00     Types: Cigarettes     Last attempt to quit: 2017     Years since quittin.4   • Smokeless tobacco: Former User     Types: Snuff   • Tobacco comment: quit snuff when 18 or 19 years old - only did for baseball - 2-3 years    Vaping Use   • Vaping Use: Never used   Substance and Sexual Activity   • Alcohol use: No   • Drug use: No   • Sexual activity:  Defer     Partners: Female     Comment: SPOUSE         FAMILY HISTORY:  Family History   Adopted: Yes   Problem Relation Age of Onset   • Diabetes Mother    • COPD Father          MEDICATIONS:  The current medication list was reviewed in the EMR    Current Outpatient Medications:   •  amLODIPine (NORVASC) 10 MG tablet, Take 1 tablet by mouth Daily., Disp: 90 tablet, Rfl: 1  •  aspirin  MG tablet, TAKE 1 TABLET BY MOUTH EVERY DAY, Disp: 90 tablet, Rfl: 1  •  atorvastatin (LIPITOR) 80 MG tablet, TAKE 1 TABLET BY MOUTH IN THE EVENING TO HELP LOWER CHOLESTEROL, Disp: 30 tablet, Rfl: 5  •  Azelastine HCl 137 MCG/SPRAY solution, 2 sprays into the nostril(s) as directed by provider 2 (two) times a day., Disp: 30 mL, Rfl: 2  •  clopidogrel (PLAVIX) 75 MG tablet, Take 1 tablet by mouth Daily., Disp: 90 tablet, Rfl: 1  •  fluticasone (Flonase) 50 MCG/ACT nasal spray, 2 sprays into the nostril(s) as directed by provider Daily., Disp: 15.8 mL, Rfl: 2  •  glucose blood test strip, Check glucose daily, Disp: 100 each, Rfl: 3  •  glucose monitor monitoring kit, 1 each Daily., Disp: 1 each, Rfl: 0  •  hydroCHLOROthiazide (HYDRODIURIL) 12.5 MG tablet, Take 1 tablet by mouth Daily., Disp: 30 tablet, Rfl: 6  •  Lacosamide (Vimpat) 150 MG tablet, Take 75 mg by mouth 2 (two) times a day., Disp: 30 tablet, Rfl: 5  •  Lancets misc, 1 Device Daily., Disp: 100 each, Rfl: 12  •  lansoprazole (PREVACID) 15 MG capsule, Take 15 mg by mouth Daily., Disp: , Rfl:   •  levETIRAcetam (KEPPRA) 1000 MG tablet, Take 1 tablet by mouth 2 (Two) Times a Day., Disp: 60 tablet, Rfl: 5  •  levETIRAcetam (KEPPRA) 500 MG tablet, Take 1 tablet by mouth 2 (Two) Times a Day., Disp: 60 tablet, Rfl: 5  •  losartan (COZAAR) 100 MG tablet, Take 1 tablet by mouth Daily. Take 1/2 tab twice daily, Disp: 180 tablet, Rfl: 3  •  metFORMIN (GLUCOPHAGE) 500 MG tablet, Take 2 tablets by mouth 2 (Two) Times a Day With Meals., Disp: 360 tablet, Rfl: 1  •  metoprolol  succinate XL (TOPROL-XL) 200 MG 24 hr tablet, Take 1 tablet by mouth Daily., Disp: 90 tablet, Rfl: 1  •  varenicline (CHANTIX) 1 MG tablet, Take 1 tablet by mouth 2 (Two) Times a Day for 140 days., Disp: 56 tablet, Rfl: 4    Current Facility-Administered Medications:   •  cyanocobalamin injection 1,000 mcg, 1,000 mcg, Intramuscular, Q28 Days, Glo Kyle, APRN, 1,000 mcg at 01/23/20 1530    ALLERGIES:  No Known Allergies      REVIEW OF SYSTEMS:    A comprehensive 14 point review of systems was performed.  Significant findings as mentioned above.  All other systems reviewed and are negative.        Physical Exam   Vital Signs:   Vitals:    09/27/21 0906   BP: 141/81   Pulse: 66   Resp: 18   Temp: 97.5 °F (36.4 °C)   SpO2: 97%    Patient's Body mass index is 32.05 kg/m².   General: Well developed, well nourished, alert and oriented x 3, in no acute distress.   Head: ATNC   Eyes: PERRL, No evidence of conjunctivitis.   Nose: No nasal discharge.   Mouth: Oral mucosal membranes moist. No oral ulceration or hemorrhages.   Neck: Neck supple. No thyromegaly. No JVD.   Lungs: Clear in all fields to A&P without rales, rhonchi or wheezing.   Heart: S1, S2. Regular rate and rhythm. No murmurs, rubs, or gallops.   Abdomen: Soft. Bowel sounds are normoactive. Nontender with palpation. No Hepatosplenomegaly can be appreciated.   Extremities: No cyanosis or edema. Peripheral pulses palpable and +2 bilaterally.   Integumentary: No rash, sores, erythema or nodules. No blistering, bruising, or dry skin.   Lymph Nodes: No palpable cervical, submandibular, supraclavicular, axillary or inguinal lymphadenopathy noted. No petechiae, purpura or ecchymosis noted.   Neurologic: Alert, awake and oriented x 3. Motor strength 5/5 in all four extremities. Cranial nerves 2-12 grossly intact. No sensory deficit.    Pain Score:  Pain Score    09/27/21 0906   PainSc: 0-No pain       PHQ-Score Total:  PHQ-9 Total Score:          PATHOLOGY:        ENDOSCOPY:        IMAGING:        RECENT LABS:  Lab Results   Component Value Date    WBC 10.62 09/27/2021    HGB 15.9 09/27/2021    HCT 47.7 09/27/2021    MCV 91.9 09/27/2021    RDW 13.3 09/27/2021     09/27/2021    NEUTRORELPCT 54.9 09/27/2021    LYMPHORELPCT 36.1 09/27/2021    MONORELPCT 7.0 09/27/2021    EOSRELPCT 1.0 09/27/2021    BASORELPCT 0.6 09/27/2021    NEUTROABS 5.84 09/27/2021    LYMPHSABS 3.83 (H) 09/27/2021       Lab Results   Component Value Date     08/23/2021    K 4.3 08/23/2021    CO2 27.8 08/23/2021    CL 99 08/23/2021    BUN 9 08/23/2021    CREATININE 0.92 08/23/2021    EGFRIFNONA 84 08/23/2021    GLUCOSE 128 (H) 08/23/2021    CALCIUM 9.8 08/23/2021    ALKPHOS 79 08/23/2021    AST 12 08/23/2021    ALT 14 08/23/2021    BILITOT 0.3 08/23/2021    ALBUMIN 4.83 08/23/2021    PROTEINTOT 7.2 08/23/2021    MG 1.8 10/15/2019    PHOS 3.2 10/15/2019     Work up 08/23/2021    Sed Rate   0 - 20 mm/hr 1      C-Reactive Protein   0.00 - 0.50 mg/dL <0.30            Hepatitis B Surface Ag   Non-Reactive Non-Reactive    Hep A IgM   Non-Reactive Non-Reactive    Hep B C IgM   Non-Reactive Non-Reactive    Hepatitis C Ab   Non-Reactive Non-Reactive      HIV-1/ HIV-2   Non-Reactive Non-Reactive      JANN Direct   Negative Negative      Rheumatoid Factor Quantitative   0.0 - 14.0 IU/mL 35.9High       BCR/ABL: Negative    ASSESSMENT & PLAN:  Vince Olivera is a very pleasant 60 y.o. male with    1. CLL  - Previous available CBCs were reviewed and patient has had chronic elevation in white count since at least October 2019. Also noted low H/H in October 2019 and he relates this to hospitalization during that time. At present H/H and platelets are within normal.   - He is a chronic, heavy smoker, 1.5 PPD for the last 32 years.  - Clinically, he is doing well and denies any significant B-symptoms.  - CBC from initial consultation showed ongoing leukocytosis with WBC of ~11.69, with  normal H/H and platelets.  - Sent additional labs to further evaluate including PBS (summarized above). ESR and CRP were normal.  Acute Hepatitis panel and HIV panel non-reactive. JANN normal. RF significantly elevated (35.9). Will place referral to rheumatology today. BCR/ABL negative. Flow cytometry was obtained and was consistent with CD5+B-cell population (6% of leukocytes) with a B-cell chronic lymphocytic leukemia/small lymphocytic lymphoma.   - These findings were discussed in detail with patient and his wife today. He is aware that this disease is the most common form of leukemia in his age range (60-81 yo) and that, while it is uncurable, it is also often a very indolent process. In the absence of associated cytopenias, B-symptoms (otherwise unexplained fevers, chills, night sweats and/or weight loss) or other symptoms that can be directly attributed to the CLL, bulky adenopathy and/or a rapid doubling of the white count, treatment is not indicated (and may continue to not be indicated for quite some time, possibly the rest of his life).   - Repeat CBC from today is showing normalized WBC with slightly elevated absolute lymphocytes ~3.83. Also obtained LDH, Uric acid, SPEP and quantitative immunoglobulins which are pending. Will also have baseline imaging including CT neck, chest, abdomen/pelvis with contrast. He will follow up with Dr. Erazo in 3-4 weeks to discuss further management.         ACO / TEE/Other  Quality measures  -  Vince Olivera did not receive 2021 flu vaccine. This was recommended.   -  Vince Olivera reports a pain score of 0.   -  Current outpatient and discharge medications have been reconciled for the patient.  Reviewed by: KINGSTON Lindsay        A total of 40 minutes were spent coordinating this patient’s care in clinic today; more than 50% of this time was face-to-face with the patient, reviewing  interim medical history and counseling on the current treatment and  followup plan. All questions were answered to his satisfaction.          Electronically Signed by: KINGSTON Laguna APRN September 27, 2021 09:51 EDT       CC:   No ref. provider found  Glo Kyle APRN

## 2021-09-28 LAB
ALBUMIN SERPL ELPH-MCNC: 4.4 G/DL (ref 2.9–4.4)
ALBUMIN/GLOB SERPL: 1.5 {RATIO} (ref 0.7–1.7)
ALPHA1 GLOB SERPL ELPH-MCNC: 0.2 G/DL (ref 0–0.4)
ALPHA2 GLOB SERPL ELPH-MCNC: 0.9 G/DL (ref 0.4–1)
B-GLOBULIN SERPL ELPH-MCNC: 0.9 G/DL (ref 0.7–1.3)
GAMMA GLOB SERPL ELPH-MCNC: 1.1 G/DL (ref 0.4–1.8)
GLOBULIN SER-MCNC: 3.1 G/DL (ref 2.2–3.9)
IGA SERPL-MCNC: 199 MG/DL (ref 90–386)
IGG SERPL-MCNC: 992 MG/DL (ref 603–1613)
IGM SERPL-MCNC: 65 MG/DL (ref 20–172)
INTERPRETATION SERPL IEP-IMP: NORMAL
LABORATORY COMMENT REPORT: NORMAL
M PROTEIN SERPL ELPH-MCNC: NORMAL G/DL
PROT SERPL-MCNC: 7.5 G/DL (ref 6–8.5)

## 2021-10-19 ENCOUNTER — APPOINTMENT (OUTPATIENT)
Dept: CT IMAGING | Facility: HOSPITAL | Age: 61
End: 2021-10-19

## 2021-10-26 DIAGNOSIS — I63.9 CEREBROVASCULAR ACCIDENT (CVA), UNSPECIFIED MECHANISM (HCC): ICD-10-CM

## 2021-10-26 RX ORDER — CLOPIDOGREL BISULFATE 75 MG/1
75 TABLET ORAL DAILY
Qty: 90 TABLET | Refills: 1 | Status: SHIPPED | OUTPATIENT
Start: 2021-10-26 | End: 2022-01-31 | Stop reason: SDUPTHER

## 2021-10-26 NOTE — TELEPHONE ENCOUNTER
Caller: Donna Davis    Relationship: Emergency Contact      Medication requested (name and dosage):     Requested Prescriptions:   Requested Prescriptions     Pending Prescriptions Disp Refills   • clopidogrel (PLAVIX) 75 MG tablet 90 tablet 1     Sig: Take 1 tablet by mouth Daily.        Pharmacy where request should be sent:  [facility]     Additional details provided by patient:      Best call back number: [phone]    Does the patient have less than a 3 day supply:  [x] Yes  [] No

## 2021-12-13 ENCOUNTER — PRIOR AUTHORIZATION (OUTPATIENT)
Dept: NEUROLOGY | Facility: CLINIC | Age: 61
End: 2021-12-13

## 2021-12-30 ENCOUNTER — OFFICE VISIT (OUTPATIENT)
Dept: CARDIAC SURGERY | Facility: CLINIC | Age: 61
End: 2021-12-30

## 2021-12-30 VITALS
HEIGHT: 66 IN | TEMPERATURE: 98.6 F | WEIGHT: 188 LBS | OXYGEN SATURATION: 98 % | DIASTOLIC BLOOD PRESSURE: 68 MMHG | BODY MASS INDEX: 30.22 KG/M2 | HEART RATE: 60 BPM | SYSTOLIC BLOOD PRESSURE: 120 MMHG

## 2021-12-30 DIAGNOSIS — E78.5 DYSLIPIDEMIA: ICD-10-CM

## 2021-12-30 DIAGNOSIS — I70.0 AORTIC OCCLUSION (HCC): ICD-10-CM

## 2021-12-30 DIAGNOSIS — I65.23 BILATERAL CAROTID ARTERY STENOSIS: Primary | ICD-10-CM

## 2021-12-30 PROCEDURE — 99213 OFFICE O/P EST LOW 20 MIN: CPT | Performed by: THORACIC SURGERY (CARDIOTHORACIC VASCULAR SURGERY)

## 2021-12-30 RX ORDER — ATORVASTATIN CALCIUM 80 MG/1
TABLET, FILM COATED ORAL
Qty: 30 TABLET | Refills: 5 | Status: SHIPPED | OUTPATIENT
Start: 2021-12-30 | End: 2022-03-03 | Stop reason: SDUPTHER

## 2021-12-30 NOTE — PROGRESS NOTES
12/30/2021  Patient Information  Vince Olivera                                                                                          [address] [facility] [address]   1960  'PCP/Referring Physician'  Glo Kyle, KINGSTON  No ref. provider found  Chief Complaint   Patient presents with   • Follow-up     1 yr f/u for PVD        CC: I am doing great I can walk without any pain    History of Present Illness: 61-year-old  male now 2 years status post aortobifemoral bypass for intra-abdominal aortic occlusion.  The patient is done exceptionally well.  He currently can walk without claudication.  He denies rest pain.  He does have a significant history of vascular disease in other distributions.  He is status post a left carotid endarterectomy.  The patient does have type 2 diabetes his hemoglobin A1c is currently 6.6 and he feels that his diabetes is well controlled.  He has no new symptoms and no new complaints.      Patient Active Problem List   Diagnosis   • H/o Left MCA ischemic CVA 4/2017   • Essential hypertension   • Dyslipidemia   • Bilateral carotid artery stenosis s/p L CEA 4/27/17. 50-69% R carotid stenosis   • Gastroesophageal reflux disease without esophagitis   • Combined receptive and expressive aphasia due to cerebrovascular accident   • Abnormal peripheral vision of right eye   • GERD (gastroesophageal reflux disease)   • Hyperlipidemia   • Palpitations   • Bilateral Carotid artery stenosis. Left greater than right    • Internal carotid artery stenosis, left   • Status post left carotid endarterectomy   • Type 2 diabetes mellitus. HbA1c 6.6   • Bilateral carpal tunnel syndrome   • PAD. Occluded distal aorta, common and external iliacs, and superior femoral arteries   • CVA (cerebral vascular accident) (CMS/HCC), rule out    • Breakthrough seizure (HCC)   • Aortic occlusion s/p aorto-femoral bypass 10/11/19 per Dr. Weaver    • Seizure disorder (HCC)     Past Medical  History:   Diagnosis Date   • Arthritis    • Carotid artery disease (HCC)    • Carotid artery disorder (HCC)    • Depression    • Diabetes mellitus (HCC)     DIAGNOSED 4-2017 CHECKS FSBG 3X/WEEK    • Ear infection     about 2 weeks ago- cleared up - wax removed and cleaned out    • GERD (gastroesophageal reflux disease)     self diagnosed takes otc ppi   • Hyperlipidemia    • Hypertension    • Hypertension    • Inguinal hernia    • Seizure (HCC)     last 10/8/2019   • Shoulder pain     bilat    • Stroke ([facility]) 04/24/2017    EXPRESSIVE APHASIA RESIDUAL    • Tooth loose     5 - all over- will get teeth done after this surgery    • Wears reading eyeglasses      Past Surgical History:   Procedure Laterality Date   • ABDOMINAL HERNIA REPAIR  2010   • AORTA FEMORAL BYPASS N/A 10/11/2019    Procedure: AORTA FEMORAL BYPASS, BILATERAL FEMORAL ENDARTERECTOMY;  Surgeon: Dylan Weaver MD;  Location:  [facility] OR;  Service: Vascular   • CAROTID ENDARTERECTOMY Left 10/17/2017    Procedure: CAROTID ENDARTERECTOMY LEFT;  Surgeon: Alexx Bach MD;  Location:  [facility] OR;  Service:    • INGUINAL HERNIA REPAIR Left        Current Outpatient Medications:   •  amLODIPine (NORVASC) 10 MG tablet, Take 1 tablet by mouth Daily., Disp: 90 tablet, Rfl: 1  •  aspirin  MG tablet, TAKE 1 TABLET BY MOUTH EVERY DAY, Disp: 90 tablet, Rfl: 1  •  atorvastatin (LIPITOR) 80 MG tablet, TAKE 1 TABLET BY MOUTH EVERY DAY TO HELP LOWER CHOLESTEROL, Disp: 30 tablet, Rfl: 5  •  Azelastine HCl 137 MCG/SPRAY solution, 2 sprays into the nostril(s) as directed by provider 2 (two) times a day., Disp: 30 mL, Rfl: 2  •  clopidogrel (PLAVIX) 75 MG tablet, Take 1 tablet by mouth Daily., Disp: 90 tablet, Rfl: 1  •  fluticasone (Flonase) 50 MCG/ACT nasal spray, 2 sprays into the nostril(s) as directed by provider Daily., Disp: 15.8 mL, Rfl: 2  •  glucose blood test strip, Check glucose daily, Disp: 100 each, Rfl: 3  •  glucose monitor monitoring kit, 1  each Daily., Disp: 1 each, Rfl: 0  •  hydroCHLOROthiazide (HYDRODIURIL) 12.5 MG tablet, Take 1 tablet by mouth Daily., Disp: 30 tablet, Rfl: 6  •  Lacosamide (Vimpat) 150 MG tablet, Take 75 mg by mouth 2 (two) times a day., Disp: 30 tablet, Rfl: 5  •  Lancets misc, 1 Device Daily., Disp: 100 each, Rfl: 12  •  lansoprazole (PREVACID) 15 MG capsule, Take 15 mg by mouth Daily., Disp: , Rfl:   •  levETIRAcetam (KEPPRA) 1000 MG tablet, Take 1 tablet by mouth 2 (Two) Times a Day., Disp: 60 tablet, Rfl: 5  •  levETIRAcetam (KEPPRA) 500 MG tablet, Take 1 tablet by mouth 2 (Two) Times a Day., Disp: 60 tablet, Rfl: 5  •  losartan (COZAAR) 100 MG tablet, Take 1 tablet by mouth Daily. Take 1/2 tab twice daily, Disp: 180 tablet, Rfl: 3  •  metFORMIN (GLUCOPHAGE) 500 MG tablet, Take 2 tablets by mouth 2 (Two) Times a Day With Meals., Disp: 360 tablet, Rfl: 1  •  metoprolol succinate XL (TOPROL-XL) 200 MG 24 hr tablet, Take 1 tablet by mouth Daily., Disp: 90 tablet, Rfl: 1  •  varenicline (CHANTIX) 1 MG tablet, Take 1 tablet by mouth 2 (Two) Times a Day for 140 days., Disp: 56 tablet, Rfl: 4    Current Facility-Administered Medications:   •  cyanocobalamin injection 1,000 mcg, 1,000 mcg, Intramuscular, Q28 Days, Glo Kyle APRN, 1,000 mcg at 20 1530  No Known Allergies  Social History     Socioeconomic History   • Marital status: Single   • Number of children: 2   Tobacco Use   • Smoking status: Current Every Day Smoker     Packs/day: 0.05     Years: 40.00     Pack years: 2.00     Types: Cigarettes     Last attempt to quit: 2017     Years since quittin.6   • Smokeless tobacco: Former User     Types: Snuff   • Tobacco comment: quit snuff when 18 or 19 years old - only did for baseball - 2-3 years    Vaping Use   • Vaping Use: Never used   Substance and Sexual Activity   • Alcohol use: No   • Drug use: No   • Sexual activity: Defer     Partners: Female     Comment: SPOUSE     Family History   Adopted: Yes  "  Problem Relation Age of Onset   • Diabetes Mother    • COPD Father        ROS, past medical history, surgical history, family history, social history and vitals  reviewed by me and corrected as needed.        Review of Systems   Constitutional: Negative for chills, fever, malaise/fatigue, night sweats and weight loss.   HENT: Negative for hearing loss, odynophagia and sore throat.    Cardiovascular: Negative for chest pain, dyspnea on exertion, leg swelling, orthopnea and palpitations.   Respiratory: Negative for cough and shortness of breath.    Hematologic/Lymphatic: Does not bruise/bleed easily.   Skin: Negative for itching and rash.   Musculoskeletal: Negative for arthritis, joint pain, joint swelling and myalgias.   Gastrointestinal: Negative for abdominal pain, constipation, diarrhea, hematemesis, hematochezia, nausea and vomiting.   Genitourinary: Negative for dysuria, frequency and hematuria.   Neurological: Negative for focal weakness, headaches, numbness and seizures.   Psychiatric/Behavioral: Negative for suicidal ideas.       Vitals:    12/30/21 1007   BP: 120/68   BP Location: Right arm   Pulse: 60   Temp: 98.6 °F (37 °C)   SpO2: 98%   Weight: 85.3 kg (188 lb)   Height: 167.6 cm (66\")        Physical Exam  Vitals and nursing note reviewed.   Constitutional:       General: He is not in acute distress.     Appearance: Normal appearance. He is normal weight.   HENT:      Head: Normocephalic and atraumatic.      Right Ear: External ear normal.      Left Ear: External ear normal.      Nose: Nose normal.      Mouth/Throat:      Mouth: Mucous membranes are moist.   Eyes:      Extraocular Movements: Extraocular movements intact.      Pupils: Pupils are equal, round, and reactive to light.   Neck:      Vascular: No carotid bruit.   Cardiovascular:      Rate and Rhythm: Normal rate and regular rhythm.      Pulses: Normal pulses.      Heart sounds: Normal heart sounds. No murmur heard.      Pulmonary:      " Effort: Pulmonary effort is normal. No respiratory distress.      Breath sounds: No wheezing, rhonchi or rales.   Abdominal:      General: Abdomen is flat. Bowel sounds are normal.      Palpations: Abdomen is soft. There is no mass.      Tenderness: There is no abdominal tenderness. There is no guarding.   Musculoskeletal:         General: No swelling or tenderness.      Cervical back: Normal range of motion. No rigidity. No muscular tenderness.      Right lower leg: No edema.      Left lower leg: No edema.      Comments: Abdominal incision is well-healed.  Pulses are 2+ and symmetrical groin incisions are well-healed.  Distal pedal pulses are palpable bilaterally.   Lymphadenopathy:      Cervical: No cervical adenopathy.   Skin:     General: Skin is warm and dry.      Capillary Refill: Capillary refill takes less than 2 seconds.      Coloration: Skin is not jaundiced.      Findings: No erythema.   Neurological:      General: No focal deficit present.      Mental Status: He is alert and oriented to person, place, and time. Mental status is at baseline.   Psychiatric:         Mood and Affect: Mood normal.         Behavior: Behavior normal.         Thought Content: Thought content normal.         Judgment: Judgment normal.         Labs: None    Imaging: None    Assessment: Stable course status post aortobifemoral bypass    Plan: Return to clinic in 1 year for recheck    Electronically signed by Dylan Weaver MD, 12/30/21, 11:35 AM EST.

## 2022-01-25 ENCOUNTER — CLINICAL SUPPORT (OUTPATIENT)
Dept: FAMILY MEDICINE CLINIC | Facility: CLINIC | Age: 62
End: 2022-01-25

## 2022-01-25 DIAGNOSIS — I10 ESSENTIAL HYPERTENSION: Chronic | ICD-10-CM

## 2022-01-25 DIAGNOSIS — I10 ESSENTIAL HYPERTENSION: ICD-10-CM

## 2022-01-25 DIAGNOSIS — E78.5 DYSLIPIDEMIA: Chronic | ICD-10-CM

## 2022-01-25 DIAGNOSIS — E11.9 DIABETES MELLITUS TYPE 2 IN NONOBESE: Chronic | ICD-10-CM

## 2022-01-25 DIAGNOSIS — Z12.5 SCREENING PSA (PROSTATE SPECIFIC ANTIGEN): ICD-10-CM

## 2022-01-25 PROCEDURE — 80061 LIPID PANEL: CPT | Performed by: NURSE PRACTITIONER

## 2022-01-25 PROCEDURE — 83036 HEMOGLOBIN GLYCOSYLATED A1C: CPT | Performed by: NURSE PRACTITIONER

## 2022-01-25 PROCEDURE — G0103 PSA SCREENING: HCPCS | Performed by: NURSE PRACTITIONER

## 2022-01-25 PROCEDURE — 85025 COMPLETE CBC W/AUTO DIFF WBC: CPT | Performed by: NURSE PRACTITIONER

## 2022-01-25 PROCEDURE — 36415 COLL VENOUS BLD VENIPUNCTURE: CPT | Performed by: NURSE PRACTITIONER

## 2022-01-25 PROCEDURE — 82043 UR ALBUMIN QUANTITATIVE: CPT | Performed by: NURSE PRACTITIONER

## 2022-01-25 PROCEDURE — 80053 COMPREHEN METABOLIC PANEL: CPT | Performed by: NURSE PRACTITIONER

## 2022-01-25 RX ORDER — METOPROLOL SUCCINATE 200 MG/1
200 TABLET, EXTENDED RELEASE ORAL DAILY
Qty: 90 TABLET | Refills: 1 | Status: SHIPPED | OUTPATIENT
Start: 2022-01-25 | End: 2022-01-31 | Stop reason: SDUPTHER

## 2022-01-25 RX ORDER — AMLODIPINE BESYLATE 10 MG/1
10 TABLET ORAL DAILY
Qty: 90 TABLET | Refills: 1 | Status: SHIPPED | OUTPATIENT
Start: 2022-01-25 | End: 2022-01-31 | Stop reason: SDUPTHER

## 2022-01-25 NOTE — PROGRESS NOTES
Venipuncture Blood Specimen Collection  Venipuncture performed in Left Arm by Rohini Garner MA with good hemostasis. Patient tolerated the procedure well without complications.   01/25/22   Rohini Garner MA

## 2022-01-26 LAB
ALBUMIN SERPL-MCNC: 4.7 G/DL (ref 3.5–5.2)
ALBUMIN UR-MCNC: <1.2 MG/DL
ALBUMIN/GLOB SERPL: 2.1 G/DL
ALP SERPL-CCNC: 59 U/L (ref 39–117)
ALT SERPL W P-5'-P-CCNC: 22 U/L (ref 1–41)
ANION GAP SERPL CALCULATED.3IONS-SCNC: 9.6 MMOL/L (ref 5–15)
AST SERPL-CCNC: 19 U/L (ref 1–40)
BASOPHILS # BLD AUTO: 0.08 10*3/MM3 (ref 0–0.2)
BASOPHILS NFR BLD AUTO: 0.6 % (ref 0–1.5)
BILIRUB SERPL-MCNC: 0.4 MG/DL (ref 0–1.2)
BUN SERPL-MCNC: 8 MG/DL (ref 8–23)
BUN/CREAT SERPL: 8.2 (ref 7–25)
CALCIUM SPEC-SCNC: 10.4 MG/DL (ref 8.6–10.5)
CHLORIDE SERPL-SCNC: 100 MMOL/L (ref 98–107)
CHOLEST SERPL-MCNC: 99 MG/DL (ref 0–200)
CO2 SERPL-SCNC: 26.4 MMOL/L (ref 22–29)
CREAT SERPL-MCNC: 0.97 MG/DL (ref 0.76–1.27)
DEPRECATED RDW RBC AUTO: 41.6 FL (ref 37–54)
EOSINOPHIL # BLD AUTO: 0.13 10*3/MM3 (ref 0–0.4)
EOSINOPHIL NFR BLD AUTO: 1 % (ref 0.3–6.2)
ERYTHROCYTE [DISTWIDTH] IN BLOOD BY AUTOMATED COUNT: 12.6 % (ref 12.3–15.4)
GFR SERPL CREATININE-BSD FRML MDRD: 79 ML/MIN/1.73
GLOBULIN UR ELPH-MCNC: 2.2 GM/DL
GLUCOSE SERPL-MCNC: 131 MG/DL (ref 65–99)
HBA1C MFR BLD: 6.6 % (ref 4.8–5.6)
HCT VFR BLD AUTO: 44.9 % (ref 37.5–51)
HDLC SERPL-MCNC: 28 MG/DL (ref 40–60)
HGB BLD-MCNC: 15.7 G/DL (ref 13–17.7)
IMM GRANULOCYTES # BLD AUTO: 0.03 10*3/MM3 (ref 0–0.05)
IMM GRANULOCYTES NFR BLD AUTO: 0.2 % (ref 0–0.5)
LDLC SERPL CALC-MCNC: 41 MG/DL (ref 0–100)
LDLC/HDLC SERPL: 1.22 {RATIO}
LYMPHOCYTES # BLD AUTO: 5.68 10*3/MM3 (ref 0.7–3.1)
LYMPHOCYTES NFR BLD AUTO: 42.4 % (ref 19.6–45.3)
MCH RBC QN AUTO: 31.5 PG (ref 26.6–33)
MCHC RBC AUTO-ENTMCNC: 35 G/DL (ref 31.5–35.7)
MCV RBC AUTO: 90.2 FL (ref 79–97)
MONOCYTES # BLD AUTO: 0.81 10*3/MM3 (ref 0.1–0.9)
MONOCYTES NFR BLD AUTO: 6 % (ref 5–12)
NEUTROPHILS NFR BLD AUTO: 49.8 % (ref 42.7–76)
NEUTROPHILS NFR BLD AUTO: 6.66 10*3/MM3 (ref 1.7–7)
NRBC BLD AUTO-RTO: 0 /100 WBC (ref 0–0.2)
PLATELET # BLD AUTO: 276 10*3/MM3 (ref 140–450)
PMV BLD AUTO: 9.4 FL (ref 6–12)
POTASSIUM SERPL-SCNC: 5.2 MMOL/L (ref 3.5–5.2)
PROT SERPL-MCNC: 6.9 G/DL (ref 6–8.5)
PSA SERPL-MCNC: 0.99 NG/ML (ref 0–4)
RBC # BLD AUTO: 4.98 10*6/MM3 (ref 4.14–5.8)
SODIUM SERPL-SCNC: 136 MMOL/L (ref 136–145)
TRIGL SERPL-MCNC: 184 MG/DL (ref 0–150)
VLDLC SERPL-MCNC: 30 MG/DL (ref 5–40)
WBC NRBC COR # BLD: 13.39 10*3/MM3 (ref 3.4–10.8)

## 2022-01-27 DIAGNOSIS — I10 ESSENTIAL HYPERTENSION: ICD-10-CM

## 2022-01-27 DIAGNOSIS — I65.23 BILATERAL CAROTID ARTERY STENOSIS: ICD-10-CM

## 2022-01-27 RX ORDER — HYDROCHLOROTHIAZIDE 12.5 MG/1
TABLET ORAL
Qty: 30 TABLET | Refills: 6 | Status: SHIPPED | OUTPATIENT
Start: 2022-01-27 | End: 2022-08-01 | Stop reason: SDUPTHER

## 2022-01-31 ENCOUNTER — OFFICE VISIT (OUTPATIENT)
Dept: FAMILY MEDICINE CLINIC | Facility: CLINIC | Age: 62
End: 2022-01-31

## 2022-01-31 VITALS
WEIGHT: 190 LBS | HEART RATE: 62 BPM | DIASTOLIC BLOOD PRESSURE: 66 MMHG | SYSTOLIC BLOOD PRESSURE: 111 MMHG | RESPIRATION RATE: 18 BRPM | HEIGHT: 66 IN | OXYGEN SATURATION: 97 % | BODY MASS INDEX: 30.53 KG/M2 | TEMPERATURE: 97.7 F

## 2022-01-31 DIAGNOSIS — M54.50 ACUTE BILATERAL LOW BACK PAIN WITHOUT SCIATICA: ICD-10-CM

## 2022-01-31 DIAGNOSIS — E11.9 TYPE 2 DIABETES MELLITUS WITHOUT COMPLICATION, WITHOUT LONG-TERM CURRENT USE OF INSULIN: Chronic | ICD-10-CM

## 2022-01-31 DIAGNOSIS — I10 ESSENTIAL HYPERTENSION: Primary | Chronic | ICD-10-CM

## 2022-01-31 DIAGNOSIS — E78.2 MIXED HYPERLIPIDEMIA: Chronic | ICD-10-CM

## 2022-01-31 DIAGNOSIS — I65.23 BILATERAL CAROTID ARTERY STENOSIS: ICD-10-CM

## 2022-01-31 DIAGNOSIS — I63.9 CEREBROVASCULAR ACCIDENT (CVA), UNSPECIFIED MECHANISM: ICD-10-CM

## 2022-01-31 DIAGNOSIS — Z23 NEED FOR INFLUENZA VACCINATION: ICD-10-CM

## 2022-01-31 PROCEDURE — 90471 IMMUNIZATION ADMIN: CPT | Performed by: NURSE PRACTITIONER

## 2022-01-31 PROCEDURE — 90686 IIV4 VACC NO PRSV 0.5 ML IM: CPT | Performed by: NURSE PRACTITIONER

## 2022-01-31 PROCEDURE — 99214 OFFICE O/P EST MOD 30 MIN: CPT | Performed by: NURSE PRACTITIONER

## 2022-01-31 RX ORDER — TIZANIDINE HYDROCHLORIDE 2 MG/1
2 CAPSULE, GELATIN COATED ORAL 3 TIMES DAILY
Qty: 90 CAPSULE | Refills: 1 | Status: SHIPPED | OUTPATIENT
Start: 2022-01-31 | End: 2022-09-15 | Stop reason: SDUPTHER

## 2022-01-31 RX ORDER — CLOPIDOGREL BISULFATE 75 MG/1
75 TABLET ORAL DAILY
Qty: 90 TABLET | Refills: 1 | Status: SHIPPED | OUTPATIENT
Start: 2022-01-31 | End: 2022-08-01 | Stop reason: SDUPTHER

## 2022-01-31 RX ORDER — ASPIRIN 325 MG
325 TABLET, DELAYED RELEASE (ENTERIC COATED) ORAL DAILY
Qty: 90 TABLET | Refills: 1 | Status: SHIPPED | OUTPATIENT
Start: 2022-01-31 | End: 2022-09-15 | Stop reason: SDUPTHER

## 2022-01-31 RX ORDER — AMLODIPINE BESYLATE 10 MG/1
10 TABLET ORAL DAILY
Qty: 90 TABLET | Refills: 1 | Status: SHIPPED | OUTPATIENT
Start: 2022-01-31 | End: 2023-02-02 | Stop reason: SDUPTHER

## 2022-01-31 RX ORDER — METOPROLOL SUCCINATE 200 MG/1
200 TABLET, EXTENDED RELEASE ORAL DAILY
Qty: 90 TABLET | Refills: 1 | Status: SHIPPED | OUTPATIENT
Start: 2022-01-31 | End: 2022-08-01 | Stop reason: SDUPTHER

## 2022-01-31 NOTE — PROGRESS NOTES
"Subjective   Vince Olivera is a 61 y.o. male.     Chief Complaint   Patient presents with   • Annual Exam       He presents for follow up of essential hypertension, mixed hyperlipidemia, and type II DM. He doesn't check his blood pressure at home. He states he isn't having any problems with his blood sugar. He c/o pain in the back of his hips in his low back for a week or two. He denies injury. He states it feels real sore, like he hangs up. He states the muscles in both his legs are sore. He has seen cardiovascular, neurology, and hematology. Notes reviewed.        The following portions of the patient's history were reviewed and updated as appropriate: allergies, current medications, past family history, past medical history, past social history, past surgical history and problem list.    Review of Systems   Constitutional: Negative for fever and unexpected weight change.   HENT: Negative for ear pain, rhinorrhea, sore throat and trouble swallowing.    Eyes: Negative for visual disturbance.   Respiratory: Negative for cough, shortness of breath and wheezing.    Cardiovascular: Negative for chest pain and palpitations.   Gastrointestinal: Negative for abdominal pain, blood in stool, constipation, diarrhea, nausea and vomiting.   Genitourinary: Negative for dysuria and hematuria.   Musculoskeletal: Positive for back pain. Negative for arthralgias and myalgias.   Skin: Negative for color change.   Allergic/Immunologic: Negative for environmental allergies.   Neurological: Negative for dizziness and headaches.   Hematological: Negative for adenopathy.   Psychiatric/Behavioral: Negative for sleep disturbance and suicidal ideas. The patient is not nervous/anxious.        Objective     /66 (BP Location: Left arm, Patient Position: Sitting, Cuff Size: Adult)   Pulse 62   Temp 97.7 °F (36.5 °C) (Temporal)   Resp 18   Ht 167.6 cm (65.98\")   Wt 86.2 kg (190 lb)   SpO2 97%   BMI 30.68 kg/m²     Physical " Exam  Vitals reviewed.   Constitutional:       General: He is not in acute distress.     Appearance: He is well-developed. He is not diaphoretic.   HENT:      Head: Normocephalic and atraumatic.   Cardiovascular:      Rate and Rhythm: Normal rate and regular rhythm.      Heart sounds: Normal heart sounds. No murmur heard.  No friction rub. No gallop.    Pulmonary:      Effort: Pulmonary effort is normal. No respiratory distress.      Breath sounds: Normal breath sounds.   Abdominal:      General: Bowel sounds are normal. There is no distension.      Palpations: Abdomen is soft.      Tenderness: There is no abdominal tenderness.   Musculoskeletal:      Lumbar back: Tenderness present.   Skin:     General: Skin is warm and dry.   Neurological:      Mental Status: He is alert and oriented to person, place, and time.   Psychiatric:         Behavior: Behavior normal.         Thought Content: Thought content normal.         Judgment: Judgment normal.         Assessment/Plan     Problem List Items Addressed This Visit        Cardiac and Vasculature    Essential hypertension (Chronic)    Hyperlipidemia (Chronic)       Endocrine and Metabolic    Type 2 diabetes mellitus. HbA1c 6.6 (Chronic)      Other Visit Diagnoses     Need for influenza vaccination    -  Primary    Relevant Orders    FluLaval/Fluarix/Fluzone >6 Months ([id number])          Plan: CBC, CMP, Lipids, A1C, PSA reviewed. Get xray of lumbar spine to look for cause of pain. Zanaflex ordered for pain. Flu vaccine ordered per his request. I recommend you follow up with hematology for further testing. He is agreeable. Follow up in 6 months and as needed.     @Body mass index is 30.68 kg/m².         Understands disease processes and need for medications.  Understands reasons for urgent and emergent care.  Patient (& family) verbalized agreement for treatment plan.   Emotional support and active listening provided.  Patient provided time to verbalize feelings.                This document has been electronically signed by KINGSTON Gallardo   January 31, 2022 14:42 EST

## 2022-01-31 NOTE — PROGRESS NOTES
Immunization  Immunization performed in right deltoid by Mary Baker MA. Patient tolerated the procedure well without complications.  01/31/22   Mary Baker MA

## 2022-02-17 DIAGNOSIS — I65.23 BILATERAL CAROTID ARTERY STENOSIS: ICD-10-CM

## 2022-02-17 RX ORDER — ASPIRIN 325 MG
TABLET, DELAYED RELEASE (ENTERIC COATED) ORAL
Qty: 30 TABLET | Refills: 1 | OUTPATIENT
Start: 2022-02-17

## 2022-03-03 DIAGNOSIS — G40.909 SEIZURE DISORDER AS SEQUELA OF CEREBROVASCULAR ACCIDENT: ICD-10-CM

## 2022-03-03 DIAGNOSIS — I69.398 SEIZURE DISORDER AS SEQUELA OF CEREBROVASCULAR ACCIDENT: ICD-10-CM

## 2022-03-03 DIAGNOSIS — E78.5 DYSLIPIDEMIA: ICD-10-CM

## 2022-03-03 RX ORDER — LEVETIRACETAM 500 MG/1
500 TABLET ORAL 2 TIMES DAILY
Qty: 60 TABLET | Refills: 5 | Status: SHIPPED | OUTPATIENT
Start: 2022-03-03 | End: 2022-06-06 | Stop reason: SDUPTHER

## 2022-03-03 NOTE — TELEPHONE ENCOUNTER
Caller: Donna Davis    Relationship: Emergency Contact    Best call back number: [phone]    Requested Prescriptions:   Requested Prescriptions     Pending Prescriptions Disp Refills   • levETIRAcetam (KEPPRA) 500 MG tablet 60 tablet 5     Sig: Take 1 tablet by mouth 2 (Two) Times a Day.      Pharmacy where request should be sent: [facility] [address] [phone] [facility] [phone] FX     Additional details provided by patient: PT HAS 4 TABLETS LEFT (2 DAYS)    Does the patient have less than a 3 day supply:  [x] Yes  [] No    Last refilled: 9/22/21 5 REFILLS  Last appointment: 9/22/21  Next appointment: 3/14/22    PLEASE REVIEW AND ADVISE.    Maged Waddell Rep   03/03/22 13:59 EST

## 2022-03-03 NOTE — TELEPHONE ENCOUNTER
Caller: Donna Davis    Relationship: Emergency Contact    Best call back number: [phone]    Requested Prescriptions:   Requested Prescriptions     Pending Prescriptions Disp Refills   • atorvastatin (LIPITOR) 80 MG tablet 30 tablet 5        Pharmacy where request should be sent: [facility] [address] [facility] [phone] [facility] [phone]      Additional details provided by patient: PATIENT HAS 1 PILL LEFT.     Does the patient have less than a 3 day supply:  [x] Yes  [] No    Cristina Cote   03/03/22 13:45 EST

## 2022-03-03 NOTE — TELEPHONE ENCOUNTER
Rx Refill Note  Requested Prescriptions     Pending Prescriptions Disp Refills   • levETIRAcetam (KEPPRA) 500 MG tablet 60 tablet 5     Sig: Take 1 tablet by mouth 2 (Two) Times a Day.      Last office visit with prescribing clinician: 9/22/2021      Next office visit with prescribing clinician: 3/14/2022            Jocelin Baac MA  03/03/22, 16:01 EST

## 2022-03-08 RX ORDER — ATORVASTATIN CALCIUM 80 MG/1
80 TABLET, FILM COATED ORAL DAILY
Qty: 30 TABLET | Refills: 5 | Status: SHIPPED | OUTPATIENT
Start: 2022-03-08 | End: 2022-07-05 | Stop reason: SDUPTHER

## 2022-03-16 DIAGNOSIS — I69.398 SEIZURE DISORDER AS SEQUELA OF CEREBROVASCULAR ACCIDENT: ICD-10-CM

## 2022-03-16 DIAGNOSIS — G40.909 SEIZURE DISORDER AS SEQUELA OF CEREBROVASCULAR ACCIDENT: ICD-10-CM

## 2022-03-16 RX ORDER — LACOSAMIDE 150 MG/1
75 TABLET, FILM COATED ORAL DAILY
Qty: 30 TABLET | Refills: 2 | Status: SHIPPED | OUTPATIENT
Start: 2022-03-16 | End: 2022-06-06 | Stop reason: SDUPTHER

## 2022-03-16 NOTE — TELEPHONE ENCOUNTER
Caller: Donna Davis    Relationship: Emergency Contact    Best call back number: [phone]    Requested Prescriptions:   Requested Prescriptions     Pending Prescriptions Disp Refills   • Lacosamide (Vimpat) 150 MG tablet 30 tablet 5     Sig: Take 75 mg by mouth.        Pharmacy where request should be sent: [facility] [address] [phone] [facility] [phone]      Additional details provided by patient: PT CURRENTLY HAS 10 DAYS WORTH OF MEDICATION BUT NO REFILLS AT PHARMACY.     Does the patient have less than a 3 day supply:  [] Yes  [x] No    Maged Anderson Rep   03/16/22 12:15 EDT

## 2022-03-16 NOTE — TELEPHONE ENCOUNTER
Rx Refill Note  Requested Prescriptions     Pending Prescriptions Disp Refills   • Lacosamide (Vimpat) 150 MG tablet 30 tablet 5     Sig: Take 75 mg by mouth Daily.      Last office visit with prescribing clinician: 9/22/2021      Next office visit with prescribing clinician: 3/22/2022            Loretta Gaspar CMA  03/16/22, 16:25 EDT

## 2022-04-29 ENCOUNTER — TELEPHONE (OUTPATIENT)
Dept: FAMILY MEDICINE CLINIC | Facility: CLINIC | Age: 62
End: 2022-04-29

## 2022-06-06 ENCOUNTER — OFFICE VISIT (OUTPATIENT)
Dept: NEUROLOGY | Facility: CLINIC | Age: 62
End: 2022-06-06

## 2022-06-06 VITALS
HEIGHT: 66 IN | OXYGEN SATURATION: 99 % | SYSTOLIC BLOOD PRESSURE: 120 MMHG | TEMPERATURE: 97.3 F | HEART RATE: 67 BPM | DIASTOLIC BLOOD PRESSURE: 80 MMHG | WEIGHT: 193 LBS | BODY MASS INDEX: 31.02 KG/M2

## 2022-06-06 DIAGNOSIS — I69.398 SEIZURE DISORDER AS SEQUELA OF CEREBROVASCULAR ACCIDENT: ICD-10-CM

## 2022-06-06 DIAGNOSIS — G40.909 SEIZURE DISORDER AS SEQUELA OF CEREBROVASCULAR ACCIDENT: ICD-10-CM

## 2022-06-06 PROCEDURE — 99213 OFFICE O/P EST LOW 20 MIN: CPT | Performed by: NURSE PRACTITIONER

## 2022-06-06 RX ORDER — LEVETIRACETAM 1000 MG/1
1000 TABLET ORAL 2 TIMES DAILY
Qty: 60 TABLET | Refills: 5 | Status: SHIPPED | OUTPATIENT
Start: 2022-06-06 | End: 2023-01-03 | Stop reason: SDUPTHER

## 2022-06-06 RX ORDER — LACOSAMIDE 150 MG/1
75 TABLET ORAL 2 TIMES DAILY
Qty: 30 TABLET | Refills: 5 | Status: SHIPPED | OUTPATIENT
Start: 2022-06-06 | End: 2022-12-14 | Stop reason: SDUPTHER

## 2022-06-06 RX ORDER — LEVETIRACETAM 500 MG/1
500 TABLET ORAL 2 TIMES DAILY
Qty: 60 TABLET | Refills: 5 | Status: SHIPPED | OUTPATIENT
Start: 2022-06-06 | End: 2023-01-03 | Stop reason: SDUPTHER

## 2022-06-06 NOTE — PROGRESS NOTES
Subjective:     Patient ID: Vince Olivera is a 61 y.o. male.    CC:   Chief Complaint   Patient presents with   • Seizures       HPI:   History of Present Illness     Mr. Olivera is here today for six-month follow-up on seizure disorder.    He has seizures as a sequela of CVA in 2017.      He is currently managed on Keppra 1500 mg twice daily as well as Vimpat 75 mg BID, (he currently takes half of a 150 mg pill twice a day)    he has had no seizure activity at all since this last episode prior to his femoropopliteal bypass in October 2019.     Patient tells me today he is doing well, he has had no seizure activity.  He remains very active, he has no problems with fatigue or weakness.  He has had no stroke symptoms, denies dizziness or headaches.     The following portions of the patient's history were reviewed and updated as appropriate: allergies, current medications, past family history, past medical history, past social history, past surgical history and problem list.    Past Medical History:   Diagnosis Date   • Arthritis    • Carotid artery disease (HCC)    • Carotid artery disorder (HCC)    • Depression    • Diabetes mellitus (HCC)     DIAGNOSED 4-2017 CHECKS FSBG 3X/WEEK    • Ear infection     about 2 weeks ago- cleared up - wax removed and cleaned out    • GERD (gastroesophageal reflux disease)     self diagnosed takes otc ppi   • Hyperlipidemia    • Hypertension    • Hypertension    • Inguinal hernia    • Seizure (HCC)     last 10/8/2019   • Shoulder pain     bilat    • Stroke ([facility]) 04/24/2017    EXPRESSIVE APHASIA RESIDUAL    • Tooth loose     5 - all over- will get teeth done after this surgery    • Wears reading eyeglasses        Past Surgical History:   Procedure Laterality Date   • ABDOMINAL HERNIA REPAIR  2010   • AORTA FEMORAL BYPASS N/A 10/11/2019    Procedure: AORTA FEMORAL BYPASS, BILATERAL FEMORAL ENDARTERECTOMY;  Surgeon: Dylan Weaver MD;  Location: [facility];  Service: Vascular   •  "CAROTID ENDARTERECTOMY Left 10/17/2017    Procedure: CAROTID ENDARTERECTOMY LEFT;  Surgeon: Alexx Bach MD;  Location: [facility];  Service:    • INGUINAL HERNIA REPAIR Left        Social History     Socioeconomic History   • Marital status: Single   • Number of children: 2   Tobacco Use   • Smoking status: Current Every Day Smoker     Packs/day: 0.05     Years: 40.00     Pack years: 2.00     Types: Cigarettes     Last attempt to quit: 2017     Years since quittin.1   • Smokeless tobacco: Former User     Types: Snuff   • Tobacco comment: quit snuff when 18 or 19 years old - only did for baseball - 2-3 years    Vaping Use   • Vaping Use: Never used   Substance and Sexual Activity   • Alcohol use: No   • Drug use: No   • Sexual activity: Defer     Partners: Female     Comment: SPOUSE       Family History   Adopted: Yes   Problem Relation Age of Onset   • Diabetes Mother    • COPD Father         Review of Systems   Constitutional: Negative.    HENT: Negative.    Eyes: Negative.    Respiratory: Negative.    Cardiovascular: Negative.    Gastrointestinal: Negative.    Endocrine: Negative.    Genitourinary: Negative.    Musculoskeletal: Negative.    Skin: Negative.    Allergic/Immunologic: Negative.    Neurological: Negative for dizziness, tremors, seizures, syncope, facial asymmetry, speech difficulty, weakness, light-headedness, numbness and headaches.   Hematological: Negative.    Psychiatric/Behavioral: Negative.         Objective:  /80   Pulse 67   Temp 97.3 °F (36.3 °C)   Ht 167.6 cm (66\")   Wt 87.5 kg (193 lb)   SpO2 99%   BMI 31.15 kg/m²     Neurologic Exam     Mental Status   Oriented to person, place, and time.   Attention: normal. Concentration: normal.   Speech: speech is normal   Level of consciousness: alert  Knowledge: consistent with education.   Able to read. Able to write. Normal comprehension.     Cranial Nerves   Cranial nerves II through XII intact.     CN III, IV, VI "   Pupils are equal, round, and reactive to light.  Extraocular motions are normal.   Right pupil: Shape: regular. Reactivity: brisk. Consensual response: intact. Accommodation: intact.   Left pupil: Shape: regular. Reactivity: brisk. Consensual response: intact. Accommodation: intact.   CN III: no CN III palsy  CN VI: no CN VI palsy  Nystagmus: none   Upgaze: normal  Downgaze: normal    CN V   Facial sensation intact.     CN VII   Facial expression full, symmetric.     CN VIII   CN VIII normal.     CN IX, X   CN IX normal.   CN X normal.     CN XI   CN XI normal.     CN XII   CN XII normal.     Motor Exam   Muscle bulk: normal  Overall muscle tone: normal  Right arm tone: normal  Left arm tone: normal  Right arm pronator drift: absent  Left arm pronator drift: absent  Right leg tone: normal  Left leg tone: normal    Strength   Strength 5/5 throughout.     Sensory Exam   Light touch normal.     Gait, Coordination, and Reflexes     Gait  Gait: normal    Coordination   Romberg: negative  Finger to nose coordination: normal    Tremor   Resting tremor: absent  Intention tremor: absent  Action tremor: absent    Reflexes   Reflexes 2+ except as noted.       Physical Exam  Vitals reviewed.   Constitutional:       General: He is not in acute distress.     Appearance: He is well-developed. He is obese. He is not ill-appearing or toxic-appearing.   HENT:      Head: Normocephalic and atraumatic.      Mouth/Throat:      Mouth: Mucous membranes are moist.   Eyes:      General: No scleral icterus.     Extraocular Movements: Extraocular movements intact and EOM normal.      Conjunctiva/sclera: Conjunctivae normal.      Pupils: Pupils are equal, round, and reactive to light.   Pulmonary:      Effort: Pulmonary effort is normal. No respiratory distress.   Musculoskeletal:      Cervical back: Normal range of motion and neck supple.   Skin:     General: Skin is warm.      Capillary Refill: Capillary refill takes less than 2 seconds.    Neurological:      General: No focal deficit present.      Mental Status: He is alert and oriented to person, place, and time.      Coordination: Finger-Nose-Finger Test and Romberg Test normal.      Gait: Gait is intact.      Deep Tendon Reflexes: Strength normal.   Psychiatric:         Mood and Affect: Mood normal.         Speech: Speech normal.         Behavior: Behavior normal.         Thought Content: Thought content normal.         Judgment: Judgment normal.         Assessment/Plan:       Diagnoses and all orders for this visit:    1. Seizure disorder as sequela of cerebrovascular accident (HCC) [I69.398, G40.909]  -     levETIRAcetam (KEPPRA) 1000 MG tablet; Take 1 tablet by mouth 2 (Two) Times a Day.  Dispense: 60 tablet; Refill: 5  -     levETIRAcetam (KEPPRA) 500 MG tablet; Take 1 tablet by mouth 2 (Two) Times a Day.  Dispense: 60 tablet; Refill: 5  -     Levetiracetam Level (Keppra); Future  -     Lacosamide Blood; Future  -     CBC (No Diff); Future  -     Comprehensive Metabolic Panel; Future  -     Lacosamide (Vimpat) 150 MG tablet; Take 75 mg by mouth 2 (Two) Times a Day.  Dispense: 30 tablet; Refill: 5    2. Seizure disorder on Keppra and Vimpat. Recurrent Grand mal 10/8/19  -     levETIRAcetam (KEPPRA) 1000 MG tablet; Take 1 tablet by mouth 2 (Two) Times a Day.  Dispense: 60 tablet; Refill: 5  -     levETIRAcetam (KEPPRA) 500 MG tablet; Take 1 tablet by mouth 2 (Two) Times a Day.  Dispense: 60 tablet; Refill: 5  -     Levetiracetam Level (Keppra); Future  -     Lacosamide Blood; Future  -     CBC (No Diff); Future  -     Comprehensive Metabolic Panel; Future  -     Lacosamide (Vimpat) 150 MG tablet; Take 75 mg by mouth 2 (Two) Times a Day.  Dispense: 30 tablet; Refill: 5    Mr. Olivera is doing well, he has had no seizure activity, denies any stroke symptoms.  He is very active, has no problems with weakness or falls.  Balance intact with no issues  Refills given today, will check labs on his way  out  He is to notify us ASAP with any seizure activity, to ER with any stroke or prolonged seizures  Patient instructions include: No driving or operating heavy machinery for 3 months from onset of most recent seizure. Minimize stress as much as possible. Recommended 7-8 hours of sleep each night. Abstain from alcohol intake. Educated on Antiepileptic medications with possible side effects and signs and symptoms to report if prescribed during visit. Instructed to take seizure medication daily if prescribed. Reviewed potential seizure risk factors. Instructed to call 911 or our office if another seizure does occur.  Discussed signs and symptoms of stroke and when to call 911. Instructed to follow a low fat diet including the Mediterranean diet. Instructed to take all medications daily as prescribed. Encouraged 30 minutes of exercise 3-4 times a week as tolerated. Stay well hydrated. Discussed potential side effects of new medications and signs and symptoms to report. If patient is currently using tobacco products, smoking cessation was encouraged. Reviewed stroke risk factors and stroke prevention plan. Patient and/or family verbalizes understanding and agrees with plan.   He will return to clinic in 6 months or sooner if needed, he is instructed to notify our office with any questions or concerns prior to follow-up appointment         Reviewed medications, potential side effects and signs and symptoms to report. Discussed risk versus benefits of treatment plan with patient and/or family-including medications, labs and radiology that may be ordered. Addressed questions and concerns during visit. Patient and/or family verbalized understanding and agree with plan.    AS THE PROVIDER, I PERSONALLY WORE PPE DURING ENTIRE FACE TO FACE ENCOUNTER IN CLINIC WITH THE PATIENT. PATIENT ALSO WORE PPE DURING ENTIRE FACE TO FACE ENCOUNTER EXCEPT FOR A MAX OF 30 SECONDS DURING NEUROLOGICAL EVALUATION OF CRANIAL NERVES AND THEN MASK  WAS PLACED BACK OVER PATIENT FACE FOR REMAINDER OF VISIT. I WASHED MY HANDS BEFORE AND AFTER VISIT.    During this visit the following were done:  Labs Reviewed []    Labs Ordered []    Radiology Reports Reviewed []    Radiology Ordered []    PCP Records Reviewed []    Referring Provider Records Reviewed []    ER Records Reviewed []    Hospital Records Reviewed []    History Obtained From Family []    Radiology Images Reviewed []    Other Reviewed []    Records Requested []      Ani Haas, KINGSTON  6/6/2022

## 2022-07-05 ENCOUNTER — TELEPHONE (OUTPATIENT)
Dept: FAMILY MEDICINE CLINIC | Facility: CLINIC | Age: 62
End: 2022-07-05

## 2022-07-05 DIAGNOSIS — I69.398 SEIZURE DISORDER AS SEQUELA OF CEREBROVASCULAR ACCIDENT: ICD-10-CM

## 2022-07-05 DIAGNOSIS — E78.5 DYSLIPIDEMIA: ICD-10-CM

## 2022-07-05 DIAGNOSIS — E11.9 DIABETES MELLITUS TYPE 2 IN NONOBESE: Chronic | ICD-10-CM

## 2022-07-05 DIAGNOSIS — G40.909 SEIZURE DISORDER AS SEQUELA OF CEREBROVASCULAR ACCIDENT: ICD-10-CM

## 2022-07-05 RX ORDER — ATORVASTATIN CALCIUM 80 MG/1
80 TABLET, FILM COATED ORAL DAILY
Qty: 90 TABLET | Refills: 1 | Status: SHIPPED | OUTPATIENT
Start: 2022-07-05 | End: 2022-08-01 | Stop reason: SDUPTHER

## 2022-07-05 NOTE — TELEPHONE ENCOUNTER
Caller: KAREEN CANDELARIO  Relationship: POA    Best call back number: [phone]    Requested Prescriptions: levETIRAcetam (KEPPRA) 1000 MG tablet & levETIRAcetam (KEPPRA) 500 MG tablet  Requested Prescriptions      No prescriptions requested or ordered in this encounter        Pharmacy where request should be sent:  [facility]    Additional details provided by patient: PT IS TOTALLY OUT OF THE MEDICATIONS levETIRAcetam (KEPPRA) 1000 MG tablet & THE 500MG    Does the patient have less than a 3 day supply:  [x] Yes  [] No    Maged Blanco Rep   07/05/22 08:46 EDT

## 2022-07-06 RX ORDER — LEVETIRACETAM 1000 MG/1
1000 TABLET ORAL 2 TIMES DAILY
Qty: 60 TABLET | Refills: 5 | OUTPATIENT
Start: 2022-07-06

## 2022-07-06 RX ORDER — LEVETIRACETAM 500 MG/1
500 TABLET ORAL 2 TIMES DAILY
Qty: 60 TABLET | Refills: 5 | OUTPATIENT
Start: 2022-07-06

## 2022-07-06 NOTE — TELEPHONE ENCOUNTER
Called and spoke to the pharmacy and the RX's were sent 6/6/22 with 5 refills and they have them ready for the patient to . Called and notified patient and he did pick them up.

## 2022-07-15 DIAGNOSIS — I10 ESSENTIAL HYPERTENSION: Chronic | ICD-10-CM

## 2022-07-15 DIAGNOSIS — G40.909 SEIZURE DISORDER AS SEQUELA OF CEREBROVASCULAR ACCIDENT: ICD-10-CM

## 2022-07-15 DIAGNOSIS — I69.398 SEIZURE DISORDER AS SEQUELA OF CEREBROVASCULAR ACCIDENT: ICD-10-CM

## 2022-07-15 RX ORDER — LACOSAMIDE 150 MG/1
75 TABLET ORAL 2 TIMES DAILY
Qty: 30 TABLET | Refills: 5 | OUTPATIENT
Start: 2022-07-15

## 2022-07-15 RX ORDER — LOSARTAN POTASSIUM 100 MG/1
100 TABLET ORAL DAILY
Qty: 180 TABLET | Refills: 3 | Status: SHIPPED | OUTPATIENT
Start: 2022-07-15 | End: 2022-08-01 | Stop reason: SDUPTHER

## 2022-07-15 NOTE — TELEPHONE ENCOUNTER
Jocelin I called pharmacy, they were running an old prescription from September 2021.  They have his current prescription of Vimpat 150 mg take 75 mg 1 p.o. twice a day #30 with 5 refills that was sent on 6/20/2022.  They will be getting this ready for him today

## 2022-07-15 NOTE — TELEPHONE ENCOUNTER
Caller: KAREEN CANDELARIO    Relationship: FRIEND, ON  [employer]    Best call back number: [phone]  Requested Prescriptions:   Requested Prescriptions     Pending Prescriptions Disp Refills   • Lacosamide (Vimpat) 150 MG tablet 30 tablet 5     Sig: Take 75 mg by mouth 2 (Two) Times a Day.        Pharmacy where request should be sent: [facility]     Additional details provided by patient: PHARMACY IS NOT PUTTING THE CORRECT INSTRUCTIONS ON THE MEDICATION. THEY TOLD HER THAT THE RX STATES TO TAKE 2 PER DAY  MG. THIS IS AN ONGOING ISSUE.    PATIENT HAS BEEN OUT OF VIMPAT FOR 3 DAYS    Does the patient have less than a 3 day supply:  [x] Yes  [] No    Maged BROWNING Rep   07/15/22 12:39 EDT

## 2022-07-15 NOTE — TELEPHONE ENCOUNTER
Caller: Donna Davis    Relationship: Emergency Contact    Best call back number: [phone]    Requested Prescriptions:   Requested Prescriptions     Pending Prescriptions Disp Refills   • losartan (COZAAR) 100 MG tablet 180 tablet 3     Sig: Take 1 tablet by mouth Daily. Take 1/2 tab twice daily        Pharmacy where request should be sent: [facility] [address] [phone] [facility] [phone] FX     Additional details provided by patient PATIENT ADVISED THAT PHARMACY SAID THEY WILL NEED A NEW PRESCRIPTION AND PATIENT HAS BEEN WITHOUT THIS FOR 3 DAYS    Does the patient have less than a 3 day supply:  [x] Yes  [] No    Maged Dietz Rep   07/15/22 12:12 EDT

## 2022-07-29 ENCOUNTER — CLINICAL SUPPORT (OUTPATIENT)
Dept: FAMILY MEDICINE CLINIC | Facility: CLINIC | Age: 62
End: 2022-07-29

## 2022-07-29 DIAGNOSIS — I10 ESSENTIAL HYPERTENSION: Chronic | ICD-10-CM

## 2022-07-29 PROCEDURE — 85025 COMPLETE CBC W/AUTO DIFF WBC: CPT | Performed by: NURSE PRACTITIONER

## 2022-07-29 PROCEDURE — 80053 COMPREHEN METABOLIC PANEL: CPT | Performed by: NURSE PRACTITIONER

## 2022-07-29 PROCEDURE — 36415 COLL VENOUS BLD VENIPUNCTURE: CPT | Performed by: NURSE PRACTITIONER

## 2022-07-29 PROCEDURE — 82043 UR ALBUMIN QUANTITATIVE: CPT | Performed by: NURSE PRACTITIONER

## 2022-07-29 PROCEDURE — 80061 LIPID PANEL: CPT | Performed by: NURSE PRACTITIONER

## 2022-07-29 PROCEDURE — 83036 HEMOGLOBIN GLYCOSYLATED A1C: CPT | Performed by: NURSE PRACTITIONER

## 2022-07-29 NOTE — PROGRESS NOTES
Venipuncture Blood Specimen Collection  Venipuncture performed in Right arm by Josefina Chowdhury MA with good hemostasis. Patient tolerated the procedure well without complications.   07/29/22   Josefina Chowdhury MA

## 2022-07-30 LAB
ALBUMIN SERPL-MCNC: 4.6 G/DL (ref 3.5–5.2)
ALBUMIN UR-MCNC: <1.2 MG/DL
ALBUMIN/GLOB SERPL: 2.3 G/DL
ALP SERPL-CCNC: 50 U/L (ref 39–117)
ALT SERPL W P-5'-P-CCNC: 16 U/L (ref 1–41)
ANION GAP SERPL CALCULATED.3IONS-SCNC: 11.3 MMOL/L (ref 5–15)
AST SERPL-CCNC: 13 U/L (ref 1–40)
BASOPHILS # BLD AUTO: 0.07 10*3/MM3 (ref 0–0.2)
BASOPHILS NFR BLD AUTO: 0.5 % (ref 0–1.5)
BILIRUB SERPL-MCNC: 0.4 MG/DL (ref 0–1.2)
BUN SERPL-MCNC: 12 MG/DL (ref 8–23)
BUN/CREAT SERPL: 13.3 (ref 7–25)
CALCIUM SPEC-SCNC: 9.4 MG/DL (ref 8.6–10.5)
CHLORIDE SERPL-SCNC: 99 MMOL/L (ref 98–107)
CHOLEST SERPL-MCNC: 101 MG/DL (ref 0–200)
CO2 SERPL-SCNC: 27.7 MMOL/L (ref 22–29)
CREAT SERPL-MCNC: 0.9 MG/DL (ref 0.76–1.27)
DEPRECATED RDW RBC AUTO: 41.7 FL (ref 37–54)
EGFRCR SERPLBLD CKD-EPI 2021: 97.8 ML/MIN/1.73
EOSINOPHIL # BLD AUTO: 0.13 10*3/MM3 (ref 0–0.4)
EOSINOPHIL NFR BLD AUTO: 1 % (ref 0.3–6.2)
ERYTHROCYTE [DISTWIDTH] IN BLOOD BY AUTOMATED COUNT: 12.9 % (ref 12.3–15.4)
GLOBULIN UR ELPH-MCNC: 2 GM/DL
GLUCOSE SERPL-MCNC: 124 MG/DL (ref 65–99)
HBA1C MFR BLD: 6.9 % (ref 4.8–5.6)
HCT VFR BLD AUTO: 43.6 % (ref 37.5–51)
HDLC SERPL-MCNC: 29 MG/DL (ref 40–60)
HGB BLD-MCNC: 15.3 G/DL (ref 13–17.7)
IMM GRANULOCYTES # BLD AUTO: 0.05 10*3/MM3 (ref 0–0.05)
IMM GRANULOCYTES NFR BLD AUTO: 0.4 % (ref 0–0.5)
LDLC SERPL CALC-MCNC: 48 MG/DL (ref 0–100)
LDLC/HDLC SERPL: 1.54 {RATIO}
LYMPHOCYTES # BLD AUTO: 5.52 10*3/MM3 (ref 0.7–3.1)
LYMPHOCYTES NFR BLD AUTO: 42.2 % (ref 19.6–45.3)
MCH RBC QN AUTO: 31.7 PG (ref 26.6–33)
MCHC RBC AUTO-ENTMCNC: 35.1 G/DL (ref 31.5–35.7)
MCV RBC AUTO: 90.3 FL (ref 79–97)
MONOCYTES # BLD AUTO: 0.83 10*3/MM3 (ref 0.1–0.9)
MONOCYTES NFR BLD AUTO: 6.3 % (ref 5–12)
NEUTROPHILS NFR BLD AUTO: 49.6 % (ref 42.7–76)
NEUTROPHILS NFR BLD AUTO: 6.48 10*3/MM3 (ref 1.7–7)
NRBC BLD AUTO-RTO: 0 /100 WBC (ref 0–0.2)
PLATELET # BLD AUTO: 255 10*3/MM3 (ref 140–450)
PMV BLD AUTO: 9.1 FL (ref 6–12)
POTASSIUM SERPL-SCNC: 4.7 MMOL/L (ref 3.5–5.2)
PROT SERPL-MCNC: 6.6 G/DL (ref 6–8.5)
RBC # BLD AUTO: 4.83 10*6/MM3 (ref 4.14–5.8)
SODIUM SERPL-SCNC: 138 MMOL/L (ref 136–145)
TRIGL SERPL-MCNC: 137 MG/DL (ref 0–150)
VLDLC SERPL-MCNC: 24 MG/DL (ref 5–40)
WBC NRBC COR # BLD: 13.08 10*3/MM3 (ref 3.4–10.8)

## 2022-07-31 ENCOUNTER — APPOINTMENT (OUTPATIENT)
Dept: CT IMAGING | Facility: HOSPITAL | Age: 62
End: 2022-07-31

## 2022-07-31 ENCOUNTER — HOSPITAL ENCOUNTER (EMERGENCY)
Facility: HOSPITAL | Age: 62
Discharge: HOME | End: 2022-07-31
Attending: EMERGENCY MEDICINE | Admitting: EMERGENCY MEDICINE

## 2022-07-31 VITALS
RESPIRATION RATE: 19 BRPM | WEIGHT: 190 LBS | HEIGHT: 66 IN | TEMPERATURE: 97.8 F | SYSTOLIC BLOOD PRESSURE: 152 MMHG | HEART RATE: 81 BPM | DIASTOLIC BLOOD PRESSURE: 81 MMHG | BODY MASS INDEX: 30.53 KG/M2 | OXYGEN SATURATION: 98 %

## 2022-07-31 DIAGNOSIS — N30.01 ACUTE CYSTITIS WITH HEMATURIA: Primary | ICD-10-CM

## 2022-07-31 DIAGNOSIS — N32.89 BLADDER MASS: ICD-10-CM

## 2022-07-31 LAB
ALBUMIN SERPL-MCNC: 5.27 G/DL (ref 3.5–5.2)
ALBUMIN/GLOB SERPL: 2 G/DL
ALP SERPL-CCNC: 73 U/L (ref 39–117)
ALT SERPL W P-5'-P-CCNC: 17 U/L (ref 1–41)
ANION GAP SERPL CALCULATED.3IONS-SCNC: 13.2 MMOL/L (ref 5–15)
AST SERPL-CCNC: 11 U/L (ref 1–40)
BACTERIA UR QL AUTO: ABNORMAL /HPF
BASOPHILS # BLD MANUAL: 0.18 10*3/MM3 (ref 0–0.2)
BASOPHILS NFR BLD MANUAL: 1 % (ref 0–1.5)
BILIRUB SERPL-MCNC: 0.3 MG/DL (ref 0–1.2)
BILIRUB UR QL STRIP: ABNORMAL
BUN SERPL-MCNC: 13 MG/DL (ref 8–23)
BUN/CREAT SERPL: 12.9 (ref 7–25)
CALCIUM SPEC-SCNC: 10.5 MG/DL (ref 8.6–10.5)
CHLORIDE SERPL-SCNC: 94 MMOL/L (ref 98–107)
CLARITY UR: ABNORMAL
CO2 SERPL-SCNC: 26.8 MMOL/L (ref 22–29)
COLOR UR: ABNORMAL
CREAT SERPL-MCNC: 1.01 MG/DL (ref 0.76–1.27)
D-LACTATE SERPL-SCNC: 1.3 MMOL/L (ref 0.5–2)
DEPRECATED RDW RBC AUTO: 42.9 FL (ref 37–54)
EGFRCR SERPLBLD CKD-EPI 2021: 85.1 ML/MIN/1.73
EOSINOPHIL # BLD MANUAL: 0.37 10*3/MM3 (ref 0–0.4)
EOSINOPHIL NFR BLD MANUAL: 2 % (ref 0.3–6.2)
ERYTHROCYTE [DISTWIDTH] IN BLOOD BY AUTOMATED COUNT: 13.1 % (ref 12.3–15.4)
GLOBULIN UR ELPH-MCNC: 2.6 GM/DL
GLUCOSE SERPL-MCNC: 165 MG/DL (ref 65–99)
GLUCOSE UR STRIP-MCNC: ABNORMAL MG/DL
HCT VFR BLD AUTO: 46.6 % (ref 37.5–51)
HGB BLD-MCNC: 16.3 G/DL (ref 13–17.7)
HGB UR QL STRIP.AUTO: ABNORMAL
HYALINE CASTS UR QL AUTO: ABNORMAL /LPF
KETONES UR QL STRIP: ABNORMAL
LEUKOCYTE ESTERASE UR QL STRIP.AUTO: ABNORMAL
LYMPHOCYTES # BLD MANUAL: 5.14 10*3/MM3 (ref 0.7–3.1)
LYMPHOCYTES NFR BLD MANUAL: 8 % (ref 5–12)
MCH RBC QN AUTO: 31.5 PG (ref 26.6–33)
MCHC RBC AUTO-ENTMCNC: 35 G/DL (ref 31.5–35.7)
MCV RBC AUTO: 90 FL (ref 79–97)
MONOCYTES # BLD: 1.47 10*3/MM3 (ref 0.1–0.9)
NEUTROPHILS # BLD AUTO: 11.19 10*3/MM3 (ref 1.7–7)
NEUTROPHILS NFR BLD MANUAL: 59 % (ref 42.7–76)
NEUTS BAND NFR BLD MANUAL: 2 % (ref 0–5)
NITRITE UR QL STRIP: POSITIVE
PH UR STRIP.AUTO: 8 [PH] (ref 5–8)
PLAT MORPH BLD: NORMAL
PLATELET # BLD AUTO: 238 10*3/MM3 (ref 140–450)
PMV BLD AUTO: 8.1 FL (ref 6–12)
POTASSIUM SERPL-SCNC: 3.7 MMOL/L (ref 3.5–5.2)
PROT SERPL-MCNC: 7.9 G/DL (ref 6–8.5)
PROT UR QL STRIP: ABNORMAL
RBC # BLD AUTO: 5.18 10*6/MM3 (ref 4.14–5.8)
RBC # UR STRIP: ABNORMAL /HPF
RBC MORPH BLD: NORMAL
REF LAB TEST METHOD: ABNORMAL
SODIUM SERPL-SCNC: 134 MMOL/L (ref 136–145)
SP GR UR STRIP: 1.01 (ref 1–1.03)
SQUAMOUS #/AREA URNS HPF: ABNORMAL /HPF
UROBILINOGEN UR QL STRIP: ABNORMAL
VARIANT LYMPHS NFR BLD MANUAL: 28 % (ref 19.6–45.3)
WBC # UR STRIP: ABNORMAL /HPF
WBC NRBC COR # BLD: 18.34 10*3/MM3 (ref 3.4–10.8)

## 2022-07-31 PROCEDURE — 87186 SC STD MICRODIL/AGAR DIL: CPT | Performed by: PHYSICIAN ASSISTANT

## 2022-07-31 PROCEDURE — 85025 COMPLETE CBC W/AUTO DIFF WBC: CPT | Performed by: PHYSICIAN ASSISTANT

## 2022-07-31 PROCEDURE — 96365 THER/PROPH/DIAG IV INF INIT: CPT

## 2022-07-31 PROCEDURE — 87040 BLOOD CULTURE FOR BACTERIA: CPT | Performed by: PHYSICIAN ASSISTANT

## 2022-07-31 PROCEDURE — 81001 URINALYSIS AUTO W/SCOPE: CPT | Performed by: PHYSICIAN ASSISTANT

## 2022-07-31 PROCEDURE — 99283 EMERGENCY DEPT VISIT LOW MDM: CPT

## 2022-07-31 PROCEDURE — 74176 CT ABD & PELVIS W/O CONTRAST: CPT

## 2022-07-31 PROCEDURE — 83605 ASSAY OF LACTIC ACID: CPT | Performed by: PHYSICIAN ASSISTANT

## 2022-07-31 PROCEDURE — 80053 COMPREHEN METABOLIC PANEL: CPT | Performed by: PHYSICIAN ASSISTANT

## 2022-07-31 PROCEDURE — 87077 CULTURE AEROBIC IDENTIFY: CPT | Performed by: PHYSICIAN ASSISTANT

## 2022-07-31 PROCEDURE — 85007 BL SMEAR W/DIFF WBC COUNT: CPT | Performed by: PHYSICIAN ASSISTANT

## 2022-07-31 PROCEDURE — 36415 COLL VENOUS BLD VENIPUNCTURE: CPT

## 2022-07-31 PROCEDURE — 87086 URINE CULTURE/COLONY COUNT: CPT | Performed by: PHYSICIAN ASSISTANT

## 2022-07-31 PROCEDURE — 25010000002 CEFTRIAXONE PER 250 MG: Performed by: PHYSICIAN ASSISTANT

## 2022-07-31 RX ORDER — SODIUM CHLORIDE 0.9 % (FLUSH) 0.9 %
10 SYRINGE (ML) INJECTION AS NEEDED
Status: DISCONTINUED | OUTPATIENT
Start: 2022-07-31 | End: 2022-07-31 | Stop reason: HOSPADM

## 2022-07-31 RX ORDER — OXYCODONE HYDROCHLORIDE AND ACETAMINOPHEN 5; 325 MG/1; MG/1
1 TABLET ORAL ONCE
Status: COMPLETED | OUTPATIENT
Start: 2022-07-31 | End: 2022-07-31

## 2022-07-31 RX ORDER — CEFDINIR 300 MG/1
300 CAPSULE ORAL 2 TIMES DAILY
Qty: 14 CAPSULE | Refills: 0 | Status: SHIPPED | OUTPATIENT
Start: 2022-07-31 | End: 2022-08-07

## 2022-07-31 RX ADMIN — OXYCODONE HYDROCHLORIDE AND ACETAMINOPHEN 1 TABLET: 5; 325 TABLET ORAL at 21:25

## 2022-07-31 RX ADMIN — CEFTRIAXONE 2 G: 2 INJECTION, POWDER, FOR SOLUTION INTRAMUSCULAR; INTRAVENOUS at 20:09

## 2022-08-01 ENCOUNTER — OFFICE VISIT (OUTPATIENT)
Dept: FAMILY MEDICINE CLINIC | Facility: CLINIC | Age: 62
End: 2022-08-01

## 2022-08-01 VITALS
TEMPERATURE: 99.6 F | SYSTOLIC BLOOD PRESSURE: 100 MMHG | OXYGEN SATURATION: 97 % | RESPIRATION RATE: 18 BRPM | WEIGHT: 193 LBS | BODY MASS INDEX: 31.02 KG/M2 | HEART RATE: 74 BPM | HEIGHT: 66 IN | DIASTOLIC BLOOD PRESSURE: 53 MMHG

## 2022-08-01 DIAGNOSIS — E78.5 DYSLIPIDEMIA: Chronic | ICD-10-CM

## 2022-08-01 DIAGNOSIS — H61.21 IMPACTED CERUMEN OF RIGHT EAR: ICD-10-CM

## 2022-08-01 DIAGNOSIS — N32.89 BLADDER MASS: ICD-10-CM

## 2022-08-01 DIAGNOSIS — I10 ESSENTIAL HYPERTENSION: Primary | Chronic | ICD-10-CM

## 2022-08-01 DIAGNOSIS — E11.9 DIABETES MELLITUS TYPE 2 IN NONOBESE: Chronic | ICD-10-CM

## 2022-08-01 DIAGNOSIS — I63.9 CEREBROVASCULAR ACCIDENT (CVA), UNSPECIFIED MECHANISM: Chronic | ICD-10-CM

## 2022-08-01 DIAGNOSIS — I65.23 BILATERAL CAROTID ARTERY STENOSIS: ICD-10-CM

## 2022-08-01 PROCEDURE — 99214 OFFICE O/P EST MOD 30 MIN: CPT | Performed by: NURSE PRACTITIONER

## 2022-08-01 RX ORDER — ATORVASTATIN CALCIUM 80 MG/1
80 TABLET, FILM COATED ORAL DAILY
Qty: 90 TABLET | Refills: 1 | Status: SHIPPED | OUTPATIENT
Start: 2022-08-01 | End: 2023-01-03 | Stop reason: SDUPTHER

## 2022-08-01 RX ORDER — LOSARTAN POTASSIUM 100 MG/1
100 TABLET ORAL DAILY
Qty: 180 TABLET | Refills: 3 | Status: SHIPPED | OUTPATIENT
Start: 2022-08-01 | End: 2022-10-13 | Stop reason: SDUPTHER

## 2022-08-01 RX ORDER — HYDROCHLOROTHIAZIDE 12.5 MG/1
12.5 TABLET ORAL DAILY
Qty: 30 TABLET | Refills: 6 | Status: SHIPPED | OUTPATIENT
Start: 2022-08-01 | End: 2023-01-03 | Stop reason: SDUPTHER

## 2022-08-01 RX ORDER — CLOPIDOGREL BISULFATE 75 MG/1
75 TABLET ORAL DAILY
Qty: 90 TABLET | Refills: 1 | Status: SHIPPED | OUTPATIENT
Start: 2022-08-01 | End: 2022-10-19 | Stop reason: SDUPTHER

## 2022-08-01 RX ORDER — METOPROLOL SUCCINATE 200 MG/1
200 TABLET, EXTENDED RELEASE ORAL DAILY
Qty: 90 TABLET | Refills: 1 | Status: SHIPPED | OUTPATIENT
Start: 2022-08-01 | End: 2023-02-02 | Stop reason: SDUPTHER

## 2022-08-01 NOTE — PROGRESS NOTES
"Subjective   Vince Olivera is a 61 y.o. male.     Chief Complaint   Patient presents with   • Hypertension       He presents for follow up of essential hypertension, mixed hyperlipidemia, and type II DM. He c/o urinary frequency on Saturday. He started passing blood through his urine yesterday. He went to ER and was told he may have a mass on his bladder. He states the blood in the urine stopped about 3am this morning. He has an appointment with urology tomorrow. He is on cefdinir for uti. ER notes reviewed. He c/o right ear pain. He states he gets dizzy if he turns real quick.        The following portions of the patient's history were reviewed and updated as appropriate: allergies, current medications, past family history, past medical history, past social history, past surgical history and problem list.    Review of Systems   Constitutional: Negative for fever and unexpected weight change.   HENT: Negative for ear pain, rhinorrhea, sore throat and trouble swallowing.    Eyes: Negative for visual disturbance.   Respiratory: Negative for cough, shortness of breath and wheezing.    Cardiovascular: Negative for chest pain and palpitations.   Gastrointestinal: Negative for abdominal pain, blood in stool, constipation, diarrhea, nausea and vomiting.   Genitourinary: Positive for frequency and hematuria. Negative for dysuria.   Musculoskeletal: Negative for arthralgias and myalgias.   Skin: Negative for color change.   Allergic/Immunologic: Negative for environmental allergies.   Neurological: Negative for dizziness.   Hematological: Negative for adenopathy.   Psychiatric/Behavioral: Negative for sleep disturbance and suicidal ideas. The patient is not nervous/anxious.        Objective     /53 (BP Location: Right arm, Patient Position: Sitting, Cuff Size: Adult)   Pulse 74   Temp 99.6 °F (37.6 °C) (Temporal)   Resp 18   Ht 167.6 cm (65.98\")   Wt 87.5 kg (193 lb)   SpO2 97%   BMI 31.17 kg/m²     Physical " Exam  Vitals reviewed.   Constitutional:       General: He is not in acute distress.     Appearance: He is well-developed. He is not diaphoretic.   HENT:      Head: Normocephalic and atraumatic.      Right Ear: There is impacted cerumen.   Cardiovascular:      Rate and Rhythm: Normal rate and regular rhythm.      Heart sounds: Normal heart sounds. No murmur heard.    No friction rub. No gallop.   Pulmonary:      Effort: Pulmonary effort is normal. No respiratory distress.      Breath sounds: Normal breath sounds.   Abdominal:      General: Bowel sounds are normal. There is no distension.      Palpations: Abdomen is soft.      Tenderness: There is no abdominal tenderness.   Skin:     General: Skin is warm and dry.   Neurological:      Mental Status: He is alert and oriented to person, place, and time.   Psychiatric:         Behavior: Behavior normal.         Thought Content: Thought content normal.         Judgment: Judgment normal.         Current Outpatient Medications   Medication Sig Dispense Refill   • amLODIPine (NORVASC) 10 MG tablet Take 1 tablet by mouth Daily. 90 tablet 1   • aspirin  MG tablet Take 1 tablet by mouth Daily. 90 tablet 1   • atorvastatin (LIPITOR) 80 MG tablet Take 1 tablet by mouth Daily. 90 tablet 1   • Azelastine HCl 137 MCG/SPRAY solution 2 sprays into the nostril(s) as directed by provider 2 (two) times a day. 30 mL 2   • cefdinir (OMNICEF) 300 MG capsule Take 1 capsule by mouth 2 (Two) Times a Day for 7 days. 14 capsule 0   • clopidogrel (PLAVIX) 75 MG tablet Take 1 tablet by mouth Daily. 90 tablet 1   • fluticasone (Flonase) 50 MCG/ACT nasal spray 2 sprays into the nostril(s) as directed by provider Daily. 15.8 mL 2   • glucose blood test strip Check glucose daily 100 each 3   • glucose monitor monitoring kit 1 each Daily. 1 each 0   • hydroCHLOROthiazide (HYDRODIURIL) 12.5 MG tablet Take 1 tablet by mouth Daily. 30 tablet 6   • Lacosamide (Vimpat) 150 MG tablet Take 75 mg by  mouth 2 (Two) Times a Day. 30 tablet 5   • Lancets misc 1 Device Daily. 100 each 12   • lansoprazole (PREVACID) 15 MG capsule Take 15 mg by mouth Daily.     • levETIRAcetam (KEPPRA) 1000 MG tablet Take 1 tablet by mouth 2 (Two) Times a Day. 60 tablet 5   • levETIRAcetam (KEPPRA) 500 MG tablet Take 1 tablet by mouth 2 (Two) Times a Day. (Patient taking differently: Take 1,500 mg by mouth 2 (Two) Times a Day.) 60 tablet 5   • losartan (COZAAR) 100 MG tablet Take 1 tablet by mouth Daily. Take 1/2 tab twice daily 180 tablet 3   • metFORMIN (GLUCOPHAGE) 500 MG tablet Take 2 tablets by mouth 2 (Two) Times a Day With Meals. 360 tablet 1   • metoprolol succinate XL (TOPROL-XL) 200 MG 24 hr tablet Take 1 tablet by mouth Daily. 90 tablet 1   • TiZANidine (Zanaflex) 2 MG capsule Take 1 capsule by mouth 3 (Three) Times a Day. 90 capsule 1     Current Facility-Administered Medications   Medication Dose Route Frequency Provider Last Rate Last Admin   • cyanocobalamin injection 1,000 mcg  1,000 mcg Intramuscular Q28 Days Glo Kyle APRN   1,000 mcg at 01/23/20 1530            Assessment & Plan     Problem List Items Addressed This Visit        Cardiac and Vasculature    Essential hypertension - Primary (Chronic)    Relevant Medications    hydroCHLOROthiazide (HYDRODIURIL) 12.5 MG tablet    losartan (COZAAR) 100 MG tablet    metoprolol succinate XL (TOPROL-XL) 200 MG 24 hr tablet    Dyslipidemia    Relevant Medications    atorvastatin (LIPITOR) 80 MG tablet    Bilateral carotid artery stenosis s/p L CEA 4/27/17. 50-69% R carotid stenosis    Overview     S/P L CEA in 2017   50-69% stenosis likely of right proximal internal carotid artery in 7/2019          Relevant Medications    hydroCHLOROthiazide (HYDRODIURIL) 12.5 MG tablet       Neuro    H/o Left MCA ischemic CVA 4/2017    Relevant Medications    clopidogrel (PLAVIX) 75 MG tablet      Other Visit Diagnoses     Diabetes mellitus type 2 in nonobese (HCC)  (Chronic)        Relevant Medications    metFORMIN (GLUCOPHAGE) 500 MG tablet    Bladder mass        Impacted cerumen of right ear                ICD-10-CM ICD-9-CM   1. Essential hypertension  I10 401.9   2. Cerebrovascular accident (CVA), unspecified mechanism (HCC)  I63.9 434.91   3. Dyslipidemia  E78.5 272.4   4. Bilateral carotid artery stenosis  I65.23 433.10     433.30   5. Diabetes mellitus type 2 in nonobese (HCC)  E11.9 250.00   6. Bladder mass  N32.89 596.89   7. Impacted cerumen of right ear  H61.21 380.4       Plan: CBC, CMP, A1C, Lipid, urine microalbumin reviewed. A1C up a little. Cut back on sweets. Keep appointment with urology. Expect a scope into your bladder. Schedule right ear irrigation. See if this improves dizziness. Follow up in 6 months and as needed.     @Body mass index is 31.17 kg/m².         Understands disease processes and need for medications.  Understands reasons for urgent and emergent care.  Patient (& family) verbalized agreement for treatment plan.   Emotional support and active listening provided.  Patient provided time to verbalize feelings.           BMI is >= 30 and <35. (Class 1 Obesity). The following options were offered after discussion;: weight loss educational material (shared in after visit summary)      This document has been electronically signed by KINGSTON Gallardo   August 2, 2022 13:03 EDT

## 2022-08-02 LAB — BACTERIA SPEC AEROBE CULT: ABNORMAL

## 2022-08-03 ENCOUNTER — PATIENT OUTREACH (OUTPATIENT)
Dept: CASE MANAGEMENT | Facility: OTHER | Age: 62
End: 2022-08-03

## 2022-08-03 NOTE — OUTREACH NOTE
AMBULATORY CASE MANAGEMENT NOTE    Name and Relationship of Patient/Support Person: Vince Olivera W - Self    Patient Outreach    Unable to reach patient X 3, service declined.    NICK REDDY  Ambulatory Case Management    8/3/2022, 11:38 EDT

## 2022-08-05 LAB
BACTERIA SPEC AEROBE CULT: NORMAL
BACTERIA SPEC AEROBE CULT: NORMAL

## 2022-08-08 ENCOUNTER — OFFICE VISIT (OUTPATIENT)
Dept: UROLOGY | Facility: CLINIC | Age: 62
End: 2022-08-08

## 2022-08-08 VITALS — BODY MASS INDEX: 31.02 KG/M2 | WEIGHT: 193 LBS | HEIGHT: 66 IN

## 2022-08-08 DIAGNOSIS — R35.0 FREQUENCY OF URINATION: Primary | ICD-10-CM

## 2022-08-08 DIAGNOSIS — R33.9 INCOMPLETE EMPTYING OF BLADDER: ICD-10-CM

## 2022-08-08 DIAGNOSIS — R31.0 GROSS HEMATURIA: ICD-10-CM

## 2022-08-08 LAB
BILIRUB BLD-MCNC: NEGATIVE MG/DL
CLARITY, POC: CLEAR
COLOR UR: YELLOW
EXPIRATION DATE: ABNORMAL
GLUCOSE UR STRIP-MCNC: ABNORMAL MG/DL
KETONES UR QL: NEGATIVE
LEUKOCYTE EST, POC: NEGATIVE
Lab: ABNORMAL
NITRITE UR-MCNC: ABNORMAL MG/ML
PH UR: 6.5 [PH] (ref 5–8)
PROT UR STRIP-MCNC: ABNORMAL MG/DL
RBC # UR STRIP: ABNORMAL /UL
SP GR UR: 1.01 (ref 1–1.03)
UROBILINOGEN UR QL: NORMAL

## 2022-08-08 PROCEDURE — 99203 OFFICE O/P NEW LOW 30 MIN: CPT | Performed by: UROLOGY

## 2022-08-08 RX ORDER — SULFAMETHOXAZOLE AND TRIMETHOPRIM 800; 160 MG/1; MG/1
TABLET ORAL
COMMUNITY
Start: 2022-08-02 | End: 2023-02-02

## 2022-08-08 NOTE — PROGRESS NOTES
Gross Hematuria Male Office Visit      Patient Name: Vince Olivera  : 1960   MRN: [id number]     Chief Complaint:  Gross Hematuria.   Chief Complaint   Patient presents with   • kidney mass        Referring Provider: Lan Chavez*    History of Present Illness: Mr. Olivera is a 61 y.o. male with history of gross hematuria.  He reports last  he went to urinate and had a large amount of blood out.  He reports he stopped bleeding after about 8 hours it ended.  He went to the ER and was found to have a possible bladder mass on noncontrast CT.  He reports at that time he had dysuria.  This has never happened before.  He denies any trauma.  No history of kidney stones.  Family history is not available because he is adopted.    He grew out ESBL E.coli in the ER and has been on Bactrim.    He has never had any prostate cancer screening.        Prior occupational exposures include:  None    Smoking history: 1.5 ppd x 40 years     Family history of  malignancy: Unknown     Family history of colon, breast, ovarian malignancy: Unkown       Subjective      Review of System: Review of Systems   Constitutional: Negative for chills, fatigue, fever and unexpected weight change.   HENT: Negative for congestion, rhinorrhea and sore throat.    Eyes: Negative for visual disturbance.   Respiratory: Negative for apnea, cough, chest tightness and shortness of breath.    Cardiovascular: Negative for chest pain.   Gastrointestinal: Negative for abdominal pain, constipation, diarrhea, nausea and vomiting.   Endocrine: Negative for polydipsia and polyuria.   Genitourinary: Positive for dysuria and hematuria. Negative for difficulty urinating, enuresis, flank pain, frequency, genital sores and urgency.   Musculoskeletal: Negative for gait problem.   Skin: Negative for rash and wound.   Allergic/Immunologic: Negative for immunocompromised state.   Neurological: Negative for dizziness, tremors, syncope,  weakness and light-headedness.   Hematological: Does not bruise/bleed easily.      I have reviewed the ROS documented by my clinical staff,  I have updated appropriately and I agree. Renuka Bridges MD    Past Medical History:  Past Medical History:   Diagnosis Date   • Arthritis    • Carotid artery disease (HCC)    • Carotid artery disorder (HCC)    • Depression    • Diabetes mellitus (HCC)     DIAGNOSED 4-2017 CHECKS FSBG 3X/WEEK    • Ear infection     about 2 weeks ago- cleared up - wax removed and cleaned out    • GERD (gastroesophageal reflux disease)     self diagnosed takes otc ppi   • Hyperlipidemia    • Hypertension    • Hypertension    • Inguinal hernia    • Seizure (HCC)     last 10/8/2019   • Shoulder pain     bilat    • Stroke ([facility]) 04/24/2017    EXPRESSIVE APHASIA RESIDUAL    • Tooth loose     5 - all over- will get teeth done after this surgery    • Wears reading eyeglasses        Past Surgical History:  Past Surgical History:   Procedure Laterality Date   • ABDOMINAL HERNIA REPAIR  2010   • AORTA FEMORAL BYPASS N/A 10/11/2019    Procedure: AORTA FEMORAL BYPASS, BILATERAL FEMORAL ENDARTERECTOMY;  Surgeon: Dylan Weaver MD;  Location: [facility] OR;  Service: Vascular   • CAROTID ENDARTERECTOMY Left 10/17/2017    Procedure: CAROTID ENDARTERECTOMY LEFT;  Surgeon: Alexx Bach MD;  Location: [facility] OR;  Service:    • INGUINAL HERNIA REPAIR Left        Medications:    Current Outpatient Medications:   •  amLODIPine (NORVASC) 10 MG tablet, Take 1 tablet by mouth Daily., Disp: 90 tablet, Rfl: 1  •  aspirin  MG tablet, Take 1 tablet by mouth Daily., Disp: 90 tablet, Rfl: 1  •  atorvastatin (LIPITOR) 80 MG tablet, Take 1 tablet by mouth Daily., Disp: 90 tablet, Rfl: 1  •  Azelastine HCl 137 MCG/SPRAY solution, 2 sprays into the nostril(s) as directed by provider 2 (two) times a day., Disp: 30 mL, Rfl: 2  •  clopidogrel (PLAVIX) 75 MG tablet, Take 1 tablet by mouth Daily., Disp: 90 tablet, Rfl:  1  •  fluticasone (Flonase) 50 MCG/ACT nasal spray, 2 sprays into the nostril(s) as directed by provider Daily., Disp: 15.8 mL, Rfl: 2  •  glucose blood test strip, Check glucose daily, Disp: 100 each, Rfl: 3  •  glucose monitor monitoring kit, 1 each Daily., Disp: 1 each, Rfl: 0  •  hydroCHLOROthiazide (HYDRODIURIL) 12.5 MG tablet, Take 1 tablet by mouth Daily., Disp: 30 tablet, Rfl: 6  •  Lacosamide (Vimpat) 150 MG tablet, Take 75 mg by mouth 2 (Two) Times a Day., Disp: 30 tablet, Rfl: 5  •  Lancets misc, 1 Device Daily., Disp: 100 each, Rfl: 12  •  lansoprazole (PREVACID) 15 MG capsule, Take 15 mg by mouth Daily., Disp: , Rfl:   •  levETIRAcetam (KEPPRA) 1000 MG tablet, Take 1 tablet by mouth 2 (Two) Times a Day., Disp: 60 tablet, Rfl: 5  •  levETIRAcetam (KEPPRA) 500 MG tablet, Take 1 tablet by mouth 2 (Two) Times a Day. (Patient taking differently: Take 1,500 mg by mouth 2 (Two) Times a Day.), Disp: 60 tablet, Rfl: 5  •  losartan (COZAAR) 100 MG tablet, Take 1 tablet by mouth Daily. Take 1/2 tab twice daily, Disp: 180 tablet, Rfl: 3  •  metFORMIN (GLUCOPHAGE) 500 MG tablet, Take 2 tablets by mouth 2 (Two) Times a Day With Meals., Disp: 360 tablet, Rfl: 1  •  metoprolol succinate XL (TOPROL-XL) 200 MG 24 hr tablet, Take 1 tablet by mouth Daily., Disp: 90 tablet, Rfl: 1  •  sulfamethoxazole-trimethoprim (BACTRIM DS,SEPTRA DS) 800-160 MG per tablet, TAKE 1 TABLET BY MOUTH TWICE DAILY FOR 10 DAYS. discontinue cefdinir (omnicef), Disp: , Rfl:   •  TiZANidine (Zanaflex) 2 MG capsule, Take 1 capsule by mouth 3 (Three) Times a Day., Disp: 90 capsule, Rfl: 1    Current Facility-Administered Medications:   •  cyanocobalamin injection 1,000 mcg, 1,000 mcg, Intramuscular, Q28 Days, Glo Kyle, APRN, 1,000 mcg at 01/23/20 1530    Allergies:  No Known Allergies    Social History:  Social History     Socioeconomic History   • Marital status: Single   • Number of children: 2   Tobacco Use   • Smoking status: Current  "Every Day Smoker     Packs/day: 0.05     Years: 40.00     Pack years: 2.00     Types: Cigarettes     Last attempt to quit: 2017     Years since quittin.2   • Smokeless tobacco: Former User     Types: Snuff   • Tobacco comment: quit snuff when 18 or 19 years old - only did for baseball - 2-3 years    Vaping Use   • Vaping Use: Never used   Substance and Sexual Activity   • Alcohol use: No   • Drug use: No   • Sexual activity: Defer     Partners: Female     Comment: SPOUSE       Family History:  Family History   Adopted: Yes   Problem Relation Age of Onset   • Diabetes Mother    • COPD Father        Objective     Physical Exam:   Vital Signs:   Vitals:    22 0841   Weight: 87.5 kg (193 lb)   Height: 167.6 cm (65.98\")     Body mass index is 31.17 kg/m².     Physical Exam  Vitals and nursing note reviewed.   Constitutional:       General: He is awake. He is not in acute distress.     Appearance: Normal appearance. He is well-developed.   HENT:      Head: Normocephalic and atraumatic.      Right Ear: External ear normal.      Left Ear: External ear normal.      Nose: Nose normal.   Eyes:      Conjunctiva/sclera: Conjunctivae normal.   Pulmonary:      Effort: Pulmonary effort is normal.   Abdominal:      General: There is no distension.      Palpations: Abdomen is soft. There is no mass.      Tenderness: There is no abdominal tenderness. There is no right CVA tenderness, left CVA tenderness, guarding or rebound.      Hernia: No hernia is present. There is no hernia in the left inguinal area or right inguinal area.   Genitourinary:     Pubic Area: No rash.       Penis: Normal.       Testes: Normal.      Rectum: No mass or tenderness. Normal anal tone.   Musculoskeletal:      Cervical back: Normal range of motion.   Lymphadenopathy:      Lower Body: No right inguinal adenopathy. No left inguinal adenopathy.   Skin:     General: Skin is warm.   Neurological:      General: No focal deficit present.      Mental " Status: He is alert and oriented to person, place, and time.   Psychiatric:         Behavior: Behavior normal. Behavior is cooperative.         Labs:   Brief Urine Lab Results  (Last result in the past 365 days)      Color   Clarity   Blood   Leuk Est   Nitrite   Protein   CREAT   Urine HCG        08/08/22 0854 Yellow   Clear   Trace   Negative     Trace                 Urine Culture    Urine Culture 7/31/22   Urine Culture >100,000 CFU/mL Escherichia coli ESBL (A)   (A) Abnormal value       Comments are available for some flowsheets but are not being displayed.              Lab Results   Component Value Date    GLUCOSE 165 (H) 07/31/2022    CALCIUM 10.5 07/31/2022     (L) 07/31/2022    K 3.7 07/31/2022    CO2 26.8 07/31/2022    CL 94 (L) 07/31/2022    BUN 13 07/31/2022    CREATININE 1.01 07/31/2022    EGFRIFNONA 79 01/25/2022    BCR 12.9 07/31/2022    ANIONGAP 13.2 07/31/2022       Lab Results   Component Value Date    WBC 18.34 (H) 07/31/2022    HGB 16.3 07/31/2022    HCT 46.6 07/31/2022    MCV 90.0 07/31/2022     07/31/2022       Lab Results   Component Value Date    PSA 0.990 01/25/2022    PSA 1.980 01/22/2021    PSA 0.929 09/23/2019       Images:   CT Abdomen Pelvis Stone Protocol    Result Date: 7/31/2022  1.  Severe urinary bladder wall thickening, predominantly favoring the urinary bladder dome superiorly. It is somewhat eccentric. Recommend correlation with urinalysis as infectious process is a consideration; however, the eccentric appearance is somewhat suspicious with potential urinary bladder wall mass such as neoplasm being a potential. Recommend direct visual inspection to better assess for this potential. The wall thickening is new compared to May 2019. 2.  There is very mild left hydroureter and trace renal pelvis dilatation. A discrete ureteral calculus is not identified however. Question if the hydroureter could potentially be related to the above item #1. Signer Name: ALLEN CAMERON MD   "Signed: 7/31/2022 7:54 PM  Workstation Name: [facility]  [facility]      Measures:   Tobacco:   Vince Olivera  reports that he has been smoking cigarettes. He has a 2.00 pack-year smoking history. He has quit using smokeless tobacco.  His smokeless tobacco use included snuff.. I have educated him on the risk of diseases from using tobacco products such as cancer, COPD and heart disease.     I advised him to quit and he is not willing to quit.    I spent 3  minutes counseling the patient.           Urine Incontinence: Patient reports that he is not currently experiencing any symptoms of urinary incontinence.       Assessment / Plan      Assessment/Plan:   Mr. Olivera is a 61 y.o. male who presented today for evaluation of gross hematuria.  His gross hematuria has resolved.  However he has a significant smoking history.  I will obtain a CT Urogram and set him up for cystoscopy.  He has never had prostate cancer screening, however, he has recently had a UTI so I will not check his PSA at this time.  He will need prostate cancer screening.        The patient was counseled regarding the possible etiologies, relevant work-up and diagnostic approach for gross hematuria, as well as the relevant risk categories as assigned by the American Urological Association guidelines.  I discussed that the definition of microscopic hematuria includes a microscopic urinalysis (not dipstick UA) positive for greater than 3 RBCs per high-powered field under microscopy.  We also discussed that any history of gross hematuria (or visible hematuria) places a patient at higher risk for occult urologic malignancies and necessitates prompt workup.  We discussed the aforementioned risk categories as assigned by the AUA, which are depicted below.  Ultimately, work-up is mandatory for gross or visible hematuria, as indicated by the \"HIGH RISK\" category and recommendations as depicted by the AUA Guidelines.  Given the " patient's age, significant smoking history, unknown family history and gross hematuria; the patient is deemed HIGH risk, and for these reasons I recommend proceeding with a flexible diagnostic cystoscopy and CT urogram to assess the collecting system of the kidney and bladder.                Diagnoses and all orders for this visit:    1. Frequency of urination (Primary)  -     POC Urinalysis Dipstick, Automated    2. Incomplete emptying of bladder  -     Bladder Scan    3. Gross hematuria  -     CT Abdomen Pelvis With & Without Contrast; Future           Follow Up:   Return in about 2 weeks (around 8/22/2022).    I spent approximately 30 minutes providing clinical care for this patient; including review of patient's chart and provider documentation, face to face time spent with patient in examination room (obtaining history, performing physical exam, discussing diagnosis and management options), placing orders, and completing patient documentation.     Renuka Bridges MD  [facility]

## 2022-08-12 ENCOUNTER — HOSPITAL ENCOUNTER (OUTPATIENT)
Dept: CT IMAGING | Facility: HOSPITAL | Age: 62
Discharge: HOME | End: 2022-08-12
Admitting: UROLOGY

## 2022-08-12 DIAGNOSIS — R31.0 GROSS HEMATURIA: ICD-10-CM

## 2022-08-12 PROCEDURE — 74178 CT ABD&PLV WO CNTR FLWD CNTR: CPT | Performed by: RADIOLOGY

## 2022-08-12 PROCEDURE — 74178 CT ABD&PLV WO CNTR FLWD CNTR: CPT

## 2022-08-12 PROCEDURE — 25010000002 IOPAMIDOL 61 % SOLUTION: Performed by: UROLOGY

## 2022-08-12 RX ADMIN — IOPAMIDOL 86 ML: 612 INJECTION, SOLUTION INTRAVENOUS at 08:39

## 2022-08-23 ENCOUNTER — PROCEDURE VISIT (OUTPATIENT)
Dept: UROLOGY | Facility: CLINIC | Age: 62
End: 2022-08-23

## 2022-08-23 VITALS — HEIGHT: 66 IN | WEIGHT: 193 LBS | BODY MASS INDEX: 31.02 KG/M2

## 2022-08-23 DIAGNOSIS — R31.0 GROSS HEMATURIA: ICD-10-CM

## 2022-08-23 DIAGNOSIS — R35.0 FREQUENCY OF URINATION: Primary | ICD-10-CM

## 2022-08-23 PROCEDURE — 96372 THER/PROPH/DIAG INJ SC/IM: CPT | Performed by: UROLOGY

## 2022-08-23 PROCEDURE — 52000 CYSTOURETHROSCOPY: CPT | Performed by: UROLOGY

## 2022-08-23 RX ORDER — GENTAMICIN SULFATE 40 MG/ML
80 INJECTION, SOLUTION INTRAMUSCULAR; INTRAVENOUS ONCE
Status: COMPLETED | OUTPATIENT
Start: 2022-08-23 | End: 2022-08-23

## 2022-08-23 RX ADMIN — GENTAMICIN SULFATE 80 MG: 40 INJECTION, SOLUTION INTRAMUSCULAR; INTRAVENOUS at 10:27

## 2022-09-09 PROBLEM — R31.0 GROSS HEMATURIA: Status: ACTIVE | Noted: 2022-09-09

## 2022-09-15 DIAGNOSIS — I65.23 BILATERAL CAROTID ARTERY STENOSIS: ICD-10-CM

## 2022-09-15 DIAGNOSIS — M54.50 ACUTE BILATERAL LOW BACK PAIN WITHOUT SCIATICA: ICD-10-CM

## 2022-09-15 RX ORDER — TIZANIDINE HYDROCHLORIDE 2 MG/1
2 CAPSULE, GELATIN COATED ORAL 3 TIMES DAILY
Qty: 90 CAPSULE | Refills: 1 | Status: SHIPPED | OUTPATIENT
Start: 2022-09-15 | End: 2022-09-16 | Stop reason: SDUPTHER

## 2022-09-15 RX ORDER — ASPIRIN 325 MG
325 TABLET, DELAYED RELEASE (ENTERIC COATED) ORAL DAILY
Qty: 90 TABLET | Refills: 1 | Status: SHIPPED | OUTPATIENT
Start: 2022-09-15 | End: 2022-09-16 | Stop reason: SDUPTHER

## 2022-09-15 NOTE — TELEPHONE ENCOUNTER
Incoming Refill Request      Medication requested (name and dose): aspirin  MG tablet, TiZANidine (Zanaflex) 2 MG capsule    Pharm[facility] where [facility] be sent: [facility]    Additional details provided by patient: PATIENT IS OUT OF THE MEDICATION    Best call back number: [phone]     Does the patient have less than a 3 day supply:  [x] Yes  [] No    Maged Berg Rep  09/15/22, 15:16 EDT

## 2022-09-16 DIAGNOSIS — M54.50 ACUTE BILATERAL LOW BACK PAIN WITHOUT SCIATICA: ICD-10-CM

## 2022-09-16 DIAGNOSIS — I65.23 BILATERAL CAROTID ARTERY STENOSIS: ICD-10-CM

## 2022-09-16 RX ORDER — TIZANIDINE HYDROCHLORIDE 2 MG/1
2 CAPSULE, GELATIN COATED ORAL 3 TIMES DAILY
Qty: 90 CAPSULE | Refills: 1 | Status: SHIPPED | OUTPATIENT
Start: 2022-09-16 | End: 2023-02-02 | Stop reason: SDUPTHER

## 2022-09-16 RX ORDER — ASPIRIN 325 MG
325 TABLET, DELAYED RELEASE (ENTERIC COATED) ORAL DAILY
Qty: 90 TABLET | Refills: 1 | Status: SHIPPED | OUTPATIENT
Start: 2022-09-16 | End: 2023-03-27

## 2022-10-13 DIAGNOSIS — I10 ESSENTIAL HYPERTENSION: Chronic | ICD-10-CM

## 2022-10-13 RX ORDER — LOSARTAN POTASSIUM 100 MG/1
100 TABLET ORAL DAILY
Qty: 180 TABLET | Refills: 3 | Status: SHIPPED | OUTPATIENT
Start: 2022-10-13

## 2022-10-13 NOTE — TELEPHONE ENCOUNTER
Caller: Donna Davis    Relationship: Emergency Contact    Best call back number: [phone]  Requested Prescriptions:   Requested Prescriptions     Pending Prescriptions Disp Refills   • losartan (COZAAR) 100 MG tablet 180 tablet 3     Sig: Take 1 tablet by mouth Daily. Take 1/2 tab twice daily        Pharmacy where request should be sent: [facility] [address] [phone] [facility] [phone] [facility]     Additional details provided by patient: PATIENT IS CURRENTLY OUT OF THIS MEDICATION    Does the patient have less than a 3 day supply:  [x] Yes  [] No    Maged Romero Rep   10/13/22 12:39 EDT

## 2022-10-19 DIAGNOSIS — I63.9 CEREBROVASCULAR ACCIDENT (CVA), UNSPECIFIED MECHANISM: Chronic | ICD-10-CM

## 2022-10-19 RX ORDER — CLOPIDOGREL BISULFATE 75 MG/1
75 TABLET ORAL DAILY
Qty: 90 TABLET | Refills: 1 | Status: SHIPPED | OUTPATIENT
Start: 2022-10-19 | End: 2023-02-02 | Stop reason: SDUPTHER

## 2022-10-19 NOTE — TELEPHONE ENCOUNTER
Caller: RyanDonna    Relationship: Emergency Contact    Best call back number:    [phone]         Requested Prescriptions:   Requested Prescriptions     Pending Prescriptions Disp Refills   • clopidogrel (PLAVIX) 75 MG tablet 90 tablet 1     Sig: Take 1 tablet by mouth Daily.        Pharmacy where request should be sent: [facility] [address] [phone] [facility] [phone] FX     Additional details provided by patient: PATIENT HAS 1 PILL REMAINING    Does the patient have less than a 3 day supply:  [x] Yes  [] No    Maged Handy Rep   10/19/22 13:04 EDT

## 2022-12-14 ENCOUNTER — TELEPHONE (OUTPATIENT)
Dept: NEUROLOGY | Facility: CLINIC | Age: 62
End: 2022-12-14

## 2022-12-14 DIAGNOSIS — I69.398 SEIZURE DISORDER AS SEQUELA OF CEREBROVASCULAR ACCIDENT: ICD-10-CM

## 2022-12-14 DIAGNOSIS — G40.909 SEIZURE DISORDER AS SEQUELA OF CEREBROVASCULAR ACCIDENT: ICD-10-CM

## 2022-12-14 RX ORDER — LACOSAMIDE 150 MG/1
75 TABLET ORAL 2 TIMES DAILY
Qty: 30 TABLET | Refills: 5 | Status: SHIPPED | OUTPATIENT
Start: 2022-12-14 | End: 2023-03-27

## 2022-12-14 NOTE — TELEPHONE ENCOUNTER
Caller: Donna Davis    Relationship: Emergency Contact    Best call back number: [phone]    Requested Prescriptions:   Requested Prescriptions     Pending Prescriptions Disp Refills   • Lacosamide (Vimpat) 150 MG tablet 30 tablet 5     Sig: Take 75 mg by mouth 2 (Two) Times a Day.        Pharmacy where request should be sent:  [phone]    Additional details provided by patient: PATIENT IS COMPLETELY OUT OF THIS MEDICATION    Does the patient have less than a 3 day supply:  [x] Yes  [] No    Would you like a call back once the refill request has been completed: [] Yes [x] No    If the office needs to give you a call back, can they leave a voicemail: [] Yes [x] No    Maged Arriaza Rep   12/14/22 10:33 EST

## 2022-12-14 NOTE — TELEPHONE ENCOUNTER
Rx Refill Note  Requested Prescriptions     Pending Prescriptions Disp Refills   • Lacosamide (Vimpat) 150 MG tablet 30 tablet 5     Sig: Take 75 mg by mouth 2 (Two) Times a Day.      Last office visit with prescribing clinician: 6/6/2022  Last telemedicine visit with prescribing clinician: 3/27/2023   Next office visit with prescribing clinician: 3/27/2023                         Would you like a call back once the refill request has been completed: [] Yes [] No    If the office needs to give you a call back, can they leave a voicemail: [] Yes [] No    Loretta Gaspar CMA  12/14/22, 11:44 EST

## 2023-01-03 DIAGNOSIS — I65.23 BILATERAL CAROTID ARTERY STENOSIS: ICD-10-CM

## 2023-01-03 DIAGNOSIS — I10 ESSENTIAL HYPERTENSION: Chronic | ICD-10-CM

## 2023-01-03 DIAGNOSIS — I69.398 SEIZURE DISORDER AS SEQUELA OF CEREBROVASCULAR ACCIDENT: ICD-10-CM

## 2023-01-03 DIAGNOSIS — G40.909 SEIZURE DISORDER AS SEQUELA OF CEREBROVASCULAR ACCIDENT: ICD-10-CM

## 2023-01-03 DIAGNOSIS — E78.5 DYSLIPIDEMIA: Chronic | ICD-10-CM

## 2023-01-03 RX ORDER — ATORVASTATIN CALCIUM 80 MG/1
80 TABLET, FILM COATED ORAL DAILY
Qty: 90 TABLET | Refills: 1 | Status: SHIPPED | OUTPATIENT
Start: 2023-01-03 | End: 2023-02-02 | Stop reason: SDUPTHER

## 2023-01-03 RX ORDER — LEVETIRACETAM 500 MG/1
500 TABLET ORAL 2 TIMES DAILY
Qty: 180 TABLET | Refills: 2 | Status: SHIPPED | OUTPATIENT
Start: 2023-01-03 | End: 2023-03-27

## 2023-01-03 RX ORDER — LEVETIRACETAM 1000 MG/1
1000 TABLET ORAL 2 TIMES DAILY
Qty: 180 TABLET | Refills: 2 | Status: SHIPPED | OUTPATIENT
Start: 2023-01-03 | End: 2023-03-27

## 2023-01-03 RX ORDER — HYDROCHLOROTHIAZIDE 12.5 MG/1
12.5 TABLET ORAL DAILY
Qty: 30 TABLET | Refills: 6 | Status: SHIPPED | OUTPATIENT
Start: 2023-01-03

## 2023-01-03 NOTE — TELEPHONE ENCOUNTER
Caller: Donna Davis    Relationship: Emergency Contact    Best call back number: [phone]    Requested Prescriptions:   Requested Prescriptions     Pending Prescriptions Disp Refills   • atorvastatin (LIPITOR) 80 MG tablet 90 tablet 1     Sig: Take 1 tablet by mouth Daily.   • hydroCHLOROthiazide (HYDRODIURIL) 12.5 MG tablet 30 tablet 6     Sig: Take 1 tablet by mouth Daily.        Pharmacy where request should be sent: [facility] [address] [phone] [facility] [phone]      Additional details provided by patient: PATIENT OUT OF MEDICATION.     Does the patient have less than a 3 day supply:  [x] Yes  [] No    Would you like a call back once the refill request has been completed: [x] Yes [] No    If the office needs to give you a call back, can they leave a voicemail: [x] Yes [] No    Cristina Cote   01/03/23 11:27 EST

## 2023-01-03 NOTE — TELEPHONE ENCOUNTER
Rx Refill Note  Requested Prescriptions     Pending Prescriptions Disp Refills   • levETIRAcetam (KEPPRA) 500 MG tablet 60 tablet 5     Sig: Take 1 tablet by mouth 2 (Two) Times a Day.   • levETIRAcetam (KEPPRA) 1000 MG tablet 60 tablet 5     Sig: Take 1 tablet by mouth 2 (Two) Times a Day.      Last office visit with prescribing clinician: 6/6/2022  Last telemedicine visit with prescribing clinician: 3/27/2023   Next office visit with prescribing clinician: 3/27/2023                         Would you like a call back once the refill request has been completed: [] Yes [] No    If the office needs to give you a call back, can they leave a voicemail: [] Yes [] No    Loretta Gaspar CMA  01/03/23, 16:02 EST

## 2023-01-03 NOTE — TELEPHONE ENCOUNTER
MEDICATION REFILL REQUEST    Caller: DavisDonna    Relationship: Emergency Contact; POA    Best call back number: [phone]    Requested Prescriptions:   Requested Prescriptions     Pending Prescriptions Disp Refills   • levETIRAcetam (KEPPRA) 500 MG tablet 60 tablet 5     Sig: Take 1 tablet by mouth 2 (Two) Times a Day.   • levETIRAcetam (KEPPRA) 1000 MG tablet 60 tablet 5     Sig: Take 1 tablet by mouth 2 (Two) Times a Day.      Pharmacy where request should be sent: [facility] [address] [phone] [facility] [phone]      Additional details provided by patient: PT'S POA STATES PT IS COMPLETELY OUT OF BOTH MEDICATION DOSAGES AT THIS TIME.    Does the patient have less than a 3 day supply?:  [x] Yes  [] No    Would you like a call back once the refill request has been completed?: [x] Yes  [] No    If the office needs to give you a call back, can they leave a voicemail?: [x] Yes  [] No    Last office visit with prescribing clinician: 6/6/2022  Next office visit with prescribing clinician: 3/27/2023     PLEASE REVIEW AND ADVISE.    Maged Waddell Rep   01/03/23 14:54 EST

## 2023-01-21 NOTE — TELEPHONE ENCOUNTER
I think it's safe to monitor for now. Once a woman is menopausal, the vagina dries out and can become irritated easily. Sometimes that can cause some spotting. Make sure to use lubrication with sex. If it continues, then I would recommend seeing gynecology. But, I wouldn't worry too much if it's just been a few days. Last filled on: metformin 4-23-18 refill 5  Last seen on: 10-23-18   Ear infection

## 2023-02-02 ENCOUNTER — OFFICE VISIT (OUTPATIENT)
Dept: FAMILY MEDICINE CLINIC | Facility: CLINIC | Age: 63
End: 2023-02-02
Payer: MEDICAID

## 2023-02-02 VITALS
WEIGHT: 190 LBS | BODY MASS INDEX: 30.53 KG/M2 | HEART RATE: 66 BPM | TEMPERATURE: 97.3 F | OXYGEN SATURATION: 96 % | SYSTOLIC BLOOD PRESSURE: 130 MMHG | DIASTOLIC BLOOD PRESSURE: 88 MMHG | HEIGHT: 66 IN | RESPIRATION RATE: 18 BRPM

## 2023-02-02 DIAGNOSIS — E78.5 DYSLIPIDEMIA: Chronic | ICD-10-CM

## 2023-02-02 DIAGNOSIS — I63.9 CEREBROVASCULAR ACCIDENT (CVA), UNSPECIFIED MECHANISM: Chronic | ICD-10-CM

## 2023-02-02 DIAGNOSIS — I73.9 PERIPHERAL ARTERY DISEASE: Chronic | ICD-10-CM

## 2023-02-02 DIAGNOSIS — I10 ESSENTIAL HYPERTENSION: Primary | Chronic | ICD-10-CM

## 2023-02-02 DIAGNOSIS — M54.50 CHRONIC BILATERAL LOW BACK PAIN WITHOUT SCIATICA: Chronic | ICD-10-CM

## 2023-02-02 DIAGNOSIS — M25.572 ACUTE LEFT ANKLE PAIN: ICD-10-CM

## 2023-02-02 DIAGNOSIS — Z12.5 SCREENING PSA (PROSTATE SPECIFIC ANTIGEN): ICD-10-CM

## 2023-02-02 DIAGNOSIS — E11.59 TYPE 2 DIABETES MELLITUS WITH OTHER CIRCULATORY COMPLICATION, WITHOUT LONG-TERM CURRENT USE OF INSULIN: Chronic | ICD-10-CM

## 2023-02-02 DIAGNOSIS — G89.29 CHRONIC BILATERAL LOW BACK PAIN WITHOUT SCIATICA: Chronic | ICD-10-CM

## 2023-02-02 PROCEDURE — 80061 LIPID PANEL: CPT | Performed by: NURSE PRACTITIONER

## 2023-02-02 PROCEDURE — 85025 COMPLETE CBC W/AUTO DIFF WBC: CPT | Performed by: NURSE PRACTITIONER

## 2023-02-02 PROCEDURE — 82043 UR ALBUMIN QUANTITATIVE: CPT | Performed by: NURSE PRACTITIONER

## 2023-02-02 PROCEDURE — 99214 OFFICE O/P EST MOD 30 MIN: CPT | Performed by: NURSE PRACTITIONER

## 2023-02-02 PROCEDURE — 80053 COMPREHEN METABOLIC PANEL: CPT | Performed by: NURSE PRACTITIONER

## 2023-02-02 PROCEDURE — G0103 PSA SCREENING: HCPCS | Performed by: NURSE PRACTITIONER

## 2023-02-02 PROCEDURE — 83036 HEMOGLOBIN GLYCOSYLATED A1C: CPT | Performed by: NURSE PRACTITIONER

## 2023-02-02 RX ORDER — CLOPIDOGREL BISULFATE 75 MG/1
75 TABLET ORAL DAILY
Qty: 90 TABLET | Refills: 1 | Status: SHIPPED | OUTPATIENT
Start: 2023-02-02

## 2023-02-02 RX ORDER — ATORVASTATIN CALCIUM 80 MG/1
80 TABLET, FILM COATED ORAL DAILY
Qty: 90 TABLET | Refills: 1 | Status: SHIPPED | OUTPATIENT
Start: 2023-02-02

## 2023-02-02 RX ORDER — TIZANIDINE HYDROCHLORIDE 2 MG/1
2 CAPSULE, GELATIN COATED ORAL 3 TIMES DAILY
Qty: 90 CAPSULE | Refills: 1 | Status: SHIPPED | OUTPATIENT
Start: 2023-02-02

## 2023-02-02 RX ORDER — METOPROLOL SUCCINATE 200 MG/1
200 TABLET, EXTENDED RELEASE ORAL DAILY
Qty: 90 TABLET | Refills: 1 | Status: SHIPPED | OUTPATIENT
Start: 2023-02-02

## 2023-02-02 RX ORDER — AMLODIPINE BESYLATE 10 MG/1
10 TABLET ORAL DAILY
Qty: 90 TABLET | Refills: 1 | Status: SHIPPED | OUTPATIENT
Start: 2023-02-02

## 2023-02-02 NOTE — PROGRESS NOTES
Venipuncture Blood Specimen Collection  Venipuncture performed in left arm by Mary Baker MA with good hemostasis. Patient tolerated the procedure well without complications.   02/02/23   Mary Baker MA

## 2023-02-02 NOTE — PROGRESS NOTES
Subjective   Vince Olivera is a 62 y.o. male.     Chief Complaint   Patient presents with   • Hypertension   • Diabetes   • Hyperlipidemia       History of Present Illness  He presents for follow up of essential hypertension, mixed hyperlipidemia, and type II DM. He reports good blood pressure control. He reports good compliance with atorvastatin. He has not checked his blood sugar in a while. He states he was weed eating in October and stepped over a ditch and his left leg popped. He states it swelled up. He went to urgent care and was told it was not broken. He states he has h/o blockages in both legs that he has had surgeries on. He c/o pain in both his legs. He has a vascular surgeon at [facility]. His wife is concerned he has blockages in his legs.        The following portions of the patient's history were reviewed and updated as appropriate: allergies, current medications, past family history, past medical history, past social history, past surgical history and problem list.    Review of Systems   Constitutional: Negative for fever and unexpected weight change.   HENT: Negative for ear pain, rhinorrhea, sore throat and trouble swallowing.    Eyes: Negative for visual disturbance.   Respiratory: Negative for cough, shortness of breath and wheezing.    Cardiovascular: Negative for chest pain and palpitations.   Gastrointestinal: Negative for abdominal pain, blood in stool, constipation, diarrhea, nausea and vomiting.   Genitourinary: Negative for dysuria.   Musculoskeletal: Positive for arthralgias and myalgias.   Skin: Negative for color change.   Allergic/Immunologic: Negative for environmental allergies.   Neurological: Negative for dizziness.   Hematological: Negative for adenopathy.   Psychiatric/Behavioral: Negative for sleep disturbance and suicidal ideas. The patient is not nervous/anxious.        Objective     /88 (BP Location: Right arm, Patient Position: Sitting, Cuff Size: Large Adult)  "  Pulse 66   Temp 97.3 °F (36.3 °C) (Temporal)   Resp 18   Ht 167.6 cm (65.98\")   Wt 86.2 kg (190 lb)   SpO2 96%   BMI 30.68 kg/m²     Physical Exam  Vitals reviewed.   Constitutional:       General: He is not in acute distress.     Appearance: He is well-developed. He is not diaphoretic.   HENT:      Head: Normocephalic and atraumatic.   Cardiovascular:      Rate and Rhythm: Normal rate and regular rhythm.      Pulses:           Dorsalis pedis pulses are 0 on the right side and 0 on the left side.        Posterior tibial pulses are 0 on the right side and 0 on the left side.      Heart sounds: Normal heart sounds. No murmur heard.    No friction rub. No gallop.   Pulmonary:      Effort: Pulmonary effort is normal. No respiratory distress.      Breath sounds: Normal breath sounds.   Abdominal:      General: Bowel sounds are normal. There is no distension.      Palpations: Abdomen is soft.      Tenderness: There is no abdominal tenderness.   Musculoskeletal:      Right ankle: Normal. No tenderness. Normal range of motion.      Comments: Varicosities left medial malleolus.    Skin:     General: Skin is warm and dry.   Neurological:      Mental Status: He is alert and oriented to person, place, and time.   Psychiatric:         Behavior: Behavior normal.         Thought Content: Thought content normal.         Judgment: Judgment normal.         Current Outpatient Medications   Medication Sig Dispense Refill   • amLODIPine (NORVASC) 10 MG tablet Take 1 tablet by mouth Daily. 90 tablet 1   • aspirin  MG tablet Take 1 tablet by mouth Daily. 90 tablet 1   • atorvastatin (LIPITOR) 80 MG tablet Take 1 tablet by mouth Daily. 90 tablet 1   • Azelastine HCl 137 MCG/SPRAY solution 2 sprays into the nostril(s) as directed by provider 2 (two) times a day. 30 mL 2   • clopidogrel (PLAVIX) 75 MG tablet Take 1 tablet by mouth Daily. 90 tablet 1   • fluticasone (Flonase) 50 MCG/ACT nasal spray 2 sprays into the nostril(s) " as directed by provider Daily. 15.8 mL 2   • glucose blood test strip Check glucose daily 100 each 3   • glucose monitor monitoring kit 1 each Daily. 1 each 0   • hydroCHLOROthiazide (HYDRODIURIL) 12.5 MG tablet Take 1 tablet by mouth Daily. 30 tablet 6   • Lacosamide (Vimpat) 150 MG tablet Take 75 mg by mouth 2 (Two) Times a Day. 30 tablet 5   • Lancets misc 1 Device Daily. 100 each 12   • lansoprazole (PREVACID) 15 MG capsule Take 15 mg by mouth Daily.     • levETIRAcetam (KEPPRA) 1000 MG tablet Take 1 tablet by mouth 2 (Two) Times a Day. 180 tablet 2   • levETIRAcetam (KEPPRA) 500 MG tablet Take 1 tablet by mouth 2 (Two) Times a Day. 180 tablet 2   • losartan (COZAAR) 100 MG tablet Take 1 tablet by mouth Daily. Take 1/2 tab twice daily 180 tablet 3   • metFORMIN (GLUCOPHAGE) 500 MG tablet Take 2 tablets by mouth 2 (Two) Times a Day With Meals. 360 tablet 1   • metoprolol succinate XL (TOPROL-XL) 200 MG 24 hr tablet Take 1 tablet by mouth Daily. 90 tablet 1   • TiZANidine (Zanaflex) 2 MG capsule Take 1 capsule by mouth 3 (Three) Times a Day. 90 capsule 1     Current Facility-Administered Medications   Medication Dose Route Frequency Provider Last Rate Last Admin   • cyanocobalamin injection 1,000 mcg  1,000 mcg Intramuscular Q28 Days Glo Kyle APRN   1,000 mcg at 01/23/20 1530            Assessment & Plan     Problem List Items Addressed This Visit        Cardiac and Vasculature    Essential hypertension - Primary (Chronic)    Relevant Medications    amLODIPine (NORVASC) 10 MG tablet    metoprolol succinate XL (TOPROL-XL) 200 MG 24 hr tablet    Other Relevant Orders    CBC & Differential    Comprehensive Metabolic Panel    Dyslipidemia    Relevant Medications    atorvastatin (LIPITOR) 80 MG tablet    Other Relevant Orders    Lipid Panel    Peripheral artery disease (HCC)    Relevant Orders    US Arterial Doppler Lower Extremity Bilateral       Endocrine and Metabolic    Diabetes mellitus (HCC)     Relevant Medications    metFORMIN (GLUCOPHAGE) 500 MG tablet    Other Relevant Orders    Hemoglobin A1c    MicroAlbumin, Urine, Random - Urine, Clean Catch       Musculoskeletal and Injuries    Chronic bilateral low back pain without sciatica    Relevant Medications    TiZANidine (Zanaflex) 2 MG capsule       Neuro    H/o Left MCA ischemic CVA 4/2017    Relevant Medications    clopidogrel (PLAVIX) 75 MG tablet   Other Visit Diagnoses     Acute left ankle pain        Relevant Orders    XR Ankle 3+ View Left    Screening PSA (prostate specific antigen)        Relevant Orders    PSA Screen            ICD-10-CM ICD-9-CM   1. Essential hypertension  I10 401.9   2. Cerebrovascular accident (CVA), unspecified mechanism (HCC)  I63.9 434.91   3. Type 2 diabetes mellitus with other circulatory complication, without long-term current use of insulin (HCC)  E11.59 250.70   4. Dyslipidemia  E78.5 272.4   5. Chronic bilateral low back pain without sciatica  M54.50 724.2    G89.29 338.29   6. Peripheral artery disease (HCC)  I73.9 443.9   7. Acute left ankle pain  M25.572 719.47   8. Screening PSA (prostate specific antigen)  Z12.5 V76.44       Plan: Get labs today. Continue current meds. Get arterial doppler to look for cause of leg pain. Get xray of left ankle to look for cause of pain and popping sound. Follow up in 6 months and pending results.     @Body mass index is 30.68 kg/m².              Understands disease processes and need for medications.  Understands reasons for urgent and emergent care.  Patient (& family) verbalized agreement for treatment plan.   Emotional support and active listening provided.  Patient provided time to verbalize feelings.           BMI is >= 30 and <35. (Class 1 Obesity). The following options were offered after discussion;: weight loss educational material (shared in after visit summary)      This document has been electronically signed by KINGSTON Gallardo   February 2, 2023 15:38 EST

## 2023-02-03 DIAGNOSIS — E83.52 HYPERCALCEMIA: Primary | ICD-10-CM

## 2023-02-03 LAB
ALBUMIN SERPL-MCNC: 5.1 G/DL (ref 3.5–5.2)
ALBUMIN UR-MCNC: <1.2 MG/DL
ALBUMIN/GLOB SERPL: 1.8 G/DL
ALP SERPL-CCNC: 68 U/L (ref 39–117)
ALT SERPL W P-5'-P-CCNC: 25 U/L (ref 1–41)
ANION GAP SERPL CALCULATED.3IONS-SCNC: 11.7 MMOL/L (ref 5–15)
AST SERPL-CCNC: 15 U/L (ref 1–40)
BASOPHILS # BLD AUTO: 0.09 10*3/MM3 (ref 0–0.2)
BASOPHILS NFR BLD AUTO: 0.6 % (ref 0–1.5)
BILIRUB SERPL-MCNC: 0.3 MG/DL (ref 0–1.2)
BUN SERPL-MCNC: 11 MG/DL (ref 8–23)
BUN/CREAT SERPL: 14.1 (ref 7–25)
CALCIUM SPEC-SCNC: 10.6 MG/DL (ref 8.6–10.5)
CHLORIDE SERPL-SCNC: 96 MMOL/L (ref 98–107)
CHOLEST SERPL-MCNC: 120 MG/DL (ref 0–200)
CO2 SERPL-SCNC: 28.3 MMOL/L (ref 22–29)
CREAT SERPL-MCNC: 0.78 MG/DL (ref 0.76–1.27)
DEPRECATED RDW RBC AUTO: 44.2 FL (ref 37–54)
EGFRCR SERPLBLD CKD-EPI 2021: 101.5 ML/MIN/1.73
EOSINOPHIL # BLD AUTO: 0.16 10*3/MM3 (ref 0–0.4)
EOSINOPHIL NFR BLD AUTO: 1.1 % (ref 0.3–6.2)
ERYTHROCYTE [DISTWIDTH] IN BLOOD BY AUTOMATED COUNT: 13.7 % (ref 12.3–15.4)
GLOBULIN UR ELPH-MCNC: 2.8 GM/DL
GLUCOSE SERPL-MCNC: 101 MG/DL (ref 65–99)
HBA1C MFR BLD: 7 % (ref 4.8–5.6)
HCT VFR BLD AUTO: 48.4 % (ref 37.5–51)
HDLC SERPL-MCNC: 29 MG/DL (ref 40–60)
HGB BLD-MCNC: 16.8 G/DL (ref 13–17.7)
IMM GRANULOCYTES # BLD AUTO: 0.05 10*3/MM3 (ref 0–0.05)
IMM GRANULOCYTES NFR BLD AUTO: 0.3 % (ref 0–0.5)
LDLC SERPL CALC-MCNC: 47 MG/DL (ref 0–100)
LDLC/HDLC SERPL: 1.18 {RATIO}
LYMPHOCYTES # BLD AUTO: 6.81 10*3/MM3 (ref 0.7–3.1)
LYMPHOCYTES NFR BLD AUTO: 45.1 % (ref 19.6–45.3)
MCH RBC QN AUTO: 31.1 PG (ref 26.6–33)
MCHC RBC AUTO-ENTMCNC: 34.7 G/DL (ref 31.5–35.7)
MCV RBC AUTO: 89.6 FL (ref 79–97)
MONOCYTES # BLD AUTO: 1.08 10*3/MM3 (ref 0.1–0.9)
MONOCYTES NFR BLD AUTO: 7.2 % (ref 5–12)
NEUTROPHILS NFR BLD AUTO: 45.7 % (ref 42.7–76)
NEUTROPHILS NFR BLD AUTO: 6.9 10*3/MM3 (ref 1.7–7)
NRBC BLD AUTO-RTO: 0 /100 WBC (ref 0–0.2)
PLATELET # BLD AUTO: 250 10*3/MM3 (ref 140–450)
PMV BLD AUTO: 9.3 FL (ref 6–12)
POTASSIUM SERPL-SCNC: 4 MMOL/L (ref 3.5–5.2)
PROT SERPL-MCNC: 7.9 G/DL (ref 6–8.5)
PSA SERPL-MCNC: 1.31 NG/ML (ref 0–4)
RBC # BLD AUTO: 5.4 10*6/MM3 (ref 4.14–5.8)
SODIUM SERPL-SCNC: 136 MMOL/L (ref 136–145)
TRIGL SERPL-MCNC: 284 MG/DL (ref 0–150)
VLDLC SERPL-MCNC: 44 MG/DL (ref 5–40)
WBC NRBC COR # BLD: 15.09 10*3/MM3 (ref 3.4–10.8)

## 2023-02-06 ENCOUNTER — CLINICAL SUPPORT (OUTPATIENT)
Dept: FAMILY MEDICINE CLINIC | Facility: CLINIC | Age: 63
End: 2023-02-06
Payer: MEDICAID

## 2023-02-06 DIAGNOSIS — E83.52 HYPERCALCEMIA: ICD-10-CM

## 2023-02-06 PROCEDURE — 36415 COLL VENOUS BLD VENIPUNCTURE: CPT | Performed by: NURSE PRACTITIONER

## 2023-02-06 PROCEDURE — 82330 ASSAY OF CALCIUM: CPT | Performed by: NURSE PRACTITIONER

## 2023-02-06 PROCEDURE — 83970 ASSAY OF PARATHORMONE: CPT | Performed by: NURSE PRACTITIONER

## 2023-02-06 NOTE — PROGRESS NOTES
Venipuncture Blood Specimen Collection  Venipuncture performed in Left arm by Josefina Chowdhury MA with good hemostasis. Patient tolerated the procedure well without complications.   02/06/23   Josefina Chowdhury MA

## 2023-02-07 LAB
CA-I BLD-MCNC: 5.1 MG/DL (ref 4.6–5.4)
CA-I SERPL ISE-MCNC: 1.27 MMOL/L (ref 1.15–1.35)
PTH-INTACT SERPL-MCNC: 14.6 PG/ML (ref 15–65)

## 2023-02-24 ENCOUNTER — HOSPITAL ENCOUNTER (OUTPATIENT)
Dept: CARDIOLOGY | Facility: HOSPITAL | Age: 63
Discharge: HOME | End: 2023-02-24
Admitting: NURSE PRACTITIONER
Payer: MEDICAID

## 2023-02-24 DIAGNOSIS — I73.9 PERIPHERAL ARTERY DISEASE: Chronic | ICD-10-CM

## 2023-02-24 PROCEDURE — 93923 UPR/LXTR ART STDY 3+ LVLS: CPT | Performed by: RADIOLOGY

## 2023-02-24 PROCEDURE — 93923 UPR/LXTR ART STDY 3+ LVLS: CPT

## 2023-02-27 NOTE — PROGRESS NOTES
Doppler reports as showing blockages in the superficial femoral arteries bilaterally. Does he have an appointment with vascular?

## 2023-03-09 ENCOUNTER — OFFICE VISIT (OUTPATIENT)
Dept: CARDIAC SURGERY | Facility: CLINIC | Age: 63
End: 2023-03-09
Payer: MEDICAID

## 2023-03-09 VITALS
SYSTOLIC BLOOD PRESSURE: 158 MMHG | BODY MASS INDEX: 31.02 KG/M2 | WEIGHT: 193 LBS | HEIGHT: 66 IN | OXYGEN SATURATION: 98 % | TEMPERATURE: 97.9 F | DIASTOLIC BLOOD PRESSURE: 84 MMHG | HEART RATE: 57 BPM

## 2023-03-09 DIAGNOSIS — I70.0 AORTIC OCCLUSION: ICD-10-CM

## 2023-03-09 DIAGNOSIS — Z72.0 TOBACCO ABUSE: ICD-10-CM

## 2023-03-09 DIAGNOSIS — Z71.6 TOBACCO ABUSE COUNSELING: ICD-10-CM

## 2023-03-09 DIAGNOSIS — I10 ESSENTIAL HYPERTENSION: Chronic | ICD-10-CM

## 2023-03-09 DIAGNOSIS — E78.5 DYSLIPIDEMIA: ICD-10-CM

## 2023-03-09 DIAGNOSIS — E11.59 TYPE 2 DIABETES MELLITUS WITH OTHER CIRCULATORY COMPLICATION, WITHOUT LONG-TERM CURRENT USE OF INSULIN: ICD-10-CM

## 2023-03-09 DIAGNOSIS — I65.23 BILATERAL CAROTID ARTERY STENOSIS: Primary | ICD-10-CM

## 2023-03-09 PROCEDURE — 3077F SYST BP >= 140 MM HG: CPT | Performed by: THORACIC SURGERY (CARDIOTHORACIC VASCULAR SURGERY)

## 2023-03-09 PROCEDURE — 1159F MED LIST DOCD IN RCRD: CPT | Performed by: THORACIC SURGERY (CARDIOTHORACIC VASCULAR SURGERY)

## 2023-03-09 PROCEDURE — 3079F DIAST BP 80-89 MM HG: CPT | Performed by: THORACIC SURGERY (CARDIOTHORACIC VASCULAR SURGERY)

## 2023-03-09 PROCEDURE — 1160F RVW MEDS BY RX/DR IN RCRD: CPT | Performed by: THORACIC SURGERY (CARDIOTHORACIC VASCULAR SURGERY)

## 2023-03-09 PROCEDURE — 99213 OFFICE O/P EST LOW 20 MIN: CPT | Performed by: THORACIC SURGERY (CARDIOTHORACIC VASCULAR SURGERY)

## 2023-03-09 NOTE — PROGRESS NOTES
03/09/2023  Patient Information  Vince Olivera                                                                                          [address] [facility] [address]   1960  'PCP/Referring Physician'  Glo Kyle APRN  No ref. provider found  Chief Complaint   Patient presents with   • Peripheral Vascular Disease     Follow up for peripheral vascular disease,complains of bilateral groin area pain at rest.       CC: I am here to have my legs checked    History of Present Illness: 62-year-old  male now 3 years status post aortobifemoral bypass for an occlusion of the distal abdominal aorta.  From the standpoint of his surgical intervention the patient has done well.  He has claudication has basically disappeared since his surgical procedure.  He unfortunately continues to smoke.  Other risk factors for vascular disease include diabetes, dyslipidemia, hypertension, and a positive family history of atherosclerotic disease.  This gentleman currently has mild claudication if he walks however he tells me that he could walk 100 yards without problems.  Last fall he was able to hunt without any symptoms.  He has had previous arterial Dopplers of the lower extremities has documented occlusion of bilateral superficial femoral arteries.  He has never had rest pain.  He has not had nonhealing ulcerations of the feet.  He denies sensory problems in the feet.  He has diabetes but does not have burning or stinging sensation in the feet.  He has previously had a cerebrovascular accident and in 2017 underwent left carotid endarterectomy (Dr. Bach).  His current carotid ultrasounds are being followed by his family physician.  His most recent carotid ultrasound done a few months ago showed a 50% stenotic area in the right carotid and no essential stenosis in the left carotid.  The patient is asymptomatic from the standpoint of his carotid disease.  I discussed his tobacco abuse in detail and  advised him that his vascular disease will continue progression and particularly in combination with his diabetes jeopardizes the longevity of his legs (10 minutes).      Patient Active Problem List   Diagnosis   • H/o Left MCA ischemic CVA 4/2017   • Essential hypertension   • Dyslipidemia   • Bilateral carotid artery stenosis s/p L CEA 4/27/17. 50-69% R carotid stenosis   • Gastroesophageal reflux disease without esophagitis   • Combined receptive and expressive aphasia due to cerebrovascular accident   • Abnormal peripheral vision of right eye   • GERD (gastroesophageal reflux disease)   • Hyperlipidemia   • Palpitations   • Bilateral Carotid artery stenosis. Left greater than right    • Internal carotid artery stenosis, left   • Status post left carotid endarterectomy   • Diabetes mellitus (HCC)   • Bilateral carpal tunnel syndrome   • Peripheral artery disease (HCC)   • CVA (cerebral vascular accident) (CMS/HCC), rule out    • Breakthrough seizure ([facility])   • Aortic occlusion s/p aorto-femoral bypass 10/11/19 per Dr. Weaver    • Seizure disorder ([facility])   • Gross hematuria   • Chronic bilateral low back pain without sciatica     Past Medical History:   Diagnosis Date   • Arthritis    • Carotid artery disease ([facility])    • Carotid artery disorder ([facility])    • Depression    • Diabetes mellitus ([facility])     DIAGNOSED 4-2017 CHECKS FSBG 3X/WEEK    • Ear infection     about 2 weeks ago- cleared up - wax removed and cleaned out    • GERD (gastroesophageal reflux disease)     self diagnosed takes otc ppi   • Hyperlipidemia    • Hypertension    • Hypertension    • Inguinal hernia    • Seizure ([facility])     last 10/8/2019   • Shoulder pain     bilat    • Stroke ([facility]) 04/24/2017    EXPRESSIVE APHASIA RESIDUAL    • Tooth loose     5 - all over- will get teeth done after this surgery    • Wears reading eyeglasses      Past Surgical History:   Procedure Laterality Date   • ABDOMINAL HERNIA REPAIR  2010   • AORTA FEMORAL BYPASS N/A  10/11/2019    Procedure: AORTA FEMORAL BYPASS, BILATERAL FEMORAL ENDARTERECTOMY;  Surgeon: Dylan Weaver MD;  Location:  [facility];  Service: Vascular   • CAROTID ENDARTERECTOMY Left 10/17/2017    Procedure: CAROTID ENDARTERECTOMY LEFT;  Surgeon: Alexx Bach MD;  Location:  [facility];  Service:    • INGUINAL HERNIA REPAIR Left        Current Outpatient Medications:   •  amLODIPine (NORVASC) 10 MG tablet, Take 1 tablet by mouth Daily., Disp: 90 tablet, Rfl: 1  •  aspirin  MG tablet, Take 1 tablet by mouth Daily., Disp: 90 tablet, Rfl: 1  •  atorvastatin (LIPITOR) 80 MG tablet, Take 1 tablet by mouth Daily., Disp: 90 tablet, Rfl: 1  •  Azelastine HCl 137 MCG/SPRAY solution, 2 sprays into the nostril(s) as directed by provider 2 (two) times a day., Disp: 30 mL, Rfl: 2  •  clopidogrel (PLAVIX) 75 MG tablet, Take 1 tablet by mouth Daily., Disp: 90 tablet, Rfl: 1  •  fluticasone (Flonase) 50 MCG/ACT nasal spray, 2 sprays into the nostril(s) as directed by provider Daily., Disp: 15.8 mL, Rfl: 2  •  glucose blood test strip, Check glucose daily, Disp: 100 each, Rfl: 3  •  glucose monitor monitoring kit, 1 each Daily., Disp: 1 each, Rfl: 0  •  hydroCHLOROthiazide (HYDRODIURIL) 12.5 MG tablet, Take 1 tablet by mouth Daily., Disp: 30 tablet, Rfl: 6  •  Lacosamide (Vimpat) 150 MG tablet, Take 75 mg by mouth 2 (Two) Times a Day., Disp: 30 tablet, Rfl: 5  •  Lancets misc, 1 Device Daily., Disp: 100 each, Rfl: 12  •  lansoprazole (PREVACID) 15 MG capsule, Take 1 capsule by mouth Daily., Disp: , Rfl:   •  levETIRAcetam (KEPPRA) 1000 MG tablet, Take 1 tablet by mouth 2 (Two) Times a Day., Disp: 180 tablet, Rfl: 2  •  levETIRAcetam (KEPPRA) 500 MG tablet, Take 1 tablet by mouth 2 (Two) Times a Day., Disp: 180 tablet, Rfl: 2  •  losartan (COZAAR) 100 MG tablet, Take 1 tablet by mouth Daily. Take 1/2 tab twice daily, Disp: 180 tablet, Rfl: 3  •  metFORMIN (GLUCOPHAGE) 500 MG tablet, Take 2 tablets by mouth 2 (Two)  Times a Day With Meals., Disp: 360 tablet, Rfl: 1  •  metoprolol succinate XL (TOPROL-XL) 200 MG 24 hr tablet, Take 1 tablet by mouth Daily., Disp: 90 tablet, Rfl: 1  •  TiZANidine (Zanaflex) 2 MG capsule, Take 1 capsule by mouth 3 (Three) Times a Day., Disp: 90 capsule, Rfl: 1    Current Facility-Administered Medications:   •  cyanocobalamin injection 1,000 mcg, 1,000 mcg, Intramuscular, Q28 Days, Glo Kyle APRN, 1,000 mcg at 20 1530  No Known Allergies  Social History     Socioeconomic History   • Marital status: Single   • Number of children: 2   Tobacco Use   • Smoking status: Every Day     Packs/day: 0.05     Years: 40.00     Pack years: 2.00     Types: Cigarettes     Last attempt to quit: 2017     Years since quittin.8   • Smokeless tobacco: Former     Types: Snuff   • Tobacco comments:     quit snuff when 18 or 19 years old - only did for baseball - 2-3 years    Vaping Use   • Vaping Use: Never used   Substance and Sexual Activity   • Alcohol use: No   • Drug use: No   • Sexual activity: Defer     Partners: Female     Comment: SPOUSE     Family History   Adopted: Yes   Problem Relation Age of Onset   • Diabetes Mother    • COPD Father        ROS, past medical history, surgical history, family history, social history and vitals  reviewed by me and corrected as needed.        Review of Systems   Constitutional: Negative. Negative for chills, fever, malaise/fatigue, night sweats and weight loss.   HENT: Negative.  Negative for hearing loss, odynophagia and sore throat.    Cardiovascular: Negative.  Negative for chest pain, dyspnea on exertion, leg swelling, orthopnea and palpitations.   Respiratory: Positive for cough. Negative for hemoptysis.    Endocrine: Negative.  Negative for cold intolerance, heat intolerance, polydipsia, polyphagia and polyuria.   Hematologic/Lymphatic: Negative.  Does not bruise/bleed easily.   Skin: Negative.  Negative for itching and rash.   Musculoskeletal:  "Positive for back pain. Negative for joint pain, joint swelling and myalgias.   Gastrointestinal: Negative.  Negative for abdominal pain, constipation, diarrhea, hematemesis, hematochezia, melena, nausea and vomiting.   Genitourinary: Negative.  Negative for dysuria, frequency and hematuria.   Neurological: Positive for dizziness. Negative for focal weakness, headaches, numbness and seizures.   Psychiatric/Behavioral: Negative.  Negative for suicidal ideas.   All other systems reviewed and are negative.      Vitals:    03/09/23 1054   BP: 158/84   BP Location: Right arm   Patient Position: Sitting   Pulse: 57   Temp: 97.9 °F (36.6 °C)   SpO2: 98%   Weight: 87.5 kg (193 lb)   Height: 167.6 cm (66\")        Physical Exam  Vitals and nursing note reviewed.   Constitutional:       General: He is not in acute distress.     Appearance: Normal appearance. He is normal weight.   HENT:      Head: Normocephalic and atraumatic.      Right Ear: External ear normal.      Left Ear: External ear normal.      Nose: Nose normal.      Mouth/Throat:      Mouth: Mucous membranes are moist.   Eyes:      Extraocular Movements: Extraocular movements intact.      Pupils: Pupils are equal, round, and reactive to light.   Neck:      Vascular: No carotid bruit.   Cardiovascular:      Rate and Rhythm: Normal rate and regular rhythm.      Heart sounds: Normal heart sounds. No murmur heard.  Pulmonary:      Effort: Pulmonary effort is normal. No respiratory distress.      Breath sounds: No wheezing, rhonchi or rales.   Abdominal:      General: Abdomen is flat. Bowel sounds are normal.      Palpations: Abdomen is soft. There is no mass.      Tenderness: There is no abdominal tenderness. There is no guarding.      Comments: Midline abdominal incision well-healed no evidence of ventral hernia.   Musculoskeletal:         General: No swelling or tenderness.      Cervical back: Normal range of motion. No rigidity. No muscular tenderness.      Right " lower leg: No edema.      Left lower leg: No edema.      Comments: Femoral pulses are 2+ and symmetrical there are no femoral bruits.  Pedal pulses are not palpable but are present with the Doppler.  Skin and nails of both feet are normal feet are pink and warm with 2-second capillary refill.  There are no ulcerations.  There is no edema.  Motor and sensory function of the toes is normal.   Lymphadenopathy:      Cervical: No cervical adenopathy.   Skin:     General: Skin is warm and dry.      Capillary Refill: Capillary refill takes less than 2 seconds.      Coloration: Skin is not jaundiced.      Findings: No erythema.   Neurological:      General: No focal deficit present.      Mental Status: He is alert and oriented to person, place, and time. Mental status is at baseline.   Psychiatric:         Mood and Affect: Mood normal.         Behavior: Behavior normal.         Thought Content: Thought content normal.         Judgment: Judgment normal.         Labs:    Imaging: Arterial ultrasounds reviewed.  Carotid duplex studies also reviewed.    Assessment: Satisfactory post aortobifemoral bypass course.  Bilateral superficial femoral artery occlusion.  No evidence of impending tissue loss.    Plan: I have counseled the patient once again to quit smoking.  He will follow-up in this office in 1 year with ABIs.  He will continue close follow-up with his family physician.  Carotid ultrasounds to be followed by his family physician.    Electronically signed by Dylan Weaver MD, 03/09/23, 11:48 AM EST.

## 2023-03-27 ENCOUNTER — OFFICE VISIT (OUTPATIENT)
Dept: NEUROLOGY | Facility: CLINIC | Age: 63
End: 2023-03-27
Payer: MEDICAID

## 2023-03-27 VITALS
HEART RATE: 67 BPM | BODY MASS INDEX: 30.53 KG/M2 | HEIGHT: 66 IN | DIASTOLIC BLOOD PRESSURE: 82 MMHG | WEIGHT: 190 LBS | OXYGEN SATURATION: 97 % | SYSTOLIC BLOOD PRESSURE: 140 MMHG

## 2023-03-27 DIAGNOSIS — G40.909 SEIZURE DISORDER AS SEQUELA OF CEREBROVASCULAR ACCIDENT: ICD-10-CM

## 2023-03-27 DIAGNOSIS — I69.398 SEIZURE DISORDER AS SEQUELA OF CEREBROVASCULAR ACCIDENT: ICD-10-CM

## 2023-03-27 DIAGNOSIS — F17.200 TOBACCO DEPENDENCE: Primary | ICD-10-CM

## 2023-03-27 DIAGNOSIS — I65.23 BILATERAL CAROTID ARTERY STENOSIS: ICD-10-CM

## 2023-03-27 PROCEDURE — 3077F SYST BP >= 140 MM HG: CPT | Performed by: NURSE PRACTITIONER

## 2023-03-27 PROCEDURE — 99214 OFFICE O/P EST MOD 30 MIN: CPT | Performed by: NURSE PRACTITIONER

## 2023-03-27 PROCEDURE — 1160F RVW MEDS BY RX/DR IN RCRD: CPT | Performed by: NURSE PRACTITIONER

## 2023-03-27 PROCEDURE — 3079F DIAST BP 80-89 MM HG: CPT | Performed by: NURSE PRACTITIONER

## 2023-03-27 PROCEDURE — 1159F MED LIST DOCD IN RCRD: CPT | Performed by: NURSE PRACTITIONER

## 2023-03-27 PROCEDURE — 99406 BEHAV CHNG SMOKING 3-10 MIN: CPT | Performed by: NURSE PRACTITIONER

## 2023-03-27 RX ORDER — LACOSAMIDE 150 MG/1
75 TABLET ORAL 2 TIMES DAILY
Qty: 90 TABLET | Refills: 1 | Status: SHIPPED | OUTPATIENT
Start: 2023-03-27

## 2023-03-27 RX ORDER — ASPIRIN 325 MG
325 TABLET, DELAYED RELEASE (ENTERIC COATED) ORAL DAILY
Qty: 90 TABLET | Refills: 3 | Status: SHIPPED | OUTPATIENT
Start: 2023-03-27

## 2023-03-27 RX ORDER — LEVETIRACETAM 500 MG/1
500 TABLET ORAL 2 TIMES DAILY
Qty: 180 TABLET | Refills: 3 | Status: SHIPPED | OUTPATIENT
Start: 2023-03-27

## 2023-03-27 RX ORDER — LEVETIRACETAM 1000 MG/1
1000 TABLET ORAL 2 TIMES DAILY
Qty: 180 TABLET | Refills: 3 | Status: SHIPPED | OUTPATIENT
Start: 2023-03-27

## 2023-03-27 NOTE — PROGRESS NOTES
Subjective:     Patient ID: Vince Olivera is a 62 y.o. male.    CC:   Chief Complaint   Patient presents with   • Seizures       HPI:   History of Present Illness   Today 3/27/2023- This is a 62-year-old male who was last seen in clinic on 06/06/2023 by KINGSTON Lock. He previously had a seizure following a cerebrovascular accident, has had grand mal seizures. He suffered CVA in 2017 and then developed seizures after that time. His last seizure was in 10/2019 per prior neurology notes. He is currently taking Keppra 1500 mg twice per day along with Vimpat 150 mg, a half tablet twice a day, which is 75 mg twice per day. Labs were ordered at last visit.     Most recent labs were completed on 02/02/2023 by his primary care provider. Comprehensive metabolic panel with glucose 101, chloride 96, and calcium 10.6, otherwise normal. Hemoglobin A1c is 7 percent, stable compared to prior testing. Lipid panel with total cholesterol 120, triglycerides 284, HDL 29, LDL 47. Urine microalbumin was less than 1.2. PSA screening was normal. PTH intact was slightly low at 14.6 and ionized calcium was normal.     Prior neuroimaging has included an MRI of the brain without contrast on 10/08/2019 completed at [facility]. This showed large area of left temporal and occipital encephalomalacia, unchanged from prior imaging with mild nonspecific white matter changes. This was consistent with old stroke. No acute abnormalities. No changes compared to 2018 imaging. He underwent a carotid ultrasound at that time showing right ICA stenosis 50 to 69 percent with plaque and left ICA stenosis at 0 to 49 percent. Repeat duplex carotid ultrasound on 02/01/2021, right internal carotid artery stenosis of 50 to 69 percent and left ICA stenosis 0 to 49 percent.     He is currently on Plavix 75 mg daily, atorvastatin 80 mg daily, and aspirin daily. He is on medication for high blood pressure. He is here for follow-up and refills on  medications.    MRI of the brain without contrast on 10/08/2019 completed at [facility]. This showed a large area of left temporal and occipital encephalomalacia, unchanged from prior imaging, with mild nonspecific white matter changes. This was consistent with old stroke. No acute abnormalities. No changes compared to 2018 imaging.    Carotid ultrasound on 02/01/2021 showed right ICA stenosis 50 to 69 percent with plaque and left ICA stenosis 0 to 49 percent.    Prior EEG was completed 10/09/2019. This showed left temporal slowing of moderate degree, with no epileptiform activity seen during that time.    EMG and NCVS of upper and lower extremities on 03/18/2019 showing severe sensory mild peripheral neuropathy, mild to moderate right and mild left carpal tunnel syndrome.    Today, he reports he had a left carotid endarterectomy in 2017. He has not had any seizures since 2019. He is still taking Keppra and Vimpat. He has not taken aspirin in a while because he forgot to take it. He was taking a full dose of aspirin. He had a femoropopliteal bypass in 10/2022.    He reports he had trouble bleeding about 3 to 4 months ago. He states that he was having hematuria and was diagnosed with a UTI at [facility] ED.    He denies any new stroke symptoms. He takes a muscle relaxer 1 in the morning and 1 at night.     He has not been checking his blood glucose at home.    He denies any falls in the past 12 months.    He is a smoker. He tried to quit years ago. He currently does not plan to quit. He cannot take chantix due to seizure warning.    He has no known long term effects from his stroke that he is aware of.    The following portions of the patient's history were reviewed and updated as appropriate: allergies, current medications, past family history, past medical history, past social history, past surgical history and problem list.    Past Medical History:   Diagnosis Date   • Arthritis    • Carotid artery disease  ([facility])    • Carotid artery disorder ([facility])    • Depression    • Diabetes mellitus ([facility])     DIAGNOSED 4-2017 CHECKS FSBG 3X/WEEK    • Ear infection     about 2 weeks ago- cleared up - wax removed and cleaned out    • GERD (gastroesophageal reflux disease)     self diagnosed takes otc ppi   • Hyperlipidemia    • Hypertension    • Hypertension    • Inguinal hernia    • Seizure ([facility])     last 10/8/2019   • Shoulder pain     bilat    • Stroke ([facility]) 04/24/2017    EXPRESSIVE APHASIA RESIDUAL    • Tooth loose     5 - all over- will get teeth done after this surgery    • Wears reading eyeglasses        Past Surgical History:   Procedure Laterality Date   • ABDOMINAL HERNIA REPAIR  2010   • AORTA FEMORAL BYPASS N/A 10/11/2019    Procedure: AORTA FEMORAL BYPASS, BILATERAL FEMORAL ENDARTERECTOMY;  Surgeon: Dylan Weaver MD;  Location: [facility] OR;  Service: Vascular   • CAROTID ENDARTERECTOMY Left 10/17/2017    Procedure: CAROTID ENDARTERECTOMY LEFT;  Surgeon: Alexx Bach MD;  Location: [facility] OR;  Service:    • INGUINAL HERNIA REPAIR Left        Social History     Socioeconomic History   • Marital status: Single   • Number of children: 2   Tobacco Use   • Smoking status: Every Day     Packs/day: 0.05     Years: 40.00     Pack years: 2.00     Types: Cigarettes   • Smokeless tobacco: Former     Types: Snuff   • Tobacco comments:     quit snuff when 18 or 19 years old - only did for baseball - 2-3 years    Vaping Use   • Vaping Use: Never used   Substance and Sexual Activity   • Alcohol use: No   • Drug use: No   • Sexual activity: Defer     Partners: Female     Comment: SPOUSE       Family History   Adopted: Yes   Problem Relation Age of Onset   • Diabetes Mother    • COPD Father           Current Outpatient Medications:   •  amLODIPine (NORVASC) 10 MG tablet, Take 1 tablet by mouth Daily., Disp: 90 tablet, Rfl: 1  •  aspirin  MG tablet, Take 1 tablet by mouth Daily., Disp: 90 tablet, Rfl: 3  •  atorvastatin  (LIPITOR) 80 MG tablet, Take 1 tablet by mouth Daily., Disp: 90 tablet, Rfl: 1  •  clopidogrel (PLAVIX) 75 MG tablet, Take 1 tablet by mouth Daily., Disp: 90 tablet, Rfl: 1  •  fluticasone (Flonase) 50 MCG/ACT nasal spray, 2 sprays into the nostril(s) as directed by provider Daily., Disp: 15.8 mL, Rfl: 2  •  glucose blood test strip, Check glucose daily, Disp: 100 each, Rfl: 3  •  glucose monitor monitoring kit, 1 each Daily., Disp: 1 each, Rfl: 0  •  hydroCHLOROthiazide (HYDRODIURIL) 12.5 MG tablet, Take 1 tablet by mouth Daily., Disp: 30 tablet, Rfl: 6  •  Lacosamide (Vimpat) 150 MG tablet, Take 75 mg by mouth 2 (Two) Times a Day., Disp: 90 tablet, Rfl: 1  •  Lancets misc, 1 Device Daily., Disp: 100 each, Rfl: 12  •  levETIRAcetam (KEPPRA) 1000 MG tablet, Take 1 tablet by mouth 2 (Two) Times a Day., Disp: 180 tablet, Rfl: 3  •  levETIRAcetam (KEPPRA) 500 MG tablet, Take 1 tablet by mouth 2 (Two) Times a Day., Disp: 180 tablet, Rfl: 3  •  losartan (COZAAR) 100 MG tablet, Take 1 tablet by mouth Daily. Take 1/2 tab twice daily, Disp: 180 tablet, Rfl: 3  •  metFORMIN (GLUCOPHAGE) 500 MG tablet, Take 2 tablets by mouth 2 (Two) Times a Day With Meals., Disp: 360 tablet, Rfl: 1  •  metoprolol succinate XL (TOPROL-XL) 200 MG 24 hr tablet, Take 1 tablet by mouth Daily., Disp: 90 tablet, Rfl: 1  •  TiZANidine (Zanaflex) 2 MG capsule, Take 1 capsule by mouth 3 (Three) Times a Day., Disp: 90 capsule, Rfl: 1    Current Facility-Administered Medications:   •  cyanocobalamin injection 1,000 mcg, 1,000 mcg, Intramuscular, Q28 Days, Glo Kyle APRN, 1,000 mcg at 01/23/20 1530     Review of Systems   Constitutional: Negative for chills, fatigue, fever and unexpected weight change.   HENT: Negative for ear pain, hearing loss, nosebleeds, rhinorrhea and sore throat.    Eyes: Negative for photophobia, pain, discharge, itching and visual disturbance.   Respiratory: Negative for cough, chest tightness, shortness of breath and  "wheezing.    Cardiovascular: Negative for chest pain, palpitations and leg swelling.   Gastrointestinal: Negative for abdominal pain, blood in stool, constipation, diarrhea, nausea and vomiting.   Genitourinary: Negative for dysuria, frequency, hematuria and urgency.   Musculoskeletal: Negative for arthralgias, back pain, gait problem, joint swelling, myalgias, neck pain and neck stiffness.   Skin: Negative for rash and wound.   Allergic/Immunologic: Negative for environmental allergies and food allergies.   Neurological: Negative for dizziness, tremors, seizures, syncope, speech difficulty, weakness, light-headedness, numbness and headaches.   Hematological: Negative for adenopathy. Does not bruise/bleed easily.   Psychiatric/Behavioral: Negative for agitation, confusion, decreased concentration, hallucinations, sleep disturbance and suicidal ideas. The patient is not nervous/anxious.    All other systems reviewed and are negative.       Objective:  /82   Pulse 67   Ht 167.6 cm (66\")   Wt 86.2 kg (190 lb)   SpO2 97%   BMI 30.67 kg/m²     Neurologic Exam     Mental Status   Oriented to person, place, and time.   Speech: speech is normal   Level of consciousness: alert    Cranial Nerves   Cranial nerves II through XII intact.     Motor Exam   Muscle bulk: normal  Overall muscle tone: normal    Strength   Strength 5/5 throughout.     Gait, Coordination, and Reflexes     Gait  Gait: normal    Coordination   Finger to nose coordination: normal    Tremor   Resting tremor: absent  Intention tremor: absent  Action tremor: absent    Reflexes   Right brachioradialis: 2+  Left brachioradialis: 2+  Right biceps: 2+  Left biceps: 2+  Right patellar: 1+  Left patellar: 1+  Right achilles: 1+  Left achilles: 1+  Right : 3+  Left : 3+      Physical Exam  Constitutional:       Appearance: Normal appearance.      Comments: BMI 30.7   Neurological:      Mental Status: He is alert and oriented to person, place, and " time.      Cranial Nerves: Cranial nerves 2-12 are intact.      Motor: Motor strength is normal.      Coordination: Finger-Nose-Finger Test normal.      Gait: Gait is intact.      Deep Tendon Reflexes:      Reflex Scores:       Bicep reflexes are 2+ on the right side and 2+ on the left side.       Brachioradialis reflexes are 2+ on the right side and 2+ on the left side.       Patellar reflexes are 1+ on the right side and 1+ on the left side.       Achilles reflexes are 1+ on the right side and 1+ on the left side.  Psychiatric:         Mood and Affect: Mood and affect normal.         Speech: Speech normal.         Behavior: Behavior normal.         Thought Content: Thought content normal.         Cognition and Memory: Cognition and memory normal.         Judgment: Judgment normal.         Assessment/Plan:       Diagnoses and all orders for this visit:    1. Tobacco dependence (Primary)    2. Bilateral carotid artery stenosis s/p L CEA 4/27/17. 50-69% R carotid stenosis  Comments:  continue plavix and statin  Orders:  -     aspirin  MG tablet; Take 1 tablet by mouth Daily.  Dispense: 90 tablet; Refill: 3  -     Duplex Carotid Ultrasound CAR; Future    3. Seizure disorder as sequela of cerebrovascular accident (HCC) [I69.398, G40.909]  -     levETIRAcetam (KEPPRA) 1000 MG tablet; Take 1 tablet by mouth 2 (Two) Times a Day.  Dispense: 180 tablet; Refill: 3  -     levETIRAcetam (KEPPRA) 500 MG tablet; Take 1 tablet by mouth 2 (Two) Times a Day.  Dispense: 180 tablet; Refill: 3  -     Lacosamide (Vimpat) 150 MG tablet; Take 75 mg by mouth 2 (Two) Times a Day.  Dispense: 90 tablet; Refill: 1           He has done really well. He has really had no long-term effects from his stroke. For some reason, he came off of his aspirin for a short amount of time, but needs this refilled today. I have refilled that for him. He should continue his Plavix and his statin therapy. He should continue following with PCP. We will  repeat carotid ultrasound since it has been 2 years to ensure stability. We did discuss the importance of tobacco cessation for stroke prevention. He has not had any seizures since 2019. Thankfully, is tolerating medications well. I have refilled those. He will follow up first available in 6 to 7 months or sooner if needed. He verbalizes understanding, agrees with plan moving forward today.    I advised the patient of the risks and benefits of continuing to use tobacco products. I provided this patient with smoking cessation education and discussed how to quit smoking. Patient has not expressed willingness to quit at this time and accepts risks of their decision. 3 minutes    Reviewed medications, potential side effects and signs and symptoms to report. Discussed risk versus benefits of treatment plan with patient and/or family-including medications, labs and radiology that may be ordered. Addressed questions and concerns during visit. Patient and/or family verbalized understanding and agree with plan.    AS THE PROVIDER, I PERSONALLY WORE PPE DURING ENTIRE FACE TO FACE ENCOUNTER IN CLINIC WITH THE PATIENT. PATIENT ALSO WORE PPE DURING ENTIRE FACE TO FACE ENCOUNTER EXCEPT FOR A MAX OF 30 SECONDS DURING NEUROLOGICAL EVALUATION OF CRANIAL NERVES AND THEN MASK WAS PLACED BACK OVER PATIENT FACE FOR REMAINDER OF VISIT. I WASHED MY HANDS BEFORE AND AFTER VISIT.    Patient instructions include: No driving or operating heavy machinery, solo bathing or tub baths for 90 days from onset of most recent seizure, if they currently hold a license. Minimize stress as much as possible. Recommended 7-8 hours of sleep each night. Abstain from alcohol intake. Educated on Antiepileptic medications with possible side effects and signs and symptoms to report if prescribed during visit. Instructed to take seizure medication daily if prescribed. Reviewed potential seizure risk factors. Instructed to call 911 or our office if another seizure  "does occur.    Sudden Unexpected Death in Epilepsy is defined as \"the sudden, unexpected, witnessed or unwitnessed, non-traumatic, and non-drowning death in patients with epilepsy with or without evidence for a seizure, and excluding documented status epilepticus, in which postmortem examination does not reveal a structural or toxicological cause for death.\"-Epilepsy Foundation 2021. SUDEP is most commonly seen in Epilepsy patient's who's seizures are not well controlled or who have seizures during their sleep. This condition is not fully understood. To reduce your risk of SUDEP, please take your seizure medications as prescribed, keep a diary of your seizures and when they occur and if your seizures are not well controlled, please notify us so we can collaborate with you to get your seizures better controlled. If you have additional questions, please visit: [email or web address].com for more information.    Discussed signs and symptoms of stroke and when to call 911. Instructed to follow a low fat diet including the Mediterranean diet. Instructed to take all medications daily as prescribed. Encouraged 30 minutes of exercise 3-4 times a week as tolerated. Stay well hydrated. Discussed potential side effects of new medications and signs and symptoms to report. If patient is currently using tobacco products, smoking cessation was encouraged. Reviewed stroke risk factors and stroke prevention plan. Patient and/or family verbalizes understanding and agrees with plan.     As part of this patient's treatment plan I am prescribing controlled substances. The patient has been made aware of appropriate use of such medications, including potential risk of somnolence, limited ability to drive and/or work safely, and potential for dependence or overdose. It has also been made clear that these medications are for use by the patient only, without concomitant use of alcohol or other substances unless prescribed. Keep out of reach of " children.  Jose C report has been reviewed. If this is going to be prescribed long term, [facility] [place] has also been read and signed by patient and myself.    During this visit the following were done:  Labs Reviewed [x]    Labs Ordered []    Radiology Reports Reviewed [x]    Radiology Ordered []    PCP Records Reviewed []    Referring Provider Records Reviewed []    ER Records Reviewed [x]    Hospital Records Reviewed [x]    History Obtained From Family []    Radiology Images Reviewed []    Other Reviewed [x] prior neurology notes, neurosurgery notes and hospital records from 2019  Records Requested []      Transcribed from ambient dictation for KINGSTON Alejandro by Shonda Almeida.  03/27/23   15:52 EDT    Patient or patient representative verbalized consent to the visit recording.  I have personally performed the services described in this document as transcribed by the above individual, and it is both accurate and complete.  KINGSTON Alejandro  3/27/2023  16:44 EDT

## 2023-03-31 ENCOUNTER — PATIENT ROUNDING (BHMG ONLY) (OUTPATIENT)
Dept: NEUROLOGY | Facility: CLINIC | Age: 63
End: 2023-03-31
Payer: MEDICAID

## 2023-03-31 NOTE — PROGRESS NOTES
March 31, 2023    Hello, may I speak with Vince Olivera?    My name is [email or web address]     I am  with [facility]  [address] [address] [phone].    Before we get started may I verify your date of birth? 1960    I am calling to officially welcome you to our practice and ask about your recent visit. Is this a good time to talk?   Unable to reach pt.

## 2023-05-24 ENCOUNTER — HOSPITAL ENCOUNTER (OUTPATIENT)
Dept: CARDIOLOGY | Facility: HOSPITAL | Age: 63
Discharge: HOME | End: 2023-05-24
Admitting: NURSE PRACTITIONER
Payer: MEDICAID

## 2023-05-24 DIAGNOSIS — I65.23 BILATERAL CAROTID ARTERY STENOSIS: ICD-10-CM

## 2023-05-24 PROCEDURE — 93880 EXTRACRANIAL BILAT STUDY: CPT | Performed by: INTERNAL MEDICINE

## 2023-05-24 PROCEDURE — 93880 EXTRACRANIAL BILAT STUDY: CPT

## 2023-05-25 LAB
BH CV XLRA MEAS LEFT DIST CCA EDV: 21.6 CM/SEC
BH CV XLRA MEAS LEFT DIST CCA PSV: 102 CM/SEC
BH CV XLRA MEAS LEFT DIST ICA EDV: 25 CM/SEC
BH CV XLRA MEAS LEFT DIST ICA PSV: 66.8 CM/SEC
BH CV XLRA MEAS LEFT ICA/CCA RATIO: 0.84
BH CV XLRA MEAS LEFT MID CCA EDV: 17.5 CM/SEC
BH CV XLRA MEAS LEFT MID CCA PSV: 72.6 CM/SEC
BH CV XLRA MEAS LEFT MID ICA EDV: 24.6 CM/SEC
BH CV XLRA MEAS LEFT MID ICA PSV: 68.3 CM/SEC
BH CV XLRA MEAS LEFT PROX CCA EDV: 22.3 CM/SEC
BH CV XLRA MEAS LEFT PROX CCA PSV: 81.7 CM/SEC
BH CV XLRA MEAS LEFT PROX ECA EDV: 17.1 CM/SEC
BH CV XLRA MEAS LEFT PROX ECA PSV: 101 CM/SEC
BH CV XLRA MEAS LEFT PROX ICA EDV: 17.2 CM/SEC
BH CV XLRA MEAS LEFT PROX ICA PSV: 58.4 CM/SEC
BH CV XLRA MEAS LEFT PROX SCLA PSV: 128 CM/SEC
BH CV XLRA MEAS LEFT VERTEBRAL A EDV: 11.6 CM/SEC
BH CV XLRA MEAS LEFT VERTEBRAL A PSV: 36.5 CM/SEC
BH CV XLRA MEAS RIGHT CAROTID BULB EDV: 31.4 CM/SEC
BH CV XLRA MEAS RIGHT CAROTID BULB PSV: 291 CM/SEC
BH CV XLRA MEAS RIGHT DIST CCA EDV: 19.5 CM/SEC
BH CV XLRA MEAS RIGHT DIST CCA PSV: 79.2 CM/SEC
BH CV XLRA MEAS RIGHT DIST ICA EDV: 17.7 CM/SEC
BH CV XLRA MEAS RIGHT DIST ICA PSV: 61.4 CM/SEC
BH CV XLRA MEAS RIGHT ICA/CCA RATIO: 2.3
BH CV XLRA MEAS RIGHT MID CCA EDV: 22.6 CM/SEC
BH CV XLRA MEAS RIGHT MID CCA PSV: 85.5 CM/SEC
BH CV XLRA MEAS RIGHT MID ICA EDV: 28.8 CM/SEC
BH CV XLRA MEAS RIGHT MID ICA PSV: 125 CM/SEC
BH CV XLRA MEAS RIGHT PROX CCA EDV: 15.7 CM/SEC
BH CV XLRA MEAS RIGHT PROX CCA PSV: 75.4 CM/SEC
BH CV XLRA MEAS RIGHT PROX ECA EDV: 17.5 CM/SEC
BH CV XLRA MEAS RIGHT PROX ECA PSV: 173 CM/SEC
BH CV XLRA MEAS RIGHT PROX ICA EDV: 49.8 CM/SEC
BH CV XLRA MEAS RIGHT PROX ICA PSV: 177 CM/SEC
BH CV XLRA MEAS RIGHT PROX SCLA PSV: 122 CM/SEC
BH CV XLRA MEAS RIGHT VERTEBRAL A EDV: 7.9 CM/SEC
BH CV XLRA MEAS RIGHT VERTEBRAL A PSV: 32.6 CM/SEC
LEFT ARM BP: NORMAL MMHG
MAXIMAL PREDICTED HEART RATE: 159 BPM
RIGHT ARM BP: NORMAL MMHG
STRESS TARGET HR: 135 BPM

## 2023-05-25 NOTE — PROGRESS NOTES
Please notify Mr. Olivera by phone that the carotid ultrasound of his arteries looks stable compared to prior ultrasound from 2021.  He has right internal carotid artery narrowing of 50 to 69% and left internal carotid artery narrowing of less than 50%.  He should continue his current medications.  He should follow-up with his cardiothoracic surgeon as scheduled.  Thanks, KINGSTON Peterson

## 2023-05-26 ENCOUNTER — TELEPHONE (OUTPATIENT)
Dept: NEUROLOGY | Facility: CLINIC | Age: 63
End: 2023-05-26
Payer: MEDICAID

## 2023-05-26 NOTE — TELEPHONE ENCOUNTER
----- Message from KINGSTON Alejandro sent at 5/25/2023  5:09 PM EDT -----  Please notify Mr. Olivera by phone that the carotid ultrasound of his arteries looks stable compared to prior ultrasound from 2021.  He has right internal carotid artery narrowing of 50 to 69% and left internal carotid artery narrowing of less than 50%.  He should continue his current medications.  He should follow-up with his cardiothoracic surgeon as scheduled.  Thanks, KINGSTON Peterson

## 2023-06-15 DIAGNOSIS — I69.398 SEIZURE DISORDER AS SEQUELA OF CEREBROVASCULAR ACCIDENT: ICD-10-CM

## 2023-06-15 DIAGNOSIS — G40.909 SEIZURE DISORDER AS SEQUELA OF CEREBROVASCULAR ACCIDENT: ICD-10-CM

## 2023-06-15 RX ORDER — LACOSAMIDE 150 MG/1
75 TABLET ORAL 2 TIMES DAILY
Qty: 90 TABLET | Refills: 5 | Status: SHIPPED | OUTPATIENT
Start: 2023-06-15

## 2023-06-15 NOTE — TELEPHONE ENCOUNTER
Rx Refill Note  Requested Prescriptions     Pending Prescriptions Disp Refills   • Lacosamide (Vimpat) 150 MG tablet 90 tablet 1     Sig: Take 75 mg by mouth 2 (Two) Times a Day.      Last filled: 3/27/23 with 1 refill pending to provider for approval  Last office visit with prescribing clinician: 3/27/2023      Next office visit with prescribing clinician: 10/20/2023     Danelle Keller MA  06/15/23, 12:17 EDT

## 2023-06-15 NOTE — TELEPHONE ENCOUNTER
URGENT3}    Medication requested (name and dose):      VIMPAT 150 MG TABLET  TAKE 75 MG BY MOUTH 2X DAY    Pharmacy where request should be sent:      [facility] [address] [phone] [facility] [phone]      Additional details provided by patient:      Best call back number:  [phone]    Does the patient have less than a 3 day supply:  [x] Yes  [] No    Maged Avendano Rep  06/15/23, 11:31 }

## 2023-07-24 DIAGNOSIS — I10 ESSENTIAL HYPERTENSION: Chronic | ICD-10-CM

## 2023-07-24 RX ORDER — METOPROLOL SUCCINATE 200 MG/1
200 TABLET, EXTENDED RELEASE ORAL DAILY
Qty: 90 TABLET | Refills: 1 | Status: SHIPPED | OUTPATIENT
Start: 2023-07-24

## 2023-07-24 NOTE — TELEPHONE ENCOUNTER
Caller: Donna Davis    Relationship: Emergency Contact    Best call back number: [phone]     Requested Prescriptions:   Requested Prescriptions     Pending Prescriptions Disp Refills    metoprolol succinate XL (TOPROL-XL) 200 MG 24 hr tablet 90 tablet 1     Sig: Take 1 tablet by mouth Daily.        Pharmacy where request should be sent: [facility] [address] [facility] [phone] [facility] [phone]      Last office visit with prescribing clinician: 2/2/2023   Last telemedicine visit with prescribing clinician: Visit date not found   Next office visit with prescribing clinician: 8/3/2023     Additional details provided by patient: PATIENT IS OUT OF MEDIATION    Does the patient have less than a 3 day supply:  [x] Yes  [] No    Would you like a call back once the refill request has been completed: [x] Yes [] No    If the office needs to give you a call back, can they leave a voicemail: [x] Yes [] No    Maged Javier Rep   07/24/23 09:56 EDT

## 2023-07-31 DIAGNOSIS — I10 ESSENTIAL HYPERTENSION: Chronic | ICD-10-CM

## 2023-07-31 DIAGNOSIS — E78.5 DYSLIPIDEMIA: Chronic | ICD-10-CM

## 2023-07-31 RX ORDER — AMLODIPINE BESYLATE 10 MG/1
10 TABLET ORAL DAILY
Qty: 90 TABLET | Refills: 1 | Status: SHIPPED | OUTPATIENT
Start: 2023-07-31

## 2023-07-31 RX ORDER — ATORVASTATIN CALCIUM 80 MG/1
80 TABLET, FILM COATED ORAL DAILY
Qty: 90 TABLET | Refills: 1 | Status: SHIPPED | OUTPATIENT
Start: 2023-07-31

## 2023-08-03 ENCOUNTER — OFFICE VISIT (OUTPATIENT)
Dept: FAMILY MEDICINE CLINIC | Facility: CLINIC | Age: 63
End: 2023-08-03
Payer: MEDICAID

## 2023-08-03 VITALS
DIASTOLIC BLOOD PRESSURE: 68 MMHG | BODY MASS INDEX: 31.38 KG/M2 | OXYGEN SATURATION: 96 % | WEIGHT: 194.4 LBS | TEMPERATURE: 97.5 F | SYSTOLIC BLOOD PRESSURE: 132 MMHG | HEART RATE: 56 BPM

## 2023-08-03 DIAGNOSIS — M54.2 NECK PAIN: Chronic | ICD-10-CM

## 2023-08-03 DIAGNOSIS — G89.29 CHRONIC BILATERAL LOW BACK PAIN WITHOUT SCIATICA: Chronic | ICD-10-CM

## 2023-08-03 DIAGNOSIS — I63.9 CEREBROVASCULAR ACCIDENT (CVA), UNSPECIFIED MECHANISM: Chronic | ICD-10-CM

## 2023-08-03 DIAGNOSIS — I10 ESSENTIAL HYPERTENSION: Primary | ICD-10-CM

## 2023-08-03 DIAGNOSIS — H61.23 BILATERAL IMPACTED CERUMEN: ICD-10-CM

## 2023-08-03 DIAGNOSIS — E11.59 TYPE 2 DIABETES MELLITUS WITH OTHER CIRCULATORY COMPLICATION, WITHOUT LONG-TERM CURRENT USE OF INSULIN: Chronic | ICD-10-CM

## 2023-08-03 DIAGNOSIS — I65.23 BILATERAL CAROTID ARTERY STENOSIS: Chronic | ICD-10-CM

## 2023-08-03 DIAGNOSIS — M25.572 ACUTE LEFT ANKLE PAIN: Chronic | ICD-10-CM

## 2023-08-03 DIAGNOSIS — M54.50 CHRONIC BILATERAL LOW BACK PAIN WITHOUT SCIATICA: Chronic | ICD-10-CM

## 2023-08-03 PROCEDURE — 85025 COMPLETE CBC W/AUTO DIFF WBC: CPT | Performed by: NURSE PRACTITIONER

## 2023-08-03 PROCEDURE — 85007 BL SMEAR W/DIFF WBC COUNT: CPT | Performed by: NURSE PRACTITIONER

## 2023-08-03 PROCEDURE — 80053 COMPREHEN METABOLIC PANEL: CPT | Performed by: NURSE PRACTITIONER

## 2023-08-03 PROCEDURE — 82043 UR ALBUMIN QUANTITATIVE: CPT | Performed by: NURSE PRACTITIONER

## 2023-08-03 PROCEDURE — 83036 HEMOGLOBIN GLYCOSYLATED A1C: CPT | Performed by: NURSE PRACTITIONER

## 2023-08-03 PROCEDURE — 80061 LIPID PANEL: CPT | Performed by: NURSE PRACTITIONER

## 2023-08-03 RX ORDER — TIZANIDINE HYDROCHLORIDE 2 MG/1
2 CAPSULE, GELATIN COATED ORAL 3 TIMES DAILY
Qty: 90 CAPSULE | Refills: 1 | Status: SHIPPED | OUTPATIENT
Start: 2023-08-03 | End: 2023-08-03 | Stop reason: SDUPTHER

## 2023-08-03 RX ORDER — CLOPIDOGREL BISULFATE 75 MG/1
75 TABLET ORAL DAILY
Qty: 90 TABLET | Refills: 1 | Status: SHIPPED | OUTPATIENT
Start: 2023-08-03

## 2023-08-03 RX ORDER — HYDROCHLOROTHIAZIDE 12.5 MG/1
12.5 TABLET ORAL DAILY
Qty: 90 TABLET | Refills: 1 | Status: SHIPPED | OUTPATIENT
Start: 2023-08-03

## 2023-08-03 RX ORDER — TIZANIDINE HYDROCHLORIDE 2 MG/1
2 CAPSULE, GELATIN COATED ORAL 3 TIMES DAILY
Qty: 90 CAPSULE | Refills: 5 | Status: SHIPPED | OUTPATIENT
Start: 2023-08-03

## 2023-08-04 LAB
ALBUMIN SERPL-MCNC: 4.5 G/DL (ref 3.5–5.2)
ALBUMIN UR-MCNC: <1.2 MG/DL
ALBUMIN/GLOB SERPL: 1.8 G/DL
ALP SERPL-CCNC: 51 U/L (ref 39–117)
ALT SERPL W P-5'-P-CCNC: 13 U/L (ref 1–41)
ANION GAP SERPL CALCULATED.3IONS-SCNC: 15.4 MMOL/L (ref 5–15)
AST SERPL-CCNC: 12 U/L (ref 1–40)
BASOPHILS # BLD MANUAL: 0.13 10*3/MM3 (ref 0–0.2)
BASOPHILS NFR BLD MANUAL: 1 % (ref 0–1.5)
BILIRUB SERPL-MCNC: 0.2 MG/DL (ref 0–1.2)
BUN SERPL-MCNC: 10 MG/DL (ref 8–23)
BUN/CREAT SERPL: 10.9 (ref 7–25)
CALCIUM SPEC-SCNC: 9.8 MG/DL (ref 8.6–10.5)
CHLORIDE SERPL-SCNC: 95 MMOL/L (ref 98–107)
CHOLEST SERPL-MCNC: 102 MG/DL (ref 0–200)
CO2 SERPL-SCNC: 26.6 MMOL/L (ref 22–29)
CREAT SERPL-MCNC: 0.92 MG/DL (ref 0.76–1.27)
DEPRECATED RDW RBC AUTO: 44.8 FL (ref 37–54)
EGFRCR SERPLBLD CKD-EPI 2021: 94.6 ML/MIN/1.73
EOSINOPHIL # BLD MANUAL: 0.13 10*3/MM3 (ref 0–0.4)
EOSINOPHIL NFR BLD MANUAL: 1 % (ref 0.3–6.2)
ERYTHROCYTE [DISTWIDTH] IN BLOOD BY AUTOMATED COUNT: 13.2 % (ref 12.3–15.4)
GLOBULIN UR ELPH-MCNC: 2.5 GM/DL
GLUCOSE SERPL-MCNC: 111 MG/DL (ref 65–99)
HBA1C MFR BLD: 6.9 % (ref 4.8–5.6)
HCT VFR BLD AUTO: 45.1 % (ref 37.5–51)
HDLC SERPL-MCNC: 27 MG/DL (ref 40–60)
HGB BLD-MCNC: 15.7 G/DL (ref 13–17.7)
LDLC SERPL CALC-MCNC: 38 MG/DL (ref 0–100)
LDLC/HDLC SERPL: 1.01 {RATIO}
LYMPHOCYTES # BLD MANUAL: 5.71 10*3/MM3 (ref 0.7–3.1)
LYMPHOCYTES NFR BLD MANUAL: 3.1 % (ref 5–12)
MCH RBC QN AUTO: 31.8 PG (ref 26.6–33)
MCHC RBC AUTO-ENTMCNC: 34.8 G/DL (ref 31.5–35.7)
MCV RBC AUTO: 91.5 FL (ref 79–97)
MONOCYTES # BLD: 0.39 10*3/MM3 (ref 0.1–0.9)
NEUTROPHILS # BLD AUTO: 6.36 10*3/MM3 (ref 1.7–7)
NEUTROPHILS NFR BLD MANUAL: 50 % (ref 42.7–76)
NRBC BLD AUTO-RTO: 0.1 /100 WBC (ref 0–0.2)
PLAT MORPH BLD: NORMAL
PLATELET # BLD AUTO: 225 10*3/MM3 (ref 140–450)
PMV BLD AUTO: 8.7 FL (ref 6–12)
POTASSIUM SERPL-SCNC: 4.1 MMOL/L (ref 3.5–5.2)
PROT SERPL-MCNC: 7 G/DL (ref 6–8.5)
RBC # BLD AUTO: 4.93 10*6/MM3 (ref 4.14–5.8)
RBC MORPH BLD: NORMAL
SMUDGE CELLS BLD QL SMEAR: ABNORMAL
SODIUM SERPL-SCNC: 137 MMOL/L (ref 136–145)
TRIGL SERPL-MCNC: 239 MG/DL (ref 0–150)
VARIANT LYMPHS NFR BLD MANUAL: 44.9 % (ref 19.6–45.3)
VLDLC SERPL-MCNC: 37 MG/DL (ref 5–40)
WBC NRBC COR # BLD: 12.71 10*3/MM3 (ref 3.4–10.8)

## 2023-09-05 ENCOUNTER — OFFICE VISIT (OUTPATIENT)
Dept: FAMILY MEDICINE CLINIC | Facility: CLINIC | Age: 63
End: 2023-09-05
Payer: MEDICAID

## 2023-09-05 VITALS
BODY MASS INDEX: 31.18 KG/M2 | RESPIRATION RATE: 18 BRPM | TEMPERATURE: 100 F | HEIGHT: 66 IN | DIASTOLIC BLOOD PRESSURE: 70 MMHG | HEART RATE: 74 BPM | WEIGHT: 194 LBS | OXYGEN SATURATION: 93 % | SYSTOLIC BLOOD PRESSURE: 118 MMHG

## 2023-09-05 DIAGNOSIS — U07.1 COVID-19: Primary | ICD-10-CM

## 2023-09-05 PROCEDURE — 1159F MED LIST DOCD IN RCRD: CPT | Performed by: NURSE PRACTITIONER

## 2023-09-05 PROCEDURE — 3044F HG A1C LEVEL LT 7.0%: CPT | Performed by: NURSE PRACTITIONER

## 2023-09-05 PROCEDURE — 1160F RVW MEDS BY RX/DR IN RCRD: CPT | Performed by: NURSE PRACTITIONER

## 2023-09-05 PROCEDURE — 99213 OFFICE O/P EST LOW 20 MIN: CPT | Performed by: NURSE PRACTITIONER

## 2023-09-05 PROCEDURE — 3074F SYST BP LT 130 MM HG: CPT | Performed by: NURSE PRACTITIONER

## 2023-09-05 PROCEDURE — 3078F DIAST BP <80 MM HG: CPT | Performed by: NURSE PRACTITIONER

## 2023-09-05 NOTE — PROGRESS NOTES
"Esther Olivera is a 62 y.o. male.     Chief Complaint   Patient presents with    Flu Symptoms       History of Present Illness  He presents with c/o positive covid test. He states he was sore and weak on Saturday and Sunday. He has had two positive covid tests.      The following portions of the patient's history were reviewed and updated as appropriate: allergies, current medications, past family history, past medical history, past social history, past surgical history and problem list.    Review of Systems   Constitutional:  Positive for fatigue and fever. Negative for unexpected weight change.   HENT:  Positive for rhinorrhea. Negative for ear pain, sore throat and trouble swallowing.    Respiratory:  Positive for cough. Negative for shortness of breath and wheezing.    Cardiovascular:  Negative for chest pain and palpitations.   Gastrointestinal:  Negative for abdominal pain, blood in stool, constipation, diarrhea, nausea and vomiting.   Genitourinary:  Negative for dysuria.   Musculoskeletal:  Positive for arthralgias and myalgias.   Neurological:  Positive for dizziness and weakness.   Hematological:  Negative for adenopathy.     Objective     /70 (BP Location: Right arm, Patient Position: Sitting, Cuff Size: Adult)   Pulse 74   Temp 100 °F (37.8 °C) (Temporal)   Resp 18   Ht 167.6 cm (65.98\")   Wt 88 kg (194 lb)   SpO2 93%   BMI 31.33 kg/m²     Physical Exam  Vitals reviewed.   Constitutional:       General: He is not in acute distress.     Appearance: He is well-developed. He is not diaphoretic.   HENT:      Head: Normocephalic and atraumatic.      Right Ear: Hearing, tympanic membrane, ear canal and external ear normal.      Left Ear: Hearing, tympanic membrane, ear canal and external ear normal.      Nose: Nose normal.      Right Sinus: No maxillary sinus tenderness or frontal sinus tenderness.      Left Sinus: No maxillary sinus tenderness or frontal sinus tenderness.      " Mouth/Throat:      Pharynx: Uvula midline.   Eyes:      General: Lids are normal.      Conjunctiva/sclera: Conjunctivae normal.      Pupils: Pupils are equal, round, and reactive to light.   Neck:      Trachea: Trachea normal. No tracheal tenderness or tracheal deviation.   Cardiovascular:      Rate and Rhythm: Normal rate and regular rhythm.      Heart sounds: Normal heart sounds, S1 normal and S2 normal. No murmur heard.    No friction rub. No gallop.   Pulmonary:      Effort: Pulmonary effort is normal. No respiratory distress.      Breath sounds: Normal breath sounds.   Abdominal:      General: Bowel sounds are normal. There is no distension.      Palpations: Abdomen is soft.      Tenderness: There is no abdominal tenderness.   Skin:     General: Skin is warm and dry.   Neurological:      Mental Status: He is alert and oriented to person, place, and time.   Psychiatric:         Behavior: Behavior normal.         Thought Content: Thought content normal.         Judgment: Judgment normal.       Current Outpatient Medications   Medication Sig Dispense Refill    amLODIPine (NORVASC) 10 MG tablet Take 1 tablet by mouth Daily. 90 tablet 1    aspirin  MG tablet Take 1 tablet by mouth Daily. 90 tablet 3    atorvastatin (LIPITOR) 80 MG tablet Take 1 tablet by mouth Daily. 90 tablet 1    carbamide peroxide (Debrox) 6.5 % otic solution Administer 5 drops into both ears 2 (Two) Times a Day. 15 mL 0    clopidogrel (PLAVIX) 75 MG tablet Take 1 tablet by mouth Daily. 90 tablet 1    glucose blood test strip Check glucose daily 100 each 3    glucose monitor monitoring kit 1 each Daily. 1 each 0    hydroCHLOROthiazide (HYDRODIURIL) 12.5 MG tablet Take 1 tablet by mouth Daily. 90 tablet 1    Lacosamide (Vimpat) 150 MG tablet Take 75 mg by mouth 2 (Two) Times a Day. 90 tablet 5    Lancets misc 1 Device Daily. 100 each 12    levETIRAcetam (KEPPRA) 1000 MG tablet Take 1 tablet by mouth 2 (Two) Times a Day. 180 tablet 3     levETIRAcetam (KEPPRA) 500 MG tablet Take 1 tablet by mouth 2 (Two) Times a Day. 180 tablet 3    losartan (COZAAR) 100 MG tablet Take 1 tablet by mouth Daily. Take 1/2 tab twice daily 180 tablet 3    metFORMIN (GLUCOPHAGE) 500 MG tablet Take 2 tablets by mouth 2 (Two) Times a Day With Meals. 360 tablet 1    metoprolol succinate XL (TOPROL-XL) 200 MG 24 hr tablet Take 1 tablet by mouth Daily. 90 tablet 1    TiZANidine (Zanaflex) 2 MG capsule Take 1 capsule by mouth 3 (Three) Times a Day. 90 capsule 5    Nirmatrelvir&Ritonavir 300/100 (PAXLOVID) 20 x 150 MG & 10 x 100MG tablet therapy pack tablet Take 3 tablets by mouth 2 (Two) Times a Day. 30 tablet 0     Current Facility-Administered Medications   Medication Dose Route Frequency Provider Last Rate Last Admin    cyanocobalamin injection 1,000 mcg  1,000 mcg Intramuscular Q28 Days Glo Kyle APRN   1,000 mcg at 01/23/20 1530            Assessment & Plan     Problem List Items Addressed This Visit    None  Visit Diagnoses       COVID-19    -  Primary    Relevant Medications    Nirmatrelvir&Ritonavir 300/100 (PAXLOVID) 20 x 150 MG & 10 x 100MG tablet therapy pack tablet              ICD-10-CM ICD-9-CM   1. COVID-19  U07.1 079.89       Plan: We discussed paxlovid is approved under emergency use and has the risk of rebound covid. He would like to take paxlovid. Stop statin while taking paxlovid and resume when finished. He verbalized an understanding of the same.  Self quarantine for 5 days from start of symptoms. Go to ER with shortness of breath or dehydration. Follow up next week if symptoms persist.        @Body mass index is 31.33 kg/m².              Understands disease processes and need for medications.  Understands reasons for urgent and emergent care.  Patient (& family) verbalized agreement for treatment plan.   Emotional support and active listening provided.  Patient provided time to verbalize feelings.                  This document has been  electronically signed by KINGSTON Gallardo   September 5, 2023 14:09 EDT

## 2023-10-09 DIAGNOSIS — I10 ESSENTIAL HYPERTENSION: Chronic | ICD-10-CM

## 2023-10-09 RX ORDER — LOSARTAN POTASSIUM 100 MG/1
100 TABLET ORAL DAILY
Qty: 180 TABLET | Refills: 3 | Status: SHIPPED | OUTPATIENT
Start: 2023-10-09

## 2023-10-09 NOTE — TELEPHONE ENCOUNTER
Caller: Donna Davis    Relationship: Emergency Contact    Best call back number: [phone]    Requested Prescriptions:   Requested Prescriptions     Pending Prescriptions Disp Refills    losartan (COZAAR) 100 MG tablet 180 tablet 3     Sig: Take 1 tablet by mouth Daily. Take 1/2 tab twice daily        Pharmacy where request should be sent: [facility] [address] [phone] [facility] [phone]      Last office visit with prescribing clinician: 9/5/2023   Last telemedicine visit with prescribing clinician: Visit date not found   Next office visit with prescribing clinician: 2/2/2024     Additional details provided by patient: TOOK LAST PILL LAST NIGHT - OUT    Does the patient have less than a 3 day supply:  [x] Yes  [] No    Would you like a call back once the refill request has been completed: [x] Yes [] No    If the office needs to give you a call back, can they leave a voicemail: [x] Yes [] No    Anila Sharma MA   10/09/23 11:12 EDT

## 2023-10-17 DIAGNOSIS — I63.9 CEREBROVASCULAR ACCIDENT (CVA), UNSPECIFIED MECHANISM: Chronic | ICD-10-CM

## 2023-10-17 RX ORDER — CLOPIDOGREL BISULFATE 75 MG/1
75 TABLET ORAL DAILY
Qty: 90 TABLET | Refills: 1 | Status: SHIPPED | OUTPATIENT
Start: 2023-10-17

## 2023-10-17 NOTE — TELEPHONE ENCOUNTER
Caller: Donna Davis    Relationship: Emergency Contact    Best call back number: [phone]    Requested Prescriptions:   Requested Prescriptions     Pending Prescriptions Disp Refills    clopidogrel (PLAVIX) 75 MG tablet 90 tablet 1     Sig: Take 1 tablet by mouth Daily.        Pharmacy where request should be sent: [facility] [address] [phone] [facility] [phone] FX     Last office visit with prescribing clinician: 9/5/2023   Last telemedicine visit with prescribing clinician: Visit date not found   Next office visit with prescribing clinician: 2/2/2024     Additional details provided by patient: OUT OF MEDICATION    Does the patient have less than a 3 day supply:  [x] Yes  [] No    Would you like a call back once the refill request has been completed: [] Yes [x] No    If the office needs to give you a call back, can they leave a voicemail: [x] Yes [] No    Anila Sharma MA   10/17/23 13:17 EDT

## 2023-10-20 ENCOUNTER — OFFICE VISIT (OUTPATIENT)
Dept: NEUROLOGY | Facility: CLINIC | Age: 63
End: 2023-10-20
Payer: MEDICAID

## 2023-10-20 VITALS
WEIGHT: 197 LBS | HEART RATE: 68 BPM | DIASTOLIC BLOOD PRESSURE: 90 MMHG | OXYGEN SATURATION: 97 % | BODY MASS INDEX: 31.66 KG/M2 | SYSTOLIC BLOOD PRESSURE: 132 MMHG | HEIGHT: 66 IN

## 2023-10-20 DIAGNOSIS — M54.2 CHRONIC NECK PAIN: ICD-10-CM

## 2023-10-20 DIAGNOSIS — G89.29 CHRONIC NECK PAIN: ICD-10-CM

## 2023-10-20 DIAGNOSIS — I73.9 PERIPHERAL ARTERY DISEASE: Primary | ICD-10-CM

## 2023-10-20 DIAGNOSIS — G40.909 SEIZURE DISORDER AS SEQUELA OF CEREBROVASCULAR ACCIDENT: ICD-10-CM

## 2023-10-20 DIAGNOSIS — I69.398 SEIZURE DISORDER AS SEQUELA OF CEREBROVASCULAR ACCIDENT: ICD-10-CM

## 2023-10-20 DIAGNOSIS — I65.23 BILATERAL CAROTID ARTERY STENOSIS: ICD-10-CM

## 2023-10-20 PROCEDURE — 3080F DIAST BP >= 90 MM HG: CPT | Performed by: NURSE PRACTITIONER

## 2023-10-20 PROCEDURE — 1159F MED LIST DOCD IN RCRD: CPT | Performed by: NURSE PRACTITIONER

## 2023-10-20 PROCEDURE — 3075F SYST BP GE 130 - 139MM HG: CPT | Performed by: NURSE PRACTITIONER

## 2023-10-20 PROCEDURE — 99214 OFFICE O/P EST MOD 30 MIN: CPT | Performed by: NURSE PRACTITIONER

## 2023-10-20 PROCEDURE — 1160F RVW MEDS BY RX/DR IN RCRD: CPT | Performed by: NURSE PRACTITIONER

## 2023-10-20 RX ORDER — LEVETIRACETAM 1000 MG/1
1000 TABLET ORAL 2 TIMES DAILY
Qty: 180 TABLET | Refills: 3 | Status: SHIPPED | OUTPATIENT
Start: 2023-10-20

## 2023-10-20 RX ORDER — ASPIRIN 325 MG
325 TABLET, DELAYED RELEASE (ENTERIC COATED) ORAL DAILY
Qty: 90 TABLET | Refills: 3 | Status: SHIPPED | OUTPATIENT
Start: 2023-10-20

## 2023-10-20 RX ORDER — LEVETIRACETAM 500 MG/1
500 TABLET ORAL 2 TIMES DAILY
Qty: 180 TABLET | Refills: 3 | Status: SHIPPED | OUTPATIENT
Start: 2023-10-20

## 2023-10-20 RX ORDER — LACOSAMIDE 150 MG/1
75 TABLET ORAL 2 TIMES DAILY
Qty: 90 TABLET | Refills: 1 | Status: SHIPPED | OUTPATIENT
Start: 2023-10-20

## 2023-10-20 NOTE — PROGRESS NOTES
"Subjective:     Patient ID: Vince Olivera is a 62 y.o. male.    CC:   Chief Complaint   Patient presents with    Seizures    Stroke     Post stroke with known Carotid Artery Stenosis       HPI:   History of Present Illness  Today 10/20/2023-  This is a 62-year-old male who is here for 6-month neurology follow-up on seizure disorder following CVA in 2017. At his last visit in clinic, he had been continued on his seizure prevention medications including Keppra 1000 mg twice per day and Keppra 500 mg twice a day for a total of 1500 mg twice per day, along with Vimpat 150 mg tablets, a half a pill twice per day. He also has known bilateral carotid artery stenosis. He has been on aspirin, Plavix, and statin therapy.     We did repeat his duplex carotid ultrasound. This showed right internal carotid artery narrowing of 50 to 69 percent and left internal carotid artery narrowing of less than 50 percent. This was completed on 05/24/2023. He is here for follow-up and refills on his medications today.    Today, he reports he is doing well. He denies having any seizures.    He reports the left lateral aspect of his neck was sore after his ultrasound. He states he did some heavy lifting, and it started bothering him then. He reports the neck soreness is his primary concern. He has not gone to physical therapy for his neck. He states he had trouble with his neck years ago when he was building a house. He reports he can \"get around\" well, but he believes he does more heavy lifting than he should be doing. He denies radicular symptoms, weakness, numbness or tingling. He denies any recent trauma or falls.    He reports he was supposed to have the arteries in his legs checked again by vascular surgery, but his previous provider, Dr. Weaver, retired.     He states he checks his blood glucose occasionally, but so far, he has not had a problem with this. He recently completed lab work on 08/03/2023. His blood counts were normal. " Liver and kidney function were normal. Hemoglobin A1c was 6.9 percent. Total cholesterol was 102 mg/dL, triglycerides were elevated at 239 mg/dL, HDL was slightly low at 27 mg/dL, and LDL was normal at 38 mg/dL.    Prior Neurological History and Workup:  He previously had a seizure following a cerebrovascular accident, has had grand mal seizures. He suffered CVA in 2017 and then developed seizures after that time. His last seizure was in 10/2019. He is currently taking Keppra 1500 mg twice per day along with Vimpat 150 mg, a half tablet twice a day, which is 75 mg twice per day.       Prior neuroimaging has included an MRI of the brain without contrast on 10/08/2019 completed at [facility]. This showed large area of left temporal and occipital encephalomalacia, unchanged from prior imaging with mild nonspecific white matter changes. This was consistent with old stroke. No acute abnormalities. No changes compared to 2018 imaging. He underwent a carotid ultrasound at that time showing right ICA stenosis 50 to 69 percent with plaque and left ICA stenosis at 0 to 49 percent. Repeat duplex carotid ultrasound on 02/01/2021, right internal carotid artery stenosis of 50 to 69 percent and left ICA stenosis 0 to 49 percent.      Carotid ultrasound on 02/01/2021 showed right ICA stenosis 50 to 69 percent with plaque and left ICA stenosis 0 to 49 percent.     Prior EEG was completed 10/09/2019. This showed left temporal slowing of moderate degree, with no epileptiform activity seen during that time.     EMG and NCVS of upper and lower extremities on 03/18/2019 showing severe sensory mild peripheral neuropathy, mild to moderate right and mild left carpal tunnel syndrome.     Left carotid endarterectomy in 2017. He had a femoropopliteal bypass in 10/2022.     He is a smoker. He tried to quit years ago. He currently does not plan to quit. He cannot take chantix due to seizure warning.     He has no known long term effects from  his stroke that he is aware of.    The following portions of the patient's history were reviewed and updated as appropriate: allergies, current medications, past family history, past medical history, past social history, past surgical history, and problem list.    Past Medical History:   Diagnosis Date    Arthritis     Carotid artery disease     Carotid artery disorder     Depression     Diabetes mellitus     DIAGNOSED 4-2017 CHECKS FSBG 3X/WEEK     Ear infection     about 2 weeks ago- cleared up - wax removed and cleaned out     GERD (gastroesophageal reflux disease)     self diagnosed takes otc ppi    Hyperlipidemia     Hypertension     Hypertension     Inguinal hernia     Seizure     last 10/8/2019    Shoulder pain     bilat     Stroke 04/24/2017    EXPRESSIVE APHASIA RESIDUAL     Tooth loose     5 - all over- will get teeth done after this surgery     Wears reading eyeglasses        Past Surgical History:   Procedure Laterality Date    ABDOMINAL HERNIA REPAIR  2010    AORTA FEMORAL BYPASS N/A 10/11/2019    Procedure: AORTA FEMORAL BYPASS, BILATERAL FEMORAL ENDARTERECTOMY;  Surgeon: Dylan Weaver MD;  Location: [facility];  Service: Vascular    CAROTID ENDARTERECTOMY Left 10/17/2017    Procedure: CAROTID ENDARTERECTOMY LEFT;  Surgeon: Alexx Bach MD;  Location: [facility];  Service:     INGUINAL HERNIA REPAIR Left        Social History     Socioeconomic History    Marital status: Single    Number of children: 2   Tobacco Use    Smoking status: Every Day     Packs/day: 1.50     Years: 40.00     Additional pack years: 0.00     Total pack years: 60.00     Types: Cigarettes    Smokeless tobacco: Former     Types: Snuff    Tobacco comments:     quit snuff when 18 or 19 years old - only did for baseball - 2-3 years    Vaping Use    Vaping Use: Never used   Substance and Sexual Activity    Alcohol use: No    Drug use: No    Sexual activity: Defer     Partners: Female     Comment: SPOUSE       Family History    Adopted: Yes   Problem Relation Age of Onset    Diabetes Mother     COPD Father           Current Outpatient Medications:     amLODIPine (NORVASC) 10 MG tablet, Take 1 tablet by mouth Daily., Disp: 90 tablet, Rfl: 1    aspirin (aspirin EC) 325 MG EC tablet, Take 1 tablet by mouth Daily., Disp: 90 tablet, Rfl: 3    atorvastatin (LIPITOR) 80 MG tablet, Take 1 tablet by mouth Daily., Disp: 90 tablet, Rfl: 1    clopidogrel (PLAVIX) 75 MG tablet, Take 1 tablet by mouth Daily., Disp: 90 tablet, Rfl: 1    glucose blood test strip, Check glucose daily, Disp: 100 each, Rfl: 3    glucose monitor monitoring kit, 1 each Daily., Disp: 1 each, Rfl: 0    hydroCHLOROthiazide (HYDRODIURIL) 12.5 MG tablet, Take 1 tablet by mouth Daily., Disp: 90 tablet, Rfl: 1    Lacosamide (Vimpat) 150 MG tablet, Take 75 mg by mouth 2 (Two) Times a Day., Disp: 90 tablet, Rfl: 1    Lancets misc, 1 Device Daily., Disp: 100 each, Rfl: 12    levETIRAcetam (KEPPRA) 1000 MG tablet, Take 1 tablet by mouth 2 (Two) Times a Day., Disp: 180 tablet, Rfl: 3    levETIRAcetam (KEPPRA) 500 MG tablet, Take 1 tablet by mouth 2 (Two) Times a Day., Disp: 180 tablet, Rfl: 3    losartan (COZAAR) 100 MG tablet, Take 1 tablet by mouth Daily. Take 1/2 tab twice daily, Disp: 180 tablet, Rfl: 3    metFORMIN (GLUCOPHAGE) 500 MG tablet, Take 2 tablets by mouth 2 (Two) Times a Day With Meals., Disp: 360 tablet, Rfl: 1    metoprolol succinate XL (TOPROL-XL) 200 MG 24 hr tablet, Take 1 tablet by mouth Daily., Disp: 90 tablet, Rfl: 1    TiZANidine (Zanaflex) 2 MG capsule, Take 1 capsule by mouth 3 (Three) Times a Day., Disp: 90 capsule, Rfl: 5    Current Facility-Administered Medications:     cyanocobalamin injection 1,000 mcg, 1,000 mcg, Intramuscular, Q28 Days, Glo Kyle, APRN, 1,000 mcg at 01/23/20 1530     Review of Systems   Musculoskeletal:  Positive for neck pain.   Neurological:  Negative for seizures and weakness.   All other systems reviewed and are  "negative.       Objective:  /90   Pulse 68   Ht 167.6 cm (66\")   Wt 89.4 kg (197 lb)   SpO2 97%   BMI 31.80 kg/m²     Neurologic Exam     Mental Status   Oriented to person, place, and time.   Speech: speech is normal   Level of consciousness: alert    Cranial Nerves     CN II   Visual fields full to confrontation.     CN III, IV, VI   Pupils are equal, round, and reactive to light.  Extraocular motions are normal.     CN VII   Facial expression full, symmetric.     CN VIII   CN VIII normal.     Motor Exam   Muscle bulk: normal  Overall muscle tone: normal    Strength   Strength 5/5 throughout.     Gait, Coordination, and Reflexes     Gait  Gait: normal      Physical Exam  Constitutional:       Appearance: Normal appearance.   Eyes:      Extraocular Movements: EOM normal.      Pupils: Pupils are equal, round, and reactive to light.   Musculoskeletal:      Cervical back: Muscular tenderness present. Decreased range of motion.   Neurological:      Mental Status: He is alert and oriented to person, place, and time.      Motor: Motor strength is normal.     Gait: Gait is intact.   Psychiatric:         Mood and Affect: Mood and affect normal.         Speech: Speech normal.         Assessment/Plan:       Diagnoses and all orders for this visit:    1. Peripheral artery disease (Primary)  -     Ambulatory Referral to Cardiovascular Surgery    2. Seizure disorder as sequela of cerebrovascular accident (HCC) [I69.398, G40.909]  -     Lacosamide (Vimpat) 150 MG tablet; Take 75 mg by mouth 2 (Two) Times a Day.  Dispense: 90 tablet; Refill: 1  -     levETIRAcetam (KEPPRA) 1000 MG tablet; Take 1 tablet by mouth 2 (Two) Times a Day.  Dispense: 180 tablet; Refill: 3  -     levETIRAcetam (KEPPRA) 500 MG tablet; Take 1 tablet by mouth 2 (Two) Times a Day.  Dispense: 180 tablet; Refill: 3    3. Bilateral carotid artery stenosis s/p L CEA 4/27/17. 50-69% R carotid stenosis  Comments:  continue plavix and statin  Orders:  -   " "  aspirin (aspirin EC) 325 MG EC tablet; Take 1 tablet by mouth Daily.  Dispense: 90 tablet; Refill: 3    4. Chronic neck pain  -     Ambulatory Referral to Physical Therapy Evaluate and treat         We will send him to physical therapy for his chronic neck pain. Encouraged him not to see a chiropractor or have any type of manual manipulation on the neck. With his carotid stenosis, continue Plavix, aspirin, and statin therapy. Continue his Vimpat and Keppra. Follow up in 6 months with reevaluation of symptoms.    In regard to his prior vascular surgeries, we will refer him to vascular surgery to reestablish care since his prior provider is no longer there. He is aware that he will be contacted about this. He will call us with any questions or concerns. He verbalizes understanding and agrees with plan today.    Reviewed medications, potential side effects and signs and symptoms to report. Discussed risk versus benefits of treatment plan with patient and/or family-including medications, labs and radiology that may be ordered. Addressed questions and concerns during visit. Patient and/or family verbalized understanding and agree with plan.    Patient instructions include: No driving or operating heavy machinery, solo bathing or tub baths for 90 days from onset of most recent seizure, if they currently hold a license. Minimize stress as much as possible. Recommended 7-8 hours of sleep each night. Abstain from alcohol intake. Educated on Antiepileptic medications with possible side effects and signs and symptoms to report if prescribed during visit. Instructed to take seizure medication daily if prescribed. Reviewed potential seizure risk factors. Instructed to call 911 or our office if another seizure does occur.    Sudden Unexpected Death in Epilepsy is defined as \"the sudden, unexpected, witnessed or unwitnessed, non-traumatic, and non-drowning death in patients with epilepsy with or without evidence for a seizure, " "and excluding documented status epilepticus, in which postmortem examination does not reveal a structural or toxicological cause for death.\"-Epilepsy Foundation 2021. SUDEP is most commonly seen in Epilepsy patient's who's seizures are not well controlled or who have seizures during their sleep. This condition is not fully understood. To reduce your risk of SUDEP, please take your seizure medications as prescribed, keep a diary of your seizures and when they occur and if your seizures are not well controlled, please notify us so we can collaborate with you to get your seizures better controlled. If you have additional questions, please visit: [email or web address].com for more information.    Discussed signs and symptoms of stroke and when to call 911. Instructed to follow a low fat diet including the Mediterranean diet. Instructed to take all medications daily as prescribed. Encouraged 30 minutes of exercise 3-4 times a week as tolerated. Stay well hydrated. Discussed potential side effects of new medications and signs and symptoms to report. If patient is currently using tobacco products, smoking cessation was encouraged. Reviewed stroke risk factors and stroke prevention plan. Patient and/or family verbalizes understanding and agrees with plan.     During this visit the following were done:  Labs Reviewed [x]    Labs Ordered []    Radiology Reports Reviewed [x]    Radiology Ordered []    PCP Records Reviewed [x]    Referring Provider Records Reviewed []    ER Records Reviewed []    Hospital Records Reviewed []    History Obtained From Family []    Radiology Images Reviewed [x]    Other Reviewed [x]    Records Requested []      Note to patient: The 21st Century Cures Act makes medical notes like these available to patients in the interest of transparency. However, be advised this is a medical document. It is intended as peer to peer communication. It is written in medical language and may contain abbreviations or verbiage " that are unfamiliar. It may appear blunt or direct. Medical documents are intended to carry relevant information, facts as evident, and the clinical opinion of the provider.      Transcribed from ambient dictation for KINGSTON Alejandro by Lisa Richardson.  10/20/23   12:56 EDT    Patient or patient representative verbalized consent to the visit recording.  I have personally performed the services described in this document as transcribed by the above individual, and it is both accurate and complete.  KINGSTON Alejandro  10/21/2023  07:03 EDT

## 2023-10-20 NOTE — LETTER
"October 21, 2023     KINGSTON Caruso  [address] [facility] [address]    Patient: Vince Olivera   YOB: 1960   Date of Visit: 10/20/2023       Dear KINGSTON Caruso    Vince Olivera was in my office today. Below is a copy of my note.    If you have questions, please do not hesitate to call me. I look forward to following Vince along with you.         Sincerely,        KINGSTON Alejandro        CC: Bolivar Garcia MD    Subjective:     Patient ID: Vince Olivera is a 62 y.o. male.    CC:   Chief Complaint   Patient presents with   • Seizures   • Stroke     Post stroke with known Carotid Artery Stenosis       HPI:   History of Present Illness  Today 10/20/2023-  This is a 62-year-old male who is here for 6-month neurology follow-up on seizure disorder following CVA in 2017. At his last visit in clinic, he had been continued on his seizure prevention medications including Keppra 1000 mg twice per day and Keppra 500 mg twice a day for a total of 1500 mg twice per day, along with Vimpat 150 mg tablets, a half a pill twice per day. He also has known bilateral carotid artery stenosis. He has been on aspirin, Plavix, and statin therapy.     We did repeat his duplex carotid ultrasound. This showed right internal carotid artery narrowing of 50 to 69 percent and left internal carotid artery narrowing of less than 50 percent. This was completed on 05/24/2023. He is here for follow-up and refills on his medications today.    Today, he reports he is doing well. He denies having any seizures.    He reports the left lateral aspect of his neck was sore after his ultrasound. He states he did some heavy lifting, and it started bothering him then. He reports the neck soreness is his primary concern. He has not gone to physical therapy for his neck. He states he had trouble with his neck years ago when he was building a house. He reports he can \"get around\" well, but he believes he " does more heavy lifting than he should be doing. He denies radicular symptoms, weakness, numbness or tingling. He denies any recent trauma or falls.    He reports he was supposed to have the arteries in his legs checked again by vascular surgery, but his previous provider, Dr. Weaver, retired.     He states he checks his blood glucose occasionally, but so far, he has not had a problem with this. He recently completed lab work on 08/03/2023. His blood counts were normal. Liver and kidney function were normal. Hemoglobin A1c was 6.9 percent. Total cholesterol was 102 mg/dL, triglycerides were elevated at 239 mg/dL, HDL was slightly low at 27 mg/dL, and LDL was normal at 38 mg/dL.    Prior Neurological History and Workup:  He previously had a seizure following a cerebrovascular accident, has had grand mal seizures. He suffered CVA in 2017 and then developed seizures after that time. His last seizure was in 10/2019. He is currently taking Keppra 1500 mg twice per day along with Vimpat 150 mg, a half tablet twice a day, which is 75 mg twice per day.       Prior neuroimaging has included an MRI of the brain without contrast on 10/08/2019 completed at [facility]. This showed large area of left temporal and occipital encephalomalacia, unchanged from prior imaging with mild nonspecific white matter changes. This was consistent with old stroke. No acute abnormalities. No changes compared to 2018 imaging. He underwent a carotid ultrasound at that time showing right ICA stenosis 50 to 69 percent with plaque and left ICA stenosis at 0 to 49 percent. Repeat duplex carotid ultrasound on 02/01/2021, right internal carotid artery stenosis of 50 to 69 percent and left ICA stenosis 0 to 49 percent.      Carotid ultrasound on 02/01/2021 showed right ICA stenosis 50 to 69 percent with plaque and left ICA stenosis 0 to 49 percent.     Prior EEG was completed 10/09/2019. This showed left temporal slowing of moderate degree, with no  epileptiform activity seen during that time.     EMG and NCVS of upper and lower extremities on 03/18/2019 showing severe sensory mild peripheral neuropathy, mild to moderate right and mild left carpal tunnel syndrome.     Left carotid endarterectomy in 2017. He had a femoropopliteal bypass in 10/2022.     He is a smoker. He tried to quit years ago. He currently does not plan to quit. He cannot take chantix due to seizure warning.     He has no known long term effects from his stroke that he is aware of.    The following portions of the patient's history were reviewed and updated as appropriate: allergies, current medications, past family history, past medical history, past social history, past surgical history, and problem list.    Past Medical History:   Diagnosis Date   • Arthritis    • Carotid artery disease    • Carotid artery disorder    • Depression    • Diabetes mellitus     DIAGNOSED 4-2017 CHECKS FSBG 3X/WEEK    • Ear infection     about 2 weeks ago- cleared up - wax removed and cleaned out    • GERD (gastroesophageal reflux disease)     self diagnosed takes otc ppi   • Hyperlipidemia    • Hypertension    • Hypertension    • Inguinal hernia    • Seizure     last 10/8/2019   • Shoulder pain     bilat    • Stroke 04/24/2017    EXPRESSIVE APHASIA RESIDUAL    • Tooth loose     5 - all over- will get teeth done after this surgery    • Wears reading eyeglasses        Past Surgical History:   Procedure Laterality Date   • ABDOMINAL HERNIA REPAIR  2010   • AORTA FEMORAL BYPASS N/A 10/11/2019    Procedure: AORTA FEMORAL BYPASS, BILATERAL FEMORAL ENDARTERECTOMY;  Surgeon: Dylan Weaver MD;  Location: [facility];  Service: Vascular   • CAROTID ENDARTERECTOMY Left 10/17/2017    Procedure: CAROTID ENDARTERECTOMY LEFT;  Surgeon: Alexx Bach MD;  Location: [facility];  Service:    • INGUINAL HERNIA REPAIR Left        Social History     Socioeconomic History   • Marital status: Single   • Number of children:  2   Tobacco Use   • Smoking status: Every Day     Packs/day: 1.50     Years: 40.00     Additional pack years: 0.00     Total pack years: 60.00     Types: Cigarettes   • Smokeless tobacco: Former     Types: Snuff   • Tobacco comments:     quit snuff when 18 or 19 years old - only did for baseball - 2-3 years    Vaping Use   • Vaping Use: Never used   Substance and Sexual Activity   • Alcohol use: No   • Drug use: No   • Sexual activity: Defer     Partners: Female     Comment: SPOUSE       Family History   Adopted: Yes   Problem Relation Age of Onset   • Diabetes Mother    • COPD Father           Current Outpatient Medications:   •  amLODIPine (NORVASC) 10 MG tablet, Take 1 tablet by mouth Daily., Disp: 90 tablet, Rfl: 1  •  aspirin (aspirin EC) 325 MG EC tablet, Take 1 tablet by mouth Daily., Disp: 90 tablet, Rfl: 3  •  atorvastatin (LIPITOR) 80 MG tablet, Take 1 tablet by mouth Daily., Disp: 90 tablet, Rfl: 1  •  clopidogrel (PLAVIX) 75 MG tablet, Take 1 tablet by mouth Daily., Disp: 90 tablet, Rfl: 1  •  glucose blood test strip, Check glucose daily, Disp: 100 each, Rfl: 3  •  glucose monitor monitoring kit, 1 each Daily., Disp: 1 each, Rfl: 0  •  hydroCHLOROthiazide (HYDRODIURIL) 12.5 MG tablet, Take 1 tablet by mouth Daily., Disp: 90 tablet, Rfl: 1  •  Lacosamide (Vimpat) 150 MG tablet, Take 75 mg by mouth 2 (Two) Times a Day., Disp: 90 tablet, Rfl: 1  •  Lancets misc, 1 Device Daily., Disp: 100 each, Rfl: 12  •  levETIRAcetam (KEPPRA) 1000 MG tablet, Take 1 tablet by mouth 2 (Two) Times a Day., Disp: 180 tablet, Rfl: 3  •  levETIRAcetam (KEPPRA) 500 MG tablet, Take 1 tablet by mouth 2 (Two) Times a Day., Disp: 180 tablet, Rfl: 3  •  losartan (COZAAR) 100 MG tablet, Take 1 tablet by mouth Daily. Take 1/2 tab twice daily, Disp: 180 tablet, Rfl: 3  •  metFORMIN (GLUCOPHAGE) 500 MG tablet, Take 2 tablets by mouth 2 (Two) Times a Day With Meals., Disp: 360 tablet, Rfl: 1  •  metoprolol succinate XL (TOPROL-XL) 200 MG  "24 hr tablet, Take 1 tablet by mouth Daily., Disp: 90 tablet, Rfl: 1  •  TiZANidine (Zanaflex) 2 MG capsule, Take 1 capsule by mouth 3 (Three) Times a Day., Disp: 90 capsule, Rfl: 5    Current Facility-Administered Medications:   •  cyanocobalamin injection 1,000 mcg, 1,000 mcg, Intramuscular, Q28 Days, Glo Kyle APRN, 1,000 mcg at 01/23/20 1530     Review of Systems   Musculoskeletal:  Positive for neck pain.   Neurological:  Negative for seizures and weakness.   All other systems reviewed and are negative.       Objective:  /90   Pulse 68   Ht 167.6 cm (66\")   Wt 89.4 kg (197 lb)   SpO2 97%   BMI 31.80 kg/m²     Neurologic Exam     Mental Status   Oriented to person, place, and time.   Speech: speech is normal   Level of consciousness: alert    Cranial Nerves     CN II   Visual fields full to confrontation.     CN III, IV, VI   Pupils are equal, round, and reactive to light.  Extraocular motions are normal.     CN VII   Facial expression full, symmetric.     CN VIII   CN VIII normal.     Motor Exam   Muscle bulk: normal  Overall muscle tone: normal    Strength   Strength 5/5 throughout.     Gait, Coordination, and Reflexes     Gait  Gait: normal      Physical Exam  Constitutional:       Appearance: Normal appearance.   Eyes:      Extraocular Movements: EOM normal.      Pupils: Pupils are equal, round, and reactive to light.   Musculoskeletal:      Cervical back: Muscular tenderness present. Decreased range of motion.   Neurological:      Mental Status: He is alert and oriented to person, place, and time.      Motor: Motor strength is normal.     Gait: Gait is intact.   Psychiatric:         Mood and Affect: Mood and affect normal.         Speech: Speech normal.         Assessment/Plan:       Diagnoses and all orders for this visit:    1. Peripheral artery disease (Primary)  -     Ambulatory Referral to Cardiovascular Surgery    2. Seizure disorder as sequela of cerebrovascular accident (HCC) " [I69.398, G40.909]  -     Lacosamide (Vimpat) 150 MG tablet; Take 75 mg by mouth 2 (Two) Times a Day.  Dispense: 90 tablet; Refill: 1  -     levETIRAcetam (KEPPRA) 1000 MG tablet; Take 1 tablet by mouth 2 (Two) Times a Day.  Dispense: 180 tablet; Refill: 3  -     levETIRAcetam (KEPPRA) 500 MG tablet; Take 1 tablet by mouth 2 (Two) Times a Day.  Dispense: 180 tablet; Refill: 3    3. Bilateral carotid artery stenosis s/p L CEA 4/27/17. 50-69% R carotid stenosis  Comments:  continue plavix and statin  Orders:  -     aspirin (aspirin EC) 325 MG EC tablet; Take 1 tablet by mouth Daily.  Dispense: 90 tablet; Refill: 3    4. Chronic neck pain  -     Ambulatory Referral to Physical Therapy Evaluate and treat         We will send him to physical therapy for his chronic neck pain. Encouraged him not to see a chiropractor or have any type of manual manipulation on the neck. With his carotid stenosis, continue Plavix, aspirin, and statin therapy. Continue his Vimpat and Keppra. Follow up in 6 months with reevaluation of symptoms.    In regard to his prior vascular surgeries, we will refer him to vascular surgery to reestablish care since his prior provider is no longer there. He is aware that he will be contacted about this. He will call us with any questions or concerns. He verbalizes understanding and agrees with plan today.    Reviewed medications, potential side effects and signs and symptoms to report. Discussed risk versus benefits of treatment plan with patient and/or family-including medications, labs and radiology that may be ordered. Addressed questions and concerns during visit. Patient and/or family verbalized understanding and agree with plan.    Patient instructions include: No driving or operating heavy machinery, solo bathing or tub baths for 90 days from onset of most recent seizure, if they currently hold a license. Minimize stress as much as possible. Recommended 7-8 hours of sleep each night. Abstain from  "alcohol intake. Educated on Antiepileptic medications with possible side effects and signs and symptoms to report if prescribed during visit. Instructed to take seizure medication daily if prescribed. Reviewed potential seizure risk factors. Instructed to call 911 or our office if another seizure does occur.    Sudden Unexpected Death in Epilepsy is defined as \"the sudden, unexpected, witnessed or unwitnessed, non-traumatic, and non-drowning death in patients with epilepsy with or without evidence for a seizure, and excluding documented status epilepticus, in which postmortem examination does not reveal a structural or toxicological cause for death.\"-Epilepsy Foundation 2021. SUDEP is most commonly seen in Epilepsy patient's who's seizures are not well controlled or who have seizures during their sleep. This condition is not fully understood. To reduce your risk of SUDEP, please take your seizure medications as prescribed, keep a diary of your seizures and when they occur and if your seizures are not well controlled, please notify us so we can collaborate with you to get your seizures better controlled. If you have additional questions, please visit: [email or web address].com for more information.    Discussed signs and symptoms of stroke and when to call 911. Instructed to follow a low fat diet including the Mediterranean diet. Instructed to take all medications daily as prescribed. Encouraged 30 minutes of exercise 3-4 times a week as tolerated. Stay well hydrated. Discussed potential side effects of new medications and signs and symptoms to report. If patient is currently using tobacco products, smoking cessation was encouraged. Reviewed stroke risk factors and stroke prevention plan. Patient and/or family verbalizes understanding and agrees with plan.     During this visit the following were done:  Labs Reviewed [x]    Labs Ordered []    Radiology Reports Reviewed [x]    Radiology Ordered []    PCP Records Reviewed [x]  "   Referring Provider Records Reviewed []    ER Records Reviewed []    Hospital Records Reviewed []    History Obtained From Family []    Radiology Images Reviewed [x]    Other Reviewed [x]    Records Requested []      Note to patient: The 21st Century Cures Act makes medical notes like these available to patients in the interest of transparency. However, be advised this is a medical document. It is intended as peer to peer communication. It is written in medical language and may contain abbreviations or verbiage that are unfamiliar. It may appear blunt or direct. Medical documents are intended to carry relevant information, facts as evident, and the clinical opinion of the provider.      Transcribed from ambient dictation for KINGSTON Alejandro by Lisa Richardson.  10/20/23   12:56 EDT    Patient or patient representative verbalized consent to the visit recording.  I have personally performed the services described in this document as transcribed by the above individual, and it is both accurate and complete.  KINGSTON Alejandro  10/21/2023  07:03 EDT

## 2023-12-01 DIAGNOSIS — G89.29 CHRONIC BILATERAL LOW BACK PAIN WITHOUT SCIATICA: Chronic | ICD-10-CM

## 2023-12-01 DIAGNOSIS — M54.50 CHRONIC BILATERAL LOW BACK PAIN WITHOUT SCIATICA: Chronic | ICD-10-CM

## 2023-12-01 RX ORDER — TIZANIDINE HYDROCHLORIDE 2 MG/1
2 CAPSULE, GELATIN COATED ORAL 3 TIMES DAILY
Qty: 90 CAPSULE | Refills: 5 | Status: SHIPPED | OUTPATIENT
Start: 2023-12-01

## 2023-12-01 NOTE — TELEPHONE ENCOUNTER
Caller: Donna Davis    Relationship: Emergency Contact    Best call back number: [phone]     Requested Prescriptions:   Requested Prescriptions     Pending Prescriptions Disp Refills    TiZANidine (Zanaflex) 2 MG capsule 90 capsule 5     Sig: Take 1 capsule by mouth 3 (Three) Times a Day.        Pharmacy where request should be sent: [facility] [address] - [phone] [facility] [phone]      Last office visit with prescribing clinician: 9/5/2023   Last telemedicine visit with prescribing clinician: Visit date not found   Next office visit with prescribing clinician: 2/2/2024     Additional details provided by patient: HAS NONE LEFT    Does the patient have less than a 3 day supply:  [x] Yes  [] No    Would you like a call back once the refill request has been completed: [x] Yes [] No    If the office needs to give you a call back, can they leave a voicemail: [] Yes [] No    Maged Inman Rep   12/01/23 13:28 EST

## 2024-01-19 DIAGNOSIS — I10 ESSENTIAL HYPERTENSION: Chronic | ICD-10-CM

## 2024-01-19 RX ORDER — METOPROLOL SUCCINATE 200 MG/1
200 TABLET, EXTENDED RELEASE ORAL DAILY
Qty: 90 TABLET | Refills: 1 | Status: SHIPPED | OUTPATIENT
Start: 2024-01-19

## 2024-01-19 NOTE — TELEPHONE ENCOUNTER
Caller: Donna Davis    Relationship: Emergency Contact    Best call back number: [phone]     Requested Prescriptions:   Requested Prescriptions     Pending Prescriptions Disp Refills    metoprolol succinate XL (TOPROL-XL) 200 MG 24 hr tablet 90 tablet 1     Sig: Take 1 tablet by mouth Daily.        Pharmacy where request should be sent: [facility] [address] [facility] [phone] [facility] [phone]      Last office visit with prescribing clinician: 9/5/2023   Last telemedicine visit with prescribing clinician: Visit date not found   Next office visit with prescribing clinician: 2/2/2024     Additional details provided by patient: PATIENT HAS TWO LEFT    Does the patient have less than a 3 day supply:  [x] Yes  [] No    Would you like a call back once the refill request has been completed: [x] Yes [] No    If the office needs to give you a call back, can they leave a voicemail: [x] Yes [] No    Maged Javier Rep   01/19/24 13:27 EST

## 2024-01-30 ENCOUNTER — CLINICAL SUPPORT (OUTPATIENT)
Dept: FAMILY MEDICINE CLINIC | Facility: CLINIC | Age: 64
End: 2024-01-30
Payer: MEDICAID

## 2024-01-30 DIAGNOSIS — E78.2 MIXED HYPERLIPIDEMIA: Primary | Chronic | ICD-10-CM

## 2024-01-30 DIAGNOSIS — I10 ESSENTIAL HYPERTENSION: Primary | ICD-10-CM

## 2024-01-30 DIAGNOSIS — E78.5 DYSLIPIDEMIA: ICD-10-CM

## 2024-01-30 DIAGNOSIS — E11.59 TYPE 2 DIABETES MELLITUS WITH OTHER CIRCULATORY COMPLICATION, WITHOUT LONG-TERM CURRENT USE OF INSULIN: ICD-10-CM

## 2024-01-30 LAB
ALBUMIN SERPL-MCNC: 4.8 G/DL (ref 3.5–5.2)
ALBUMIN/GLOB SERPL: 2.2 G/DL
ALP SERPL-CCNC: 57 U/L (ref 39–117)
ALT SERPL W P-5'-P-CCNC: 17 U/L (ref 1–41)
ANION GAP SERPL CALCULATED.3IONS-SCNC: 10 MMOL/L (ref 5–15)
ANISOCYTOSIS BLD QL: ABNORMAL
AST SERPL-CCNC: 11 U/L (ref 1–40)
BILIRUB SERPL-MCNC: 0.3 MG/DL (ref 0–1.2)
BUN SERPL-MCNC: 11 MG/DL (ref 8–23)
BUN/CREAT SERPL: 12.6 (ref 7–25)
CALCIUM SPEC-SCNC: 10 MG/DL (ref 8.6–10.5)
CHLORIDE SERPL-SCNC: 98 MMOL/L (ref 98–107)
CHOLEST SERPL-MCNC: 98 MG/DL (ref 0–200)
CO2 SERPL-SCNC: 26 MMOL/L (ref 22–29)
CREAT SERPL-MCNC: 0.87 MG/DL (ref 0.76–1.27)
DEPRECATED RDW RBC AUTO: 41.2 FL (ref 37–54)
EGFRCR SERPLBLD CKD-EPI 2021: 97.6 ML/MIN/1.73
EOSINOPHIL # BLD MANUAL: 0.3 10*3/MM3 (ref 0–0.4)
EOSINOPHIL NFR BLD MANUAL: 2.1 % (ref 0.3–6.2)
ERYTHROCYTE [DISTWIDTH] IN BLOOD BY AUTOMATED COUNT: 12.6 % (ref 12.3–15.4)
GLOBULIN UR ELPH-MCNC: 2.2 GM/DL
GLUCOSE SERPL-MCNC: 128 MG/DL (ref 65–99)
HBA1C MFR BLD: 7 % (ref 4.8–5.6)
HCT VFR BLD AUTO: 43.9 % (ref 37.5–51)
HDLC SERPL-MCNC: 29 MG/DL (ref 40–60)
HGB BLD-MCNC: 15.2 G/DL (ref 13–17.7)
LDLC SERPL CALC-MCNC: 43 MG/DL (ref 0–100)
LDLC/HDLC SERPL: 1.32 {RATIO}
LYMPHOCYTES # BLD MANUAL: 5.66 10*3/MM3 (ref 0.7–3.1)
LYMPHOCYTES NFR BLD MANUAL: 12.4 % (ref 5–12)
MCH RBC QN AUTO: 31.5 PG (ref 26.6–33)
MCHC RBC AUTO-ENTMCNC: 34.6 G/DL (ref 31.5–35.7)
MCV RBC AUTO: 91.1 FL (ref 79–97)
MICROCYTES BLD QL: ABNORMAL
MONOCYTES # BLD: 1.79 10*3/MM3 (ref 0.1–0.9)
NEUTROPHILS # BLD AUTO: 6.7 10*3/MM3 (ref 1.7–7)
NEUTROPHILS NFR BLD MANUAL: 46.4 % (ref 42.7–76)
PLAT MORPH BLD: NORMAL
PLATELET # BLD AUTO: 255 10*3/MM3 (ref 140–450)
PMV BLD AUTO: 8.7 FL (ref 6–12)
POIKILOCYTOSIS BLD QL SMEAR: ABNORMAL
POTASSIUM SERPL-SCNC: 4.4 MMOL/L (ref 3.5–5.2)
PROT SERPL-MCNC: 7 G/DL (ref 6–8.5)
RBC # BLD AUTO: 4.82 10*6/MM3 (ref 4.14–5.8)
SMUDGE CELLS BLD QL SMEAR: ABNORMAL
SODIUM SERPL-SCNC: 134 MMOL/L (ref 136–145)
TRIGL SERPL-MCNC: 154 MG/DL (ref 0–150)
VARIANT LYMPHS NFR BLD MANUAL: 35.1 % (ref 19.6–45.3)
VARIANT LYMPHS NFR BLD MANUAL: 4.1 % (ref 0–5)
VLDLC SERPL-MCNC: 26 MG/DL (ref 5–40)
WBC NRBC COR # BLD AUTO: 14.43 10*3/MM3 (ref 3.4–10.8)

## 2024-01-30 PROCEDURE — 36415 COLL VENOUS BLD VENIPUNCTURE: CPT | Performed by: NURSE PRACTITIONER

## 2024-01-30 PROCEDURE — 83036 HEMOGLOBIN GLYCOSYLATED A1C: CPT | Performed by: NURSE PRACTITIONER

## 2024-01-30 PROCEDURE — 85025 COMPLETE CBC W/AUTO DIFF WBC: CPT | Performed by: NURSE PRACTITIONER

## 2024-01-30 PROCEDURE — 80061 LIPID PANEL: CPT | Performed by: NURSE PRACTITIONER

## 2024-01-30 PROCEDURE — 85007 BL SMEAR W/DIFF WBC COUNT: CPT | Performed by: NURSE PRACTITIONER

## 2024-01-30 PROCEDURE — 80053 COMPREHEN METABOLIC PANEL: CPT | Performed by: NURSE PRACTITIONER

## 2024-01-30 PROCEDURE — 82043 UR ALBUMIN QUANTITATIVE: CPT | Performed by: NURSE PRACTITIONER

## 2024-01-30 NOTE — PROGRESS NOTES
Venipuncture Blood Specimen Collection  Venipuncture performed in LEFT ARM by Liza Swift RN with good hemostasis. Patient tolerated the procedure well without complications.   01/30/24   Liza Swift RN

## 2024-01-31 LAB — ALBUMIN UR-MCNC: <1.2 MG/DL

## 2024-02-02 ENCOUNTER — OFFICE VISIT (OUTPATIENT)
Dept: FAMILY MEDICINE CLINIC | Facility: CLINIC | Age: 64
End: 2024-02-02
Payer: MEDICAID

## 2024-02-02 VITALS
WEIGHT: 190 LBS | HEART RATE: 61 BPM | DIASTOLIC BLOOD PRESSURE: 72 MMHG | OXYGEN SATURATION: 97 % | RESPIRATION RATE: 18 BRPM | BODY MASS INDEX: 30.53 KG/M2 | TEMPERATURE: 97.3 F | SYSTOLIC BLOOD PRESSURE: 130 MMHG | HEIGHT: 66 IN

## 2024-02-02 DIAGNOSIS — E78.5 DYSLIPIDEMIA: Chronic | ICD-10-CM

## 2024-02-02 DIAGNOSIS — I10 ESSENTIAL HYPERTENSION: Primary | Chronic | ICD-10-CM

## 2024-02-02 DIAGNOSIS — Z87.891 PERSONAL HISTORY OF TOBACCO USE, PRESENTING HAZARDS TO HEALTH: Chronic | ICD-10-CM

## 2024-02-02 DIAGNOSIS — M54.50 CHRONIC BILATERAL LOW BACK PAIN WITHOUT SCIATICA: Chronic | ICD-10-CM

## 2024-02-02 DIAGNOSIS — G89.29 CHRONIC BILATERAL LOW BACK PAIN WITHOUT SCIATICA: Chronic | ICD-10-CM

## 2024-02-02 DIAGNOSIS — D72.829 LEUKOCYTOSIS, UNSPECIFIED TYPE: Chronic | ICD-10-CM

## 2024-02-02 DIAGNOSIS — Z23 NEED FOR INFLUENZA VACCINATION: Chronic | ICD-10-CM

## 2024-02-02 DIAGNOSIS — I65.23 BILATERAL CAROTID ARTERY STENOSIS: Chronic | ICD-10-CM

## 2024-02-02 DIAGNOSIS — E11.59 TYPE 2 DIABETES MELLITUS WITH OTHER CIRCULATORY COMPLICATION, WITHOUT LONG-TERM CURRENT USE OF INSULIN: Chronic | ICD-10-CM

## 2024-02-02 DIAGNOSIS — F17.210 NICOTINE DEPENDENCE, CIGARETTES, UNCOMPLICATED: Chronic | ICD-10-CM

## 2024-02-02 DIAGNOSIS — I63.9 CEREBROVASCULAR ACCIDENT (CVA), UNSPECIFIED MECHANISM: Chronic | ICD-10-CM

## 2024-02-02 DIAGNOSIS — I73.9 PERIPHERAL ARTERY DISEASE: Chronic | ICD-10-CM

## 2024-02-02 DIAGNOSIS — M25.552 LEFT HIP PAIN: Chronic | ICD-10-CM

## 2024-02-02 RX ORDER — AMLODIPINE BESYLATE 10 MG/1
10 TABLET ORAL DAILY
Qty: 90 TABLET | Refills: 1 | Status: SHIPPED | OUTPATIENT
Start: 2024-02-02

## 2024-02-02 RX ORDER — HYDROCHLOROTHIAZIDE 12.5 MG/1
12.5 TABLET ORAL DAILY
Qty: 90 TABLET | Refills: 1 | Status: SHIPPED | OUTPATIENT
Start: 2024-02-02

## 2024-02-02 RX ORDER — CLOPIDOGREL BISULFATE 75 MG/1
75 TABLET ORAL DAILY
Qty: 90 TABLET | Refills: 1 | Status: SHIPPED | OUTPATIENT
Start: 2024-02-02

## 2024-02-02 RX ORDER — METOPROLOL SUCCINATE 200 MG/1
200 TABLET, EXTENDED RELEASE ORAL DAILY
Qty: 90 TABLET | Refills: 1 | Status: SHIPPED | OUTPATIENT
Start: 2024-02-02

## 2024-02-02 RX ORDER — LOSARTAN POTASSIUM 100 MG/1
100 TABLET ORAL DAILY
Qty: 180 TABLET | Refills: 3 | Status: SHIPPED | OUTPATIENT
Start: 2024-02-02

## 2024-02-02 RX ORDER — TIZANIDINE HYDROCHLORIDE 2 MG/1
2 CAPSULE, GELATIN COATED ORAL 3 TIMES DAILY
Qty: 90 CAPSULE | Refills: 5 | Status: SHIPPED | OUTPATIENT
Start: 2024-02-02

## 2024-02-02 RX ORDER — ATORVASTATIN CALCIUM 80 MG/1
80 TABLET, FILM COATED ORAL DAILY
Qty: 90 TABLET | Refills: 1 | Status: SHIPPED | OUTPATIENT
Start: 2024-02-02

## 2024-02-02 NOTE — PROGRESS NOTES
Immunization  Immunization performed in right deltoid by Mary Baker MA. Patient tolerated the procedure well without complications.  02/02/24   Mary Baker MA

## 2024-02-02 NOTE — PROGRESS NOTES
"Subjective   Vince Olivera is a 63 y.o. male.     Chief Complaint   Patient presents with    Hypertension    Hyperlipidemia    Hip Pain    Diabetes    peripheral artery disease    Allergies       History of Present Illness  He presents for follow up of essential hypertension, mixed hyperlipidemia, and type II DM. He reports good compliance with medications. He doesn't check his blood pressure or his blood sugar. WBC was elevated. He states he was told he had leukemia in the past but it was the best kind. He c/o left hip pain that is burning. It is worse with lifting. He c/o continued back pain. He states the pain radiates down the side of his left leg. He has h/o pad and is concerned because his legs have been hot.        The following portions of the patient's history were reviewed and updated as appropriate: allergies, current medications, past family history, past medical history, past social history, past surgical history and problem list.    Review of Systems   Constitutional:  Negative for fever.   Respiratory:  Negative for cough, shortness of breath and wheezing.    Cardiovascular:  Negative for chest pain and palpitations.   Gastrointestinal:  Negative for abdominal pain, blood in stool, constipation, diarrhea, nausea and vomiting.   Genitourinary:  Negative for dysuria and hematuria.   Musculoskeletal:  Positive for arthralgias, back pain and myalgias.   Allergic/Immunologic: Negative for environmental allergies.   Psychiatric/Behavioral:  Negative for suicidal ideas. The patient is not nervous/anxious.        Objective     /72 (BP Location: Right arm, Patient Position: Sitting, Cuff Size: Large Adult)   Pulse 61   Temp 97.3 °F (36.3 °C) (Temporal)   Resp 18   Ht 167.6 cm (65.98\")   Wt 86.2 kg (190 lb)   SpO2 97%   BMI 30.68 kg/m²     Physical Exam  Vitals reviewed.   Constitutional:       General: He is not in acute distress.     Appearance: He is well-developed. He is not diaphoretic. "   HENT:      Head: Normocephalic and atraumatic.   Cardiovascular:      Rate and Rhythm: Normal rate and regular rhythm.      Pulses:           Dorsalis pedis pulses are 2+ on the right side and 2+ on the left side.        Posterior tibial pulses are 0 on the right side and 0 on the left side.      Heart sounds: Normal heart sounds. No murmur heard.     No friction rub. No gallop.   Pulmonary:      Effort: Pulmonary effort is normal. No respiratory distress.      Breath sounds: Normal breath sounds.   Abdominal:      General: Bowel sounds are normal. There is no distension.      Palpations: Abdomen is soft.      Tenderness: There is no abdominal tenderness.   Musculoskeletal:      Left hip: Tenderness present. Normal range of motion.   Feet:      Right foot:      Protective Sensation: 5 sites tested.  5 sites sensed.      Skin integrity: Skin integrity normal.      Left foot:      Protective Sensation: 5 sites tested.  5 sites sensed.      Skin integrity: Skin integrity normal.   Skin:     General: Skin is warm and dry.   Neurological:      Mental Status: He is alert and oriented to person, place, and time.   Psychiatric:         Behavior: Behavior normal.         Thought Content: Thought content normal.         Judgment: Judgment normal.         Current Outpatient Medications   Medication Sig Dispense Refill    amLODIPine (NORVASC) 10 MG tablet Take 1 tablet by mouth Daily. 90 tablet 1    aspirin (aspirin EC) 325 MG EC tablet Take 1 tablet by mouth Daily. 90 tablet 3    atorvastatin (LIPITOR) 80 MG tablet Take 1 tablet by mouth Daily. 90 tablet 1    clopidogrel (PLAVIX) 75 MG tablet Take 1 tablet by mouth Daily. 90 tablet 1    glucose blood test strip Check glucose daily 100 each 3    glucose monitor monitoring kit 1 each Daily. 1 each 0    hydroCHLOROthiazide (HYDRODIURIL) 12.5 MG tablet Take 1 tablet by mouth Daily. 90 tablet 1    Lacosamide (Vimpat) 150 MG tablet Take 75 mg by mouth 2 (Two) Times a Day. 90  tablet 1    Lancets misc 1 Device Daily. 100 each 12    levETIRAcetam (KEPPRA) 1000 MG tablet Take 1 tablet by mouth 2 (Two) Times a Day. 180 tablet 3    levETIRAcetam (KEPPRA) 500 MG tablet Take 1 tablet by mouth 2 (Two) Times a Day. 180 tablet 3    losartan (COZAAR) 100 MG tablet Take 1 tablet by mouth Daily. Take 1/2 tab twice daily 180 tablet 3    metFORMIN (GLUCOPHAGE) 500 MG tablet Take 2 tablets by mouth 2 (Two) Times a Day With Meals. 360 tablet 1    metoprolol succinate XL (TOPROL-XL) 200 MG 24 hr tablet Take 1 tablet by mouth Daily. 90 tablet 1    TiZANidine (Zanaflex) 2 MG capsule Take 1 capsule by mouth 3 (Three) Times a Day. 90 capsule 5     Current Facility-Administered Medications   Medication Dose Route Frequency Provider Last Rate Last Admin    cyanocobalamin injection 1,000 mcg  1,000 mcg Intramuscular Q28 Days Glo yKle APRN   1,000 mcg at 01/23/20 1530            Assessment & Plan     Problem List Items Addressed This Visit       H/o Left MCA ischemic CVA 4/2017    Relevant Medications    clopidogrel (PLAVIX) 75 MG tablet    Personal history of tobacco use, presenting hazards to health    Relevant Orders    CT chest low dose wo    Essential hypertension - Primary (Chronic)    Relevant Medications    amLODIPine (NORVASC) 10 MG tablet    hydroCHLOROthiazide (HYDRODIURIL) 12.5 MG tablet    losartan (COZAAR) 100 MG tablet    metoprolol succinate XL (TOPROL-XL) 200 MG 24 hr tablet    Dyslipidemia    Relevant Medications    atorvastatin (LIPITOR) 80 MG tablet    Bilateral carotid artery stenosis s/p L CEA 4/27/17. 50-69% R carotid stenosis    Overview     S/P L CEA in 2017   50-69% stenosis likely of right proximal internal carotid artery in 7/2019          Relevant Medications    hydroCHLOROthiazide (HYDRODIURIL) 12.5 MG tablet    Diabetes mellitus    Relevant Medications    metFORMIN (GLUCOPHAGE) 500 MG tablet    Left hip pain    Relevant Orders    XR Hip With or Without Pelvis 2 - 3 View  Left    Peripheral artery disease    Relevant Orders    US Arterial Doppler Lower Extremity Bilateral    Chronic bilateral low back pain without sciatica    Relevant Medications    TiZANidine (Zanaflex) 2 MG capsule    Nicotine dependence, cigarettes, uncomplicated    Relevant Orders    CT chest low dose wo    Leukocytosis (Chronic)    Relevant Orders    Ambulatory Referral to Hematology    Need for influenza vaccination (Chronic)    Relevant Orders    Fluzone (or Fluarix & Flulaval for VFC) >6mos (Completed)         ICD-10-CM ICD-9-CM   1. Essential hypertension  I10 401.9   2. Bilateral carotid artery stenosis  I65.23 433.10     433.30   3. Peripheral artery disease  I73.9 443.9   4. Left hip pain  M25.552 719.45   5. Dyslipidemia  E78.5 272.4   6. Cerebrovascular accident (CVA), unspecified mechanism  I63.9 434.91   7. Type 2 diabetes mellitus with other circulatory complication, without long-term current use of insulin  E11.59 250.70   8. Chronic bilateral low back pain without sciatica  M54.50 724.2    G89.29 338.29   9. Leukocytosis, unspecified type  D72.829 288.60   10. Personal history of tobacco use, presenting hazards to health  Z87.891 V15.82   11. Nicotine dependence, cigarettes, uncomplicated  F17.210 305.1   12. Need for influenza vaccination  Z23 V04.81       Plan: CBC, CMP, A1C reviewed. Get arterial doppler of legs. Get xray of left hip. Refer to hematology for elevated wbc. Follow up in 6 months and pending results.     @Body mass index is 30.68 kg/m².     Vince MARLY Olivera          Understands disease processes and need for medications.  Understands reasons for urgent and emergent care.  Patient (& family) verbalized agreement for treatment plan.   Emotional support and active listening provided.  Patient provided time to verbalize feelings.                  This document has been electronically signed by KINGSTON Gallardo   February 2, 2024 16:15 EST

## 2024-02-14 ENCOUNTER — CONSULT (OUTPATIENT)
Dept: ONCOLOGY | Facility: CLINIC | Age: 64
End: 2024-02-14
Payer: MEDICAID

## 2024-02-14 VITALS
HEART RATE: 58 BPM | SYSTOLIC BLOOD PRESSURE: 108 MMHG | BODY MASS INDEX: 30.73 KG/M2 | WEIGHT: 191.2 LBS | TEMPERATURE: 96.8 F | HEIGHT: 66 IN | DIASTOLIC BLOOD PRESSURE: 63 MMHG | RESPIRATION RATE: 18 BRPM | OXYGEN SATURATION: 95 %

## 2024-02-14 DIAGNOSIS — C91.10 CHRONIC LYMPHOCYTIC LEUKEMIA: Primary | ICD-10-CM

## 2024-03-01 ENCOUNTER — TELEPHONE (OUTPATIENT)
Dept: CARDIAC SURGERY | Facility: CLINIC | Age: 64
End: 2024-03-01
Payer: MEDICAID

## 2024-03-22 ENCOUNTER — HOSPITAL ENCOUNTER (OUTPATIENT)
Dept: GENERAL RADIOLOGY | Facility: HOSPITAL | Age: 64
Discharge: HOME | End: 2024-03-22
Payer: MEDICAID

## 2024-03-22 ENCOUNTER — HOSPITAL ENCOUNTER (OUTPATIENT)
Dept: CT IMAGING | Facility: HOSPITAL | Age: 64
Discharge: HOME | End: 2024-03-22
Payer: MEDICAID

## 2024-03-22 ENCOUNTER — HOSPITAL ENCOUNTER (OUTPATIENT)
Dept: ULTRASOUND IMAGING | Facility: HOSPITAL | Age: 64
Discharge: HOME | End: 2024-03-22
Payer: MEDICAID

## 2024-03-22 DIAGNOSIS — M54.50 CHRONIC BILATERAL LOW BACK PAIN WITHOUT SCIATICA: Chronic | ICD-10-CM

## 2024-03-22 DIAGNOSIS — Z87.891 PERSONAL HISTORY OF TOBACCO USE, PRESENTING HAZARDS TO HEALTH: Chronic | ICD-10-CM

## 2024-03-22 DIAGNOSIS — M25.552 LEFT HIP PAIN: Chronic | ICD-10-CM

## 2024-03-22 DIAGNOSIS — M25.572 ACUTE LEFT ANKLE PAIN: Chronic | ICD-10-CM

## 2024-03-22 DIAGNOSIS — F17.210 NICOTINE DEPENDENCE, CIGARETTES, UNCOMPLICATED: Chronic | ICD-10-CM

## 2024-03-22 DIAGNOSIS — G89.29 CHRONIC BILATERAL LOW BACK PAIN WITHOUT SCIATICA: Chronic | ICD-10-CM

## 2024-03-22 DIAGNOSIS — M54.2 NECK PAIN: Chronic | ICD-10-CM

## 2024-03-22 PROCEDURE — 72040 X-RAY EXAM NECK SPINE 2-3 VW: CPT

## 2024-03-22 PROCEDURE — 73610 X-RAY EXAM OF ANKLE: CPT

## 2024-03-22 PROCEDURE — 72100 X-RAY EXAM L-S SPINE 2/3 VWS: CPT

## 2024-03-22 PROCEDURE — 71271 CT THORAX LUNG CANCER SCR C-: CPT

## 2024-03-22 PROCEDURE — 93923 UPR/LXTR ART STDY 3+ LVLS: CPT

## 2024-03-22 PROCEDURE — 73502 X-RAY EXAM HIP UNI 2-3 VIEWS: CPT

## 2024-03-22 NOTE — PROGRESS NOTES
CT of the chest reports that showing a 5 mm nodule in the left lower lobe but it has not changed.  They did not suspect anything cancerous.

## 2024-03-27 NOTE — PROGRESS NOTES
[facility] Cardiothoracic Surgery Office Follow Up Note     Date of Encounter: 2024     Name: Vince Olivera  : 1960     Referred By: No ref. provider found  PCP: Glo Kyle APRN    Chief Complaint:    Chief Complaint   Patient presents with    Peripheral Vascular Disease     1 year follow-up with arterial doppler. Patient has pain and burning with left hip/buttocks       Subjective      History of Present Illness:    Vince Olivera is a 63 y.o. male current smoker with history of CLL being followed by Dr Breen, seizures, HTN, HLD on statin therapy, non-insulin-dependent DM, CVA 2017 & carotid disease s/p left CEA 2017 by Dr. Bach, and PVD with distal abdominal aortic occlusion s/p aortobifemoral bypass with bilateral femoral endarterectomies in 2019 by Dr. Weaver.   He presents today for his annual PVD surveillance. 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 He has no issues with BLE wounds/ulcers or delayed wound healing. He does complain of left hip pain that he was told was related to bone spurs.     Review of Systems:  Review of Systems   Constitutional: Negative for chills, decreased appetite, diaphoresis, fever, malaise/fatigue, night sweats, weight gain and weight loss.   HENT:  Negative for hoarse voice.    Eyes:  Negative for blurred vision, double vision and visual disturbance.   Cardiovascular:  Negative for chest pain, claudication, dyspnea on exertion, irregular heartbeat, leg swelling, near-syncope, orthopnea, palpitations, paroxysmal nocturnal dyspnea and syncope.   Respiratory:  Positive for cough. Negative for hemoptysis, shortness of breath, sputum production and wheezing.    Hematologic/Lymphatic: Negative for adenopathy and bleeding problem. Does not bruise/bleed easily.   Skin:  Negative for color change, nail changes, poor wound healing and rash.   Musculoskeletal:  Positive for joint pain. Negative for back pain, falls and muscle  cramps.   Gastrointestinal:  Negative for abdominal pain, dysphagia and heartburn.   Genitourinary:  Negative for flank pain.   Neurological:  Negative for brief paralysis, disturbances in coordination, dizziness, focal weakness, headaches, light-headedness, loss of balance, numbness, paresthesias, sensory change, vertigo and weakness.   Psychiatric/Behavioral:  Negative for depression and suicidal ideas. The patient is not nervous/anxious.    Allergic/Immunologic: Negative for persistent infections.       I have reviewed the following portions of the patient's history: problem list, current medications, allergies, past surgical history, past medical history, past social history, past family history, and ROS and confirm it's accurate.    Allergies:  No Known Allergies    Medications:      Current Outpatient Medications:     amLODIPine (NORVASC) 10 MG tablet, Take 1 tablet by mouth Daily., Disp: 90 tablet, Rfl: 1    aspirin (aspirin EC) 325 MG EC tablet, Take 1 tablet by mouth Daily., Disp: 90 tablet, Rfl: 3    atorvastatin (LIPITOR) 80 MG tablet, Take 1 tablet by mouth Daily., Disp: 90 tablet, Rfl: 1    clopidogrel (PLAVIX) 75 MG tablet, Take 1 tablet by mouth Daily., Disp: 90 tablet, Rfl: 1    glucose blood test strip, Check glucose daily, Disp: 100 each, Rfl: 3    glucose monitor monitoring kit, 1 each Daily., Disp: 1 each, Rfl: 0    hydroCHLOROthiazide (HYDRODIURIL) 12.5 MG tablet, Take 1 tablet by mouth Daily., Disp: 90 tablet, Rfl: 1    Lacosamide (Vimpat) 150 MG tablet, Take 75 mg by mouth 2 (Two) Times a Day., Disp: 90 tablet, Rfl: 1    Lancets misc, 1 Device Daily., Disp: 100 each, Rfl: 12    levETIRAcetam (KEPPRA) 1000 MG tablet, Take 1 tablet by mouth 2 (Two) Times a Day., Disp: 180 tablet, Rfl: 3    levETIRAcetam (KEPPRA) 500 MG tablet, Take 1 tablet by mouth 2 (Two) Times a Day., Disp: 180 tablet, Rfl: 3    losartan (COZAAR) 100 MG tablet, Take 1 tablet by mouth Daily. Take 1/2 tab twice daily, Disp: 180  tablet, Rfl: 3    metFORMIN (GLUCOPHAGE) 500 MG tablet, Take 2 tablets by mouth 2 (Two) Times a Day With Meals., Disp: 360 tablet, Rfl: 1    metoprolol succinate XL (TOPROL-XL) 200 MG 24 hr tablet, Take 1 tablet by mouth Daily., Disp: 90 tablet, Rfl: 1    TiZANidine (Zanaflex) 2 MG capsule, Take 1 capsule by mouth 3 (Three) Times a Day., Disp: 90 capsule, Rfl: 5    Current Facility-Administered Medications:     cyanocobalamin injection 1,000 mcg, 1,000 mcg, Intramuscular, Q28 Days, Glo Kyle APRN, 1,000 mcg at 01/23/20 1530    History:   Past Medical History:   Diagnosis Date    Arthritis     Carotid artery disease     Carotid artery disorder     Depression     Diabetes mellitus     DIAGNOSED 4-2017 CHECKS FSBG 3X/WEEK     Ear infection     about 2 weeks ago- cleared up - wax removed and cleaned out     GERD (gastroesophageal reflux disease)     self diagnosed takes otc ppi    Hyperlipidemia     Hypertension     Hypertension     Inguinal hernia     Seizure     last 10/8/2019    Shoulder pain     bilat     Stroke 04/24/2017    EXPRESSIVE APHASIA RESIDUAL     Tooth loose     5 - all over- will get teeth done after this surgery     Wears reading eyeglasses        Past Surgical History:   Procedure Laterality Date    ABDOMINAL HERNIA REPAIR  2010    AORTA FEMORAL BYPASS N/A 10/11/2019    Procedure: AORTA FEMORAL BYPASS, BILATERAL FEMORAL ENDARTERECTOMY;  Surgeon: Dylan Weaver MD;  Location: [facility];  Service: Vascular    CAROTID ENDARTERECTOMY Left 10/17/2017    Procedure: CAROTID ENDARTERECTOMY LEFT;  Surgeon: Alexx Bach MD;  Location[facility] OR;  Service:     INGUINAL HERNIA REPAIR Left        Social History     Socioeconomic History    Marital status: Single    Number of children: 2   Tobacco Use    Smoking status: Every Day     Current packs/day: 1.50     Average packs/day: 1.5 packs/day for 40.0 years (60.0 ttl pk-yrs)     Types: Cigarettes    Smokeless tobacco: Former     Types: Snuff     Tobacco comments:     quit snuff when 18 or 19 years old - only did for baseball - 2-3 years    Vaping Use    Vaping status: Never Used   Substance and Sexual Activity    Alcohol use: No    Drug use: No    Sexual activity: Defer     Partners: Female     Comment: SPOUSE        Family History   Adopted: Yes   Problem Relation Age of Onset    Diabetes Mother     COPD Father        Objective   Physical Exam:  Vitals:    03/28/24 1403 03/28/24 1404   BP: 130/79 140/80   BP Location: Right arm Left arm   Patient Position: Sitting Sitting   Pulse: 56    Temp: 97.1 °F (36.2 °C)    SpO2: 96%    Weight: 86.1 kg (189 lb 12.8 oz)       Body mass index is 30.63 kg/m².    Physical Exam  Vitals and nursing note reviewed.   Constitutional:       Appearance: Normal appearance. He is overweight.   Cardiovascular:      Rate and Rhythm: Normal rate.      Pulses:           Femoral pulses are 3+ on the right side and 3+ on the left side.       Dorsalis pedis pulses are 1+ on the right side and 1+ on the left side.        Posterior tibial pulses are 0 on the right side and 1+ on the left side.   Pulmonary:      Effort: Pulmonary effort is normal.   Musculoskeletal:      Right lower leg: No edema.      Left lower leg: No edema.   Feet:      Right foot:      Skin integrity: Skin integrity normal.      Toenail Condition: Right toenails are abnormally thick and long.      Left foot:      Skin integrity: Skin integrity normal.      Toenail Condition: Left toenails are abnormally thick and long.   Skin:     General: Skin is warm and dry.   Neurological:      Mental Status: He is alert and oriented to person, place, and time. Mental status is at baseline.         Imaging/Labs:  US Arterial Doppler Lower Extremity Bilateral (03/22/2024 14:42)   1. Occlusion of the right superficial femoral and popliteal arteries. Flow is noted in the posterior tibial artery  2. Occlusion of the left superficial femoral artery with reconstitution of flow in the  popliteal artery.   This report was finalized on 3/22/2024 4:00 PM by Angeli Avalos M.D.    US Arterial Doppler Lower Extremity Bilateral (02/24/2023 15:18)   Occlusive segments in the superficial femoral arteries bilaterally.    This report was finalized on 2/24/2023 5:45 PM by Dr. Josiah Kaplan MD.    CT Angio Abdominal Aorta Bilateral Iliofem Runoff With & Without Contrast (05/29/2019 11:21)   Extensive peripheral vascular disease. There is occlusion of  the distal abdominal aorta and iliac vessels bilaterally. Multifocal  areas of occlusion are present in the superficial femoral arteries.  Reconstitution in the region of the popliteal arteries and at least  single vessel runoff to both ankles.    This report was finalized on 5/29/2019 2:30 PM by Dr. Josiah Kaplan MD.    Assessment / Plan      Assessment / Plan:  Diagnoses and all orders for this visit:    1. Peripheral artery disease (Primary)    2. Aortic occlusion s/p aortobifemoral bypass 2019       s/p aortobifemoral bypass and bilateral femoral endarterectomies in 2019 by Dr. Weaver  Annual surveillance with NO lifestyle limiting IC or rest pain. NO BLE wounds/ulcers  Palpable pulses on exam. Toes pink, warm, dry without delayed cap refill  Duplex with bilateral SFA occlusive disease with reconstitution  In an asymptomatic patient, continue medical management with DAPT and statin therapy.   Encouraged smoking cessation which he is not currently interested in      Patient Education:  A structured walking regiment is used to improve the circulation or blood flow in your legs. Recognize that walking may hurt at first - and that is good. In fact, the goal is to walk at a pace that causes mild or moderate pain or tightness in your legs. Pace yourself, stop to rest for a few minutes, but then resume walking. This discomfort triggers your body to improve your circulation. Repeat several times: Walk at a pace that causes mild or moderate leg pain, then rest,  and keep going. Over time, you may find you are able to walk longer with less pain. That is a sign that your blood vessels are recovering. It may take months, so be patient and persistent.     Pt lives near the [facility] and is requesting to follow-up in our [place] office since Dr Gracia's has retired.     Follow Up:   Return in about 1 year (around 3/28/2025) for Imaging: Vascular duplex.   Or sooner for any further concerns or worsening sign and symptoms. If unable to reach us in the office please dial 911 or go to the nearest emergency department.      KINGSTON Greene  [facility] Cardiothoracic Surgery      Time Spent: I spent 23 minutes caring for Vince on this date of service. This time includes time spent by me in the following activities: preparing for the visit, reviewing tests, obtaining and/or reviewing a separately obtained history, performing a medically appropriate examination and/or evaluation, counseling and educating the patient/family/caregiver, ordering medications, tests, or procedures, referring and communicating with other health care professionals, documenting information in the medical record, independently interpreting results and communicating that information with the patient/family/caregiver, and care coordination.

## 2024-03-28 ENCOUNTER — OFFICE VISIT (OUTPATIENT)
Dept: CARDIAC SURGERY | Facility: CLINIC | Age: 64
End: 2024-03-28
Payer: MEDICAID

## 2024-03-28 VITALS
DIASTOLIC BLOOD PRESSURE: 80 MMHG | TEMPERATURE: 97.1 F | WEIGHT: 189.8 LBS | BODY MASS INDEX: 30.63 KG/M2 | HEART RATE: 56 BPM | SYSTOLIC BLOOD PRESSURE: 140 MMHG | OXYGEN SATURATION: 96 %

## 2024-03-28 DIAGNOSIS — I70.0 AORTIC OCCLUSION: ICD-10-CM

## 2024-03-28 DIAGNOSIS — I73.9 PERIPHERAL ARTERY DISEASE: Primary | ICD-10-CM

## 2024-04-12 DIAGNOSIS — I63.9 CEREBROVASCULAR ACCIDENT (CVA), UNSPECIFIED MECHANISM: Chronic | ICD-10-CM

## 2024-04-12 RX ORDER — CLOPIDOGREL BISULFATE 75 MG/1
75 TABLET ORAL DAILY
Qty: 90 TABLET | Refills: 1 | Status: SHIPPED | OUTPATIENT
Start: 2024-04-12

## 2024-04-12 NOTE — TELEPHONE ENCOUNTER
Caller: Donna Davis    Relationship: Emergency Contact    Best call back number: [phone]     Requested Prescriptions:   Requested Prescriptions     Pending Prescriptions Disp Refills    clopidogrel (PLAVIX) 75 MG tablet 90 tablet 1     Sig: Take 1 tablet by mouth Daily.        Pharmacy where request should be sent: [facility] [address] [facility] [phone] [facility] [phone]      Last office visit with prescribing clinician: 2/2/2024   Last telemedicine visit with prescribing clinician: Visit date not found   Next office visit with prescribing clinician: 8/2/2024     Additional details provided by patient:HAS NONE LEFT    Does the patient have less than a 3 day supply:  [x] Yes  [] No    Would you like a call back once the refill request has been completed: [x] Yes [] No    If the office needs to give you a call back, can they leave a voicemail: [] Yes [] No    Maged Inman Rep   04/12/24 12:19 EDT

## 2024-04-22 NOTE — TELEPHONE ENCOUNTER
----- Message from Maged De La Rosa sent at 1/17/2020  2:50 PM EST -----  Regarding: MEDICATION REFILL  PT'S WIFE CALLED 14:45 REQUESTED REFILL OF LACOSAMIDE 50 MG. 1.5 PO BID.   Patient seen and evaluated  Discussed condition with patient  There is concern for venous stasis dermatitis b/l  No breaks in the integument, no open wound  Pt stable for dsc from podiatry Patient seen and evaluated  Discussed condition with patient  There is concern for venous stasis dermatitis b/l  No breaks in the integument, no open wound

## 2024-05-14 DIAGNOSIS — I69.398 SEIZURE DISORDER AS SEQUELA OF CEREBROVASCULAR ACCIDENT: ICD-10-CM

## 2024-05-14 DIAGNOSIS — G40.909 SEIZURE DISORDER AS SEQUELA OF CEREBROVASCULAR ACCIDENT: ICD-10-CM

## 2024-05-15 RX ORDER — LACOSAMIDE 150 MG/1
75 TABLET ORAL 2 TIMES DAILY
Qty: 90 TABLET | Refills: 1 | Status: SHIPPED | OUTPATIENT
Start: 2024-05-15

## 2024-05-15 NOTE — TELEPHONE ENCOUNTER
Caller: Donna Davis    Relationship: Emergency Contact    Best call back number: [phone]    Requested Prescriptions:   Requested Prescriptions     Pending Prescriptions Disp Refills    Lacosamide (Vimpat) 150 MG tablet 90 tablet 1     Sig: Take 75 mg by mouth 2 (Two) Times a Day.        Pharmacy where request should be sent:    [facility]    Last office visit with prescribing clinician: 10/20/2023   Last telemedicine visit with prescribing clinician: Visit date not found   Next office visit with prescribing clinician: 12/13/2024     Additional details provided by patient: PT WAS GIVEN ENOUGH TO LAST JUST FOR TODAY AND NEEDS REFILL.    Does the patient have less than a 3 day supply:  [x] Yes  [] No    Would you like a call back once the refill request has been completed: [] Yes [] No    If the office needs to give you a call back, can they leave a voicemail: [] Yes [] No    Maged Dailey Rep   05/15/24 08:22 EDT

## 2024-05-15 NOTE — TELEPHONE ENCOUNTER
Rx Refill Note  Requested Prescriptions     Pending Prescriptions Disp Refills    Lacosamide (Vimpat) 150 MG tablet 90 tablet 1     Sig: Take 75 mg by mouth 2 (Two) Times a Day.      Last office visit with prescribing clinician: 10/20/2023   Last telemedicine visit with prescribing clinician: Visit date not found   Next office visit with prescribing clinician: 12/13/2024                         Would you like a call back once the refill request has been completed: [] Yes [] No    If the office needs to give you a call back, can they leave a voicemail: [] Yes [] No    Loretta Gaspar CMA  05/15/24, 08:33 EDT

## 2024-07-03 DIAGNOSIS — I10 ESSENTIAL HYPERTENSION: Chronic | ICD-10-CM

## 2024-07-03 RX ORDER — LOSARTAN POTASSIUM 100 MG/1
100 TABLET ORAL DAILY
Qty: 180 TABLET | Refills: 3 | Status: SHIPPED | OUTPATIENT
Start: 2024-07-03

## 2024-07-03 NOTE — TELEPHONE ENCOUNTER
Caller: Donna Davis    Relationship: Emergency Contact    Best call back number: [phone]     Requested Prescriptions:   Requested Prescriptions     Pending Prescriptions Disp Refills    losartan (COZAAR) 100 MG tablet 180 tablet 3     Sig: Take 1 tablet by mouth Daily. Take 1/2 tab twice daily        Pharmacy where request should be sent: [facility] [address] [facility] [phone] [facility] [phone]      Last office visit with prescribing clinician: 2/2/2024   Last telemedicine visit with prescribing clinician: Visit date not found   Next office visit with prescribing clinician: 8/2/2024     Additional details provided by patient: PATIENT IS COMPLETELY OUT OF THIS MEDICATION.    Does the patient have less than a 3 day supply:  [x] Yes  [] No    Would you like a call back once the refill request has been completed: [] Yes [x] No    If the office needs to give you a call back, can they leave a voicemail: [] Yes [x] No    Maged Eng Rep   07/03/24 13:43 EDT

## 2024-07-22 DIAGNOSIS — I10 ESSENTIAL HYPERTENSION: Chronic | ICD-10-CM

## 2024-07-22 RX ORDER — METOPROLOL SUCCINATE 200 MG/1
200 TABLET, EXTENDED RELEASE ORAL DAILY
Qty: 90 TABLET | Refills: 1 | Status: SHIPPED | OUTPATIENT
Start: 2024-07-22

## 2024-07-22 NOTE — TELEPHONE ENCOUNTER
Caller: Bigg Davisen    Relationship: Emergency Contact    Best call back number: [phone]     Requested Prescriptions:   Requested Prescriptions     Pending Prescriptions Disp Refills    metoprolol succinate XL (TOPROL-XL) 200 MG 24 hr tablet 90 tablet 1     Sig: Take 1 tablet by mouth Daily.        Pharmacy where request should be sent: [facility] [address] [facility] [phone] [facility] [phone]      Last office visit with prescribing clinician: 2/2/2024   Last telemedicine visit with prescribing clinician: Visit date not found   Next office visit with prescribing clinician: 8/2/2024     Additional details provided by patient: PLEASE SEND 90 DAY SUPPLY WITH REFILLS      Does the patient have less than a 3 day supply:  [x] Yes  [] No OUT SINCE FRIDAY 07/19/24    Would you like a call back once the refill request has been completed: [] Yes [x] No    If the office needs to give you a call back, can they leave a voicemail: [] Yes [x] No    Maged Sam Rep   07/22/24 10:53 EDT

## 2024-07-23 ENCOUNTER — OFFICE VISIT (OUTPATIENT)
Dept: NEUROLOGY | Facility: CLINIC | Age: 64
End: 2024-07-23
Payer: MEDICAID

## 2024-07-23 VITALS
HEART RATE: 87 BPM | WEIGHT: 189 LBS | DIASTOLIC BLOOD PRESSURE: 90 MMHG | SYSTOLIC BLOOD PRESSURE: 140 MMHG | OXYGEN SATURATION: 97 % | BODY MASS INDEX: 30.37 KG/M2 | HEIGHT: 66 IN

## 2024-07-23 DIAGNOSIS — M54.42 CHRONIC LEFT-SIDED LOW BACK PAIN WITH LEFT-SIDED SCIATICA: Primary | ICD-10-CM

## 2024-07-23 DIAGNOSIS — G89.29 CHRONIC NECK PAIN: ICD-10-CM

## 2024-07-23 DIAGNOSIS — I65.23 BILATERAL CAROTID ARTERY STENOSIS: ICD-10-CM

## 2024-07-23 DIAGNOSIS — M25.552 LEFT HIP PAIN: ICD-10-CM

## 2024-07-23 DIAGNOSIS — G89.29 CHRONIC LEFT-SIDED LOW BACK PAIN WITH LEFT-SIDED SCIATICA: Primary | ICD-10-CM

## 2024-07-23 DIAGNOSIS — M54.2 CHRONIC NECK PAIN: ICD-10-CM

## 2024-07-23 DIAGNOSIS — I69.320 APHASIA AS LATE EFFECT OF CEREBROVASCULAR ACCIDENT: ICD-10-CM

## 2024-07-23 DIAGNOSIS — R25.1 TREMOR OF LEFT HAND: ICD-10-CM

## 2024-07-23 DIAGNOSIS — G40.909 SEIZURE DISORDER: ICD-10-CM

## 2024-07-23 PROCEDURE — 1160F RVW MEDS BY RX/DR IN RCRD: CPT | Performed by: NURSE PRACTITIONER

## 2024-07-23 PROCEDURE — 3080F DIAST BP >= 90 MM HG: CPT | Performed by: NURSE PRACTITIONER

## 2024-07-23 PROCEDURE — 3077F SYST BP >= 140 MM HG: CPT | Performed by: NURSE PRACTITIONER

## 2024-07-23 PROCEDURE — 99214 OFFICE O/P EST MOD 30 MIN: CPT | Performed by: NURSE PRACTITIONER

## 2024-07-23 PROCEDURE — 1159F MED LIST DOCD IN RCRD: CPT | Performed by: NURSE PRACTITIONER

## 2024-07-23 RX ORDER — LEVETIRACETAM 1000 MG/1
1000 TABLET ORAL 2 TIMES DAILY
Qty: 180 TABLET | Refills: 3 | Status: SHIPPED | OUTPATIENT
Start: 2024-07-23

## 2024-07-23 RX ORDER — LEVETIRACETAM 500 MG/1
500 TABLET ORAL 2 TIMES DAILY
Qty: 180 TABLET | Refills: 3 | Status: SHIPPED | OUTPATIENT
Start: 2024-07-23

## 2024-07-23 RX ORDER — ASPIRIN 325 MG
325 TABLET, DELAYED RELEASE (ENTERIC COATED) ORAL DAILY
Qty: 90 TABLET | Refills: 3 | Status: SHIPPED | OUTPATIENT
Start: 2024-07-23

## 2024-07-23 RX ORDER — LACOSAMIDE 150 MG/1
75 TABLET ORAL 2 TIMES DAILY
Qty: 90 TABLET | Refills: 1 | Status: SHIPPED | OUTPATIENT
Start: 2024-07-23

## 2024-07-23 NOTE — LETTER
July 23, 2024     KINGSTON Caruso  [address] [facility] [address]    Patient: Vince Olivera   YOB: 1960   Date of Visit: 7/23/2024       Dear KINGSTON Caruso    Vince Olivera was in my office today. Below is a copy of my note.    If you have questions, please do not hesitate to call me. I look forward to following Vince along with you.         Sincerely,        KINGSTON Alejandro        CC: MD Deneen Vance APRN    Subjective:     Patient ID: Vince Olivera is a 63 y.o. male.    CC:   Chief Complaint   Patient presents with   • Seizures       HPI:   History of Present Illness  This is a 63-year-old male who presents for a 9-month neurology follow-up on seizure disorder following CVA in 2017. His last seizure was in 10/2019. His seizures have been well controlled on lacosamide 150 mg half a tablet twice per day along with Keppra 1500 mg twice per day long term. He is here for follow-up and refills on medications today.     He has been noted to have an elevated white blood cell count and PCP has addressed this at follow-up. He has been evaluated by Dr. Brent Breen with Hematology/Oncology for chronic lymphocytic leukemia. He also follows with his PCP and CT surgery for peripheral vascular disease. He is by himself during most of the visit and he is a poor historian. He is accompanied by his daughter only at the end of the visit per provider request.    Since his last visit, he reports no changes in his health, with no surgeries or hospital visits. He has not experienced any seizures and tolerates his medication well. He does not have a pacemaker and has not experienced any new stroke symptoms.    He experiences a sensation of vibration in his left hand, which intensifies with heavy physical activities such as mowing, weed eating, or lifting. This tremor has been present since his stroke and has worsened, but it usually resolves within  "a few days. He is right-handed. He denies any difficulty walking, swallowing difficulties, severe constipation, loss of smell, or drooling at night. He also experiences pain in his left shoulder. He has stopped driving and denies any falls since his last visit.    He experiences pain in his left arm, lower left back, and left hip, which he suspects may be due to a \"pinched nerve in his neck\". He attended physical therapy in 2017, but discontinued after his stroke. He takes a muscle relaxer three times a day. He has not seen an arthritis or orthopedic specialist. Physical therapy was ordered at his last visit 10/2023, but he did not complete this.    He is seeing primary care, cardiothoracic surgery for peripheral vascular disease, and hematology/Oncology for his elevated white blood cell count. He takes metoprolol for his blood pressure. He takes aspirin, cholesterol, and Plavix for secondary stroke prevention.    He completed 11th grade education, worked in construction and additional jobs. He stays physically active.   He is adopted and does not know his family history because he does not know. He denies any family history of Parkinson's disease.    Prior Neurological History and Workup:  He previously had a seizure following a cerebrovascular accident, has had grand mal seizures. He suffered CVA in 2017 and then developed seizures after that time. His last seizure was in 10/2019. He is currently taking Keppra 1500 mg twice per day along with Vimpat 150 mg, a half tablet twice a day, which is 75 mg twice per day.       Prior neuroimaging has included an MRI of the brain without contrast on 10/08/2019 completed at [facility]. This showed large area of left temporal and occipital encephalomalacia, unchanged from prior imaging with mild nonspecific white matter changes. This was consistent with old stroke. No acute abnormalities. No changes compared to 2018 imaging. He underwent a carotid ultrasound at that time " showing right ICA stenosis 50 to 69 percent with plaque and left ICA stenosis at 0 to 49 percent. Repeat duplex carotid ultrasound on 02/01/2021, right internal carotid artery stenosis of 50 to 69 percent and left ICA stenosis 0 to 49 percent.      Carotid ultrasound on 02/01/2021 showed right ICA stenosis 50 to 69 percent with plaque and left ICA stenosis 0 to 49 percent.     Prior EEG was completed 10/09/2019. This showed left temporal slowing of moderate degree, with no epileptiform activity seen during that time.     EMG and NCVS of upper and lower extremities on 03/18/2019 showing severe sensory mild peripheral neuropathy, mild to moderate right and mild left carpal tunnel syndrome.     Left carotid endarterectomy in 2017. He had a femoropopliteal bypass in 10/2022.     He is a smoker. He tried to quit years ago. He currently does not plan to quit. He cannot take chantix due to seizure warning.     He has no known long term effects from his stroke that he is aware of, however, he has reported coordination issues on the right side, trouble with speech since stroke.    duplex carotid ultrasound. This showed right internal carotid artery narrowing of 50 to 69 percent and left internal carotid artery narrowing of less than 50 percent. This was completed on 05/24/2023.     The following portions of the patient's history were reviewed and updated as appropriate: allergies, current medications, past family history, past medical history, past social history, past surgical history, and problem list.    Past Medical History:   Diagnosis Date   • Arthritis    • Carotid artery disease    • Carotid artery disorder    • Depression    • Diabetes mellitus     DIAGNOSED 4-2017 CHECKS FSBG 3X/WEEK    • Ear infection     about 2 weeks ago- cleared up - wax removed and cleaned out    • GERD (gastroesophageal reflux disease)     self diagnosed takes otc ppi   • Hyperlipidemia    • Hypertension    • Hypertension    • Inguinal hernia     • Seizure     last 10/8/2019   • Shoulder pain     bilat    • Stroke 04/24/2017    EXPRESSIVE APHASIA RESIDUAL    • Tooth loose     5 - all over- will get teeth done after this surgery    • Wears reading eyeglasses        Past Surgical History:   Procedure Laterality Date   • ABDOMINAL HERNIA REPAIR  2010   • AORTA FEMORAL BYPASS N/A 10/11/2019    Procedure: AORTA FEMORAL BYPASS, BILATERAL FEMORAL ENDARTERECTOMY;  Surgeon: Dylan Weaver MD;  Location:  [facility];  Service: Vascular   • CAROTID ENDARTERECTOMY Left 10/17/2017    Procedure: CAROTID ENDARTERECTOMY LEFT;  Surgeon: Alexx Bach MD;  Location:  [facility];  Service:    • INGUINAL HERNIA REPAIR Left        Social History     Socioeconomic History   • Marital status: Single   • Number of children: 2   Tobacco Use   • Smoking status: Every Day     Current packs/day: 1.50     Average packs/day: 1.5 packs/day for 40.0 years (60.0 ttl pk-yrs)     Types: Cigarettes   • Smokeless tobacco: Former     Types: Snuff   • Tobacco comments:     quit snuff when 18 or 19 years old - only did for baseball - 2-3 years    Vaping Use   • Vaping status: Never Used   Substance and Sexual Activity   • Alcohol use: No   • Drug use: No   • Sexual activity: Defer     Partners: Female     Comment: SPOUSE       Family History   Adopted: Yes   Problem Relation Age of Onset   • Diabetes Mother    • COPD Father           Current Outpatient Medications:   •  amLODIPine (NORVASC) 10 MG tablet, Take 1 tablet by mouth Daily., Disp: 90 tablet, Rfl: 1  •  aspirin (aspirin EC) 325 MG EC tablet, Take 1 tablet by mouth Daily., Disp: 90 tablet, Rfl: 3  •  atorvastatin (LIPITOR) 80 MG tablet, Take 1 tablet by mouth Daily., Disp: 90 tablet, Rfl: 1  •  clopidogrel (PLAVIX) 75 MG tablet, Take 1 tablet by mouth Daily., Disp: 90 tablet, Rfl: 1  •  glucose blood test strip, Check glucose daily, Disp: 100 each, Rfl: 3  •  glucose monitor monitoring kit, 1 each Daily., Disp: 1 each, Rfl: 0  •  " hydroCHLOROthiazide (HYDRODIURIL) 12.5 MG tablet, Take 1 tablet by mouth Daily., Disp: 90 tablet, Rfl: 1  •  Lacosamide (Vimpat) 150 MG tablet, Take 75 mg by mouth 2 (Two) Times a Day., Disp: 90 tablet, Rfl: 1  •  Lancets misc, 1 Device Daily., Disp: 100 each, Rfl: 12  •  levETIRAcetam (KEPPRA) 1000 MG tablet, Take 1 tablet by mouth 2 (Two) Times a Day., Disp: 180 tablet, Rfl: 3  •  levETIRAcetam (KEPPRA) 500 MG tablet, Take 1 tablet by mouth 2 (Two) Times a Day., Disp: 180 tablet, Rfl: 3  •  losartan (COZAAR) 100 MG tablet, Take 1 tablet by mouth Daily. Take 1/2 tab twice daily, Disp: 180 tablet, Rfl: 3  •  metFORMIN (GLUCOPHAGE) 500 MG tablet, Take 2 tablets by mouth 2 (Two) Times a Day With Meals., Disp: 360 tablet, Rfl: 1  •  metoprolol succinate XL (TOPROL-XL) 200 MG 24 hr tablet, Take 1 tablet by mouth Daily., Disp: 90 tablet, Rfl: 1  •  TiZANidine (Zanaflex) 2 MG capsule, Take 1 capsule by mouth 3 (Three) Times a Day., Disp: 90 capsule, Rfl: 5    Current Facility-Administered Medications:   •  cyanocobalamin injection 1,000 mcg, 1,000 mcg, Intramuscular, Q28 Days, Glo Kyle APRN, 1,000 mcg at 01/23/20 1530     Review of Systems   Musculoskeletal:  Positive for arthralgias, back pain and neck pain.   Neurological:  Positive for tremors. Negative for seizures.   Psychiatric/Behavioral:  Positive for decreased concentration.    All other systems reviewed and are negative.       Objective:  /90   Pulse 87   Ht 167.6 cm (66\")   Wt 85.7 kg (189 lb)   SpO2 97%   BMI 30.51 kg/m²     Neurologic Exam     Mental Status   Oriented to person, place, and time.   Speech: speech is normal   Level of consciousness: alert  Knowledge: poor.     Reports minimal expressive and receptive aphasia chronic since CVA     Cranial Nerves   Cranial nerves II through XII intact.     Motor Exam   Muscle bulk: normal  Overall muscle tone: normal    Strength   Strength 5/5 throughout.     Gait, Coordination, and " Reflexes     Gait  Gait: normal    Coordination   Romberg: negative  Finger to nose coordination: abnormal (mild coordination difficulties reported since stroke)  Heel to shin coordination: normal    Tremor   Resting tremor: absent  Intention tremor: absent  Action tremor: absent    Reflexes   Right : 2+  Left : 2+  No tremor on exam today. Normal finger and heel taps. No bradykinesia, no cogwheel rigidity.        Physical Exam  Constitutional:       Appearance: Normal appearance.   Neurological:      Mental Status: He is alert and oriented to person, place, and time.      Cranial Nerves: Cranial nerves 2-12 are intact.      Motor: Motor strength is normal.     Coordination: Finger-Nose-Finger Test abnormal (mild coordination difficulties reported since stroke). Heel to Gunn Test and Romberg Test normal.      Gait: Gait is intact.   Psychiatric:         Mood and Affect: Mood is anxious.         Speech: Speech normal.         Behavior: Behavior is slowed.         Thought Content: Thought content normal.         Judgment: Judgment normal.         Results:  Results  Laboratory Studies with PCP 1/30/2024:  Glucose 128, sodium 134, otherwise normal. Hemoglobin A1c 7%. Total cholesterol 198, triglycerides 154, HDL 29, LDL 43. Elevated white blood cell count.    He underwent imaging including x-ray of the cervical spine 3/22/2024 degenerative changes of the cervical spine seen.  X-ray of the lumbar spine 3/22/2024 shows degenerative changes of the lumbar spine.  X-ray of the left ankle shows no acute findings per radiology.  X-ray of the hip with 3 view on the left side completed 3/22/2024 showing essentially unremarkable findings. All ordered by PCP. No MRI's of the spine or hip available for review.    Assessment/Plan:     Diagnoses and all orders for this visit:    1. Chronic left-sided low back pain with left-sided sciatica (Primary)  -     Ambulatory Referral to Physical Therapy for Evaluation &  Treatment    2. Chronic neck pain  -     Ambulatory Referral to Physical Therapy for Evaluation & Treatment    3. Left hip pain  -     Ambulatory Referral to Physical Therapy for Evaluation & Treatment    4. Tremor of left hand  Comments:  monitor for now, declines Jazzy scan, monitor for progression, no other symptoms    5. Aphasia as late effect of cerebrovascular accident  Comments:  mild, stable    6. Seizure disorder as sequela of cerebrovascular accident (HCC) [I69.398, G40.909]  -     levETIRAcetam (KEPPRA) 1000 MG tablet; Take 1 tablet by mouth 2 (Two) Times a Day.  Dispense: 180 tablet; Refill: 3  -     levETIRAcetam (KEPPRA) 500 MG tablet; Take 1 tablet by mouth 2 (Two) Times a Day.  Dispense: 180 tablet; Refill: 3  -     Lacosamide (Vimpat) 150 MG tablet; Take 75 mg by mouth 2 (Two) Times a Day.  Dispense: 90 tablet; Refill: 1    7. Bilateral carotid artery stenosis s/p L CEA 4/27/17. 50-69% R carotid stenosis  Comments:  continue plavix and statin  Orders:  -     aspirin (aspirin EC) 325 MG EC tablet; Take 1 tablet by mouth Daily.  Dispense: 90 tablet; Refill: 3           Assessment & Plan  During the check out process with his verbal permission, I spoke with his daughter about the visit and plan today as well.He does report a tremor in his left hand and some left shoulder pain, but no evidence of Parkinson's on exam. I did explain that this could be early Parkinson's symptoms. I will monitor for now. I did offer the [facility] nuclear medicine scan to evaluate for early Parkinson's, however, he declines and his daughter is okay with this. I also explained that his neck, low back, and left hip pain could be improved with physical therapy, provided him with an order today. I will complete baseline mini mental state exam score at his next appointment. He will continue Vimpat and Keppra for seizure prevention, continue aspirin, Plavix, and statin for secondary stroke prevention. If not improving, I could order  "MRI of the cervical and lumbar spine and refer him to orthopedics for left hip evaluation. He and his daughter verbalized understanding and agree with plan. If he has worsening of symptoms prior to his follow-up in 1 year, we can place orders and referrals as necessary. They just need to call us.    Follow-up  He will follow up in 1 year. He has a long commute of 2+ hours to visits.  He no longer drives by his choice and family drives him.    Reviewed medications, potential side effects and signs and symptoms to report. Discussed risk versus benefits of treatment plan with patient and/or family-including medications, labs and radiology that may be ordered. Addressed questions and concerns during visit. Patient and/or family verbalized understanding and agree with plan.    Patient instructions include: No driving or operating heavy machinery, solo bathing or tub baths for 90 days from onset of most recent seizure, if they currently hold a license. Minimize stress as much as possible. Recommended 7-8 hours of sleep each night. Abstain from alcohol intake. Educated on Antiepileptic medications with possible side effects and signs and symptoms to report if prescribed during visit. Instructed to take seizure medication daily if prescribed. Reviewed potential seizure risk factors. Instructed to call 911 or our office if another seizure does occur.    Sudden Unexpected Death in Epilepsy is defined as \"the sudden, unexpected, witnessed or unwitnessed, non-traumatic, and non-drowning death in patients with epilepsy with or without evidence for a seizure, and excluding documented status epilepticus, in which postmortem examination does not reveal a structural or toxicological cause for death.\"-Epilepsy Foundation 2021. SUDEP is most commonly seen in Epilepsy patient's who's seizures are not well controlled or who have seizures during their sleep. This condition is not fully understood. To reduce your risk of SUDEP, please " take your seizure medications as prescribed, keep a diary of your seizures and when they occur and if your seizures are not well controlled, please notify us so we can collaborate with you to get your seizures better controlled. If you have additional questions, please visit: [email or web address].com for more information.    As part of this patient's treatment plan I am prescribing controlled substances. The patient has been made aware of appropriate use of such medications, including potential risk of somnolence, limited ability to drive and/or work safely, and potential for dependence or overdose. It has also been made clear that these medications are for use by the patient only, without concomitant use of alcohol or other substances unless prescribed. Keep out of reach of children.  Jose C report has been reviewed. If this is going to be prescribed long term, [facility] [place] Contract has also been read and signed by patient and myself.    During this visit the following were done:  Labs Reviewed [x]    Labs Ordered []    Radiology Reports Reviewed [x]    Radiology Ordered []    PCP Records Reviewed []    Referring Provider Records Reviewed []    ER Records Reviewed []    Hospital Records Reviewed []    History Obtained From Family [x]    Radiology Images Reviewed []    Other Reviewed []    Records Requested []      07/23/24   08:30 EDT    Patient or patient representative verbalized consent for the use of Ambient Listening during the visit with  KINGSTON Alejandro for chart documentation. 7/23/2024  10:08 EDT    Note to patient: The 21st Century Cures Act makes medical notes like these available to patients in the interest of transparency. However, be advised this is a medical document. It is intended as peer to peer communication. It is written in medical language and may contain abbreviations or verbiage that are unfamiliar. It may appear blunt or direct. Medical documents are intended to  carry relevant information, facts as evident, and the clinical opinion of the provider.

## 2024-07-23 NOTE — PATIENT INSTRUCTIONS
Order for physical therapy.    If neck or low back or left hip worsens, you can call us and we can order an MRI of the neck and Low back OR refer to orthopedics.    If tremor increases in left hand, can order a scan to look for Parkinson's done at [facility]-we will follow up in 1 year.

## 2024-07-23 NOTE — PROGRESS NOTES
"Subjective:     Patient ID: Vince Olivera is a 63 y.o. male.    CC:   Chief Complaint   Patient presents with    Seizures       HPI:   History of Present Illness  This is a 63-year-old male who presents for a 9-month neurology follow-up on seizure disorder following CVA in 2017. His last seizure was in 10/2019. His seizures have been well controlled on lacosamide 150 mg half a tablet twice per day along with Keppra 1500 mg twice per day long term. He is here for follow-up and refills on medications today.     He has been noted to have an elevated white blood cell count and PCP has addressed this at follow-up. He has been evaluated by Dr. Brent Breen with Hematology/Oncology for chronic lymphocytic leukemia. He also follows with his PCP and CT surgery for peripheral vascular disease. He is by himself during most of the visit and he is a poor historian. He is accompanied by his daughter only at the end of the visit per provider request.    Since his last visit, he reports no changes in his health, with no surgeries or hospital visits. He has not experienced any seizures and tolerates his medication well. He does not have a pacemaker and has not experienced any new stroke symptoms.    He experiences a sensation of vibration in his left hand, which intensifies with heavy physical activities such as mowing, weed eating, or lifting. This tremor has been present since his stroke and has worsened, but it usually resolves within a few days. He is right-handed. He denies any difficulty walking, swallowing difficulties, severe constipation, loss of smell, or drooling at night. He also experiences pain in his left shoulder. He has stopped driving and denies any falls since his last visit.    He experiences pain in his left arm, lower left back, and left hip, which he suspects may be due to a \"pinched nerve in his neck\". He attended physical therapy in 2017, but discontinued after his stroke. He takes a muscle relaxer three " times a day. He has not seen an arthritis or orthopedic specialist. Physical therapy was ordered at his last visit 10/2023, but he did not complete this.    He is seeing primary care, cardiothoracic surgery for peripheral vascular disease, and hematology/Oncology for his elevated white blood cell count. He takes metoprolol for his blood pressure. He takes aspirin, cholesterol, and Plavix for secondary stroke prevention.    He completed 11th grade education, worked in construction and additional jobs. He stays physically active.   He is adopted and does not know his family history because he does not know. He denies any family history of Parkinson's disease.    Prior Neurological History and Workup:  He previously had a seizure following a cerebrovascular accident, has had grand mal seizures. He suffered CVA in 2017 and then developed seizures after that time. His last seizure was in 10/2019. He is currently taking Keppra 1500 mg twice per day along with Vimpat 150 mg, a half tablet twice a day, which is 75 mg twice per day.       Prior neuroimaging has included an MRI of the brain without contrast on 10/08/2019 completed at [facility]. This showed large area of left temporal and occipital encephalomalacia, unchanged from prior imaging with mild nonspecific white matter changes. This was consistent with old stroke. No acute abnormalities. No changes compared to 2018 imaging. He underwent a carotid ultrasound at that time showing right ICA stenosis 50 to 69 percent with plaque and left ICA stenosis at 0 to 49 percent. Repeat duplex carotid ultrasound on 02/01/2021, right internal carotid artery stenosis of 50 to 69 percent and left ICA stenosis 0 to 49 percent.      Carotid ultrasound on 02/01/2021 showed right ICA stenosis 50 to 69 percent with plaque and left ICA stenosis 0 to 49 percent.     Prior EEG was completed 10/09/2019. This showed left temporal slowing of moderate degree, with no epileptiform activity  seen during that time.     EMG and NCVS of upper and lower extremities on 03/18/2019 showing severe sensory mild peripheral neuropathy, mild to moderate right and mild left carpal tunnel syndrome.     Left carotid endarterectomy in 2017. He had a femoropopliteal bypass in 10/2022.     He is a smoker. He tried to quit years ago. He currently does not plan to quit. He cannot take chantix due to seizure warning.     He has no known long term effects from his stroke that he is aware of, however, he has reported coordination issues on the right side, trouble with speech since stroke.    duplex carotid ultrasound. This showed right internal carotid artery narrowing of 50 to 69 percent and left internal carotid artery narrowing of less than 50 percent. This was completed on 05/24/2023.     The following portions of the patient's history were reviewed and updated as appropriate: allergies, current medications, past family history, past medical history, past social history, past surgical history, and problem list.    Past Medical History:   Diagnosis Date    Arthritis     Carotid artery disease     Carotid artery disorder     Depression     Diabetes mellitus     DIAGNOSED 4-2017 CHECKS FSBG 3X/WEEK     Ear infection     about 2 weeks ago- cleared up - wax removed and cleaned out     GERD (gastroesophageal reflux disease)     self diagnosed takes otc ppi    Hyperlipidemia     Hypertension     Hypertension     Inguinal hernia     Seizure     last 10/8/2019    Shoulder pain     bilat     Stroke 04/24/2017    EXPRESSIVE APHASIA RESIDUAL     Tooth loose     5 - all over- will get teeth done after this surgery     Wears reading eyeglasses        Past Surgical History:   Procedure Laterality Date    ABDOMINAL HERNIA REPAIR  2010    AORTA FEMORAL BYPASS N/A 10/11/2019    Procedure: AORTA FEMORAL BYPASS, BILATERAL FEMORAL ENDARTERECTOMY;  Surgeon: Dylan Weaver MD;  Location: [facility];  Service: Vascular    CAROTID ENDARTERECTOMY  Left 10/17/2017    Procedure: CAROTID ENDARTERECTOMY LEFT;  Surgeon: Alexx Bach MD;  Location: [facility];  Service:     INGUINAL HERNIA REPAIR Left        Social History     Socioeconomic History    Marital status: Single    Number of children: 2   Tobacco Use    Smoking status: Every Day     Current packs/day: 1.50     Average packs/day: 1.5 packs/day for 40.0 years (60.0 ttl pk-yrs)     Types: Cigarettes    Smokeless tobacco: Former     Types: Snuff    Tobacco comments:     quit snuff when 18 or 19 years old - only did for baseball - 2-3 years    Vaping Use    Vaping status: Never Used   Substance and Sexual Activity    Alcohol use: No    Drug use: No    Sexual activity: Defer     Partners: Female     Comment: SPOUSE       Family History   Adopted: Yes   Problem Relation Age of Onset    Diabetes Mother     COPD Father           Current Outpatient Medications:     amLODIPine (NORVASC) 10 MG tablet, Take 1 tablet by mouth Daily., Disp: 90 tablet, Rfl: 1    aspirin (aspirin EC) 325 MG EC tablet, Take 1 tablet by mouth Daily., Disp: 90 tablet, Rfl: 3    atorvastatin (LIPITOR) 80 MG tablet, Take 1 tablet by mouth Daily., Disp: 90 tablet, Rfl: 1    clopidogrel (PLAVIX) 75 MG tablet, Take 1 tablet by mouth Daily., Disp: 90 tablet, Rfl: 1    glucose blood test strip, Check glucose daily, Disp: 100 each, Rfl: 3    glucose monitor monitoring kit, 1 each Daily., Disp: 1 each, Rfl: 0    hydroCHLOROthiazide (HYDRODIURIL) 12.5 MG tablet, Take 1 tablet by mouth Daily., Disp: 90 tablet, Rfl: 1    Lacosamide (Vimpat) 150 MG tablet, Take 75 mg by mouth 2 (Two) Times a Day., Disp: 90 tablet, Rfl: 1    Lancets misc, 1 Device Daily., Disp: 100 each, Rfl: 12    levETIRAcetam (KEPPRA) 1000 MG tablet, Take 1 tablet by mouth 2 (Two) Times a Day., Disp: 180 tablet, Rfl: 3    levETIRAcetam (KEPPRA) 500 MG tablet, Take 1 tablet by mouth 2 (Two) Times a Day., Disp: 180 tablet, Rfl: 3    losartan (COZAAR) 100 MG tablet, Take 1  "tablet by mouth Daily. Take 1/2 tab twice daily, Disp: 180 tablet, Rfl: 3    metFORMIN (GLUCOPHAGE) 500 MG tablet, Take 2 tablets by mouth 2 (Two) Times a Day With Meals., Disp: 360 tablet, Rfl: 1    metoprolol succinate XL (TOPROL-XL) 200 MG 24 hr tablet, Take 1 tablet by mouth Daily., Disp: 90 tablet, Rfl: 1    TiZANidine (Zanaflex) 2 MG capsule, Take 1 capsule by mouth 3 (Three) Times a Day., Disp: 90 capsule, Rfl: 5    Current Facility-Administered Medications:     cyanocobalamin injection 1,000 mcg, 1,000 mcg, Intramuscular, Q28 Days, Glo Kyle APRN, 1,000 mcg at 01/23/20 1530     Review of Systems   Musculoskeletal:  Positive for arthralgias, back pain and neck pain.   Neurological:  Positive for tremors. Negative for seizures.   Psychiatric/Behavioral:  Positive for decreased concentration.    All other systems reviewed and are negative.       Objective:  /90   Pulse 87   Ht 167.6 cm (66\")   Wt 85.7 kg (189 lb)   SpO2 97%   BMI 30.51 kg/m²     Neurologic Exam     Mental Status   Oriented to person, place, and time.   Speech: speech is normal   Level of consciousness: alert  Knowledge: poor.     Reports minimal expressive and receptive aphasia chronic since CVA     Cranial Nerves   Cranial nerves II through XII intact.     Motor Exam   Muscle bulk: normal  Overall muscle tone: normal    Strength   Strength 5/5 throughout.     Gait, Coordination, and Reflexes     Gait  Gait: normal    Coordination   Romberg: negative  Finger to nose coordination: abnormal (mild coordination difficulties reported since stroke)  Heel to shin coordination: normal    Tremor   Resting tremor: absent  Intention tremor: absent  Action tremor: absent    Reflexes   Right : 2+  Left : 2+  No tremor on exam today. Normal finger and heel taps. No bradykinesia, no cogwheel rigidity.        Physical Exam  Constitutional:       Appearance: Normal appearance.   Neurological:      Mental Status: He is alert and " oriented to person, place, and time.      Cranial Nerves: Cranial nerves 2-12 are intact.      Motor: Motor strength is normal.     Coordination: Finger-Nose-Finger Test abnormal (mild coordination difficulties reported since stroke). Heel to Gunn Test and Romberg Test normal.      Gait: Gait is intact.   Psychiatric:         Mood and Affect: Mood is anxious.         Speech: Speech normal.         Behavior: Behavior is slowed.         Thought Content: Thought content normal.         Judgment: Judgment normal.         Results:  Results  Laboratory Studies with PCP 1/30/2024:  Glucose 128, sodium 134, otherwise normal. Hemoglobin A1c 7%. Total cholesterol 198, triglycerides 154, HDL 29, LDL 43. Elevated white blood cell count.    He underwent imaging including x-ray of the cervical spine 3/22/2024 degenerative changes of the cervical spine seen.  X-ray of the lumbar spine 3/22/2024 shows degenerative changes of the lumbar spine.  X-ray of the left ankle shows no acute findings per radiology.  X-ray of the hip with 3 view on the left side completed 3/22/2024 showing essentially unremarkable findings. All ordered by PCP. No MRI's of the spine or hip available for review.    Assessment/Plan:     Diagnoses and all orders for this visit:    1. Chronic left-sided low back pain with left-sided sciatica (Primary)  -     Ambulatory Referral to Physical Therapy for Evaluation & Treatment    2. Chronic neck pain  -     Ambulatory Referral to Physical Therapy for Evaluation & Treatment    3. Left hip pain  -     Ambulatory Referral to Physical Therapy for Evaluation & Treatment    4. Tremor of left hand  Comments:  monitor for now, declines Jazzy scan, monitor for progression, no other symptoms    5. Aphasia as late effect of cerebrovascular accident  Comments:  mild, stable    6. Seizure disorder as sequela of cerebrovascular accident (HCC) [I69.398, G40.909]  -     levETIRAcetam (KEPPRA) 1000 MG tablet; Take 1 tablet by mouth 2  (Two) Times a Day.  Dispense: 180 tablet; Refill: 3  -     levETIRAcetam (KEPPRA) 500 MG tablet; Take 1 tablet by mouth 2 (Two) Times a Day.  Dispense: 180 tablet; Refill: 3  -     Lacosamide (Vimpat) 150 MG tablet; Take 75 mg by mouth 2 (Two) Times a Day.  Dispense: 90 tablet; Refill: 1    7. Bilateral carotid artery stenosis s/p L CEA 4/27/17. 50-69% R carotid stenosis  Comments:  continue plavix and statin  Orders:  -     aspirin (aspirin EC) 325 MG EC tablet; Take 1 tablet by mouth Daily.  Dispense: 90 tablet; Refill: 3           Assessment & Plan  During the check out process with his verbal permission, I spoke with his daughter about the visit and plan today as well.He does report a tremor in his left hand and some left shoulder pain, but no evidence of Parkinson's on exam. I did explain that this could be early Parkinson's symptoms. I will monitor for now. I did offer the [facility] nuclear medicine scan to evaluate for early Parkinson's, however, he declines and his daughter is okay with this. I also explained that his neck, low back, and left hip pain could be improved with physical therapy, provided him with an order today. I will complete baseline mini mental state exam score at his next appointment. He will continue Vimpat and Keppra for seizure prevention, continue aspirin, Plavix, and statin for secondary stroke prevention. If not improving, I could order MRI of the cervical and lumbar spine and refer him to orthopedics for left hip evaluation. He and his daughter verbalized understanding and agree with plan. If he has worsening of symptoms prior to his follow-up in 1 year, we can place orders and referrals as necessary. They just need to call us.    Follow-up  He will follow up in 1 year. He has a long commute of 2+ hours to visits.  He no longer drives by his choice and family drives him.    Reviewed medications, potential side effects and signs and symptoms to report. Discussed risk versus benefits of  "treatment plan with patient and/or family-including medications, labs and radiology that may be ordered. Addressed questions and concerns during visit. Patient and/or family verbalized understanding and agree with plan.    Patient instructions include: No driving or operating heavy machinery, solo bathing or tub baths for 90 days from onset of most recent seizure, if they currently hold a license. Minimize stress as much as possible. Recommended 7-8 hours of sleep each night. Abstain from alcohol intake. Educated on Antiepileptic medications with possible side effects and signs and symptoms to report if prescribed during visit. Instructed to take seizure medication daily if prescribed. Reviewed potential seizure risk factors. Instructed to call 911 or our office if another seizure does occur.    Sudden Unexpected Death in Epilepsy is defined as \"the sudden, unexpected, witnessed or unwitnessed, non-traumatic, and non-drowning death in patients with epilepsy with or without evidence for a seizure, and excluding documented status epilepticus, in which postmortem examination does not reveal a structural or toxicological cause for death.\"-Epilepsy Foundation 2021. SUDEP is most commonly seen in Epilepsy patient's who's seizures are not well controlled or who have seizures during their sleep. This condition is not fully understood. To reduce your risk of SUDEP, please take your seizure medications as prescribed, keep a diary of your seizures and when they occur and if your seizures are not well controlled, please notify us so we can collaborate with you to get your seizures better controlled. If you have additional questions, please visit: [email or web address].com for more information.    As part of this patient's treatment plan I am prescribing controlled substances. The patient has been made aware of appropriate use of such medications, including potential risk of somnolence, limited ability to drive and/or work safely, and " potential for dependence or overdose. It has also been made clear that these medications are for use by the patient only, without concomitant use of alcohol or other substances unless prescribed. Keep out of reach of children.  Jose C report has been reviewed. If this is going to be prescribed long term, [facility] Controlled Substance Agreement Contract has also been read and signed by patient and myself.    During this visit the following were done:  Labs Reviewed [x]    Labs Ordered []    Radiology Reports Reviewed [x]    Radiology Ordered []    PCP Records Reviewed []    Referring Provider Records Reviewed []    ER Records Reviewed []    Hospital Records Reviewed []    History Obtained From Family [x]    Radiology Images Reviewed []    Other Reviewed []    Records Requested []      07/23/24   08:30 EDT    Patient or patient representative verbalized consent for the use of Ambient Listening during the visit with  KINGSTON Alejandro for chart documentation. 7/23/2024  10:08 EDT    Note to patient: The 21st Century Cures Act makes medical notes like these available to patients in the interest of transparency. However, be advised this is a medical document. It is intended as peer to peer communication. It is written in medical language and may contain abbreviations or verbiage that are unfamiliar. It may appear blunt or direct. Medical documents are intended to carry relevant information, facts as evident, and the clinical opinion of the provider.

## 2024-07-29 ENCOUNTER — OFFICE VISIT (OUTPATIENT)
Dept: FAMILY MEDICINE CLINIC | Facility: CLINIC | Age: 64
End: 2024-07-29
Payer: MEDICAID

## 2024-07-29 VITALS
SYSTOLIC BLOOD PRESSURE: 118 MMHG | BODY MASS INDEX: 30.37 KG/M2 | HEART RATE: 73 BPM | WEIGHT: 189 LBS | RESPIRATION RATE: 16 BRPM | OXYGEN SATURATION: 95 % | HEIGHT: 66 IN | DIASTOLIC BLOOD PRESSURE: 72 MMHG

## 2024-07-29 DIAGNOSIS — H61.23 BILATERAL IMPACTED CERUMEN: ICD-10-CM

## 2024-07-29 DIAGNOSIS — H60.331 ACUTE SWIMMER'S EAR OF RIGHT SIDE: ICD-10-CM

## 2024-07-29 DIAGNOSIS — H65.191 OTHER NON-RECURRENT ACUTE NONSUPPURATIVE OTITIS MEDIA OF RIGHT EAR: Primary | ICD-10-CM

## 2024-07-29 PROCEDURE — 3074F SYST BP LT 130 MM HG: CPT | Performed by: NURSE PRACTITIONER

## 2024-07-29 PROCEDURE — 1159F MED LIST DOCD IN RCRD: CPT | Performed by: NURSE PRACTITIONER

## 2024-07-29 PROCEDURE — 69210 REMOVE IMPACTED EAR WAX UNI: CPT | Performed by: NURSE PRACTITIONER

## 2024-07-29 PROCEDURE — 3051F HG A1C>EQUAL 7.0%<8.0%: CPT | Performed by: NURSE PRACTITIONER

## 2024-07-29 PROCEDURE — 1126F AMNT PAIN NOTED NONE PRSNT: CPT | Performed by: NURSE PRACTITIONER

## 2024-07-29 PROCEDURE — 3078F DIAST BP <80 MM HG: CPT | Performed by: NURSE PRACTITIONER

## 2024-07-29 PROCEDURE — 99213 OFFICE O/P EST LOW 20 MIN: CPT | Performed by: NURSE PRACTITIONER

## 2024-07-29 PROCEDURE — 1160F RVW MEDS BY RX/DR IN RCRD: CPT | Performed by: NURSE PRACTITIONER

## 2024-07-29 RX ORDER — OFLOXACIN 3 MG/ML
5 SOLUTION AURICULAR (OTIC) DAILY
Qty: 10 ML | Refills: 0 | Status: SHIPPED | OUTPATIENT
Start: 2024-07-29

## 2024-07-29 RX ORDER — AMOXICILLIN AND CLAVULANATE POTASSIUM 875; 125 MG/1; MG/1
1 TABLET, FILM COATED ORAL 2 TIMES DAILY
Qty: 20 TABLET | Refills: 0 | Status: SHIPPED | OUTPATIENT
Start: 2024-07-29

## 2024-07-29 NOTE — PROGRESS NOTES
"Subjective   Vince Olivera is a 63 y.o. male.     Chief Complaint   Patient presents with    Earache       History of Present Illness  He presents with c/o not being able to hear out of his right ear for 3 weeks. He states he had vertigo on Thursday. He denies fever.  He used some eardrops that he had at home.       The following portions of the patient's history were reviewed and updated as appropriate: allergies, current medications, past family history, past medical history, past social history, past surgical history and problem list.    Review of Systems   Constitutional:  Negative for fever.   HENT:  Positive for ear discharge, ear pain and hearing loss. Negative for rhinorrhea, sore throat and trouble swallowing.    Respiratory:  Negative for cough, shortness of breath and wheezing.    Cardiovascular:  Negative for chest pain and palpitations.   Neurological:  Positive for dizziness.       Objective     /72 (BP Location: Right arm, Patient Position: Sitting, Cuff Size: Adult)   Pulse 73   Resp 16   Ht 167.6 cm (65.98\")   Wt 85.7 kg (189 lb)   SpO2 95%   BMI 30.52 kg/m²     Physical Exam  Vitals reviewed.   Constitutional:       General: He is not in acute distress.     Appearance: He is well-developed. He is not diaphoretic.   HENT:      Head: Normocephalic and atraumatic.      Right Ear: Hearing and external ear normal. Drainage and swelling present. There is impacted cerumen. Tympanic membrane is erythematous.      Left Ear: Hearing, tympanic membrane, ear canal and external ear normal. There is impacted cerumen.      Nose: Nose normal.      Right Sinus: No maxillary sinus tenderness or frontal sinus tenderness.      Left Sinus: No maxillary sinus tenderness or frontal sinus tenderness.      Mouth/Throat:      Pharynx: Uvula midline.   Eyes:      General: Lids are normal.      Conjunctiva/sclera: Conjunctivae normal.      Pupils: Pupils are equal, round, and reactive to light.   Neck:      " Trachea: Trachea normal. No tracheal tenderness or tracheal deviation.   Cardiovascular:      Rate and Rhythm: Normal rate and regular rhythm.      Heart sounds: Normal heart sounds, S1 normal and S2 normal. No murmur heard.     No friction rub. No gallop.   Pulmonary:      Effort: Pulmonary effort is normal. No respiratory distress.      Breath sounds: Normal breath sounds.   Abdominal:      General: Bowel sounds are normal. There is no distension.      Palpations: Abdomen is soft.      Tenderness: There is no abdominal tenderness.   Skin:     General: Skin is warm and dry.   Neurological:      Mental Status: He is alert and oriented to person, place, and time.   Psychiatric:         Behavior: Behavior normal.         Thought Content: Thought content normal.         Judgment: Judgment normal.         Current Outpatient Medications   Medication Sig Dispense Refill    amLODIPine (NORVASC) 10 MG tablet Take 1 tablet by mouth Daily. 90 tablet 1    aspirin (aspirin EC) 325 MG EC tablet Take 1 tablet by mouth Daily. 90 tablet 3    atorvastatin (LIPITOR) 80 MG tablet Take 1 tablet by mouth Daily. 90 tablet 1    clopidogrel (PLAVIX) 75 MG tablet Take 1 tablet by mouth Daily. 90 tablet 1    glucose blood test strip Check glucose daily 100 each 3    glucose monitor monitoring kit 1 each Daily. 1 each 0    hydroCHLOROthiazide (HYDRODIURIL) 12.5 MG tablet Take 1 tablet by mouth Daily. 90 tablet 1    Lacosamide (Vimpat) 150 MG tablet Take 75 mg by mouth 2 (Two) Times a Day. 90 tablet 1    Lancets misc 1 Device Daily. 100 each 12    levETIRAcetam (KEPPRA) 1000 MG tablet Take 1 tablet by mouth 2 (Two) Times a Day. 180 tablet 3    levETIRAcetam (KEPPRA) 500 MG tablet Take 1 tablet by mouth 2 (Two) Times a Day. 180 tablet 3    losartan (COZAAR) 100 MG tablet Take 1 tablet by mouth Daily. Take 1/2 tab twice daily 180 tablet 3    metFORMIN (GLUCOPHAGE) 500 MG tablet Take 2 tablets by mouth 2 (Two) Times a Day With Meals. 360 tablet 1     metoprolol succinate XL (TOPROL-XL) 200 MG 24 hr tablet Take 1 tablet by mouth Daily. 90 tablet 1    TiZANidine (Zanaflex) 2 MG capsule Take 1 capsule by mouth 3 (Three) Times a Day. 90 capsule 5    amoxicillin-clavulanate (AUGMENTIN) 875-125 MG per tablet Take 1 tablet by mouth 2 (Two) Times a Day. 20 tablet 0    ofloxacin (FLOXIN) 0.3 % otic solution Administer 5 drops to the right ear Daily. 10 mL 0     Current Facility-Administered Medications   Medication Dose Route Frequency Provider Last Rate Last Admin    cyanocobalamin injection 1,000 mcg  1,000 mcg Intramuscular Q28 Days Glo Kyle APRN   1,000 mcg at 01/23/20 1530            Assessment & Plan     Problem List Items Addressed This Visit    None  Visit Diagnoses       Other non-recurrent acute nonsuppurative otitis media of right ear    -  Primary    Relevant Medications    amoxicillin-clavulanate (AUGMENTIN) 875-125 MG per tablet    Acute swimmer's ear of right side        Relevant Medications    ofloxacin (FLOXIN) 0.3 % otic solution              ICD-10-CM ICD-9-CM   1. Other non-recurrent acute nonsuppurative otitis media of right ear  H65.191 381.00   2. Acute swimmer's ear of right side  H60.331 380.12       Plan: Verbal consent obtained for bilateral ear irrigation.  Ear irrigation performed.  Large amounts of dark brown cerumen removed from bilateral ears.  Foul odor to the right ear.  Patient tolerated irrigation well.  Augmentin ordered for otitis media.  Floxin otic ordered for otitis externa.  Keep scheduled follow-up on Friday.    @Body mass index is 30.52 kg/m².              Understands disease processes and need for medications.  Understands reasons for urgent and emergent care.  Patient (& family) verbalized agreement for treatment plan.   Emotional support and active listening provided.  Patient provided time to verbalize feelings.                  This document has been electronically signed by KINGSTON Gallardo   July 29,  2024 14:23 EDT

## 2024-07-30 DIAGNOSIS — I65.23 BILATERAL CAROTID ARTERY STENOSIS: Chronic | ICD-10-CM

## 2024-07-30 DIAGNOSIS — E78.5 DYSLIPIDEMIA: Chronic | ICD-10-CM

## 2024-07-30 DIAGNOSIS — I10 ESSENTIAL HYPERTENSION: Chronic | ICD-10-CM

## 2024-07-30 NOTE — TELEPHONE ENCOUNTER
Caller: Donna Davis    Relationship: Emergency Contact    Best call back number: [phone]     Requested Prescriptions:   Requested Prescriptions     Pending Prescriptions Disp Refills    atorvastatin (LIPITOR) 80 MG tablet 90 tablet 1     Sig: Take 1 tablet by mouth Daily.    hydroCHLOROthiazide 12.5 MG tablet 90 tablet 1     Sig: Take 1 tablet by mouth Daily.    amLODIPine (NORVASC) 10 MG tablet 90 tablet 1     Sig: Take 1 tablet by mouth Daily.        Pharmacy where request should be sent: [facility] [address] [phone] [facility] [phone]      Last office visit with prescribing clinician: 7/29/2024   Last telemedicine visit with prescribing clinician: Visit date not found   Next office visit with prescribing clinician: 8/2/2024     Additional details provided by patient: PATIENT IS COMPLETELY OUT OF THESE MEDICATIONS.    Does the patient have less than a 3 day supply:  [x] Yes  [] No    Would you like a call back once the refill request has been completed: [] Yes [x] No    If the office needs to give you a call back, can they leave a voicemail: [] Yes [x] No    Maged Eng Rep   07/30/24 15:21 EDT

## 2024-07-31 DIAGNOSIS — E78.5 DYSLIPIDEMIA: Chronic | ICD-10-CM

## 2024-07-31 DIAGNOSIS — I65.23 BILATERAL CAROTID ARTERY STENOSIS: Chronic | ICD-10-CM

## 2024-07-31 DIAGNOSIS — I10 ESSENTIAL HYPERTENSION: Chronic | ICD-10-CM

## 2024-08-01 RX ORDER — HYDROCHLOROTHIAZIDE 12.5 MG/1
12.5 TABLET ORAL DAILY
Qty: 90 TABLET | Refills: 1 | Status: SHIPPED | OUTPATIENT
Start: 2024-08-01

## 2024-08-01 RX ORDER — ATORVASTATIN CALCIUM 80 MG/1
TABLET, FILM COATED ORAL
Qty: 90 TABLET | Refills: 1 | OUTPATIENT
Start: 2024-08-01

## 2024-08-01 RX ORDER — AMLODIPINE BESYLATE 10 MG/1
10 TABLET ORAL DAILY
Qty: 90 TABLET | Refills: 1 | OUTPATIENT
Start: 2024-08-01

## 2024-08-01 RX ORDER — AMLODIPINE BESYLATE 10 MG/1
10 TABLET ORAL DAILY
Qty: 90 TABLET | Refills: 1 | Status: SHIPPED | OUTPATIENT
Start: 2024-08-01

## 2024-08-01 RX ORDER — ATORVASTATIN CALCIUM 80 MG/1
80 TABLET, FILM COATED ORAL DAILY
Qty: 90 TABLET | Refills: 1 | Status: SHIPPED | OUTPATIENT
Start: 2024-08-01

## 2024-08-01 RX ORDER — HYDROCHLOROTHIAZIDE 12.5 MG/1
12.5 TABLET ORAL DAILY
Qty: 90 TABLET | Refills: 1 | OUTPATIENT
Start: 2024-08-01

## 2024-08-02 ENCOUNTER — OFFICE VISIT (OUTPATIENT)
Dept: FAMILY MEDICINE CLINIC | Facility: CLINIC | Age: 64
End: 2024-08-02
Payer: MEDICAID

## 2024-08-02 VITALS
DIASTOLIC BLOOD PRESSURE: 70 MMHG | OXYGEN SATURATION: 95 % | HEART RATE: 80 BPM | BODY MASS INDEX: 30.37 KG/M2 | SYSTOLIC BLOOD PRESSURE: 118 MMHG | HEIGHT: 66 IN | RESPIRATION RATE: 16 BRPM | TEMPERATURE: 98.2 F | WEIGHT: 189 LBS

## 2024-08-02 DIAGNOSIS — H60.331 ACUTE SWIMMER'S EAR OF RIGHT SIDE: ICD-10-CM

## 2024-08-02 DIAGNOSIS — E11.59 TYPE 2 DIABETES MELLITUS WITH OTHER CIRCULATORY COMPLICATION, WITHOUT LONG-TERM CURRENT USE OF INSULIN: Chronic | ICD-10-CM

## 2024-08-02 DIAGNOSIS — E78.5 DYSLIPIDEMIA: Chronic | ICD-10-CM

## 2024-08-02 DIAGNOSIS — I10 ESSENTIAL HYPERTENSION: Primary | ICD-10-CM

## 2024-08-02 DIAGNOSIS — H65.191 OTHER NON-RECURRENT ACUTE NONSUPPURATIVE OTITIS MEDIA OF RIGHT EAR: ICD-10-CM

## 2024-08-02 DIAGNOSIS — Z12.5 SCREENING PSA (PROSTATE SPECIFIC ANTIGEN): ICD-10-CM

## 2024-08-02 DIAGNOSIS — Z23 NEED FOR STREPTOCOCCUS PNEUMONIAE VACCINATION: ICD-10-CM

## 2024-08-02 LAB
ALBUMIN SERPL-MCNC: 4.6 G/DL (ref 3.5–5.2)
ALBUMIN/GLOB SERPL: 1.6 G/DL
ALP SERPL-CCNC: 67 U/L (ref 39–117)
ALT SERPL W P-5'-P-CCNC: 16 U/L (ref 1–41)
ANION GAP SERPL CALCULATED.3IONS-SCNC: 11.7 MMOL/L (ref 5–15)
AST SERPL-CCNC: 11 U/L (ref 1–40)
BILIRUB SERPL-MCNC: 0.3 MG/DL (ref 0–1.2)
BUN SERPL-MCNC: 13 MG/DL (ref 8–23)
BUN/CREAT SERPL: 12.7 (ref 7–25)
CALCIUM SPEC-SCNC: 10.7 MG/DL (ref 8.6–10.5)
CHLORIDE SERPL-SCNC: 97 MMOL/L (ref 98–107)
CHOLEST SERPL-MCNC: 100 MG/DL (ref 0–200)
CO2 SERPL-SCNC: 27.3 MMOL/L (ref 22–29)
CREAT SERPL-MCNC: 1.02 MG/DL (ref 0.76–1.27)
DEPRECATED RDW RBC AUTO: 45.3 FL (ref 37–54)
EGFRCR SERPLBLD CKD-EPI 2021: 83.1 ML/MIN/1.73
EOSINOPHIL # BLD MANUAL: 0.29 10*3/MM3 (ref 0–0.4)
EOSINOPHIL NFR BLD MANUAL: 2 % (ref 0.3–6.2)
ERYTHROCYTE [DISTWIDTH] IN BLOOD BY AUTOMATED COUNT: 13.4 % (ref 12.3–15.4)
GLOBULIN UR ELPH-MCNC: 2.8 GM/DL
GLUCOSE SERPL-MCNC: 116 MG/DL (ref 65–99)
HBA1C MFR BLD: 7.2 % (ref 4.8–5.6)
HCT VFR BLD AUTO: 43 % (ref 37.5–51)
HDLC SERPL-MCNC: 27 MG/DL (ref 40–60)
HGB BLD-MCNC: 14.4 G/DL (ref 13–17.7)
LARGE PLATELETS: ABNORMAL
LDLC SERPL CALC-MCNC: 31 MG/DL (ref 0–100)
LDLC/HDLC SERPL: 0.64 {RATIO}
LYMPHOCYTES # BLD MANUAL: 6.03 10*3/MM3 (ref 0.7–3.1)
LYMPHOCYTES NFR BLD MANUAL: 9 % (ref 5–12)
MCH RBC QN AUTO: 30.9 PG (ref 26.6–33)
MCHC RBC AUTO-ENTMCNC: 33.5 G/DL (ref 31.5–35.7)
MCV RBC AUTO: 92.3 FL (ref 79–97)
MONOCYTES # BLD: 1.32 10*3/MM3 (ref 0.1–0.9)
NEUTROPHILS # BLD AUTO: 7.06 10*3/MM3 (ref 1.7–7)
NEUTROPHILS NFR BLD MANUAL: 48 % (ref 42.7–76)
PLATELET # BLD AUTO: 273 10*3/MM3 (ref 140–450)
PMV BLD AUTO: 8.8 FL (ref 6–12)
POTASSIUM SERPL-SCNC: 4 MMOL/L (ref 3.5–5.2)
PROT SERPL-MCNC: 7.4 G/DL (ref 6–8.5)
RBC # BLD AUTO: 4.66 10*6/MM3 (ref 4.14–5.8)
RBC MORPH BLD: NORMAL
SMUDGE CELLS BLD QL SMEAR: ABNORMAL
SODIUM SERPL-SCNC: 136 MMOL/L (ref 136–145)
TRIGL SERPL-MCNC: 278 MG/DL (ref 0–150)
VARIANT LYMPHS NFR BLD MANUAL: 41 % (ref 19.6–45.3)
VLDLC SERPL-MCNC: 42 MG/DL (ref 5–40)
WBC NRBC COR # BLD AUTO: 14.71 10*3/MM3 (ref 3.4–10.8)

## 2024-08-02 PROCEDURE — 85007 BL SMEAR W/DIFF WBC COUNT: CPT | Performed by: NURSE PRACTITIONER

## 2024-08-02 PROCEDURE — 85025 COMPLETE CBC W/AUTO DIFF WBC: CPT | Performed by: NURSE PRACTITIONER

## 2024-08-02 PROCEDURE — 90677 PCV20 VACCINE IM: CPT | Performed by: NURSE PRACTITIONER

## 2024-08-02 PROCEDURE — 1126F AMNT PAIN NOTED NONE PRSNT: CPT | Performed by: NURSE PRACTITIONER

## 2024-08-02 PROCEDURE — 1160F RVW MEDS BY RX/DR IN RCRD: CPT | Performed by: NURSE PRACTITIONER

## 2024-08-02 PROCEDURE — 82043 UR ALBUMIN QUANTITATIVE: CPT | Performed by: NURSE PRACTITIONER

## 2024-08-02 PROCEDURE — 3074F SYST BP LT 130 MM HG: CPT | Performed by: NURSE PRACTITIONER

## 2024-08-02 PROCEDURE — 3078F DIAST BP <80 MM HG: CPT | Performed by: NURSE PRACTITIONER

## 2024-08-02 PROCEDURE — 80053 COMPREHEN METABOLIC PANEL: CPT | Performed by: NURSE PRACTITIONER

## 2024-08-02 PROCEDURE — 1159F MED LIST DOCD IN RCRD: CPT | Performed by: NURSE PRACTITIONER

## 2024-08-02 PROCEDURE — 99214 OFFICE O/P EST MOD 30 MIN: CPT | Performed by: NURSE PRACTITIONER

## 2024-08-02 PROCEDURE — 90471 IMMUNIZATION ADMIN: CPT | Performed by: NURSE PRACTITIONER

## 2024-08-02 PROCEDURE — 3051F HG A1C>EQUAL 7.0%<8.0%: CPT | Performed by: NURSE PRACTITIONER

## 2024-08-02 PROCEDURE — G0103 PSA SCREENING: HCPCS | Performed by: NURSE PRACTITIONER

## 2024-08-02 PROCEDURE — 80061 LIPID PANEL: CPT | Performed by: NURSE PRACTITIONER

## 2024-08-02 PROCEDURE — 83036 HEMOGLOBIN GLYCOSYLATED A1C: CPT | Performed by: NURSE PRACTITIONER

## 2024-08-02 NOTE — PROGRESS NOTES
"Subjective   Vince Olivera is a 63 y.o. male.     Chief Complaint   Patient presents with    Diabetes    Hypertension    Hyperlipidemia       History of Present Illness  He presents for follow up of essential hypertension, mixed hyperlipidemia, and type II DM. He reports good compliance with medications. He doesn't check his blood pressure or his blood sugar. He states he has had a tremor on the left side since he had seizures in the past. He also presents for follow up of otitis media and otitis externa. He states he is taking the antibiotic and is feeling much better. The dizziness has resolved.  Cardiology and neurology notes reviewed.       The following portions of the patient's history were reviewed and updated as appropriate: allergies, current medications, past family history, past medical history, past social history, past surgical history and problem list.    Review of Systems   Constitutional:  Negative for fever.   HENT:  Negative for ear discharge and ear pain.    Respiratory:  Negative for cough, shortness of breath and wheezing.    Cardiovascular:  Negative for chest pain and palpitations.   Gastrointestinal:  Negative for diarrhea, nausea and vomiting.   Genitourinary:  Negative for dysuria and hematuria.       Objective     /70 (BP Location: Right arm, Patient Position: Sitting, Cuff Size: Adult)   Pulse 80   Temp 98.2 °F (36.8 °C) (Oral)   Resp 16   Ht 167.6 cm (65.98\")   Wt 85.7 kg (189 lb)   SpO2 95%   BMI 30.52 kg/m²     Physical Exam  Vitals reviewed.   Constitutional:       General: He is not in acute distress.     Appearance: He is well-developed. He is not diaphoretic.   HENT:      Head: Normocephalic and atraumatic.      Right Ear: Ear canal and external ear normal. Tympanic membrane is erythematous. Tympanic membrane is not bulging.      Left Ear: Hearing, tympanic membrane, ear canal and external ear normal.   Cardiovascular:      Rate and Rhythm: Normal rate and regular " rhythm.      Heart sounds: Normal heart sounds. No murmur heard.     No friction rub. No gallop.   Pulmonary:      Effort: Pulmonary effort is normal. No respiratory distress.      Breath sounds: Normal breath sounds.   Abdominal:      General: Bowel sounds are normal. There is no distension.      Palpations: Abdomen is soft.      Tenderness: There is no abdominal tenderness.   Skin:     General: Skin is warm and dry.   Neurological:      Mental Status: He is alert and oriented to person, place, and time.      Comments: Strength 5/5 bilateral upper and lower extremities.   Psychiatric:         Behavior: Behavior normal.         Thought Content: Thought content normal.         Judgment: Judgment normal.         Current Outpatient Medications   Medication Sig Dispense Refill    amLODIPine (NORVASC) 10 MG tablet Take 1 tablet by mouth Daily. 90 tablet 1    amoxicillin-clavulanate (AUGMENTIN) 875-125 MG per tablet Take 1 tablet by mouth 2 (Two) Times a Day. 20 tablet 0    aspirin (aspirin EC) 325 MG EC tablet Take 1 tablet by mouth Daily. 90 tablet 3    atorvastatin (LIPITOR) 80 MG tablet Take 1 tablet by mouth Daily. 90 tablet 1    clopidogrel (PLAVIX) 75 MG tablet Take 1 tablet by mouth Daily. 90 tablet 1    glucose blood test strip Check glucose daily 100 each 3    glucose monitor monitoring kit 1 each Daily. 1 each 0    hydroCHLOROthiazide 12.5 MG tablet Take 1 tablet by mouth Daily. 90 tablet 1    Lacosamide (Vimpat) 150 MG tablet Take 75 mg by mouth 2 (Two) Times a Day. 90 tablet 1    Lancets misc 1 Device Daily. 100 each 12    levETIRAcetam (KEPPRA) 1000 MG tablet Take 1 tablet by mouth 2 (Two) Times a Day. 180 tablet 3    levETIRAcetam (KEPPRA) 500 MG tablet Take 1 tablet by mouth 2 (Two) Times a Day. 180 tablet 3    losartan (COZAAR) 100 MG tablet Take 1 tablet by mouth Daily. Take 1/2 tab twice daily 180 tablet 3    metFORMIN (GLUCOPHAGE) 500 MG tablet Take 2 tablets by mouth 2 (Two) Times a Day With Meals.  360 tablet 1    metoprolol succinate XL (TOPROL-XL) 200 MG 24 hr tablet Take 1 tablet by mouth Daily. 90 tablet 1    ofloxacin (FLOXIN) 0.3 % otic solution Administer 5 drops to the right ear Daily. 10 mL 0    TiZANidine (Zanaflex) 2 MG capsule Take 1 capsule by mouth 3 (Three) Times a Day. 90 capsule 5     Current Facility-Administered Medications   Medication Dose Route Frequency Provider Last Rate Last Admin    cyanocobalamin injection 1,000 mcg  1,000 mcg Intramuscular Q28 Days Glo Kyle APRN   1,000 mcg at 01/23/20 1530            Assessment & Plan     Problem List Items Addressed This Visit       Essential hypertension - Primary (Chronic)    Relevant Orders    CBC & Differential    Comprehensive Metabolic Panel    Dyslipidemia    Relevant Orders    Lipid Panel    Diabetes mellitus    Relevant Orders    Hemoglobin A1c    MicroAlbumin, Urine, Random - Urine, Clean Catch     Other Visit Diagnoses       Screening PSA (prostate specific antigen)        Relevant Orders    PSA Screen    Need for Streptococcus pneumoniae vaccination        Relevant Orders    Pneumococcal Conjugate Vaccine 20-Valent (PCV20)              ICD-10-CM ICD-9-CM   1. Essential hypertension  I10 401.9   2. Dyslipidemia  E78.5 272.4   3. Type 2 diabetes mellitus with other circulatory complication, without long-term current use of insulin  E11.59 250.70   4. Screening PSA (prostate specific antigen)  Z12.5 V76.44   5. Need for Streptococcus pneumoniae vaccination  Z23 V03.82       Plan: Continue current meds. Get labs today. Strength is normal. I suspect tremor is musculoskeletal. Ears are improving.     @Body mass index is 30.52 kg/m².           Understands disease processes and need for medications.  Understands reasons for urgent and emergent care.  Patient (& family) verbalized agreement for treatment plan.   Emotional support and active listening provided.  Patient provided time to verbalize feelings.                  This  document has been electronically signed by KINGSTON Gallardo   August 2, 2024 13:21 EDT

## 2024-08-02 NOTE — PROGRESS NOTES
Venipuncture Blood Specimen Collection  Venipuncture performed in right arm by Mary Baker MA with good hemostasis. Patient tolerated the procedure well without complications.   08/02/24   Mary Baker MA    Immunization  Immunization performed in left arm by Mary Baker MA. Patient tolerated the procedure well without complications.  08/02/24   Mary Baker MA

## 2024-08-03 LAB
ALBUMIN UR-MCNC: <1.2 MG/DL
PSA SERPL-MCNC: 1.03 NG/ML (ref 0–4)

## 2024-08-05 DIAGNOSIS — E83.52 HYPERCALCEMIA: Primary | ICD-10-CM

## 2024-08-05 NOTE — PROGRESS NOTES
A1c is 7.2.  White count is stable.  Calcium is a little high.  Have him come in for recheck this week.

## 2024-08-06 LAB — REF LAB TEST METHOD: NORMAL

## 2024-08-19 ENCOUNTER — CLINICAL SUPPORT (OUTPATIENT)
Dept: FAMILY MEDICINE CLINIC | Facility: CLINIC | Age: 64
End: 2024-08-19
Payer: MEDICAID

## 2024-08-19 DIAGNOSIS — E83.52 HYPERCALCEMIA: ICD-10-CM

## 2024-08-19 LAB — CA-I SERPL ISE-MCNC: 1.23 MMOL/L (ref 1.12–1.32)

## 2024-08-19 PROCEDURE — 82330 ASSAY OF CALCIUM: CPT | Performed by: NURSE PRACTITIONER

## 2024-08-19 PROCEDURE — 36415 COLL VENOUS BLD VENIPUNCTURE: CPT | Performed by: NURSE PRACTITIONER

## 2024-08-19 PROCEDURE — 83970 ASSAY OF PARATHORMONE: CPT | Performed by: NURSE PRACTITIONER

## 2024-08-19 PROCEDURE — 82306 VITAMIN D 25 HYDROXY: CPT | Performed by: NURSE PRACTITIONER

## 2024-08-19 NOTE — PROGRESS NOTES
Venipuncture Blood Specimen Collection  Venipuncture performed in left arm by Chhaya Savage MA with good hemostasis. Patient tolerated the procedure well without complications.   08/19/24   Chhaya Savage MA

## 2024-08-20 DIAGNOSIS — E55.9 VITAMIN D DEFICIENCY: Primary | ICD-10-CM

## 2024-08-20 LAB
25(OH)D3 SERPL-MCNC: 25.6 NG/ML (ref 30–100)
PTH-INTACT SERPL-MCNC: 13 PG/ML (ref 15–65)

## 2024-08-20 RX ORDER — ERGOCALCIFEROL 1.25 MG/1
50000 CAPSULE ORAL WEEKLY
Qty: 5 CAPSULE | Refills: 5 | Status: SHIPPED | OUTPATIENT
Start: 2024-08-20

## 2024-10-15 ENCOUNTER — TELEPHONE (OUTPATIENT)
Dept: FAMILY MEDICINE CLINIC | Facility: CLINIC | Age: 64
End: 2024-10-15
Payer: MEDICAID

## 2024-10-15 DIAGNOSIS — I63.9 CEREBROVASCULAR ACCIDENT (CVA), UNSPECIFIED MECHANISM: Chronic | ICD-10-CM

## 2024-10-15 RX ORDER — CLOPIDOGREL BISULFATE 75 MG/1
75 TABLET ORAL DAILY
Qty: 90 TABLET | Refills: 1 | Status: SHIPPED | OUTPATIENT
Start: 2024-10-15

## 2024-10-15 NOTE — TELEPHONE ENCOUNTER
Caller: Donna Davis    Relationship: Emergency Contact    Best call back number: [phone]    Requested Prescriptions:   Requested Prescriptions      No prescriptions requested or ordered in this encounter     Clopidogrel (PLAVIX) 75 MG tablet     Pharmacy where request should be sent: [facility] [place], [place] - [address]  - [phone] [facility] [phone] FX     Last office visit with prescribing clinician: 8/2/2024   Last telemedicine visit with prescribing clinician: Visit date not found   Next office visit with prescribing clinician: 2/3/2025     Additional details provided by patient:    COMPLETELY OUT OF MEDICATION      Does the patient have less than a 3 day supply:  [x] Yes  [] No    Would you like a call back once the refill request has been completed: [] Yes [x] No    If the office needs to give you a call back, can they leave a voicemail: [] Yes [x] No    Rae Hatfield, PCT   10/15/24 16:01 EDT

## 2024-11-13 DIAGNOSIS — G40.909 SEIZURE DISORDER: ICD-10-CM

## 2024-11-13 RX ORDER — LACOSAMIDE 150 MG/1
75 TABLET ORAL 2 TIMES DAILY
Qty: 90 TABLET | Refills: 1 | OUTPATIENT
Start: 2024-11-13

## 2024-11-13 NOTE — TELEPHONE ENCOUNTER
I spoke with pharmacy and the last fill was from script from 5/2024 and they did receive the script from 7/2024 with 6 refills and will fill for pt.  I called and let pt's wife know that pharmacy is getting medication ready

## 2024-11-13 NOTE — TELEPHONE ENCOUNTER
Caller: Donna Davis    Relationship: Emergency Contact    Best call back number: [phone]    Requested Prescriptions:   Requested Prescriptions     Pending Prescriptions Disp Refills    Lacosamide (Vimpat) 150 MG tablet 90 tablet 1     Sig: Take 75 mg by mouth 2 (Two) Times a Day.        Pharmacy where request should be sent: [facility] [place], [place] - [address] [facility] - [phone] [facility] [phone]      Last office visit with prescribing clinician: 7/23/2024   Last telemedicine visit with prescribing clinician: Visit date not found   Next office visit with prescribing clinician: 7/14/2025     Additional details provided by patient: PATIENT IS COMPLETELY OUT OF THIS MEDICATION.    Does the patient have less than a 3 day supply:  [x] Yes  [] No    Would you like a call back once the refill request has been completed: [] Yes [x] No    If the office needs to give you a call back, can they leave a voicemail: [] Yes [x] No    Maged Aceves Rep   11/13/24 11:12 EST

## 2024-12-02 NOTE — TELEPHONE ENCOUNTER
"  The patient has been notified of following:      \"This virtual  visit will be conducted via a call between you and your physician/provider. We have found that certain health care needs can be provided without the need for a physical exam.  This service lets us provide the care you need virtually/via video   If a prescription is necessary we can send it directly to your pharmacy.  If lab work is needed we can place an order for that and you can then stop by our lab to have the test done at a later time.     Virtual/Video visits are billed at different rates depending on your insurance coverage.Some insurers they may be billed the same as an in-person visit.  Please reach out to your insurance provider with any questions.    Patient has given verbal consent for virtual  visit : Yes      Ho w would you like to obtain your AVS? Mail a copy  If the video visit is dropped, the invitation should be resent by: Send to e-mail at: [email or web address]  Will anyone else be joining your video visit? No            Psychiatric  Out- Patient  Follow Up Progress Note  Date of visit:12/2/2024           Discussion of Care and Treatment Recommendations:   This is a 28 year old male with past psychiatric history significant of bipolar 1, depression and ADHD .Pt presets to the clinic for a follow up appointment.      Last visit 08/29/2024.  Recommendation at last visit .     1.ADHD  : Continue Adderall 20 mg daily  2.  Bipolar-continue lithium 300 mg twice daily  3.  Depression-continue sertraline 50 mg daily-patient is considering weaning off in the next few months  4.  Highly recommend psychotherapy-has an upcoming appointment to initiate care  5.  High risk medication : baseline EKG ordered-Labs , lithium level ,basic metabolic panel, lipid panel, CBC with differential, hemoglobin A1c.  Baseline POC DM   6.  Return to the clinic in approximately 12 weeks calling between visits any questions or concerns  Patient and I reviewed " Last filled: [facility] 9/11/2019 refill 6   Last seen: 3-23-20   diagnosis and treatment plan and patient agrees with following recommendations:  Ongoing education given regarding diagnostic and treatment options with adequate verbalization of understanding.  Plan      1.ADHD  : Continue Adderall 20 mg daily  2.  Bipolar-continue lithium 300 mg twice daily  3.  Depression-continue sertraline 50 mg daily-patient is considering weaning off in the next few months  4.  Highly recommend psychotherapy- Is now seeing a therapist - meets bi-weekly   5.  Return to the clinic in approximately 12 weeks calling between visits any questions or concerns         DIagnoses:    Bipolar I disorder (H)  Eating disorder, unspecified type (history)  Attention deficit hyperactivity disorder (ADHD), predominantly inattentive type  Long term use of drug          Patient Active Problem List   Diagnosis    Bipolar I disorder (H)    Class 1 obesity due to excess calories without serious comorbidity with body mass index (BMI) of 31.0 to 31.9 in adult             Chief Complaint / Subjective:    Chief complaint: Bipolar disorder    History of Present Illness:     Patient reports compliance with current medications and denies side effects.  Symptoms are well-managed at this time.  He is currently engaged in psychotherapy and finds it helpful.  He was recently started on Ozempic for weight loss and has currently lost 40 pounds.  He is working closely with primary care who is managing the Ozempic.  We discussed current medications benefits and side effects profile including possible further weight loss while on both Ozempic and stimulants.  Patient will continue to follow closely with primary care and myself to monitor for any possible symptoms of drug interaction and side effects.  Also working closely with the pharmacist.  No other concerns today.       answers submitted by the patient for this visit:  Patient Health Questionnaire (Submitted on 12/2/2024)  If you checked off any problems, how difficult have  "these problems made it for you to do your work, take care of things at home, or get along with other people?: Somewhat difficult  PHQ9 TOTAL SCORE: 4  Patient Health Questionnaire (G7) (Submitted on 12/2/2024)  AMAURY 7 TOTAL SCORE: 13    Mental Status Examination:   Appearance: Well groomed, good eye contact   Orientation: Patient alert and oriented to person, place, time, and situation  Reliability:  Patient appears to be an adequate historian.    Behavior: cooperative   Speech: Speech is spontaneous and coherent, with a normal rate, rhythm and tone.    Language:There are no difficulties with expressive or receptive language as observed throughout the interview.    Mood: Described as \"ok\".    Affect: congruent   Judgement: Able to make basic decision regarding safety.  Insight: Good awareness of physical and mental health conditions and aware of needs around care for these.  Gait and station: unable to assess  Thought process: Logical   Thought content: No evidence of delusions or paranoia.    Hallucinations : No evidence of any hallucination  Thought content: No evidence of delusions or paranoia.   Suicidal /Homical Ideations:  No thoughts of self harm or suicide. No thoughts of harming others.  Associations: Connected  Fund of knowledge: Average  Attention / Concentration: Able to remain focused during the interview with minimal distractibility or need for redirection.  Short Term Memory: Grossly intact as evidence by client recalling themes and ideas discussed.  Long Term Memory: Intact  Motor Status: unable to asse    Drug/treatment history and current pattern of use:   Seizures/DTs/hallucinations: Denies   Past Use: Numerous drugs in college, former cannabis use. LSD, ketamine, cannabis, DMT, psylocybin, research chemicals. No opioids or benzodiazepines   Legal Consequences- None  No current use of substance use, denies any current use.   Alcohol: None  Nicotine: None  Caffeine: 600-800mg caffeine a day "   Opioids: None                 Narcan Kit: N/A  THC/CBD: None   Other Illicit Drugs: none        Medication changes: See Above   Medication adherence: compliant  Medication side effects: absent  Information about medications: Side effects, benefits and alternative treatments discussed and patient agrees .    Psychotherapy: Supportive therapy day-to-day living    Education: Diet, exercise, abstinence from drugs and alcohol, patient will not drive if sedated and medications or  under influence of any substance    Lab Results:   Personally reviewed and discussed with the patient    Lab Results   Component Value Date    WBC 7.6 09/06/2024    HGB 17.1 09/06/2024    HCT 51.3 09/06/2024     09/06/2024    CHOL 213 (H) 12/11/2023    TRIG 182 (H) 12/11/2023    HDL 33 (L) 12/11/2023    ALT 27 09/06/2024    AST 21 09/06/2024     09/06/2024    BUN 15.9 09/06/2024    CO2 26 09/06/2024    TSH 1.27 12/11/2023       Vital signs:  Ht 1.829 m (6')   Wt 91.2 kg (201 lb)   BMI 27.26 kg/m    Telemedicine visit-no vital signs completed  Allergies: Patient has no known allergies.         Medications:     Current Outpatient Medications   Medication Sig Dispense Refill    amphetamine-dextroamphetamine (ADDERALL XR) 10 MG 24 hr capsule Take 2 capsules (20 mg) by mouth daily 60 capsule 0    amphetamine-dextroamphetamine (ADDERALL XR) 20 MG 24 hr capsule Take 1 capsule (20 mg) by mouth daily. 30 capsule 0    COMPOUNDED NON-CONTROLLED SUBSTANCE (CMPD RX) - PHARMACY TO MIX COMPOUNDED MEDICATION Semaglutide 1.5mg subcutaneously every 7 days 2 mL 1    lithium ER (LITHOBID) 300 MG CR tablet Take 2 tablets (600 mg) by mouth daily 180 tablet 0    sertraline (ZOLOFT) 50 MG tablet Take 1 tablet (50 mg) by mouth daily 90 tablet 0     No current facility-administered medications for this visit.         No current facility-administered medications for this visit.     No current facility-administered medications for this visit.          Medication adherence: Reviewed risk/benefits of medication , Patient able to verbalize understanding of side effects and Patient verbally consents to taking medications      PSYCHOEDUCATION:  Medication side effects and alternatives reviewed. Health promotion activities recommended and reviewed today. All questions addressed. Education and counseling completed regarding risks and benefits of medications and psychotherapy options.  Consent provided by patient/guardian  Call the psychiatric nurse line with medication questions or concerns at [phone].  ZettaCorehart may be used to communicate with your provider, but this is not intended to be used for emergencies.  SEROTONIN SYNDROME:  Discussed risks of Serotonin syndrome (ie, serotonin toxicity) which is a potentially life-threatening condition associated with increased serotonergic activity in the central nervous system (CNS). It is seen with therapeutic medication use, inadvertent interactions between drugs, and intentional self-poisoning. Serotonin syndrome may involve a spectrum of clinical findings, which often include mental status changes, autonomic hyperactivity, and neuromuscular abnormalities.    STIMULANT THERAPY: Side effects discussed including but not limited to cardiac (including HTN, tachycardia, sudden death), motor/tic, appetite/growth, mood lability and sleep disruption. This is a controlled substance with risk for abuse, need to keep in a safe keep place and cannot replace lost scripts  HARM REDUCTION:  Discussions regarding effects of mood altering substances, alcohol and cannabis, on mood and that approach is harm reduction, will continue to prescribe meds as they work to cut back use.    SAFETY:  We all care about your loved one's safety. To reduce the risk of self-harm, remove access to all:  Firearms, Medicines (both prescribed and over-the-counter), Knives and other sharp objects, Ropes and like materials, and Alcohol  SLEEP HYGIENE:  establish a sleep routine, limit screen time 1 hour prior to bed, use bed for sleep only, take sleep/medications on time (including sleepy time tea, trazadone or herbal treatments such as melatonin), aroma therapy, limit caffeine/sugar, yoga, guided imagery, stretch, meditation, limit naps to 20 minutes, make a temperature change in the room, white noise, be mindful of slowing down breathing, take a warm bath/shower, frequently wash sheets, and journaling.   Medlineplus.gov is information for patients.  It is run by the [employer] and it contains information about all disorders, diseases and all medications.              Review of Systems:      ROS:    Subjective Data Only- Tele-Health Visit    10 point ROS was negative except for the items listed in HPI.      Crisis Resources:    Present to the Emergency Department as needed or call after hours crisis line at [phone] or [phone].   [place] Crisis Text Line: Text [place] to 996919.  Suicide LifeLine Chat: suicideLeftRight Studios.org/chat/.  [place] Suicide Prevention Lifeline: 687.374.1906 (TTY: 316.790.1311). Call anytime for help.  (www.suicidepreventionlifeline.org)  National [place] on Mental Illness (www.ileana.org): 179.216.2400 or 140-457-4337.  Mental Health Association (www.mentalhealth.org): 275.137.2373 or 133-991-6067.      Coordination of Care:   More than 50% of time spent on coordination of care including: Educating patient about diagnosis, prognosis, side effects and benefits of medications, diet, exercise.  Time also spent providing supportive therapy regarding above issues.    Video-Visit Details    Type of service:  Video Visit    Originating Location (pt. Location): Home    Distant Location (provider location): Providers Remote Office     Platform used for Video Visit: popAD      Disclaimer: This note consists of symbols derived from keyboarding, dictation and/or voice recognition software. As a result, there may be  errors in the script that have gone undetected. Please consider this when interpreting information found in this chart.    Start Time : 0900  End time : 0930

## 2024-12-05 DIAGNOSIS — E11.59 TYPE 2 DIABETES MELLITUS WITH OTHER CIRCULATORY COMPLICATION, WITHOUT LONG-TERM CURRENT USE OF INSULIN: Chronic | ICD-10-CM

## 2024-12-30 DIAGNOSIS — G40.909 SEIZURE DISORDER: ICD-10-CM

## 2024-12-30 RX ORDER — LEVETIRACETAM 1000 MG/1
1000 TABLET ORAL 2 TIMES DAILY
Qty: 180 TABLET | Refills: 3 | Status: SHIPPED | OUTPATIENT
Start: 2024-12-30

## 2024-12-30 RX ORDER — LEVETIRACETAM 500 MG/1
500 TABLET ORAL 2 TIMES DAILY
Qty: 180 TABLET | Refills: 3 | Status: SHIPPED | OUTPATIENT
Start: 2024-12-30

## 2024-12-30 NOTE — TELEPHONE ENCOUNTER
I sent both for 1 year 7/2024. Unsure why he is out. Please ensure pharmacy received my prescriptions because he should have been good for an entire year.

## 2024-12-30 NOTE — TELEPHONE ENCOUNTER
Caller: KAREEN    Ramesh call back number:   Telephone Information:   Mobile [phone]         Requested Prescriptions:   Requested Prescriptions     Pending Prescriptions Disp Refills    levETIRAcetam (KEPPRA) 500 MG tablet 180 tablet 3     Sig: Take 1 tablet by mouth 2 (Two) Times a Day.        Pharmacy where request should be sent: [facility] [address] [facility] [phone] [facility] [phone]      Last office visit with prescribing clinician: 7/23/2024   Last telemedicine visit with prescribing clinician: Visit date not found   Next office visit with prescribing clinician: 7/14/2025     Additional details provided by patient: PT IS COMPLETELY OUT OF MEDICATION, HE WAS ABLE TO TAKE THE MORNING DOSE TODAY.     Does the patient have less than a 3 day supply:  [x] Yes  [] No    Would you like a call back once the refill request has been completed: [] Yes [x] No    If the office needs to give you a call back, can they leave a voicemail: [] Yes [x] No    Maged Anderson Rep   12/30/24 13:33 EST

## 2025-01-13 DIAGNOSIS — M54.50 CHRONIC BILATERAL LOW BACK PAIN WITHOUT SCIATICA: Chronic | ICD-10-CM

## 2025-01-13 DIAGNOSIS — G89.29 CHRONIC BILATERAL LOW BACK PAIN WITHOUT SCIATICA: Chronic | ICD-10-CM

## 2025-01-13 RX ORDER — TIZANIDINE HYDROCHLORIDE 2 MG/1
2 CAPSULE, GELATIN COATED ORAL 3 TIMES DAILY
Qty: 90 CAPSULE | Refills: 5 | Status: SHIPPED | OUTPATIENT
Start: 2025-01-13

## 2025-01-13 NOTE — TELEPHONE ENCOUNTER
Caller: Donna Davis    Relationship: Emergency Contact    Best call back number: [phone]     Requested Prescriptions:   Requested Prescriptions     Pending Prescriptions Disp Refills    TiZANidine (Zanaflex) 2 MG capsule 90 capsule 5     Sig: Take 1 capsule by mouth 3 (Three) Times a Day.        Pharmacy where request should be sent: [facility] [address] [facility] - [phone] [facility] [phone]      Last office visit with prescribing clinician: 8/2/2024   Last telemedicine visit with prescribing clinician: Visit date not found   Next office visit with prescribing clinician: 2/3/2025     Additional details provided by patient: PATIENT IS OUT OF THE MEDICATION    Does the patient have less than a 3 day supply:  [x] Yes  [] No    Would you like a call back once the refill request has been completed: [] Yes [x] No    If the office needs to give you a call back, can they leave a voicemail: [] Yes [x] No    Maged Berg Rep   01/13/25 15:24 EST

## 2025-01-22 DIAGNOSIS — I10 ESSENTIAL HYPERTENSION: Chronic | ICD-10-CM

## 2025-01-22 RX ORDER — METOPROLOL SUCCINATE 200 MG/1
200 TABLET, EXTENDED RELEASE ORAL DAILY
Qty: 90 TABLET | Refills: 0 | Status: SHIPPED | OUTPATIENT
Start: 2025-01-22

## 2025-01-22 NOTE — TELEPHONE ENCOUNTER
Caller: Donna Davis    Relationship: Emergency Contact    Best call back number: [phone]     Requested Prescriptions:   Requested Prescriptions     Pending Prescriptions Disp Refills    metoprolol succinate XL (TOPROL-XL) 200 MG 24 hr tablet 90 tablet 1     Sig: Take 1 tablet by mouth Daily.        Pharmacy where request should be sent: [facility] [address] [facility] [phone] [facility] [phone]      Last office visit with prescribing clinician: 8/2/2024   Last telemedicine visit with prescribing clinician: Visit date not found   Next office visit with prescribing clinician: 2/3/2025     Additional details provided by patient: PATIENT IS COMPLETLEY OUT OF THIS MEDICATION.    Does the patient have less than a 3 day supply:  [x] Yes  [] No    Would you like a call back once the refill request has been completed: [] Yes [x] No    If the office needs to give you a call back, can they leave a voicemail: [] Yes [x] No    Maged Eng Rep   01/22/25 10:31 EST

## 2025-01-22 NOTE — TELEPHONE ENCOUNTER
KAREEN (GIRLFRIEND) CALLED AND SAYS THAT SHANEKA NEEDS METOPROLOL CALLED IN. HE HAS BEEN OUT FOR TWO DAYS.

## 2025-01-28 DIAGNOSIS — E78.5 DYSLIPIDEMIA: Chronic | ICD-10-CM

## 2025-01-28 DIAGNOSIS — I10 ESSENTIAL HYPERTENSION: Chronic | ICD-10-CM

## 2025-01-28 RX ORDER — AMLODIPINE BESYLATE 10 MG/1
10 TABLET ORAL DAILY
Qty: 90 TABLET | Refills: 1 | Status: SHIPPED | OUTPATIENT
Start: 2025-01-28

## 2025-01-28 RX ORDER — ATORVASTATIN CALCIUM 80 MG/1
80 TABLET, FILM COATED ORAL DAILY
Qty: 90 TABLET | Refills: 1 | Status: SHIPPED | OUTPATIENT
Start: 2025-01-28

## 2025-01-28 NOTE — TELEPHONE ENCOUNTER
Caller: RyanDonna    Relationship: Emergency Contact    Best call back number: [phone]     Requested Prescriptions:   Requested Prescriptions     Pending Prescriptions Disp Refills    atorvastatin (LIPITOR) 80 MG tablet 90 tablet 1     Sig: Take 1 tablet by mouth Daily.    amLODIPine (NORVASC) 10 MG tablet 90 tablet 1     Sig: Take 1 tablet by mouth Daily.        Pharmacy where request should be sent: [facility] [address] [facility] [phone] [facility] [phone]      Last office visit with prescribing clinician: 8/2/2024   Last telemedicine visit with prescribing clinician: Visit date not found   Next office visit with prescribing clinician: 2/3/2025     Additional details provided by patient: PATIENT IS OUT.     Does the patient have less than a 3 day supply:  [x] Yes  [] No    Would you like a call back once the refill request has been completed: [x] Yes [] No    If the office needs to give you a call back, can they leave a voicemail: [x] Yes [] No    Maged Means Rep   01/28/25 10:06 EST

## 2025-01-31 DIAGNOSIS — I73.9 PERIPHERAL ARTERY DISEASE: Primary | ICD-10-CM

## 2025-02-10 ENCOUNTER — OFFICE VISIT (OUTPATIENT)
Dept: FAMILY MEDICINE CLINIC | Facility: CLINIC | Age: 65
End: 2025-02-10
Payer: COMMERCIAL

## 2025-02-10 VITALS
OXYGEN SATURATION: 98 % | HEART RATE: 63 BPM | DIASTOLIC BLOOD PRESSURE: 80 MMHG | RESPIRATION RATE: 16 BRPM | BODY MASS INDEX: 31.18 KG/M2 | WEIGHT: 194 LBS | SYSTOLIC BLOOD PRESSURE: 158 MMHG | HEIGHT: 66 IN | TEMPERATURE: 97.4 F

## 2025-02-10 DIAGNOSIS — Z23 NEED FOR INFLUENZA VACCINATION: ICD-10-CM

## 2025-02-10 DIAGNOSIS — I65.23 BILATERAL CAROTID ARTERY STENOSIS: Chronic | ICD-10-CM

## 2025-02-10 DIAGNOSIS — I10 ESSENTIAL HYPERTENSION: Primary | ICD-10-CM

## 2025-02-10 DIAGNOSIS — E11.9 DIABETES MELLITUS TYPE 2 IN NONOBESE: Chronic | ICD-10-CM

## 2025-02-10 DIAGNOSIS — Z87.891 PERSONAL HISTORY OF TOBACCO USE, PRESENTING HAZARDS TO HEALTH: ICD-10-CM

## 2025-02-10 DIAGNOSIS — I63.9 CEREBROVASCULAR ACCIDENT (CVA), UNSPECIFIED MECHANISM: Chronic | ICD-10-CM

## 2025-02-10 PROCEDURE — 99214 OFFICE O/P EST MOD 30 MIN: CPT | Performed by: NURSE PRACTITIONER

## 2025-02-10 PROCEDURE — 36415 COLL VENOUS BLD VENIPUNCTURE: CPT | Performed by: NURSE PRACTITIONER

## 2025-02-10 PROCEDURE — 82043 UR ALBUMIN QUANTITATIVE: CPT | Performed by: NURSE PRACTITIONER

## 2025-02-10 PROCEDURE — 85025 COMPLETE CBC W/AUTO DIFF WBC: CPT | Performed by: NURSE PRACTITIONER

## 2025-02-10 PROCEDURE — 90656 IIV3 VACC NO PRSV 0.5 ML IM: CPT | Performed by: NURSE PRACTITIONER

## 2025-02-10 PROCEDURE — 80061 LIPID PANEL: CPT | Performed by: NURSE PRACTITIONER

## 2025-02-10 PROCEDURE — 90471 IMMUNIZATION ADMIN: CPT | Performed by: NURSE PRACTITIONER

## 2025-02-10 PROCEDURE — 80053 COMPREHEN METABOLIC PANEL: CPT | Performed by: NURSE PRACTITIONER

## 2025-02-10 PROCEDURE — 83036 HEMOGLOBIN GLYCOSYLATED A1C: CPT | Performed by: NURSE PRACTITIONER

## 2025-02-10 PROCEDURE — 82570 ASSAY OF URINE CREATININE: CPT | Performed by: NURSE PRACTITIONER

## 2025-02-10 PROCEDURE — 85007 BL SMEAR W/DIFF WBC COUNT: CPT | Performed by: NURSE PRACTITIONER

## 2025-02-10 RX ORDER — HYDROCHLOROTHIAZIDE 12.5 MG/1
12.5 TABLET ORAL DAILY
Qty: 90 TABLET | Refills: 1 | Status: SHIPPED | OUTPATIENT
Start: 2025-02-10

## 2025-02-10 RX ORDER — CLOPIDOGREL BISULFATE 75 MG/1
75 TABLET ORAL DAILY
Qty: 90 TABLET | Refills: 1 | Status: SHIPPED | OUTPATIENT
Start: 2025-02-10

## 2025-02-10 NOTE — PROGRESS NOTES
Medications reviewed he forgot his bottles at home but brought the medication in he needs a refills on.  Allergies reviewed no changes  Surgeries reviewed no changes  Histories reviewed no changes        Venipuncture Blood Specimen Collection  Venipuncture performed in right arm by Mary Baker MA with good hemostasis. Patient tolerated the procedure well without complications.   02/10/25   Mary Baker MA       Immunization  Immunization performed in left deltoid by Mary Baker MA. Patient tolerated the procedure well without complications.  02/10/25   Mary Baker MA

## 2025-02-10 NOTE — PROGRESS NOTES
"Subjective   Vince Olivera is a 64 y.o. male.     Chief Complaint   Patient presents with    Hypertension    Hyperlipidemia    Diabetes    Carotid Artery Disease       History of Present Illness  He presents for follow up of essential hypertension, mixed hyperlipidemia, and type II DM. He states he has been out of hctz for 3-4 days because he has been out. He states he doesn't check his blood pressure at home. He reports good compliance with atorvastatin. He reports good blood sugar control.  He sees neurology for seizures but has not seen them since his last visit.  Last carotid Doppler reported 50 to 69% stenosis on the right side in 2023.       The following portions of the patient's history were reviewed and updated as appropriate: allergies, current medications, past family history, past medical history, past social history, past surgical history and problem list.    Review of Systems   Constitutional:  Negative for fever.   Respiratory:  Negative for cough, shortness of breath and wheezing.    Cardiovascular:  Negative for chest pain and palpitations.   Gastrointestinal:  Negative for blood in stool, diarrhea, nausea and vomiting.   Genitourinary:  Negative for hematuria.       Objective     /80 (BP Location: Right arm, Patient Position: Sitting, Cuff Size: Large Adult)   Pulse 63   Temp 97.4 °F (36.3 °C) (Tympanic)   Resp 16   Ht 167.6 cm (65.98\")   Wt 88 kg (194 lb)   SpO2 98%   BMI 31.33 kg/m²     Physical Exam  Vitals reviewed.   Constitutional:       General: He is not in acute distress.     Appearance: He is well-developed. He is not diaphoretic.   HENT:      Head: Normocephalic and atraumatic.   Cardiovascular:      Rate and Rhythm: Normal rate and regular rhythm.      Heart sounds: Normal heart sounds. No murmur heard.     No friction rub. No gallop.   Pulmonary:      Effort: Pulmonary effort is normal. No respiratory distress.      Breath sounds: Normal breath sounds.   Abdominal:      " General: Bowel sounds are normal. There is no distension.      Palpations: Abdomen is soft.      Tenderness: There is no abdominal tenderness.   Skin:     General: Skin is warm and dry.   Neurological:      Mental Status: He is alert and oriented to person, place, and time.   Psychiatric:         Behavior: Behavior normal.         Thought Content: Thought content normal.         Judgment: Judgment normal.         Current Outpatient Medications   Medication Sig Dispense Refill    amLODIPine (NORVASC) 10 MG tablet Take 1 tablet by mouth Daily. 90 tablet 1    aspirin (aspirin EC) 325 MG EC tablet Take 1 tablet by mouth Daily. 90 tablet 3    atorvastatin (LIPITOR) 80 MG tablet Take 1 tablet by mouth Daily. 90 tablet 1    clopidogrel (PLAVIX) 75 MG tablet Take 1 tablet by mouth Daily. 90 tablet 1    glucose blood test strip Check glucose daily 100 each 3    glucose monitor monitoring kit 1 each Daily. 1 each 0    hydroCHLOROthiazide 12.5 MG tablet Take 1 tablet by mouth Daily. 90 tablet 1    Lacosamide (Vimpat) 150 MG tablet Take 75 mg by mouth 2 (Two) Times a Day. 90 tablet 1    Lancets misc 1 Device Daily. 100 each 12    levETIRAcetam (KEPPRA) 1000 MG tablet Take 1 tablet by mouth 2 (Two) Times a Day. 180 tablet 3    levETIRAcetam (KEPPRA) 500 MG tablet Take 1 tablet by mouth 2 (Two) Times a Day. 180 tablet 3    losartan (COZAAR) 100 MG tablet Take 1 tablet by mouth Daily. Take 1/2 tab twice daily 180 tablet 3    metFORMIN (GLUCOPHAGE) 500 MG tablet TAKE 2 TABLETS BY MOUTH TWICE DAILY WITH meals 360 tablet 1    metoprolol succinate XL (TOPROL-XL) 200 MG 24 hr tablet Take 1 tablet by mouth Daily. 90 tablet 0    TiZANidine (Zanaflex) 2 MG capsule Take 1 capsule by mouth 3 (Three) Times a Day. 90 capsule 5    vitamin D (ERGOCALCIFEROL) 1.25 MG (58136 UT) capsule capsule Take 1 capsule by mouth 1 (One) Time Per Week. 5 capsule 5     Current Facility-Administered Medications   Medication Dose Route Frequency Provider Last  Rate Last Admin    cyanocobalamin injection 1,000 mcg  1,000 mcg Intramuscular Q28 Days Glo yKle, KINGSTON   1,000 mcg at 01/23/20 1530            Assessment & Plan     Problem List Items Addressed This Visit       H/o Left MCA ischemic CVA 4/2017    Relevant Medications    clopidogrel (PLAVIX) 75 MG tablet    Personal history of tobacco use, presenting hazards to health    Relevant Orders    CT chest low dose wo    Essential hypertension - Primary (Chronic)    Relevant Medications    hydroCHLOROthiazide 12.5 MG tablet    Other Relevant Orders    CBC & Differential    Comprehensive Metabolic Panel    Bilateral carotid artery stenosis s/p L CEA 4/27/17. 50-69% R carotid stenosis    Overview     S/P L CEA in 2017   50-69% stenosis likely of right proximal internal carotid artery in 7/2019          Relevant Medications    hydroCHLOROthiazide 12.5 MG tablet    Other Relevant Orders    Lipid Panel    US Carotid Bilateral     Other Visit Diagnoses       Diabetes mellitus type 2 in nonobese  (Chronic)       Relevant Medications    glucose blood test strip    Other Relevant Orders    Hemoglobin A1c    Microalbumin / Creatinine Urine Ratio - Urine, Clean Catch              ICD-10-CM ICD-9-CM   1. Essential hypertension  I10 401.9   2. Bilateral carotid artery stenosis  I65.23 433.10     433.30   3. Cerebrovascular accident (CVA), unspecified mechanism  I63.9 434.91   4. Diabetes mellitus type 2 in nonobese  E11.9 250.00   5. Personal history of tobacco use, presenting hazards to health  Z87.891 V15.82       Plan:Resume HCTZ to get blood pressure back to normal.  Get labs today.  Recheck carotid Doppler.  Flu vaccine given with his consent.  He declines colonoscopy.  Get low-dose lung cancer screening CT when due.  Follow-up in 6 months and as needed.    @Body mass index is 31.33 kg/m².         Understands disease processes and need for medications.  Understands reasons for urgent and emergent care.  Patient (& family)  verbalized agreement for treatment plan.   Emotional support and active listening provided.  Patient provided time to verbalize feelings.                  This document has been electronically signed by KINGSTON Gallardo   February 10, 2025 14:46 EST

## 2025-02-11 LAB
ALBUMIN SERPL-MCNC: 4.5 G/DL (ref 3.5–5.2)
ALBUMIN UR-MCNC: <1.2 MG/DL
ALBUMIN/GLOB SERPL: 1.6 G/DL
ALP SERPL-CCNC: 77 U/L (ref 39–117)
ALT SERPL W P-5'-P-CCNC: 17 U/L (ref 1–41)
ANION GAP SERPL CALCULATED.3IONS-SCNC: 13.3 MMOL/L (ref 5–15)
AST SERPL-CCNC: 14 U/L (ref 1–40)
BASOPHILS # BLD MANUAL: 0 10*3/MM3 (ref 0–0.2)
BASOPHILS NFR BLD MANUAL: 0 % (ref 0–1.5)
BILIRUB SERPL-MCNC: 0.2 MG/DL (ref 0–1.2)
BUN SERPL-MCNC: 10 MG/DL (ref 8–23)
BUN/CREAT SERPL: 10.6 (ref 7–25)
CALCIUM SPEC-SCNC: 10 MG/DL (ref 8.6–10.5)
CHLORIDE SERPL-SCNC: 97 MMOL/L (ref 98–107)
CHOLEST SERPL-MCNC: 122 MG/DL (ref 0–200)
CO2 SERPL-SCNC: 26.7 MMOL/L (ref 22–29)
CREAT SERPL-MCNC: 0.94 MG/DL (ref 0.76–1.27)
CREAT UR-MCNC: 12.4 MG/DL
DEPRECATED RDW RBC AUTO: 41.4 FL (ref 37–54)
EGFRCR SERPLBLD CKD-EPI 2021: 91.1 ML/MIN/1.73
EOSINOPHIL # BLD MANUAL: 0.74 10*3/MM3 (ref 0–0.4)
EOSINOPHIL NFR BLD MANUAL: 4.1 % (ref 0.3–6.2)
ERYTHROCYTE [DISTWIDTH] IN BLOOD BY AUTOMATED COUNT: 12.8 % (ref 12.3–15.4)
GLOBULIN UR ELPH-MCNC: 2.8 GM/DL
GLUCOSE SERPL-MCNC: 77 MG/DL (ref 65–99)
HBA1C MFR BLD: 7.3 % (ref 4.8–5.6)
HCT VFR BLD AUTO: 44.5 % (ref 37.5–51)
HDLC SERPL-MCNC: 27 MG/DL (ref 40–60)
HGB BLD-MCNC: 15.5 G/DL (ref 13–17.7)
LDLC SERPL CALC-MCNC: 42 MG/DL (ref 0–100)
LDLC/HDLC SERPL: 0.87 {RATIO}
LYMPHOCYTES # BLD MANUAL: 10.58 10*3/MM3 (ref 0.7–3.1)
LYMPHOCYTES NFR BLD MANUAL: 3.1 % (ref 5–12)
MCH RBC QN AUTO: 31.5 PG (ref 26.6–33)
MCHC RBC AUTO-ENTMCNC: 34.8 G/DL (ref 31.5–35.7)
MCV RBC AUTO: 90.4 FL (ref 79–97)
MICROALBUMIN/CREAT UR: NORMAL MG/G{CREAT}
MONOCYTES # BLD: 0.56 10*3/MM3 (ref 0.1–0.9)
NEUTROPHILS # BLD AUTO: 6.12 10*3/MM3 (ref 1.7–7)
NEUTROPHILS NFR BLD MANUAL: 34 % (ref 42.7–76)
NRBC BLD AUTO-RTO: 0.1 /100 WBC (ref 0–0.2)
PLAT MORPH BLD: NORMAL
PLATELET # BLD AUTO: 289 10*3/MM3 (ref 140–450)
PMV BLD AUTO: 9.4 FL (ref 6–12)
POTASSIUM SERPL-SCNC: 4.1 MMOL/L (ref 3.5–5.2)
PROT SERPL-MCNC: 7.3 G/DL (ref 6–8.5)
RBC # BLD AUTO: 4.92 10*6/MM3 (ref 4.14–5.8)
RBC MORPH BLD: NORMAL
SODIUM SERPL-SCNC: 137 MMOL/L (ref 136–145)
TRIGL SERPL-MCNC: 357 MG/DL (ref 0–150)
VARIANT LYMPHS NFR BLD MANUAL: 58.8 % (ref 19.6–45.3)
VLDLC SERPL-MCNC: 53 MG/DL (ref 5–40)
WBC MORPH BLD: NORMAL
WBC NRBC COR # BLD AUTO: 17.99 10*3/MM3 (ref 3.4–10.8)

## 2025-02-11 NOTE — PROGRESS NOTES
Electrolytes, liver, and kidney function are normal.  Cholesterol LDL is at goal.  Triglycerides are elevated.  Cut back on breads sweets carbs, and sugars.  A1c is 7.3.  Goal is under 6.5.  White count remains elevated.

## 2025-03-18 DIAGNOSIS — G40.909 SEIZURE DISORDER: ICD-10-CM

## 2025-03-18 RX ORDER — LACOSAMIDE 150 MG/1
75 TABLET ORAL 2 TIMES DAILY
Qty: 90 TABLET | Refills: 1 | Status: SHIPPED | OUTPATIENT
Start: 2025-03-18

## 2025-03-18 NOTE — TELEPHONE ENCOUNTER
Caller: Donna Davis    Relationship: Emergency Contact    Best call back number: [phone]    Requested Prescriptions:   Requested Prescriptions     Pending Prescriptions Disp Refills    Lacosamide (Vimpat) 150 MG tablet 90 tablet 1     Sig: Take 75 mg by mouth 2 (Two) Times a Day.        Pharmacy where request should be sent: [facility] [address] [facility] [phone] [facility] [phone]     Last office visit with prescribing clinician: 7/23/2024   Last telemedicine visit with prescribing clinician: Visit date not found   Next office visit with prescribing clinician: 7/14/2025     Additional details provided by patient: PT IS OUT OF THIS RX. PT WAS TOLD THE PHARMACY WAS TRYING TO SEND A REQUEST ON 3-14-25 BUT WASN'T ABLE TO GET IT TO GO THROUGH.    Does the patient have less than a 3 day supply:  [x] Yes  [] No    Would you like a call back once the refill request has been completed: [] Yes [] No    If the office needs to give you a call back, can they leave a voicemail: [] Yes [] No    Maged Dailey Rep   03/18/25 14:30 EDT

## 2025-03-28 ENCOUNTER — HOSPITAL ENCOUNTER (OUTPATIENT)
Facility: HOSPITAL | Age: 65
Discharge: HOME | End: 2025-03-28
Payer: MEDICAID

## 2025-03-28 DIAGNOSIS — Z87.891 PERSONAL HISTORY OF TOBACCO USE, PRESENTING HAZARDS TO HEALTH: ICD-10-CM

## 2025-03-28 PROCEDURE — 93880 EXTRACRANIAL BILAT STUDY: CPT

## 2025-03-28 PROCEDURE — 71271 CT THORAX LUNG CANCER SCR C-: CPT

## 2025-03-28 PROCEDURE — 71271 CT THORAX LUNG CANCER SCR C-: CPT | Performed by: RADIOLOGY

## 2025-04-15 DIAGNOSIS — I63.9 CEREBROVASCULAR ACCIDENT (CVA), UNSPECIFIED MECHANISM: Chronic | ICD-10-CM

## 2025-04-15 RX ORDER — CLOPIDOGREL BISULFATE 75 MG/1
75 TABLET ORAL DAILY
Qty: 90 TABLET | Refills: 1 | Status: SHIPPED | OUTPATIENT
Start: 2025-04-15

## 2025-04-15 NOTE — TELEPHONE ENCOUNTER
Caller: Donna Davis    Relationship: Emergency Contact    Best call back number: [phone]     Requested Prescriptions:   Requested Prescriptions     Pending Prescriptions Disp Refills    clopidogrel (PLAVIX) 75 MG tablet 90 tablet 1     Sig: Take 1 tablet by mouth Daily.        Pharmacy where request should be sent: [facility] [address] [facility] [phone] [facility] [phone]      Last office visit with prescribing clinician: 2/10/2025   Last telemedicine visit with prescribing clinician: Visit date not found   Next office visit with prescribing clinician: 8/11/2025     Additional details provided by patient: PATIENT IS COMPLETELY OUT OF THIS MEDICATION.    Does the patient have less than a 3 day supply:  [x] Yes  [] No    Would you like a call back once the refill request has been completed: [] Yes [x] No    If the office needs to give you a call back, can they leave a voicemail: [] Yes [x] No    Maged Eng Rep   04/15/25 11:42 EDT

## 2025-04-22 DIAGNOSIS — I10 ESSENTIAL HYPERTENSION: Chronic | ICD-10-CM

## 2025-04-22 RX ORDER — METOPROLOL SUCCINATE 200 MG/1
200 TABLET, EXTENDED RELEASE ORAL DAILY
Qty: 90 TABLET | Refills: 0 | Status: SHIPPED | OUTPATIENT
Start: 2025-04-22

## 2025-04-22 NOTE — TELEPHONE ENCOUNTER
Caller: Donna Davis    Relationship: Emergency Contact    Best call back number: [phone]     Requested Prescriptions:   Requested Prescriptions     Pending Prescriptions Disp Refills    metoprolol succinate XL (TOPROL-XL) 200 MG 24 hr tablet 90 tablet 0     Sig: Take 1 tablet by mouth Daily.        Pharmacy where request should be sent: [facility] [address] [phone] [facility] [phone]      Last office visit with prescribing clinician: 2/10/2025   Last telemedicine visit with prescribing clinician: Visit date not found   Next office visit with prescribing clinician: 8/11/2025     Additional details provided by patient: PATIENT OUT OF MEDICATION    Does the patient have less than a 3 day supply:  [x] Yes  [] No    Would you like a call back once the refill request has been completed: [] Yes [x] No    If the office needs to give you a call back, can they leave a voicemail: [] Yes [x] No    Cristina Cote   04/22/25 10:58 EDT

## 2025-05-06 NOTE — PROGRESS NOTES
PATIENT EDUCATION    Your xray was negative for any acute cardiopulmonary findings.    Take prednisone as instructed.  Complete course.    Continue with your albuterol inhaler and nebulizer machine as instructed to help decrease bronchospasms.    Use Nasacort or Flonase nasal spray as bottle instructs to provide symptomatic treatment of congestion.    Use Mucinex cough and congestion as box instructs to provide symptomatic treatment of congestion.    Use saltwater gargles with warm water, warm tea with honey, throat lozenges, and over-the-counter throat numbing sprays to help with pain.      Use Tylenol and/or ibuprofen as bottle instructs help with pain.    Rest.    Push oral fluids.    Avoid strong fragrances, such as perfumes, Vicks vapor rub, smoke, mildew, etc.    Use a humidifier next to the bedside.    If cough becomes really bad, turn on a hot shower, close the bathroom door, and allow the bathroom to fill up with steam.  Go into the bathroom for at least 20 minutes to help open up passages and decrease cough.    Prop up pillows for sleeping if it helps to decrease the cough.    If your symptoms have not started to improve by Friday, reach out and antibiotic will be sent in.    Reach out with any new or concerning issues.   X-ray of left ankle reports is not showing anything abnormal.

## 2025-06-12 NOTE — PROGRESS NOTES
No Subjective:     Patient ID: Vince Olivera is a 59 y.o. male.    CC:   Chief Complaint   Patient presents with   • Seizures       HPI:   History of Present Illness     Mr. Olivera is here today for six-month follow-up on seizure disorder.    He has seizures as a sequela of CVA in 2017.  He is currently managed on Keppra 1500 mg twice daily as well as Vimpat 75 mg daily, (he currently takes half of a 150 daily)      He tells me today he is doing very well.  He has had no seizure activity since  immediately before his femoropopliteal bypass in October 2019.  He is doing very well overall.  He is status post aortofemoral bypass and is doing well postoperatively.  He has no other complaints today, he has a mild degree of dizziness, this greatly improved after stopping Dilantin and starting Vimpat however I do suspect that he has chronic dizziness from his prior CVA  He has a history of carotid stenosis, he follows with neurosurgery, last ultrasound showed a 50 to 69% blockage which was being monitored, he has upcoming appointment with Dr. Bach by report  He has had no stroke symptoms, he is very active complaints today  The following portions of the patient's history were reviewed and updated as appropriate: allergies, current medications, past family history, past medical history, past social history, past surgical history and problem list.    Past Medical History:   Diagnosis Date   • Arthritis    • Carotid artery disease (CMS/HCC)    • Carotid artery disorder (CMS/HCC)    • Depression    • Diabetes mellitus (CMS/HCC)     DIAGNOSED 4-2017 CHECKS FSBG 3X/WEEK    • Ear infection     about 2 weeks ago- cleared up - wax removed and cleaned out    • GERD (gastroesophageal reflux disease)     self diagnosed takes otc ppi   • Hyperlipidemia    • Hypertension    • Hypertension    • Inguinal hernia    • Seizure (CMS/HCC)     last 10/8/2019   • Shoulder pain     bilat    • Stroke (CMS/HCC) 04/24/2017    EXPRESSIVE APHASIA  RESIDUAL    • Tooth loose     5 - all over- will get teeth done after this surgery    • Wears reading eyeglasses        Past Surgical History:   Procedure Laterality Date   • ABDOMINAL HERNIA REPAIR  2010   • AORTA FEMORAL BYPASS N/A 10/11/2019    Procedure: AORTA FEMORAL BYPASS, BILATERAL FEMORAL ENDARTERECTOMY;  Surgeon: Dylan Weaver MD;  Location: [facility] OR;  Service: Vascular   • CAROTID ENDARTERECTOMY Left 10/17/2017    Procedure: CAROTID ENDARTERECTOMY LEFT;  Surgeon: Alexx Bach MD;  Location: [facility] OR;  Service:    • INGUINAL HERNIA REPAIR Left        Social History     Socioeconomic History   • Marital status: Single     Spouse name: Not on file   • Number of children: 2   • Years of education: Not on file   • Highest education level: Not on file   Occupational History   • Occupation: Disabled/allan     Comment: disabled from stroke   Tobacco Use   • Smoking status: Former Smoker     Packs/day: 0.05     Years: 40.00     Pack years: 2.00     Types: Cigarettes     Last attempt to quit: 4/26/2017     Years since quitting: 3.2   • Smokeless tobacco: Former User     Types: Snuff   • Tobacco comment: quit snuff when 18 or 19 years old - only did for baseball - 2-3 years    Substance and Sexual Activity   • Alcohol use: No   • Drug use: No   • Sexual activity: Defer     Partners: Female     Comment: SPOUSE   Social History Narrative    Patient consumes 0-1serving of caffeine daily.     Patient lives at home with wife in [place]       Family History   Adopted: Yes   Problem Relation Age of Onset   • Diabetes Mother    • COPD Father         Review of Systems   Constitutional: Negative.    HENT: Negative.    Eyes: Negative.    Respiratory: Negative.    Cardiovascular: Negative.    Gastrointestinal: Negative.    Endocrine: Negative.    Genitourinary: Negative.    Musculoskeletal: Negative.    Skin: Negative.    Allergic/Immunologic: Negative.    Neurological: Negative.    Hematological:  "Negative.    Psychiatric/Behavioral: Negative.         Objective:  /80   Pulse 60   Temp 97.1 °F (36.2 °C)   Ht 167.6 cm (66\")   Wt 85.7 kg (189 lb)   SpO2 98%   BMI 30.51 kg/m²     Neurologic Exam     Mental Status   Oriented to person, place, and time.   Attention: normal. Concentration: normal.   Speech: speech is normal   Level of consciousness: alert  Knowledge: consistent with education.   Able to read. Able to write. Normal comprehension.     Cranial Nerves   Cranial nerves II through XII intact.     CN II   Visual fields full to confrontation.   Right visual field deficit: none  Left visual field deficit: none     CN III, IV, VI   Pupils are equal, round, and reactive to light.  Extraocular motions are normal.   Right pupil: Shape: regular. Reactivity: brisk. Consensual response: intact. Accommodation: intact.   Left pupil: Shape: regular. Reactivity: brisk. Consensual response: intact. Accommodation: intact.   CN III: no CN III palsy  CN VI: no CN VI palsy  Nystagmus: none   Upgaze: normal  Downgaze: normal    CN V   Facial sensation intact.     CN VII   Facial expression full, symmetric.     CN VIII   CN VIII normal.     CN IX, X   CN IX normal.   CN X normal.     CN XI   CN XI normal.     CN XII   CN XII normal.     Motor Exam   Muscle bulk: normal  Overall muscle tone: normal  Right arm tone: normal  Left arm tone: normal  Right arm pronator drift: absent  Left arm pronator drift: absent  Right leg tone: normal  Left leg tone: normal    Strength   Strength 5/5 throughout.     Sensory Exam   Light touch normal.     Gait, Coordination, and Reflexes     Gait  Gait: normal    Coordination   Romberg: negative  Finger to nose coordination: normal  Heel to shin coordination: normal  Tandem walking coordination: normal    Tremor   Resting tremor: absent  Intention tremor: absent  Action tremor: absent    Reflexes   Reflexes 2+ except as noted.   Right Rick: absent  Left Rick: " absent      Physical Exam   Constitutional: He is oriented to person, place, and time. He appears well-developed and well-nourished. No distress.   HENT:   Head: Normocephalic and atraumatic.   Eyes: Pupils are equal, round, and reactive to light. Conjunctivae and EOM are normal. No scleral icterus.   Neck: Normal range of motion. Neck supple.   Pulmonary/Chest: Effort normal. No respiratory distress.   Neurological: He is alert and oriented to person, place, and time. He has normal strength. He has a normal Finger-Nose-Finger Test, a normal Heel to Shin Test, a normal Romberg Test and a normal Tandem Gait Test. Gait normal.   Skin: Skin is warm.   Psychiatric: He has a normal mood and affect. His speech is normal and behavior is normal. Judgment and thought content normal.   Vitals reviewed.      Assessment/Plan:       Vince was seen today for seizures.    Diagnoses and all orders for this visit:    Seizure disorder (CMS/[facility])  Comments:  post CVA, previously controlled on keppra and low dose vimpat  breakthrough seizure 10/19  currently stable  continue keppra and vimpat    Cerebrovascular accident (CVA) due to embolism of left middle cerebral artery (CMS/[facility])  Comments:  h/o  no recent stroke s/s    Seizure disorder on Keppra and Vimpat. 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  -     levETIRAcetam (Keppra) 500 MG tablet; Take 3 tablets by mouth 2 (Two) Times a Day.    Mr. Olivera is doing well, he has had no seizures since his immediate preop seizure back in October 2019.  He is currently stable on Vimpat 75 mg twice daily and Keppra 1500 mg twice daily.  He is having no adverse medication side effects.  He will continue follow-up with Dr. Bach on his carotid stenosis, he tells me he has upcoming appointment.  I will see him back in 6 months or sooner if needed, he will contact my office with any questions concerns or problems prior to  follow-up appointment  Patient instructions include: No driving or operating heavy machinery for 3 months from onset of most recent seizure. Minimize stress as much as possible. Recommended 7-8 hours of sleep each night. Abstain from alcohol intake. Educated on Antiepileptic medications with possible side effects and signs and symptoms to report if prescribed during visit. Instructed to take seizure medication daily if prescribed. Reviewed potential seizure risk factors. Instructed to call 911 or our office if another seizure does occur.           Reviewed medications, potential side effects and signs and symptoms to report. Discussed risk versus benefits of treatment plan with patient and/or family-including medications, labs and radiology that may be ordered. Addressed questions and concerns during visit. Patient and/or family verbalized understanding and agree with plan.    During this visit the following were done:  Labs Reviewed []    Labs Ordered []    Radiology Reports Reviewed []    Radiology Ordered []    PCP Records Reviewed []    Referring Provider Records Reviewed []    ER Records Reviewed []    Hospital Records Reviewed []    History Obtained From Family []    Radiology Images Reviewed []    Other Reviewed []    Records Requested []      Ani Haas, [place]  8/5/2020

## 2025-07-14 ENCOUNTER — OFFICE VISIT (OUTPATIENT)
Dept: NEUROLOGY | Facility: CLINIC | Age: 65
End: 2025-07-14
Payer: MEDICAID

## 2025-07-14 VITALS
WEIGHT: 194 LBS | SYSTOLIC BLOOD PRESSURE: 136 MMHG | OXYGEN SATURATION: 98 % | DIASTOLIC BLOOD PRESSURE: 90 MMHG | HEIGHT: 66 IN | BODY MASS INDEX: 31.18 KG/M2 | HEART RATE: 81 BPM

## 2025-07-14 DIAGNOSIS — I69.320 APHASIA AS LATE EFFECT OF CEREBROVASCULAR ACCIDENT: ICD-10-CM

## 2025-07-14 DIAGNOSIS — I65.23 BILATERAL CAROTID ARTERY STENOSIS: ICD-10-CM

## 2025-07-14 DIAGNOSIS — I69.319 COGNITIVE DEFICIT AS LATE EFFECT OF CEREBROVASCULAR ACCIDENT (CVA): ICD-10-CM

## 2025-07-14 DIAGNOSIS — Z86.73 HISTORY OF CVA (CEREBROVASCULAR ACCIDENT): ICD-10-CM

## 2025-07-14 DIAGNOSIS — G40.909 SEIZURE DISORDER: Primary | ICD-10-CM

## 2025-07-14 PROCEDURE — 1160F RVW MEDS BY RX/DR IN RCRD: CPT | Performed by: NURSE PRACTITIONER

## 2025-07-14 PROCEDURE — 3075F SYST BP GE 130 - 139MM HG: CPT | Performed by: NURSE PRACTITIONER

## 2025-07-14 PROCEDURE — 1159F MED LIST DOCD IN RCRD: CPT | Performed by: NURSE PRACTITIONER

## 2025-07-14 PROCEDURE — 3080F DIAST BP >= 90 MM HG: CPT | Performed by: NURSE PRACTITIONER

## 2025-07-14 PROCEDURE — 99214 OFFICE O/P EST MOD 30 MIN: CPT | Performed by: NURSE PRACTITIONER

## 2025-07-14 RX ORDER — LACOSAMIDE 150 MG/1
75 TABLET ORAL 2 TIMES DAILY
Qty: 90 TABLET | Refills: 1 | Status: SHIPPED | OUTPATIENT
Start: 2025-07-14

## 2025-07-14 RX ORDER — SENNOSIDES 8.6 MG
325 CAPSULE ORAL DAILY
Qty: 90 TABLET | Refills: 3 | Status: SHIPPED | OUTPATIENT
Start: 2025-07-14

## 2025-07-14 RX ORDER — LEVETIRACETAM 1000 MG/1
1000 TABLET ORAL 2 TIMES DAILY
Qty: 180 TABLET | Refills: 3 | Status: SHIPPED | OUTPATIENT
Start: 2025-07-14

## 2025-07-14 RX ORDER — LEVETIRACETAM 500 MG/1
500 TABLET ORAL 2 TIMES DAILY
Qty: 180 TABLET | Refills: 3 | Status: SHIPPED | OUTPATIENT
Start: 2025-07-14

## 2025-07-14 NOTE — PATIENT INSTRUCTIONS
KINGSTON Greene with [facility] in [facility] [place]-#[phone] and call to follow up

## 2025-07-14 NOTE — LETTER
July 14, 2025     KINGSTON Caruso  [address] [address]    Patient: Vince Olivera   YOB: 1960   Date of Visit: 7/14/2025       Dear KINGSTON Caruso    Vince Olivera was in my office today. Below is a copy of my note.    If you have questions, please do not hesitate to call me. I look forward to following Vince along with you.         Sincerely,        KINGSTON Alejandro        CC: KINGSTON Harkins    Subjective:     Patient ID: Vince Olivera is a 64 y.o. male.    CC:   Chief Complaint   Patient presents with   • Seizures     With history of CVA in 2017     HPI:   History of Present Illness  This is a 64-year-old male here for a 1-year neurology follow-up on seizure disorder and the sequelae of a cerebrovascular accident (CVA) since 2017. He has known bilateral carotid artery stenosis s/p carotid endarterectomy and is currently on aspirin, Plavix, and a statin. He has had complaints of joint pain and left-hand tremor. A DaTscan was offered at the last visit to evaluate for early Parkinson's, but his family declined. At his last clinic visit on 07/23/2024, he was referred for physical therapy, which was not completed. His last recorded seizure was in 2019. He is currently on Keppra 500 mg and 1000 mg for a total of 1500 mg twice per day for seizure prevention. He also takes Vimpat 150 mg, half a tablet twice per day for seizure prevention. Additionally, he is on full-dose aspirin 325 mg, Lipitor 80 mg, and Plavix 75 mg daily.    He reports no significant changes in his health since the last visit. He did not undergo the recommended physical therapy as he did not find it beneficial in the past. He continues to experience intermittent back, neck, and hip discomfort, which he manages with tizanidine. He has not had any recent surgeries or hospitalizations. He has not experienced any falls. He has had no seizures since 2019. He has ceased driving  long term following his CVA and his brother-in-law drove him to his visit today. He occasionally monitors his blood sugar levels. He has completed his course of vitamin D supplementation. He experiences occasional hand tremors in the left, which he describes as similar to a chainsaw vibration. He is ambidextrous but primarily uses his left hand due to numbness in three fingers on his right hand following his seizure.    He has not consulted with Dr. Brent Breen, the oncologist, for his chronic lymphocytic leukemia in the past two years. He was told he did not need to return unless he had new symptoms.    He has undergone a carotid ultrasound and is under the care of KINGSTON Thomas, with [facility] [place] [facility], whom he saw in 2024 and needs to reschedule his follow up.    He denies any new concerns today. Has long term cognitive deficits from past CVA. Denies confusion, hallucinations or delusions. Has mild expressive aphasia chronically.    INTERVAL: Since last visit, he has not undergone physical therapy, experienced any falls, or had any seizures. He continues to manage intermittent back, neck, and hip discomfort with tizanidine.    MEDICATIONS  CURRENT MEDS:  Aspirin 325 mg Oral Daily  Plavix 75 mg Oral Daily  Lipitor 80 mg Oral Daily  Keppra 1500 mg Oral Twice daily  Vimpat 150 mg Oral Half a tablet twice daily  Hydrochlorothiazide Oral  Tizanidine Oral  PREVIOUS MEDS:  Vitamin D Oral    Prior Neurological History and Workup:  Seizure disorder following CVA in 2017. His last seizure was in 10/2019. His seizures have been well controlled on lacosamide 150 mg half a tablet twice per day along with Keppra 1500 mg twice per day long term.     He previously had a seizure following a cerebrovascular accident, has had grand mal seizures. He suffered CVA in 2017 and then developed seizures after that time. His last seizure was in 10/2019. He is currently taking Keppra 1500 mg twice per day along with Vimpat 150 mg, a  half tablet twice a day, which is 75 mg twice per day.       Prior neuroimaging has included an MRI of the brain without contrast on 10/08/2019 completed at [facility]. This showed large area of left temporal and occipital encephalomalacia, unchanged from prior imaging with mild nonspecific white matter changes. This was consistent with old stroke. No acute abnormalities. No changes compared to 2018 imaging. He underwent a carotid ultrasound at that time showing right ICA stenosis 50 to 69 percent with plaque and left ICA stenosis at 0 to 49 percent. Repeat duplex carotid ultrasound on 02/01/2021, right internal carotid artery stenosis of 50 to 69 percent and left ICA stenosis 0 to 49 percent.      Carotid ultrasound on 02/01/2021 showed right ICA stenosis 50 to 69 percent with plaque and left ICA stenosis 0 to 49 percent.     Prior EEG was completed 10/09/2019. This showed left temporal slowing of moderate degree, with no epileptiform activity seen during that time.     EMG and NCVS of upper and lower extremities on 03/18/2019 showing severe sensory mild peripheral neuropathy, mild to moderate right and mild left carpal tunnel syndrome.     Left carotid endarterectomy in 2017. He had a femoropopliteal bypass in 10/2022.     He is a smoker. He tried to quit years ago. He currently does not plan to quit. He cannot take chantix due to seizure warning.     He has no known long term effects from his stroke that he is aware of, however, he has reported coordination issues on the right side, trouble with speech since stroke.     duplex carotid ultrasound. This showed right internal carotid artery narrowing of 50 to 69 percent and left internal carotid artery narrowing of less than 50 percent. This was completed on 05/24/2023.     He experiences a sensation of vibration in his left hand, which intensifies with heavy physical activities such as mowing, weed eating, or lifting. This tremor has been present since his  stroke and has worsened, but it usually resolves within a few days. He is right-handed. He denies any difficulty walking, swallowing difficulties, severe constipation, loss of smell, or drooling at night.      He has been evaluated by Dr. Brent Breen with Hematology/Oncology for chronic lymphocytic leukemia.     He completed 11th grade education, worked in construction and additional jobs. He stays physically active.   He is adopted and does not know his family history because he does not know. He denies any family history of Parkinson's disease.    Laboratory Studies with PCP 1/30/2024:  Glucose 128, sodium 134, otherwise normal. Hemoglobin A1c 7%. Total cholesterol 198, triglycerides 154, HDL 29, LDL 43. Elevated white blood cell count.     He underwent imaging including x-ray of the cervical spine 3/22/2024 degenerative changes of the cervical spine seen.  X-ray of the lumbar spine 3/22/2024 shows degenerative changes of the lumbar spine.  X-ray of the left ankle shows no acute findings per radiology.  X-ray of the hip with 3 view on the left side completed 3/22/2024 showing essentially unremarkable findings. All ordered by PCP.     The following portions of the patient's history were reviewed and updated as appropriate: allergies, current medications, past family history, past medical history, past social history, past surgical history, and problem list.    Past Medical History:   Diagnosis Date   • Arthritis    • Carotid artery disease    • Carotid artery disorder    • Depression    • Diabetes mellitus     DIAGNOSED 4-2017 CHECKS FSBG 3X/WEEK    • Ear infection     about 2 weeks ago- cleared up - wax removed and cleaned out    • GERD (gastroesophageal reflux disease)     self diagnosed takes otc ppi   • Hyperlipidemia    • Hypertension    • Hypertension    • Inguinal hernia    • Seizure     last 10/8/2019   • Shoulder pain     bilat    • Stroke 04/24/2017    EXPRESSIVE APHASIA RESIDUAL    • Tooth loose     5  - all over- will get teeth done after this surgery    • Wears reading eyeglasses        Past Surgical History:   Procedure Laterality Date   • ABDOMINAL HERNIA REPAIR  2010   • AORTA FEMORAL BYPASS N/A 10/11/2019    Procedure: AORTA FEMORAL BYPASS, BILATERAL FEMORAL ENDARTERECTOMY;  Surgeon: Dylan Weaver MD;  Location: [facility] OR;  Service: Vascular   • CAROTID ENDARTERECTOMY Left 10/17/2017    Procedure: CAROTID ENDARTERECTOMY LEFT;  Surgeon: Alexx Bach MD;  Location: [facility] OR;  Service:    • INGUINAL HERNIA REPAIR Left        Social History     Socioeconomic History   • Marital status: Single   • Number of children: 2   Tobacco Use   • Smoking status: Every Day     Current packs/day: 1.50     Average packs/day: 1.5 packs/day for 40.0 years (60.0 ttl pk-yrs)     Types: Cigarettes   • Smokeless tobacco: Former     Types: Snuff   • Tobacco comments:     quit snuff when 18 or 19 years old - only did for baseball - 2-3 years    Vaping Use   • Vaping status: Never Used   Substance and Sexual Activity   • Alcohol use: No   • Drug use: No   • Sexual activity: Defer     Partners: Female     Comment: SPOUSE       Family History   Adopted: Yes   Problem Relation Age of Onset   • Diabetes Mother    • COPD Father           Current Outpatient Medications:   •  amLODIPine (NORVASC) 10 MG tablet, Take 1 tablet by mouth Daily., Disp: 90 tablet, Rfl: 1  •  aspirin (aspirin EC) 325 MG EC tablet, Take 1 tablet by mouth Daily., Disp: 90 tablet, Rfl: 3  •  atorvastatin (LIPITOR) 80 MG tablet, Take 1 tablet by mouth Daily., Disp: 90 tablet, Rfl: 1  •  clopidogrel (PLAVIX) 75 MG tablet, Take 1 tablet by mouth Daily., Disp: 90 tablet, Rfl: 1  •  glucose blood test strip, Check glucose daily, Disp: 100 each, Rfl: 3  •  glucose monitor monitoring kit, 1 each Daily., Disp: 1 each, Rfl: 0  •  hydroCHLOROthiazide 12.5 MG tablet, Take 1 tablet by mouth Daily., Disp: 90 tablet, Rfl: 1  •  Lacosamide (Vimpat) 150 MG tablet, Take  "75 mg by mouth 2 (Two) Times a Day., Disp: 90 tablet, Rfl: 1  •  Lancets misc, 1 Device Daily., Disp: 100 each, Rfl: 12  •  levETIRAcetam (KEPPRA) 1000 MG tablet, Take 1 tablet by mouth 2 (Two) Times a Day., Disp: 180 tablet, Rfl: 3  •  levETIRAcetam (KEPPRA) 500 MG tablet, Take 1 tablet by mouth 2 (Two) Times a Day., Disp: 180 tablet, Rfl: 3  •  losartan (COZAAR) 100 MG tablet, Take 1 tablet by mouth Daily. Take 1/2 tab twice daily, Disp: 180 tablet, Rfl: 3  •  metFORMIN (GLUCOPHAGE) 500 MG tablet, TAKE 2 TABLETS BY MOUTH TWICE DAILY WITH meals, Disp: 360 tablet, Rfl: 1  •  metoprolol succinate XL (TOPROL-XL) 200 MG 24 hr tablet, Take 1 tablet by mouth Daily., Disp: 90 tablet, Rfl: 0  •  TiZANidine (Zanaflex) 2 MG capsule, Take 1 capsule by mouth 3 (Three) Times a Day., Disp: 90 capsule, Rfl: 5    Current Facility-Administered Medications:   •  cyanocobalamin injection 1,000 mcg, 1,000 mcg, Intramuscular, Q28 Days, Glo Kyle APRN, 1,000 mcg at 01/23/20 1530     Review of Systems   Musculoskeletal:  Positive for arthralgias, back pain and neck pain.   Neurological:  Positive for numbness (chronic). Negative for headaches.   All other systems reviewed and are negative.       Objective:  /90   Pulse 81   Ht 167.6 cm (66\")   Wt 88 kg (194 lb)   SpO2 98%   BMI 31.31 kg/m²     Neurological Exam  Mental Status  Alert. Oriented to person, place, time and situation. Recalls 3 of 3 objects immediately. At 3 minutes recalls 0 of 3 objects. Unable to copy figure. Speech is normal. Expressive aphasia present. Unable to perform serial calculations. Difficulty spelling words backwards. Fund of knowledge is abnormal. Apraxia absent.    Cranial Nerves  CN II: Visual acuity is normal. Visual fields full to confrontation.  CN III, IV, VI: Extraocular movements intact bilaterally. Normal lids and orbits bilaterally. Pupils equal round and reactive to light bilaterally.  CN V: Facial sensation is normal.  CN VII: " Full and symmetric facial movement.  CN IX, X: Palate elevates symmetrically. Normal gag reflex.  CN XI: Shoulder shrug strength is normal.  CN XII: Tongue midline without atrophy or fasciculations.    Motor  Normal muscle bulk throughout. No fasciculations present. Normal muscle tone. No abnormal involuntary movements. Strength is 5/5 throughout all four extremities.    Coordination  Right: Finger-to-nose normal. Rapid alternating movement normal.Left: Finger-to-nose abnormality: Rapid alternating movement normal.    Gait  Casual gait: Wide stance. Reduced stride length. Antalgic gait. Romberg is absent. Normal pull test. Able to rise from chair without using arms.    Physical Exam  Constitutional:       Appearance: Normal appearance.   Eyes:      General: Lids are normal.      Extraocular Movements: Extraocular movements intact.      Pupils: Pupils are equal, round, and reactive to light.   Neurological:      Mental Status: He is alert.      Motor: Motor strength is normal.     Coordination: Romberg sign negative.   Psychiatric:         Mood and Affect: Mood is anxious.         Speech: Speech normal.         Behavior: Behavior is slowed.         Thought Content: Thought content normal.         Cognition and Memory: He exhibits impaired recent memory.         Judgment: Judgment normal.       Results:  Results  Diagnostic Testing   - Mini-Mental State Exam: Score is 17 out of 28 (did not write or complete drawing due to coordination difficulties)    US Carotid Bilateral  Result Date: 3/28/2025    No acute findings in the arteries of the neck.  This report was finalized on 3/28/2025 11:00 AM by Dr. Mateo Monahan MD.      Assessment/Plan:     Diagnoses and all orders for this visit:    1. Seizure disorder (Primary)  Comments:  since CVA 2017, stable  Orders:  -     Lacosamide (Vimpat) 150 MG tablet; Take 75 mg by mouth 2 (Two) Times a Day.  Dispense: 90 tablet; Refill: 1  -     levETIRAcetam (KEPPRA) 500 MG tablet;  Take 1 tablet by mouth 2 (Two) Times a Day.  Dispense: 180 tablet; Refill: 3  -     levETIRAcetam (KEPPRA) 1000 MG tablet; Take 1 tablet by mouth 2 (Two) Times a Day.  Dispense: 180 tablet; Refill: 3    2. Bilateral carotid artery stenosis s/p L CEA 4/27/17. 50-69% R carotid stenosis  Comments:  continue aspirin, plavix, statin lifelong  Orders:  -     aspirin (aspirin EC) 325 MG EC tablet; Take 1 tablet by mouth Daily.  Dispense: 90 tablet; Refill: 3    3. Aphasia as late effect of cerebrovascular accident  Comments:  monitor    4. History of CVA (cerebrovascular accident)  Comments:  continue aspirin, plavix, statin lifelong    5. Cognitive deficit as late effect of cerebrovascular accident (CVA)  Comments:  monitor, long term since 2017           Assessment & Plan  1. Seizure disorder.  He has not experienced any seizures since 2019. He will continue his current regimen of Keppra 500 mg and 1000 mg for a total of 1500 mg twice per day and Vimpat 150 mg, taking half a tablet twice per day for seizure prevention. The controlled substance agreement is on file.    2. Cerebrovascular accident (CVA) sequelae.  He has long-term expressive aphasia and chronic right-hand numbness from his stroke in 2017. His cognitive deficits are related to the CVA. He does not drive and lives with his significant other.    3. Bilateral carotid artery stenosis.  He has known bilateral carotid artery stenosis and is on aspirin 325 mg, Plavix 75 mg, and Lipitor 80 mg daily. He has completed his carotid ultrasound and needs [place] [facility] [place]    4. Chronic lymphocytic leukemia.  He has not seen his oncologist, Dr. Brent Breen, in approximately two years. He should consider rescheduling an appointment for ongoing management.    5. Joint pain and left-hand tremor.  He reports occasional joint pain and left-hand tremor, which have not changed significantly. A DaTscan was previously  offered to evaluate for early Parkinson's, but his family declined. If symptoms worsen, further evaluation may be considered. He did not complete the previously ordered physical therapy as he feels it has not been helpful in the past. He is taking tizanidine prescribed by his PCP for muscle spasms.     Follow-up  He will follow up in 1 year or sooner if needed.    Reviewed medications, potential side effects and signs and symptoms to report. Discussed risk versus benefits of treatment plan with patient and/or family-including medications, labs and radiology that may be ordered. Addressed questions and concerns during visit. Patient and/or family verbalized understanding and agree with plan.    As part of this patient's treatment plan I am prescribing controlled substances. The patient has been made aware of appropriate use of such medications, including potential risk of somnolence, limited ability to drive and/or work safely, and potential for dependence or overdose. It has also been made clear that these medications are for use by the patient only, without concomitant use of alcohol or other substances unless prescribed. Keep out of reach of children.  Jose C report has been reviewed. If this is going to be prescribed long term, [facility] [place] Contract has also been read and signed by patient and myself.    Patient instructions include: No driving or operating heavy machinery, solo bathing or tub baths for 90 days from onset of most recent seizure, if they currently hold a license. Minimize stress as much as possible. Recommended 7-8 hours of sleep each night. Abstain from alcohol intake. Educated on Antiepileptic medications with possible side effects and signs and symptoms to report if prescribed during visit. Instructed to take seizure medication daily if prescribed. Reviewed potential seizure risk factors. Instructed to call 911 or our office if another seizure does occur.    Sudden  "Unexpected Death in Epilepsy is defined as \"the sudden, unexpected, witnessed or unwitnessed, non-traumatic, and non-drowning death in patients with epilepsy with or without evidence for a seizure, and excluding documented status epilepticus, in which postmortem examination does not reveal a structural or toxicological cause for death.\"-Epilepsy Foundation 2021. SUDEP is most commonly seen in Epilepsy patient's who's seizures are not well controlled or who have seizures during their sleep. This condition is not fully understood. To reduce your risk of SUDEP, please take your seizure medications as prescribed, keep a diary of your seizures and when they occur and if your seizures are not well controlled, please notify us so we can collaborate with you to get your seizures better controlled. If you have additional questions, please visit: [email or web address].com for more information.    Discussed signs and symptoms of stroke and when to call 911. Instructed to follow a low fat diet including the Mediterranean diet. Instructed to take all medications daily as prescribed. Encouraged 30 minutes of exercise 3-4 times a week as tolerated. Stay well hydrated. Discussed potential side effects of new medications and signs and symptoms to report. If patient is currently using tobacco products, smoking cessation was encouraged. Reviewed stroke risk factors and stroke prevention plan. Patient and/or family verbalizes understanding and agrees with plan.     During this visit the following were done:  Labs Reviewed [x]    Labs Ordered []    Radiology Reports Reviewed [x]    Radiology Ordered []    PCP Records Reviewed [x]    Referring Provider Records Reviewed []    ER Records Reviewed []    Hospital Records Reviewed []    History Obtained From Family []    Radiology Images Reviewed []    Other Reviewed [x]    Records Requested []      07/14/25   11:16 EDT    Patient or patient representative verbalized consent for the use of Ambient " Listening during the visit with  KINGSTON Alejandro for chart documentation. 7/14/2025  12:24 EDT    Note to patient: The 21st Century Cures Act makes medical notes like these available to patients in the interest of transparency. However, be advised this is a medical document. It is intended as peer to peer communication. It is written in medical language and may contain abbreviations or verbiage that are unfamiliar. It may appear blunt or direct. Medical documents are intended to carry relevant information, facts as evident, and the clinical opinion of the provider.

## 2025-07-14 NOTE — PROGRESS NOTES
Subjective:     Patient ID: Vince Olivera is a 64 y.o. male.    CC:   Chief Complaint   Patient presents with    Seizures     With history of CVA in 2017     HPI:   History of Present Illness  This is a 64-year-old male here for a 1-year neurology follow-up on seizure disorder and the sequelae of a cerebrovascular accident (CVA) since 2017. He has known bilateral carotid artery stenosis s/p carotid endarterectomy and is currently on aspirin, Plavix, and a statin. He has had complaints of joint pain and left-hand tremor. A DaTscan was offered at the last visit to evaluate for early Parkinson's, but his family declined. At his last clinic visit on 07/23/2024, he was referred for physical therapy, which was not completed. His last recorded seizure was in 2019. He is currently on Keppra 500 mg and 1000 mg for a total of 1500 mg twice per day for seizure prevention. He also takes Vimpat 150 mg, half a tablet twice per day for seizure prevention. Additionally, he is on full-dose aspirin 325 mg, Lipitor 80 mg, and Plavix 75 mg daily.    He reports no significant changes in his health since the last visit. He did not undergo the recommended physical therapy as he did not find it beneficial in the past. He continues to experience intermittent back, neck, and hip discomfort, which he manages with tizanidine. He has not had any recent surgeries or hospitalizations. He has not experienced any falls. He has had no seizures since 2019. He has ceased driving long term following his CVA and his brother-in-law drove him to his visit today. He occasionally monitors his blood sugar levels. He has completed his course of vitamin D supplementation. He experiences occasional hand tremors in the left, which he describes as similar to a chainsaw vibration. He is ambidextrous but primarily uses his left hand due to numbness in three fingers on his right hand following his seizure.    He has not consulted with Dr. Brent Breen, the  oncologist, for his chronic lymphocytic leukemia in the past two years. He was told he did not need to return unless he had new symptoms.    He has undergone a carotid ultrasound and is under the care of KINGSTON Thomas, with [facility], whom he saw in 2024 and needs to reschedule his follow up.    He denies any new concerns today. Has long term cognitive deficits from past CVA. Denies confusion, hallucinations or delusions. Has mild expressive aphasia chronically.    INTERVAL: Since last visit, he has not undergone physical therapy, experienced any falls, or had any seizures. He continues to manage intermittent back, neck, and hip discomfort with tizanidine.    MEDICATIONS  CURRENT MEDS:  Aspirin 325 mg Oral Daily  Plavix 75 mg Oral Daily  Lipitor 80 mg Oral Daily  Keppra 1500 mg Oral Twice daily  Vimpat 150 mg Oral Half a tablet twice daily  Hydrochlorothiazide Oral  Tizanidine Oral  PREVIOUS MEDS:  Vitamin D Oral    Prior Neurological History and Workup:  Seizure disorder following CVA in 2017. His last seizure was in 10/2019. His seizures have been well controlled on lacosamide 150 mg half a tablet twice per day along with Keppra 1500 mg twice per day long term.     He previously had a seizure following a cerebrovascular accident, has had grand mal seizures. He suffered CVA in 2017 and then developed seizures after that time. His last seizure was in 10/2019. He is currently taking Keppra 1500 mg twice per day along with Vimpat 150 mg, a half tablet twice a day, which is 75 mg twice per day.       Prior neuroimaging has included an MRI of the brain without contrast on 10/08/2019 completed at [facility]. This showed large area of left temporal and occipital encephalomalacia, unchanged from prior imaging with mild nonspecific white matter changes. This was consistent with old stroke. No acute abnormalities. No changes compared to 2018 imaging. He underwent a carotid ultrasound at that time showing  right ICA stenosis 50 to 69 percent with plaque and left ICA stenosis at 0 to 49 percent. Repeat duplex carotid ultrasound on 02/01/2021, right internal carotid artery stenosis of 50 to 69 percent and left ICA stenosis 0 to 49 percent.      Carotid ultrasound on 02/01/2021 showed right ICA stenosis 50 to 69 percent with plaque and left ICA stenosis 0 to 49 percent.     Prior EEG was completed 10/09/2019. This showed left temporal slowing of moderate degree, with no epileptiform activity seen during that time.     EMG and NCVS of upper and lower extremities on 03/18/2019 showing severe sensory mild peripheral neuropathy, mild to moderate right and mild left carpal tunnel syndrome.     Left carotid endarterectomy in 2017. He had a femoropopliteal bypass in 10/2022.     He is a smoker. He tried to quit years ago. He currently does not plan to quit. He cannot take chantix due to seizure warning.     He has no known long term effects from his stroke that he is aware of, however, he has reported coordination issues on the right side, trouble with speech since stroke.     duplex carotid ultrasound. This showed right internal carotid artery narrowing of 50 to 69 percent and left internal carotid artery narrowing of less than 50 percent. This was completed on 05/24/2023.     He experiences a sensation of vibration in his left hand, which intensifies with heavy physical activities such as mowing, weed eating, or lifting. This tremor has been present since his stroke and has worsened, but it usually resolves within a few days. He is right-handed. He denies any difficulty walking, swallowing difficulties, severe constipation, loss of smell, or drooling at night.      He has been evaluated by Dr. Brent Breen with Hematology/Oncology for chronic lymphocytic leukemia.     He completed 11th grade education, worked in construction and additional jobs. He stays physically active.   He is adopted and does not know his family history  because he does not know. He denies any family history of Parkinson's disease.    Laboratory Studies with PCP 1/30/2024:  Glucose 128, sodium 134, otherwise normal. Hemoglobin A1c 7%. Total cholesterol 198, triglycerides 154, HDL 29, LDL 43. Elevated white blood cell count.     He underwent imaging including x-ray of the cervical spine 3/22/2024 degenerative changes of the cervical spine seen.  X-ray of the lumbar spine 3/22/2024 shows degenerative changes of the lumbar spine.  X-ray of the left ankle shows no acute findings per radiology.  X-ray of the hip with 3 view on the left side completed 3/22/2024 showing essentially unremarkable findings. All ordered by PCP.     The following portions of the patient's history were reviewed and updated as appropriate: allergies, current medications, past family history, past medical history, past social history, past surgical history, and problem list.    Past Medical History:   Diagnosis Date    Arthritis     Carotid artery disease     Carotid artery disorder     Depression     Diabetes mellitus     DIAGNOSED 4-2017 CHECKS FSBG 3X/WEEK     Ear infection     about 2 weeks ago- cleared up - wax removed and cleaned out     GERD (gastroesophageal reflux disease)     self diagnosed takes otc ppi    Hyperlipidemia     Hypertension     Hypertension     Inguinal hernia     Seizure     last 10/8/2019    Shoulder pain     bilat     Stroke 04/24/2017    EXPRESSIVE APHASIA RESIDUAL     Tooth loose     5 - all over- will get teeth done after this surgery     Wears reading eyeglasses        Past Surgical History:   Procedure Laterality Date    ABDOMINAL HERNIA REPAIR  2010    AORTA FEMORAL BYPASS N/A 10/11/2019    Procedure: AORTA FEMORAL BYPASS, BILATERAL FEMORAL ENDARTERECTOMY;  Surgeon: Dylan Weaver MD;  Location: [facility] OR;  Service: Vascular    CAROTID ENDARTERECTOMY Left 10/17/2017    Procedure: CAROTID ENDARTERECTOMY LEFT;  Surgeon: Alexx Bach MD;  Location:   [facility] OR;  Service:     INGUINAL HERNIA REPAIR Left        Social History     Socioeconomic History    Marital status: Single    Number of children: 2   Tobacco Use    Smoking status: Every Day     Current packs/day: 1.50     Average packs/day: 1.5 packs/day for 40.0 years (60.0 ttl pk-yrs)     Types: Cigarettes    Smokeless tobacco: Former     Types: Snuff    Tobacco comments:     quit snuff when 18 or 19 years old - only did for baseball - 2-3 years    Vaping Use    Vaping status: Never Used   Substance and Sexual Activity    Alcohol use: No    Drug use: No    Sexual activity: Defer     Partners: Female     Comment: SPOUSE       Family History   Adopted: Yes   Problem Relation Age of Onset    Diabetes Mother     COPD Father           Current Outpatient Medications:     amLODIPine (NORVASC) 10 MG tablet, Take 1 tablet by mouth Daily., Disp: 90 tablet, Rfl: 1    aspirin (aspirin EC) 325 MG EC tablet, Take 1 tablet by mouth Daily., Disp: 90 tablet, Rfl: 3    atorvastatin (LIPITOR) 80 MG tablet, Take 1 tablet by mouth Daily., Disp: 90 tablet, Rfl: 1    clopidogrel (PLAVIX) 75 MG tablet, Take 1 tablet by mouth Daily., Disp: 90 tablet, Rfl: 1    glucose blood test strip, Check glucose daily, Disp: 100 each, Rfl: 3    glucose monitor monitoring kit, 1 each Daily., Disp: 1 each, Rfl: 0    hydroCHLOROthiazide 12.5 MG tablet, Take 1 tablet by mouth Daily., Disp: 90 tablet, Rfl: 1    Lacosamide (Vimpat) 150 MG tablet, Take 75 mg by mouth 2 (Two) Times a Day., Disp: 90 tablet, Rfl: 1    Lancets misc, 1 Device Daily., Disp: 100 each, Rfl: 12    levETIRAcetam (KEPPRA) 1000 MG tablet, Take 1 tablet by mouth 2 (Two) Times a Day., Disp: 180 tablet, Rfl: 3    levETIRAcetam (KEPPRA) 500 MG tablet, Take 1 tablet by mouth 2 (Two) Times a Day., Disp: 180 tablet, Rfl: 3    losartan (COZAAR) 100 MG tablet, Take 1 tablet by mouth Daily. Take 1/2 tab twice daily, Disp: 180 tablet, Rfl: 3    metFORMIN (GLUCOPHAGE) 500 MG tablet, TAKE 2  "TABLETS BY MOUTH TWICE DAILY WITH meals, Disp: 360 tablet, Rfl: 1    metoprolol succinate XL (TOPROL-XL) 200 MG 24 hr tablet, Take 1 tablet by mouth Daily., Disp: 90 tablet, Rfl: 0    TiZANidine (Zanaflex) 2 MG capsule, Take 1 capsule by mouth 3 (Three) Times a Day., Disp: 90 capsule, Rfl: 5    Current Facility-Administered Medications:     cyanocobalamin injection 1,000 mcg, 1,000 mcg, Intramuscular, Q28 Days, Glo Kyle APRN, 1,000 mcg at 01/23/20 1530     Review of Systems   Musculoskeletal:  Positive for arthralgias, back pain and neck pain.   Neurological:  Positive for numbness (chronic). Negative for headaches.   All other systems reviewed and are negative.       Objective:  /90   Pulse 81   Ht 167.6 cm (66\")   Wt 88 kg (194 lb)   SpO2 98%   BMI 31.31 kg/m²     Neurological Exam  Mental Status  Alert. Oriented to person, place, time and situation. Recalls 3 of 3 objects immediately. At 3 minutes recalls 0 of 3 objects. Unable to copy figure. Speech is normal. Expressive aphasia present. Unable to perform serial calculations. Difficulty spelling words backwards. Fund of knowledge is abnormal. Apraxia absent.    Cranial Nerves  CN II: Visual acuity is normal. Visual fields full to confrontation.  CN III, IV, VI: Extraocular movements intact bilaterally. Normal lids and orbits bilaterally. Pupils equal round and reactive to light bilaterally.  CN V: Facial sensation is normal.  CN VII: Full and symmetric facial movement.  CN IX, X: Palate elevates symmetrically. Normal gag reflex.  CN XI: Shoulder shrug strength is normal.  CN XII: Tongue midline without atrophy or fasciculations.    Motor  Normal muscle bulk throughout. No fasciculations present. Normal muscle tone. No abnormal involuntary movements. Strength is 5/5 throughout all four extremities.    Coordination  Right: Finger-to-nose normal. Rapid alternating movement normal.Left: Finger-to-nose abnormality: Rapid alternating movement " normal.    Gait  Casual gait: Wide stance. Reduced stride length. Antalgic gait. Romberg is absent. Normal pull test. Able to rise from chair without using arms.    Physical Exam  Constitutional:       Appearance: Normal appearance.   Eyes:      General: Lids are normal.      Extraocular Movements: Extraocular movements intact.      Pupils: Pupils are equal, round, and reactive to light.   Neurological:      Mental Status: He is alert.      Motor: Motor strength is normal.     Coordination: Romberg sign negative.   Psychiatric:         Mood and Affect: Mood is anxious.         Speech: Speech normal.         Behavior: Behavior is slowed.         Thought Content: Thought content normal.         Cognition and Memory: He exhibits impaired recent memory.         Judgment: Judgment normal.       Results:  Results  Diagnostic Testing   - Mini-Mental State Exam: Score is 17 out of 28 (did not write or complete drawing due to coordination difficulties)    US Carotid Bilateral  Result Date: 3/28/2025    No acute findings in the arteries of the neck.  This report was finalized on 3/28/2025 11:00 AM by Dr. Mateo Monahan MD.      Assessment/Plan:     Diagnoses and all orders for this visit:    1. Seizure disorder (Primary)  Comments:  since CVA 2017, stable  Orders:  -     Lacosamide (Vimpat) 150 MG tablet; Take 75 mg by mouth 2 (Two) Times a Day.  Dispense: 90 tablet; Refill: 1  -     levETIRAcetam (KEPPRA) 500 MG tablet; Take 1 tablet by mouth 2 (Two) Times a Day.  Dispense: 180 tablet; Refill: 3  -     levETIRAcetam (KEPPRA) 1000 MG tablet; Take 1 tablet by mouth 2 (Two) Times a Day.  Dispense: 180 tablet; Refill: 3    2. Bilateral carotid artery stenosis s/p L CEA 4/27/17. 50-69% R carotid stenosis  Comments:  continue aspirin, plavix, statin lifelong  Orders:  -     aspirin (aspirin EC) 325 MG EC tablet; Take 1 tablet by mouth Daily.  Dispense: 90 tablet; Refill: 3    3. Aphasia as late effect of cerebrovascular  accident  Comments:  monitor    4. History of CVA (cerebrovascular accident)  Comments:  continue aspirin, plavix, statin lifelong    5. Cognitive deficit as late effect of cerebrovascular accident (CVA)  Comments:  monitor, long term since 2017           Assessment & Plan  1. Seizure disorder.  He has not experienced any seizures since 2019. He will continue his current regimen of Keppra 500 mg and 1000 mg for a total of 1500 mg twice per day and Vimpat 150 mg, taking half a tablet twice per day for seizure prevention. The controlled substance agreement is on file.    2. Cerebrovascular accident (CVA) sequelae.  He has long-term expressive aphasia and chronic right-hand numbness from his stroke in 2017. His cognitive deficits are related to the CVA. He does not drive and lives with his significant other.    3. Bilateral carotid artery stenosis.  He has known bilateral carotid artery stenosis and is on aspirin 325 mg, Plavix 75 mg, and Lipitor 80 mg daily. He has completed his carotid ultrasound and needs to schedule a follow-up with cardiothoracic surgery at the [facility].    4. Chronic lymphocytic leukemia.  He has not seen his oncologist, Dr. Brent Breen, in approximately two years. He should consider rescheduling an appointment for ongoing management.    5. Joint pain and left-hand tremor.  He reports occasional joint pain and left-hand tremor, which have not changed significantly. A DaTscan was previously offered to evaluate for early Parkinson's, but his family declined. If symptoms worsen, further evaluation may be considered. He did not complete the previously ordered physical therapy as he feels it has not been helpful in the past. He is taking tizanidine prescribed by his PCP for muscle spasms.     Follow-up  He will follow up in 1 year or sooner if needed.    Reviewed medications, potential side effects and signs and symptoms to report. Discussed risk versus benefits of treatment plan with patient  "and/or family-including medications, labs and radiology that may be ordered. Addressed questions and concerns during visit. Patient and/or family verbalized understanding and agree with plan.    As part of this patient's treatment plan I am prescribing controlled substances. The patient has been made aware of appropriate use of such medications, including potential risk of somnolence, limited ability to drive and/or work safely, and potential for dependence or overdose. It has also been made clear that these medications are for use by the patient only, without concomitant use of alcohol or other substances unless prescribed. Keep out of reach of children.  Jose C report has been reviewed. If this is going to be prescribed long term, [facility] Controlled Substance Agreement Contract has also been read and signed by patient and myself.    Patient instructions include: No driving or operating heavy machinery, solo bathing or tub baths for 90 days from onset of most recent seizure, if they currently hold a license. Minimize stress as much as possible. Recommended 7-8 hours of sleep each night. Abstain from alcohol intake. Educated on Antiepileptic medications with possible side effects and signs and symptoms to report if prescribed during visit. Instructed to take seizure medication daily if prescribed. Reviewed potential seizure risk factors. Instructed to call 911 or our office if another seizure does occur.    Sudden Unexpected Death in Epilepsy is defined as \"the sudden, unexpected, witnessed or unwitnessed, non-traumatic, and non-drowning death in patients with epilepsy with or without evidence for a seizure, and excluding documented status epilepticus, in which postmortem examination does not reveal a structural or toxicological cause for death.\"-Epilepsy Foundation 2021. SUDEP is most commonly seen in Epilepsy patient's who's seizures are not well controlled or who have seizures during their sleep. This condition is " not fully understood. To reduce your risk of SUDEP, please take your seizure medications as prescribed, keep a diary of your seizures and when they occur and if your seizures are not well controlled, please notify us so we can collaborate with you to get your seizures better controlled. If you have additional questions, please visit: [email or web address].com for more information.    Discussed signs and symptoms of stroke and when to call 911. Instructed to follow a low fat diet including the Mediterranean diet. Instructed to take all medications daily as prescribed. Encouraged 30 minutes of exercise 3-4 times a week as tolerated. Stay well hydrated. Discussed potential side effects of new medications and signs and symptoms to report. If patient is currently using tobacco products, smoking cessation was encouraged. Reviewed stroke risk factors and stroke prevention plan. Patient and/or family verbalizes understanding and agrees with plan.     During this visit the following were done:  Labs Reviewed [x]    Labs Ordered []    Radiology Reports Reviewed [x]    Radiology Ordered []    PCP Records Reviewed [x]    Referring Provider Records Reviewed []    ER Records Reviewed []    Hospital Records Reviewed []    History Obtained From Family []    Radiology Images Reviewed []    Other Reviewed [x]    Records Requested []      07/14/25   11:16 EDT    Patient or patient representative verbalized consent for the use of Ambient Listening during the visit with  KINGSTON Alejandro for chart documentation. 7/14/2025  12:24 EDT    Note to patient: The 21st Century Cures Act makes medical notes like these available to patients in the interest of transparency. However, be advised this is a medical document. It is intended as peer to peer communication. It is written in medical language and may contain abbreviations or verbiage that are unfamiliar. It may appear blunt or direct. Medical documents are intended to carry relevant  information, facts as evident, and the clinical opinion of the provider.

## 2025-07-21 DIAGNOSIS — I10 ESSENTIAL HYPERTENSION: Chronic | ICD-10-CM

## 2025-07-21 RX ORDER — METOPROLOL SUCCINATE 200 MG/1
200 TABLET, EXTENDED RELEASE ORAL DAILY
Qty: 90 TABLET | Refills: 0 | Status: SHIPPED | OUTPATIENT
Start: 2025-07-21

## 2025-07-21 NOTE — TELEPHONE ENCOUNTER
Caller: Donna Davis    Relationship: Emergency Contact    Best call back number: [phone]    Requested Prescriptions:   Requested Prescriptions     Pending Prescriptions Disp Refills    metoprolol succinate XL (TOPROL-XL) 200 MG 24 hr tablet 90 tablet 0     Sig: Take 1 tablet by mouth Daily.        Pharmacy where request should be sent: [facility] [address] [facility] [phone] [facility] [phone]      Last office visit with prescribing clinician: 2/10/2025   Last telemedicine visit with prescribing clinician: Visit date not found   Next office visit with prescribing clinician: 8/11/2025     Additional details provided by patient: PATIENT IS OUT OF MEDICATION    Does the patient have less than a 3 day supply:  [x] Yes  [] No      No    Anila Sharma MA   07/21/25 13:43 EDT

## 2025-07-22 ENCOUNTER — TRANSCRIBE ORDERS (OUTPATIENT)
Dept: CARDIAC SURGERY | Facility: CLINIC | Age: 65
End: 2025-07-22
Payer: MEDICAID

## 2025-07-22 ENCOUNTER — HOSPITAL ENCOUNTER (OUTPATIENT)
Dept: ULTRASOUND IMAGING | Facility: HOSPITAL | Age: 65
Discharge: HOME | End: 2025-07-22
Admitting: NURSE PRACTITIONER
Payer: MEDICAID

## 2025-07-22 DIAGNOSIS — I73.9 PERIPHERAL VASCULAR DISEASE, UNSPECIFIED: Primary | ICD-10-CM

## 2025-07-22 DIAGNOSIS — I73.9 PERIPHERAL VASCULAR DISEASE, UNSPECIFIED: ICD-10-CM

## 2025-07-22 PROCEDURE — 93925 LOWER EXTREMITY STUDY: CPT

## 2025-07-22 PROCEDURE — 93925 LOWER EXTREMITY STUDY: CPT | Performed by: RADIOLOGY

## 2025-07-28 DIAGNOSIS — E78.5 DYSLIPIDEMIA: Chronic | ICD-10-CM

## 2025-07-28 DIAGNOSIS — I10 ESSENTIAL HYPERTENSION: Chronic | ICD-10-CM

## 2025-07-28 RX ORDER — AMLODIPINE BESYLATE 10 MG/1
10 TABLET ORAL DAILY
Qty: 90 TABLET | Refills: 0 | Status: SHIPPED | OUTPATIENT
Start: 2025-07-28

## 2025-07-28 RX ORDER — ATORVASTATIN CALCIUM 80 MG/1
80 TABLET, FILM COATED ORAL DAILY
Qty: 90 TABLET | Refills: 0 | Status: SHIPPED | OUTPATIENT
Start: 2025-07-28

## 2025-07-28 NOTE — TELEPHONE ENCOUNTER
Caller: DavisDonna    Relationship: Emergency Contact    Best call back number: [phone]     Requested Prescriptions:   Requested Prescriptions     Pending Prescriptions Disp Refills    amLODIPine (NORVASC) 10 MG tablet 90 tablet 1     Sig: Take 1 tablet by mouth Daily.    atorvastatin (LIPITOR) 80 MG tablet 90 tablet 1     Sig: Take 1 tablet by mouth Daily.        Pharmacy where request should be sent: [facility] [address] [facility] [phone] [facility] [phone]      Last office visit with prescribing clinician: 2/10/2025   Last telemedicine visit with prescribing clinician: Visit date not found   Next office visit with prescribing clinician: 8/11/2025     Additional details provided by patient: PATIENT IS OUT OF MEDICATION.     Does the patient have less than a 3 day supply:  [x] Yes  [] No        Maged Means Rep   07/28/25 15:29 EDT

## 2025-07-28 NOTE — TELEPHONE ENCOUNTER
Amlodipine  Last pertinent visit 2/10/25   Next visit 8/11/25  Last rx 1/28/25 6mo    Atorvastatin  Last pertinent visit 2/10/25   Next visit 8/11/25  Last rx 1/28/25 6mo

## 2025-08-07 DIAGNOSIS — E11.59 TYPE 2 DIABETES MELLITUS WITH OTHER CIRCULATORY COMPLICATION, WITHOUT LONG-TERM CURRENT USE OF INSULIN: Chronic | ICD-10-CM

## 2025-08-14 ENCOUNTER — OFFICE VISIT (OUTPATIENT)
Dept: FAMILY MEDICINE CLINIC | Facility: CLINIC | Age: 65
End: 2025-08-14
Payer: MEDICAID

## 2025-08-14 VITALS
WEIGHT: 197.8 LBS | BODY MASS INDEX: 31.79 KG/M2 | HEART RATE: 62 BPM | OXYGEN SATURATION: 97 % | SYSTOLIC BLOOD PRESSURE: 132 MMHG | DIASTOLIC BLOOD PRESSURE: 82 MMHG | HEIGHT: 66 IN | TEMPERATURE: 97.7 F

## 2025-08-14 DIAGNOSIS — I63.9 CEREBROVASCULAR ACCIDENT (CVA), UNSPECIFIED MECHANISM: Chronic | ICD-10-CM

## 2025-08-14 DIAGNOSIS — G89.29 CHRONIC BILATERAL LOW BACK PAIN WITHOUT SCIATICA: Chronic | ICD-10-CM

## 2025-08-14 DIAGNOSIS — E11.59 TYPE 2 DIABETES MELLITUS WITH OTHER CIRCULATORY COMPLICATION, WITHOUT LONG-TERM CURRENT USE OF INSULIN: Chronic | ICD-10-CM

## 2025-08-14 DIAGNOSIS — E78.5 DYSLIPIDEMIA: Chronic | ICD-10-CM

## 2025-08-14 DIAGNOSIS — M54.50 CHRONIC BILATERAL LOW BACK PAIN WITHOUT SCIATICA: Chronic | ICD-10-CM

## 2025-08-14 DIAGNOSIS — I65.23 BILATERAL CAROTID ARTERY STENOSIS: Chronic | ICD-10-CM

## 2025-08-14 DIAGNOSIS — Z12.5 SCREENING PSA (PROSTATE SPECIFIC ANTIGEN): ICD-10-CM

## 2025-08-14 DIAGNOSIS — I10 ESSENTIAL HYPERTENSION: Primary | ICD-10-CM

## 2025-08-14 PROCEDURE — 80061 LIPID PANEL: CPT | Performed by: NURSE PRACTITIONER

## 2025-08-14 PROCEDURE — 80053 COMPREHEN METABOLIC PANEL: CPT | Performed by: NURSE PRACTITIONER

## 2025-08-14 PROCEDURE — 82043 UR ALBUMIN QUANTITATIVE: CPT | Performed by: NURSE PRACTITIONER

## 2025-08-14 PROCEDURE — G0103 PSA SCREENING: HCPCS | Performed by: NURSE PRACTITIONER

## 2025-08-14 PROCEDURE — 82570 ASSAY OF URINE CREATININE: CPT | Performed by: NURSE PRACTITIONER

## 2025-08-14 PROCEDURE — 85007 BL SMEAR W/DIFF WBC COUNT: CPT | Performed by: NURSE PRACTITIONER

## 2025-08-14 PROCEDURE — 85025 COMPLETE CBC W/AUTO DIFF WBC: CPT | Performed by: NURSE PRACTITIONER

## 2025-08-14 PROCEDURE — 83036 HEMOGLOBIN GLYCOSYLATED A1C: CPT | Performed by: NURSE PRACTITIONER

## 2025-08-14 RX ORDER — ATORVASTATIN CALCIUM 80 MG/1
80 TABLET, FILM COATED ORAL DAILY
Qty: 90 TABLET | Refills: 1 | Status: SHIPPED | OUTPATIENT
Start: 2025-08-14

## 2025-08-14 RX ORDER — LOSARTAN POTASSIUM 100 MG/1
100 TABLET ORAL DAILY
Qty: 180 TABLET | Refills: 1 | Status: SHIPPED | OUTPATIENT
Start: 2025-08-14

## 2025-08-14 RX ORDER — METOPROLOL SUCCINATE 200 MG/1
200 TABLET, EXTENDED RELEASE ORAL DAILY
Qty: 90 TABLET | Refills: 1 | Status: SHIPPED | OUTPATIENT
Start: 2025-08-14

## 2025-08-14 RX ORDER — HYDROCHLOROTHIAZIDE 12.5 MG/1
12.5 TABLET ORAL DAILY
Qty: 90 TABLET | Refills: 1 | Status: SHIPPED | OUTPATIENT
Start: 2025-08-14

## 2025-08-14 RX ORDER — CLOPIDOGREL BISULFATE 75 MG/1
75 TABLET ORAL DAILY
Qty: 90 TABLET | Refills: 1 | Status: SHIPPED | OUTPATIENT
Start: 2025-08-14

## 2025-08-14 RX ORDER — AMLODIPINE BESYLATE 10 MG/1
10 TABLET ORAL DAILY
Qty: 90 TABLET | Refills: 1 | Status: SHIPPED | OUTPATIENT
Start: 2025-08-14

## 2025-08-14 RX ORDER — TIZANIDINE HYDROCHLORIDE 2 MG/1
2 CAPSULE, GELATIN COATED ORAL 3 TIMES DAILY
Qty: 90 CAPSULE | Refills: 5 | Status: SHIPPED | OUTPATIENT
Start: 2025-08-14

## 2025-08-15 LAB
ALBUMIN SERPL-MCNC: 4.5 G/DL (ref 3.5–5.2)
ALBUMIN UR-MCNC: <1.2 MG/DL
ALBUMIN/GLOB SERPL: 1.6 G/DL
ALP SERPL-CCNC: 66 U/L (ref 39–117)
ALT SERPL W P-5'-P-CCNC: 17 U/L (ref 1–41)
ANION GAP SERPL CALCULATED.3IONS-SCNC: 12.3 MMOL/L (ref 5–15)
AST SERPL-CCNC: 10 U/L (ref 1–40)
BILIRUB SERPL-MCNC: 0.4 MG/DL (ref 0–1.2)
BUN SERPL-MCNC: 10 MG/DL (ref 8–23)
BUN/CREAT SERPL: 9.3 (ref 7–25)
CALCIUM SPEC-SCNC: 10 MG/DL (ref 8.6–10.5)
CHLORIDE SERPL-SCNC: 98 MMOL/L (ref 98–107)
CHOLEST SERPL-MCNC: 129 MG/DL (ref 0–200)
CO2 SERPL-SCNC: 25.7 MMOL/L (ref 22–29)
CREAT SERPL-MCNC: 1.08 MG/DL (ref 0.76–1.27)
CREAT UR-MCNC: 21 MG/DL
DACRYOCYTES BLD QL SMEAR: ABNORMAL
DEPRECATED RDW RBC AUTO: 43.8 FL (ref 37–54)
EGFRCR SERPLBLD CKD-EPI 2021: 77.1 ML/MIN/1.73
ERYTHROCYTE [DISTWIDTH] IN BLOOD BY AUTOMATED COUNT: 12.8 % (ref 12.3–15.4)
GLOBULIN UR ELPH-MCNC: 2.9 GM/DL
GLUCOSE SERPL-MCNC: 169 MG/DL (ref 65–99)
HBA1C MFR BLD: 8 % (ref 4.8–5.6)
HCT VFR BLD AUTO: 44.2 % (ref 37.5–51)
HDLC SERPL-MCNC: 26 MG/DL (ref 40–60)
HGB BLD-MCNC: 15.1 G/DL (ref 13–17.7)
LDLC SERPL CALC-MCNC: 40 MG/DL (ref 0–100)
LDLC/HDLC SERPL: 0.63 {RATIO}
LYMPHOCYTES # BLD MANUAL: 9.41 10*3/MM3 (ref 0.7–3.1)
LYMPHOCYTES NFR BLD MANUAL: 8 % (ref 5–12)
MCH RBC QN AUTO: 32 PG (ref 26.6–33)
MCHC RBC AUTO-ENTMCNC: 34.2 G/DL (ref 31.5–35.7)
MCV RBC AUTO: 93.6 FL (ref 79–97)
MICROALBUMIN/CREAT UR: NORMAL MG/G{CREAT}
MONOCYTES # BLD: 1.21 10*3/MM3 (ref 0.1–0.9)
NEUTROPHILS # BLD AUTO: 4.55 10*3/MM3 (ref 1.7–7)
NEUTROPHILS NFR BLD MANUAL: 30 % (ref 42.7–76)
NRBC BLD AUTO-RTO: 0 /100 WBC (ref 0–0.2)
PLAT MORPH BLD: NORMAL
PLATELET # BLD AUTO: 236 10*3/MM3 (ref 140–450)
PMV BLD AUTO: 9.2 FL (ref 6–12)
POTASSIUM SERPL-SCNC: 4.2 MMOL/L (ref 3.5–5.2)
PROT SERPL-MCNC: 7.4 G/DL (ref 6–8.5)
PSA SERPL-MCNC: 0.85 NG/ML (ref 0–4)
RBC # BLD AUTO: 4.72 10*6/MM3 (ref 4.14–5.8)
SMUDGE CELLS BLD QL SMEAR: ABNORMAL
SODIUM SERPL-SCNC: 136 MMOL/L (ref 136–145)
TRIGL SERPL-MCNC: 433 MG/DL (ref 0–150)
VARIANT LYMPHS NFR BLD MANUAL: 62 % (ref 19.6–45.3)
VLDLC SERPL-MCNC: 63 MG/DL (ref 5–40)
WBC NRBC COR # BLD AUTO: 15.17 10*3/MM3 (ref 3.4–10.8)

## 2025-08-27 ENCOUNTER — OFFICE VISIT (OUTPATIENT)
Dept: CARDIAC SURGERY | Facility: CLINIC | Age: 65
End: 2025-08-27
Payer: MEDICAID

## 2025-08-27 VITALS
TEMPERATURE: 98.7 F | HEART RATE: 98 BPM | WEIGHT: 195.8 LBS | BODY MASS INDEX: 31.47 KG/M2 | DIASTOLIC BLOOD PRESSURE: 78 MMHG | SYSTOLIC BLOOD PRESSURE: 132 MMHG | HEIGHT: 66 IN | OXYGEN SATURATION: 98 %

## 2025-08-27 DIAGNOSIS — I70.0 AORTIC OCCLUSION: ICD-10-CM

## 2025-08-27 DIAGNOSIS — I73.9 PERIPHERAL ARTERY DISEASE: Primary | ICD-10-CM

## 2025-08-27 PROCEDURE — 3074F SYST BP LT 130 MM HG: CPT | Performed by: NURSE PRACTITIONER

## 2025-08-27 PROCEDURE — 99213 OFFICE O/P EST LOW 20 MIN: CPT | Performed by: NURSE PRACTITIONER

## 2025-08-27 PROCEDURE — 1160F RVW MEDS BY RX/DR IN RCRD: CPT | Performed by: NURSE PRACTITIONER

## 2025-08-27 PROCEDURE — 3078F DIAST BP <80 MM HG: CPT | Performed by: NURSE PRACTITIONER

## 2025-08-27 PROCEDURE — 1159F MED LIST DOCD IN RCRD: CPT | Performed by: NURSE PRACTITIONER

## (undated) DEVICE — 3M™ IOBAN™ 2 ANTIMICROBIAL INCISE DRAPE 6651EZ: Brand: IOBAN™ 2

## (undated) DEVICE — ENCORE® LATEX MICRO SIZE 7, STERILE LATEX POWDER-FREE SURGICAL GLOVE: Brand: ENCORE

## (undated) DEVICE — SUT SILK 3/0 TIES 18IN A184H

## (undated) DEVICE — PROXIMATE RH ROTATING HEAD SKIN STAPLERS (35 WIDE) CONTAINS 35 STAINLESS STEEL STAPLES: Brand: PROXIMATE

## (undated) DEVICE — SUT PROLN 6/0 C1 CARDIO 18IN 8718H

## (undated) DEVICE — MAGNETIC DRAPE: Brand: DEVON

## (undated) DEVICE — PK VASC 10

## (undated) DEVICE — 3M™ STERI-STRIP™ REINFORCED ADHESIVE SKIN CLOSURES, R1547, 1/2 IN X 4 IN (12 MM X 100 MM), 6 STRIPS/ENVELOPE: Brand: 3M™ STERI-STRIP™

## (undated) DEVICE — DRN PENRS 1X18IN LTX

## (undated) DEVICE — ELECTRD BLD EXT EDGE 1P COAT 6.5IN STRL

## (undated) DEVICE — SHNT BYPASS JAVID CAROTID TEMP TYP1852 17FTO10F 27.5CM

## (undated) DEVICE — SUT PROLN 6/0 C1 D/A 30IN 8706H

## (undated) DEVICE — FLOSEAL MATRIX IS INDICATED IN SURGICAL PROCEDURES (OTHER THAN IN OPHTHALMIC) AS AN ADJUNCT TO HEMOSTASIS WHEN CONTROL OF BLEEDING BY LIGATURE OR CONVENTIONALPROCEDURES IS INEFFECTIVE OR IMPRACTICAL.: Brand: FLOSEAL HEMOSTATIC MATRIX

## (undated) DEVICE — TAPE MICROFM 2IN LF

## (undated) DEVICE — CVR HNDL LT SURG ACCSSRY BLU STRL

## (undated) DEVICE — DECANT BG O JET

## (undated) DEVICE — COVADERM PLUS: Brand: DEROYAL

## (undated) DEVICE — MEDI-VAC YANKAUER SUCTION HANDLE W/BULBOUS TIP: Brand: CARDINAL HEALTH

## (undated) DEVICE — SUT PROLN 4/0 V5 36IN 8935H

## (undated) DEVICE — ANTIBACTERIAL UNDYED BRAIDED (POLYGLACTIN 910), SYNTHETIC ABSORBABLE SUTURE: Brand: COATED VICRYL

## (undated) DEVICE — COVADERM: Brand: DEROYAL

## (undated) DEVICE — JP PERF DRN SIL FLT 10MM FULL: Brand: CARDINAL HEALTH

## (undated) DEVICE — SAFESECURE,SECUREMENT,FOLEY CATH,STERILE: Brand: MEDLINE

## (undated) DEVICE — CATHETER,URETHRAL,REDRUBBER,STERILE,20FR: Brand: MEDLINE

## (undated) DEVICE — MEDI-VAC NON-CONDUCTIVE SUCTION TUBING: Brand: CARDINAL HEALTH

## (undated) DEVICE — SKIN AFFIX SURG ADHESIVE 72/CS 0.55ML: Brand: MEDLINE

## (undated) DEVICE — IRRIGATOR BULB ASEPTO 60CC STRL

## (undated) DEVICE — JP PERF DRN SIL FLT 7MM FULL: Brand: CARDINAL HEALTH

## (undated) DEVICE — APPL CHLORAPREP W/TINT 26ML BLU

## (undated) DEVICE — SMALL VISCERA RETAINER: Brand: VISCERA RETAINER, FISH

## (undated) DEVICE — SUT PROLN 4/0 V7 36IN 8975H

## (undated) DEVICE — JACKSON-PRATT 100CC BULB RESERVOIR: Brand: CARDINAL HEALTH

## (undated) DEVICE — DISPOSABLE IRRIGATION BIPOLAR CORD, M1000 TYPE: Brand: KIRWAN

## (undated) DEVICE — PROVIDES A STERILE INTERFACE BETWEEN THE OPERATING ROOM SURGICAL LAMPS (NON-STERILE) AND THE SURGEON OR NURSE (STERILE).: Brand: STERION®CLAMP COVER FABRIC

## (undated) DEVICE — 1 ML TUBERCULIN SYRINGE REGULAR TIP: Brand: MONOJECT

## (undated) DEVICE — CLTH CLENS READYCLEANSE PERI CARE PK/5

## (undated) DEVICE — SUT SILK 4/0 TIES 18IN A183H

## (undated) DEVICE — GLV SURG PREMIERPRO MIC LTX PF SZ7 BRN

## (undated) DEVICE — SUT PROLN 7/0 BV1 D/A 24IN 8702H

## (undated) DEVICE — ENCORE® LATEX MICRO SIZE 6.5, STERILE LATEX POWDER-FREE SURGICAL GLOVE: Brand: ENCORE

## (undated) DEVICE — SUT VIC 2 TP1 54IN J880T

## (undated) DEVICE — INTENDED TO BE USED TO OCCLUDE, RETRACT AND IDENTIFY ARTERIES, VEINS, TENDONS AND NERVES IN SURGICAL PROCEDURES: Brand: STERION®  VESSEL LOOP

## (undated) DEVICE — SPNG GZ WOVN 4X4IN 12PLY 10/BX STRL

## (undated) DEVICE — TOTAL TRAY, 16FR 10ML SIL FOLEY, URN: Brand: MEDLINE

## (undated) DEVICE — FOGARTY - HYDRAGRIP SURGICAL - CLAMP INSERTS: Brand: FOGARTY SOFTJAW

## (undated) DEVICE — SUT PDS LP 1 TP1 96IN VIO PDP880GA

## (undated) DEVICE — NEURO SPONGES: Brand: DEROYAL

## (undated) DEVICE — TOWEL,OR,DSP,ST,WHITE,DLX,XR,4/PK,20PK/C: Brand: MEDLINE

## (undated) DEVICE — SUT SILK 2/0 TIES 18IN A185H

## (undated) DEVICE — ENCORE® LATEX MICRO SIZE 7.5, STERILE LATEX POWDER-FREE SURGICAL GLOVE: Brand: ENCORE

## (undated) DEVICE — SYS SKIN CLS DERMABOND PRINEO W/22CM MESH TP

## (undated) DEVICE — AIRWY 90MM NO9

## (undated) DEVICE — DRP SLUSH WARM STRL 44X44IN

## (undated) DEVICE — NDL HYPO SFTY PROEDGE 27G 1 1/4IN GRY

## (undated) DEVICE — SUT PROLN 3/0 V7 D/A 36IN 8976H

## (undated) DEVICE — INCISIONLINE PLEDGET SFT 22X1 2.75MM

## (undated) DEVICE — SUCTION CANISTER, 2500CC, RIGID: Brand: DEROYAL

## (undated) DEVICE — DRSNG SURG AQUACEL AG 9X15CM

## (undated) DEVICE — CANNULA,OXY,ADULT,SUPERSOFT,W/7'TUB,UC: Brand: MEDLINE

## (undated) DEVICE — PK NEURO DISC 10

## (undated) DEVICE — SUT SILK 2/0 SH 30IN K833H